# Patient Record
Sex: FEMALE | Race: WHITE | NOT HISPANIC OR LATINO | Employment: FULL TIME | URBAN - METROPOLITAN AREA
[De-identification: names, ages, dates, MRNs, and addresses within clinical notes are randomized per-mention and may not be internally consistent; named-entity substitution may affect disease eponyms.]

---

## 2019-03-18 ENCOUNTER — OFFICE VISIT (OUTPATIENT)
Dept: URGENT CARE | Facility: CLINIC | Age: 39
End: 2019-03-18
Payer: COMMERCIAL

## 2019-03-18 VITALS
BODY MASS INDEX: 49.51 KG/M2 | WEIGHT: 290 LBS | DIASTOLIC BLOOD PRESSURE: 80 MMHG | TEMPERATURE: 97 F | HEIGHT: 64 IN | HEART RATE: 88 BPM | OXYGEN SATURATION: 99 % | SYSTOLIC BLOOD PRESSURE: 130 MMHG

## 2019-03-18 DIAGNOSIS — N30.01 ACUTE CYSTITIS WITH HEMATURIA: Primary | ICD-10-CM

## 2019-03-18 DIAGNOSIS — R30.0 DYSURIA: ICD-10-CM

## 2019-03-18 LAB
BILIRUB UR QL STRIP: NEGATIVE
GLUCOSE SERPL-MCNC: 103 MG/DL (ref 70–110)
GLUCOSE UR QL STRIP: NEGATIVE
KETONES UR QL STRIP: NEGATIVE
LEUKOCYTE ESTERASE UR QL STRIP: POSITIVE
PH, POC UA: 5.5 (ref 5–8)
POC ANION GAP: 17 MMOL/L (ref 10–20)
POC BLOOD, URINE: POSITIVE
POC BUN: 14 MMOL/L (ref 8–26)
POC CHLORIDE: 101 MMOL/L (ref 98–109)
POC CREATININE: 1 MG/DL (ref 0.6–1.3)
POC HEMATOCRIT: 38 %PCV (ref 37–47)
POC HEMOGLOBIN: 12.9 G/DL (ref 12.5–16)
POC ICA: 1.16 MMOL/L (ref 1.12–1.32)
POC NITRATES, URINE: POSITIVE
POC POTASSIUM: 4.1 MMOL/L (ref 3.5–4.9)
POC SODIUM: 140 MMOL/L (ref 138–146)
POC TCO2: 27 MMOL/L (ref 24–29)
PROT UR QL STRIP: NEGATIVE
SP GR UR STRIP: 1.01 (ref 1–1.03)
UROBILINOGEN UR STRIP-ACNC: NORMAL (ref 0.1–1.1)

## 2019-03-18 PROCEDURE — 81003 URINALYSIS AUTO W/O SCOPE: CPT | Mod: QW,S$GLB,, | Performed by: PHYSICIAN ASSISTANT

## 2019-03-18 PROCEDURE — 99203 PR OFFICE/OUTPT VISIT, NEW, LEVL III, 30-44 MIN: ICD-10-PCS | Mod: 25,S$GLB,, | Performed by: PHYSICIAN ASSISTANT

## 2019-03-18 PROCEDURE — 3008F PR BODY MASS INDEX (BMI) DOCUMENTED: ICD-10-PCS | Mod: CPTII,S$GLB,, | Performed by: PHYSICIAN ASSISTANT

## 2019-03-18 PROCEDURE — 80047 POCT CHEMISTRY PANEL: ICD-10-PCS | Mod: QW,S$GLB,, | Performed by: PHYSICIAN ASSISTANT

## 2019-03-18 PROCEDURE — 87088 URINE BACTERIA CULTURE: CPT

## 2019-03-18 PROCEDURE — 3008F BODY MASS INDEX DOCD: CPT | Mod: CPTII,S$GLB,, | Performed by: PHYSICIAN ASSISTANT

## 2019-03-18 PROCEDURE — 80047 BASIC METABLC PNL IONIZED CA: CPT | Mod: QW,S$GLB,, | Performed by: PHYSICIAN ASSISTANT

## 2019-03-18 PROCEDURE — 87086 URINE CULTURE/COLONY COUNT: CPT

## 2019-03-18 PROCEDURE — 99203 OFFICE O/P NEW LOW 30 MIN: CPT | Mod: 25,S$GLB,, | Performed by: PHYSICIAN ASSISTANT

## 2019-03-18 PROCEDURE — 87077 CULTURE AEROBIC IDENTIFY: CPT

## 2019-03-18 PROCEDURE — 87186 SC STD MICRODIL/AGAR DIL: CPT

## 2019-03-18 PROCEDURE — 81003 POCT URINALYSIS, DIPSTICK, AUTOMATED, W/O SCOPE: ICD-10-PCS | Mod: QW,S$GLB,, | Performed by: PHYSICIAN ASSISTANT

## 2019-03-18 RX ORDER — SULFAMETHOXAZOLE AND TRIMETHOPRIM 800; 160 MG/1; MG/1
1 TABLET ORAL 2 TIMES DAILY
Qty: 14 TABLET | Refills: 0 | Status: SHIPPED | OUTPATIENT
Start: 2019-03-18 | End: 2019-03-25

## 2019-03-18 RX ORDER — PHENAZOPYRIDINE HYDROCHLORIDE 200 MG/1
200 TABLET, FILM COATED ORAL 3 TIMES DAILY PRN
Qty: 6 TABLET | Refills: 0 | Status: SHIPPED | OUTPATIENT
Start: 2019-03-18 | End: 2019-03-20

## 2019-03-18 NOTE — PROGRESS NOTES
"Subjective:       Patient ID: Fran Higgins is a 38 y.o. female.    Vitals:  height is 5' 4" (1.626 m) and weight is 131.5 kg (290 lb). Her temperature is 96.6 °F (35.9 °C). Her blood pressure is 130/80 and her pulse is 88. Her oxygen saturation is 99%.     Chief Complaint: Urinary Tract Infection    Patient reports dysuria, urinary frequency and urgency x 2 days. Patient reports no fever, back/flank pain, abdominal pain, N/V/D/C, headache, blurry vision, dizziness or weakness.       Urinary Tract Infection    This is a new problem. The current episode started yesterday. The problem occurs every urination. The problem has been gradually worsening. The quality of the pain is described as burning and stabbing. The pain is at a severity of 9/10. The pain is severe. There has been no fever. She is sexually active. There is no history of pyelonephritis. Associated symptoms include chills, frequency, hematuria and urgency. Pertinent negatives include no flank pain, nausea, vomiting, constipation or rash. She has tried nothing for the symptoms. The treatment provided no relief.       Constitution: Positive for chills. Negative for sweating, fatigue and fever.   HENT: Negative for ear pain, postnasal drip, sinus pain, sinus pressure, sore throat and trouble swallowing.    Neck: Negative for painful lymph nodes.   Cardiovascular: Negative for chest pain, leg swelling, palpitations, sob on exertion and passing out.   Eyes: Negative for eye pain, photophobia, double vision and blurred vision.   Respiratory: Negative for chest tightness, cough, sputum production, bloody sputum, COPD, shortness of breath, stridor and wheezing.    Gastrointestinal: Negative for abdominal pain, nausea, vomiting, constipation and diarrhea.   Genitourinary: Positive for dysuria, frequency, urgency and hematuria. Negative for urine decreased, flank pain, bladder incontinence, bed wetting, history of kidney stones, painful menstruation, irregular " menstruation, missed menses, heavy menstrual bleeding, ovarian cysts, genital trauma, vaginal pain, vaginal discharge, vaginal bleeding, vaginal odor, painful intercourse, genital sore, painful ejaculation and pelvic pain.   Musculoskeletal: Negative for joint pain, joint swelling, back pain, muscle cramps and muscle ache.   Skin: Negative for rash and lesion.   Neurological: Negative for dizziness, light-headedness, passing out, headaches, disorientation, altered mental status and loss of consciousness.   Hematologic/Lymphatic: Negative for swollen lymph nodes.   Psychiatric/Behavioral: Negative for altered mental status and disorientation.       Objective:      Physical Exam   Constitutional: She is oriented to person, place, and time. She appears well-developed and well-nourished.  Non-toxic appearance. She does not have a sickly appearance. She does not appear ill. No distress.   HENT:   Head: Normocephalic and atraumatic.   Right Ear: Hearing, tympanic membrane, external ear and ear canal normal.   Left Ear: Hearing, tympanic membrane, external ear and ear canal normal.   Nose: Nose normal. No nasal deformity. No epistaxis.   Mouth/Throat: Oropharynx is clear and moist and mucous membranes are normal.   Eyes: Conjunctivae and lids are normal.   Neck: Trachea normal, normal range of motion and phonation normal. Neck supple.   Cardiovascular: Normal rate, regular rhythm, normal heart sounds and normal pulses.   Pulmonary/Chest: Effort normal and breath sounds normal. No accessory muscle usage or stridor. She has no decreased breath sounds. She has no wheezes. She has no rhonchi. She has no rales.   Abdominal: Soft. Normal appearance and bowel sounds are normal. She exhibits no distension and no mass. There is no tenderness. There is no rigidity, no rebound, no guarding, no CVA tenderness, no tenderness at McBurney's point and negative Nolan's sign. No hernia.   Lymphadenopathy:     She has no cervical  adenopathy.   Neurological: She is alert and oriented to person, place, and time.   Skin: Skin is warm, dry and intact. No rash noted.   Psychiatric: She has a normal mood and affect. Her speech is normal and behavior is normal. Cognition and memory are normal.   Nursing note and vitals reviewed.        Results for orders placed or performed in visit on 03/18/19   POCT Urinalysis, Dipstick, Automated, W/O Scope   Result Value Ref Range    POC Blood, Urine Positive (A) Negative    POC Bilirubin, Urine Negative Negative    POC Urobilinogen, Urine normal 0.1 - 1.1    POC Ketones, Urine Negative Negative    POC Protein, Urine Negative Negative    POC Nitrates, Urine Positive (A) Negative    POC Glucose, Urine Negative Negative    pH, UA 5.5 5 - 8    POC Specific Gravity, Urine 1.015 1.003 - 1.029    POC Leukocytes, Urine Positive (A) Negative   POCT Chemistry Panel   Result Value Ref Range    POC Sodium 140 138 - 146 MMOL/L    POC Potassium 4.1 3.5 - 4.9 MMOL/L    POC Chloride 101 98 - 109 MMOL/L    POC BUN 14 8 - 26 MMOL/L    POC Glucose 103 70 - 110 MG/DL    POC Creatinine 1.0 0.6 - 1.3 mg/dL    POC iCA 1.16 1.12 - 1.32 MMOL/L    POC TCO2 27 24 - 29 MMOL/L    POC Hematocrit 38 37 - 47 %PCV    POC Hemoglobin 12.9 12.5 - 16 g/dL    POC Anion Gap 17 10.0 - 20 MMOL/L       Assessment:       1. Acute cystitis with hematuria    2. Dysuria        Plan:         Acute cystitis with hematuria  -     POCT Urinalysis, Dipstick, Automated, W/O Scope  -     Culture, Urine  -     sulfamethoxazole-trimethoprim 800-160mg (BACTRIM DS) 800-160 mg Tab; Take 1 tablet by mouth 2 (two) times daily. for 7 days  Dispense: 14 tablet; Refill: 0  -     phenazopyridine (PYRIDIUM) 200 MG tablet; Take 1 tablet (200 mg total) by mouth 3 (three) times daily as needed for Pain.  Dispense: 6 tablet; Refill: 0  -     POCT Chemistry Panel    Dysuria  -     POCT Urinalysis, Dipstick, Automated, W/O Scope  -     Culture, Urine      Patient Instructions  "  If you were prescribed a narcotic or controlled medication, do not drive or operate heavy equipment or machinery while taking these medications.  You must understand that you've received an Urgent Care treatment only and that you may be released before all your medical problems are known or treated. You, the patient, will arrange for follow up care as instructed.  Follow up with your PCP or specialty clinic as directed if not improved or as needed. You can call [phone] to schedule an appointment with the appropriate provider.  If your condition worsens we recommend that you receive another evaluation at the Emergency Department for any concerns or worsening of condition.  Patient aware and verbalized understanding.    Discussed UA results with patient.  We will call you in the next 3-5 days for Culture results.  Patient aware and verbalized understanding.    Bladder Infection, Female (Adult)    Urine is normally doesn't have any bacteria in it. But bacteria can get into the urinary tract from the skin around the rectum. Or they can travel in the blood from elsewhere in the body. Once they are in your urinary tract, they can cause infection in the urethra (urethritis), the bladder (cystitis), or the kidneys (pyelonephritis).  The most common place for an infection is in the bladder. This is called a bladder infection. This is one of the most common infections in women. Most bladder infections are easily treated. They are not serious unless the infection spreads to the kidney.  The phrases "bladder infection," "UTI," and "cystitis" are often used to describe the same thing. But they are not always the same. Cystitis is an inflammation of the bladder. The most common cause of cystitis is an infection.  Symptoms  The infection causes inflammation in the urethra and bladder. This causes many of the symptoms. The most common symptoms of a bladder infection are:  · Pain or burning when urinating  · Having to " urinate more often than usual  · Urgent need to urinate  · Only a small amount of urine comes out  · Blood in urine  · Abdominal discomfort. This is usually in the lower abdomen above the pubic bone.  · Cloudy urine  · Strong- or bad-smelling urine  · Unable to urinate (urinary retention)  · Unable to hold urine in (urinary incontinence)  · Fever  · Loss of appetite  · Confusion (in older adults)  Causes  Bladder infections are not contagious. You can't get one from someone else, from a toilet seat, or from sharing a bath.  The most common cause of bladder infections is bacteria from the bowels. The bacteria get onto the skin around the opening of the urethra. From there, they can get into the urine and travel up to the bladder, causing inflammation and infection. This usually happens because of:  · Wiping improperly after urinating. Always wipe from front to back.  · Bowel incontinence  · Pregnancy  · Procedures such as having a catheter inserted  · Older age  · Not emptying your bladder. This can allow bacteria a chance to grow in your urine.  · Dehydration  · Constipation  · Sex  · Use of a diaphragm for birth control   Treatment  Bladder infections are diagnosed by a urine test. They are treated with antibiotics and usually clear up quickly without complications. Treatment helps prevent a more serious kidney infection.  Medicines  Medicines can help in the treatment of a bladder infection:  · Take antibiotics until they are used up, even if you feel better. It is important to finish them to make sure the infection has cleared.  · You can use acetaminophen or ibuprofen for pain, fever, or discomfort, unless another medicine was prescribed. If you have chronic liver or kidney disease, talk with your healthcare provider before using these medicines. Also talk with your provider if you've ever had a stomach ulcer or gastrointestinal bleeding, or are taking blood-thinner medicines.  · If you are  given phenazopydridine to reduce burning with urination, it will cause your urine to become a bright orange color. This can stain clothing.  Care and prevention  These self-care steps can help prevent future infections:  · Drink plenty of fluids to prevent dehydration and flush out your bladder. Do this unless you must restrict fluids for other health reasons, or your doctor told you not to.  · Proper cleaning after going to the bathroom is important. Wipe from front to back after using the toilet to prevent the spread of bacteria.  · Urinate more often. Don't try to hold urine in for a long time.  · Wear loose-fitting clothes and cotton underwear. Avoid tight-fitting pants.  · Improve your diet and prevent constipation. Eat more fresh fruit and vegetables, and fiber, and less junk and fatty foods.  · Avoid sex until your symptoms are gone.  · Avoid caffeine, alcohol, and spicy foods. These can irritate your bladder.  · Urinate right after intercourse to flush out your bladder.  · If you use birth control pills and have frequent bladder infections, discuss it with your doctor.  Follow-up care  Call your healthcare provider if all symptoms are not gone after 3 days of treatment. This is especially important if you have repeat infections.  If a culture was done, you will be told if your treatment needs to be changed. If directed, you can call to find out the results.  If X-rays were done, you will be told if the results will affect your treatment.  Call 911  Call 911 if any of the following occur:  · Trouble breathing  · Hard to wake up or confusion  · Fainting or loss of consciousness  · Rapid heart rate  When to seek medical advice  Call your healthcare provider right away if any of these occur:  · Fever of 100.4ºF (38.0ºC) or higher, or as directed by your healthcare provider  · Symptoms are not better by the third day of treatment  · Back or belly (abdominal) pain that gets worse  · Repeated vomiting, or unable to  keep medicine down  · Weakness or dizziness  · Vaginal discharge  · Pain, redness, or swelling in the outer vaginal area (labia)  Date Last Reviewed: 10/1/2016  © 8275-4014 [employer], [employer]. [address] [place] [address], [place] [address]. All rights reserved. This information is not intended as a substitute for professional medical care. Always follow your healthcare professional's instructions.

## 2019-03-18 NOTE — PATIENT INSTRUCTIONS
"If you were prescribed a narcotic or controlled medication, do not drive or operate heavy equipment or machinery while taking these medications.  You must understand that you've received an Urgent Care treatment only and that you may be released before all your medical problems are known or treated. You, the patient, will arrange for follow up care as instructed.  Follow up with your PCP or specialty clinic as directed if not improved or as needed. You can call [phone] to schedule an appointment with the appropriate provider.  If your condition worsens we recommend that you receive another evaluation at the Emergency Department for any concerns or worsening of condition.  Patient aware and verbalized understanding.    Discussed UA results with patient.  We will call you in the next 3-5 days for Culture results.  Patient aware and verbalized understanding.    Bladder Infection, Female (Adult)    Urine is normally doesn't have any bacteria in it. But bacteria can get into the urinary tract from the skin around the rectum. Or they can travel in the blood from elsewhere in the body. Once they are in your urinary tract, they can cause infection in the urethra (urethritis), the bladder (cystitis), or the kidneys (pyelonephritis).  The most common place for an infection is in the bladder. This is called a bladder infection. This is one of the most common infections in women. Most bladder infections are easily treated. They are not serious unless the infection spreads to the kidney.  The phrases "bladder infection," "UTI," and "cystitis" are often used to describe the same thing. But they are not always the same. Cystitis is an inflammation of the bladder. The most common cause of cystitis is an infection.  Symptoms  The infection causes inflammation in the urethra and bladder. This causes many of the symptoms. The most common symptoms of a bladder infection are:  · Pain or burning when urinating  · Having to urinate " more often than usual  · Urgent need to urinate  · Only a small amount of urine comes out  · Blood in urine  · Abdominal discomfort. This is usually in the lower abdomen above the pubic bone.  · Cloudy urine  · Strong- or bad-smelling urine  · Unable to urinate (urinary retention)  · Unable to hold urine in (urinary incontinence)  · Fever  · Loss of appetite  · Confusion (in older adults)  Causes  Bladder infections are not contagious. You can't get one from someone else, from a toilet seat, or from sharing a bath.  The most common cause of bladder infections is bacteria from the bowels. The bacteria get onto the skin around the opening of the urethra. From there, they can get into the urine and travel up to the bladder, causing inflammation and infection. This usually happens because of:  · Wiping improperly after urinating. Always wipe from front to back.  · Bowel incontinence  · Pregnancy  · Procedures such as having a catheter inserted  · Older age  · Not emptying your bladder. This can allow bacteria a chance to grow in your urine.  · Dehydration  · Constipation  · Sex  · Use of a diaphragm for birth control   Treatment  Bladder infections are diagnosed by a urine test. They are treated with antibiotics and usually clear up quickly without complications. Treatment helps prevent a more serious kidney infection.  Medicines  Medicines can help in the treatment of a bladder infection:  · Take antibiotics until they are used up, even if you feel better. It is important to finish them to make sure the infection has cleared.  · You can use acetaminophen or ibuprofen for pain, fever, or discomfort, unless another medicine was prescribed. If you have chronic liver or kidney disease, talk with your healthcare provider before using these medicines. Also talk with your provider if you've ever had a stomach ulcer or gastrointestinal bleeding, or are taking blood-thinner medicines.  · If you are given phenazopydridine to  reduce burning with urination, it will cause your urine to become a bright orange color. This can stain clothing.  Care and prevention  These self-care steps can help prevent future infections:  · Drink plenty of fluids to prevent dehydration and flush out your bladder. Do this unless you must restrict fluids for other health reasons, or your doctor told you not to.  · Proper cleaning after going to the bathroom is important. Wipe from front to back after using the toilet to prevent the spread of bacteria.  · Urinate more often. Don't try to hold urine in for a long time.  · Wear loose-fitting clothes and cotton underwear. Avoid tight-fitting pants.  · Improve your diet and prevent constipation. Eat more fresh fruit and vegetables, and fiber, and less junk and fatty foods.  · Avoid sex until your symptoms are gone.  · Avoid caffeine, alcohol, and spicy foods. These can irritate your bladder.  · Urinate right after intercourse to flush out your bladder.  · If you use birth control pills and have frequent bladder infections, discuss it with your doctor.  Follow-up care  Call your healthcare provider if all symptoms are not gone after 3 days of treatment. This is especially important if you have repeat infections.  If a culture was done, you will be told if your treatment needs to be changed. If directed, you can call to find out the results.  If X-rays were done, you will be told if the results will affect your treatment.  Call 911  Call 911 if any of the following occur:  · Trouble breathing  · Hard to wake up or confusion  · Fainting or loss of consciousness  · Rapid heart rate  When to seek medical advice  Call your healthcare provider right away if any of these occur:  · Fever of 100.4ºF (38.0ºC) or higher, or as directed by your healthcare provider  · Symptoms are not better by the third day of treatment  · Back or belly (abdominal) pain that gets worse  · Repeated vomiting, or unable to keep medicine  down  · Weakness or dizziness  · Vaginal discharge  · Pain, redness, or swelling in the outer vaginal area (labia)  Date Last Reviewed: 10/1/2016  © 4400-8004 [employer]. [address] [place] [address], [place] [address]. All rights reserved. This information is not intended as a substitute for professional medical care. Always follow your healthcare professional's instructions.

## 2019-03-21 LAB — BACTERIA UR CULT: NORMAL

## 2019-03-22 ENCOUNTER — TELEPHONE (OUTPATIENT)
Dept: URGENT CARE | Facility: CLINIC | Age: 39
End: 2019-03-22

## 2019-03-22 NOTE — TELEPHONE ENCOUNTER
Left vm for pt to call back for results.  ----- Message from Jose Estrella MD sent at 3/21/2019  2:21 PM CDT -----  Call patient and let know on correct antibiotic based on culture

## 2019-04-16 ENCOUNTER — HOSPITAL ENCOUNTER (OUTPATIENT)
Dept: RADIOLOGY | Facility: HOSPITAL | Age: 39
Discharge: HOME | End: 2019-04-16
Attending: PHYSICIAN ASSISTANT
Payer: COMMERCIAL

## 2019-04-16 ENCOUNTER — OFFICE VISIT (OUTPATIENT)
Dept: ORTHOPEDICS | Facility: CLINIC | Age: 39
End: 2019-04-16
Payer: COMMERCIAL

## 2019-04-16 VITALS — WEIGHT: 293 LBS | BODY MASS INDEX: 50.02 KG/M2 | HEIGHT: 64 IN

## 2019-04-16 DIAGNOSIS — M77.52 TENDINITIS OF LEFT FOOT: ICD-10-CM

## 2019-04-16 DIAGNOSIS — M79.672 LEFT FOOT PAIN: ICD-10-CM

## 2019-04-16 DIAGNOSIS — M72.2 PLANTAR FASCIITIS, LEFT: Primary | ICD-10-CM

## 2019-04-16 DIAGNOSIS — M79.672 LEFT FOOT PAIN: Primary | ICD-10-CM

## 2019-04-16 PROCEDURE — 3008F PR BODY MASS INDEX (BMI) DOCUMENTED: ICD-10-PCS | Mod: CPTII,S$GLB,, | Performed by: PHYSICIAN ASSISTANT

## 2019-04-16 PROCEDURE — 99999 PR PBB SHADOW E&M-EST. PATIENT-LVL III: CPT | Mod: PBBFAC,,, | Performed by: PHYSICIAN ASSISTANT

## 2019-04-16 PROCEDURE — 73630 X-RAY EXAM OF FOOT: CPT | Mod: TC,LT

## 2019-04-16 PROCEDURE — 3008F BODY MASS INDEX DOCD: CPT | Mod: CPTII,S$GLB,, | Performed by: PHYSICIAN ASSISTANT

## 2019-04-16 PROCEDURE — 99203 PR OFFICE/OUTPT VISIT, NEW, LEVL III, 30-44 MIN: ICD-10-PCS | Mod: S$GLB,,, | Performed by: PHYSICIAN ASSISTANT

## 2019-04-16 PROCEDURE — 73630 XR FOOT COMPLETE 3 VIEW LEFT: ICD-10-PCS | Mod: 26,LT,, | Performed by: RADIOLOGY

## 2019-04-16 PROCEDURE — 73630 X-RAY EXAM OF FOOT: CPT | Mod: 26,LT,, | Performed by: RADIOLOGY

## 2019-04-16 PROCEDURE — 99203 OFFICE O/P NEW LOW 30 MIN: CPT | Mod: S$GLB,,, | Performed by: PHYSICIAN ASSISTANT

## 2019-04-16 PROCEDURE — 99999 PR PBB SHADOW E&M-EST. PATIENT-LVL III: ICD-10-PCS | Mod: PBBFAC,,, | Performed by: PHYSICIAN ASSISTANT

## 2019-04-16 RX ORDER — MELOXICAM 15 MG/1
15 TABLET ORAL DAILY
Qty: 30 TABLET | Refills: 0 | Status: SHIPPED | OUTPATIENT
Start: 2019-04-16 | End: 2019-05-13 | Stop reason: SDUPTHER

## 2019-04-18 NOTE — PROGRESS NOTES
Subjective:      Patient ID: Fran Higgins is a 38 y.o. female.    Chief Complaint: Pain of the Left Foot    HPI  38 year old female presents for evaluation of her left foot. She reports intermittent plantar fasciitis x 1 year and has received about 5 injections for this by a podiatrist in the past. She reports lateral foot pain x couple of months. She was walking and felt a snap at the lateral foot and had immediate pain. Pain is now intermittent. It sometimes radiates up the leg. It is worse with prolonged standing. She has pain with walking. Duexis prn sometimes helps. She reports intermittent swelling. No giving way. She wears inserts and does stretches for her plantar fasciitis.   Review of Systems   Constitution: Negative for chills, fever and night sweats.   Cardiovascular: Negative for chest pain.   Respiratory: Negative for cough and shortness of breath.    Hematologic/Lymphatic: Does not bruise/bleed easily.   Skin: Negative for color change.   Gastrointestinal: Negative for heartburn.   Genitourinary: Negative for dysuria.   Neurological: Negative for numbness and paresthesias.   Psychiatric/Behavioral: Negative for altered mental status.   Allergic/Immunologic: Negative for persistent infections.         Objective:            General    Vitals reviewed.  Constitutional: She is oriented to person, place, and time. She appears well-developed and well-nourished.   Cardiovascular: Normal rate.    Neurological: She is alert and oriented to person, place, and time.         Left Ankle/Foot Exam     Inspection  Erythema: absent    Range of Motion   The patient has normal left ankle ROM.     Tests   Anterior drawer: negative  Varus tilt: negative    Other   Sensation: normal    Comments:  TTP cuboid and medial calcaneal tuberosity.       Vascular Exam       Left Pulses  Dorsalis Pedis:      2+              X-ray: ordered and reviewed by myself. No acute abnormality or significant arthritis.  Calcaneal  enthesopathy.        Assessment:       Encounter Diagnoses   Name Primary?    Plantar fasciitis, left Yes    Tendinitis of left foot           Plan:       Discussed treatment options with patient. She was placed in a boot. She will wear this with insert. She will take mobic x 2 weeks then prn. RTC in 4 weeks for re-evaluation.

## 2019-04-24 ENCOUNTER — LAB VISIT (OUTPATIENT)
Dept: LAB | Facility: HOSPITAL | Age: 39
End: 2019-04-24
Attending: OBSTETRICS & GYNECOLOGY
Payer: COMMERCIAL

## 2019-04-24 ENCOUNTER — OFFICE VISIT (OUTPATIENT)
Dept: OBSTETRICS AND GYNECOLOGY | Facility: CLINIC | Age: 39
End: 2019-04-24
Payer: COMMERCIAL

## 2019-04-24 VITALS
DIASTOLIC BLOOD PRESSURE: 70 MMHG | BODY MASS INDEX: 50.02 KG/M2 | WEIGHT: 293 LBS | HEIGHT: 64 IN | SYSTOLIC BLOOD PRESSURE: 114 MMHG

## 2019-04-24 DIAGNOSIS — Z30.9 ENCOUNTER FOR CONTRACEPTIVE MANAGEMENT, UNSPECIFIED TYPE: ICD-10-CM

## 2019-04-24 DIAGNOSIS — Z12.4 PAP SMEAR FOR CERVICAL CANCER SCREENING: ICD-10-CM

## 2019-04-24 DIAGNOSIS — Z11.3 VENEREAL DISEASE SCREENING: ICD-10-CM

## 2019-04-24 DIAGNOSIS — Z01.419 ENCOUNTER FOR GYNECOLOGICAL EXAMINATION WITHOUT ABNORMAL FINDING: Primary | ICD-10-CM

## 2019-04-24 LAB
B-HCG UR QL: NEGATIVE
CTP QC/QA: YES

## 2019-04-24 PROCEDURE — 86592 SYPHILIS TEST NON-TREP QUAL: CPT

## 2019-04-24 PROCEDURE — 88141 CYTOPATH C/V INTERPRET: CPT | Mod: ,,, | Performed by: PATHOLOGY

## 2019-04-24 PROCEDURE — 81025 URINE PREGNANCY TEST: CPT | Mod: S$GLB,,, | Performed by: OBSTETRICS & GYNECOLOGY

## 2019-04-24 PROCEDURE — 87624 HPV HI-RISK TYP POOLED RSLT: CPT

## 2019-04-24 PROCEDURE — 88175 CYTOPATH C/V AUTO FLUID REDO: CPT | Performed by: PATHOLOGY

## 2019-04-24 PROCEDURE — 87491 CHLMYD TRACH DNA AMP PROBE: CPT

## 2019-04-24 PROCEDURE — 99999 PR PBB SHADOW E&M-EST. PATIENT-LVL III: ICD-10-PCS | Mod: PBBFAC,,, | Performed by: OBSTETRICS & GYNECOLOGY

## 2019-04-24 PROCEDURE — 86703 HIV-1/HIV-2 1 RESULT ANTBDY: CPT

## 2019-04-24 PROCEDURE — 80074 ACUTE HEPATITIS PANEL: CPT

## 2019-04-24 PROCEDURE — 99385 PREV VISIT NEW AGE 18-39: CPT | Mod: S$GLB,,, | Performed by: OBSTETRICS & GYNECOLOGY

## 2019-04-24 PROCEDURE — 99385 PR PREVENTIVE VISIT,NEW,18-39: ICD-10-PCS | Mod: S$GLB,,, | Performed by: OBSTETRICS & GYNECOLOGY

## 2019-04-24 PROCEDURE — 36415 COLL VENOUS BLD VENIPUNCTURE: CPT

## 2019-04-24 PROCEDURE — 88141 LIQUID-BASED PAP SMEAR, SCREENING: ICD-10-PCS | Mod: ,,, | Performed by: PATHOLOGY

## 2019-04-24 PROCEDURE — 81025 POCT URINE PREGNANCY: ICD-10-PCS | Mod: S$GLB,,, | Performed by: OBSTETRICS & GYNECOLOGY

## 2019-04-24 PROCEDURE — 99999 PR PBB SHADOW E&M-EST. PATIENT-LVL III: CPT | Mod: PBBFAC,,, | Performed by: OBSTETRICS & GYNECOLOGY

## 2019-04-24 NOTE — PROGRESS NOTES
Subjective:       Patient ID: Fran Higgins is a 38 y.o. female.    Chief Complaint:  Gynecologic Exam and Contraception      History of Present Illness  HPI    Fran Higgins is a 38 y.o. female  NEW TO ME  here for her annual GYN exam.  She is also interested in contraception.She also requests STD testing.   She describes her periods as regular, normal, heavy flow, lasting 6-7 days.   denies break through bleeding.   denies vaginal itching or irritation.  Denies vaginal discharge.  She is not currently sexually active.   She uses condoms for contraception.   History of abnormal pap: YES   Last Pap: approximate date  and was normal  Last MMG: None  Last Colonoscopy:  NONE  denies domestic violence. She does feel safe at home.     Past Medical History:   Diagnosis Date    Abnormal Pap smear of cervix      Past Surgical History:   Procedure Laterality Date    TONSILLECTOMY       Social History     Socioeconomic History    Marital status: Single     Spouse name: Not on file    Number of children: Not on file    Years of education: Not on file    Highest education level: Not on file   Occupational History    Not on file   Social Needs    Financial resource strain: Not on file    Food insecurity:     Worry: Not on file     Inability: Not on file    Transportation needs:     Medical: Not on file     Non-medical: Not on file   Tobacco Use    Smoking status: Never Smoker    Smokeless tobacco: Never Used   Substance and Sexual Activity    Alcohol use: No    Drug use: No    Sexual activity: Not Currently     Partners: Male     Birth control/protection: None, Condom   Lifestyle    Physical activity:     Days per week: Not on file     Minutes per session: Not on file    Stress: Not on file   Relationships    Social connections:     Talks on phone: Not on file     Gets together: Not on file     Attends L[employer]-day service: Not on file     Active member of club or organization: Not on  "file     Attends meetings of clubs or organizations: Not on file     Relationship status: Not on file   Other Topics Concern    Not on file   Social History Narrative    Not on file     Family History   Problem Relation Age of Onset    Diabetes Paternal Grandfather     Hypertension Father     Hypertension Mother     Stroke Mother 60        ASD, PFO    Colon cancer Neg Hx     Ovarian cancer Neg Hx     Breast cancer Neg Hx      OB History        1    Para   1    Term   1       0    AB   0    Living   1       SAB   0    TAB   0    Ectopic   0    Multiple   0    Live Births   1                 /70   Ht 5' 4" (1.626 m)   Wt (!) 158.1 kg (348 lb 8.8 oz)   LMP 2019 (Approximate)   BMI 59.83 kg/m²         GYN & OB History  Patient's last menstrual period was 2019 (approximate).   Date of Last Pap: No result found    OB History    Para Term  AB Living   1 1 1 0 0 1   SAB TAB Ectopic Multiple Live Births   0 0 0 0 1      # Outcome Date GA Lbr Hector/2nd Weight Sex Delivery Anes PTL Lv   1 Term 13 38w6d 13:00 / 00:37 3.53 kg (7 lb 12.5 oz) F Vag-Spont EPI N TYRELL       Review of Systems  Review of Systems   Constitutional: Negative for activity change, appetite change, fatigue and unexpected weight change.   HENT: Negative.    Eyes: Negative for visual disturbance.   Respiratory: Negative for shortness of breath and wheezing.    Cardiovascular: Negative for chest pain, palpitations and leg swelling.   Gastrointestinal: Negative for abdominal pain, bloating and blood in stool.   Endocrine: Negative for diabetes and hair loss.   Genitourinary: Negative for decreased libido, dyspareunia, menorrhagia and menstrual problem.   Musculoskeletal: Negative for back pain and joint swelling.   Integumentary:  Negative for acne, hair changes and nipple discharge.   Neurological: Negative for headaches.   Hematological: Does not bruise/bleed easily.   Psychiatric/Behavioral: " Negative for depression and sleep disturbance. The patient is not nervous/anxious.    Breast: Negative for mastodynia and nipple discharge          Objective:      Physical Exam:   Constitutional: She is oriented to person, place, and time. She appears well-developed and well-nourished.    HENT:   Head: Normocephalic and atraumatic.    Eyes: Pupils are equal, round, and reactive to light. EOM are normal.    Neck: Normal range of motion. Neck supple.    Cardiovascular: Normal rate and regular rhythm.     Pulmonary/Chest: Effort normal and breath sounds normal.   BREASTS:  no mass, no tenderness, no deformity and no retraction. Right breast exhibits no inverted nipple, no mass, no nipple discharge, no skin change, no tenderness, no bleeding and no swelling. Left breast exhibits no inverted nipple, no mass, no nipple discharge, no skin change, no tenderness, no bleeding and no swelling. Breasts are symmetrical.              Abdominal: Soft. Bowel sounds are normal.     Genitourinary: Pelvic exam was performed with patient supine.   Genitourinary Comments: PELVIC: Normal external genitalia without lesions.  Normal hair distribution.  Adequate perineal body, normal urethral meatus.  Vagina moist and well rugated without lesions or discharge.  Cervix pink, without lesions, discharge or tenderness.  No significant cystocele or rectocele.  Bimanual exam shows uterus to be NOT PALPABLE SECONDARY TO HABITUS, nontender.  Adnexa without masses or tenderness.               Musculoskeletal: Normal range of motion and moves all extremeties.       Neurological: She is alert and oriented to person, place, and time.    Skin: Skin is warm and dry.    Psychiatric: She has a normal mood and affect.              Assessment:        1. Encounter for gynecological examination without abnormal finding    2. Pap smear for cervical cancer screening    3. Venereal disease screening    4. Encounter for contraceptive management, unspecified type                 Plan:        1. Encounter for gynecological examination without abnormal finding  COUNSELING:  The patient was counseled today on regular weight bearing exercise. Patient was counseled today on the new ACS guidelines for cervical cytology screening as well as the current recommendations for breast cancer screening. Counseling session lasted approximately 10 minutes, and all her questions were answered. She was advised to see her primary care physician for all other health maintenance.   FOLLOW-UP with me for next routine visit.       2. Pap smear for cervical cancer screening    - Liquid-based pap smear, screening  - HPV High Risk Genotypes, PCR    3. Venereal disease screening    - C. trachomatis/N. gonorrhoeae by AMP DNA  - Hepatitis panel, acute; Future  - RPR; Future  - HIV 1/2 Ag/Ab (4th Gen); Future    4. Encounter for contraceptive management, unspecified type  Patient was counseled today on contraceptive options--Pills, Depo-Provera, IUD, Nexplanon, & Nuva Ring. We discussed the advantages and disadvantages of each. We discussed the continued use of condoms to prevent STDs.   -The use of hormonal contraception as well as other contraception options has been fully discussed with the patient. We discussed all options including OCPs, transdermal patches, vaginal ring, Depo Provera injections, Nexplanon, and IUDs. Warnings about anticipated minor side effects such as breakthrough spotting, nausea, breast tenderness, weight changes, acne, headaches, etc were given.  She has been told of the more serious potential side effects such as MI, stroke, and deep vein thrombosis, all of which are very unlikely.  She has been asked to report any signs of such serious problems immediately. The need for additional protection, such as a condom, to prevent exposure to sexually transmitted diseases has also been discussed- the patient has been clearly reminded that no hormonal contraceptive method can protect her  against diseases such as HIV and others.  All of her questions were answered. Total duration face to face visit time 25 minutes of which more than 50% was spent in counseling..     Counseling included discussion of treatment options and risks and benefits.      - POCT urine pregnancy  - US Pelvis Comp with Transvag NON-OB (xpd; Future  - levonorgestrel (MIRENA) 20 mcg/24 hr (5 years) IUD; 1 Intra Uterine Device by Intrauterine route once. for 1 dose  Dispense: 1 Intra Uterine Device; Refill: 0  - Device Authorization Order       Follow up in about 1 year (around 4/24/2020).

## 2019-04-25 ENCOUNTER — HOSPITAL ENCOUNTER (OUTPATIENT)
Dept: RADIOLOGY | Facility: OTHER | Age: 39
Discharge: HOME | End: 2019-04-25
Attending: OBSTETRICS & GYNECOLOGY
Payer: COMMERCIAL

## 2019-04-25 DIAGNOSIS — Z30.9 ENCOUNTER FOR CONTRACEPTIVE MANAGEMENT, UNSPECIFIED TYPE: ICD-10-CM

## 2019-04-25 LAB
C TRACH DNA SPEC QL NAA+PROBE: NOT DETECTED
HAV IGM SERPL QL IA: NEGATIVE
HBV CORE IGM SERPL QL IA: NEGATIVE
HBV SURFACE AG SERPL QL IA: NEGATIVE
HCV AB SERPL QL IA: NEGATIVE
HIV 1+2 AB+HIV1 P24 AG SERPL QL IA: NEGATIVE
N GONORRHOEA DNA SPEC QL NAA+PROBE: NOT DETECTED
RPR SER QL: NORMAL

## 2019-04-25 PROCEDURE — 76856 US EXAM PELVIC COMPLETE: CPT | Mod: 26,,, | Performed by: RADIOLOGY

## 2019-04-25 PROCEDURE — 76830 US PELVIS COMP WITH TRANSVAG NON-OB (XPD): ICD-10-PCS | Mod: 26,,, | Performed by: RADIOLOGY

## 2019-04-25 PROCEDURE — 76830 TRANSVAGINAL US NON-OB: CPT | Mod: TC

## 2019-04-25 PROCEDURE — 76830 TRANSVAGINAL US NON-OB: CPT | Mod: 26,,, | Performed by: RADIOLOGY

## 2019-04-25 PROCEDURE — 76856 US PELVIS COMP WITH TRANSVAG NON-OB (XPD): ICD-10-PCS | Mod: 26,,, | Performed by: RADIOLOGY

## 2019-04-29 ENCOUNTER — PATIENT MESSAGE (OUTPATIENT)
Dept: OBSTETRICS AND GYNECOLOGY | Facility: CLINIC | Age: 39
End: 2019-04-29

## 2019-04-29 ENCOUNTER — TELEPHONE (OUTPATIENT)
Dept: OBSTETRICS AND GYNECOLOGY | Facility: CLINIC | Age: 39
End: 2019-04-29

## 2019-04-29 DIAGNOSIS — N83.202 CYST OF LEFT OVARY: ICD-10-CM

## 2019-04-29 DIAGNOSIS — N76.0 VULVOVAGINITIS: Primary | ICD-10-CM

## 2019-04-29 RX ORDER — FLUCONAZOLE 150 MG/1
150 TABLET ORAL ONCE
Qty: 2 TABLET | Refills: 0 | Status: SHIPPED | OUTPATIENT
Start: 2019-04-29 | End: 2019-05-01

## 2019-04-29 NOTE — TELEPHONE ENCOUNTER
Patient stated that she has been having white, thick, smelly discharge with a lot of itching and would like to know if a medication can be called in

## 2019-04-29 NOTE — TELEPHONE ENCOUNTER
Discussed results of u/s with patient, large 12 cm mass, possible Dermoid.  Discussed need for surgery. Will check  as well.

## 2019-04-30 ENCOUNTER — LAB VISIT (OUTPATIENT)
Dept: LAB | Facility: HOSPITAL | Age: 39
End: 2019-04-30
Attending: OBSTETRICS & GYNECOLOGY
Payer: COMMERCIAL

## 2019-04-30 DIAGNOSIS — N83.202 CYST OF LEFT OVARY: ICD-10-CM

## 2019-04-30 LAB
CANCER AG125 SERPL-ACNC: 11 U/ML (ref 0–30)
HPV HR 12 DNA CVX QL NAA+PROBE: NEGATIVE
HPV16 AG SPEC QL: NEGATIVE
HPV18 DNA SPEC QL NAA+PROBE: NEGATIVE

## 2019-04-30 PROCEDURE — 36415 COLL VENOUS BLD VENIPUNCTURE: CPT

## 2019-04-30 PROCEDURE — 86304 IMMUNOASSAY TUMOR CA 125: CPT

## 2019-05-01 ENCOUNTER — TELEPHONE (OUTPATIENT)
Dept: OBSTETRICS AND GYNECOLOGY | Facility: CLINIC | Age: 39
End: 2019-05-01

## 2019-05-01 ENCOUNTER — PATIENT MESSAGE (OUTPATIENT)
Dept: OBSTETRICS AND GYNECOLOGY | Facility: CLINIC | Age: 39
End: 2019-05-01

## 2019-05-09 ENCOUNTER — OFFICE VISIT (OUTPATIENT)
Dept: OBSTETRICS AND GYNECOLOGY | Facility: CLINIC | Age: 39
End: 2019-05-09
Payer: COMMERCIAL

## 2019-05-09 VITALS
BODY MASS INDEX: 50.02 KG/M2 | WEIGHT: 293 LBS | SYSTOLIC BLOOD PRESSURE: 124 MMHG | HEIGHT: 64 IN | DIASTOLIC BLOOD PRESSURE: 82 MMHG

## 2019-05-09 DIAGNOSIS — N94.89 ADNEXAL MASS: ICD-10-CM

## 2019-05-09 DIAGNOSIS — N83.202 LEFT OVARIAN CYST: Primary | ICD-10-CM

## 2019-05-09 PROCEDURE — 99214 PR OFFICE/OUTPT VISIT, EST, LEVL IV, 30-39 MIN: ICD-10-PCS | Mod: S$GLB,,, | Performed by: OBSTETRICS & GYNECOLOGY

## 2019-05-09 PROCEDURE — 3008F BODY MASS INDEX DOCD: CPT | Mod: CPTII,S$GLB,, | Performed by: OBSTETRICS & GYNECOLOGY

## 2019-05-09 PROCEDURE — 3008F PR BODY MASS INDEX (BMI) DOCUMENTED: ICD-10-PCS | Mod: CPTII,S$GLB,, | Performed by: OBSTETRICS & GYNECOLOGY

## 2019-05-09 PROCEDURE — 99214 OFFICE O/P EST MOD 30 MIN: CPT | Mod: S$GLB,,, | Performed by: OBSTETRICS & GYNECOLOGY

## 2019-05-09 PROCEDURE — 99999 PR PBB SHADOW E&M-EST. PATIENT-LVL III: ICD-10-PCS | Mod: PBBFAC,,, | Performed by: OBSTETRICS & GYNECOLOGY

## 2019-05-09 PROCEDURE — 99999 PR PBB SHADOW E&M-EST. PATIENT-LVL III: CPT | Mod: PBBFAC,,, | Performed by: OBSTETRICS & GYNECOLOGY

## 2019-05-09 NOTE — PROGRESS NOTES
PT SEEN BY MR FOR ANNUAL EXAM. NO PROBLEMS AT THAT TIME BUT ADMITS TO PELVIC DISCOMFORT ON L NOW.  U/S WITH COMPLEX CYST ON L.    04/26/19 U/S  Uterus:  Size: 7.8 x 4.6 x 5.9 cm  Masses: 2 posterior fibroids are identified measuring 2.0 cm and 1.1 cm, respectively.  Endometrium: Normal in this pre menopausal patient, measuring 11 mm.  Right ovary is not identified on today's exam.  Left ovary:  Size: 12.6 x 9.2 x 12.7 cm  Appearance: There is a complex cystic lesion measuring 11.8 x 8.6 x 11.0 cm.  Internal mural nodule versus dependent debris.  Vascular Flow: Normal.  Free Fluid:  None.      Impression     Complex cystic lesion of the left ovary measuring up to 11.8 cm.  Findings may represent dermoid or less likely endometrioma or hemorrhagic cyst.  Given size, short-term interval follow-up versus MRI may be indicated for further evaluation.  Small uterine fibroids identified.     ROS:  GENERAL: No fever, chills, fatigability or weight loss.  VULVAR: No pain, no lesions and no itching.  VAGINAL: No relaxation, no itching, no discharge, no abnormal bleeding and no lesions.  ABDOMEN: No abdominal pain. Denies nausea. Denies vomiting. No diarrhea. No constipation  BREAST: Denies pain. No lumps. No discharge.  URINARY: No incontinence, no nocturia, no frequency and no dysuria.  CARDIOVASCULAR: No chest pain. No shortness of breath. No leg cramps.  NEUROLOGICAL: No headaches. No vision changes.  The remainder of the review of systems was negative.    PE:  General Appearance: obese And Well developed. Well nourished. In no acute distress.  Vulva: Lesions: No.  Urethral Meatus: Normal size. Normal location. No lesions. No prolapse.  Urethra: No masses. No tenderness. No prolapse. No scarring.  Bladder: No masses. No tenderness.  Vagina: Mucosa NI:yes discharge no, atrophy no, cystocele no or rectocele no.  Cervix: Lesion: no  Stenotic: no Cervical motion tenderness: no  Uterus: Uterus size: 7 weeks. Support good. Uterus  size: Normal  Adnexa: Masses: Yes 10 CM LEFT MOBILE Tenderness: No CDS Nodularity: Yes  Abdomen: obese No masses. No tenderness.  CHEST: CTA B  HEART: RRR  EXT: NO EDEMA      PROCEDURES:    PLAN:     DIAGNOSIS:  1. Left ovarian cyst    2. Adnexal mass        MEDICATIONS & ORDERS:  Orders Placed This Encounter    MRI Pelvis W WO Contrast     20 MIN D/W PT ON PELVIC MASSES AND TX OPTIONS.    FOLLOW-UP: With me AFTER MRI    Answers for HPI/ROS submitted by the patient on 2019   Gynecologic exam  genital itching: No  genital lesions: No  genital odor: No  genital rash: No  missed menses: No  pelvic pain: Yes  vaginal bleeding: No  vaginal discharge: No  Chronicity: new  Frequency: daily  Progression since onset: gradually worsening  Pain severity: moderate  Pregnant now?: No  abdominal pain: Yes  anorexia: No  back pain: Yes  chills: No  constipation: No  diarrhea: No  discolored urine: No  dysuria: No  fever: No  flank pain: Yes  frequency: No  headaches: No  hematuria: No  nausea: No  painful intercourse: Yes  rash: No  urgency: Yes  vomiting: No  STD: No  abdominal surgery: No   section: No  Ectopic pregnancy: No  Endometriosis: No  herpes simplex: No  gynecological surgery: No  menorrhagia: Yes  metrorrhagia: Yes  miscarriage: No  ovarian cysts: Yes  perineal abscess: No  PID: No  terminated pregnancy: No  vaginosis: No

## 2019-05-13 ENCOUNTER — OFFICE VISIT (OUTPATIENT)
Dept: OBSTETRICS AND GYNECOLOGY | Facility: CLINIC | Age: 39
End: 2019-05-13
Attending: OBSTETRICS & GYNECOLOGY
Payer: COMMERCIAL

## 2019-05-13 VITALS
BODY MASS INDEX: 50.02 KG/M2 | HEIGHT: 64 IN | WEIGHT: 293 LBS | SYSTOLIC BLOOD PRESSURE: 120 MMHG | DIASTOLIC BLOOD PRESSURE: 74 MMHG

## 2019-05-13 DIAGNOSIS — R87.615 UNSATISFACTORY CERVICAL PAPANICOLAOU SMEAR: Primary | ICD-10-CM

## 2019-05-13 PROCEDURE — 99499 UNLISTED E&M SERVICE: CPT | Mod: S$GLB,,, | Performed by: OBSTETRICS & GYNECOLOGY

## 2019-05-13 PROCEDURE — 88175 CYTOPATH C/V AUTO FLUID REDO: CPT

## 2019-05-13 PROCEDURE — 99999 PR PBB SHADOW E&M-EST. PATIENT-LVL III: CPT | Mod: PBBFAC,,, | Performed by: OBSTETRICS & GYNECOLOGY

## 2019-05-13 PROCEDURE — 99999 PR PBB SHADOW E&M-EST. PATIENT-LVL III: ICD-10-PCS | Mod: PBBFAC,,, | Performed by: OBSTETRICS & GYNECOLOGY

## 2019-05-13 PROCEDURE — 99499 NO LOS: ICD-10-PCS | Mod: S$GLB,,, | Performed by: OBSTETRICS & GYNECOLOGY

## 2019-05-13 RX ORDER — MELOXICAM 15 MG/1
15 TABLET ORAL DAILY
Qty: 30 TABLET | Refills: 0 | Status: SHIPPED | OUTPATIENT
Start: 2019-05-13 | End: 2019-06-15

## 2019-05-13 NOTE — PROGRESS NOTES
"  Subjective:       Patient ID: Fran Higgins is a 38 y.o. female.    Chief Complaint:  Follow-up (repap)      History of Present Illness  HPI  The patient presents today for a repeat Pap smear. Previous pap recently was insufficient, but had Negative HPV.    GYN & OB History  Patient's last menstrual period was 2019.   Date of Last Pap: 2019    OB History    Para Term  AB Living   1 1 1 0 0 1   SAB TAB Ectopic Multiple Live Births   0 0 0 0 1      # Outcome Date GA Lbr Hector/2nd Weight Sex Delivery Anes PTL Lv   1 Term 13 38w6d 13:00 / 00:37 3.53 kg (7 lb 12.5 oz) F Vag-Spont EPI N TYRELL       Past Medical History:   Diagnosis Date    Abnormal Pap smear of cervix        Past Surgical History:   Procedure Laterality Date    TONSILLECTOMY         Review of Systems  Review of Systems   Constitutional: Negative for activity change, appetite change, fatigue and unexpected weight change.   HENT: Negative.    Eyes: Negative for visual disturbance.   Respiratory: Negative for shortness of breath and wheezing.    Cardiovascular: Negative for chest pain, palpitations and leg swelling.   Gastrointestinal: Negative for abdominal pain, bloating and blood in stool.   Endocrine: Negative for diabetes and hair loss.   Genitourinary: Negative for decreased libido, dyspareunia, menorrhagia and menstrual problem.   Musculoskeletal: Negative for back pain and joint swelling.   Integumentary:  Negative for acne, hair changes and nipple discharge.   Neurological: Negative for headaches.   Hematological: Does not bruise/bleed easily.   Psychiatric/Behavioral: Negative for depression and sleep disturbance. The patient is not nervous/anxious.    Breast: Negative for mastodynia and nipple discharge          Objective:      /74   Ht 5' 4" (1.626 m)   Wt (!) 157.2 kg (346 lb 9 oz)   LMP 2019   BMI 59.49 kg/m²   Physical Exam:               Genitourinary: Pelvic exam was performed with " patient supine.   Genitourinary Comments: PELVIC: Normal external genitalia without lesions.  Normal hair distribution.  Adequate perineal body, normal urethral meatus.  Vagina moist and well rugated without lesions or discharge.  Cervix pink, PAROUS APPEARING, FRIABLE, without lesions, discharge or tenderness.  No significant cystocele or rectocele.  Bimanual exam shows uterus to be NOT READILY PALPABLE.  Adnexa NOT PALPABLE                          Assessment:        1. Unsatisfactory cervical Papanicolaou smear                Plan:       1. Unsatisfactory cervical Papanicolaou smear    - Liquid-based pap smear, screening       Follow up if symptoms worsen or fail to improve.

## 2019-05-16 ENCOUNTER — OFFICE VISIT (OUTPATIENT)
Dept: INTERNAL MEDICINE | Facility: CLINIC | Age: 39
End: 2019-05-16
Payer: COMMERCIAL

## 2019-05-16 VITALS
RESPIRATION RATE: 18 BRPM | HEIGHT: 64 IN | WEIGHT: 293 LBS | HEART RATE: 94 BPM | OXYGEN SATURATION: 98 % | TEMPERATURE: 99 F | BODY MASS INDEX: 50.02 KG/M2 | DIASTOLIC BLOOD PRESSURE: 73 MMHG | SYSTOLIC BLOOD PRESSURE: 122 MMHG

## 2019-05-16 DIAGNOSIS — F32.A DEPRESSION, UNSPECIFIED DEPRESSION TYPE: ICD-10-CM

## 2019-05-16 DIAGNOSIS — Z00.00 ANNUAL PHYSICAL EXAM: Primary | ICD-10-CM

## 2019-05-16 PROCEDURE — 99385 PREV VISIT NEW AGE 18-39: CPT | Mod: S$GLB,,, | Performed by: HOSPITALIST

## 2019-05-16 PROCEDURE — 99999 PR PBB SHADOW E&M-EST. PATIENT-LVL IV: CPT | Mod: PBBFAC,,, | Performed by: HOSPITALIST

## 2019-05-16 PROCEDURE — 99999 PR PBB SHADOW E&M-EST. PATIENT-LVL IV: ICD-10-PCS | Mod: PBBFAC,,, | Performed by: HOSPITALIST

## 2019-05-16 PROCEDURE — 99385 PR PREVENTIVE VISIT,NEW,18-39: ICD-10-PCS | Mod: S$GLB,,, | Performed by: HOSPITALIST

## 2019-05-16 RX ORDER — ESCITALOPRAM OXALATE 10 MG/1
10 TABLET ORAL DAILY
Qty: 30 TABLET | Refills: 3 | Status: SHIPPED | OUTPATIENT
Start: 2019-05-16 | End: 2019-07-16 | Stop reason: DRUGHIGH

## 2019-05-16 NOTE — PROGRESS NOTES
Subjective:     @Patient ID: Fran Higgins is a 38 y.o. female.    Chief Complaint: Establish Care and Annual Exam    HPI    38 y.o. female presents annual exam. Pt is new to me. 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 Reports she has busy work schedule.  Has hx of postpartum hemorrhage. Reports she has lost 100 lb in the past  However, lately reports she has been fatigued. Has gained weight again and feels like that has contributed to her depressed mood  Reports feeling depressed,low energy, appetite intermittent and reports insomnia. Denies SI. Has been on prozac before. Suffers with migraines usually associated with her menstrual cycle.     Lipid disorders/ASCVD risk (ages >/= 45 or >/= 20 if increased risk ): ordered    DM (>45y yearly or if obese, HTN): A1c ordered  Hepatitis C (one time if born between 0819-5316):   Eye exam: due. Has appt coming   Breast Cancer (40-50y discretion of pt, 50-74y every 1-2 years): Mammogram n/a   Cervical Cancer (Pap Smear ages 21-65 every 3 years or Pap + HPV q5 years after 30 years of age):  Done 5/13/19     Vaccines:   Influenza (yearly): done  Tetanus (every 10 yrs - 1st tdap): done 11/2018    Exercise: walking  Diet: healthy portion        Review of Systems   Constitutional: Negative for activity change, chills and fever.   HENT: Negative for hearing loss, rhinorrhea and trouble swallowing.    Eyes: Negative for discharge and visual disturbance.   Respiratory: Negative for chest tightness and wheezing.    Cardiovascular: Negative for chest pain and palpitations.   Gastrointestinal: Negative for blood in stool, constipation, diarrhea and vomiting.   Genitourinary: Negative for difficulty urinating, dysuria, hematuria and menstrual problem.   Musculoskeletal: Negative for arthralgias, joint swelling and neck pain.   Skin: Negative for color change and wound.   Neurological: Positive for headaches. Negative for weakness.   Psychiatric/Behavioral: Positive for dysphoric  "mood. Negative for confusion.     Past medical history, surgical history, and family medical history reviewed and updated as appropriate.    Medications and allergies reviewed.     Objective:     Vitals:    05/16/19 1559   BP: 122/73   BP Location: Right arm   Patient Position: Sitting   BP Method: Medium (Automatic)   Pulse: 94   Resp: 18   Temp: 98.7 °F (37.1 °C)   TempSrc: Oral   SpO2: 98%   Weight: (!) 157.7 kg (347 lb 10.7 oz)   Height: 5' 4" (1.626 m)     Body mass index is 59.68 kg/m².  Physical Exam   Constitutional: She is oriented to person, place, and time. She appears well-developed and well-nourished. No distress.   HENT:   Head: Normocephalic and atraumatic.   Mouth/Throat: Oropharynx is clear and moist. No oropharyngeal exudate.   Eyes: Pupils are equal, round, and reactive to light. Conjunctivae are normal. Right eye exhibits no discharge. Left eye exhibits no discharge.   Neck: Normal range of motion. Neck supple.   Cardiovascular: Normal rate, regular rhythm and intact distal pulses. Exam reveals no friction rub.   No murmur heard.  Pulmonary/Chest: Effort normal and breath sounds normal.   Abdominal: Soft. Bowel sounds are normal. She exhibits no distension. There is no tenderness. There is no guarding.   Musculoskeletal: Normal range of motion. She exhibits no edema.   Neurological: She is alert and oriented to person, place, and time.   Skin: Skin is warm and dry.   Psychiatric: She has a normal mood and affect. Her behavior is normal.   Vitals reviewed.      Lab Results   Component Value Date    WBC 6.49 07/16/2013    HGB 10.9 (L) 07/16/2013    HCT 32.4 (L) 07/16/2013     07/16/2013    INR 1.0 07/15/2013    HGBA1C 5.1 12/05/2012       Assessment:     1. Annual physical exam    2. Depression, unspecified depression type    3. BMI 50.0-59.9, adult      Plan:   Fran was seen today for establish care and annual exam.    Diagnoses and all orders for this visit:    Annual physical " exam  - Encouraged healthy diet and exercise. Pt current on immunizations and pap smear  -     Comprehensive metabolic panel; Future  -     CBC auto differential; Future  -     TSH; Future  -     Vitamin D; Future  -     Lipid panel; Future  -     Urinalysis; Future  -     HEMOGLOBIN A1C; Future    Depression, unspecified depression type  - Will start lexapro. Counseled that could take 8 weeks before max effect. Pt to notify MD if any issues with medication  -     escitalopram oxalate (LEXAPRO) 10 MG tablet; Take 1 tablet (10 mg total) by mouth once daily.    BMI 50.0-59.9, adult        - Pt encouraged on healthy diet and exercise for weight loss. Pt plans to start exercise regimen soon.         No follow-ups on file.    Erica Pineda MD  Internal Medicine    5/16/2019

## 2019-05-17 PROBLEM — F32.A DEPRESSION: Status: ACTIVE | Noted: 2019-05-17

## 2019-05-20 ENCOUNTER — LAB VISIT (OUTPATIENT)
Dept: LAB | Facility: HOSPITAL | Age: 39
End: 2019-05-20
Attending: HOSPITALIST
Payer: COMMERCIAL

## 2019-05-20 DIAGNOSIS — Z00.00 ANNUAL PHYSICAL EXAM: ICD-10-CM

## 2019-05-20 LAB
25(OH)D3+25(OH)D2 SERPL-MCNC: 19 NG/ML (ref 30–96)
ALBUMIN SERPL BCP-MCNC: 3.7 G/DL (ref 3.5–5.2)
ALP SERPL-CCNC: 61 U/L (ref 55–135)
ALT SERPL W/O P-5'-P-CCNC: 18 U/L (ref 10–44)
ANION GAP SERPL CALC-SCNC: 9 MMOL/L (ref 8–16)
AST SERPL-CCNC: 14 U/L (ref 10–40)
BASOPHILS # BLD AUTO: 0.02 K/UL (ref 0–0.2)
BASOPHILS NFR BLD: 0.3 % (ref 0–1.9)
BILIRUB SERPL-MCNC: 0.3 MG/DL (ref 0.1–1)
BUN SERPL-MCNC: 12 MG/DL (ref 6–20)
CALCIUM SERPL-MCNC: 9.2 MG/DL (ref 8.7–10.5)
CHLORIDE SERPL-SCNC: 106 MMOL/L (ref 95–110)
CHOLEST SERPL-MCNC: 179 MG/DL (ref 120–199)
CHOLEST/HDLC SERPL: 4.2 {RATIO} (ref 2–5)
CO2 SERPL-SCNC: 26 MMOL/L (ref 23–29)
CREAT SERPL-MCNC: 0.8 MG/DL (ref 0.5–1.4)
DIFFERENTIAL METHOD: NORMAL
EOSINOPHIL # BLD AUTO: 0.1 K/UL (ref 0–0.5)
EOSINOPHIL NFR BLD: 1.3 % (ref 0–8)
ERYTHROCYTE [DISTWIDTH] IN BLOOD BY AUTOMATED COUNT: 13.2 % (ref 11.5–14.5)
EST. GFR  (AFRICAN AMERICAN): >60 ML/MIN/1.73 M^2
EST. GFR  (NON AFRICAN AMERICAN): >60 ML/MIN/1.73 M^2
ESTIMATED AVG GLUCOSE: 111 MG/DL (ref 68–131)
GLUCOSE SERPL-MCNC: 99 MG/DL (ref 70–110)
HBA1C MFR BLD HPLC: 5.5 % (ref 4–5.6)
HCT VFR BLD AUTO: 37.2 % (ref 37–48.5)
HDLC SERPL-MCNC: 43 MG/DL (ref 40–75)
HDLC SERPL: 24 % (ref 20–50)
HGB BLD-MCNC: 12 G/DL (ref 12–16)
LDLC SERPL CALC-MCNC: 106.6 MG/DL (ref 63–159)
LYMPHOCYTES # BLD AUTO: 1.9 K/UL (ref 1–4.8)
LYMPHOCYTES NFR BLD: 27 % (ref 18–48)
MCH RBC QN AUTO: 27 PG (ref 27–31)
MCHC RBC AUTO-ENTMCNC: 32.3 G/DL (ref 32–36)
MCV RBC AUTO: 84 FL (ref 82–98)
MONOCYTES # BLD AUTO: 0.5 K/UL (ref 0.3–1)
MONOCYTES NFR BLD: 7.7 % (ref 4–15)
NEUTROPHILS # BLD AUTO: 4.3 K/UL (ref 1.8–7.7)
NEUTROPHILS NFR BLD: 63.4 % (ref 38–73)
NONHDLC SERPL-MCNC: 136 MG/DL
PLATELET # BLD AUTO: 195 K/UL (ref 150–350)
PMV BLD AUTO: 11.5 FL (ref 9.2–12.9)
POTASSIUM SERPL-SCNC: 3.8 MMOL/L (ref 3.5–5.1)
PROT SERPL-MCNC: 6.5 G/DL (ref 6–8.4)
RBC # BLD AUTO: 4.44 M/UL (ref 4–5.4)
SODIUM SERPL-SCNC: 141 MMOL/L (ref 136–145)
TRIGL SERPL-MCNC: 147 MG/DL (ref 30–150)
TSH SERPL DL<=0.005 MIU/L-ACNC: 1.35 UIU/ML (ref 0.4–4)
WBC # BLD AUTO: 6.84 K/UL (ref 3.9–12.7)

## 2019-05-20 PROCEDURE — 80053 COMPREHEN METABOLIC PANEL: CPT

## 2019-05-20 PROCEDURE — 82306 VITAMIN D 25 HYDROXY: CPT

## 2019-05-20 PROCEDURE — 83036 HEMOGLOBIN GLYCOSYLATED A1C: CPT

## 2019-05-20 PROCEDURE — 36415 COLL VENOUS BLD VENIPUNCTURE: CPT

## 2019-05-20 PROCEDURE — 84443 ASSAY THYROID STIM HORMONE: CPT

## 2019-05-20 PROCEDURE — 85025 COMPLETE CBC W/AUTO DIFF WBC: CPT

## 2019-05-20 PROCEDURE — 80061 LIPID PANEL: CPT

## 2019-05-21 ENCOUNTER — PATIENT MESSAGE (OUTPATIENT)
Dept: INTERNAL MEDICINE | Facility: CLINIC | Age: 39
End: 2019-05-21

## 2019-05-22 ENCOUNTER — HOSPITAL ENCOUNTER (OUTPATIENT)
Dept: RADIOLOGY | Facility: HOSPITAL | Age: 39
Discharge: HOME | End: 2019-05-22
Attending: OBSTETRICS & GYNECOLOGY
Payer: COMMERCIAL

## 2019-05-22 DIAGNOSIS — N94.89 ADNEXAL MASS: ICD-10-CM

## 2019-05-22 PROCEDURE — 72197 MRI PELVIS W/O & W/DYE: CPT | Mod: TC

## 2019-05-22 PROCEDURE — 72197 MRI PELVIS W/O & W/DYE: CPT | Mod: 26,,, | Performed by: RADIOLOGY

## 2019-05-22 PROCEDURE — A9585 GADOBUTROL INJECTION: HCPCS | Performed by: OBSTETRICS & GYNECOLOGY

## 2019-05-22 PROCEDURE — 72197 MRI FEMALE PELVIS W W/O CONTRAST: ICD-10-PCS | Mod: 26,,, | Performed by: RADIOLOGY

## 2019-05-22 PROCEDURE — 25500020 PHARM REV CODE 255: Performed by: OBSTETRICS & GYNECOLOGY

## 2019-05-22 RX ORDER — GADOBUTROL 604.72 MG/ML
10 INJECTION INTRAVENOUS
Status: COMPLETED | OUTPATIENT
Start: 2019-05-22 | End: 2019-05-22

## 2019-05-22 RX ADMIN — GADOBUTROL 10 ML: 604.72 INJECTION INTRAVENOUS at 06:05

## 2019-05-24 ENCOUNTER — PATIENT MESSAGE (OUTPATIENT)
Dept: OBSTETRICS AND GYNECOLOGY | Facility: CLINIC | Age: 39
End: 2019-05-24

## 2019-05-30 ENCOUNTER — OFFICE VISIT (OUTPATIENT)
Dept: OBSTETRICS AND GYNECOLOGY | Facility: CLINIC | Age: 39
End: 2019-05-30
Payer: COMMERCIAL

## 2019-05-30 ENCOUNTER — TELEPHONE (OUTPATIENT)
Dept: OBSTETRICS AND GYNECOLOGY | Facility: CLINIC | Age: 39
End: 2019-05-30

## 2019-05-30 VITALS
HEIGHT: 64 IN | WEIGHT: 293 LBS | BODY MASS INDEX: 50.02 KG/M2 | SYSTOLIC BLOOD PRESSURE: 130 MMHG | DIASTOLIC BLOOD PRESSURE: 80 MMHG

## 2019-05-30 DIAGNOSIS — R19.00 PELVIC MASS IN FEMALE: Primary | ICD-10-CM

## 2019-05-30 DIAGNOSIS — N83.202 LEFT OVARIAN CYST: Primary | ICD-10-CM

## 2019-05-30 PROCEDURE — 99212 OFFICE O/P EST SF 10 MIN: CPT | Mod: S$GLB,,, | Performed by: OBSTETRICS & GYNECOLOGY

## 2019-05-30 PROCEDURE — 99212 PR OFFICE/OUTPT VISIT, EST, LEVL II, 10-19 MIN: ICD-10-PCS | Mod: S$GLB,,, | Performed by: OBSTETRICS & GYNECOLOGY

## 2019-05-30 PROCEDURE — 3008F PR BODY MASS INDEX (BMI) DOCUMENTED: ICD-10-PCS | Mod: CPTII,S$GLB,, | Performed by: OBSTETRICS & GYNECOLOGY

## 2019-05-30 PROCEDURE — 3008F BODY MASS INDEX DOCD: CPT | Mod: CPTII,S$GLB,, | Performed by: OBSTETRICS & GYNECOLOGY

## 2019-05-30 PROCEDURE — 99999 PR PBB SHADOW E&M-EST. PATIENT-LVL III: CPT | Mod: PBBFAC,,, | Performed by: OBSTETRICS & GYNECOLOGY

## 2019-05-30 PROCEDURE — 99999 PR PBB SHADOW E&M-EST. PATIENT-LVL III: ICD-10-PCS | Mod: PBBFAC,,, | Performed by: OBSTETRICS & GYNECOLOGY

## 2019-05-30 NOTE — TELEPHONE ENCOUNTER
Spoke with pt. Pt states she can do surgery on 6/25/19. Pt informed me that she does not want the Right tube removed, just wants to do the LSO. Advised pt I would let Dr Fox know. Pre op appts scheduled and Dr Fox sent the case request for surgery to sign. Pt verbalized understanding.

## 2019-05-30 NOTE — PROGRESS NOTES
PT HERE FOR F/U OF CYST. NO PAIN.  WANTS TO SCHEDULE SURGERY.    PT SEEN BY MR FOR ANNUAL EXAM. NO PROBLEMS AT THAT TIME BUT ADMITS TO PELVIC DISCOMFORT ON L NOW.  U/S WITH COMPLEX CYST ON L.    05/22/19 MRI  There is a 9.6 x 12 x 13 cm well-circumscribed cystic mass arising from the left adnexa at that demonstrates irregular enhancing solid/cystic mural nodules, the largest of which measures 4 x 6 x 4 cm.  Right adnexa is normal.  There is an enhancing intramural fibroid within the right lateral aspect of the uterine fundus.  Junctional zone appears normal.  Cervix is grossly unremarkable.  There is a trace volume of pelvic free fluid.  Bladder is within normal limits.  Limited evaluation of the visualized bowel demonstrates no significant abnormalities.  No obvious enhancing peritoneal implants within the lower abdomen and pelvis.  No definite lower abdominal or pelvic lymphadenopathy.  Subcutaneous soft tissues are within normal limits.  No marrow signal abnormality to suggest an infiltrative process.      Impression     1. Large left adnexal complex cystic mass with enhancing solid/cystic mural nodules.  Surgical consultation is recommended.  This report was flagged in Epic as abnormal.        04/26/19 U/S       Uterus:  Size: 7.8 x 4.6 x 5.9 cm  Masses: 2 posterior fibroids are identified measuring 2.0 cm and 1.1 cm, respectively.  Endometrium: Normal in this pre menopausal patient, measuring 11 mm.  Right ovary is not identified on today's exam.  Left ovary:  Size: 12.6 x 9.2 x 12.7 cm  Appearance: There is a complex cystic lesion measuring 11.8 x 8.6 x 11.0 cm.  Internal mural nodule versus dependent debris.  Vascular Flow: Normal.  Free Fluid:  None.       Impression       Complex cystic lesion of the left ovary measuring up to 11.8 cm.  Findings may represent dermoid or less likely endometrioma or hemorrhagic cyst.  Given size, short-term interval follow-up versus MRI may be indicated for further  evaluation.  Small uterine fibroids identified.      ROS:  GENERAL: No fever, chills, fatigability or weight loss.  VULVAR: No pain, no lesions and no itching.  VAGINAL: No relaxation, no itching, no discharge, no abnormal bleeding and no lesions.  ABDOMEN: No abdominal pain. Denies nausea. Denies vomiting. No diarrhea. No constipation  BREAST: Denies pain. No lumps. No discharge.  URINARY: No incontinence, no nocturia, no frequency and no dysuria.  CARDIOVASCULAR: No chest pain. No shortness of breath. No leg cramps.  NEUROLOGICAL: No headaches. No vision changes.  The remainder of the review of systems was negative.    PE:  General Appearance: obese And Well developed. Well nourished. In no acute distress.  Vulva: Lesions: No.  Urethral Meatus: Normal size. Normal location. No lesions. No prolapse.  Urethra: No masses. No tenderness. No prolapse. No scarring.  Bladder: No masses. No tenderness.  Vagina: Mucosa NI:yes discharge no, atrophy no, cystocele no or rectocele no.  Cervix: Lesion: no  Stenotic: no Cervical motion tenderness: no  Uterus: Uterus size: 7 weeks. Support good. Uterus size: Normal  Adnexa: Masses: Yes 10 CM LEFT MOBILE Tenderness: No CDS Nodularity: Yes  Abdomen: obese No masses. No tenderness.  CHEST: CTA B  HEART: RRR  EXT: NO EDEMA        PROCEDURES:    PLAN:      DIAGNOSIS:  1. Left ovarian cyst    2. Adnexal mass      20 MIN D/W PT ON FINDINGS. FEEL THIS IS NOT MALIGNANT BUT NO WAY TO KNOW WITHOUT PATH.  WANTS LSO WITH RS.

## 2019-06-14 ENCOUNTER — PATIENT MESSAGE (OUTPATIENT)
Dept: OBSTETRICS AND GYNECOLOGY | Facility: CLINIC | Age: 39
End: 2019-06-14

## 2019-06-19 ENCOUNTER — OFFICE VISIT (OUTPATIENT)
Dept: OBSTETRICS AND GYNECOLOGY | Facility: CLINIC | Age: 39
End: 2019-06-19
Payer: COMMERCIAL

## 2019-06-19 VITALS
WEIGHT: 293 LBS | HEIGHT: 64 IN | DIASTOLIC BLOOD PRESSURE: 70 MMHG | BODY MASS INDEX: 50.02 KG/M2 | SYSTOLIC BLOOD PRESSURE: 120 MMHG

## 2019-06-19 DIAGNOSIS — N83.202 LEFT OVARIAN CYST: Primary | ICD-10-CM

## 2019-06-19 PROCEDURE — 99499 UNLISTED E&M SERVICE: CPT | Mod: S$GLB,,, | Performed by: OBSTETRICS & GYNECOLOGY

## 2019-06-19 PROCEDURE — 99499 NO LOS: ICD-10-PCS | Mod: S$GLB,,, | Performed by: OBSTETRICS & GYNECOLOGY

## 2019-06-19 PROCEDURE — 99999 PR PBB SHADOW E&M-EST. PATIENT-LVL III: CPT | Mod: PBBFAC,,, | Performed by: OBSTETRICS & GYNECOLOGY

## 2019-06-19 PROCEDURE — 99999 PR PBB SHADOW E&M-EST. PATIENT-LVL III: ICD-10-PCS | Mod: PBBFAC,,, | Performed by: OBSTETRICS & GYNECOLOGY

## 2019-06-19 RX ORDER — MELOXICAM 15 MG/1
15 TABLET ORAL DAILY
COMMUNITY
End: 2020-02-10

## 2019-06-19 RX ORDER — SODIUM CHLORIDE, SODIUM LACTATE, POTASSIUM CHLORIDE, CALCIUM CHLORIDE 600; 310; 30; 20 MG/100ML; MG/100ML; MG/100ML; MG/100ML
INJECTION, SOLUTION INTRAVENOUS CONTINUOUS
Status: CANCELLED | OUTPATIENT
Start: 2019-06-19

## 2019-06-19 NOTE — PROGRESS NOTES
PT HERE FOR F/U OF CYST. NO PAIN.  WANTS TO SCHEDULE SURGERY.     PT SEEN BY MR FOR ANNUAL EXAM. NO PROBLEMS AT THAT TIME BUT ADMITS TO PELVIC DISCOMFORT ON L NOW.  U/S WITH COMPLEX CYST ON L.     05/22/19 MRI       There is a 9.6 x 12 x 13 cm well-circumscribed cystic mass arising from the left adnexa at that demonstrates irregular enhancing solid/cystic mural nodules, the largest of which measures 4 x 6 x 4 cm.  Right adnexa is normal.  There is an enhancing intramural fibroid within the right lateral aspect of the uterine fundus.  Junctional zone appears normal.  Cervix is grossly unremarkable.  There is a trace volume of pelvic free fluid.  Bladder is within normal limits.  Limited evaluation of the visualized bowel demonstrates no significant abnormalities.  No obvious enhancing peritoneal implants within the lower abdomen and pelvis.  No definite lower abdominal or pelvic lymphadenopathy.  Subcutaneous soft tissues are within normal limits.  No marrow signal abnormality to suggest an infiltrative process.       Impression       1. Large left adnexal complex cystic mass with enhancing solid/cystic mural nodules.  Surgical consultation is recommended.  This report was flagged in Epic as abnormal.         04/26/19 U/S          Uterus:  Size: 7.8 x 4.6 x 5.9 cm  Masses: 2 posterior fibroids are identified measuring 2.0 cm and 1.1 cm, respectively.  Endometrium: Normal in this pre menopausal patient, measuring 11 mm.  Right ovary is not identified on today's exam.  Left ovary:  Size: 12.6 x 9.2 x 12.7 cm  Appearance: There is a complex cystic lesion measuring 11.8 x 8.6 x 11.0 cm.  Internal mural nodule versus dependent debris.  Vascular Flow: Normal.  Free Fluid:  None.       Impression       Complex cystic lesion of the left ovary measuring up to 11.8 cm.  Findings may represent dermoid or less likely endometrioma or hemorrhagic cyst.  Given size, short-term interval follow-up versus MRI may be indicated for  further evaluation.  Small uterine fibroids identified.      ROS:  GENERAL: No fever, chills, fatigability or weight loss.  VULVAR: No pain, no lesions and no itching.  VAGINAL: No relaxation, no itching, no discharge, no abnormal bleeding and no lesions.  ABDOMEN: No abdominal pain. Denies nausea. Denies vomiting. No diarrhea. No constipation  BREAST: Denies pain. No lumps. No discharge.  URINARY: No incontinence, no nocturia, no frequency and no dysuria.  CARDIOVASCULAR: No chest pain. No shortness of breath. No leg cramps.  NEUROLOGICAL: No headaches. No vision changes.  The remainder of the review of systems was negative.    PE:  General Appearance: obese And Well developed. Well nourished. In no acute distress.  Vulva: Lesions: No.  Urethral Meatus: Normal size. Normal location. No lesions. No prolapse.  Urethra: No masses. No tenderness. No prolapse. No scarring.  Bladder: No masses. No tenderness.  Vagina: Mucosa NI:yes discharge no, atrophy no, cystocele no or rectocele no.  Cervix: Lesion: no  Stenotic: no Cervical motion tenderness: no  Uterus: Uterus size: 7 weeks. Support good. Uterus size: Normal  Adnexa: Masses: Yes 10 CM LEFT MOBILE Tenderness: No CDS Nodularity: Yes  Abdomen: obese No masses. No tenderness.  CHEST: CTA B  HEART: RRR  EXT: NO EDEMA        PROCEDURES:    PLAN:      DIAGNOSIS:  1. Left ovarian cyst    2. Adnexal mass       20 MIN D/W PT ON RISKS OF LSO.

## 2019-06-21 ENCOUNTER — TELEPHONE (OUTPATIENT)
Dept: OBSTETRICS AND GYNECOLOGY | Facility: CLINIC | Age: 39
End: 2019-06-21

## 2019-06-21 ENCOUNTER — ANESTHESIA EVENT (OUTPATIENT)
Dept: SURGERY | Facility: OTHER | Age: 39
End: 2019-06-21
Payer: COMMERCIAL

## 2019-06-21 ENCOUNTER — HOSPITAL ENCOUNTER (OUTPATIENT)
Dept: PREADMISSION TESTING | Facility: OTHER | Age: 39
Discharge: HOME | End: 2019-06-21
Attending: OBSTETRICS & GYNECOLOGY
Payer: COMMERCIAL

## 2019-06-21 VITALS
WEIGHT: 293 LBS | BODY MASS INDEX: 50.02 KG/M2 | SYSTOLIC BLOOD PRESSURE: 145 MMHG | HEART RATE: 95 BPM | DIASTOLIC BLOOD PRESSURE: 99 MMHG | HEIGHT: 64 IN | OXYGEN SATURATION: 96 % | TEMPERATURE: 98 F

## 2019-06-21 RX ORDER — ACETAMINOPHEN 500 MG
1000 TABLET ORAL
Status: CANCELLED | OUTPATIENT
Start: 2019-06-21 | End: 2019-06-21

## 2019-06-21 RX ORDER — PREGABALIN 75 MG/1
75 CAPSULE ORAL
Status: CANCELLED | OUTPATIENT
Start: 2019-06-21 | End: 2019-06-21

## 2019-06-21 RX ORDER — FAMOTIDINE 20 MG/1
20 TABLET, FILM COATED ORAL
Status: CANCELLED | OUTPATIENT
Start: 2019-06-21 | End: 2019-06-21

## 2019-06-21 RX ORDER — SODIUM CHLORIDE, SODIUM LACTATE, POTASSIUM CHLORIDE, CALCIUM CHLORIDE 600; 310; 30; 20 MG/100ML; MG/100ML; MG/100ML; MG/100ML
INJECTION, SOLUTION INTRAVENOUS CONTINUOUS
Status: CANCELLED | OUTPATIENT
Start: 2019-06-21

## 2019-06-21 NOTE — TELEPHONE ENCOUNTER
----- Message from Erica Byrne sent at 6/21/2019  4:17 PM CDT -----  Contact: Patient   Patient returned missed call and would like a call back. The patient can be reached at [phone].

## 2019-06-21 NOTE — DISCHARGE INSTRUCTIONS
PRE-ADMIT TESTING -  [phone]    [address]  [place] [facility]          Your surgery has been scheduled at [facility]. We are pleased to have the opportunity to serve you. For Further Information please call [phone].    On the day of surgery please report to the Information Desk on the 1st floor.    · CONTACT YOUR PHYSICIAN'S OFFICE THE DAY PRIOR TO YOUR SURGERY TO OBTAIN YOUR ARRIVAL TIME.     · The evening before surgery do not eat anything after 9 p.m. ( this includes hard candy, chewing gum and mints).  You may only have GATORADE, POWERADE AND WATER  from 9 p.m. until you leave your home.   DO NOT DRINK ANY LIQUIDS ON THE WAY TO THE HOSPITAL.      SPECIAL MEDICATION INSTRUCTIONS: TAKE medications checked off by the Anesthesiologist on your Medication List.    Angiogram Patients: Take medications as instructed by your physician, including aspirin.     Surgery Patients:    If you take ASPIRIN - Your PHYSICIAN/SURGEON will need to inform you IF/OR when you need to stop taking aspirin prior to your surgery.     Do Not take any medications containing IBUPROFEN.  Do Not Wear any make-up or dark nail polish   (especially eye make-up) to surgery. If you come to surgery with makeup on you will be required to remove the makeup or nail polish.    Do not shave your surgical area at least 5 days prior to your surgery. The surgical prep will be performed at the hospital according to Infection Control regulations.    Leave all valuables at home.   Do Not wear any jewelry or watches, including any metal in body piercings. Jewelry must be removed prior to coming to the hospital.  There is a possibility that rings that are unable to be removed may be cut off if they are on the surgical extremity.    Contact Lens must be removed before surgery. Either do not wear the contact lens or bring a case and solution for storage.  Please bring a container for eyeglasses or dentures as required.  Bring  any paperwork your physician has provided, such as consent forms,  history and physicals, doctor's orders, etc.   Bring comfortable clothes that are loose fitting to wear upon discharge. Take into consideration the type of surgery being performed.  Maintain your diet as advised per your physician the day prior to surgery.      Adequate rest the night before surgery is advised.   Park in the Parking lot behind the hospital or in the [facility] across the street from the parking lot. Parking is complimentary.  If you will be discharged the same day as your procedure, please arrange for a responsible adult to drive you home or to accompany you if traveling by taxi.   YOU WILL NOT BE PERMITTED TO DRIVE OR TO LEAVE THE HOSPITAL ALONE AFTER SURGERY.   It is strongly recommended that you arrange for someone to remain with you for the first 24 hrs following your surgery.    The Surgeon will speak to your family/visitor after your surgery regarding the outcome of your surgery and post op care.  The Surgeon may speak to you after your surgery, but there is a possibility you may not remember the details.  Please check with your family members regarding the conversation with the Surgeon.      Thank you for your cooperation.  The Staff of [facility].                Bathing Instructions with Hibiclens     Shower the evening before and morning of your procedure with Hibiclens:   Wash your face with water and your regular face wash/soap   Apply Hibiclens directly on your skin or on a wet washcloth and wash gently. When showering: Move away from the shower stream when applying Hibiclens to avoid rinsing off too soon.   Rinse thoroughly with warm water   Do not dilute Hibiclens         Dry off as usual, do not use any deodorant, powder, body lotions, perfume, after shave or cologne.

## 2019-06-21 NOTE — ANESTHESIA PREPROCEDURE EVALUATION
06/21/2019  Fran Higgins is a 38 y.o., female.    Anesthesia Evaluation    I have reviewed the Patient Summary Reports.    I have reviewed the Nursing Notes.   I have reviewed the Medications.     Review of Systems  Anesthesia Hx:  No problems with previous Anesthesia  Denies Family Hx of Anesthesia complications.   Denies Personal Hx of Anesthesia complications.   Social:  Non-Smoker    Hematology/Oncology:  Hematology Normal   Oncology Normal     Cardiovascular:  Cardiovascular Normal     Pulmonary:  Pulmonary Normal    Renal/:  Renal/ Normal     Hepatic/GI:  Hepatic/GI Normal    Musculoskeletal:   Cervical scoliosis with arm and hand pain and numbness R>L Spine Disorders: cervical    Neurological:  Neurology Normal    Endocrine:  Endocrine Normal        Physical Exam  General:  Morbid Obesity    Airway/Jaw/Neck:  Airway Findings: Mouth Opening: Normal Tongue: Normal  Mallampati: I  TM Distance: Normal, at least 6 cm         Dental:  Dental Findings: In tact             Anesthesia Plan  Type of Anesthesia, risks & benefits discussed:  Anesthesia Type:  general  Patient's Preference:   Intra-op Monitoring Plan: standard ASA monitors  Intra-op Monitoring Plan Comments:   Post Op Pain Control Plan: multimodal analgesia and per primary service following discharge from PACU  Post Op Pain Control Plan Comments:   Induction:   IV  Beta Blocker:         Informed Consent: Patient understands risks and agrees with Anesthesia plan.  Questions answered. Anesthesia consent signed with patient.  ASA Score: 3     Day of Surgery Review of History & Physical:    H&P update referred to the surgeon.         Ready For Surgery From Anesthesia Perspective.

## 2019-06-25 ENCOUNTER — ANESTHESIA (OUTPATIENT)
Dept: SURGERY | Facility: OTHER | Age: 39
End: 2019-06-25
Payer: COMMERCIAL

## 2019-06-25 ENCOUNTER — HOSPITAL ENCOUNTER (OUTPATIENT)
Facility: OTHER | Age: 39
Discharge: HOME | End: 2019-06-25
Attending: OBSTETRICS & GYNECOLOGY | Admitting: OBSTETRICS & GYNECOLOGY
Payer: COMMERCIAL

## 2019-06-25 VITALS
HEIGHT: 64 IN | HEART RATE: 80 BPM | TEMPERATURE: 98 F | OXYGEN SATURATION: 96 % | SYSTOLIC BLOOD PRESSURE: 101 MMHG | RESPIRATION RATE: 16 BRPM | WEIGHT: 293 LBS | DIASTOLIC BLOOD PRESSURE: 65 MMHG | BODY MASS INDEX: 50.02 KG/M2

## 2019-06-25 DIAGNOSIS — N83.202 LEFT OVARIAN CYST: ICD-10-CM

## 2019-06-25 DIAGNOSIS — Z98.890 STATUS POST ROBOT-ASSISTED SURGICAL PROCEDURE: Primary | ICD-10-CM

## 2019-06-25 LAB
B-HCG UR QL: NEGATIVE
CTP QC/QA: YES

## 2019-06-25 PROCEDURE — 37000009 HC ANESTHESIA EA ADD 15 MINS: Performed by: OBSTETRICS & GYNECOLOGY

## 2019-06-25 PROCEDURE — 36000711: Performed by: OBSTETRICS & GYNECOLOGY

## 2019-06-25 PROCEDURE — 25000003 PHARM REV CODE 250: Performed by: SPECIALIST

## 2019-06-25 PROCEDURE — 63600175 PHARM REV CODE 636 W HCPCS: Performed by: OBSTETRICS & GYNECOLOGY

## 2019-06-25 PROCEDURE — 58661 LAPAROSCOPY REMOVE ADNEXA: CPT | Mod: LT,,, | Performed by: OBSTETRICS & GYNECOLOGY

## 2019-06-25 PROCEDURE — 25000003 PHARM REV CODE 250: Performed by: NURSE ANESTHETIST, CERTIFIED REGISTERED

## 2019-06-25 PROCEDURE — 88307 TISSUE EXAM BY PATHOLOGIST: CPT | Mod: 26,,, | Performed by: PATHOLOGY

## 2019-06-25 PROCEDURE — 58661 PR LAP,RMV  ADNEXAL STRUCTURE: ICD-10-PCS | Mod: LT,,, | Performed by: OBSTETRICS & GYNECOLOGY

## 2019-06-25 PROCEDURE — 58661 LAPAROSCOPY REMOVE ADNEXA: CPT | Mod: AS,LT,, | Performed by: NURSE PRACTITIONER

## 2019-06-25 PROCEDURE — 71000015 HC POSTOP RECOV 1ST HR: Performed by: OBSTETRICS & GYNECOLOGY

## 2019-06-25 PROCEDURE — 63600175 PHARM REV CODE 636 W HCPCS: Performed by: NURSE ANESTHETIST, CERTIFIED REGISTERED

## 2019-06-25 PROCEDURE — 37000008 HC ANESTHESIA 1ST 15 MINUTES: Performed by: OBSTETRICS & GYNECOLOGY

## 2019-06-25 PROCEDURE — 27201423 OPTIME MED/SURG SUP & DEVICES STERILE SUPPLY: Performed by: OBSTETRICS & GYNECOLOGY

## 2019-06-25 PROCEDURE — 25000003 PHARM REV CODE 250: Performed by: ANESTHESIOLOGY

## 2019-06-25 PROCEDURE — 88307 TISSUE SPECIMEN TO PATHOLOGY - SURGERY: ICD-10-PCS | Mod: 26,,, | Performed by: PATHOLOGY

## 2019-06-25 PROCEDURE — 63600175 PHARM REV CODE 636 W HCPCS: Performed by: SPECIALIST

## 2019-06-25 PROCEDURE — 25000003 PHARM REV CODE 250: Performed by: OBSTETRICS & GYNECOLOGY

## 2019-06-25 PROCEDURE — 36000710: Performed by: OBSTETRICS & GYNECOLOGY

## 2019-06-25 PROCEDURE — 58661 PR LAP,RMV  ADNEXAL STRUCTURE: ICD-10-PCS | Mod: AS,LT,, | Performed by: NURSE PRACTITIONER

## 2019-06-25 PROCEDURE — 88307 TISSUE EXAM BY PATHOLOGIST: CPT | Performed by: PATHOLOGY

## 2019-06-25 PROCEDURE — 71000033 HC RECOVERY, INTIAL HOUR: Performed by: OBSTETRICS & GYNECOLOGY

## 2019-06-25 PROCEDURE — 81025 URINE PREGNANCY TEST: CPT | Performed by: ANESTHESIOLOGY

## 2019-06-25 RX ORDER — MIDAZOLAM HYDROCHLORIDE 1 MG/ML
INJECTION INTRAMUSCULAR; INTRAVENOUS
Status: DISCONTINUED | OUTPATIENT
Start: 2019-06-25 | End: 2019-06-25

## 2019-06-25 RX ORDER — ROCURONIUM BROMIDE 10 MG/ML
INJECTION, SOLUTION INTRAVENOUS
Status: DISCONTINUED | OUTPATIENT
Start: 2019-06-25 | End: 2019-06-25

## 2019-06-25 RX ORDER — OXYCODONE HYDROCHLORIDE 5 MG/1
5 TABLET ORAL
Status: DISCONTINUED | OUTPATIENT
Start: 2019-06-25 | End: 2019-06-25 | Stop reason: HOSPADM

## 2019-06-25 RX ORDER — ONDANSETRON 2 MG/ML
INJECTION INTRAMUSCULAR; INTRAVENOUS
Status: DISCONTINUED | OUTPATIENT
Start: 2019-06-25 | End: 2019-06-25

## 2019-06-25 RX ORDER — HYDROCODONE BITARTRATE AND ACETAMINOPHEN 5; 325 MG/1; MG/1
1 TABLET ORAL EVERY 4 HOURS PRN
Status: DISCONTINUED | OUTPATIENT
Start: 2019-06-25 | End: 2019-06-25 | Stop reason: HOSPADM

## 2019-06-25 RX ORDER — PROPOFOL 10 MG/ML
VIAL (ML) INTRAVENOUS
Status: DISCONTINUED | OUTPATIENT
Start: 2019-06-25 | End: 2019-06-25

## 2019-06-25 RX ORDER — DIPHENHYDRAMINE HCL 25 MG
25 CAPSULE ORAL EVERY 4 HOURS PRN
Status: DISCONTINUED | OUTPATIENT
Start: 2019-06-25 | End: 2019-06-25 | Stop reason: HOSPADM

## 2019-06-25 RX ORDER — FENTANYL CITRATE 50 UG/ML
INJECTION, SOLUTION INTRAMUSCULAR; INTRAVENOUS
Status: DISCONTINUED | OUTPATIENT
Start: 2019-06-25 | End: 2019-06-25

## 2019-06-25 RX ORDER — FAMOTIDINE 20 MG/1
20 TABLET, FILM COATED ORAL
Status: COMPLETED | OUTPATIENT
Start: 2019-06-25 | End: 2019-06-25

## 2019-06-25 RX ORDER — HYDROCODONE BITARTRATE AND ACETAMINOPHEN 5; 325 MG/1; MG/1
1 TABLET ORAL EVERY 4 HOURS PRN
Qty: 10 TABLET | Refills: 0 | Status: SHIPPED | OUTPATIENT
Start: 2019-06-25 | End: 2019-06-27 | Stop reason: SDUPTHER

## 2019-06-25 RX ORDER — DIPHENHYDRAMINE HYDROCHLORIDE 50 MG/ML
25 INJECTION INTRAMUSCULAR; INTRAVENOUS EVERY 4 HOURS PRN
Status: DISCONTINUED | OUTPATIENT
Start: 2019-06-25 | End: 2019-06-25 | Stop reason: HOSPADM

## 2019-06-25 RX ORDER — ONDANSETRON 2 MG/ML
4 INJECTION INTRAMUSCULAR; INTRAVENOUS DAILY PRN
Status: DISCONTINUED | OUTPATIENT
Start: 2019-06-25 | End: 2019-06-25 | Stop reason: HOSPADM

## 2019-06-25 RX ORDER — DIPHENHYDRAMINE HYDROCHLORIDE 50 MG/ML
25 INJECTION INTRAMUSCULAR; INTRAVENOUS EVERY 6 HOURS PRN
Status: DISCONTINUED | OUTPATIENT
Start: 2019-06-25 | End: 2019-06-25 | Stop reason: HOSPADM

## 2019-06-25 RX ORDER — HYDROMORPHONE HYDROCHLORIDE 2 MG/ML
0.4 INJECTION, SOLUTION INTRAMUSCULAR; INTRAVENOUS; SUBCUTANEOUS EVERY 5 MIN PRN
Status: DISCONTINUED | OUTPATIENT
Start: 2019-06-25 | End: 2019-06-25 | Stop reason: HOSPADM

## 2019-06-25 RX ORDER — SODIUM CHLORIDE, SODIUM LACTATE, POTASSIUM CHLORIDE, CALCIUM CHLORIDE 600; 310; 30; 20 MG/100ML; MG/100ML; MG/100ML; MG/100ML
INJECTION, SOLUTION INTRAVENOUS CONTINUOUS
Status: DISCONTINUED | OUTPATIENT
Start: 2019-06-25 | End: 2019-06-25 | Stop reason: HOSPADM

## 2019-06-25 RX ORDER — ONDANSETRON 8 MG/1
8 TABLET, ORALLY DISINTEGRATING ORAL EVERY 8 HOURS PRN
Status: DISCONTINUED | OUTPATIENT
Start: 2019-06-25 | End: 2019-06-25 | Stop reason: HOSPADM

## 2019-06-25 RX ORDER — MEPERIDINE HYDROCHLORIDE 50 MG/ML
12.5 INJECTION INTRAMUSCULAR; INTRAVENOUS; SUBCUTANEOUS ONCE AS NEEDED
Status: DISCONTINUED | OUTPATIENT
Start: 2019-06-25 | End: 2019-06-25 | Stop reason: HOSPADM

## 2019-06-25 RX ORDER — CEFAZOLIN SODIUM 1 G/3ML
3 INJECTION, POWDER, FOR SOLUTION INTRAMUSCULAR; INTRAVENOUS
Status: COMPLETED | OUTPATIENT
Start: 2019-06-25 | End: 2019-06-25

## 2019-06-25 RX ORDER — ACETAMINOPHEN 500 MG
1000 TABLET ORAL
Status: COMPLETED | OUTPATIENT
Start: 2019-06-25 | End: 2019-06-25

## 2019-06-25 RX ORDER — SODIUM CHLORIDE 0.9 % (FLUSH) 0.9 %
3 SYRINGE (ML) INJECTION
Status: DISCONTINUED | OUTPATIENT
Start: 2019-06-25 | End: 2019-06-25 | Stop reason: HOSPADM

## 2019-06-25 RX ORDER — DEXAMETHASONE SODIUM PHOSPHATE 4 MG/ML
INJECTION, SOLUTION INTRA-ARTICULAR; INTRALESIONAL; INTRAMUSCULAR; INTRAVENOUS; SOFT TISSUE
Status: DISCONTINUED | OUTPATIENT
Start: 2019-06-25 | End: 2019-06-25

## 2019-06-25 RX ORDER — LIDOCAINE HCL/PF 100 MG/5ML
SYRINGE (ML) INTRAVENOUS
Status: DISCONTINUED | OUTPATIENT
Start: 2019-06-25 | End: 2019-06-25

## 2019-06-25 RX ORDER — PREGABALIN 75 MG/1
75 CAPSULE ORAL
Status: COMPLETED | OUTPATIENT
Start: 2019-06-25 | End: 2019-06-25

## 2019-06-25 RX ADMIN — ROCURONIUM BROMIDE 10 MG: 10 INJECTION INTRAVENOUS at 11:06

## 2019-06-25 RX ADMIN — HYDROMORPHONE HYDROCHLORIDE 0.4 MG: 2 INJECTION, SOLUTION INTRAMUSCULAR; INTRAVENOUS; SUBCUTANEOUS at 01:06

## 2019-06-25 RX ADMIN — OXYCODONE HYDROCHLORIDE 5 MG: 5 TABLET ORAL at 12:06

## 2019-06-25 RX ADMIN — ACETAMINOPHEN 1000 MG: 500 TABLET, FILM COATED ORAL at 08:06

## 2019-06-25 RX ADMIN — ROCURONIUM BROMIDE 50 MG: 10 INJECTION INTRAVENOUS at 10:06

## 2019-06-25 RX ADMIN — CEFAZOLIN 3 G: 330 INJECTION, POWDER, FOR SOLUTION INTRAMUSCULAR; INTRAVENOUS at 10:06

## 2019-06-25 RX ADMIN — FENTANYL CITRATE 100 MCG: 50 INJECTION, SOLUTION INTRAMUSCULAR; INTRAVENOUS at 10:06

## 2019-06-25 RX ADMIN — FENTANYL CITRATE 50 MCG: 50 INJECTION, SOLUTION INTRAMUSCULAR; INTRAVENOUS at 11:06

## 2019-06-25 RX ADMIN — PROPOFOL 200 MG: 10 INJECTION, EMULSION INTRAVENOUS at 10:06

## 2019-06-25 RX ADMIN — HYDROMORPHONE HYDROCHLORIDE 0.4 MG: 2 INJECTION, SOLUTION INTRAMUSCULAR; INTRAVENOUS; SUBCUTANEOUS at 12:06

## 2019-06-25 RX ADMIN — MIDAZOLAM HYDROCHLORIDE 2 MG: 1 INJECTION, SOLUTION INTRAMUSCULAR; INTRAVENOUS at 10:06

## 2019-06-25 RX ADMIN — ROCURONIUM BROMIDE 10 MG: 10 INJECTION INTRAVENOUS at 10:06

## 2019-06-25 RX ADMIN — SODIUM CHLORIDE, SODIUM LACTATE, POTASSIUM CHLORIDE, AND CALCIUM CHLORIDE: 600; 310; 30; 20 INJECTION, SOLUTION INTRAVENOUS at 11:06

## 2019-06-25 RX ADMIN — ONDANSETRON 4 MG: 2 INJECTION INTRAMUSCULAR; INTRAVENOUS at 12:06

## 2019-06-25 RX ADMIN — DEXAMETHASONE SODIUM PHOSPHATE 8 MG: 4 INJECTION, SOLUTION INTRAMUSCULAR; INTRAVENOUS at 10:06

## 2019-06-25 RX ADMIN — IBUPROFEN 800 MG: 800 INJECTION INTRAVENOUS at 01:06

## 2019-06-25 RX ADMIN — PREGABALIN 75 MG: 75 CAPSULE ORAL at 08:06

## 2019-06-25 RX ADMIN — LIDOCAINE HYDROCHLORIDE 50 MG: 20 INJECTION, SOLUTION INTRAVENOUS at 10:06

## 2019-06-25 RX ADMIN — CARBOXYMETHYLCELLULOSE SODIUM 2 DROP: 2.5 SOLUTION/ DROPS OPHTHALMIC at 10:06

## 2019-06-25 RX ADMIN — SODIUM CHLORIDE, SODIUM LACTATE, POTASSIUM CHLORIDE, AND CALCIUM CHLORIDE: 600; 310; 30; 20 INJECTION, SOLUTION INTRAVENOUS at 09:06

## 2019-06-25 RX ADMIN — FAMOTIDINE 20 MG: 20 TABLET, FILM COATED ORAL at 08:06

## 2019-06-25 NOTE — OP NOTE
[facility]  Surgery Department  Operative Note    SUMMARY     Date of Procedure: 6/25/2019     Procedure: Procedure(s) (LRB):  ROBOTIC SALPINGO-OOPHORECTOMY (Left)     Surgeon(s) and Role:     * Marky Fxo Jr., MD - Primary    Assisting Surgeon:      *Samantha Eid NP -- No qualified surgeon available to perform her tasks    Pre-Operative Diagnosis:   Pelvic mass in female [R19.00]    Post-Operative Diagnosis: Post-Op Diagnosis Codes:     * Pelvic mass in female [R19.00]    Anesthesia: General    Description of the Findings of the Procedure:   The vagina, vulva and cervix were normal. Intra-abdominally the upper abdomen was normal as well as the pelvis except there was however a large 12 cm adnexal mass consistent with an ovarian cyst on the left ovary with the fimbriae of the left tube densely adherent to that ovary.  The right ovary and tube were normal as well as the uterus in the anterior / posterior cul-de-sac.    OPERATIVE PROCEDURE IN DETAIL  Patient was taken to the operating room [facility].  Patient was placed in the [facility].  Patient's abdomen and perineum and vagina were then prepped and draped in the usual sterile manner.  A Side spec was used and cervix visualized.  Anterior was grasped with a single-tooth tenaculum.  Uterus sounded to 8 cm.  A 8 cm MOMO was then placed and retained within the uterus.  A Killian catheter was also retained within the bladder.  All other instruments were removed from the vagina and attention was brought to the abdomen.    Tenting up on the abdomen with towel clips and the Veres needle was inserted in the infraumbilical fold.  Saline drop test was confirmatory and abdomen was insufflated with carbon dioxide.  A supraumbilical midline 8 mm incision with the scalpel and trocar was introduced in the intra-abdominal cavity.  Laparoscopic confirmation of location was performed.  Of the above operative findings were noted.  8 mm trocars were  placed laterally the left and right under direct laparoscopic visualization and a 10 mm air seal trocar was placed in the left upper quadrant.    Robot was docked the procedure was begun.  The left utero-ovarian ligament and fallopian tube were transected using bipolar cautery and monopolar scissors.  The ureter was noted be well below the operative field the left infundibulopelvic ligament was then coagulated with bipolar cautery and transected with the monopolar scissors. The peritoneum was dissected and the specimen was freed from the uterus.  The ureter noted to be peristalsing nicely.  The 12 mm Endo-Catch bag was then introduced through the accessory port and the ovary was placed in the bag and inadvertently ruptured and fluid was suctioned.  The the bag was then removed from the abdominal cavity with the ovary and tube inside.  Hemostasis was noted in the operative field.  The accessory port fascia was closed with 0 Vicryl suture using the Juni Posadas suture closure method.  Trocars are removed as well as instruments and the skin incisions closed with 4 O Monocryl in a subcuticular manner.  The MOMO was removed from the vagina. Patient was taken to the recovery room in stable condition    Complications: No    Estimated Blood Loss (EBL): 10 mL           Implants: * No implants in log *    Specimens:   Specimen (12h ago, onward)    Start     Ordered    06/25/19 1106  Specimen to Pathology - Surgery  Once     Comments:  1. Left tube and ovary     Start Status     06/25/19 1106 Collected (06/25/19 1150) Order ID: [id number]       06/25/19 1150                  Condition: Stable    Disposition: PACU - hemodynamically stable.    Attestation: I was present and scrubbed for the entire procedure.       Marky Fox MD

## 2019-06-25 NOTE — TRANSFER OF CARE
"Anesthesia Transfer of Care Note    Patient: Fran Higgins    Procedure(s) Performed: Procedure(s) (LRB):  ROBOTIC SALPINGO-OOPHORECTOMY (Left)    Patient location: PACU    Anesthesia Type: general    Transport from OR: Transported from OR on 2-3 L/min O2 by NC with adequate spontaneous ventilation. Continuous SpO2 monitoring in transport    Post pain: adequate analgesia    Post assessment: no apparent anesthetic complications and tolerated procedure well    Post vital signs: stable    Level of consciousness: awake, alert and oriented    Nausea/Vomiting: no nausea/vomiting    Complications: none    Transfer of care protocol was followed      Last vitals:   Visit Vitals  /76 (BP Location: Right arm, Patient Position: Lying)   Pulse 84   Temp 37.3 °C (99.1 °F) (Oral)   Resp 16   Ht 5' 4" (1.626 m)   Wt (!) 152.8 kg (336 lb 13.8 oz)   LMP 06/01/2019   SpO2 98%   Breastfeeding? No   BMI 57.82 kg/m²     "

## 2019-06-25 NOTE — OPERATIVE NOTE ADDENDUM
Certification of Assistant at Surgery       Surgery Date: 6/25/2019     Participating Surgeons:  Surgeon(s) and Role:     * Marky Fox Jr., MD - Primary    Procedures:  Procedure(s) (LRB):  ROBOTIC SALPINGO-OOPHORECTOMY (Left)    Assistant Surgeon's Certification of Necessity:  I understand that section 1842 (b) (6) (d) of the Social Security Act generally prohibits Medicare Part B reasonable charge payment for the services of assistants at surgery in teaching hospitals when qualified residents are available to furnish such services. I certify that the services for which payment is claimed were medically necessary, and that no qualified resident was available to perform the services. I further understand that these services are subject to post-payment review by the Medicare carrier.      SUE Eli    06/25/2019  12:46 PM

## 2019-06-25 NOTE — PLAN OF CARE
Fran Johanna Higgins has met all discharge criteria from Phase II. Vital Signs are stable, ambulating  without difficulty. Discharge instructions given, patient verbalized understanding. Discharged from facility via wheelchair in stable condition.

## 2019-06-25 NOTE — ANESTHESIA POSTPROCEDURE EVALUATION
Anesthesia Post Evaluation    Patient: Fran Higgins    Procedure(s) Performed: Procedure(s) (LRB):  ROBOTIC SALPINGO-OOPHORECTOMY (Left)    Final Anesthesia Type: general  Patient location during evaluation: PACU  Patient participation: Yes- Able to Participate  Level of consciousness: oriented and awake  Post-procedure vital signs: reviewed and stable  Pain management: adequate  Airway patency: patent  PONV status at discharge: No PONV  Anesthetic complications: no      Cardiovascular status: hemodynamically stable  Respiratory status: unassisted, spontaneous ventilation and room air  Hydration status: euvolemic  Follow-up not needed.          Vitals Value Taken Time   /75 6/25/2019  1:50 PM   Temp 36.9 °C (98.4 °F) 6/25/2019  1:50 PM   Pulse 73 6/25/2019  1:50 PM   Resp 16 6/25/2019  1:50 PM   SpO2 98 % 6/25/2019  1:50 PM         Event Time     Out of Recovery 13:46:33          Pain/Bernadine Score: Pain Rating Prior to Med Admin: 6 (6/25/2019  1:06 PM)  Pain Rating Post Med Admin: 3 (6/25/2019  1:45 PM)  Bernadine Score: 10 (6/25/2019  1:50 PM)

## 2019-06-25 NOTE — DISCHARGE INSTRUCTIONS
Abdominal Laparoscopy Discharge Instructions      Activity  · Limit your activity for 4-6 weeks.  · Dont lift anything heavier than 5-10 pounds.  · Avoid strenuous activities, such as mowing the lawn, vacuuming, or playing sports.  · Limit your activity to short, slow walks. Gradually increase your pace and distance as you feel able.  · Listen to your body. If an activity causes pain, stop.  · Rest when you are tired.  · Dont have sexual intercourse or use tampons or douches until your doctor says its safe to do so.    Home Care  · Always keep your incision clean and dry.@[id number](Error - No data available.)@  · Shower as instructed in 24 hours. You may wash your incision gently with mild soap and warm water and pat dry.  Avoid tub baths, hot tubs and swimming pools until seen by your physician for a post-op follow up.  · If you have steri strips they should fall off in 7-10 days.    · If you have band aids covering your incisions change daily or when soiled.  The band aids may be removed post op day 1 if they are clean and dry.  · Take your medication exactly as instructed by your doctor.  · Return to your diet as you feel able. Eat a healthy, well-balanced diet.  · Avoid constipation.  ¨ Use laxatives, stool softeners, or enemas as directed by your doctor.  ¨ Eat more high-fiber foods.  ¨ Drink 6-8 glasses of water every day, unless directed otherwise.  Follow-Up  Make a follow-up appointment as directed by our staff.       When to Call Your Doctor  Call your doctor right away if you have any of the following:  · Fever above 101.5°F  (38.6°C) or chills  · Bright red vaginal bleeding or a foul-smelling discharge  · Vaginal bleeding that soaks more than one sanitary pad per hour  · Trouble urinating or burning sensation when you urinate  · Severe abdominal pain or bloating  · Redness, swelling, or drainage at your incision site  · Shortness of breath         FOR EMERGENCIES:  If any unusual problems or  difficulties occur, contact Dr. Fox or the resident at [phone] (page ) or at the Clinic office, [phone].      -------------------------------------------------------------------------------------------------------------    Discharge Instructions: After Your Surgery  Youve just had surgery. During surgery, you were given medicine called anesthesia to keep you relaxed and free of pain. After surgery, you may have some pain or nausea. This is common. Here are some tips for feeling better and getting well after surgery.     Stay on schedule with your medicine.   Going home  Your healthcare provider will show you how to take care of yourself when you go home. He or she will also answer your questions. Have an adult family member or friend drive you home. For the first 24 hours after your surgery:  · Do not drive or use heavy equipment.  · Do not make important decisions or sign legal papers.  · Do not drink alcohol.  · Have someone stay with you, if needed. He or she can watch for problems and help keep you safe.  Be sure to go to all follow-up visits with your healthcare provider. And rest after your surgery for as long as your healthcare provider tells you to.  Coping with pain  If you have pain after surgery, pain medicine will help you feel better. Take it as told, before pain becomes severe. Also, ask your healthcare provider or pharmacist about other ways to control pain. This might be with heat, ice, or relaxation. And follow any other instructions your surgeon or nurse gives you.  Tips for taking pain medicine  To get the best relief possible, remember these points:  · Pain medicines can upset your stomach. Taking them with a little food may help.  · Most pain relievers taken by mouth need at least 20 to 30 minutes to start to work.  · Taking medicine on a schedule can help you remember to take it. Try to time your medicine so that you can take it before starting an activity. This might be  before you get dressed, go for a walk, or sit down for dinner.  · Constipation is a common side effect of pain medicines. Call your healthcare provider before taking any medicines such as laxatives or stool softeners to help ease constipation. Also ask if you should skip any foods. Drinking lots of fluids and eating foods such as fruits and vegetables that are high in fiber can also help. Remember, do not take laxatives unless your surgeon has prescribed them.  · Drinking alcohol and taking pain medicine can cause dizziness and slow your breathing. It can even be deadly. Do not drink alcohol while taking pain medicine.  · Pain medicine can make you react more slowly to things. Do not drive or run machinery while taking pain medicine.  Your healthcare provider may tell you to take acetaminophen to help ease your pain. Ask him or her how much you are supposed to take each day. Acetaminophen or other pain relievers may interact with your prescription medicines or other over-the-counter (OTC) medicines. Some prescription medicines have acetaminophen and other ingredients. Using both prescription and OTC acetaminophen for pain can cause you to overdose. Read the labels on your OTC medicines with care. This will help you to clearly know the list of ingredients, how much to take, and any warnings. It may also help you not take too much acetaminophen. If you have questions or do not understand the information, ask your pharmacist or healthcare provider to explain it to you before you take the OTC medicine.  Managing nausea  Some people have an upset stomach after surgery. This is often because of anesthesia, pain, or pain medicine, or the stress of surgery. These tips will help you handle nausea and eat healthy foods as you get better. If you were on a special food plan before surgery, ask your healthcare provider if you should follow it while you get better. These tips may help:  · Do not push yourself to eat. Your body  will tell you when to eat and how much.  · Start off with clear liquids and soup. They are easier to digest.  · Next try semi-solid foods, such as mashed potatoes, applesauce, and gelatin, as you feel ready.  · Slowly move to solid foods. Dont eat fatty, rich, or spicy foods at first.  · Do not force yourself to have 3 large meals a day. Instead eat smaller amounts more often.  · Take pain medicines with a small amount of solid food, such as crackers or toast, to avoid nausea.     Call your surgeon if  · You still have pain an hour after taking medicine. The medicine may not be strong enough.  · You feel too sleepy, dizzy, or groggy. The medicine may be too strong.  · You have side effects like nausea, vomiting, or skin changes, such as rash, itching, or hives.       If you have obstructive sleep apnea  You were given anesthesia medicine during surgery to keep you comfortable and free of pain. After surgery, you may have more apnea spells because of this medicine and other medicines you were given. The spells may last longer than usual.   At home:  · Keep using the continuous positive airway pressure (CPAP) device when you sleep. Unless your healthcare provider tells you not to, use it when you sleep, day or night. CPAP is a common device used to treat obstructive sleep apnea.  · Talk with your provider before taking any pain medicine, muscle relaxants, or sedatives. Your provider will tell you about the possible dangers of taking these medicines.  Date Last Reviewed: 12/1/2016 © 2000-2017 [employer] [address] [place] [address], [place] [address]. All rights reserved. This information is not intended as a substitute for professional medical care. Always follow your healthcare professional's instructions.      FOLLOW ANY OTHER INSTRUCTIONS GIVEN TO YOU BY DR. HAMMER!!!

## 2019-06-25 NOTE — INTERVAL H&P NOTE
The patient has been examined and the H&P has been reviewed:    I concur with the findings and no changes have occurred since H&P was written.    Anesthesia/Surgery risks, benefits and alternative options discussed and understood by patient/family.    Proceed to OR.    Active Hospital Problems    Diagnosis  POA    Left ovarian cyst [N83.202]  Yes      Resolved Hospital Problems   No resolved problems to display.

## 2019-06-26 ENCOUNTER — PATIENT MESSAGE (OUTPATIENT)
Dept: OBSTETRICS AND GYNECOLOGY | Facility: CLINIC | Age: 39
End: 2019-06-26

## 2019-06-27 RX ORDER — HYDROCODONE BITARTRATE AND ACETAMINOPHEN 5; 325 MG/1; MG/1
1 TABLET ORAL EVERY 4 HOURS PRN
Qty: 15 TABLET | Refills: 0 | Status: SHIPPED | OUTPATIENT
Start: 2019-06-27 | End: 2019-07-11

## 2019-07-03 ENCOUNTER — PATIENT MESSAGE (OUTPATIENT)
Dept: OBSTETRICS AND GYNECOLOGY | Facility: CLINIC | Age: 39
End: 2019-07-03

## 2019-07-06 ENCOUNTER — OFFICE VISIT (OUTPATIENT)
Dept: URGENT CARE | Facility: CLINIC | Age: 39
End: 2019-07-06
Payer: COMMERCIAL

## 2019-07-06 VITALS
TEMPERATURE: 99 F | SYSTOLIC BLOOD PRESSURE: 137 MMHG | HEIGHT: 64 IN | WEIGHT: 293 LBS | HEART RATE: 91 BPM | BODY MASS INDEX: 50.02 KG/M2 | OXYGEN SATURATION: 96 % | RESPIRATION RATE: 19 BRPM | DIASTOLIC BLOOD PRESSURE: 80 MMHG

## 2019-07-06 DIAGNOSIS — J32.9 SINUSITIS, UNSPECIFIED CHRONICITY, UNSPECIFIED LOCATION: Primary | ICD-10-CM

## 2019-07-06 DIAGNOSIS — R05.9 COUGH: ICD-10-CM

## 2019-07-06 PROCEDURE — 99214 PR OFFICE/OUTPT VISIT, EST, LEVL IV, 30-39 MIN: ICD-10-PCS | Mod: 25,S$GLB,, | Performed by: FAMILY MEDICINE

## 2019-07-06 PROCEDURE — 3008F BODY MASS INDEX DOCD: CPT | Mod: CPTII,S$GLB,, | Performed by: FAMILY MEDICINE

## 2019-07-06 PROCEDURE — 99214 OFFICE O/P EST MOD 30 MIN: CPT | Mod: 25,S$GLB,, | Performed by: FAMILY MEDICINE

## 2019-07-06 PROCEDURE — 96372 PR INJECTION,THERAP/PROPH/DIAG2ST, IM OR SUBCUT: ICD-10-PCS | Mod: S$GLB,,, | Performed by: FAMILY MEDICINE

## 2019-07-06 PROCEDURE — 3008F PR BODY MASS INDEX (BMI) DOCUMENTED: ICD-10-PCS | Mod: CPTII,S$GLB,, | Performed by: FAMILY MEDICINE

## 2019-07-06 PROCEDURE — 96372 THER/PROPH/DIAG INJ SC/IM: CPT | Mod: S$GLB,,, | Performed by: FAMILY MEDICINE

## 2019-07-06 RX ORDER — BETAMETHASONE SODIUM PHOSPHATE AND BETAMETHASONE ACETATE 3; 3 MG/ML; MG/ML
9 INJECTION, SUSPENSION INTRA-ARTICULAR; INTRALESIONAL; INTRAMUSCULAR; SOFT TISSUE
Status: COMPLETED | OUTPATIENT
Start: 2019-07-06 | End: 2019-07-06

## 2019-07-06 RX ADMIN — BETAMETHASONE SODIUM PHOSPHATE AND BETAMETHASONE ACETATE 9 MG: 3; 3 INJECTION, SUSPENSION INTRA-ARTICULAR; INTRALESIONAL; INTRAMUSCULAR; SOFT TISSUE at 04:07

## 2019-07-06 NOTE — PROGRESS NOTES
"Subjective:       Patient ID: Fran Higgins is a 38 y.o. female.    Vitals:  height is 5' 4" (1.626 m) and weight is 149.7 kg (330 lb) (abnormal). Her oral temperature is 98.9 °F (37.2 °C). Her blood pressure is 137/80 and her pulse is 91. Her respiration is 19 and oxygen saturation is 96%.     Chief Complaint: Sinusitis    Sinusitis   This is a new problem. The current episode started in the past 7 days (2 days ). The problem has been gradually worsening since onset. There has been no fever. Her pain is at a severity of 6/10. The pain is moderate. Associated symptoms include congestion, coughing, ear pain, headaches, sinus pressure and sneezing. Pertinent negatives include no chills, diaphoresis, hoarse voice, neck pain, shortness of breath, sore throat or swollen glands. Treatments tried: sudafed, clartin, zyrtec, flonase. The treatment provided no relief.       Constitution: Negative for chills, sweating, fatigue and fever.   HENT: Positive for ear pain, congestion, postnasal drip, sinus pain and sinus pressure. Negative for sore throat and voice change.    Neck: Negative for neck pain and painful lymph nodes.   Eyes: Negative for eye redness.   Respiratory: Positive for cough. Negative for chest tightness, sputum production, bloody sputum, COPD, shortness of breath, stridor, wheezing and asthma.    Gastrointestinal: Negative for nausea and vomiting.   Musculoskeletal: Negative for muscle ache.   Skin: Negative for rash and erythema.   Allergic/Immunologic: Positive for sneezing. Negative for seasonal allergies and asthma.   Neurological: Positive for headaches.   Hematologic/Lymphatic: Negative for swollen lymph nodes.       Objective:      Physical Exam   Constitutional: She is oriented to person, place, and time. She appears well-developed and well-nourished.   HENT:   Head: Normocephalic and atraumatic.   Right Ear: External ear normal.   Left Ear: External ear normal.   Mild bilateral maxillary " sinus tenderness.     Eyes: Pupils are equal, round, and reactive to light. EOM are normal.   Neck: Normal range of motion. Neck supple.   Cardiovascular: Normal rate, regular rhythm and normal heart sounds. Exam reveals no gallop and no friction rub.   No murmur heard.  Pulmonary/Chest: Breath sounds normal. No respiratory distress. She has no wheezes. She has no rales. She exhibits no tenderness.   Abdominal: Soft. Bowel sounds are normal. She exhibits no distension. There is no tenderness.   Musculoskeletal: Normal range of motion. She exhibits no edema, tenderness or deformity.   Lymphadenopathy:     She has no cervical adenopathy.   Neurological: She is alert and oriented to person, place, and time. No cranial nerve deficit. She exhibits normal muscle tone. Coordination normal.   Skin: Skin is warm. Capillary refill takes less than 2 seconds. No rash noted. No erythema. No pallor.   Psychiatric: She has a normal mood and affect. Her behavior is normal. Judgment and thought content normal.   Vitals reviewed.      Assessment:       1. Sinusitis, unspecified chronicity, unspecified location    2. Cough        Plan:         Sinusitis, unspecified chronicity, unspecified location  -     betamethasone acetate-betamethasone sodium phosphate injection 9 mg    Cough          Patient Instructions     Understanding Sinus Problems    You dont often think about your sinuses until theres a problem. One day you realize you cant smell dinner cooking. Or you find you often have headaches or problems breathing through your nose.  Symptoms of sinus problems  Sinus problems can cause uncomfortable symptoms. Your nose may run constantly. You might have trouble sleeping at night. You may even lose your sense of smell. Other symptoms can include:  · Nasal congestion  · Fullness in ears  · Green, yellow, or bloody drainage from the nose  · Trouble tasting food  · Frequent headaches  · Facial pain  · Cough  When sinuses are  blocked  If something blocks the passages in the nose or sinuses, mucus cant drain. Mucus-filled sinuses often become infected.  · Colds cause the lining of the nose and sinuses to swell and make extra mucus. A buildup of mucus can lead to a more serious infection.  · Allergies irritate turbinates and other tissues. This causes swelling, which can cause a blockage. Over time, this irritation can also lead to sacs of swollen tissue (polyps).  · Polyps may form in both the sinuses and nose. Polyps can grow large enough to clog nasal passages and block drainage.  · A crooked (deviated) septum may block nasal passages. This is often the result of an injury.  Date Last Reviewed: 11/1/2016 © 2000-2017 [employer]. [address] [place] [address], [place] [address]. All rights reserved. This information is not intended as a substitute for professional medical care. Always follow your healthcare professional's instructions.      Follow up with your doctor in a few days as needed.  Return to the urgent care or go to the ER if symptoms get worse.    Salvatore Courtney MD

## 2019-07-08 ENCOUNTER — PATIENT MESSAGE (OUTPATIENT)
Dept: OBSTETRICS AND GYNECOLOGY | Facility: CLINIC | Age: 39
End: 2019-07-08

## 2019-07-08 DIAGNOSIS — C56.9 MUCINOUS TUMOR, OF LOW MALIGNANT POTENTIAL: Primary | ICD-10-CM

## 2019-07-09 ENCOUNTER — PATIENT MESSAGE (OUTPATIENT)
Dept: OBSTETRICS AND GYNECOLOGY | Facility: CLINIC | Age: 39
End: 2019-07-09

## 2019-07-09 ENCOUNTER — DOCUMENTATION ONLY (OUTPATIENT)
Dept: GYNECOLOGIC ONCOLOGY | Facility: CLINIC | Age: 39
End: 2019-07-09

## 2019-07-09 NOTE — PROGRESS NOTES
Referring physician: Dr. 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  Mass was ruptured intraoperatively as it was being placed into an Endo-Catch bag.  Preoperative  was 11.    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  Size of tumor: 11.8 CM.  Capsule involvement cannot be determined secondary to fragmentation.  Microinvasion is present: 5 foci.  Intraepithelial carcinoma is present.  Benign fallopian tube and fimbriated end with no significant histopathologic alterations.    Patient has been referred for further management.    Pap: 5/13/2019: normal.

## 2019-07-10 ENCOUNTER — TELEPHONE (OUTPATIENT)
Dept: GYNECOLOGIC ONCOLOGY | Facility: CLINIC | Age: 39
End: 2019-07-10

## 2019-07-11 ENCOUNTER — LAB VISIT (OUTPATIENT)
Dept: LAB | Facility: HOSPITAL | Age: 39
End: 2019-07-11
Attending: OBSTETRICS & GYNECOLOGY
Payer: COMMERCIAL

## 2019-07-11 ENCOUNTER — TELEPHONE (OUTPATIENT)
Dept: GYNECOLOGIC ONCOLOGY | Facility: CLINIC | Age: 39
End: 2019-07-11

## 2019-07-11 ENCOUNTER — INITIAL CONSULT (OUTPATIENT)
Dept: GYNECOLOGIC ONCOLOGY | Facility: CLINIC | Age: 39
End: 2019-07-11
Payer: COMMERCIAL

## 2019-07-11 VITALS
DIASTOLIC BLOOD PRESSURE: 74 MMHG | SYSTOLIC BLOOD PRESSURE: 127 MMHG | WEIGHT: 293 LBS | HEART RATE: 97 BPM | BODY MASS INDEX: 56.3 KG/M2

## 2019-07-11 DIAGNOSIS — C56.9 MUCINOUS TUMOR, OF LOW MALIGNANT POTENTIAL: ICD-10-CM

## 2019-07-11 LAB
ANION GAP SERPL CALC-SCNC: 9 MMOL/L (ref 8–16)
BUN SERPL-MCNC: 14 MG/DL (ref 6–20)
CALCIUM SERPL-MCNC: 10 MG/DL (ref 8.7–10.5)
CEA SERPL-MCNC: 1.7 NG/ML (ref 0–5)
CHLORIDE SERPL-SCNC: 103 MMOL/L (ref 95–110)
CO2 SERPL-SCNC: 27 MMOL/L (ref 23–29)
CREAT SERPL-MCNC: 0.8 MG/DL (ref 0.5–1.4)
EST. GFR  (AFRICAN AMERICAN): >60 ML/MIN/1.73 M^2
EST. GFR  (NON AFRICAN AMERICAN): >60 ML/MIN/1.73 M^2
GLUCOSE SERPL-MCNC: 96 MG/DL (ref 70–110)
POTASSIUM SERPL-SCNC: 4.1 MMOL/L (ref 3.5–5.1)
SODIUM SERPL-SCNC: 139 MMOL/L (ref 136–145)

## 2019-07-11 PROCEDURE — 99999 PR PBB SHADOW E&M-EST. PATIENT-LVL III: CPT | Mod: PBBFAC,,, | Performed by: OBSTETRICS & GYNECOLOGY

## 2019-07-11 PROCEDURE — 3008F BODY MASS INDEX DOCD: CPT | Mod: CPTII,S$GLB,, | Performed by: OBSTETRICS & GYNECOLOGY

## 2019-07-11 PROCEDURE — 3008F PR BODY MASS INDEX (BMI) DOCUMENTED: ICD-10-PCS | Mod: CPTII,S$GLB,, | Performed by: OBSTETRICS & GYNECOLOGY

## 2019-07-11 PROCEDURE — 82378 CARCINOEMBRYONIC ANTIGEN: CPT

## 2019-07-11 PROCEDURE — 99205 OFFICE O/P NEW HI 60 MIN: CPT | Mod: S$GLB,,, | Performed by: OBSTETRICS & GYNECOLOGY

## 2019-07-11 PROCEDURE — 36415 COLL VENOUS BLD VENIPUNCTURE: CPT

## 2019-07-11 PROCEDURE — 80048 BASIC METABOLIC PNL TOTAL CA: CPT

## 2019-07-11 PROCEDURE — 99205 PR OFFICE/OUTPT VISIT, NEW, LEVL V, 60-74 MIN: ICD-10-PCS | Mod: S$GLB,,, | Performed by: OBSTETRICS & GYNECOLOGY

## 2019-07-11 PROCEDURE — 99999 PR PBB SHADOW E&M-EST. PATIENT-LVL III: ICD-10-PCS | Mod: PBBFAC,,, | Performed by: OBSTETRICS & GYNECOLOGY

## 2019-07-11 RX ORDER — AMOXICILLIN AND CLAVULANATE POTASSIUM 875; 125 MG/1; MG/1
1 TABLET, FILM COATED ORAL 2 TIMES DAILY
Refills: 0 | COMMUNITY
Start: 2019-07-06 | End: 2019-07-26

## 2019-07-11 NOTE — LETTER
July 14, 2019      Marky Fox Jr., MD  [address]  [address]  [facility] [address]           [facility] GYN [facility]  [address]  [place] [address]  Phone: [phone]          Patient: Fran Higgins   MR Number: [id number]   YOB: 1980   Date of Visit: 7/11/2019       Dear Dr. Marky Fox Jr.:    Thank you for referring Fran Higgins to me for evaluation. Attached you will find relevant portions of my assessment and plan of care.    If you have questions, please do not hesitate to call me. I look forward to following Fran Higgins along with you.    Sincerely,    Jeff Kaufman MD    Enclosure  CC:  No Recipients    If you would like to receive this communication electronically, please contact [email or web address] or [phone] to request more information on [employer] access.    For providers and/or their staff who would like to refer a patient to [facility], please contact us through our one-stop-shop provider referral line, [facility], at [phone].    If you feel you have received this communication in error or would no longer like to receive these types of communications, please e-mail [email or web address]

## 2019-07-11 NOTE — DISCHARGE SUMMARY
[facility]  Cardiac Electrophysiology  Discharge Summary      Patient Name: Fran Higgins  MRN: [id number]  Admission Date: 6/25/2019  Hospital Length of Stay: 0 days  Discharge Date and Time:  07/11/2019 2:28 PM  Attending Physician: Marky Fox MD  Discharging Provider: Marky Fox Jr, MD  Primary Care Physician: Erica Pineda MD    HPI:   No notes on file    Procedure(s) (LRB):  ROBOTIC SALPINGO-OOPHORECTOMY (Left)     Indwelling Lines/Drains at time of discharge:  Lines/Drains/Airways          None          Hospital Course:  No notes on file    Consults:     Significant Diagnostic Studies: none    Pending Diagnostic Studies:     None          Final Active Diagnoses:    Diagnosis Date Noted POA    Left ovarian cyst [N83.202] 06/25/2019 Yes    S/P RA Laparoscopic LSO [Z98.890] 06/25/2019 Not Applicable      Problems Resolved During this Admission:     No new Assessment & Plan notes have been filed under this hospital service since the last note was generated.  Service: Gynecology      Discharged Condition: good    Disposition: Home or Self Care    Follow Up:    Patient Instructions:      Diet general     Other restrictions (specify):   Order Comments: Pelvic rest for 1 week. Nothing in the vagina: No sex, tampons, or douching.  No lifting greater than 10lbs, heavy house work, or strenuous exercise for 1 week.     Call MD for:  temperature >100.4     Call MD for:  persistent nausea and vomiting     Call MD for:  severe uncontrolled pain     Call MD for:  difficulty breathing, headache or visual disturbances     Call MD for:  redness, tenderness, or signs of infection (pain, swelling, redness, odor or green/yellow discharge around incision site)     Call MD for:  hives     Call MD for:  persistent dizziness or light-headedness     Call MD for:  extreme fatigue     Call MD for:   Order Comments: VAGINAL BLEEDING GREATER THAN 1 PAD PER HOUR FOR 2 HOURS     Wound care routine  (specify)   Order Comments: Wash incision with soap/water daily, dry completely. Remove steri-strips 1 week from surgery     Medications:  Reconciled Home Medications:      Medication List      CONTINUE taking these medications    escitalopram oxalate 10 MG tablet  Commonly known as:  LEXAPRO  Take 1 tablet (10 mg total) by mouth once daily.     meloxicam 15 MG tablet  Commonly known as:  MOBIC  Take 15 mg by mouth once daily.            Marky Fox Jr, MD  Cardiac Electrophysiology  [facility]

## 2019-07-11 NOTE — PROGRESS NOTES
Subjective:       Patient ID: Fran Higgins is a 38 y.o. female.    Chief Complaint:   HPI     Referring physician: Dr. 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  Mass was ruptured intraoperatively as it was being placed into an Endo-Catch bag.  Preoperative  was 11.     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  Size of tumor: 11.8 CM.  Capsule involvement cannot be determined secondary to fragmentation.  Microinvasion is present: 5 foci.  Intraepithelial carcinoma is present.  Benign fallopian tube and fimbriated end with no significant histopathologic alterations.     Patient has been referred for further management.     Pap: 5/13/2019: normal.     Colonoscopy: never    LMP: 6/28/2019      Review of Systems   Constitutional: Negative for chills, fatigue and fever.   Respiratory: Negative for cough, shortness of breath and wheezing.    Cardiovascular: Negative for chest pain, palpitations and leg swelling.   Gastrointestinal: Negative for abdominal pain, constipation, diarrhea, nausea and vomiting.   Genitourinary: Negative for difficulty urinating, dysuria, frequency, genital sores, hematuria, urgency, vaginal bleeding, vaginal discharge and vaginal pain.   Neurological: Negative for weakness.   Hematological: Negative for adenopathy. Does not bruise/bleed easily.   Psychiatric/Behavioral: The patient is not nervous/anxious.        Objective:   /74   Pulse 97   Wt (!) 148.8 kg (328 lb)   LMP 06/28/2019   BMI 56.30 kg/m²      Physical Exam      Deferred due to recent surgery  Assessment:       1. Mucinous tumor, of low malignant potential        Plan:   Mucinous tumor, of low malignant  potential  Discussion re: LMP tumor. Discussed observation vs surgical RALH/RSO.   Discussed early menopause with RSO and need for ERT.   Discussed risks of early menopause.   She states family is complete and could consider RALH/RSO.   Will get CT scan and rtc afterwards for exam and to discuss final plan.      -     CEA; Future; Expected date: 07/11/2019  -     Basic metabolic panel; Future; Expected date: 07/11/2019  -     CT Abdomen Pelvis With Contrast; Future; Expected date: 07/11/2019

## 2019-07-12 ENCOUNTER — PATIENT MESSAGE (OUTPATIENT)
Dept: GYNECOLOGIC ONCOLOGY | Facility: CLINIC | Age: 39
End: 2019-07-12

## 2019-07-12 DIAGNOSIS — N83.209 CYST OF OVARY, UNSPECIFIED LATERALITY: ICD-10-CM

## 2019-07-12 DIAGNOSIS — C56.9 MUCINOUS TUMOR, OF LOW MALIGNANT POTENTIAL: Primary | ICD-10-CM

## 2019-07-12 RX ORDER — ACETAMINOPHEN AND CODEINE PHOSPHATE 120; 12 MG/5ML; MG/5ML
1 SOLUTION ORAL DAILY
Qty: 30 TABLET | Refills: 11 | Status: SHIPPED | OUTPATIENT
Start: 2019-07-12 | End: 2019-10-15 | Stop reason: SDUPTHER

## 2019-07-16 ENCOUNTER — PATIENT MESSAGE (OUTPATIENT)
Dept: INTERNAL MEDICINE | Facility: CLINIC | Age: 39
End: 2019-07-16

## 2019-07-16 RX ORDER — ESCITALOPRAM OXALATE 20 MG/1
20 TABLET ORAL DAILY
Qty: 30 TABLET | Refills: 11 | Status: SHIPPED | OUTPATIENT
Start: 2019-07-16 | End: 2020-07-09

## 2019-07-22 ENCOUNTER — OFFICE VISIT (OUTPATIENT)
Dept: OBSTETRICS AND GYNECOLOGY | Facility: CLINIC | Age: 39
End: 2019-07-22
Payer: COMMERCIAL

## 2019-07-22 VITALS
HEIGHT: 64 IN | BODY MASS INDEX: 50.02 KG/M2 | WEIGHT: 293 LBS | DIASTOLIC BLOOD PRESSURE: 66 MMHG | SYSTOLIC BLOOD PRESSURE: 128 MMHG

## 2019-07-22 DIAGNOSIS — C56.9 MUCINOUS TUMOR, OF LOW MALIGNANT POTENTIAL: ICD-10-CM

## 2019-07-22 DIAGNOSIS — Z98.890 STATUS POST ROBOT-ASSISTED SURGICAL PROCEDURE: Primary | ICD-10-CM

## 2019-07-22 PROCEDURE — 99024 PR POST-OP FOLLOW-UP VISIT: ICD-10-PCS | Mod: S$GLB,,, | Performed by: OBSTETRICS & GYNECOLOGY

## 2019-07-22 PROCEDURE — 99024 POSTOP FOLLOW-UP VISIT: CPT | Mod: S$GLB,,, | Performed by: OBSTETRICS & GYNECOLOGY

## 2019-07-22 PROCEDURE — 99999 PR PBB SHADOW E&M-EST. PATIENT-LVL III: ICD-10-PCS | Mod: PBBFAC,,, | Performed by: OBSTETRICS & GYNECOLOGY

## 2019-07-22 PROCEDURE — 99999 PR PBB SHADOW E&M-EST. PATIENT-LVL III: CPT | Mod: PBBFAC,,, | Performed by: OBSTETRICS & GYNECOLOGY

## 2019-07-22 NOTE — PROGRESS NOTES
PT HERE S/P LSO. MORE PAINFUL AT [facility].  HAS SEEN DR MALIK.    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  Size of tumor: 11.8 CM.  Capsule involvement cannot be determined secondary to fragmentation.  Microinvasion is present: 5 foci.  Intraepithelial carcinoma is present.  Benign fallopian tube and fimbriated end with no significant histopathologic alterations.      PE:  General Appearance: obese And Well developed. Well nourished. In no acute distress.  Abdomen: obese No masses. No tenderness. HEALED INCISIONS NO HERNIA    PROCEDURES:    PLAN:     DIAGNOSIS:  1. S/P RA Laparoscopic LSO    2. Mucinous tumor, of low malignant potential        MEDICATIONS & ORDERS:       FOLLOW-UP: With DR MALIK

## 2019-07-25 ENCOUNTER — TELEPHONE (OUTPATIENT)
Dept: GYNECOLOGIC ONCOLOGY | Facility: CLINIC | Age: 39
End: 2019-07-25

## 2019-07-25 ENCOUNTER — HOSPITAL ENCOUNTER (OUTPATIENT)
Dept: RADIOLOGY | Facility: HOSPITAL | Age: 39
Discharge: HOME | End: 2019-07-25
Attending: OBSTETRICS & GYNECOLOGY
Payer: COMMERCIAL

## 2019-07-25 DIAGNOSIS — C56.9 MUCINOUS TUMOR, OF LOW MALIGNANT POTENTIAL: ICD-10-CM

## 2019-07-25 PROCEDURE — 74177 CT ABDOMEN PELVIS WITH CONTRAST: ICD-10-PCS | Mod: 26,,, | Performed by: RADIOLOGY

## 2019-07-25 PROCEDURE — 74177 CT ABD & PELVIS W/CONTRAST: CPT | Mod: TC

## 2019-07-25 PROCEDURE — 25500020 PHARM REV CODE 255: Performed by: OBSTETRICS & GYNECOLOGY

## 2019-07-25 PROCEDURE — 74177 CT ABD & PELVIS W/CONTRAST: CPT | Mod: 26,,, | Performed by: RADIOLOGY

## 2019-07-25 RX ADMIN — IOHEXOL 100 ML: 350 INJECTION, SOLUTION INTRAVENOUS at 04:07

## 2019-07-26 ENCOUNTER — OFFICE VISIT (OUTPATIENT)
Dept: GYNECOLOGIC ONCOLOGY | Facility: CLINIC | Age: 39
End: 2019-07-26
Payer: COMMERCIAL

## 2019-07-26 VITALS
HEART RATE: 85 BPM | HEIGHT: 64 IN | BODY MASS INDEX: 50.02 KG/M2 | WEIGHT: 293 LBS | SYSTOLIC BLOOD PRESSURE: 128 MMHG | DIASTOLIC BLOOD PRESSURE: 87 MMHG

## 2019-07-26 DIAGNOSIS — Q24.8 PERICARDIAL CYST: ICD-10-CM

## 2019-07-26 DIAGNOSIS — C56.9 MUCINOUS TUMOR, OF LOW MALIGNANT POTENTIAL: Primary | ICD-10-CM

## 2019-07-26 PROCEDURE — 3008F BODY MASS INDEX DOCD: CPT | Mod: CPTII,S$GLB,, | Performed by: OBSTETRICS & GYNECOLOGY

## 2019-07-26 PROCEDURE — 99213 PR OFFICE/OUTPT VISIT, EST, LEVL III, 20-29 MIN: ICD-10-PCS | Mod: S$GLB,,, | Performed by: OBSTETRICS & GYNECOLOGY

## 2019-07-26 PROCEDURE — 99999 PR PBB SHADOW E&M-EST. PATIENT-LVL III: CPT | Mod: PBBFAC,,, | Performed by: OBSTETRICS & GYNECOLOGY

## 2019-07-26 PROCEDURE — 3008F PR BODY MASS INDEX (BMI) DOCUMENTED: ICD-10-PCS | Mod: CPTII,S$GLB,, | Performed by: OBSTETRICS & GYNECOLOGY

## 2019-07-26 PROCEDURE — 99999 PR PBB SHADOW E&M-EST. PATIENT-LVL III: ICD-10-PCS | Mod: PBBFAC,,, | Performed by: OBSTETRICS & GYNECOLOGY

## 2019-07-26 PROCEDURE — 99213 OFFICE O/P EST LOW 20 MIN: CPT | Mod: S$GLB,,, | Performed by: OBSTETRICS & GYNECOLOGY

## 2019-07-26 NOTE — PROGRESS NOTES
"Subjective:       Patient ID: Fran Higgins is a 38 y.o. female.    Chief Complaint: Mucinous tumor, of low malignant potential; Follow-up; and Results (CT scan and blood work )    HPI     Patient comes in today to discuss CT scan and also to schedule surgery for robotic hysterectomy with right salpingo-oophorectomy.    CT scan of the abdomen and pelvis shows no evidence for metastatic disease. Right pericardial cyst.       Her oncologic history is:    June 25, 2019:  Patient underwent robotic assisted laparoscopic left salpingo-oophorectomy.  Mass was ruptured intraoperatively as it was being placed into an Endo-Catch bag.  Preoperative  was 11.     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  Size of tumor: 11.8 CM.  Capsule involvement cannot be determined secondary to fragmentation.  Microinvasion is present: 5 foci.  Intraepithelial carcinoma is present.  Benign fallopian tube and fimbriated end with no significant histopathologic alterations.     Patient has been referred for further management.     Pap: 5/13/2019: normal.      Colonoscopy: never     LMP: 6/28/2019        Review of Systems    Not done  Objective:   /87   Pulse 85   Ht 5' 4" (1.626 m)   Wt (!) 147.4 kg (324 lb 15.3 oz)   LMP 06/28/2019   BMI 55.78 kg/m²      Physical Exam    Not done  Assessment:       1. Mucinous tumor, of low malignant potential    2. Pericardial cyst        Plan:   Mucinous tumor, of low malignant potential  We discussed the role for robotic hysterectomy with right salpingo-oophorectomy.    At this time she would like to wait because she does not have any sick leave left.  She will have worked at [facility] for 1 year in January 2020 and would like to plan for surgery after that.    I told her that I thought that this was reasonable.    Return " to clinic in 6 months with ultrasound and CEA.    Cardiology consult for pericardial cysts.      -     CEA; Future; Expected date: 01/26/2020  -     US Pelvis Comp with Transvag NON-OB (xpd; Future; Expected date: 01/26/2020    Pericardial cyst  -     Ambulatory consult to Cardiology

## 2019-07-30 ENCOUNTER — OFFICE VISIT (OUTPATIENT)
Dept: CARDIOLOGY | Facility: CLINIC | Age: 39
End: 2019-07-30
Payer: COMMERCIAL

## 2019-07-30 VITALS
HEART RATE: 86 BPM | HEIGHT: 64 IN | SYSTOLIC BLOOD PRESSURE: 130 MMHG | WEIGHT: 293 LBS | BODY MASS INDEX: 50.02 KG/M2 | DIASTOLIC BLOOD PRESSURE: 69 MMHG

## 2019-07-30 DIAGNOSIS — Q24.8 PERICARDIAL CYST: Primary | ICD-10-CM

## 2019-07-30 PROCEDURE — 99204 OFFICE O/P NEW MOD 45 MIN: CPT | Mod: S$GLB,,, | Performed by: STUDENT IN AN ORGANIZED HEALTH CARE EDUCATION/TRAINING PROGRAM

## 2019-07-30 PROCEDURE — 99999 PR PBB SHADOW E&M-EST. PATIENT-LVL III: CPT | Mod: PBBFAC,,, | Performed by: STUDENT IN AN ORGANIZED HEALTH CARE EDUCATION/TRAINING PROGRAM

## 2019-07-30 PROCEDURE — 3008F PR BODY MASS INDEX (BMI) DOCUMENTED: ICD-10-PCS | Mod: CPTII,S$GLB,, | Performed by: STUDENT IN AN ORGANIZED HEALTH CARE EDUCATION/TRAINING PROGRAM

## 2019-07-30 PROCEDURE — 99204 PR OFFICE/OUTPT VISIT, NEW, LEVL IV, 45-59 MIN: ICD-10-PCS | Mod: S$GLB,,, | Performed by: STUDENT IN AN ORGANIZED HEALTH CARE EDUCATION/TRAINING PROGRAM

## 2019-07-30 PROCEDURE — 99999 PR PBB SHADOW E&M-EST. PATIENT-LVL III: ICD-10-PCS | Mod: PBBFAC,,, | Performed by: STUDENT IN AN ORGANIZED HEALTH CARE EDUCATION/TRAINING PROGRAM

## 2019-07-30 PROCEDURE — 3008F BODY MASS INDEX DOCD: CPT | Mod: CPTII,S$GLB,, | Performed by: STUDENT IN AN ORGANIZED HEALTH CARE EDUCATION/TRAINING PROGRAM

## 2019-07-30 NOTE — PROGRESS NOTES
Cardiology Clinic Note  Reason for Visit: Pericardial cyst    HPI:   Pt is a 38 year old lady who was referred by Dr. Kaufman for a pericardial cyst.     Pt has a mucinous tumor of low malignant potential s/p robotic assisted laproscopic left salpingo-oophorectomy on June 25th and is now to have a right salpingo-oophorectomy and hysterectomy after January. In work up she had a CT scan with a pericardial cyst. She is asymptomatic without any chest pain, or shortness of breath.     Review of Systems   Constitution: Negative for decreased appetite.   HENT: Negative for congestion and hearing loss.    Eyes: Negative for double vision.   Cardiovascular: Negative for chest pain and palpitations.   Respiratory: Negative for cough and shortness of breath.    Hematologic/Lymphatic: Does not bruise/bleed easily.   Gastrointestinal: Negative for abdominal pain, nausea and vomiting.   Neurological: Negative for dizziness, light-headedness and weakness.       PMH:     Past Medical History:   Diagnosis Date    Abnormal Pap smear of cervix 2013    Cervical scoliosis 1994    Severe    Depression     Mucinous tumor, 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    Ovarian cyst     Pericardial cyst 7/26/2019     Past Surgical History:   Procedure Laterality Date    ROBOTIC SALPINGO-OOPHORECTOMY Left 6/25/2019    [facility]    TONSILLECTOMY       Allergies:   Review of patient's allergies indicates:  No Known Allergies  Medications:     Current Outpatient Medications on File Prior to Visit   Medication Sig Dispense Refill    escitalopram oxalate (LEXAPRO) 20 MG tablet Take 1 tablet (20 mg total) by mouth once daily. 30 tablet 11    meloxicam (MOBIC) 15 MG tablet Take 15 mg by mouth once daily.      norethindrone (MICRONOR, 28,) 0.35 mg tablet Take 1 tablet (0.35 mg total) by mouth once daily. 30 tablet 11     No current facility-administered medications on file prior to visit.      Social  "History:     Social History     Tobacco Use    Smoking status: Never Smoker    Smokeless tobacco: Never Used   Substance Use Topics    Alcohol use: Yes     Frequency: Monthly or less     Drinks per session: 1 or 2     Binge frequency: Never     Comment: socially      Family History:     Family History   Problem Relation Age of Onset    Diabetes Paternal Grandfather     Cancer Maternal Grandfather         prostate cancer    Hypertension Father     Hypertension Mother     Stroke Mother 60        ASD, PFO    Cancer Other         uterine cancer     Uterine cancer Other     Colon cancer Neg Hx     Ovarian cancer Neg Hx     Breast cancer Neg Hx        Physical Exam   Constitutional: She is oriented to person, place, and time. She appears well-developed and well-nourished.   HENT:   Head: Normocephalic and atraumatic.   Neck: Normal range of motion. Neck supple.   Cardiovascular: Normal rate and regular rhythm.   Pulmonary/Chest: Effort normal and breath sounds normal.   Abdominal: Soft. Bowel sounds are normal.   Neurological: She is alert and oriented to person, place, and time.   Skin: Skin is warm and dry.     /69 (BP Location: Left arm, Patient Position: Sitting, BP Method: X-Large (Automatic))   Pulse 86   Ht 5' 4" (1.626 m)   Wt (!) 150.5 kg (331 lb 12.7 oz)   BMI 56.95 kg/m²          Labs:     Lab Results   Component Value Date     07/11/2019    K 4.1 07/11/2019     07/11/2019    CO2 27 07/11/2019    BUN 14 07/11/2019    CREATININE 0.8 07/11/2019    ANIONGAP 9 07/11/2019     Lab Results   Component Value Date    HGBA1C 5.5 05/20/2019     No results found for: BNP, BNPTRIAGEBLO Lab Results   Component Value Date    WBC 6.84 05/20/2019    HGB 12.0 05/20/2019    HCT 37.2 05/20/2019     05/20/2019    GRAN 4.3 05/20/2019    GRAN 63.4 05/20/2019     Lab Results   Component Value Date    CHOL 179 05/20/2019    HDL 43 05/20/2019    LDLCALC 106.6 05/20/2019    TRIG 147 05/20/2019 "          No results found for: EF              Assessment and Plan  Fran Higgins is a 38 y.o. lady who is here for a pericardial cyst.     1. Pericardial cyst   Pt with incidental finding of pericardial cyst in the work up for her mucinous tumor. This cyst is asymptomatic at this time as she has not had any chest pain or shortness of breath. Symptoms typically occur when the cyst compresses other structures ie. Right heart, coronary arteries so will need to be cognizant of symptoms associated with these issues. If there are no symptoms nothing should be done however should she become symptomatic she would require surgical evaluation by CT surgery. Will get an echo today.           Signed:        Heidy Pruitt MD  Cardiology Fellow  Pager [phone]

## 2019-07-30 NOTE — PROGRESS NOTES
I have reviewed the notes, assessments, and/or procedures performed by Dr Pruitt, I concur with her/his documentation of Fran Higgins.

## 2019-08-02 ENCOUNTER — HOSPITAL ENCOUNTER (OUTPATIENT)
Dept: CARDIOLOGY | Facility: CLINIC | Age: 39
Discharge: HOME | End: 2019-08-02
Attending: STUDENT IN AN ORGANIZED HEALTH CARE EDUCATION/TRAINING PROGRAM
Payer: COMMERCIAL

## 2019-08-02 VITALS
DIASTOLIC BLOOD PRESSURE: 88 MMHG | BODY MASS INDEX: 50.02 KG/M2 | HEIGHT: 64 IN | SYSTOLIC BLOOD PRESSURE: 128 MMHG | WEIGHT: 293 LBS | HEART RATE: 85 BPM

## 2019-08-02 DIAGNOSIS — Q24.8 PERICARDIAL CYST: ICD-10-CM

## 2019-08-02 LAB
ASCENDING AORTA: 2.82 CM
AV INDEX (PROSTH): 0.89
AV MEAN GRADIENT: 4 MMHG
AV PEAK GRADIENT: 8 MMHG
AV VALVE AREA: 3.22 CM2
AV VELOCITY RATIO: 0.84
BSA FOR ECHO PROCEDURE: 2.6 M2
CV ECHO LV RWT: 0.32 CM
DOP CALC AO PEAK VEL: 1.4 M/S
DOP CALC AO VTI: 25.61 CM
DOP CALC LVOT AREA: 3.6 CM2
DOP CALC LVOT DIAMETER: 2.15 CM
DOP CALC LVOT PEAK VEL: 1.18 M/S
DOP CALC LVOT STROKE VOLUME: 82.37 CM3
DOP CALCLVOT PEAK VEL VTI: 22.7 CM
E WAVE DECELERATION TIME: 206.08 MSEC
E/A RATIO: 1.49
E/E' RATIO: 6.54 M/S
ECHO LV POSTERIOR WALL: 0.89 CM (ref 0.6–1.1)
FRACTIONAL SHORTENING: 30 % (ref 28–44)
INTERVENTRICULAR SEPTUM: 0.87 CM (ref 0.6–1.1)
IVRT: 0.07 MSEC
LA MAJOR: 5.3 CM
LA MINOR: 5.3 CM
LA WIDTH: 4.05 CM
LEFT ATRIUM SIZE: 4 CM
LEFT ATRIUM VOLUME INDEX: 30.1 ML/M2
LEFT ATRIUM VOLUME: 72.98 CM3
LEFT INTERNAL DIMENSION IN SYSTOLE: 3.92 CM (ref 2.1–4)
LEFT VENTRICLE DIASTOLIC VOLUME INDEX: 64.43 ML/M2
LEFT VENTRICLE DIASTOLIC VOLUME: 156.11 ML
LEFT VENTRICLE MASS INDEX: 78 G/M2
LEFT VENTRICLE SYSTOLIC VOLUME INDEX: 27.6 ML/M2
LEFT VENTRICLE SYSTOLIC VOLUME: 66.76 ML
LEFT VENTRICULAR INTERNAL DIMENSION IN DIASTOLE: 5.64 CM (ref 3.5–6)
LEFT VENTRICULAR MASS: 188.48 G
LV LATERAL E/E' RATIO: 5.67 M/S
LV SEPTAL E/E' RATIO: 7.73 M/S
MV PEAK A VEL: 0.57 M/S
MV PEAK E VEL: 0.85 M/S
PISA TR MAX VEL: 2.3 M/S
PULM VEIN S/D RATIO: 1.97
PV PEAK D VEL: 0.34 M/S
PV PEAK S VEL: 0.67 M/S
RA MAJOR: 5.16 CM
RA PRESSURE: 3 MMHG
RA WIDTH: 3.98 CM
RIGHT VENTRICULAR END-DIASTOLIC DIMENSION: 4.05 CM
RV TISSUE DOPPLER FREE WALL SYSTOLIC VELOCITY 1 (APICAL 4 CHAMBER VIEW): 14.46 CM/S
SINUS: 2.96 CM
STJ: 2.33 CM
TDI LATERAL: 0.15 M/S
TDI SEPTAL: 0.11 M/S
TDI: 0.13 M/S
TR MAX PG: 21 MMHG
TRICUSPID ANNULAR PLANE SYSTOLIC EXCURSION: 2.33 CM
TV REST PULMONARY ARTERY PRESSURE: 24 MMHG

## 2019-08-02 PROCEDURE — 93306 TTE W/DOPPLER COMPLETE: CPT | Mod: S$GLB,,, | Performed by: INTERNAL MEDICINE

## 2019-08-02 PROCEDURE — 93306 TRANSTHORACIC ECHO (TTE) COMPLETE (CUPID ONLY): ICD-10-PCS | Mod: S$GLB,,, | Performed by: INTERNAL MEDICINE

## 2019-08-05 ENCOUNTER — PATIENT MESSAGE (OUTPATIENT)
Dept: CARDIOLOGY | Facility: CLINIC | Age: 39
End: 2019-08-05

## 2019-08-06 ENCOUNTER — PATIENT MESSAGE (OUTPATIENT)
Dept: CARDIOLOGY | Facility: CLINIC | Age: 39
End: 2019-08-06

## 2019-08-06 ENCOUNTER — PATIENT MESSAGE (OUTPATIENT)
Dept: GYNECOLOGIC ONCOLOGY | Facility: CLINIC | Age: 39
End: 2019-08-06

## 2019-08-07 ENCOUNTER — PATIENT MESSAGE (OUTPATIENT)
Dept: INTERNAL MEDICINE | Facility: CLINIC | Age: 39
End: 2019-08-07

## 2019-08-08 ENCOUNTER — TELEPHONE (OUTPATIENT)
Dept: CARDIOLOGY | Facility: HOSPITAL | Age: 39
End: 2019-08-08

## 2019-09-12 ENCOUNTER — PATIENT MESSAGE (OUTPATIENT)
Dept: GYNECOLOGIC ONCOLOGY | Facility: CLINIC | Age: 39
End: 2019-09-12

## 2019-09-13 ENCOUNTER — TELEPHONE (OUTPATIENT)
Dept: GYNECOLOGIC ONCOLOGY | Facility: CLINIC | Age: 39
End: 2019-09-13

## 2019-09-13 ENCOUNTER — CLINICAL SUPPORT (OUTPATIENT)
Dept: INTERNAL MEDICINE | Facility: CLINIC | Age: 39
End: 2019-09-13
Payer: COMMERCIAL

## 2019-09-13 DIAGNOSIS — N93.9 ABNORMAL UTERINE BLEEDING (AUB): ICD-10-CM

## 2019-09-13 PROCEDURE — 90471 IMMUNIZATION ADMIN: CPT | Mod: S$GLB,,, | Performed by: HOSPITALIST

## 2019-09-13 PROCEDURE — 90686 IIV4 VACC NO PRSV 0.5 ML IM: CPT | Mod: S$GLB,,, | Performed by: HOSPITALIST

## 2019-09-13 PROCEDURE — 90686 FLU VACCINE (QUAD) GREATER THAN OR EQUAL TO 3YO PRESERVATIVE FREE IM: ICD-10-PCS | Mod: S$GLB,,, | Performed by: HOSPITALIST

## 2019-09-13 PROCEDURE — 90471 FLU VACCINE (QUAD) GREATER THAN OR EQUAL TO 3YO PRESERVATIVE FREE IM: ICD-10-PCS | Mod: S$GLB,,, | Performed by: HOSPITALIST

## 2019-09-13 RX ORDER — ESTRADIOL 0.5 MG/1
0.5 TABLET ORAL DAILY
Qty: 14 TABLET | Refills: 0 | Status: SHIPPED | OUTPATIENT
Start: 2019-09-13 | End: 2020-02-10

## 2019-09-13 NOTE — PROGRESS NOTES
After obtaining consent, and per orders of Dr. Pineda, injection of fluarix Lot [id number] Exp 6/30/20 given in the LD by URBAN MORA. Patient tolerated well and band aid applied. Patient instructed to remain in clinic for 15 minutes afterwards, and to report any adverse reaction to me immediately.

## 2019-09-25 ENCOUNTER — TELEPHONE (OUTPATIENT)
Dept: ADMINISTRATIVE | Facility: OTHER | Age: 39
End: 2019-09-25

## 2019-09-26 ENCOUNTER — LAB VISIT (OUTPATIENT)
Dept: LAB | Facility: HOSPITAL | Age: 39
End: 2019-09-26
Attending: OBSTETRICS & GYNECOLOGY
Payer: COMMERCIAL

## 2019-09-26 ENCOUNTER — OFFICE VISIT (OUTPATIENT)
Dept: GYNECOLOGIC ONCOLOGY | Facility: CLINIC | Age: 39
End: 2019-09-26
Payer: COMMERCIAL

## 2019-09-26 VITALS
BODY MASS INDEX: 50.02 KG/M2 | HEIGHT: 64 IN | WEIGHT: 293 LBS | SYSTOLIC BLOOD PRESSURE: 124 MMHG | HEART RATE: 87 BPM | DIASTOLIC BLOOD PRESSURE: 83 MMHG

## 2019-09-26 DIAGNOSIS — C56.9 MUCINOUS TUMOR, OF LOW MALIGNANT POTENTIAL: Primary | ICD-10-CM

## 2019-09-26 DIAGNOSIS — B37.31 VAGINAL YEAST INFECTION: ICD-10-CM

## 2019-09-26 DIAGNOSIS — C56.9 MUCINOUS TUMOR, OF LOW MALIGNANT POTENTIAL: ICD-10-CM

## 2019-09-26 LAB
ANION GAP SERPL CALC-SCNC: 10 MMOL/L (ref 8–16)
BUN SERPL-MCNC: 12 MG/DL (ref 6–20)
CALCIUM SERPL-MCNC: 9.4 MG/DL (ref 8.7–10.5)
CHLORIDE SERPL-SCNC: 106 MMOL/L (ref 95–110)
CO2 SERPL-SCNC: 23 MMOL/L (ref 23–29)
CREAT SERPL-MCNC: 0.8 MG/DL (ref 0.5–1.4)
EST. GFR  (AFRICAN AMERICAN): >60 ML/MIN/1.73 M^2
EST. GFR  (NON AFRICAN AMERICAN): >60 ML/MIN/1.73 M^2
GLUCOSE SERPL-MCNC: 91 MG/DL (ref 70–110)
POTASSIUM SERPL-SCNC: 4.1 MMOL/L (ref 3.5–5.1)
SODIUM SERPL-SCNC: 139 MMOL/L (ref 136–145)

## 2019-09-26 PROCEDURE — 3008F BODY MASS INDEX DOCD: CPT | Mod: CPTII,S$GLB,, | Performed by: OBSTETRICS & GYNECOLOGY

## 2019-09-26 PROCEDURE — 80048 BASIC METABOLIC PNL TOTAL CA: CPT

## 2019-09-26 PROCEDURE — 99999 PR PBB SHADOW E&M-EST. PATIENT-LVL III: CPT | Mod: PBBFAC,,, | Performed by: OBSTETRICS & GYNECOLOGY

## 2019-09-26 PROCEDURE — 99999 PR PBB SHADOW E&M-EST. PATIENT-LVL III: ICD-10-PCS | Mod: PBBFAC,,, | Performed by: OBSTETRICS & GYNECOLOGY

## 2019-09-26 PROCEDURE — 3008F PR BODY MASS INDEX (BMI) DOCUMENTED: ICD-10-PCS | Mod: CPTII,S$GLB,, | Performed by: OBSTETRICS & GYNECOLOGY

## 2019-09-26 PROCEDURE — 36415 COLL VENOUS BLD VENIPUNCTURE: CPT

## 2019-09-26 PROCEDURE — 99214 PR OFFICE/OUTPT VISIT, EST, LEVL IV, 30-39 MIN: ICD-10-PCS | Mod: S$GLB,,, | Performed by: OBSTETRICS & GYNECOLOGY

## 2019-09-26 PROCEDURE — 99214 OFFICE O/P EST MOD 30 MIN: CPT | Mod: S$GLB,,, | Performed by: OBSTETRICS & GYNECOLOGY

## 2019-09-26 RX ORDER — FLUCONAZOLE 150 MG/1
150 TABLET ORAL ONCE
Qty: 1 TABLET | Refills: 0 | Status: SHIPPED | OUTPATIENT
Start: 2019-09-26 | End: 2019-09-27

## 2019-09-26 NOTE — PROGRESS NOTES
"Subjective:       Patient ID: Fran Higgins is a 39 y.o. female.    Chief Complaint: Follow-up    HPI     Patient comes in today with complaint of abnormal uterine bleeding and pelvic pain.  AUB started 6 weeks ago on progestin only OCP. Started on estrace 0.5 mg daily for 3 weeks. Bleeding has stopped.   Having vaginal itching and discharge.     Complains of LLQ pain.     History of mucinous tumor of low malignant potential of the left ovary status post LSO.  Postop CT scan of the abdomen and pelvis showed no evidence for metastatic disease.      Pap: 5/13/2019: normal.      Colonoscopy: never      Her oncologic history is:     June 25, 2019:  Patient underwent robotic assisted laparoscopic left salpingo-oophorectomy.  Mass was ruptured intraoperatively as it was being placed into an Endo-Catch bag.  Preoperative  was 11.     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  Size of tumor: 11.8 CM.  Capsule involvement cannot be determined secondary to fragmentation.  Microinvasion is present: 5 foci.  Intraepithelial carcinoma is present.  Benign fallopian tube and fimbriated end with no significant histopathologic alterations.    July 2019: Postop CT scan of the abdomen and pelvis showed no evidence for metastatic disease.                       Review of Systems   Genitourinary: Positive for vaginal discharge.       Objective:   /83   Pulse 87   Ht 5' 4" (1.626 m)   Wt (!) 150 kg (330 lb 11 oz)   LMP 09/07/2019 (Exact Date)   BMI 56.76 kg/m²      Physical Exam   Constitutional: She is oriented to person, place, and time. She appears well-developed and well-nourished.   HENT:   Head: Normocephalic and atraumatic.   Genitourinary:   Genitourinary Comments: Vagina: DC consistent with yeast. No blood.    Neurological: She is alert and " oriented to person, place, and time.   Skin: Skin is warm and dry.   Psychiatric: She has a normal mood and affect. Her behavior is normal. Judgment and thought content normal.       Assessment:       1. Mucinous tumor, of low malignant potential    2. Vaginal yeast infection        Plan:   Mucinous tumor, of low malignant potential  Return to clinic in January 2020 to schedule robotic assisted laparoscopic hysterectomy and right salpingo-oophorectomy.    Stop oral estrogen.  Continue progestin OCP.    Patient is to call me if she has another episode of bleeding.  If so, she will need an endometrial biopsy and pelvic ultrasound.    CT scan ordered for the left lower quadrant pain    -     Basic metabolic panel; Future; Expected date: 09/26/2019  -     CT Chest Abdomen Pelvis With Contrast; Future; Expected date: 09/26/2019    Vaginal yeast infection  -     fluconazole (DIFLUCAN) 150 MG Tab; Take 1 tablet (150 mg total) by mouth once for 1 dose.  Dispense: 1 tablet; Refill: 0

## 2019-10-01 ENCOUNTER — HOSPITAL ENCOUNTER (OUTPATIENT)
Dept: RADIOLOGY | Facility: HOSPITAL | Age: 39
Discharge: HOME | End: 2019-10-01
Attending: OBSTETRICS & GYNECOLOGY
Payer: COMMERCIAL

## 2019-10-01 DIAGNOSIS — C56.9 MUCINOUS TUMOR, OF LOW MALIGNANT POTENTIAL: ICD-10-CM

## 2019-10-01 PROCEDURE — 74177 CT ABD & PELVIS W/CONTRAST: CPT | Mod: TC

## 2019-10-01 PROCEDURE — 71260 CT THORAX DX C+: CPT | Mod: 26,,, | Performed by: RADIOLOGY

## 2019-10-01 PROCEDURE — 74177 CT ABD & PELVIS W/CONTRAST: CPT | Mod: 26,,, | Performed by: RADIOLOGY

## 2019-10-01 PROCEDURE — 71260 CT CHEST ABDOMEN PELVIS WITH CONTRAST (XPD): ICD-10-PCS | Mod: 26,,, | Performed by: RADIOLOGY

## 2019-10-01 PROCEDURE — 74177 CT CHEST ABDOMEN PELVIS WITH CONTRAST (XPD): ICD-10-PCS | Mod: 26,,, | Performed by: RADIOLOGY

## 2019-10-01 PROCEDURE — 25500020 PHARM REV CODE 255: Performed by: OBSTETRICS & GYNECOLOGY

## 2019-10-01 RX ADMIN — IOHEXOL 15 ML: 350 INJECTION, SOLUTION INTRAVENOUS at 07:10

## 2019-10-01 RX ADMIN — IOHEXOL 100 ML: 350 INJECTION, SOLUTION INTRAVENOUS at 07:10

## 2019-10-15 ENCOUNTER — TELEPHONE (OUTPATIENT)
Dept: GYNECOLOGIC ONCOLOGY | Facility: CLINIC | Age: 39
End: 2019-10-15

## 2019-10-15 DIAGNOSIS — C56.9 MUCINOUS TUMOR, OF LOW MALIGNANT POTENTIAL: ICD-10-CM

## 2019-10-15 RX ORDER — ACETAMINOPHEN AND CODEINE PHOSPHATE 120; 12 MG/5ML; MG/5ML
1 SOLUTION ORAL DAILY
Qty: 30 TABLET | Refills: 11 | Status: SHIPPED | OUTPATIENT
Start: 2019-10-15 | End: 2020-01-13

## 2019-10-15 NOTE — TELEPHONE ENCOUNTER
----- Message from Vicki Rodney MA sent at 10/15/2019  3:44 PM CDT -----  Please advise           Fran Higgins would like a refill of the following medications:         norethindrone (MICRONOR, 28,) 0.35 mg tablet [Jeff Kaufman MD]     Preferred pharmacy: [facility]ACY #[id number] - [id number]   Delivery method: Pickup

## 2020-01-03 ENCOUNTER — PATIENT MESSAGE (OUTPATIENT)
Dept: INTERNAL MEDICINE | Facility: CLINIC | Age: 40
End: 2020-01-03

## 2020-01-27 ENCOUNTER — HOSPITAL ENCOUNTER (OUTPATIENT)
Dept: RADIOLOGY | Facility: HOSPITAL | Age: 40
Discharge: HOME | End: 2020-01-27
Attending: OBSTETRICS & GYNECOLOGY
Payer: COMMERCIAL

## 2020-01-27 DIAGNOSIS — C56.9 MUCINOUS TUMOR, OF LOW MALIGNANT POTENTIAL: ICD-10-CM

## 2020-01-27 PROCEDURE — 76830 TRANSVAGINAL US NON-OB: CPT | Mod: 26,,, | Performed by: INTERNAL MEDICINE

## 2020-01-27 PROCEDURE — 76830 US PELVIS COMP WITH TRANSVAG NON-OB (XPD): ICD-10-PCS | Mod: 26,,, | Performed by: INTERNAL MEDICINE

## 2020-01-27 PROCEDURE — 76856 US EXAM PELVIC COMPLETE: CPT | Mod: TC

## 2020-01-27 PROCEDURE — 76856 US EXAM PELVIC COMPLETE: CPT | Mod: 26,,, | Performed by: INTERNAL MEDICINE

## 2020-01-27 PROCEDURE — 76856 US PELVIS COMP WITH TRANSVAG NON-OB (XPD): ICD-10-PCS | Mod: 26,,, | Performed by: INTERNAL MEDICINE

## 2020-01-27 NOTE — PROGRESS NOTES
Subjective:       Patient ID: Fran Higgins is a 39 y.o. female.    Chief Complaint: mucinous tumor, of low malignant potential    HPI     Patient comes in today to sign consents for robotic assisted laparoscopic hysterectomy and RSO.  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 Marky Fox.       Postop CT scan of the abdomen and pelvis showed no evidence for metastatic disease.    Jan 2020: CEA is 2.       LMP: 1/17/2020    Pap: 5/13/2019: normal.      Colonoscopy: never        Her oncologic history is:     June 25, 2019:  Patient underwent robotic assisted laparoscopic left salpingo-oophorectomy.  Mass was ruptured intraoperatively as it was being placed into an Endo-Catch bag.  Preoperative  was 11.     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  Size of tumor: 11.8 CM.  Capsule involvement cannot be determined secondary to fragmentation.  Microinvasion is present: 5 foci.  Intraepithelial carcinoma is present.  Benign fallopian tube and fimbriated end with no significant histopathologic alterations.     July 2019: Postop CT scan of the abdomen and pelvis showed no evidence for metastatic disease.           Past Medical History:   Diagnosis Date    Abnormal Pap smear of cervix 2013    Abnormal uterine bleeding (AUB) 9/13/2019    Amblyopia     Cervical scoliosis 1994    Severe    Depression     DUB (dysfunctional uterine bleeding) 1/30/2020    Migraine with aura, not intractable 1/30/2020    Mucinous tumor, 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    Ovarian cyst     Pericardial cyst 7/26/2019    Vaginal yeast infection 9/26/2019     Past Surgical History:   Procedure Laterality Date    ROBOT-ASSISTED LAPAROSCOPIC SALPINGO-OOPHORECTOMY Left 6/25/2019    Procedure:  "ROBOTIC SALPINGO-OOPHORECTOMY;  Surgeon: Marky Fox Jr., MD;  Location: [facility];  Service: OB/GYN;  Laterality: Left;    TONSILLECTOMY       Family History   Problem Relation Age of Onset    Diabetes Paternal Grandfather     Macular degeneration Maternal Grandmother     Cancer Maternal Grandfather         prostate cancer    Hypertension Father     Hypertension Mother     Stroke Mother 60        ASD, PFO    Cancer Other         uterine cancer     Uterine cancer Other     Colon cancer Neg Hx     Ovarian cancer Neg Hx     Breast cancer Neg Hx     Glaucoma Neg Hx    .soch  Review of patient's allergies indicates:  No Known Allergies    Current Outpatient Medications:     escitalopram oxalate (LEXAPRO) 20 MG tablet, Take 1 tablet (20 mg total) by mouth once daily., Disp: 30 tablet, Rfl: 11    cyclobenzaprine (FLEXERIL) 5 MG tablet, Take 1 tablet (5 mg total) by mouth 2 (two) times daily as needed for Muscle spasms., Disp: 60 tablet, Rfl: 3    galcanezumab-gnlm (EMGALITY PEN) 120 mg/mL PnIj, Inject 120 mg into the skin every 30 days., Disp: 1 mL, Rfl: 11    norethindrone-ethinyl estradiol (MICROGESTIN 1/20) 1-20 mg-mcg per tablet, Take 1 tablet by mouth once daily., Disp: 21 tablet, Rfl: 11          Review of Systems   Constitutional: Negative for chills, fatigue and fever.   Respiratory: Negative for cough, shortness of breath and wheezing.    Cardiovascular: Negative for chest pain, palpitations and leg swelling.   Gastrointestinal: Negative for abdominal pain, constipation, diarrhea, nausea and vomiting.   Genitourinary: Negative for difficulty urinating, dysuria, frequency, genital sores, hematuria, urgency, vaginal bleeding, vaginal discharge and vaginal pain.   Neurological: Negative for weakness.   Hematological: Negative for adenopathy. Does not bruise/bleed easily.   Psychiatric/Behavioral: The patient is not nervous/anxious.        Objective:   /88   Pulse 105   Ht 5' 4" (1.626 m)  "  Wt (!) 150.9 kg (332 lb 10.8 oz)   BMI 57.10 kg/m²      Physical Exam   Constitutional: She is oriented to person, place, and time. She appears well-developed and well-nourished.   Cardiovascular: Normal rate and regular rhythm.   Pulmonary/Chest: Effort normal and breath sounds normal.   Neurological: She is alert and oriented to person, place, and time.   Psychiatric: She has a normal mood and affect. Her behavior is normal. Judgment and thought content normal.       Assessment:       1. Mucinous tumor, of low malignant potential    2. S/P RA Laparoscopic LSO    3. DUB (dysfunctional uterine bleeding)    4. Migraine with aura and without status migrainosus, not intractable        Plan:   Mucinous tumor, of low malignant potential  -     Basic metabolic panel; Future; Expected date: 01/30/2020  -     CBC auto differential; Future; Expected date: 01/30/2020  -     CT Abdomen Pelvis With Contrast; Future; Expected date: 01/30/2020    S/P RA Laparoscopic LSO    DUB (dysfunctional uterine bleeding)  -     norethindrone-ethinyl estradiol (MICROGESTIN 1/20) 1-20 mg-mcg per tablet; Take 1 tablet by mouth once daily.  Dispense: 21 tablet; Refill: 11    Migraine with aura and without status migrainosus, not intractable      Consent forms were reviewed with patient. Questions were answered. Patient voiced understanding. Consents were signed.  Preop orders placed.

## 2020-01-29 ENCOUNTER — TELEPHONE (OUTPATIENT)
Dept: ADMINISTRATIVE | Facility: OTHER | Age: 40
End: 2020-01-29

## 2020-01-30 ENCOUNTER — TELEPHONE (OUTPATIENT)
Dept: GYNECOLOGIC ONCOLOGY | Facility: CLINIC | Age: 40
End: 2020-01-30
Payer: COMMERCIAL

## 2020-01-30 ENCOUNTER — HOSPITAL ENCOUNTER (OUTPATIENT)
Dept: CARDIOLOGY | Facility: CLINIC | Age: 40
Discharge: HOME | End: 2020-01-30
Payer: COMMERCIAL

## 2020-01-30 ENCOUNTER — OFFICE VISIT (OUTPATIENT)
Dept: GYNECOLOGIC ONCOLOGY | Facility: CLINIC | Age: 40
End: 2020-01-30
Payer: COMMERCIAL

## 2020-01-30 ENCOUNTER — LAB VISIT (OUTPATIENT)
Dept: LAB | Facility: HOSPITAL | Age: 40
End: 2020-01-30
Attending: OBSTETRICS & GYNECOLOGY
Payer: COMMERCIAL

## 2020-01-30 VITALS
HEIGHT: 64 IN | SYSTOLIC BLOOD PRESSURE: 133 MMHG | BODY MASS INDEX: 50.02 KG/M2 | WEIGHT: 293 LBS | HEART RATE: 105 BPM | DIASTOLIC BLOOD PRESSURE: 88 MMHG

## 2020-01-30 DIAGNOSIS — C56.9 MUCINOUS TUMOR, OF LOW MALIGNANT POTENTIAL: ICD-10-CM

## 2020-01-30 DIAGNOSIS — N93.8 DUB (DYSFUNCTIONAL UTERINE BLEEDING): ICD-10-CM

## 2020-01-30 DIAGNOSIS — C56.9 MUCINOUS TUMOR, OF LOW MALIGNANT POTENTIAL: Primary | ICD-10-CM

## 2020-01-30 DIAGNOSIS — G43.109 MIGRAINE WITH AURA AND WITHOUT STATUS MIGRAINOSUS, NOT INTRACTABLE: ICD-10-CM

## 2020-01-30 DIAGNOSIS — Z98.890 STATUS POST ROBOT-ASSISTED SURGICAL PROCEDURE: ICD-10-CM

## 2020-01-30 LAB
ANION GAP SERPL CALC-SCNC: 6 MMOL/L (ref 8–16)
BASOPHILS # BLD AUTO: 0.05 K/UL (ref 0–0.2)
BASOPHILS NFR BLD: 0.7 % (ref 0–1.9)
BUN SERPL-MCNC: 10 MG/DL (ref 6–20)
CALCIUM SERPL-MCNC: 9.7 MG/DL (ref 8.7–10.5)
CHLORIDE SERPL-SCNC: 105 MMOL/L (ref 95–110)
CO2 SERPL-SCNC: 27 MMOL/L (ref 23–29)
CREAT SERPL-MCNC: 0.8 MG/DL (ref 0.5–1.4)
DIFFERENTIAL METHOD: ABNORMAL
EOSINOPHIL # BLD AUTO: 0.1 K/UL (ref 0–0.5)
EOSINOPHIL NFR BLD: 1 % (ref 0–8)
ERYTHROCYTE [DISTWIDTH] IN BLOOD BY AUTOMATED COUNT: 12.9 % (ref 11.5–14.5)
EST. GFR  (AFRICAN AMERICAN): >60 ML/MIN/1.73 M^2
EST. GFR  (NON AFRICAN AMERICAN): >60 ML/MIN/1.73 M^2
GLUCOSE SERPL-MCNC: 97 MG/DL (ref 70–110)
HCT VFR BLD AUTO: 40.8 % (ref 37–48.5)
HGB BLD-MCNC: 12.6 G/DL (ref 12–16)
IMM GRANULOCYTES # BLD AUTO: 0.02 K/UL (ref 0–0.04)
IMM GRANULOCYTES NFR BLD AUTO: 0.3 % (ref 0–0.5)
LYMPHOCYTES # BLD AUTO: 1.8 K/UL (ref 1–4.8)
LYMPHOCYTES NFR BLD: 23.6 % (ref 18–48)
MCH RBC QN AUTO: 26.6 PG (ref 27–31)
MCHC RBC AUTO-ENTMCNC: 30.9 G/DL (ref 32–36)
MCV RBC AUTO: 86 FL (ref 82–98)
MONOCYTES # BLD AUTO: 0.7 K/UL (ref 0.3–1)
MONOCYTES NFR BLD: 9.6 % (ref 4–15)
NEUTROPHILS # BLD AUTO: 4.9 K/UL (ref 1.8–7.7)
NEUTROPHILS NFR BLD: 64.8 % (ref 38–73)
NRBC BLD-RTO: 0 /100 WBC
PLATELET # BLD AUTO: 210 K/UL (ref 150–350)
PMV BLD AUTO: 11.2 FL (ref 9.2–12.9)
POTASSIUM SERPL-SCNC: 4.8 MMOL/L (ref 3.5–5.1)
RBC # BLD AUTO: 4.74 M/UL (ref 4–5.4)
SODIUM SERPL-SCNC: 138 MMOL/L (ref 136–145)
WBC # BLD AUTO: 7.62 K/UL (ref 3.9–12.7)

## 2020-01-30 PROCEDURE — 99214 OFFICE O/P EST MOD 30 MIN: CPT | Mod: S$GLB,,, | Performed by: OBSTETRICS & GYNECOLOGY

## 2020-01-30 PROCEDURE — 36415 COLL VENOUS BLD VENIPUNCTURE: CPT

## 2020-01-30 PROCEDURE — 99214 PR OFFICE/OUTPT VISIT, EST, LEVL IV, 30-39 MIN: ICD-10-PCS | Mod: S$GLB,,, | Performed by: OBSTETRICS & GYNECOLOGY

## 2020-01-30 PROCEDURE — 93000 ELECTROCARDIOGRAM COMPLETE: CPT | Mod: ,,, | Performed by: INTERNAL MEDICINE

## 2020-01-30 PROCEDURE — 3008F PR BODY MASS INDEX (BMI) DOCUMENTED: ICD-10-PCS | Mod: CPTII,S$GLB,, | Performed by: OBSTETRICS & GYNECOLOGY

## 2020-01-30 PROCEDURE — 80048 BASIC METABOLIC PNL TOTAL CA: CPT

## 2020-01-30 PROCEDURE — 99999 PR PBB SHADOW E&M-EST. PATIENT-LVL III: ICD-10-PCS | Mod: PBBFAC,,, | Performed by: OBSTETRICS & GYNECOLOGY

## 2020-01-30 PROCEDURE — 99999 PR PBB SHADOW E&M-EST. PATIENT-LVL III: CPT | Mod: PBBFAC,,, | Performed by: OBSTETRICS & GYNECOLOGY

## 2020-01-30 PROCEDURE — 93000 EKG 12-LEAD: ICD-10-PCS | Mod: ,,, | Performed by: INTERNAL MEDICINE

## 2020-01-30 PROCEDURE — 85025 COMPLETE CBC W/AUTO DIFF WBC: CPT

## 2020-01-30 PROCEDURE — 3008F BODY MASS INDEX DOCD: CPT | Mod: CPTII,S$GLB,, | Performed by: OBSTETRICS & GYNECOLOGY

## 2020-01-30 RX ORDER — NORETHINDRONE ACETATE AND ETHINYL ESTRADIOL .02; 1 MG/1; MG/1
1 TABLET ORAL DAILY
Qty: 21 TABLET | Refills: 11 | Status: ON HOLD | OUTPATIENT
Start: 2020-01-30 | End: 2020-03-10 | Stop reason: HOSPADM

## 2020-01-31 ENCOUNTER — TELEPHONE (OUTPATIENT)
Dept: INTERNAL MEDICINE | Facility: CLINIC | Age: 40
End: 2020-01-31

## 2020-02-02 RX ORDER — MUPIROCIN 20 MG/G
OINTMENT TOPICAL
Status: CANCELLED | OUTPATIENT
Start: 2020-03-09

## 2020-02-02 RX ORDER — LIDOCAINE HYDROCHLORIDE 10 MG/ML
1 INJECTION, SOLUTION EPIDURAL; INFILTRATION; INTRACAUDAL; PERINEURAL ONCE
Status: CANCELLED | OUTPATIENT
Start: 2020-02-02 | End: 2020-02-02

## 2020-02-02 RX ORDER — SODIUM CHLORIDE 0.9 % (FLUSH) 0.9 %
10 SYRINGE (ML) INJECTION
Status: CANCELLED | OUTPATIENT
Start: 2020-02-02

## 2020-02-03 ENCOUNTER — HOSPITAL ENCOUNTER (OUTPATIENT)
Dept: RADIOLOGY | Facility: HOSPITAL | Age: 40
Discharge: HOME | End: 2020-02-03
Attending: OBSTETRICS & GYNECOLOGY
Payer: COMMERCIAL

## 2020-02-03 DIAGNOSIS — C56.9 MUCINOUS TUMOR, OF LOW MALIGNANT POTENTIAL: ICD-10-CM

## 2020-02-03 PROCEDURE — 74177 CT ABD & PELVIS W/CONTRAST: CPT | Mod: 26,,, | Performed by: RADIOLOGY

## 2020-02-03 PROCEDURE — 25500020 PHARM REV CODE 255: Performed by: OBSTETRICS & GYNECOLOGY

## 2020-02-03 PROCEDURE — 74177 CT ABD & PELVIS W/CONTRAST: CPT | Mod: TC

## 2020-02-03 PROCEDURE — 74177 CT ABDOMEN PELVIS WITH CONTRAST: ICD-10-PCS | Mod: 26,,, | Performed by: RADIOLOGY

## 2020-02-03 RX ADMIN — IOHEXOL 100 ML: 350 INJECTION, SOLUTION INTRAVENOUS at 07:02

## 2020-02-04 ENCOUNTER — PATIENT MESSAGE (OUTPATIENT)
Dept: INTERNAL MEDICINE | Facility: CLINIC | Age: 40
End: 2020-02-04

## 2020-02-04 ENCOUNTER — PATIENT MESSAGE (OUTPATIENT)
Dept: SURGERY | Facility: HOSPITAL | Age: 40
End: 2020-02-04

## 2020-02-10 ENCOUNTER — OFFICE VISIT (OUTPATIENT)
Dept: INTERNAL MEDICINE | Facility: CLINIC | Age: 40
End: 2020-02-10
Payer: COMMERCIAL

## 2020-02-10 VITALS
WEIGHT: 293 LBS | DIASTOLIC BLOOD PRESSURE: 90 MMHG | HEART RATE: 72 BPM | SYSTOLIC BLOOD PRESSURE: 120 MMHG | HEIGHT: 64 IN | TEMPERATURE: 100 F | BODY MASS INDEX: 50.02 KG/M2

## 2020-02-10 DIAGNOSIS — M54.9 MUSCULOSKELETAL BACK PAIN: ICD-10-CM

## 2020-02-10 DIAGNOSIS — G43.119 INTRACTABLE MIGRAINE WITH AURA WITHOUT STATUS MIGRAINOSUS: Primary | ICD-10-CM

## 2020-02-10 DIAGNOSIS — R03.0 ELEVATED BP WITHOUT DIAGNOSIS OF HYPERTENSION: ICD-10-CM

## 2020-02-10 PROCEDURE — 3008F BODY MASS INDEX DOCD: CPT | Mod: CPTII,S$GLB,, | Performed by: HOSPITALIST

## 2020-02-10 PROCEDURE — 99999 PR PBB SHADOW E&M-EST. PATIENT-LVL III: CPT | Mod: PBBFAC,,, | Performed by: HOSPITALIST

## 2020-02-10 PROCEDURE — 99999 PR PBB SHADOW E&M-EST. PATIENT-LVL III: ICD-10-PCS | Mod: PBBFAC,,, | Performed by: HOSPITALIST

## 2020-02-10 PROCEDURE — 99214 PR OFFICE/OUTPT VISIT, EST, LEVL IV, 30-39 MIN: ICD-10-PCS | Mod: S$GLB,,, | Performed by: HOSPITALIST

## 2020-02-10 PROCEDURE — 3008F PR BODY MASS INDEX (BMI) DOCUMENTED: ICD-10-PCS | Mod: CPTII,S$GLB,, | Performed by: HOSPITALIST

## 2020-02-10 PROCEDURE — 99214 OFFICE O/P EST MOD 30 MIN: CPT | Mod: S$GLB,,, | Performed by: HOSPITALIST

## 2020-02-10 RX ORDER — CYCLOBENZAPRINE HCL 5 MG
5 TABLET ORAL 2 TIMES DAILY PRN
Qty: 60 TABLET | Refills: 3 | Status: SHIPPED | OUTPATIENT
Start: 2020-02-10 | End: 2020-03-13

## 2020-02-10 RX ORDER — NORETHINDRONE 0.35 MG/1
TABLET ORAL
COMMUNITY
Start: 2020-02-06 | End: 2020-02-28 | Stop reason: CLARIF

## 2020-02-10 NOTE — PROGRESS NOTES
Subjective:     @Patient ID: Fran Higgins is a 39 y.o. female.    Chief Complaint: Headache    HPI    38 yo F presents for evaluation of migraine headaches. She has been diagnosed with migraines as a teenager. Reports in the past year has worsened. Now occurring 3x a week. They occur anytime. Gets flashes of light before her headaches starts. She sometimes get blurry vision, mostly on R side. Occ on the left. +n/v. Last 10-12 hours. Takes Excedrin migraine 1-2 tablets and tries to sleep if she can but she also works as a nurse at [employer] and is not always able to sleep if it happens at work. Sometimes the excedrin helps. She is unable to take NSAIDs due to GI upset. Reports HAs are worse around her menstruation. Has tried tylenol but not much relief. Has not tried any prescription strength migraine medications. She reports she does drink plenty of water/fluids to stay hydrated daily. She also reports she takes 1 cup of coffee a day but that's it.  She has hysterectomy and oopherectomy scheduled 1 month from now. Reports bp may be a little up do last night dinner had high amount of salt. Additionally pt would like muscle relaxant. Has tried flexeril in the past given to her by her mother and states it has helped. She also has hx of scoliosis.     Review of Systems   Constitutional: Negative for activity change and unexpected weight change.   HENT: Negative for hearing loss, rhinorrhea and trouble swallowing.    Eyes: Negative for discharge and visual disturbance.   Respiratory: Negative for chest tightness and wheezing.    Cardiovascular: Negative for chest pain and palpitations.   Gastrointestinal: Negative for blood in stool, constipation, diarrhea and vomiting.   Endocrine: Negative for polydipsia and polyuria.   Genitourinary: Negative for difficulty urinating, dysuria, hematuria and menstrual problem.   Musculoskeletal: Negative for arthralgias, joint swelling and neck pain.   Neurological: Positive  "for headaches. Negative for weakness.   Psychiatric/Behavioral: Negative for confusion and dysphoric mood.     Past medical history, surgical history, and family medical history reviewed and updated as appropriate.    Medications and allergies reviewed.     Objective:     Vitals:    02/10/20 0815 02/10/20 0853   BP: (!) 130/90 (!) 120/90   BP Location: Right arm    Patient Position: Sitting    BP Method: Large (Manual)    Pulse: 72    Temp: 99.5 °F (37.5 °C)    TempSrc: Oral    Weight: (!) 150.6 kg (332 lb 0.2 oz)    Height: 5' 4" (1.626 m)      Body mass index is 56.99 kg/m².     Physical Exam   Constitutional: She is oriented to person, place, and time. She appears well-developed and well-nourished. No distress.   HENT:   Head: Normocephalic and atraumatic.   Eyes: Conjunctivae are normal. Right eye exhibits no discharge. Left eye exhibits no discharge.   Neck: Normal range of motion. Neck supple.   Cardiovascular: Normal rate, regular rhythm and intact distal pulses. Exam reveals no friction rub.   No murmur heard.  Pulmonary/Chest: Effort normal and breath sounds normal.   Musculoskeletal: Normal range of motion. She exhibits no edema.   Neurological: She is alert and oriented to person, place, and time.   Skin: Skin is warm and dry.   Psychiatric: She has a normal mood and affect. Her behavior is normal.   Vitals reviewed.      Lab Results   Component Value Date    WBC 7.62 01/30/2020    HGB 12.6 01/30/2020    HCT 40.8 01/30/2020     01/30/2020    CHOL 179 05/20/2019    TRIG 147 05/20/2019    HDL 43 05/20/2019    ALT 18 05/20/2019    AST 14 05/20/2019     01/30/2020    K 4.8 01/30/2020     01/30/2020    CREATININE 0.8 01/30/2020    BUN 10 01/30/2020    CO2 27 01/30/2020    TSH 1.353 05/20/2019    INR 1.0 07/15/2013    HGBA1C 5.5 05/20/2019       Assessment:     1. Intractable migraine with aura without status migrainosus    2. Elevated BP without diagnosis of hypertension    3. " Musculoskeletal back pain      Plan:   Fran was seen today for headache.    Diagnoses and all orders for this visit:    Intractable migraine with aura without status migrainosus  - Pt unable to be on imitrex/triptans due to interaction with her lexapro. Unable to tolerate NSAIDS due to GI upset. Additionally having 3 migraines per week which is hindering her from her daily activities. Feel that she would be a good candidate for Emgality. Discussed with patient how medication works, possible side effects to monitor for (ie rash, anaphylaxis). Will send to [facility] pharmacy to obtain prior authorization. UPT negative in clinic   -     POCT Urine Pregnancy    Elevated BP with dx of htn         - BP usually controlled. Pt reports having food last night with high salt content. States she usually does not indulge. She will have her BP checked few times over the next week and will send me a message.     Musculoskeletal back pain           - Flexeril prn for pain. Pt aware of possible sedation    Follow up if symptoms worsen or fail to improve.    Erica Pineda MD  Internal Medicine    2/10/2020

## 2020-02-12 ENCOUNTER — PATIENT MESSAGE (OUTPATIENT)
Dept: SURGERY | Facility: HOSPITAL | Age: 40
End: 2020-02-12

## 2020-02-13 ENCOUNTER — OFFICE VISIT (OUTPATIENT)
Dept: OPTOMETRY | Facility: CLINIC | Age: 40
End: 2020-02-13
Payer: COMMERCIAL

## 2020-02-13 ENCOUNTER — OFFICE VISIT (OUTPATIENT)
Dept: OPTOMETRY | Facility: CLINIC | Age: 40
End: 2020-02-13

## 2020-02-13 DIAGNOSIS — H52.13 MYOPIC ASTIGMATISM OF BOTH EYES: ICD-10-CM

## 2020-02-13 DIAGNOSIS — H52.203 MYOPIC ASTIGMATISM OF BOTH EYES: ICD-10-CM

## 2020-02-13 DIAGNOSIS — Z46.0 FITTING AND ADJUSTMENT OF SPECTACLES AND CONTACT LENSES: Primary | ICD-10-CM

## 2020-02-13 DIAGNOSIS — H16.403 CORNEAL NEOVASCULARIZATION OF BOTH EYES: Primary | ICD-10-CM

## 2020-02-13 PROCEDURE — 92015 DETERMINE REFRACTIVE STATE: CPT | Mod: S$GLB,,, | Performed by: OPTOMETRIST

## 2020-02-13 PROCEDURE — 99999 PR PBB SHADOW E&M-EST. PATIENT-LVL II: CPT | Mod: PBBFAC,,, | Performed by: OPTOMETRIST

## 2020-02-13 PROCEDURE — 99999 PR PBB SHADOW E&M-EST. PATIENT-LVL II: ICD-10-PCS | Mod: PBBFAC,,, | Performed by: OPTOMETRIST

## 2020-02-13 PROCEDURE — 92310 CONTACT LENS FITTING OU: CPT | Mod: CSM,S$GLB,, | Performed by: OPTOMETRIST

## 2020-02-13 PROCEDURE — 92310 PR CONTACT LENS FITTING (NO CHANGE): ICD-10-PCS | Mod: CSM,S$GLB,, | Performed by: OPTOMETRIST

## 2020-02-13 PROCEDURE — 92015 PR REFRACTION: ICD-10-PCS | Mod: S$GLB,,, | Performed by: OPTOMETRIST

## 2020-02-13 PROCEDURE — 92004 COMPRE OPH EXAM NEW PT 1/>: CPT | Mod: S$GLB,,, | Performed by: OPTOMETRIST

## 2020-02-13 PROCEDURE — 92004 PR EYE EXAM, NEW PATIENT,COMPREHESV: ICD-10-PCS | Mod: S$GLB,,, | Performed by: OPTOMETRIST

## 2020-02-13 NOTE — PROGRESS NOTES
LILLIANA MARTÍNEZ 5 years ago somehwere else. Reports okay vision at distance and near.   Pt wishes to be for with new contact lens-her brand has been discontinued.     Monthly replacement, does not sleep in her lens. Wears CL every day for 16   hours/day. Denies occasional itching with allergies. Denies flashes -   occasional floater that are longstanding. No gtts.    Last edited by Vannessa Holley, OD on 2/13/2020 11:23 AM. (History)            Assessment /Plan     For exam results, see Encounter Report.    Corneal neovascularization of both eyes    Myopic astigmatism of both eyes      1. Educated pt of today's findings. Extensive discussion with patient on importance of decreasing CL wear. Patient denies sleeping in CL but wears CL 7 days/week for 16 hours/day. Educated patient to decrease CL throughout the week and less hours during the day. Continue to monitor in 1 yr.  2. Rx Final Spec Rx and CL Rx. Educated patient on changes. Contact lens trials dispensed to pt. Daily wear only advised, with education to risks of extended wear.  Discussed lens care, compliance and solutions.  RTC in 1 yr for annual eye exam or prn.

## 2020-02-28 ENCOUNTER — TELEPHONE (OUTPATIENT)
Dept: PREADMISSION TESTING | Facility: HOSPITAL | Age: 40
End: 2020-02-28

## 2020-02-28 ENCOUNTER — TELEPHONE (OUTPATIENT)
Dept: INTERNAL MEDICINE | Facility: CLINIC | Age: 40
End: 2020-02-28

## 2020-02-28 NOTE — ANESTHESIA PAT ROS NOTE
02/28/2020  Fran Higgins is a 39 y.o., female.      Pre-op Assessment         Review of Systems  Anesthesia Hx:  Hx of Anesthetic complications  History of prior surgery of interest to airway management or planning: Previous anesthesia: General 6/25/2019 ROBOTIC SALPINGO-OOPHORECTOMY (Left Abdomen) with general anesthesia.  Procedure performed at an [facility]. Denies Family Hx of Anesthesia complications.  Personal Hx of Anesthesia complications, Post-Operative Nausea/Vomiting, in the past, but not with recent anesthetics / prophylaxis   Social:  Non-Smoker, Social Alcohol Use    Hematology/Oncology:  Hematology Normal   Oncology Normal     EENT/Dental:EENT/Dental Normal   Cardiovascular:   Denies Pacemaker.  Denies MI.   Denies CABG/stent.   Denies Angina.     ECG has been reviewed. H/o pericardial cyst   8/20/19 TTE EF of 55-60% Functional Capacity good / => 4 METS    Pulmonary:   Denies Asthma.  Denies Shortness of breath.  Denies Recent URI.    Renal/:  Renal/ Normal     Hepatic/GI:  Hepatic/GI Normal    Musculoskeletal:  Cervical Spine Disorder H/o cervical scoliosis   No limited ROM   Neurological:   Denies TIA. Denies CVA. Denies Seizures.  Dx of Headaches, Migraine Headache   Endocrine:  Metabolic Disorders, Morbid Obesity / BMI > 40  Dermatological:  Skin Normal    Psych:   depression             Anesthesia Assessment: Preoperative EQUATION    Planned Procedure: Procedure(s) (LRB):  XI ROBOTIC HYSTERECTOMY (N/A)  SALPINGO-OOPHORECTOMY  USO (N/A)  Requested Anesthesia Type:General  Surgeon: Jeff Kaufman MD  Service: Oncology  Known or anticipated Date of Surgery:3/9/2020    Surgeon notes: reviewed    Electronic QUestionnaire Assessment completed via nurse interview with patient.        Triage considerations:     The patient has no apparent active cardiac condition (No unstable  coronary Syndrome such as severe unstable angina or recent [<1 month] myocardial infarction, decompensated CHF, severe valvular   disease or significant arrhythmia)    Previous anesthesia records:GETA, No problems and Hx PONV   6/25/2019 ROBOTIC SALPINGO-OOPHORECTOMY (Left Abdomen)  Airway/Jaw/Neck:  Airway Findings: Mouth Opening: Normal Tongue: Normal  Mallampati: I  TM Distance: Normal, at least 6 cm     06/25/19 Placement Time: 1019 Method of Intubation: Guillen Inserted by: CRNA Airway Device: Endotracheal Tube Mask Ventilation: Easy Intubated: Postinduction Blade: Emily #3 Airway Device Size: 7.5 Style: Cuffed Cuff Inflation: Minimal occlusive pressure Inflation Amount (mL): 4 Placement Verified By: Auscultation;Capnometry Grade: Grade I Complicating Factors: Obesity;Short neck Findings Post-Intubation: Positive EtCO2;Bilateral breath sounds;Atraumatic/Condition of teeth unchanged Depth of Insertion (cm): 22 Secured at: Lips Complications: None Breath Sounds: Equal Bilateral Insertion attempts (enter comment if more than 2 attempts): 1            Last PCP note: within 1 month , within [facility] Dr Azuoru 2/10/2020  Subspecialty notes: Cardiology: General, Gyn/ONC    Other important co-morbidities: Morbid Obesity and pericardial cyst, cervical scoliosis, migraines, mucinous tumor      Tests already available:  Available tests,  within 1 month , within [facility] . Results have been reviewed.   1/30/2020 CBC, BMP, EKG  8/20/2019 TTE             Instructions given. (See in Nurse's note)    Optimization:  Anesthesia Preop Clinic Assessment  Indicated, however pt has a documented airway on 6/25/2019.    Medical Opinion Indicated.            Plan:    Testing:  T&S      Consultation:Patient's PCP for a statement of optimization  sent in basket to Dr Camilo (Cards) for recommendations.     Patient  has previously scheduled Medical Appointment:  none    Navigation: Tests Scheduled.              Consults scheduled.              Results will be tracked by Preop Clinic.                Case discussed with Dr Peterson, awaiting optimization from PCP and Cards.    2/28/2020  Per Dr. Camilo:  Thanks for the update. I do not have any further recommendations prior to proceeding with her surgery.  3/4/2020 PCP clearance per Dr Pineda; see note in chart

## 2020-02-28 NOTE — TELEPHONE ENCOUNTER
----- Message from Lester Camilo III, MD sent at 2/28/2020  9:24 AM CST -----  Thanks for the update. I do not have any further recommendations prior to proceeding with her surgery.    Thanks,  David Ton  ----- Message -----  From: Mai Markham RN  Sent: 2/28/2020   9:06 AM CST  To: Lester Camilo III, MD, #    This patient is scheduled for Right Robotic Salpingo-Oophorectomy with Dr. Kaufman on 3/9/2020.  (General anesthesia, 180 minutes).  Anesthesia review is in progress.  She was cleared by your office prior to the Robotic LSO in 6/2019.    Anesthesia just wanted you to be aware of upcoming surgery should you have any further recommendations.    Thank you,  Mai Markham RN  Anesthesia [facility]ative Care Center, [facility] [place]

## 2020-02-28 NOTE — PRE-PROCEDURE INSTRUCTIONS
Preop instructions given and reviewed; instructed to hold vitamins, herbal supplements and NSAIDS one week prior to surgery;  Patient verbalized understanding.  Patient instructed to call POC with any questions or changes.

## 2020-02-28 NOTE — TELEPHONE ENCOUNTER
----- Message from Mai Markham RN sent at 2/28/2020  9:03 AM CST -----  This patient is scheduled for Right Robotic Salpingo-oophorectomy with Dr. Kaufman on 3/9/2020.  (General anesthesia, 180 minutes).  Anesthesia review is in progress.    You recently saw pt in clinic 2/10/2020.  Can you please provide a statement of optimization?    Thank you,  Mai Markham RN  Anesthesia [facility]ative [facility], [facility] [place]

## 2020-03-02 ENCOUNTER — TELEPHONE (OUTPATIENT)
Dept: INTERNAL MEDICINE | Facility: CLINIC | Age: 40
End: 2020-03-02

## 2020-03-02 ENCOUNTER — PATIENT MESSAGE (OUTPATIENT)
Dept: INTERNAL MEDICINE | Facility: CLINIC | Age: 40
End: 2020-03-02

## 2020-03-02 NOTE — TELEPHONE ENCOUNTER
----- Message from Erica Pineda MD sent at 3/2/2020  1:26 PM CST -----  Hi,   I did not do a preop exam when I saw the patient recently. It was only for migraine HA management. If she needs to have a preop evaluation then she needs to be seen in clinic for this. Let us know, and we can schedule her to have her preop done before her surgery     Dr Pineda  ----- Message -----  From: Mai Markham RN  Sent: 2/28/2020   9:03 AM CST  To: Erica Pineda MD, [facility] Staff    This patient is scheduled for Right Robotic Salpingo-oophorectomy with Dr. Kaufman on 3/9/2020.  (General anesthesia, 180 minutes).  Anesthesia review is in progress.    You recently saw pt in clinic 2/10/2020.  Can you please provide a statement of optimization?    Thank you,  Mai Markham RN  Anesthesia PreOperative Care Center, [facility] [place]

## 2020-03-03 ENCOUNTER — PATIENT MESSAGE (OUTPATIENT)
Dept: CARDIOLOGY | Facility: CLINIC | Age: 40
End: 2020-03-03

## 2020-03-04 ENCOUNTER — TELEPHONE (OUTPATIENT)
Dept: INTERNAL MEDICINE | Facility: CLINIC | Age: 40
End: 2020-03-04

## 2020-03-04 ENCOUNTER — TELEPHONE (OUTPATIENT)
Dept: PREADMISSION TESTING | Facility: HOSPITAL | Age: 40
End: 2020-03-04

## 2020-03-04 ENCOUNTER — ANESTHESIA EVENT (OUTPATIENT)
Dept: SURGERY | Facility: HOSPITAL | Age: 40
End: 2020-03-04
Payer: COMMERCIAL

## 2020-03-04 NOTE — TELEPHONE ENCOUNTER
The patient called and i stated that a pre-op was no longer needed. Dr. Pineda cleared the patient.

## 2020-03-04 NOTE — TELEPHONE ENCOUNTER
----- Message from Erica Pineda MD sent at 3/4/2020  8:49 AM CST -----  From medical standpoint ok to proceed with surgery as planned. Pt reported unable to come in due to financial constraints. On my last examination she did not have any active cardiac or pulmonary issues. I have also reviewed her EKG in January which was normal.     Dr Pineda        ----- Message -----  From: Mai Markham RN  Sent: 3/4/2020   8:14 AM CST  To: Erica Pineda MD, [facility] Staff    Just following up on this pt.  Anesthesia reached out to Dr. Camilo with [facility] for recommendations.  His note was placed in the chart.      I did not see a clearance appt for the pt with your office for her Clearance/Optimization and just wanted to see where that stands.    Thanks!  Mai  ----- Message -----  From: Erica Pineda MD  Sent: 3/2/2020   1:26 PM CST  To: Mai Markham RN, [facility] Staff    Hi,   I did not do a preop exam when I saw the patient recently. It was only for migraine HA management. If she needs to have a preop evaluation then she needs to be seen in clinic for this. Let us know, and we can schedule her to have her preop done before her surgery     Dr Pineda  ----- Message -----  From: Mai Markham RN  Sent: 2/28/2020   9:03 AM CST  To: Erica Pineda MD, [facility] Staff    This patient is scheduled for Right Robotic Salpingo-oophorectomy with Dr. Kaufman on 3/9/2020.  (General anesthesia, 180 minutes).  Anesthesia review is in progress.    You recently saw pt in clinic 2/10/2020.  Can you please provide a statement of optimization?    Thank you,  Mai Markham RN  Anesthesia PreOperative Care Center, [facility] [place]

## 2020-03-04 NOTE — TELEPHONE ENCOUNTER
----- Message from Mai Markham RN sent at 3/4/2020  8:14 AM CST -----  Just following up on this pt.  Anesthesia reached out to Dr. Camilo with [facility] for recommendations.  His note was placed in the chart.      I did not see a clearance appt for the pt with your office for her Clearance/Optimization and just wanted to see where that stands.    Thanks!  Mai  ----- Message -----  From: Erica Pineda MD  Sent: 3/2/2020   1:26 PM CST  To: Mai Markham RN, [facility] Erica Staff    Hi,   I did not do a preop exam when I saw the patient recently. It was only for migraine HA management. If she needs to have a preop evaluation then she needs to be seen in clinic for this. Let us know, and we can schedule her to have her preop done before her surgery     Dr Pineda  ----- Message -----  From: Mai Markham RN  Sent: 2/28/2020   9:03 AM CST  To: Erica Pineda MD, Brent Erica Staff    This patient is scheduled for Right Robotic Salpingo-oophorectomy with Dr. Kaufman on 3/9/2020.  (General anesthesia, 180 minutes).  Anesthesia review is in progress.    You recently saw pt in clinic 2/10/2020.  Can you please provide a statement of optimization?    Thank you,  Mai Markham RN  [facility]esthesia [facility], [facility] [place]

## 2020-03-06 ENCOUNTER — TELEPHONE (OUTPATIENT)
Dept: GYNECOLOGIC ONCOLOGY | Facility: CLINIC | Age: 40
End: 2020-03-06

## 2020-03-06 NOTE — ANESTHESIA PREPROCEDURE EVALUATION
03/06/2020  Fran Higgins is a 39 y.o., female.  Patient Active Problem List   Diagnosis    BMI 50.0-59.9, adult    Depression    Left ovarian cyst    S/P RA Laparoscopic LSO    Mucinous tumor, of low malignant potential    Pericardial cyst    Scoliosis of cervical spine    Abnormal uterine bleeding (AUB)    Vaginal yeast infection    DUB (dysfunctional uterine bleeding)    Migraine with aura, not intractable         Anesthesia Evaluation         Review of Systems      Physical Exam  General:  Well nourished    Airway/Jaw/Neck:  Airway Findings: Mouth Opening: Normal Tongue: Normal  General Airway Assessment: Adult  Mallampati: II  Improves to II with phonation.  TM Distance: Normal, at least 6 cm      Dental:  Dental Findings:   Chest/Lungs:  Chest/Lungs Findings: Clear to auscultation     Heart/Vascular:  Heart Findings: Rate: Normal  Rhythm: Regular Rhythm  Sounds: Normal        Mental Status:  Mental Status Findings:  Cooperative, Alert and Oriented         Anesthesia Plan  Type of Anesthesia, risks & benefits discussed:  Anesthesia Type:  general  Patient's Preference: General  Intra-op Monitoring Plan: standard ASA monitors  Intra-op Monitoring Plan Comments: Standard ASA monitors.   Post Op Pain Control Plan: per primary service following discharge from PACU  Post Op Pain Control Plan Comments: Per primary service.     Induction:   IV  Beta Blocker:  Patient is not currently on a Beta-Blocker (No further documentation required).       Informed Consent: Patient understands risks and agrees with Anesthesia plan.  Questions answered. Anesthesia consent signed with patient.  ASA Score: 3     Day of Surgery Review of History & Physical:    H&P update referred to the surgeon.     Anesthesia Plan Notes: Chart reviewed, patient interviewed and examined.  The plan for general anesthesia was  explained.  Questions were answered and the consent was signed.  Gus THOMAS         Ready For Surgery From Anesthesia Perspective.        flu-like symptoms

## 2020-03-09 ENCOUNTER — ANESTHESIA (OUTPATIENT)
Dept: SURGERY | Facility: HOSPITAL | Age: 40
End: 2020-03-09
Payer: COMMERCIAL

## 2020-03-09 ENCOUNTER — HOSPITAL ENCOUNTER (OUTPATIENT)
Facility: HOSPITAL | Age: 40
LOS: 1 days | Discharge: HOME | End: 2020-03-10
Attending: OBSTETRICS & GYNECOLOGY | Admitting: OBSTETRICS & GYNECOLOGY
Payer: COMMERCIAL

## 2020-03-09 DIAGNOSIS — Z01.818 PREOP TESTING: Primary | ICD-10-CM

## 2020-03-09 DIAGNOSIS — C56.9 MUCINOUS TUMOR, OF LOW MALIGNANT POTENTIAL: ICD-10-CM

## 2020-03-09 PROBLEM — D39.10 BORDERLINE EPITHELIAL NEOPLASM OF OVARY: Status: ACTIVE | Noted: 2020-03-09

## 2020-03-09 PROBLEM — Z90.710 HISTORY OF ROBOT-ASSISTED LAPAROSCOPIC HYSTERECTOMY: Status: ACTIVE | Noted: 2020-03-09

## 2020-03-09 LAB
ABO + RH BLD: NORMAL
B-HCG UR QL: NEGATIVE
BLD GP AB SCN CELLS X3 SERPL QL: NORMAL
CTP QC/QA: YES

## 2020-03-09 PROCEDURE — 63600175 PHARM REV CODE 636 W HCPCS: Performed by: ANESTHESIOLOGY

## 2020-03-09 PROCEDURE — 81025 URINE PREGNANCY TEST: CPT | Performed by: ANESTHESIOLOGY

## 2020-03-09 PROCEDURE — 27201423 OPTIME MED/SURG SUP & DEVICES STERILE SUPPLY: Performed by: OBSTETRICS & GYNECOLOGY

## 2020-03-09 PROCEDURE — 71000016 HC POSTOP RECOV ADDL HR: Performed by: OBSTETRICS & GYNECOLOGY

## 2020-03-09 PROCEDURE — 86850 RBC ANTIBODY SCREEN: CPT

## 2020-03-09 PROCEDURE — 88307 TISSUE EXAM BY PATHOLOGIST: CPT | Performed by: PATHOLOGY

## 2020-03-09 PROCEDURE — 88302 PR  SURG PATH,LEVEL II: ICD-10-PCS | Mod: 26,,, | Performed by: PATHOLOGY

## 2020-03-09 PROCEDURE — 25000003 PHARM REV CODE 250: Performed by: NURSE ANESTHETIST, CERTIFIED REGISTERED

## 2020-03-09 PROCEDURE — 25000003 PHARM REV CODE 250: Performed by: STUDENT IN AN ORGANIZED HEALTH CARE EDUCATION/TRAINING PROGRAM

## 2020-03-09 PROCEDURE — 88307 TISSUE EXAM BY PATHOLOGIST: CPT | Mod: 26,,, | Performed by: PATHOLOGY

## 2020-03-09 PROCEDURE — 36000712 HC OR TIME LEV V 1ST 15 MIN: Performed by: OBSTETRICS & GYNECOLOGY

## 2020-03-09 PROCEDURE — 58571 PR LAPAROSCOPY W TOT HYSTERECTUTERUS <=250 GRAM  W TUBE/OVARY: ICD-10-PCS | Mod: AS,,, | Performed by: NURSE PRACTITIONER

## 2020-03-09 PROCEDURE — 88112 PR  CYTOPATH, CELL ENHANCE TECH: ICD-10-PCS | Mod: 26,,, | Performed by: PATHOLOGY

## 2020-03-09 PROCEDURE — 36000713 HC OR TIME LEV V EA ADD 15 MIN: Performed by: OBSTETRICS & GYNECOLOGY

## 2020-03-09 PROCEDURE — 88307 PR  SURG PATH,LEVEL V: ICD-10-PCS | Mod: 26,,, | Performed by: PATHOLOGY

## 2020-03-09 PROCEDURE — 37000008 HC ANESTHESIA 1ST 15 MINUTES: Performed by: OBSTETRICS & GYNECOLOGY

## 2020-03-09 PROCEDURE — 88302 TISSUE EXAM BY PATHOLOGIST: CPT | Mod: 26,,, | Performed by: PATHOLOGY

## 2020-03-09 PROCEDURE — 58571 TLH W/T/O 250 G OR LESS: CPT | Mod: AS,,, | Performed by: NURSE PRACTITIONER

## 2020-03-09 PROCEDURE — 88305 TISSUE EXAM BY PATHOLOGIST: CPT | Performed by: PATHOLOGY

## 2020-03-09 PROCEDURE — 63600175 PHARM REV CODE 636 W HCPCS: Performed by: STUDENT IN AN ORGANIZED HEALTH CARE EDUCATION/TRAINING PROGRAM

## 2020-03-09 PROCEDURE — 94761 N-INVAS EAR/PLS OXIMETRY MLT: CPT

## 2020-03-09 PROCEDURE — 71000015 HC POSTOP RECOV 1ST HR: Performed by: OBSTETRICS & GYNECOLOGY

## 2020-03-09 PROCEDURE — 88112 CYTOPATH CELL ENHANCE TECH: CPT | Mod: 26,,, | Performed by: PATHOLOGY

## 2020-03-09 PROCEDURE — 63600175 PHARM REV CODE 636 W HCPCS: Performed by: NURSE ANESTHETIST, CERTIFIED REGISTERED

## 2020-03-09 PROCEDURE — 58571 TLH W/T/O 250 G OR LESS: CPT | Mod: ,,, | Performed by: OBSTETRICS & GYNECOLOGY

## 2020-03-09 PROCEDURE — D9220A PRA ANESTHESIA: ICD-10-PCS | Mod: ANES,,, | Performed by: ANESTHESIOLOGY

## 2020-03-09 PROCEDURE — D9220A PRA ANESTHESIA: Mod: ANES,,, | Performed by: ANESTHESIOLOGY

## 2020-03-09 PROCEDURE — 71000033 HC RECOVERY, INTIAL HOUR: Performed by: OBSTETRICS & GYNECOLOGY

## 2020-03-09 PROCEDURE — D9220A PRA ANESTHESIA: Mod: CRNA,,, | Performed by: NURSE ANESTHETIST, CERTIFIED REGISTERED

## 2020-03-09 PROCEDURE — 58571 PR LAPAROSCOPY W TOT HYSTERECTUTERUS <=250 GRAM  W TUBE/OVARY: ICD-10-PCS | Mod: ,,, | Performed by: OBSTETRICS & GYNECOLOGY

## 2020-03-09 PROCEDURE — D9220A PRA ANESTHESIA: ICD-10-PCS | Mod: CRNA,,, | Performed by: NURSE ANESTHETIST, CERTIFIED REGISTERED

## 2020-03-09 PROCEDURE — 88112 CYTOPATH CELL ENHANCE TECH: CPT | Performed by: PATHOLOGY

## 2020-03-09 PROCEDURE — 25000003 PHARM REV CODE 250: Performed by: OBSTETRICS & GYNECOLOGY

## 2020-03-09 PROCEDURE — 86920 COMPATIBILITY TEST SPIN: CPT

## 2020-03-09 PROCEDURE — 37000009 HC ANESTHESIA EA ADD 15 MINS: Performed by: OBSTETRICS & GYNECOLOGY

## 2020-03-09 RX ORDER — FENTANYL CITRATE 50 UG/ML
25 INJECTION, SOLUTION INTRAMUSCULAR; INTRAVENOUS EVERY 5 MIN PRN
Status: DISCONTINUED | OUTPATIENT
Start: 2020-03-09 | End: 2020-03-09 | Stop reason: HOSPADM

## 2020-03-09 RX ORDER — MUPIROCIN 20 MG/G
OINTMENT TOPICAL
Status: DISCONTINUED | OUTPATIENT
Start: 2020-03-09 | End: 2020-03-09

## 2020-03-09 RX ORDER — HYDROCODONE BITARTRATE AND ACETAMINOPHEN 5; 325 MG/1; MG/1
1 TABLET ORAL EVERY 4 HOURS PRN
Status: DISCONTINUED | OUTPATIENT
Start: 2020-03-09 | End: 2020-03-10 | Stop reason: HOSPADM

## 2020-03-09 RX ORDER — CEFAZOLIN SODIUM 1 G/3ML
INJECTION, POWDER, FOR SOLUTION INTRAMUSCULAR; INTRAVENOUS
Status: DISCONTINUED | OUTPATIENT
Start: 2020-03-09 | End: 2020-03-09

## 2020-03-09 RX ORDER — CYCLOBENZAPRINE HCL 5 MG
5 TABLET ORAL 2 TIMES DAILY PRN
Status: DISCONTINUED | OUTPATIENT
Start: 2020-03-09 | End: 2020-03-10 | Stop reason: HOSPADM

## 2020-03-09 RX ORDER — LIDOCAINE HCL/PF 100 MG/5ML
SYRINGE (ML) INTRAVENOUS
Status: DISCONTINUED | OUTPATIENT
Start: 2020-03-09 | End: 2020-03-09

## 2020-03-09 RX ORDER — ACETAMINOPHEN 10 MG/ML
INJECTION, SOLUTION INTRAVENOUS
Status: DISCONTINUED | OUTPATIENT
Start: 2020-03-09 | End: 2020-03-09

## 2020-03-09 RX ORDER — HYDROCODONE BITARTRATE AND ACETAMINOPHEN 10; 325 MG/1; MG/1
1 TABLET ORAL EVERY 4 HOURS PRN
Status: DISCONTINUED | OUTPATIENT
Start: 2020-03-09 | End: 2020-03-10 | Stop reason: HOSPADM

## 2020-03-09 RX ORDER — LIDOCAINE HYDROCHLORIDE 10 MG/ML
1 INJECTION, SOLUTION EPIDURAL; INFILTRATION; INTRACAUDAL; PERINEURAL ONCE
Status: DISCONTINUED | OUTPATIENT
Start: 2020-03-09 | End: 2020-03-09

## 2020-03-09 RX ORDER — SODIUM CHLORIDE 0.9 % (FLUSH) 0.9 %
10 SYRINGE (ML) INJECTION
Status: DISCONTINUED | OUTPATIENT
Start: 2020-03-09 | End: 2020-03-09

## 2020-03-09 RX ORDER — ONDANSETRON 2 MG/ML
INJECTION INTRAMUSCULAR; INTRAVENOUS
Status: DISCONTINUED | OUTPATIENT
Start: 2020-03-09 | End: 2020-03-09

## 2020-03-09 RX ORDER — ONDANSETRON 2 MG/ML
4 INJECTION INTRAMUSCULAR; INTRAVENOUS EVERY 12 HOURS PRN
Status: DISCONTINUED | OUTPATIENT
Start: 2020-03-09 | End: 2020-03-10 | Stop reason: HOSPADM

## 2020-03-09 RX ORDER — HYDROMORPHONE HYDROCHLORIDE 1 MG/ML
0.2 INJECTION, SOLUTION INTRAMUSCULAR; INTRAVENOUS; SUBCUTANEOUS EVERY 5 MIN PRN
Status: DISCONTINUED | OUTPATIENT
Start: 2020-03-09 | End: 2020-03-09 | Stop reason: HOSPADM

## 2020-03-09 RX ORDER — SODIUM CHLORIDE 9 MG/ML
INJECTION, SOLUTION INTRAVENOUS CONTINUOUS PRN
Status: DISCONTINUED | OUTPATIENT
Start: 2020-03-09 | End: 2020-03-09

## 2020-03-09 RX ORDER — SODIUM CHLORIDE 9 MG/ML
INJECTION, SOLUTION INTRAVENOUS CONTINUOUS
Status: DISCONTINUED | OUTPATIENT
Start: 2020-03-09 | End: 2020-03-10

## 2020-03-09 RX ORDER — MIDAZOLAM HYDROCHLORIDE 1 MG/ML
INJECTION INTRAMUSCULAR; INTRAVENOUS
Status: DISCONTINUED | OUTPATIENT
Start: 2020-03-09 | End: 2020-03-09

## 2020-03-09 RX ORDER — HYDROMORPHONE HYDROCHLORIDE 1 MG/ML
0.5 INJECTION, SOLUTION INTRAMUSCULAR; INTRAVENOUS; SUBCUTANEOUS
Status: DISCONTINUED | OUTPATIENT
Start: 2020-03-09 | End: 2020-03-10 | Stop reason: HOSPADM

## 2020-03-09 RX ORDER — KETAMINE HCL IN 0.9 % NACL 50 MG/5 ML
SYRINGE (ML) INTRAVENOUS
Status: DISCONTINUED | OUTPATIENT
Start: 2020-03-09 | End: 2020-03-09

## 2020-03-09 RX ORDER — DIPHENHYDRAMINE HYDROCHLORIDE 50 MG/ML
25 INJECTION INTRAMUSCULAR; INTRAVENOUS EVERY 6 HOURS PRN
Status: DISCONTINUED | OUTPATIENT
Start: 2020-03-09 | End: 2020-03-09 | Stop reason: HOSPADM

## 2020-03-09 RX ORDER — KETOROLAC TROMETHAMINE 30 MG/ML
INJECTION, SOLUTION INTRAMUSCULAR; INTRAVENOUS
Status: DISCONTINUED | OUTPATIENT
Start: 2020-03-09 | End: 2020-03-09

## 2020-03-09 RX ORDER — ESCITALOPRAM OXALATE 20 MG/1
20 TABLET ORAL DAILY
Status: DISCONTINUED | OUTPATIENT
Start: 2020-03-10 | End: 2020-03-10 | Stop reason: HOSPADM

## 2020-03-09 RX ORDER — PROPOFOL 10 MG/ML
VIAL (ML) INTRAVENOUS
Status: DISCONTINUED | OUTPATIENT
Start: 2020-03-09 | End: 2020-03-09

## 2020-03-09 RX ORDER — DEXAMETHASONE SODIUM PHOSPHATE 4 MG/ML
INJECTION, SOLUTION INTRA-ARTICULAR; INTRALESIONAL; INTRAMUSCULAR; INTRAVENOUS; SOFT TISSUE
Status: DISCONTINUED | OUTPATIENT
Start: 2020-03-09 | End: 2020-03-09

## 2020-03-09 RX ORDER — GLYCOPYRROLATE 0.2 MG/ML
INJECTION INTRAMUSCULAR; INTRAVENOUS
Status: DISCONTINUED | OUTPATIENT
Start: 2020-03-09 | End: 2020-03-09

## 2020-03-09 RX ORDER — MUPIROCIN 20 MG/G
1 OINTMENT TOPICAL 2 TIMES DAILY
Status: DISCONTINUED | OUTPATIENT
Start: 2020-03-09 | End: 2020-03-10 | Stop reason: HOSPADM

## 2020-03-09 RX ORDER — ONDANSETRON 2 MG/ML
4 INJECTION INTRAMUSCULAR; INTRAVENOUS ONCE AS NEEDED
Status: DISCONTINUED | OUTPATIENT
Start: 2020-03-09 | End: 2020-03-09 | Stop reason: HOSPADM

## 2020-03-09 RX ORDER — DOCUSATE SODIUM 100 MG/1
100 CAPSULE, LIQUID FILLED ORAL 2 TIMES DAILY
Status: DISCONTINUED | OUTPATIENT
Start: 2020-03-09 | End: 2020-03-10 | Stop reason: HOSPADM

## 2020-03-09 RX ORDER — ROCURONIUM BROMIDE 10 MG/ML
INJECTION, SOLUTION INTRAVENOUS
Status: DISCONTINUED | OUTPATIENT
Start: 2020-03-09 | End: 2020-03-09

## 2020-03-09 RX ORDER — FENTANYL CITRATE 50 UG/ML
INJECTION, SOLUTION INTRAMUSCULAR; INTRAVENOUS
Status: DISCONTINUED | OUTPATIENT
Start: 2020-03-09 | End: 2020-03-09

## 2020-03-09 RX ADMIN — SODIUM CHLORIDE: 0.9 INJECTION, SOLUTION INTRAVENOUS at 07:03

## 2020-03-09 RX ADMIN — SODIUM CHLORIDE: 0.9 INJECTION, SOLUTION INTRAVENOUS at 09:03

## 2020-03-09 RX ADMIN — Medication 800 MG: at 11:03

## 2020-03-09 RX ADMIN — CEFAZOLIN 3 G: 330 INJECTION, POWDER, FOR SOLUTION INTRAMUSCULAR; INTRAVENOUS at 07:03

## 2020-03-09 RX ADMIN — Medication 800 MG: at 06:03

## 2020-03-09 RX ADMIN — ROCURONIUM BROMIDE 10 MG: 10 INJECTION, SOLUTION INTRAVENOUS at 09:03

## 2020-03-09 RX ADMIN — SODIUM CHLORIDE, SODIUM GLUCONATE, SODIUM ACETATE, POTASSIUM CHLORIDE, MAGNESIUM CHLORIDE, SODIUM PHOSPHATE, DIBASIC, AND POTASSIUM PHOSPHATE: .53; .5; .37; .037; .03; .012; .00082 INJECTION, SOLUTION INTRAVENOUS at 08:03

## 2020-03-09 RX ADMIN — ROCURONIUM BROMIDE 10 MG: 10 INJECTION, SOLUTION INTRAVENOUS at 08:03

## 2020-03-09 RX ADMIN — HYDROCODONE BITARTRATE AND ACETAMINOPHEN 1 TABLET: 5; 325 TABLET ORAL at 10:03

## 2020-03-09 RX ADMIN — ROCURONIUM BROMIDE 20 MG: 10 INJECTION, SOLUTION INTRAVENOUS at 08:03

## 2020-03-09 RX ADMIN — Medication 800 MG: at 02:03

## 2020-03-09 RX ADMIN — HYDROCODONE BITARTRATE AND ACETAMINOPHEN 1 TABLET: 5; 325 TABLET ORAL at 02:03

## 2020-03-09 RX ADMIN — HYDROMORPHONE HYDROCHLORIDE 0.2 MG: 1 INJECTION, SOLUTION INTRAMUSCULAR; INTRAVENOUS; SUBCUTANEOUS at 10:03

## 2020-03-09 RX ADMIN — MUPIROCIN: 20 OINTMENT TOPICAL at 06:03

## 2020-03-09 RX ADMIN — Medication 25 MG: at 07:03

## 2020-03-09 RX ADMIN — HYDROCODONE BITARTRATE AND ACETAMINOPHEN 1 TABLET: 10; 325 TABLET ORAL at 08:03

## 2020-03-09 RX ADMIN — ROCURONIUM BROMIDE 50 MG: 10 INJECTION, SOLUTION INTRAVENOUS at 07:03

## 2020-03-09 RX ADMIN — ONDANSETRON 4 MG: 2 INJECTION INTRAMUSCULAR; INTRAVENOUS at 09:03

## 2020-03-09 RX ADMIN — MUPIROCIN 1 G: 20 OINTMENT TOPICAL at 08:03

## 2020-03-09 RX ADMIN — FENTANYL CITRATE 100 MCG: 50 INJECTION, SOLUTION INTRAMUSCULAR; INTRAVENOUS at 07:03

## 2020-03-09 RX ADMIN — KETOROLAC TROMETHAMINE 30 MG: 30 INJECTION, SOLUTION INTRAMUSCULAR at 09:03

## 2020-03-09 RX ADMIN — GLYCOPYRROLATE 0.1 MG: 0.2 INJECTION INTRAMUSCULAR; INTRAVENOUS at 07:03

## 2020-03-09 RX ADMIN — DOCUSATE SODIUM 100 MG: 100 CAPSULE, LIQUID FILLED ORAL at 10:03

## 2020-03-09 RX ADMIN — HYDROMORPHONE HYDROCHLORIDE 0.5 MG: 1 INJECTION, SOLUTION INTRAMUSCULAR; INTRAVENOUS; SUBCUTANEOUS at 06:03

## 2020-03-09 RX ADMIN — ROCURONIUM BROMIDE 20 MG: 10 INJECTION, SOLUTION INTRAVENOUS at 07:03

## 2020-03-09 RX ADMIN — MIDAZOLAM HYDROCHLORIDE 2 MG: 1 INJECTION, SOLUTION INTRAMUSCULAR; INTRAVENOUS at 07:03

## 2020-03-09 RX ADMIN — DEXAMETHASONE SODIUM PHOSPHATE 8 MG: 4 INJECTION, SOLUTION INTRA-ARTICULAR; INTRALESIONAL; INTRAMUSCULAR; INTRAVENOUS; SOFT TISSUE at 07:03

## 2020-03-09 RX ADMIN — DOCUSATE SODIUM 100 MG: 100 CAPSULE, LIQUID FILLED ORAL at 08:03

## 2020-03-09 RX ADMIN — ACETAMINOPHEN 1000 MG: 10 INJECTION, SOLUTION INTRAVENOUS at 09:03

## 2020-03-09 RX ADMIN — PROPOFOL 50 MG: 10 INJECTION, EMULSION INTRAVENOUS at 07:03

## 2020-03-09 RX ADMIN — HYDROCODONE BITARTRATE AND ACETAMINOPHEN 1 TABLET: 10; 325 TABLET ORAL at 11:03

## 2020-03-09 RX ADMIN — SUGAMMADEX 400 MG: 100 INJECTION, SOLUTION INTRAVENOUS at 09:03

## 2020-03-09 RX ADMIN — PROPOFOL 150 MG: 10 INJECTION, EMULSION INTRAVENOUS at 07:03

## 2020-03-09 RX ADMIN — LIDOCAINE HYDROCHLORIDE 100 MG: 20 INJECTION, SOLUTION INTRAVENOUS at 07:03

## 2020-03-09 RX ADMIN — Medication 15 MG: at 08:03

## 2020-03-09 NOTE — NURSING TRANSFER
Nursing Transfer Note      3/9/2020     Transfer To: 801 From PACU    Transfer via bed    Transfer with IV pump    Transported by PCT    Medicines sent: none    Chart send with patient: Yes    Notified: mother    Patient reassessed at: 3/9/2020 @ 1045    Upon arrival to floor:

## 2020-03-09 NOTE — PROGRESS NOTES
Afternoon Assessment:    Patient resting comfortably in bed. States that her pain is well-controlled. Has not tried PO. Denies nausea/vomiting.    Temp:  [97.7 °F (36.5 °C)-98.4 °F (36.9 °C)] 98.1 °F (36.7 °C)  Pulse:  [] 81  Resp:  [16-22] 16  SpO2:  [93 %-100 %] 97 %  BP: (120-131)/(73-84) 126/78    PE:  Gen: A&O, no acute distress  : Killian in place draining clear, yellow urine    A/P:  - Routine post-op care  - Scheduled IV ibuprofen and norco 5/10 prn   - Continuous IVF until tolerating a regular diet  - Will obtain AM CBC    Chana Brandon MD  OBGYN, PGY-2

## 2020-03-09 NOTE — OPERATIVE NOTE ADDENDUM
Certification of Assistant at Surgery       Surgery Date: 3/9/2020     Participating Surgeons:  Surgeon(s) and Role:     * Jeff Kaufman MD - Primary     * Bre Cope MD - Resident - Assisting       Procedures:  Procedure(s) (LRB):  XI ROBOTIC HYSTERECTOMY (N/A)  SALPINGO-OOPHORECTOMY  USO (Right)    Assistant Surgeon's Certification of Necessity:  I understand that section 1842 (b) (6) (d) of the Social Security Act generally prohibits Medicare Part B reasonable charge payment for the services of assistants at surgery in [facility] hospitals when qualified residents are available to furnish such services. I certify that the services for which payment is claimed were medically necessary, and that no qualified resident was available to perform the services. I further understand that these services are subject to post-payment review by the Medicare carrier.      Joleen Fontanez NP    03/09/2020  9:39 AM

## 2020-03-09 NOTE — H&P
The patient has been examined and the H&P has been reviewed:    I concur with the findings and no changes have occurred since H&P was written.    Anesthesia/Surgery risks, benefits and alternative options discussed and understood by patient/family.      Chana Brandon MD  OBGYN, PGY-2         Subjective:       Patient ID: Fran Higgins is a 39 y.o. female.     Chief Complaint: mucinous tumor, of low malignant potential     HPI      Patient comes in today to sign consents for robotic assisted laparoscopic hysterectomy and RSO.  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 Marky Fox.         Postop CT scan of the abdomen and pelvis showed no evidence for metastatic disease.     Jan 2020: CEA is 2.       LMP: 1/17/2020     Pap: 5/13/2019: normal.      Colonoscopy: never        Her oncologic history is:     June 25, 2019:  Patient underwent robotic assisted laparoscopic left salpingo-oophorectomy.  Mass was ruptured intraoperatively as it was being placed into an Endo-Catch bag.  Preoperative  was 11.     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  Size of tumor: 11.8 CM.  Capsule involvement cannot be determined secondary to fragmentation.  Microinvasion is present: 5 foci.  Intraepithelial carcinoma is present.  Benign fallopian tube and fimbriated end with no significant histopathologic alterations.     July 2019: Postop CT scan of the abdomen and pelvis showed no evidence for metastatic disease.                Past Medical History:   Diagnosis Date    Abnormal Pap smear of cervix 2013    Abnormal uterine bleeding (AUB) 9/13/2019    Amblyopia      Cervical scoliosis 1994     Severe    Depression      DUB (dysfunctional uterine bleeding) 1/30/2020    Migraine with aura, not  intractable 1/30/2020    Mucinous tumor, 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    Ovarian cyst      Pericardial cyst 7/26/2019    Vaginal yeast infection 9/26/2019            Past Surgical History:   Procedure Laterality Date    ROBOT-ASSISTED LAPAROSCOPIC SALPINGO-OOPHORECTOMY Left 6/25/2019     Procedure: ROBOTIC SALPINGO-OOPHORECTOMY;  Surgeon: Marky Fox Jr., MD;  Location: [facility];  Service: OB/GYN;  Laterality: Left;    TONSILLECTOMY                Family History   Problem Relation Age of Onset    Diabetes Paternal Grandfather      Macular degeneration Maternal Grandmother      Cancer Maternal Grandfather           prostate cancer    Hypertension Father      Hypertension Mother      Stroke Mother 60         ASD, PFO    Cancer Other           uterine cancer     Uterine cancer Other      Colon cancer Neg Hx      Ovarian cancer Neg Hx      Breast cancer Neg Hx      Glaucoma Neg Hx     .soch  Review of patient's allergies indicates:  No Known Allergies     Current Outpatient Medications:     escitalopram oxalate (LEXAPRO) 20 MG tablet, Take 1 tablet (20 mg total) by mouth once daily., Disp: 30 tablet, Rfl: 11    cyclobenzaprine (FLEXERIL) 5 MG tablet, Take 1 tablet (5 mg total) by mouth 2 (two) times daily as needed for Muscle spasms., Disp: 60 tablet, Rfl: 3    galcanezumab-gnlm (EMGALITY PEN) 120 mg/mL PnIj, Inject 120 mg into the skin every 30 days., Disp: 1 mL, Rfl: 11    norethindrone-ethinyl estradiol (MICROGESTIN 1/20) 1-20 mg-mcg per tablet, Take 1 tablet by mouth once daily., Disp: 21 tablet, Rfl: 11           Review of Systems   Constitutional: Negative for chills, fatigue and fever.   Respiratory: Negative for cough, shortness of breath and wheezing.    Cardiovascular: Negative for chest pain, palpitations and leg swelling.   Gastrointestinal: Negative for abdominal pain, constipation, diarrhea, nausea and vomiting.   Genitourinary: Negative for difficulty urinating, dysuria,  "frequency, genital sores, hematuria, urgency, vaginal bleeding, vaginal discharge and vaginal pain.   Neurological: Negative for weakness.   Hematological: Negative for adenopathy. Does not bruise/bleed easily.   Psychiatric/Behavioral: The patient is not nervous/anxious.        Objective:   /88   Pulse 105   Ht 5' 4" (1.626 m)   Wt (!) 150.9 kg (332 lb 10.8 oz)   BMI 57.10 kg/m²   Physical Exam   Constitutional: She is oriented to person, place, and time. She appears well-developed and well-nourished.   Cardiovascular: Normal rate and regular rhythm.   Pulmonary/Chest: Effort normal and breath sounds normal.   Neurological: She is alert and oriented to person, place, and time.   Psychiatric: She has a normal mood and affect. Her behavior is normal. Judgment and thought content normal.       Assessment:       1. Mucinous tumor, of low malignant potential    2. S/P RA Laparoscopic LSO    3. DUB (dysfunctional uterine bleeding)    4. Migraine with aura and without status migrainosus, not intractable        Plan:   Mucinous tumor, of low malignant potential  -     Basic metabolic panel; Future; Expected date: 01/30/2020  -     CBC auto differential; Future; Expected date: 01/30/2020  -     CT Abdomen Pelvis With Contrast; Future; Expected date: 01/30/2020     S/P RA Laparoscopic LSO     DUB (dysfunctional uterine bleeding)  -     norethindrone-ethinyl estradiol (MICROGESTIN 1/20) 1-20 mg-mcg per tablet; Take 1 tablet by mouth once daily.  Dispense: 21 tablet; Refill: 11     Migraine with aura and without status migrainosus, not intractable        Consent forms were reviewed with patient. Questions were answered. Patient voiced understanding. Consents were signed.  Preop orders placed.           "

## 2020-03-09 NOTE — BRIEF OP NOTE
[facility]  Brief Operative Note    SUMMARY     Surgery Date: 3/9/2020     Surgeon(s) and Role:     * Jeff Kaufman MD - Primary     * Bre Cope MD - Resident - Assisting     * Chana Brandon MD - Resident - Assisting        Pre-op Diagnosis:  Mucinous tumor, of low malignant potential [D48.9]    Post-op Diagnosis:  Post-Op Diagnosis Codes:     * Mucinous tumor, of low malignant potential [D48.9]    Procedure(s) (LRB):  XI ROBOTIC HYSTERECTOMY (N/A)  SALPINGO-OOPHORECTOMY  USO (Right)    Anesthesia: General    Description of Procedure: Robotic removal of uterus, cervix and right tube and ovary.     Description of the findings of the procedure: normal appearing uterus and right tube and ovary.     Estimated Blood Loss: 300 mL   Total Fluids: 1700 ml  Urine Output:500 m l         Specimens:   Specimen (12h ago, onward)    None

## 2020-03-09 NOTE — PROGRESS NOTES
Pre op complete. Patient noted to be a difficult stick; anesthesia MD made aware.  Waiting on IV nurse to assess.

## 2020-03-09 NOTE — OP NOTE
[facility]  Surgery Department  Operative Note    SUMMARY     Patient: Fran Higgins    Medical Record: [id number]    Date of Procedure: 3/9/2020     Surgeon: Surgeon(s) and Role:     * Jeff Kaufman MD - Primary     * Bre Cope MD - Resident - Assisting     * Chana Brandon MD - Resident - Assisting        Pre-Operative Diagnosis: Mucinous tumor, of low malignant potential [D48.9]    Post-Operative Diagnosis: Post-Op Diagnosis Codes:     * Mucinous tumor, of low malignant potential [D48.9]    Procedure: Procedure(s) (LRB):  XI ROBOTIC HYSTERECTOMY (N/A)  SALPINGO-OOPHORECTOMY  USO (Right)    EBL: 300 ml    Total Fluid: 1700 ml    Urine Output: 500 ml    Drains: none    Operative History: prior RSO showing a mucinous LMP tumor.     Operative Findings: normal appearing right tube and ovary.  Normal appearing uterus.    Procedure in Detail: The patient was brought to the Operating Room after induction of general anesthesia, the arms were secured to the patient's side. A chest strap was placed.      The legs were placed in Tito stirrups.  The vulva and vagina were then prepped with Betadine scrub and solution.  The abdomen was prepped with ChloraPrep and the patient was sterilely draped.     After timeout identifying patient and procedure, attention was then directed to the peritoneum.  The cervix was exposed with two right angle retractors.  Cervix was grasped with single-tooth tenaculum.  Uterus and cervix sounded 9 cm. The cervix was easily dilated to a 21 Hanks dilator.     We then placed a 0 Vicryl stitch at 6 o'clock and 12 o'clock.  A large VCare uterine manipulator was inserted into the endometrial cavity.  Cervical cup was advanced over cervical sutures.  Vaginal occluder was advanced and the entire system was locked in place.     We changed gloves and attention was now directed to the abdomen.     Pneumoperitoneum was obtained with first pass of the Veress  needle.  Opening  pressure was 2 and the needle was further advanced with a resultant pressure of -2.  The abdomen was insufflated to 15 mmHg.  An incision was made approximately 22 cm above the pubic symphysis.  The Xi trocar was introduced followed by the camera.  We had entered into the peritoneal cavity without injury.     We then placed 2 robotic arm trocar(s) on the left.  One approximately 10 cm lateral and slightly below the camera port.  The other just above the iliac crest.  A 3rd robotic arm was then placed on the right side 15 cm lateral and slightly below the camera port.  An 8 mm AirSeal trocar was placed in the left upper quadrant.  These four trocars were placed under direct visualization.     The patient was then placed in steep Trendelenburg and the robot was docked.     I began on the left hand side.  The left round ligament was transected with monopolar scissors.  The anterior leaf of the broad ligament was opened.  The infundibulopelvic ligament was identified.  The left ureter was identified.   the left tube and ovary had already been removed.  The attachment of the infundibulopelvic ligament the uterus was now cauterized and cut.  The also heard the portion of the related and to the was also resected.       The anterior cul-de-sac peritoneum was opened.  The bladder was dissected downward off of the cervix.  The uterine vessels were cauterized and cut.        Attention was now directed to the right hand side. The right round ligament was transected with monopolar scissors.  The anterior leaf of the broad ligament was opened.  The infundibulopelvic ligament was identified.  The right ureter was identified.  We then began the salpingo-oophorectomy by identifying infundibulopelvic ligament which was cauterized by 3 and cut.     Attention was now directed back anteriorly.  The bladder had been dissected downward off of the cervix.  A colpotomy was made at the cervical cup and followed  circumferentially around the cervix.  Uterus, cervix, and bilateral tubes and ovaries were removed through the vagina.    At this point, the vagina was then closed with interrupted 0 Vicryl sutures.  A Sharmaine stitch was placed on each side of each angle of the vagina and the intervening vagina was closed with interrupted 0 Vicryl suture.     The pelvis was irrigated.  There was no evidence of any bleeding.  The abdomen was desufflated and reinsufflated with no evidence for bleeding.     The robotic instruments were removed.  The robot was undocked.  The skin incisions were then closed with a running 4-0 Monocryl suture in a subcuticular closure.     The patient was then awakened and taken to Recovery Room in stable condition.    Lap, needle, sponge, and instrument count was correct.

## 2020-03-09 NOTE — TRANSFER OF CARE
Anesthesia Transfer of Care Note    Patient: Fran Higgins    Procedure(s) Performed: Procedure(s) (LRB):  XI ROBOTIC HYSTERECTOMY (N/A)  SALPINGO-OOPHORECTOMY  USO (Right)    Patient location: PACU    Anesthesia Type: general    Transport from OR: Transported from OR on 6-10 L/min O2 by face mask with adequate spontaneous ventilation    Post pain: adequate analgesia    Post assessment: no apparent anesthetic complications and tolerated procedure well    Post vital signs: stable    Level of consciousness: responds to stimulation    Nausea/Vomiting: no nausea/vomiting    Complications: none    Transfer of care protocol was followed      Last vitals:   Visit Vitals  /78 (BP Location: Right arm)   Pulse (!) 111   Temp 36.5 °C (97.7 °F) (Temporal)   Resp (!) 21   Wt (!) 150.6 kg (332 lb)   LMP 03/09/2020 (Exact Date)   SpO2 100%   Breastfeeding? No   BMI 56.99 kg/m²

## 2020-03-09 NOTE — PLAN OF CARE
Patient arrive from PACU at 1140; she remained awake and alert ; vital signs remain stable ; All schedules/PRN medications administered as ordered.  Normal saline infusing at 75 cc/hour; tolerating a regular diet without any difficulty. Assisted with activity; Killian catheter remains in place per order;  side rails up x2; call bell in place; bed in lowest, locked position; SCDs placed;skid proof socks on;care plan explained to patient; no additional complaints at this time. Will continue routine plan of care.

## 2020-03-10 ENCOUNTER — PATIENT MESSAGE (OUTPATIENT)
Dept: GYNECOLOGIC ONCOLOGY | Facility: CLINIC | Age: 40
End: 2020-03-10

## 2020-03-10 ENCOUNTER — PATIENT MESSAGE (OUTPATIENT)
Dept: INTERNAL MEDICINE | Facility: CLINIC | Age: 40
End: 2020-03-10

## 2020-03-10 VITALS
WEIGHT: 293 LBS | DIASTOLIC BLOOD PRESSURE: 68 MMHG | TEMPERATURE: 98 F | SYSTOLIC BLOOD PRESSURE: 120 MMHG | HEART RATE: 91 BPM | BODY MASS INDEX: 56.99 KG/M2 | RESPIRATION RATE: 16 BRPM | OXYGEN SATURATION: 96 %

## 2020-03-10 LAB
BASOPHILS # BLD AUTO: 0.01 K/UL (ref 0–0.2)
BASOPHILS NFR BLD: 0.1 % (ref 0–1.9)
DIFFERENTIAL METHOD: ABNORMAL
EOSINOPHIL # BLD AUTO: 0 K/UL (ref 0–0.5)
EOSINOPHIL NFR BLD: 0.1 % (ref 0–8)
ERYTHROCYTE [DISTWIDTH] IN BLOOD BY AUTOMATED COUNT: 13.1 % (ref 11.5–14.5)
FINAL PATHOLOGIC DIAGNOSIS: NORMAL
HCT VFR BLD AUTO: 35.9 % (ref 37–48.5)
HGB BLD-MCNC: 11 G/DL (ref 12–16)
IMM GRANULOCYTES # BLD AUTO: 0.02 K/UL (ref 0–0.04)
IMM GRANULOCYTES NFR BLD AUTO: 0.2 % (ref 0–0.5)
LYMPHOCYTES # BLD AUTO: 1.3 K/UL (ref 1–4.8)
LYMPHOCYTES NFR BLD: 14.8 % (ref 18–48)
MCH RBC QN AUTO: 26.8 PG (ref 27–31)
MCHC RBC AUTO-ENTMCNC: 30.6 G/DL (ref 32–36)
MCV RBC AUTO: 87 FL (ref 82–98)
MONOCYTES # BLD AUTO: 0.6 K/UL (ref 0.3–1)
MONOCYTES NFR BLD: 6.8 % (ref 4–15)
NEUTROPHILS # BLD AUTO: 6.8 K/UL (ref 1.8–7.7)
NEUTROPHILS NFR BLD: 78 % (ref 38–73)
NRBC BLD-RTO: 0 /100 WBC
PLATELET # BLD AUTO: 197 K/UL (ref 150–350)
PMV BLD AUTO: 12 FL (ref 9.2–12.9)
RBC # BLD AUTO: 4.11 M/UL (ref 4–5.4)
WBC # BLD AUTO: 8.74 K/UL (ref 3.9–12.7)

## 2020-03-10 PROCEDURE — 25000003 PHARM REV CODE 250: Performed by: STUDENT IN AN ORGANIZED HEALTH CARE EDUCATION/TRAINING PROGRAM

## 2020-03-10 PROCEDURE — 36415 COLL VENOUS BLD VENIPUNCTURE: CPT

## 2020-03-10 PROCEDURE — 25000003 PHARM REV CODE 250: Performed by: OBSTETRICS & GYNECOLOGY

## 2020-03-10 PROCEDURE — 94761 N-INVAS EAR/PLS OXIMETRY MLT: CPT

## 2020-03-10 PROCEDURE — 85025 COMPLETE CBC W/AUTO DIFF WBC: CPT

## 2020-03-10 RX ORDER — IBUPROFEN 600 MG/1
600 TABLET ORAL EVERY 6 HOURS PRN
Qty: 30 TABLET | Refills: 1 | Status: SHIPPED | OUTPATIENT
Start: 2020-03-10 | End: 2020-08-10

## 2020-03-10 RX ORDER — HYDROCODONE BITARTRATE AND ACETAMINOPHEN 5; 325 MG/1; MG/1
1 TABLET ORAL EVERY 6 HOURS PRN
Qty: 25 TABLET | Refills: 0 | Status: SHIPPED | OUTPATIENT
Start: 2020-03-10 | End: 2020-04-09

## 2020-03-10 RX ORDER — IBUPROFEN 600 MG/1
600 TABLET ORAL EVERY 6 HOURS
Status: DISCONTINUED | OUTPATIENT
Start: 2020-03-10 | End: 2020-03-10 | Stop reason: HOSPADM

## 2020-03-10 RX ORDER — IBUPROFEN 600 MG/1
600 TABLET ORAL EVERY 6 HOURS
Status: DISCONTINUED | OUTPATIENT
Start: 2020-03-10 | End: 2020-03-10

## 2020-03-10 RX ORDER — HYDROCODONE BITARTRATE AND ACETAMINOPHEN 5; 325 MG/1; MG/1
1 TABLET ORAL EVERY 6 HOURS PRN
Qty: 25 TABLET | Refills: 0 | Status: SHIPPED | OUTPATIENT
Start: 2020-03-10 | End: 2020-03-10

## 2020-03-10 RX ADMIN — HYDROCODONE BITARTRATE AND ACETAMINOPHEN 1 TABLET: 10; 325 TABLET ORAL at 03:03

## 2020-03-10 RX ADMIN — HYDROCODONE BITARTRATE AND ACETAMINOPHEN 1 TABLET: 10; 325 TABLET ORAL at 08:03

## 2020-03-10 RX ADMIN — ESCITALOPRAM OXALATE 20 MG: 20 TABLET ORAL at 08:03

## 2020-03-10 RX ADMIN — DOCUSATE SODIUM 100 MG: 100 CAPSULE, LIQUID FILLED ORAL at 08:03

## 2020-03-10 RX ADMIN — MUPIROCIN 1 G: 20 OINTMENT TOPICAL at 08:03

## 2020-03-10 RX ADMIN — IBUPROFEN 600 MG: 600 TABLET, FILM COATED ORAL at 08:03

## 2020-03-10 NOTE — SUBJECTIVE & OBJECTIVE
Interval History:   Patient resting comfortably in bed. She states that she had moderate pain yesterday evening and has been taking norco 10mg q4h throughout the night. She states that her pain is coming mostly from the vagina and Killian irritation. She reports a small amount of vaginal spotting. She ambulated twice around her room yesterday. She has not yet voided; Killian catheter is still in place. She denies nausea, vomiting, flatus, and BM.    Scheduled Meds:   docusate sodium  100 mg Oral BID    escitalopram oxalate  20 mg Oral Daily    ibuprofen  800 mg Intravenous Q6H    mupirocin  1 g Nasal BID     Continuous Infusions:   sodium chloride 0.9% 75 mL/hr at 03/09/20 0941     PRN Meds:cyclobenzaprine, HYDROcodone-acetaminophen, HYDROcodone-acetaminophen, HYDROmorphone, ondansetron, promethazine (PHENERGAN) IVPB    Review of patient's allergies indicates:  No Known Allergies    Objective:     Vital Signs (Most Recent):  Temp: 98.6 °F (37 °C) (03/10/20 0353)  Pulse: 85 (03/10/20 0353)  Resp: 16 (03/10/20 0353)  BP: 126/75 (03/10/20 0353)  SpO2: 97 % (03/10/20 0353) Vital Signs (24h Range):  Temp:  [97.7 °F (36.5 °C)-99.2 °F (37.3 °C)] 98.6 °F (37 °C)  Pulse:  [] 85  Resp:  [12-22] 16  SpO2:  [93 %-100 %] 97 %  BP: (117-131)/(66-84) 126/75     Weight: (!) 150.6 kg (332 lb)  Body mass index is 56.99 kg/m².    Intake/Output - Last 3 Shifts       03/08 0700 - 03/09 0659 03/09 0700 - 03/10 0659    I.V. (mL/kg)  3230.3 (21.4)    IV Piggyback  250    Total Intake(mL/kg)  3480.3 (23.1)    Urine (mL/kg/hr)  3700 (1)    Blood  300    Total Output  4000    Net  -519.8                   Physical Exam:   Constitutional: She is oriented to person, place, and time. She appears well-developed and well-nourished. No distress.    HENT:   Head: Normocephalic and atraumatic.      Cardiovascular: Normal rate.     Pulmonary/Chest: Effort normal. No respiratory distress.        Abdominal: Soft. Bowel sounds are normal. She  exhibits abdominal incision (Steri strips in place over 5 port sites). There is no tenderness. There is no rebound and no guarding.     Genitourinary:   Genitourinary Comments: Killian in place draining clear, yellow urine           Musculoskeletal: Normal range of motion. She exhibits no edema or tenderness.   TEDs/SCDs on       Neurological: She is alert and oriented to person, place, and time.    Skin: Skin is warm and dry.    Psychiatric: She has a normal mood and affect. Her behavior is normal. Judgment and thought content normal.       Lines/Drains/Airways     Drain                 Urethral Catheter 03/09/20 0732 Non-latex 16 Fr. less than 1 day          Peripheral Intravenous Line                 Peripheral IV - Single Lumen 03/09/20 0709 18 G Left Antecubital less than 1 day         Peripheral IV - Single Lumen 03/09/20 0715 20 G Right Hand less than 1 day                Laboratory:  CBC:   Recent Labs   Lab 03/10/20  0350   WBC 8.74   HGB 11.0*   HCT 35.9*

## 2020-03-10 NOTE — DISCHARGE SUMMARY
[facility]  Gynecologic Oncology  Discharge Summary    Patient Name: Fran Higgins  MRN: [id number]  Admission Date: 3/9/2020  Hospital Length of Stay: 1 days  Discharge Date and Time:  03/10/2020 11:39 AM  Attending Physician: No att. providers found   Discharging Provider: Chana Brandon MD  Primary Care Provider: Erica Pineda MD    HPI:   Fran Higgins is a 39 y.o. [id number] who is now s/p RALH/RSO for the treatment of mucinous tumor with low manlignant potential. Her PMHx includes morbid obesity, depression, and a pericardial cyst.    Hospital Course:  03/10/2020: POD#1. Pain controlled with norco 10mg q4h. Tolerating PO. Passing flatus and voiding. Will discharge home today.    Procedure(s) (LRB):  XI ROBOTIC HYSTERECTOMY (N/A)  SALPINGO-OOPHORECTOMY  USO (Right)         Pending Diagnostic Studies:     Procedure Component Value Units Date/Time    Cytology, Fluid/Wash/Brush [[id number]] Collected:  20 0758    Order Status:  Sent Lab Status:  In process Updated:  20 1308    Specimen to Pathology, Surgery Gynecology and Obstetrics [[id number]] Collected:  20 0928    Order Status:  Sent Lab Status:  In process Updated:  20 1207        Final Active Diagnoses:    Diagnosis Date Noted POA    PRINCIPAL PROBLEM:  s/p robotic hysterectomy/RSO 3/9/20 [Z90.710] 2020 Not Applicable    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 No.    Discharged Condition: good    Disposition: Home or Self Care    Follow Up:  Follow-up Information     Jeff Kaufman MD. Go on 2020.    Specialty:  Gynecologic Oncology  Why:  Postoperative appointment  Contact  information:  [address]  [facility] [address]  [phone]                 Patient Instructions:   No discharge procedures on file.  Medications:  Reconciled Home Medications:      Medication List      START taking these medications    HYDROcodone-acetaminophen 5-325 mg per tablet  Commonly known as:  NORCO  Take 1 tablet by mouth every 6 (six) hours as needed.     ibuprofen 600 MG tablet  Commonly known as:  ADVIL,MOTRIN  Take 1 tablet (600 mg total) by mouth every 6 (six) hours as needed for Pain.        CONTINUE taking these medications    cyclobenzaprine 5 MG tablet  Commonly known as:  FLEXERIL  Take 1 tablet (5 mg total) by mouth 2 (two) times daily as needed for Muscle spasms.     escitalopram oxalate 20 MG tablet  Commonly known as:  LEXAPRO  Take 1 tablet (20 mg total) by mouth once daily.        STOP taking these medications    galcanezumab-gnlm 120 mg/mL Pnij  Commonly known as:  EMGALITY PEN     JUNEL 1/20 (21) 1-20 mg-mcg per tablet  Generic drug:  norethindrone-ethinyl estradiol            Chana Brandon MD  Gynecologic Oncology  [facility]

## 2020-03-10 NOTE — HOSPITAL COURSE
03/10/2020: POD#1. Pain controlled with norco 10mg q4h. Tolerating PO. Passing flatus and voiding. Will discharge home today.

## 2020-03-10 NOTE — PLAN OF CARE
Discharge instructions and prescriptions given and explained to pt.  Pt. verbalized understanding with no further questions.  Peripheral IVs D/C'd with catheter tip intact.  VS WDL.  Patient is awaiting to leave with pt's mother.    ROAD TEST  O=SpO2 97% on RA  A=Ambulating around room and hallway  D=Peripheral IVs D/C'd  T=Tolerating regular diet  E=Voids  S=Performs self care independently  T=Teaching on wounds care complete

## 2020-03-10 NOTE — PROGRESS NOTES
[facility]  Gynecologic Oncology  Progress Note      Patient Name: Fran Higgins  MRN: [id number]  Admission Date: 3/9/2020  Hospital Length of Stay: 1 days  Attending Provider: Jeff Kaufman MD  Primary Care Provider: Erica Pineda MD  Principal Problem: History of robot-assisted laparoscopic hysterectomy    Follow-up For: Procedure(s) (LRB):  XI ROBOTIC HYSTERECTOMY (N/A)  SALPINGO-OOPHORECTOMY  USO (Right)  Post-Operative Day: 1 Day Post-Op  Subjective:      History of Present Illness:  Fran Higgins is a 39 y.o. [id number] who is now s/p RALH/RSO for the treatment of mucinous tumor with low manlignant potential. Her PMHx includes morbid obesity, depression, and a pericardial cyst.    Hospital Course:  03/10/2020: POD#1. Pain controlled with norco 10mg q4h. Tolerating PO. Denies flatus/BM.    Interval History:   Patient resting comfortably in bed. She states that she had moderate pain yesterday evening and has been taking norco 10mg q4h throughout the night. She states that her pain is coming mostly from the vagina and Killian irritation. She reports a small amount of vaginal spotting. She ambulated twice around her room yesterday. She has not yet voided; Killian catheter is still in place. She denies nausea, vomiting, flatus, and BM.    Scheduled Meds:   docusate sodium  100 mg Oral BID    escitalopram oxalate  20 mg Oral Daily    ibuprofen  800 mg Intravenous Q6H    mupirocin  1 g Nasal BID     Continuous Infusions:   sodium chloride 0.9% 75 mL/hr at 20 0941     PRN Meds:cyclobenzaprine, HYDROcodone-acetaminophen, HYDROcodone-acetaminophen, HYDROmorphone, ondansetron, promethazine (PHENERGAN) IVPB    Review of patient's allergies indicates:  No Known Allergies    Objective:     Vital Signs (Most Recent):  Temp: 98.6 °F (37 °C) (03/10/20 0353)  Pulse: 85 (03/10/20 0353)  Resp: 16 (03/10/20 0353)  BP: 126/75 (03/10/20 0353)  SpO2: 97 % (03/10/20 0353) Vital Signs (24h  Range):  Temp:  [97.7 °F (36.5 °C)-99.2 °F (37.3 °C)] 98.6 °F (37 °C)  Pulse:  [] 85  Resp:  [12-22] 16  SpO2:  [93 %-100 %] 97 %  BP: (117-131)/(66-84) 126/75     Weight: (!) 150.6 kg (332 lb)  Body mass index is 56.99 kg/m².    Intake/Output - Last 3 Shifts       03/08 0700 - 03/09 0659 03/09 0700 - 03/10 0659    I.V. (mL/kg)  3230.3 (21.4)    IV Piggyback  250    Total Intake(mL/kg)  3480.3 (23.1)    Urine (mL/kg/hr)  3700 (1)    Blood  300    Total Output  4000    Net  -519.8                   Physical Exam:   Constitutional: She is oriented to person, place, and time. She appears well-developed and well-nourished. No distress.    HENT:   Head: Normocephalic and atraumatic.      Cardiovascular: Normal rate.     Pulmonary/Chest: Effort normal. No respiratory distress.        Abdominal: Soft. Bowel sounds are normal. She exhibits abdominal incision (Steri strips in place over 5 port sites). There is no tenderness. There is no rebound and no guarding.     Genitourinary:   Genitourinary Comments: Killian in place draining clear, yellow urine           Musculoskeletal: Normal range of motion. She exhibits no edema or tenderness.   TEDs/SCDs on       Neurological: She is alert and oriented to person, place, and time.    Skin: Skin is warm and dry.    Psychiatric: She has a normal mood and affect. Her behavior is normal. Judgment and thought content normal.       Lines/Drains/Airways     Drain                 Urethral Catheter 03/09/20 0732 Non-latex 16 Fr. less than 1 day          Peripheral Intravenous Line                 Peripheral IV - Single Lumen 03/09/20 0709 18 G Left Antecubital less than 1 day         Peripheral IV - Single Lumen 03/09/20 0715 20 G Right Hand less than 1 day                Laboratory:  CBC:   Recent Labs   Lab 03/10/20  0350   WBC 8.74   HGB 11.0*   HCT 35.9*        Assessment/Plan:     * s/p robotic hysterectomy/RSO 3/9/20  POD#1:   - Routine post-op advances  - Continue  scheduled IV ibuprofen. Norco 5mg/10mg prn and IV dilaudid prn BTP  - D/C gates this AM and perform spontaneous voiding trial. UOP adequate, >75cc/hr  - Encourage ambulation   - Encourage IS  - H/H stable: 12.6/40.8 > 11.0/35.9  - Tolerating regular diet. Will discontinue IVF  - Antiemetics prn nausea/vomiting  - Awaiting return of bowel function    Depression  - Continue home lexapro    BMI 50.0-59.9, adult  - Encourage ambulation and use of TEDs/SCDs      VTE Risk Mitigation (From admission, onward)    None          Was gates catheter removed? No: plan to discontinue this AM.    Chana Brandon MD  Gynecologic Oncology  [facility]

## 2020-03-10 NOTE — PLAN OF CARE
Problem: Adult Inpatient Plan of Care  Goal: Plan of Care Review  Outcome: Ongoing, Progressing     Pt is POD 1 from [facility] with 5 lap sites. On IVF. Killian catheter intact. Tolerating regular diet. Up with standby assist. Afebrile.

## 2020-03-10 NOTE — ANESTHESIA POSTPROCEDURE EVALUATION
Anesthesia Post Evaluation    Patient: Fran Higgins    Procedure(s) Performed: Procedure(s) (LRB):  XI ROBOTIC HYSTERECTOMY (N/A)  SALPINGO-OOPHORECTOMY  USO (Right)    Final Anesthesia Type: general    Patient location during evaluation: floor  Patient participation: Yes- Able to Participate  Level of consciousness: awake and alert  Post-procedure vital signs: reviewed and stable  Pain management: adequate  Airway patency: patent    PONV status at discharge: No PONV  Anesthetic complications: no      Cardiovascular status: hemodynamically stable  Respiratory status: unassisted  Hydration status: euvolemic  Follow-up not needed.          Vitals Value Taken Time   /68 3/10/2020  7:22 AM   Temp 36.8 °C (98.3 °F) 3/10/2020  7:22 AM   Pulse 90 3/10/2020  7:22 AM   Resp 17 3/10/2020  7:22 AM   SpO2 96 % 3/10/2020  7:22 AM         Event Time     Out of Recovery 10:15:00          Pain/Bernadine Score: Pain Rating Prior to Med Admin: 6 (3/10/2020  8:32 AM)  Pain Rating Post Med Admin: 3 (3/9/2020  9:37 PM)  Bernadine Score: 10 (3/9/2020 10:15 AM)

## 2020-03-10 NOTE — PLAN OF CARE
Future Appointments   Date Time Provider Department Center   4/9/2020 10:30 AM Jeff Kaufman MD [facility] GYN ONC [place], RN, BSN, CM  Utilization Management  [facility]

## 2020-03-10 NOTE — HPI
Fran Johanna Higgins is a 39 y.o. [id number] who is now s/p RALH/RSO for the treatment of mucinous tumor with low manlignant potential. Her PMHx includes morbid obesity, depression, and a pericardial cyst.

## 2020-03-10 NOTE — ASSESSMENT & PLAN NOTE
POD#1:   - Routine post-op advances  - Continue scheduled IV ibuprofen. Norco 5mg/10mg prn and IV dilaudid prn BTP  - D/C gates this AM and perform spontaneous voiding trial. UOP adequate, >75cc/hr  - Encourage ambulation   - Encourage IS  - H/H stable: 12.6/40.8 > 11.0/35.9  - Tolerating regular diet. Will discontinue IVF  - Antiemetics prn nausea/vomiting  - Awaiting return of bowel function

## 2020-03-11 LAB
FINAL PATHOLOGIC DIAGNOSIS: NORMAL
GROSS: NORMAL

## 2020-03-12 ENCOUNTER — PATIENT MESSAGE (OUTPATIENT)
Dept: GYNECOLOGIC ONCOLOGY | Facility: CLINIC | Age: 40
End: 2020-03-12

## 2020-03-12 ENCOUNTER — TELEPHONE (OUTPATIENT)
Dept: GYNECOLOGIC ONCOLOGY | Facility: OTHER | Age: 40
End: 2020-03-12

## 2020-03-12 RX ORDER — TERCONAZOLE 4 MG/G
1 CREAM VAGINAL NIGHTLY
Qty: 45 G | Refills: 0 | Status: SHIPPED | OUTPATIENT
Start: 2020-03-12 | End: 2020-03-12

## 2020-03-12 RX ORDER — TERCONAZOLE 4 MG/G
1 CREAM VAGINAL NIGHTLY
Qty: 45 G | Refills: 0 | Status: SHIPPED | OUTPATIENT
Start: 2020-03-12 | End: 2020-03-19

## 2020-03-12 NOTE — TELEPHONE ENCOUNTER
Returned call and had a long talk re: how she is doing post op. Worsening depression, no SI/HI. Declines psych referral at this time. Encouraged small frequent meals, hydration, up and around in the house, showering, getting out of the house for events with continued light restrictions as she is able. Encouraged follow up with our dept and that she can call to talk anytime.    ----- Message from Heriberto Amanda MA sent at 3/12/2020  3:55 PM CDT -----  Contact: CARLA MCLEOD [[id number]]      ----- Message -----  From: Michael Acevedo, Patient Care Assistant  Sent: 3/12/2020   3:44 PM CDT  To: Mandeep MERRITT Staff    Type:  Patient Returning Call    Who Called: CARLA MCLEOD [[id number]]    Who Left Message for Patient: Joleen Boggs Huseyin  Does the patient know what this is regarding?:Yes    Best Call Back Number:[phone]    Additional Information:   None

## 2020-03-12 NOTE — TELEPHONE ENCOUNTER
Left message to call clinic. Rx sent for Terazol to be used externally only while she heals from surgery.

## 2020-03-13 ENCOUNTER — PATIENT MESSAGE (OUTPATIENT)
Dept: GYNECOLOGIC ONCOLOGY | Facility: CLINIC | Age: 40
End: 2020-03-13

## 2020-03-13 DIAGNOSIS — E89.40 SURGICAL MENOPAUSE: ICD-10-CM

## 2020-03-13 LAB
BLD PROD TYP BPU: NORMAL
BLOOD UNIT EXPIRATION DATE: NORMAL
BLOOD UNIT TYPE CODE: 6200
BLOOD UNIT TYPE: NORMAL
CODING SYSTEM: NORMAL
DISPENSE STATUS: NORMAL
TRANS ERYTHROCYTES VOL PATIENT: NORMAL ML

## 2020-03-18 DIAGNOSIS — B37.31 YEAST VAGINITIS: ICD-10-CM

## 2020-03-18 RX ORDER — FLUCONAZOLE 150 MG/1
150 TABLET ORAL ONCE
Qty: 1 TABLET | Refills: 0 | Status: SHIPPED | OUTPATIENT
Start: 2020-03-18 | End: 2020-03-18

## 2020-03-27 ENCOUNTER — PATIENT MESSAGE (OUTPATIENT)
Dept: GYNECOLOGIC ONCOLOGY | Facility: CLINIC | Age: 40
End: 2020-03-27

## 2020-04-06 DIAGNOSIS — E89.40 SURGICAL MENOPAUSE: ICD-10-CM

## 2020-04-07 ENCOUNTER — TELEPHONE (OUTPATIENT)
Dept: INTERNAL MEDICINE | Facility: CLINIC | Age: 40
End: 2020-04-07

## 2020-04-07 DIAGNOSIS — E89.40 SURGICAL MENOPAUSE: ICD-10-CM

## 2020-04-07 NOTE — PROGRESS NOTES
"Subjective:       Patient ID: Fran Higgins is a 39 y.o. female.    Chief Complaint: Post-op Evaluation and Ovarian Cancer    HPI   Patient comes in today for her postoperative visit after robotic assisted laparoscopic hysterectomy and RSO on 03/09/2020..  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 Marky Fox.          Postoperatively she has been struggling with depression and fatigue. Today she complains of insomnia, dizziness especially when she lies down to sleep at night. She continues with depression. She also notes bilateral galactorrhea.  She was started on estrogen replacement therapy. This has helped some with the depression.   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     She had vaginal spotting last week. None since.     She walking a mile a day outside.       Review of Systems   Constitutional: Positive for fatigue. Negative for chills and fever.   Gastrointestinal: Negative for abdominal pain.   Endocrine:        Bilateral galactorrhea.      Genitourinary: Negative for vaginal bleeding.   Neurological: Positive for dizziness. Negative for weakness.   Psychiatric/Behavioral: Positive for sleep disturbance (insomnia).        Depression.          Objective:   /89   Pulse 85   Ht 5' 4" (1.626 m)   Wt (!) 146.4 kg (322 lb 12.1 oz)   LMP 03/09/2020 (Exact Date)   BMI 55.40 kg/m²      Physical Exam   Constitutional: She is oriented to person, place, and time. She appears well-developed and well-nourished.   HENT:   Head: Normocephalic and atraumatic.   Eyes: No scleral icterus.   Abdominal: Soft. She exhibits no distension and no mass. There is no tenderness. There is no rebound and no guarding.   Healing trocar sites    Genitourinary:   Genitourinary Comments: Bimanual exam:  Vulva: no lesions. Normal appearance  Urethra: Normal size and location. " No lesions  Bladder: No masses or tenderness.  Vagina: normal mucosa. No lesion. Cuff intact.   Cervix: absent.   Uterus: absent.  Adnexa: no masses.  Rectovaginal: No posterior cul de sac thickening or nodularity.  Rectal: no masses. Nontender. Normal tone.      Musculoskeletal: She exhibits no edema or tenderness.   Neurological: She is alert and oriented to person, place, and time.   Skin: Skin is warm and dry.   Psychiatric: She has a normal mood and affect. Her behavior is normal. Judgment and thought content normal.       Assessment:       1. Mucinous tumor, of low malignant potential    2. s/p robotic hysterectomy/RSO 3/9/20    3. Menopause    4. Dizziness    5. Galactorrhea in female- bilateral breast    6. Other insomnia        Plan:   Mucinous tumor, of low malignant potential  -     CEA; Future; Expected date: 07/09/2020    s/p robotic hysterectomy/RSO 3/9/20    Menopause  -     progesterone (PROMETRIUM) 100 MG capsule; Place 1 capsule (100 mg total) vaginally once daily.  Dispense: 21 capsule; Refill: 6    Dizziness  -     meclizine (ANTIVERT) 12.5 mg tablet; Take 1 tablet (12.5 mg total) by mouth 3 (three) times daily as needed for Dizziness.  Dispense: 90 tablet; Refill: 2    Galactorrhea in female- bilateral breast  -     PROLACTIN; Future; Expected date: 04/09/2020    Other insomnia  -     ALPRAZolam (XANAX) 0.5 MG tablet; Take 1 tablet (0.5 mg total) by mouth nightly as needed.  Dispense: 30 tablet; Refill: 3      Will add micronized progesterone to her ERT.   Check prolactin   alprazolam for insomnia  Meclizine for dizziness.     RTC in 3 months for follow up.   Instructed to call if she is not feeling better.

## 2020-04-07 NOTE — TELEPHONE ENCOUNTER
MD spoke w/ pt and pt confirmed that emgality had been approved. She just started Rx last week. Will keep MD posted on progress

## 2020-04-09 ENCOUNTER — DOCUMENTATION ONLY (OUTPATIENT)
Dept: GYNECOLOGIC ONCOLOGY | Facility: CLINIC | Age: 40
End: 2020-04-09

## 2020-04-09 ENCOUNTER — PATIENT MESSAGE (OUTPATIENT)
Dept: GYNECOLOGIC ONCOLOGY | Facility: CLINIC | Age: 40
End: 2020-04-09

## 2020-04-09 ENCOUNTER — LAB VISIT (OUTPATIENT)
Dept: LAB | Facility: HOSPITAL | Age: 40
End: 2020-04-09
Attending: OBSTETRICS & GYNECOLOGY
Payer: COMMERCIAL

## 2020-04-09 ENCOUNTER — OFFICE VISIT (OUTPATIENT)
Dept: GYNECOLOGIC ONCOLOGY | Facility: CLINIC | Age: 40
End: 2020-04-09
Payer: COMMERCIAL

## 2020-04-09 VITALS
HEIGHT: 64 IN | SYSTOLIC BLOOD PRESSURE: 131 MMHG | DIASTOLIC BLOOD PRESSURE: 89 MMHG | WEIGHT: 293 LBS | BODY MASS INDEX: 50.02 KG/M2 | HEART RATE: 85 BPM

## 2020-04-09 DIAGNOSIS — C56.9 MUCINOUS TUMOR, OF LOW MALIGNANT POTENTIAL: Primary | ICD-10-CM

## 2020-04-09 DIAGNOSIS — N64.3 GALACTORRHEA IN FEMALE: ICD-10-CM

## 2020-04-09 DIAGNOSIS — R42 DIZZINESS: ICD-10-CM

## 2020-04-09 DIAGNOSIS — Z78.0 MENOPAUSE: ICD-10-CM

## 2020-04-09 DIAGNOSIS — Z90.710 HISTORY OF ROBOT-ASSISTED LAPAROSCOPIC HYSTERECTOMY: ICD-10-CM

## 2020-04-09 DIAGNOSIS — G47.09 OTHER INSOMNIA: ICD-10-CM

## 2020-04-09 LAB — PROLACTIN SERPL IA-MCNC: 19.8 NG/ML (ref 5.2–26.5)

## 2020-04-09 PROCEDURE — 84146 ASSAY OF PROLACTIN: CPT

## 2020-04-09 PROCEDURE — 36415 COLL VENOUS BLD VENIPUNCTURE: CPT

## 2020-04-09 PROCEDURE — 99999 PR PBB SHADOW E&M-EST. PATIENT-LVL III: CPT | Mod: PBBFAC,,, | Performed by: OBSTETRICS & GYNECOLOGY

## 2020-04-09 PROCEDURE — 99024 POSTOP FOLLOW-UP VISIT: CPT | Mod: S$GLB,,, | Performed by: OBSTETRICS & GYNECOLOGY

## 2020-04-09 PROCEDURE — 99024 PR POST-OP FOLLOW-UP VISIT: ICD-10-PCS | Mod: S$GLB,,, | Performed by: OBSTETRICS & GYNECOLOGY

## 2020-04-09 PROCEDURE — 99999 PR PBB SHADOW E&M-EST. PATIENT-LVL III: ICD-10-PCS | Mod: PBBFAC,,, | Performed by: OBSTETRICS & GYNECOLOGY

## 2020-04-09 RX ORDER — ALPRAZOLAM 0.5 MG/1
0.5 TABLET ORAL NIGHTLY PRN
Qty: 30 TABLET | Refills: 3 | Status: SHIPPED | OUTPATIENT
Start: 2020-04-09 | End: 2020-08-06 | Stop reason: SDUPTHER

## 2020-04-09 RX ORDER — MECLIZINE HCL 12.5 MG 12.5 MG/1
12.5 TABLET ORAL 3 TIMES DAILY PRN
Qty: 90 TABLET | Refills: 2 | Status: SHIPPED | OUTPATIENT
Start: 2020-04-09 | End: 2021-08-02

## 2020-04-09 RX ORDER — PROGESTERONE 100 MG/1
100 CAPSULE ORAL DAILY
Qty: 21 CAPSULE | Refills: 6 | Status: SHIPPED | OUTPATIENT
Start: 2020-04-09 | End: 2020-12-10

## 2020-04-09 NOTE — PROGRESS NOTES
Employee Health form received from pt, emailed to disability dept requesting completed copy be emailed to pt at work email. [email or web address] per pt request.

## 2020-04-16 ENCOUNTER — TELEPHONE (OUTPATIENT)
Dept: PHARMACY | Facility: CLINIC | Age: 40
End: 2020-04-16

## 2020-04-16 NOTE — TELEPHONE ENCOUNTER
DOCUMENTATION ONLY:     Re authorization for Emgality approved from 04/09/20 to 09/08/20.       Case ID# [id number]    Co-pay: $25    Patient Assistance IS NOT required.      Forward to clinical pharmacist for consult & shipment. CRM

## 2020-04-16 NOTE — TELEPHONE ENCOUNTER
Initial Emgality consult declined on 2020 as patient started therapy last month and is comfortable with medication . Emgality 120mg will be shipped on 2020 to arrive at patient's home on 2020 via [employer]. $0.00 copay. Patient intends to inject next Emgality dose on 2020. Address confirmed. Confirmed 2 patient identifiers - name and . Therapy Appropriate.

## 2020-04-21 DIAGNOSIS — Z01.84 ANTIBODY RESPONSE EXAMINATION: ICD-10-CM

## 2020-04-24 ENCOUNTER — PATIENT MESSAGE (OUTPATIENT)
Dept: GYNECOLOGIC ONCOLOGY | Facility: CLINIC | Age: 40
End: 2020-04-24

## 2020-05-01 ENCOUNTER — LAB VISIT (OUTPATIENT)
Dept: LAB | Facility: HOSPITAL | Age: 40
End: 2020-05-01
Attending: INTERNAL MEDICINE
Payer: COMMERCIAL

## 2020-05-01 DIAGNOSIS — Z01.84 ANTIBODY RESPONSE EXAMINATION: ICD-10-CM

## 2020-05-01 LAB — SARS-COV-2 IGG SERPLBLD QL IA.RAPID: NEGATIVE

## 2020-05-01 PROCEDURE — 36415 COLL VENOUS BLD VENIPUNCTURE: CPT | Mod: PO

## 2020-05-01 PROCEDURE — 86769 SARS-COV-2 COVID-19 ANTIBODY: CPT

## 2020-05-04 ENCOUNTER — PATIENT MESSAGE (OUTPATIENT)
Dept: INTERNAL MEDICINE | Facility: CLINIC | Age: 40
End: 2020-05-04

## 2020-05-07 ENCOUNTER — OFFICE VISIT (OUTPATIENT)
Dept: DERMATOLOGY | Facility: CLINIC | Age: 40
End: 2020-05-07
Payer: COMMERCIAL

## 2020-05-07 DIAGNOSIS — D23.61 DERMATOFIBROMA OF RIGHT UPPER EXTREMITY: ICD-10-CM

## 2020-05-07 DIAGNOSIS — L73.8 BACTERIAL FOLLICULITIS: Primary | ICD-10-CM

## 2020-05-07 DIAGNOSIS — D22.5 MELANOCYTIC NEVI OF TRUNK: ICD-10-CM

## 2020-05-07 PROCEDURE — 99203 PR OFFICE/OUTPT VISIT, NEW, LEVL III, 30-44 MIN: ICD-10-PCS | Mod: S$GLB,,, | Performed by: DERMATOLOGY

## 2020-05-07 PROCEDURE — 99999 PR PBB SHADOW E&M-EST. PATIENT-LVL II: ICD-10-PCS | Mod: PBBFAC,,, | Performed by: DERMATOLOGY

## 2020-05-07 PROCEDURE — 99999 PR PBB SHADOW E&M-EST. PATIENT-LVL II: CPT | Mod: PBBFAC,,, | Performed by: DERMATOLOGY

## 2020-05-07 PROCEDURE — 99203 OFFICE O/P NEW LOW 30 MIN: CPT | Mod: S$GLB,,, | Performed by: DERMATOLOGY

## 2020-05-07 RX ORDER — CLINDAMYCIN PHOSPHATE 10 UG/ML
LOTION TOPICAL 2 TIMES DAILY
Qty: 60 ML | Refills: 3 | Status: SHIPPED | OUTPATIENT
Start: 2020-05-07 | End: 2021-03-22

## 2020-05-07 NOTE — PROGRESS NOTES
"  Subjective:       Patient ID:  Fran Higgins is a 39 y.o. female who presents for   Chief Complaint   Patient presents with    Mass     both thighs, acne    Acne     face, years,      Mass  - Initial  Affected locations: left upper leg, right upper leg, left buttock, right buttock and abdomen  Duration: 3 years  Signs / symptoms: redness and inflamed ("acne bumps")  Severity: mild  Timing: constant and recurrent  Aggravated by: nothing  Treatments tried: has used Hibiclens.  Improvement on treatment: moderate    Mole  - Initial  Affected locations: diffuse  Duration: 37 years  Signs / symptoms: asymptomatic  Severity: mild  Timing: constant      Review of Systems   Constitutional: Negative for fever.   Skin: Negative for itching and rash.   Endocrine:        "hormonal changes" due to surgical menopause        Objective:    Physical Exam   Constitutional: She appears well-developed and well-nourished. No distress.   HENT:   Mouth/Throat: Lips normal.    Eyes: Lids are normal.  No conjunctival no injection.   Neurological: She is alert and oriented to person, place, and time. She is not disoriented.   Psychiatric: She has a normal mood and affect.   Skin:   Areas Examined (abnormalities noted in diagram):   Head / Face Inspection Performed  Neck Inspection Performed  Chest / Axilla Inspection Performed  Abdomen Inspection Performed  Genitals / Buttocks / Groin Inspection Performed  Back Inspection Performed  RUE Inspected  LUE Inspection Performed  RLE Inspected  LLE Inspection Performed              Diagram Legend     Erythematous scaling macule/papule c/w actinic keratosis       Vascular papule c/w angioma      Pigmented verrucoid papule/plaque c/w seborrheic keratosis      Yellow umbilicated papule c/w sebaceous hyperplasia      Irregularly shaped tan macule c/w lentigo     1-2 mm smooth white papules consistent with Milia      Movable subcutaneous cyst with punctum c/w epidermal inclusion cyst      " Subcutaneous movable cyst c/w pilar cyst      Firm pink to brown papule c/w dermatofibroma      Pedunculated fleshy papule(s) c/w skin tag(s)      Evenly pigmented macule c/w junctional nevus     Mildly variegated pigmented, slightly irregular-bordered macule c/w mildly atypical nevus      Flesh colored to evenly pigmented papule c/w intradermal nevus       Pink pearly papule/plaque c/w basal cell carcinoma      Erythematous hyperkeratotic cursted plaque c/w SCC      Surgical scar with no sign of skin cancer recurrence      Open and closed comedones      Inflammatory papules and pustules      Verrucoid papule consistent consistent with wart     Erythematous eczematous patches and plaques     Dystrophic onycholytic nail with subungual debris c/w onychomycosis     Umbilicated papule    Erythematous-base heme-crusted tan verrucoid plaque consistent with inflamed seborrheic keratosis     Erythematous Silvery Scaling Plaque c/w Psoriasis     See annotation      Assessment / Plan:        Bacterial folliculitis  -     clindamycin (CLEOCIN T) 1 % lotion; Apply topically 2 (two) times daily. To affected areas of legs/buttocks  Dispense: 60 mL; Refill: 3  Recommended a benzoyl peroxide (2-5%) wash, such as PanOxyl 4% Creamy Wash or Neutrogena Clear Pore Cleanser/Mask. Reminded patient that benzoyl peroxide can bleach towels, sheets, and clothing if not rinsed well from the skin.    Recommended washing body with Hibiclens solution for 5-10 minutes qod x 2 weeks. Do not allow contact with eyes or ears.    Melanocytic nevi of trunk  Multiple benign-appearing nevi present on exam today. Reassurance provided. Counseled patient to periodically examine moles and return to clinic if any changes in size, shape, or color are noted or if it becomes symptomatic (bleeding, itching, pain, etc).    Dermatofibroma of right upper extremity  Reassurance given to patient. No treatment is necessary.  This is a benign growth that sometimes occurs  as a result of trauma or insect bites.      Follow up if symptoms worsen or fail to improve.

## 2020-05-14 ENCOUNTER — TELEPHONE (OUTPATIENT)
Dept: PHARMACY | Facility: CLINIC | Age: 40
End: 2020-05-14

## 2020-05-18 ENCOUNTER — TELEPHONE (OUTPATIENT)
Dept: PHARMACY | Facility: CLINIC | Age: 40
End: 2020-05-18

## 2020-06-12 ENCOUNTER — TELEPHONE (OUTPATIENT)
Dept: PHARMACY | Facility: CLINIC | Age: 40
End: 2020-06-12

## 2020-06-16 ENCOUNTER — TELEPHONE (OUTPATIENT)
Dept: PHARMACY | Facility: CLINIC | Age: 40
End: 2020-06-16

## 2020-07-08 NOTE — PROGRESS NOTES
Subjective:       Patient ID: Fran Hgigins is a 39 y.o. female.    Chief Complaint: Ovarian Cancer (3mth f/u)    HPI   Patient comes in today for follow-up mucinous tumor of low malignant potential of the right ovary.  Status post robotic assisted laparoscopic hysterectomy and BSO as detailed below.    After completion hysterectomy and RSO she problems with depression and hormone replacement.    Continued VMS. Also continued galactorhea.     Continued depression.   Working 3-12 hour shifts plus on call.       Jul 2020: CEA: 1.5.       Her oncologic history is:    Mar 2020: Robotic assisted laparoscopic hysterectomy and RSO on 03/09/2020..  She has a history of mucinous tumor of low malignant potential of the left ovary status post LSO by Dr. Marky Fox in Jun 2019.        Review of Systems   Constitutional: Negative for chills, fatigue and fever.        VMS   Respiratory: Negative for cough, shortness of breath and wheezing.    Cardiovascular: Negative for chest pain, palpitations and leg swelling.   Gastrointestinal: Negative for abdominal pain, constipation, diarrhea, nausea and vomiting.   Genitourinary: Negative for difficulty urinating, dysuria, frequency, genital sores, hematuria, urgency, vaginal bleeding, vaginal discharge and vaginal pain.   Neurological: Negative for weakness.   Hematological: Negative for adenopathy. Does not bruise/bleed easily.   Psychiatric/Behavioral: The patient is not nervous/anxious.         Depression.        Objective:   /76   Pulse 85   Wt (!) 151 kg (332 lb 14.3 oz)   LMP 03/09/2020 (Exact Date)   BMI 57.14 kg/m²      Physical Exam  Constitutional:       Appearance: She is well-developed.   HENT:      Head: Normocephalic and atraumatic.   Eyes:      General: No scleral icterus.  Neck:      Musculoskeletal: Neck supple.      Thyroid: No thyroid mass or thyromegaly.      Trachea: No tracheal deviation.   Cardiovascular:      Rate and Rhythm: Normal rate and  regular rhythm.   Pulmonary:      Effort: Pulmonary effort is normal.      Breath sounds: Normal breath sounds. No wheezing.   Abdominal:      General: There is no distension.      Palpations: Abdomen is soft. There is no mass.      Tenderness: There is no abdominal tenderness. There is no guarding or rebound.   Genitourinary:     Comments: Bimanual exam:  Vulva: no lesions. Normal appearance  Urethra: Normal size and location. No lesions  Bladder: No masses or tenderness.  Vagina: normal mucosa. No lesion  Cervix: absent.   Uterus: absent.  Adnexa: no masses.  Rectovaginal: Not performed    Musculoskeletal:         General: No tenderness.   Lymphadenopathy:      Cervical: No cervical adenopathy.      Upper Body:      Right upper body: No supraclavicular adenopathy.      Left upper body: No supraclavicular adenopathy.   Skin:     General: Skin is warm and dry.   Neurological:      Mental Status: She is alert and oriented to person, place, and time.   Psychiatric:         Behavior: Behavior normal.         Thought Content: Thought content normal.         Judgment: Judgment normal.         Assessment:       1. Mucinous tumor, of low malignant potential    2. Menopause    3. Reactive depression    4. Galactorrhea of both breasts        Plan:   Mucinous tumor, of low malignant potential   LUZMARIA    RTC in 3 months.   -     CEA; Future; Expected date: 10/09/2020    Menopause    Reactive depression  -     venlafaxine (EFFEXOR-XR) 37.5 MG 24 hr capsule; Take 1 capsule (37.5 mg total) by mouth once daily.  Dispense: 30 capsule; Refill: 11  -     Ambulatory referral/consult to Hematology/Oncology/Psychology; Future; Expected date: 07/16/2020    Galactorrhea of both breasts  Prior normal prolactin.   -     Mammo Digital Screening Bilat; Future; Expected date: 07/09/2020      Will stop Lexapro and change to venlafaxine to see if this helps with VMS. If VMS are improved then will consider stopping Prometrium to lessen risk for  breast cancer.   Referral to Oncology psychology.

## 2020-07-09 ENCOUNTER — LAB VISIT (OUTPATIENT)
Dept: LAB | Facility: HOSPITAL | Age: 40
End: 2020-07-09
Payer: COMMERCIAL

## 2020-07-09 ENCOUNTER — OFFICE VISIT (OUTPATIENT)
Dept: GYNECOLOGIC ONCOLOGY | Facility: CLINIC | Age: 40
End: 2020-07-09
Payer: COMMERCIAL

## 2020-07-09 ENCOUNTER — TELEPHONE (OUTPATIENT)
Dept: INFUSION THERAPY | Facility: HOSPITAL | Age: 40
End: 2020-07-09

## 2020-07-09 VITALS
WEIGHT: 293 LBS | DIASTOLIC BLOOD PRESSURE: 76 MMHG | SYSTOLIC BLOOD PRESSURE: 136 MMHG | HEART RATE: 85 BPM | BODY MASS INDEX: 57.14 KG/M2

## 2020-07-09 DIAGNOSIS — Z78.0 MENOPAUSE: ICD-10-CM

## 2020-07-09 DIAGNOSIS — F32.9 REACTIVE DEPRESSION: ICD-10-CM

## 2020-07-09 DIAGNOSIS — N64.3 GALACTORRHEA OF BOTH BREASTS: ICD-10-CM

## 2020-07-09 DIAGNOSIS — C56.9 MUCINOUS TUMOR, OF LOW MALIGNANT POTENTIAL: ICD-10-CM

## 2020-07-09 DIAGNOSIS — C56.9 MUCINOUS TUMOR, OF LOW MALIGNANT POTENTIAL: Primary | ICD-10-CM

## 2020-07-09 LAB — CEA SERPL-MCNC: 1.5 NG/ML (ref 0–5)

## 2020-07-09 PROCEDURE — 82378 CARCINOEMBRYONIC ANTIGEN: CPT

## 2020-07-09 PROCEDURE — 99214 PR OFFICE/OUTPT VISIT, EST, LEVL IV, 30-39 MIN: ICD-10-PCS | Mod: S$GLB,,, | Performed by: OBSTETRICS & GYNECOLOGY

## 2020-07-09 PROCEDURE — 3008F PR BODY MASS INDEX (BMI) DOCUMENTED: ICD-10-PCS | Mod: CPTII,S$GLB,, | Performed by: OBSTETRICS & GYNECOLOGY

## 2020-07-09 PROCEDURE — 99214 OFFICE O/P EST MOD 30 MIN: CPT | Mod: S$GLB,,, | Performed by: OBSTETRICS & GYNECOLOGY

## 2020-07-09 PROCEDURE — 99999 PR PBB SHADOW E&M-EST. PATIENT-LVL III: CPT | Mod: PBBFAC,,, | Performed by: OBSTETRICS & GYNECOLOGY

## 2020-07-09 PROCEDURE — 99999 PR PBB SHADOW E&M-EST. PATIENT-LVL III: ICD-10-PCS | Mod: PBBFAC,,, | Performed by: OBSTETRICS & GYNECOLOGY

## 2020-07-09 PROCEDURE — 36415 COLL VENOUS BLD VENIPUNCTURE: CPT

## 2020-07-09 PROCEDURE — 3008F BODY MASS INDEX DOCD: CPT | Mod: CPTII,S$GLB,, | Performed by: OBSTETRICS & GYNECOLOGY

## 2020-07-09 RX ORDER — VENLAFAXINE HYDROCHLORIDE 37.5 MG/1
37.5 CAPSULE, EXTENDED RELEASE ORAL DAILY
Qty: 30 CAPSULE | Refills: 11 | Status: SHIPPED | OUTPATIENT
Start: 2020-07-09 | End: 2020-08-06

## 2020-07-11 ENCOUNTER — HOSPITAL ENCOUNTER (OUTPATIENT)
Dept: RADIOLOGY | Facility: HOSPITAL | Age: 40
Discharge: HOME | End: 2020-07-11
Attending: OBSTETRICS & GYNECOLOGY
Payer: COMMERCIAL

## 2020-07-11 DIAGNOSIS — N64.3 GALACTORRHEA OF BOTH BREASTS: ICD-10-CM

## 2020-07-13 ENCOUNTER — TELEPHONE (OUTPATIENT)
Dept: GYNECOLOGIC ONCOLOGY | Facility: CLINIC | Age: 40
End: 2020-07-13

## 2020-07-13 DIAGNOSIS — N64.3 GALACTORRHEA OF BOTH BREASTS: Primary | ICD-10-CM

## 2020-07-13 NOTE — TELEPHONE ENCOUNTER
----- Message from Jeff Kaufman MD sent at 7/13/2020  4:42 PM CDT -----  Regarding: RE: wrong Mammo ordered  Please let her know that placed an order for diag [facility]. Will need to be scheduled. Thank you.  ----- Message -----  From: Heriberto Amanda MA  Sent: 7/13/2020  12:12 PM CDT  To: Jeff Kaufman MD  Subject: FW: wrong Mammo ordered                            ----- Message -----  From: Joni Jonhchuckie  Sent: 7/13/2020   9:28 AM CDT  To: Mandeep MERRITT Staff  Subject: wrong Mammo ordered                                  The Pt states that she will need another order entered in for a Diagnostic Mammo since she has nipple discharge.  She was told this on Saturday when she went to her appt.    Please contact the Pt to discuss.    Phone # [phone]

## 2020-07-16 ENCOUNTER — HOSPITAL ENCOUNTER (OUTPATIENT)
Dept: RADIOLOGY | Facility: HOSPITAL | Age: 40
Discharge: HOME | End: 2020-07-16
Attending: OBSTETRICS & GYNECOLOGY
Payer: COMMERCIAL

## 2020-07-16 ENCOUNTER — TELEPHONE (OUTPATIENT)
Dept: PHARMACY | Facility: CLINIC | Age: 40
End: 2020-07-16

## 2020-07-16 ENCOUNTER — OFFICE VISIT (OUTPATIENT)
Dept: PSYCHIATRY | Facility: CLINIC | Age: 40
End: 2020-07-16
Payer: COMMERCIAL

## 2020-07-16 DIAGNOSIS — C56.9 MUCINOUS TUMOR, OF LOW MALIGNANT POTENTIAL: ICD-10-CM

## 2020-07-16 DIAGNOSIS — F33.1 MAJOR DEPRESSIVE DISORDER, RECURRENT EPISODE, MODERATE WITH ANXIOUS DISTRESS: Primary | ICD-10-CM

## 2020-07-16 DIAGNOSIS — N64.3 GALACTORRHEA OF BOTH BREASTS: ICD-10-CM

## 2020-07-16 DIAGNOSIS — F32.9 REACTIVE DEPRESSION: ICD-10-CM

## 2020-07-16 PROCEDURE — 90791 PR PSYCHIATRIC DIAGNOSTIC EVALUATION: ICD-10-PCS | Mod: S$GLB,,, | Performed by: PSYCHOLOGIST

## 2020-07-16 PROCEDURE — G0279 TOMOSYNTHESIS, MAMMO: HCPCS | Mod: 26,,, | Performed by: RADIOLOGY

## 2020-07-16 PROCEDURE — G0279 MAMMO DIGITAL DIAGNOSTIC BILAT WITH TOMOSYNTHESIS_CAD: ICD-10-PCS | Mod: 26,,, | Performed by: RADIOLOGY

## 2020-07-16 PROCEDURE — 99999 PR PBB SHADOW E&M-EST. PATIENT-LVL II: ICD-10-PCS | Mod: PBBFAC,,, | Performed by: PSYCHOLOGIST

## 2020-07-16 PROCEDURE — 77066 DX MAMMO INCL CAD BI: CPT | Mod: 26,,, | Performed by: RADIOLOGY

## 2020-07-16 PROCEDURE — 90791 PSYCH DIAGNOSTIC EVALUATION: CPT | Mod: S$GLB,,, | Performed by: PSYCHOLOGIST

## 2020-07-16 PROCEDURE — 99999 PR PBB SHADOW E&M-EST. PATIENT-LVL II: CPT | Mod: PBBFAC,,, | Performed by: PSYCHOLOGIST

## 2020-07-16 PROCEDURE — 77066 DX MAMMO INCL CAD BI: CPT | Mod: TC,PO

## 2020-07-16 PROCEDURE — 77066 MAMMO DIGITAL DIAGNOSTIC BILAT WITH TOMOSYNTHESIS_CAD: ICD-10-PCS | Mod: 26,,, | Performed by: RADIOLOGY

## 2020-07-16 NOTE — PROGRESS NOTES
INFORMED CONSENT/ LIMITS of CONFIDENTIALITY: Prior to beginning the interview, the patient's identification was confirmed via name and date of birth. Fran Higgins  was informed of the possible risks and benefits of psychological interventions (e.g., counseling, psychotherapy, testing) and provided information regarding the handling of protected health records and the limits of confidentiality, including the importance of reporting any suicidal or homicidal ideation to ensure safety of all parties. This provider explained the purpose of today's appointment and the patient was provided with time to ask questions regarding this information.  Acceptance and understanding of these conditions was expressed, and Fran Higgins freely consented to this evaluation.     PSYCHO-ONCOLOGY INTAKE    Diagnostic Interview - CPT 00204    Date: 7/16/2020  Site: [facility]     Evaluation Length (direct face-to-face time):  1 hour     Referral Source: Jeff Kaufman MD   Oncologist:   PCP: Erica Pineda MD    Clinical status of patient: Outpatient    Fran Higgins, a 39 y.o. female, seen for initial evaluation visit.  Met with patient.    Chief complaint/reason for encounter: adjustment to illness, anxiety and Psychological Evaluation and treatment recommendations    Medical/Surgical History:    Patient Active Problem List   Diagnosis    BMI 50.0-59.9, adult    Depression    Left ovarian cyst    S/P RA Laparoscopic LSO    Mucinous tumor, of low malignant potential    Pericardial cyst    Scoliosis of cervical spine    Abnormal uterine bleeding (AUB)    Vaginal yeast infection    DUB (dysfunctional uterine bleeding)    Migraine with aura, not intractable    s/p robotic hysterectomy/RSO 3/9/20    Borderline epithelial neoplasm of ovary    Surgical menopause    Yeast vaginitis    Menopause    Dizziness    Galactorrhea in female- bilateral breast    Other insomnia    Galactorrhea  of both breasts       Health Behaviors:       ETOH Use: Yes (rare)       Tobacco Use: No   Illicit Drug Use:  No     Prescription Misuse:No   Caffeine: Coffee, 1 standard cup in the morning.    Exercise:The patient engages in little, if any physical activity.   Firearms:  No   Advanced directives:No     Family History:   Psychiatric illness: Yes  Mother, Father, Sister with Depression   Alcohol/Drug Abuse: Yes  Father- Drug Use, went to rehab   Suicide: No      Past Psychiatric History:   Inpatient treatment: No     Outpatient treatment: Yes  Childhood Therapy for depression; Grief for father's death in 2004   Prior substance abuse treatment: No     Suicide Attempts: No     Psychotropic Medications:  Current: Effexor and Xanax       Past: Lexapro  and Prozac    Current medications as per below, allergies reviewed in chart.    Current Outpatient Medications   Medication    ALPRAZolam (XANAX) 0.5 MG tablet    clindamycin (CLEOCIN T) 1 % lotion    estrogens, conjugated, (PREMARIN) 1.25 MG tablet    galcanezumab-gnlm (EMGALITY PEN) 120 mg/mL PnIj    ibuprofen (ADVIL,MOTRIN) 600 MG tablet    meclizine (ANTIVERT) 12.5 mg tablet    progesterone (PROMETRIUM) 100 MG capsule    venlafaxine (EFFEXOR-XR) 37.5 MG 24 hr capsule     No current facility-administered medications for this visit.        CAM Therapies: None     Screening: Depression: Positive   Bell: Denies Psychosis: Denies    Generalized anxiety: Positive Panic Disorder: Denies    Social/specific phobia: Denies OCD: Denies   Sexual Dysfunction:  No Desire/Interest (New) Trauma: Denies      Social situation/Stressors: Fran Higgins lives with daughter and mother in [place], [place].  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    Fran Higgins has never been  and has 1 7-year old daugther.    The patient reports fair social support. She has friends but very limited support from family.  Fran Higgins is  not Spiritism.  Fran Higgins has no hobbies.    Additional stressors:  Lives with Mother- Tense Situation  Difficulty with Child's fathers  Grief-- Dad  in a motorcycle accident    Strengths:Housing stability, Able to vocalize needs and Setting and pursuing goals, hopes, dreams, aspirations  Liabilities: No interests, Lack of social supports and Financial strain    Current Evaluation:     Mental Status Exam: Fran Higgins arrived promptly for the assessment session. The patient was fully cooperative throughout the interview and was an adequate historian   Appearance: age appropriate, casually  dressed, adequately  groomed  Behavior/Cooperation: friendly and cooperative  Speech: normal in rate, volume, and tone and appropriate quality, quantity and organization of sentences  Mood: anxious, dysthymic  Affect: blunted  Thought Process: goal-directed, logical  Thought Content: normal, no suicidality, no homicidality, delusions, or paranoia;did not appear to be responding to internal stimuli during the interview.   Orientation: grossly intact  Memory: Grossly intact  Attention Span/Concentration: Attends to interview without distraction; reports no difficulty  Fund of Knowledge: average  Estimate of Intelligence: average from verbal skills and history  Cognition: grossly intact  Insight: patient has awareness of illness; good insight into own behavior and behavior of others  Judgment: the patient's behavior is adequate to circumstances    Distress Score    Distress Score: (P) 10        Practical Problems Physical Problems   : (P) Yes Appearance: (P) Yes   Housing: (P) Yes Bathing / Dressing: (P) No   Insurance / Financial: (P) Yes Breathing: (P) No    Transportation: (P) No  Changes in Urination: (P) No    Work / School: (P) Yes  Constipation: (P) Yes   Treatment Decisions: (P) Yes  Diarrhea: (P) No     Eating: (P) No    Family Problems Fatigue: (P) No    Dealing with Children: (P)  Yes Feeling Swollen: (P) No    Dealing with Partner: (P) No Fevers: (P) No    Ability to Have Children: (P) Yes  Getting Around: (P) No       Indigestion: (P) Yes     Memory / Concentration: (P) Yes   Emotional Problems Mouth Sores: (P) No    Depression: (P) Yes  Nausea: (P) No    Fears: (P) Yes  Nose Dry / Congested: (P) No    Nervousness: (P) Yes  Pain: (P) Yes    Sadness: (P) Yes Sexual: (P) Yes    Worry: (P) Yes Skin Dry / Itchy: (P) Yes    Loss of Interest in Usual Activities: (P) Yes Sleep: (P) Yes     Substance Abuse: (P) No    Spiritual/Religions Concerns Tingling in Hands / Feet: (P) No   Spritual / Mosque Concerns: (P) No         Other Problems              PHQ ANSWERS    Q1. Little interest or pleasure in doing things: (P) More than half the days (07/09/20 1720)  Q2. Feeling down, depressed, or hopeless: (P) Nearly every day (07/09/20 1720)  Q3. Trouble falling or staying asleep, or sleeping too much: (P) Nearly every day (07/09/20 1720)  Q4. Feeling tired or having little energy: (P) Nearly every day (07/09/20 1720)  Q5. Poor appetite or overeating: (P) Nearly every day (07/09/20 1720)  Q6. Feeling bad about yourself - or that you are a failure or have let yourself or your family down: (P) More than half the days (07/09/20 1720)  Q7. Trouble concentrating on things, such as reading the newspaper or watching television: (P) Nearly every day (07/09/20 1720)  Q8. Moving or speaking so slowly that other people could have noticed. Or the opposite - being so fidgety or restless that you have been moving around a lot more than usual: (P) More than half the days (07/09/20 1720)  Q9.  no SI    PHQ8 Score : (P) 21 (07/09/20 1720)  PHQ-9 Total Score: (P) 21 (07/09/20 1720)        JOSE MARIA-7     GAD7 7/9/2020   1. Feeling nervous, anxious, or on edge? 3   2. Not being able to stop or control worrying? 3   3. Worrying too much about different things? 3   4. Trouble relaxing? 3   5. Being so restless that it is hard to  sit still? 3   6. Becoming easily annoyed or irritable? 3   7. Feeling afraid as if something awful might happen? 3   JOSE MARIA-7 Score 21          History of present illness:    Parental history of psychiatric issues with hospitalization. Patient with Ovarian Cancer. Post surgery, No chemoRT. On hormone therapy.  Patient reports having stress related to her daughter care given that her child;'s father is not involved. Patient currently in a tense relationship with her live-in mother. 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 She also takes call.       Fran Higgins has adjusted to illness with difficulty primarily through avoidance. She has engaged in appropriate information gathering.  The patient has inadequate family/friend support.  Illness-related psychosocial stressors include difficulty meeting family responsibilities and limitations on sexual interactions.  The patient has a good partnership with her [facility] [place] team. The patient reports the following barriers to cancer care:none.   Symptoms:   · Mood: depressed mood, diminished interest, insomnia, psychomotor agitation, fatigue, worthlessness/guilt, poor concentration, tearfulness and social isolation;  prior depression:episodic, age 16  · Anxiety: Feeling nervous, anxious, or on edge, Uncontrollable worry (about everything), Excessive worry (interfering with sleep, functioning), Difficulty relaxing, Restlessness, Irritability and Fear of unknown; lifelong anxiety  · Substance abuse: denied  · Cognitive functioning: denied  · Health behaviors: noncontributory  · Sleep: Trouble with sleep onset and maintenence. Wakes up several times in the night, with variable reonset.  (+) use of benzodiazepines Xanax Nighttime anxiety present. Nightmares present-dreams about dad.  · Pain: Ms. Higgins reports nerve pain in vaginal, occasional back pain. Symptoms interfere with daily activity, sleeping and work.       Assessment -  Diagnosis - Goals:       ICD-10-CM ICD-9-CM   1. Major depressive disorder, recurrent episode, moderate with anxious distress  F33.1 296.32   2. Reactive depression  F32.9 300.4   3. Mucinous tumor, of low malignant potential  D48.9 238.9   R/O JOSE MARIA    Plan:individual psychotherapy and consult psychiatrist for medication evaluation    Summary and Recommendations  Fran Higgins is a 39 y.o. female referred by Jeff Kaufman MD for psychological evaluation and treatment.  Ms. Higgins appears to be coping poorly with her diagnosis and proposed treatment course.  She is interested in CBT to address depression/anxiety/insomnia and will follow up with me for that purpose. Mood protective strategies during cancer treatment were discussed. .    GOALS:   Referral to psychiatry for medication evaluation  Sleep Hygiene: Buffer Zone, Reset Sleep  Meditation  Exercise    Next Session:  Sleep Hygiene    Brittani Mahan, PhD  Clinical Psychologist  [place] License #[id number]

## 2020-07-16 NOTE — Clinical Note
Patient with ovarian cancer s/p surgery. No chemoRT indicated on horomone therapy. Started effexor this week at 37.5. also taking xanax. Would like an appointment.

## 2020-07-20 DIAGNOSIS — N64.3 GALACTORRHEA IN FEMALE: ICD-10-CM

## 2020-07-20 DIAGNOSIS — N64.3 GALACTORRHEA OF BOTH BREASTS: Primary | ICD-10-CM

## 2020-07-27 ENCOUNTER — TELEPHONE (OUTPATIENT)
Dept: SURGERY | Facility: CLINIC | Age: 40
End: 2020-07-27

## 2020-07-27 NOTE — TELEPHONE ENCOUNTER
Contacted the patient regarding scheduling appointment with .  The patient is scheduled to be seen on Thursday 8/6/2020, 9:30 am with  at [facility].  Patient voiced understanding of appointment date, time, and location.  Reminder letter mailed to the patient.

## 2020-07-30 ENCOUNTER — OFFICE VISIT (OUTPATIENT)
Dept: PSYCHIATRY | Facility: CLINIC | Age: 40
End: 2020-07-30
Payer: COMMERCIAL

## 2020-07-30 DIAGNOSIS — C56.9 MUCINOUS TUMOR, OF LOW MALIGNANT POTENTIAL: ICD-10-CM

## 2020-07-30 DIAGNOSIS — F33.1 MAJOR DEPRESSIVE DISORDER, RECURRENT EPISODE, MODERATE WITH ANXIOUS DISTRESS: Primary | ICD-10-CM

## 2020-07-30 PROCEDURE — 90834 PR PSYCHOTHERAPY W/PATIENT, 45 MIN: ICD-10-PCS | Mod: S$GLB,,, | Performed by: PSYCHOLOGIST

## 2020-07-30 PROCEDURE — 99999 PR PBB SHADOW E&M-EST. PATIENT-LVL I: ICD-10-PCS | Mod: PBBFAC,,, | Performed by: PSYCHOLOGIST

## 2020-07-30 PROCEDURE — 90834 PSYTX W PT 45 MINUTES: CPT | Mod: S$GLB,,, | Performed by: PSYCHOLOGIST

## 2020-07-30 PROCEDURE — 99999 PR PBB SHADOW E&M-EST. PATIENT-LVL I: CPT | Mod: PBBFAC,,, | Performed by: PSYCHOLOGIST

## 2020-07-30 NOTE — PROGRESS NOTES
INFORMED CONSENT: Fran Higgins   is known to this provider and identity was confirmed via NAME and .  The patient has been informed of the risks and benefits associated with engaging in psychotherapy, the handling of protected health information, the rights of privacy and the limits of confidentiality. The patient has also been informed of the importance of reporting any suicidal or homicidal ideation to this or any provider to ensure safety of all parties, and the Fran Higgins expressed understanding. The patient was agreeable to these terms and freely participates in individual psychotherapy.    PSYCHO-ONCOLOGY NOTE/ Individual Psychotherapy     Date: 2020   Site:  [facility]        Therapeutic Intervention: Met with patient.  Outpatient - Behavior modifying psychotherapy 45 min - CPT code 84261      Patient was last seen by me on 2020    Problem list  Patient Active Problem List   Diagnosis    BMI 50.0-59.9, adult    Depression    Left ovarian cyst    S/P RA Laparoscopic LSO    Mucinous tumor, of low malignant potential    Pericardial cyst    Scoliosis of cervical spine    Abnormal uterine bleeding (AUB)    Vaginal yeast infection    DUB (dysfunctional uterine bleeding)    Migraine with aura, not intractable    s/p robotic hysterectomy/RSO 3/9/20    Borderline epithelial neoplasm of ovary    Surgical menopause    Yeast vaginitis    Menopause    Dizziness    Galactorrhea in female- bilateral breast    Other insomnia    Major depressive disorder, recurrent episode, moderate with anxious distress       Chief complaint/reason for encounter: depression and anxiety   Met with patient to evaluate psychosocial adaptation to diagnosis/treatment/survivorship of Ovarian Cancer    Current Medications  Current Outpatient Medications   Medication    ALPRAZolam (XANAX) 0.5 MG tablet    clindamycin (CLEOCIN T) 1 % lotion    estrogens, conjugated, (PREMARIN) 1.25 MG  tablet    galcanezumab-gnlm (EMGALITY PEN) 120 mg/mL PnIj    ibuprofen (ADVIL,MOTRIN) 600 MG tablet    meclizine (ANTIVERT) 12.5 mg tablet    progesterone (PROMETRIUM) 100 MG capsule    venlafaxine (EFFEXOR-XR) 37.5 MG 24 hr capsule     No current facility-administered medications for this visit.        Objective:  Fran Higgins arrived promptly for the session.   Ms. Higgins was independently ambulatory at the time of session. The patient was fully cooperative throughout the session.  Appearance: age appropriate, appropriately  dressed, adequately  groomed  Behavior/Cooperation: friendly and cooperative  Speech: normal in rate, volume, and tone and appropriate quality, quantity and organization of sentences  Mood: anxious, dysthymic  Affect: mood incongruent  Thought Process: goal-directed, logical  Thought Content: normal,  No delusions or paranoia; did not appear to be responding to internal stimuli during the session  Orientation: grossly intact  Memory: Grossly intact  Attention Span/Concentration: Attends to session without distraction; reports no difficulty  Fund of Knowledge: average  Estimate of Intelligence: average from verbal skills and history  Cognition: grossly intact  Insight: patient has awareness of illness; good insight into own behavior and behavior of others  Judgment: the patient's behavior is adequate to circumstances    Interval history and content of current session: Patient reported increased distress related to her best friend father with COVID and a decline in functional status (on a vent). Patient reported that feeling of grief and somewhat intensified due to reminder about her own fathers death. Discussed importance of focusing on well-being and being aware of own needs in order to provide support for friend. Patient struggles with incorporating coping strategies due to her hectic work schedule.   Discussed attching well-being strategies to usual acitivies including 5  senses awareness when drinking coffeee, 5 mins deep breathing in parking lot after work and sleep reset.. Reports to be coping with great difficulty. Evaluated cognitive response, paying particular attention to negative intrusive thoughts of a persistent and detrimental nature. Thoughts of this type are in evidence with severe distress. Provided cognitive behavioral therapy to address negative cognitions. Identified and evaluated psychosocial and environmental stressors secondary to diagnosis and treatment.  Examined proactive behaviors that may be implemented to minimize or ameliorate psychosocial stressors secondary to diagnosis and treatment.     Risk parameters:   Patient reports no suicidal ideation  Patient reports no homicidal ideation  Patient reports no self-injurious behavior  Patient reports no violent behavior   Safety needs:  None at this time      Verbal deficits: None     Patient's response to intervention:The patient's response to intervention is accepting.     Progress toward goals and other mental status changes:  The patient's progress toward goals is fair .      Progress to date:No Progress - Continue Objectives      Goals from last visit: Attempted, not met     Patient reported outcomes:    Distress Score    Distress Score: 10        Practical Problems Physical Problems   : Yes Appearance: Yes   Housing: No Bathing / Dressing: No   Insurance / Financial: Yes Breathing: No    Transportation: No  Changes in Urination: No    Work / School: Yes  Constipation: Yes   Treatment Decisions: Yes  Diarrhea: No     Eating: No    Family Problems Fatigue: Yes    Dealing with Children: Yes Feeling Swollen: No    Dealing with Partner: No Fevers: No    Ability to Have Children: Yes  Getting Around: No       Indigestion: Yes     Memory / Concentration: Yes   Emotional Problems Mouth Sores: No    Depression: Yes  Nausea: Yes    Fears: Yes  Nose Dry / Congested: No    Nervousness: Yes  Pain: Yes     Sadness: Yes Sexual: Yes    Worry: Yes Skin Dry / Itchy: No    Loss of Interest in Usual Activities: Yes Sleep: Yes     Substance Abuse: No    Spiritual/Religions Concerns Tingling in Hands / Feet: No   Spritual / Episcopalian Concerns: No         Other Problems              PHQ ANSWERS    Q1. Little interest or pleasure in doing things: (P) More than half the days (07/24/20 2047)  Q2. Feeling down, depressed, or hopeless: (P) Nearly every day (07/24/20 2047)  Q3. Trouble falling or staying asleep, or sleeping too much: (P) Nearly every day (07/24/20 2047)  Q4. Feeling tired or having little energy: (P) Nearly every day (07/24/20 2047)  Q5. Poor appetite or overeating: (P) Nearly every day (07/24/20 2047)  Q6. Feeling bad about yourself - or that you are a failure or have let yourself or your family down: (P) More than half the days (07/24/20 2047)  Q7. Trouble concentrating on things, such as reading the newspaper or watching television: (P) Nearly every day (07/24/20 2047)  Q8. Moving or speaking so slowly that other people could have noticed. Or the opposite - being so fidgety or restless that you have been moving around a lot more than usual: (P) Several days (07/24/20 2047)  Q9.      PHQ8 Score : (P) 20 (07/24/20 2047)  PHQ-9 Total Score: (P) 20 (07/24/20 2047)     JOSE MARIA-7 Answers    GAD7 7/24/2020   1. Feeling nervous, anxious, or on edge? 3   2. Not being able to stop or control worrying? 3   3. Worrying too much about different things? 3   4. Trouble relaxing? 3   5. Being so restless that it is hard to sit still? 3   6. Becoming easily annoyed or irritable? 3   7. Feeling afraid as if something awful might happen? 3   JOSE MARIA-7 Score 21     JOSE MARIA-7 Score: (P) 21  Interpretation: (P) Severe Anxiety     Client Strengths: verbal, intelligent, successful, good social support, good insight, commitment to wellness, strong kerri, strong cultural traditions     Diagnosis:     ICD-10-CM ICD-9-CM   1. Major depressive  disorder, recurrent episode, moderate with anxious distress  F33.1 296.32   2. Mucinous tumor, of low malignant potential  D48.9 238.9       Treatment Plan:individual psychotherapy and consult psychiatrist for medication evaluation  · Target symptoms: depression, anxiety , grief, work stress  · Why chosen therapy is appropriate versus another modality: relevant to diagnosis, evidence based practice  · Outcome monitoring methods: self-report, observation, checklist/rating scale  · Therapeutic intervention type: behavior modifying psychotherapy  · Prognosis: Fair      Behavioral goals:    Exercise:   Stress management:   Social engagement:   Nutrition:   Smoking Cessation:   Therapy: Appointment with Dr. Balderas next week    Return to clinic: 2 weeks 8/13/2020 at 1pm    Next Session:   Self-Care well-being strategies.   Introduce to meaning Making for Grief     Length of Service (minutes direct face-to-face contact): 45    Brittani Mahan, PhD  [place] License #[id number]

## 2020-08-04 ENCOUNTER — TELEPHONE (OUTPATIENT)
Dept: OTOLARYNGOLOGY | Facility: CLINIC | Age: 40
End: 2020-08-04

## 2020-08-04 NOTE — TELEPHONE ENCOUNTER
----- Message from Mihaela Colin sent at 8/4/2020 12:08 PM CDT -----  Contact: Patient  Type:  Patient Returning Call    Who Called Patient  Who Left Message for Patient:Office  Does the patient know what this is regarding?:appt  Would the patient rather a call back or a response via [employer]? call  Best Call Back Number: [phone]  Additional Information:

## 2020-08-04 NOTE — TELEPHONE ENCOUNTER
Spoke with the pt. I explained that an audiogram is needed prior to her apt. I was able to schedule her audio for 8/10 at 10AM and her visit with Dr.de Geller for 10:40AM. Pt thanked me and verbalized understanding.

## 2020-08-06 ENCOUNTER — OFFICE VISIT (OUTPATIENT)
Dept: PSYCHIATRY | Facility: CLINIC | Age: 40
End: 2020-08-06
Payer: COMMERCIAL

## 2020-08-06 ENCOUNTER — OFFICE VISIT (OUTPATIENT)
Dept: SURGERY | Facility: CLINIC | Age: 40
End: 2020-08-06
Attending: OBSTETRICS & GYNECOLOGY
Payer: COMMERCIAL

## 2020-08-06 ENCOUNTER — LAB VISIT (OUTPATIENT)
Dept: LAB | Facility: OTHER | Age: 40
End: 2020-08-06
Attending: SURGERY
Payer: COMMERCIAL

## 2020-08-06 VITALS — HEIGHT: 64 IN | WEIGHT: 293 LBS | BODY MASS INDEX: 50.02 KG/M2

## 2020-08-06 VITALS
BODY MASS INDEX: 58.84 KG/M2 | WEIGHT: 293 LBS | SYSTOLIC BLOOD PRESSURE: 136 MMHG | DIASTOLIC BLOOD PRESSURE: 79 MMHG | HEART RATE: 91 BPM

## 2020-08-06 DIAGNOSIS — G47.09 OTHER INSOMNIA: ICD-10-CM

## 2020-08-06 DIAGNOSIS — N64.3 GALACTORRHEA IN FEMALE: ICD-10-CM

## 2020-08-06 DIAGNOSIS — F32.1 CURRENT MODERATE EPISODE OF MAJOR DEPRESSIVE DISORDER, UNSPECIFIED WHETHER RECURRENT: Primary | ICD-10-CM

## 2020-08-06 LAB
T3FREE SERPL-MCNC: 4.1 PG/ML (ref 2.3–4.2)
T4 FREE SERPL-MCNC: 0.91 NG/DL (ref 0.71–1.51)
T4 SERPL-MCNC: 10.4 UG/DL (ref 4.5–11.5)
TSH SERPL DL<=0.005 MIU/L-ACNC: 0.87 UIU/ML (ref 0.4–4)

## 2020-08-06 PROCEDURE — 99203 PR OFFICE/OUTPT VISIT, NEW, LEVL III, 30-44 MIN: ICD-10-PCS | Mod: S$GLB,,, | Performed by: SURGERY

## 2020-08-06 PROCEDURE — 84436 ASSAY OF TOTAL THYROXINE: CPT

## 2020-08-06 PROCEDURE — 99999 PR PBB SHADOW E&M-EST. PATIENT-LVL II: ICD-10-PCS | Mod: PBBFAC,,, | Performed by: SPECIALIST

## 2020-08-06 PROCEDURE — 84439 ASSAY OF FREE THYROXINE: CPT

## 2020-08-06 PROCEDURE — 99203 OFFICE O/P NEW LOW 30 MIN: CPT | Mod: S$GLB,,, | Performed by: SURGERY

## 2020-08-06 PROCEDURE — 90792 PSYCH DIAG EVAL W/MED SRVCS: CPT | Mod: S$GLB,,, | Performed by: SPECIALIST

## 2020-08-06 PROCEDURE — 84481 FREE ASSAY (FT-3): CPT

## 2020-08-06 PROCEDURE — 99999 PR PBB SHADOW E&M-EST. PATIENT-LVL II: CPT | Mod: PBBFAC,,, | Performed by: SPECIALIST

## 2020-08-06 PROCEDURE — 3008F PR BODY MASS INDEX (BMI) DOCUMENTED: ICD-10-PCS | Mod: CPTII,S$GLB,, | Performed by: SURGERY

## 2020-08-06 PROCEDURE — 90792 PR PSYCHIATRIC DIAGNOSTIC EVALUATION W/MEDICAL SERVICES: ICD-10-PCS | Mod: S$GLB,,, | Performed by: SPECIALIST

## 2020-08-06 PROCEDURE — 36415 COLL VENOUS BLD VENIPUNCTURE: CPT

## 2020-08-06 PROCEDURE — 3008F BODY MASS INDEX DOCD: CPT | Mod: CPTII,S$GLB,, | Performed by: SURGERY

## 2020-08-06 PROCEDURE — 84443 ASSAY THYROID STIM HORMONE: CPT

## 2020-08-06 RX ORDER — CYCLOBENZAPRINE HCL 10 MG
10 TABLET ORAL 3 TIMES DAILY PRN
COMMUNITY
End: 2020-08-10

## 2020-08-06 RX ORDER — ALPRAZOLAM 0.5 MG/1
0.5 TABLET ORAL NIGHTLY PRN
Qty: 30 TABLET | Refills: 3 | Status: SHIPPED | OUTPATIENT
Start: 2020-08-06 | End: 2021-02-05 | Stop reason: SDUPTHER

## 2020-08-06 RX ORDER — TRAZODONE HYDROCHLORIDE 50 MG/1
TABLET ORAL
Qty: 90 TABLET | Refills: 2 | Status: SHIPPED | OUTPATIENT
Start: 2020-08-06 | End: 2021-03-11

## 2020-08-06 RX ORDER — VENLAFAXINE HYDROCHLORIDE 75 MG/1
75 CAPSULE, EXTENDED RELEASE ORAL DAILY
Qty: 30 CAPSULE | Refills: 11 | Status: SHIPPED | OUTPATIENT
Start: 2020-08-06 | End: 2020-10-08 | Stop reason: DRUGHIGH

## 2020-08-06 NOTE — LETTER
August 31, 2020      Jeff Kaufman MD  [address]  [facility] [address]           [facility] [address]  [address], [address]  [facility] [address]  Phone: [phone]  Fax: [phone]          Patient: Fran Higgins   MR Number: [id number]   YOB: 1980   Date of Visit: 8/6/2020       Dear Dr. Jeff Kaufman:    Thank you for referring Fran Higgins to me for evaluation. Attached you will find relevant portions of my assessment and plan of care.    If you have questions, please do not hesitate to call me. I look forward to following Fran Higgins along with you.    Sincerely,    Ирина James MD    Enclosure  CC:  No Recipients    If you would like to receive this communication electronically, please contact [email or web address] or [phone] to request more information on [employer] access.    For providers and/or their staff who would like to refer a patient to [facility], please contact us through our one-stop-shop provider referral line, [facility], at [phone].    If you feel you have received this communication in error or would no longer like to receive these types of communications, please e-mail [email or web address]

## 2020-08-06 NOTE — PROGRESS NOTES
"Outpatient Psychiatry Initial Visit (MD/NP)    8/6/2020    Fran Higgins, a 39 y.o. female, presenting for initial evaluation visit. Met with patient.    Reason for Encounter: Referral from Dr Mahan. Patient complains of depression, and wondering if her medications are appropriate. .    History of Present Illness: Depression  Patient complains of depression. She complains of anhedonia, depressed mood, difficulty concentrating, fatigue, impaired memory and insomnia. Onset was approximately 5 months ago. Symptoms have been unchanged since that time. Current symptoms include: anhedonia, depressed mood, difficulty concentrating, fatigue, impaired memory and insomnia. Patient denies suicidal thoughts with specific plan. Family history significant for no psychiatric illness. Possible organic causes contributing are: endocrine/metabolic, prior depressive episodes, BSO, removal ovaries, menopausal symptosm. Risk factors: negative life event bilateral oopherectomies, hysterectomy, loss ovaries and previous episode of depression. Previous treatment includes medication. She complains of the following side effects from the treatment: none.    The patinet's depressive history predates her medical history.   As as teenager she suffered depression and was treated with prozac 40 mg. This continued for many years.   In 5/2019 following her first left oopherectomy she was started on lexapro 20 mg by her PMD for derpession.  She had a second oopherectomy (right) 3/2020, also with low potential malignancy, and a hysterectomry.  She reports her anxiety was "off the charts" and she became very depressed, but not suicidal.  To address menopausal symptoms, she was changed to effexor 37.5 mg.  Lexapro was discontinued.  She has been of effexor 3 weeks.  Shefeels like the dose is "not enough"- she still has sinking feelings, incrased irritabliyt, loss o fmotivation, hot flases, insomnia, sweating, poor memory and diminished " "concnentration.   She has been prescribed alprazoalm 0.5 mg for insomnia, which does not keep her asleep overnght.   No SA  No psychiat hospitalizations  NO tobacco, ETOH, drugs    PMH  Obesity  Oophorectomies x2, sp hysterectomy  Depression, recurrent  Migraine  Pericardial cyst  Surgical menopause  Scoliosis cervical spine  Insomnia    PSOC  Works as RN circulator nurse  Has 7 year old dtr  Single mom, father not in picture  Has "of and on" relatiohsip with mother, mom has difficulty with derpession and memoyr  Both parents had psych hospitazliations at some point  Father- ETOH drugs,     ROS  Mood- down, irriitably  intersest- decreased  Guilt- no  Energy- decreased  Concentration - poor  Appetite- no decrease  Suicidal thought- no  Sweats= yes  Hot flashes- yes  Insomnia= yes        Review Of Systems:     Pertinent items are noted in HPI.    Current Evaluation:     Nutritional Screening: Considering the patient's height and weight, medications, medical history and preferences, should a referral be made to the dietitian? no    Constitutional  Vitals:  Most recent vital signs, dated less than 90 days prior to this appointment, were reviewed.    Vitals:    08/06/20 1310   BP: 136/79   Pulse: 91   Weight: (!) 155.5 kg (342 lb 13 oz)        General:  unremarkable, age appropriate     Musculoskeletal  Muscle Strength/Tone:  no spasicity, no rigidity   Gait & Station:  non-ataxic     Psychiatric  Speech:  no latency; no press, spontaneous   Mood & Affect:  anxious  congruent and appropriate, decreased range   Thought Process:  normal and logical   Associations:  intact   Thought Content:  normal, no suicidality, no homicidality, delusions, or paranoia   Insight:  has awareness of illness   Judgement: behavior is adequate to circumstances   Orientation:  grossly intact, person, place, situation, time/date   Memory: intact for content of interview, grossly intact   Language: grossly intact, not tested   Attention Span & " Concentration:  able to focus   Fund of Knowledge:  intact and appropriate to age and level of education       Relevant Elements of Neurological Exam: awake  alert, ox3, coopertive, good eye contact, mild anxity, no psychomotor retardation    Functioning in Relationships:  Spouse/partner: NA  Peers: good  Employers: good    Laboratory Data  Lab Visit on 08/06/2020   Component Date Value Ref Range Status    TSH 08/06/2020 0.872  0.400 - 4.000 uIU/mL Final    Free T4 08/06/2020 0.91  0.71 - 1.51 ng/dL Final   Lab Visit on 07/09/2020   Component Date Value Ref Range Status    CEA 07/09/2020 1.5  0.0 - 5.0 ng/mL Final         Medications  Outpatient Encounter Medications as of 8/6/2020   Medication Sig Dispense Refill    ALPRAZolam (XANAX) 0.5 MG tablet Take 1 tablet (0.5 mg total) by mouth nightly as needed. 30 tablet 3    clindamycin (CLEOCIN T) 1 % lotion Apply topically 2 (two) times daily to affected areas of legs/buttocks. 60 mL 3    cyclobenzaprine (FLEXERIL) 10 MG tablet Take 10 mg by mouth 3 (three) times daily as needed for Muscle spasms.      estrogens, conjugated, (PREMARIN) 1.25 MG tablet Take 1 tablet (1.25 mg total) by mouth once daily. 30 tablet 11    galcanezumab-gnlm (EMGALITY PEN) 120 mg/mL PnIj Inject 120 mg into the skin every 30 days. 1 mL 11    ibuprofen (ADVIL,MOTRIN) 600 MG tablet Take 1 tablet (600 mg total) by mouth every 6 (six) hours as needed for Pain. (Patient not taking: Reported on 4/9/2020) 30 tablet 1    meclizine (ANTIVERT) 12.5 mg tablet Take 1 tablet (12.5 mg total) by mouth 3 (three) times daily as needed for Dizziness. 90 tablet 2    progesterone (PROMETRIUM) 100 MG capsule Place 1 capsule (100 mg total) vaginally once daily. 21 capsule 6    venlafaxine (EFFEXOR-XR) 37.5 MG 24 hr capsule Take 1 capsule (37.5 mg total) by mouth once daily. 30 capsule 11     No facility-administered encounter medications on file as of 8/6/2020.            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 Previously on prozac, lexapro, now effexor 37.5 mg with perissitent symptoms.   Suggest-   Increase to 75 mg, reassess. She may need 150 mg. Add trazodone  mg HS, augmentation of effexor and address insomnia.   She may need to continue alorazpolam for now- only using at night. Advised to use only 4 ngiths per week, either with or without trazodone.   Try 0.25 mg to see what is lowset efective dose. She may need 0.5. avoid tolerance.   FU 4-6 weeks.   She is safe for Op treatment, and knows to come to the ER for new problems, suicidal thoughts or intolerable side effects.     No diagnosis found.    Strengths and Liabilities: Strength: Patient accepts guidance/feedback, Strength: Patient is expressive/articulate., Strength: Patient is intelligent., Strength: Patient is motivated for change., Strength: Patient has positive support network., Strength: Patient has reasonable judgment., Strength: Patient is stable.    Treatment Goals:  Specify outcomes written in observable, behavioral terms:   Depression: increasing energy    Treatment Plan/Recommendations:   · Medication Management: Continue current medications.   · Add trazodone  · Refill laxalx 0.5 HS 4 nights per week. Try 0.25 mg.       Return to Clinic: 1 month    Counseling time: 10  Total time: 45  Consulting clinician was informed of the encounter and consult note.

## 2020-08-07 ENCOUNTER — LAB VISIT (OUTPATIENT)
Dept: URGENT CARE | Facility: CLINIC | Age: 40
End: 2020-08-07
Payer: COMMERCIAL

## 2020-08-07 DIAGNOSIS — R11.0 NAUSEA: ICD-10-CM

## 2020-08-07 DIAGNOSIS — R42 DIZZINESS: ICD-10-CM

## 2020-08-07 DIAGNOSIS — J02.9 SORE THROAT: ICD-10-CM

## 2020-08-07 DIAGNOSIS — J02.9 SORE THROAT: Primary | ICD-10-CM

## 2020-08-07 DIAGNOSIS — R51.9 NONINTRACTABLE HEADACHE, UNSPECIFIED CHRONICITY PATTERN, UNSPECIFIED HEADACHE TYPE: ICD-10-CM

## 2020-08-07 LAB
CTP QC/QA: YES
SARS-COV-2 RDRP RESP QL NAA+PROBE: NEGATIVE

## 2020-08-07 PROCEDURE — U0002 COVID-19 LAB TEST NON-CDC: HCPCS | Mod: S$GLB,,, | Performed by: INTERNAL MEDICINE

## 2020-08-07 PROCEDURE — U0002: ICD-10-PCS | Mod: S$GLB,,, | Performed by: INTERNAL MEDICINE

## 2020-08-08 ENCOUNTER — TELEPHONE (OUTPATIENT)
Dept: PRIMARY CARE CLINIC | Facility: CLINIC | Age: 40
End: 2020-08-08

## 2020-08-10 ENCOUNTER — OFFICE VISIT (OUTPATIENT)
Dept: OTOLARYNGOLOGY | Facility: CLINIC | Age: 40
End: 2020-08-10
Payer: COMMERCIAL

## 2020-08-10 ENCOUNTER — CLINICAL SUPPORT (OUTPATIENT)
Dept: OTOLARYNGOLOGY | Facility: CLINIC | Age: 40
End: 2020-08-10
Payer: COMMERCIAL

## 2020-08-10 VITALS
SYSTOLIC BLOOD PRESSURE: 141 MMHG | BODY MASS INDEX: 50.02 KG/M2 | HEIGHT: 64 IN | WEIGHT: 293 LBS | DIASTOLIC BLOOD PRESSURE: 91 MMHG | TEMPERATURE: 99 F | HEART RATE: 91 BPM

## 2020-08-10 DIAGNOSIS — H93.13 TINNITUS OF BOTH EARS: ICD-10-CM

## 2020-08-10 DIAGNOSIS — R42 DIZZINESS: ICD-10-CM

## 2020-08-10 DIAGNOSIS — H93.13 TINNITUS AURIUM, BILATERAL: Primary | ICD-10-CM

## 2020-08-10 DIAGNOSIS — R42 VERTIGO: Primary | ICD-10-CM

## 2020-08-10 DIAGNOSIS — H90.3 HEARING LOSS, SENSORINEURAL, HIGH FREQUENCY, BILATERAL: ICD-10-CM

## 2020-08-10 PROCEDURE — 92567 PR TYMPA2METRY: ICD-10-PCS | Mod: S$GLB,,, | Performed by: AUDIOLOGIST-HEARING AID FITTER

## 2020-08-10 PROCEDURE — 99999 PR PBB SHADOW E&M-EST. PATIENT-LVL IV: ICD-10-PCS | Mod: PBBFAC,,, | Performed by: OTOLARYNGOLOGY

## 2020-08-10 PROCEDURE — 99999 PR PBB SHADOW E&M-EST. PATIENT-LVL II: CPT | Mod: PBBFAC,,, | Performed by: AUDIOLOGIST-HEARING AID FITTER

## 2020-08-10 PROCEDURE — 99999 PR PBB SHADOW E&M-EST. PATIENT-LVL IV: CPT | Mod: PBBFAC,,, | Performed by: OTOLARYNGOLOGY

## 2020-08-10 PROCEDURE — 3008F BODY MASS INDEX DOCD: CPT | Mod: CPTII,S$GLB,, | Performed by: OTOLARYNGOLOGY

## 2020-08-10 PROCEDURE — 99204 OFFICE O/P NEW MOD 45 MIN: CPT | Mod: S$GLB,,, | Performed by: OTOLARYNGOLOGY

## 2020-08-10 PROCEDURE — 3008F PR BODY MASS INDEX (BMI) DOCUMENTED: ICD-10-PCS | Mod: CPTII,S$GLB,, | Performed by: OTOLARYNGOLOGY

## 2020-08-10 PROCEDURE — 92557 COMPREHENSIVE HEARING TEST: CPT | Mod: S$GLB,,, | Performed by: AUDIOLOGIST-HEARING AID FITTER

## 2020-08-10 PROCEDURE — 92567 TYMPANOMETRY: CPT | Mod: S$GLB,,, | Performed by: AUDIOLOGIST-HEARING AID FITTER

## 2020-08-10 PROCEDURE — 99204 PR OFFICE/OUTPT VISIT, NEW, LEVL IV, 45-59 MIN: ICD-10-PCS | Mod: S$GLB,,, | Performed by: OTOLARYNGOLOGY

## 2020-08-10 PROCEDURE — 92557 PR COMPREHENSIVE HEARING TEST: ICD-10-PCS | Mod: S$GLB,,, | Performed by: AUDIOLOGIST-HEARING AID FITTER

## 2020-08-10 PROCEDURE — 99999 PR PBB SHADOW E&M-EST. PATIENT-LVL II: ICD-10-PCS | Mod: PBBFAC,,, | Performed by: AUDIOLOGIST-HEARING AID FITTER

## 2020-08-10 RX ORDER — CYCLOBENZAPRINE HCL 5 MG
TABLET ORAL
COMMUNITY
Start: 2020-02-10 | End: 2020-12-10 | Stop reason: SDUPTHER

## 2020-08-10 NOTE — PATIENT INSTRUCTIONS
Dizziness and Vertigo    Dizziness is a feeling that may be hard to describe, but often includes a feeling that you are spinning or tilting, or that you are about to fall or pass out. Dizziness can also cause you to feel lightheaded or giddy, or have difficulty walking straight.    Many people who feel dizzy have vertigo, a specific type of dizziness. Vertigo is a sense of spinning dizziness, swaying, or tilting. You may feel that you are moving or that the room is moving around you. Vertigo can be caused by a number of different problems involving the inner ear or brain. Some of these problems are not serious while others can be life threatening.    Causes of Vertigo    Benign paroxysmal positional vertigo (BPPV) - BPPV, sometimes called benign positional vertigo, positional vertigo, postural vertigo, or simply vertigo, is a type of vertigo that develops due to collections of calcium in the inner ear. These collections are called canaliths. Moving the canaliths (called canalith repositioning) is a common treatment for BPPV.    Vertigo is typically brief in people with BPPV, lasting seconds to minutes. Vertigo can be triggered by moving the head in certain ways. Many forms of vertigo are worsened by head movements. In BPPV it is CAUSED by movement.    Meniere disease - Meniere disease is a condition that causes repeated spells of vertigo, hearing loss, and ringing in the ears. Spells can last several minutes or hours. It is probably caused by a buildup of fluid in the inner ear.     Vestibular neuritis - Vestibular neuritis, or labyrinthitis, is probably caused by a virus that causes swelling around the balance nerve. People with vestibular neuritis develop sudden, severe vertigo, nausea, vomiting, and difficulty walking or standing up; these problems can last several days. Some people also develop difficulty hearing in one ear (labyrinthitis).    Head injury - Head injuries can affect the vestibular system in a  variety of ways, and lead to vertigo.     Medications - Other medications can affect the function of the inner ear or brain and lead to vertigo. Rarely, some medications can actually damage the inner ear.    Migraines - In a condition called vestibular migraine or migrainous vertigo, vertigo can be caused by a migraine. This type of vertigo usually happens along with a headache, but some patients have migraines without headache.     Other brain problems - Stroke or TIA (transient ischemic attack), bleeding in the brain, or multiple sclerosis can also cause vertigo. There are usually other symptoms, besides vertigo, that happen with these brain problems.    Non-vestibular causes of dizziness and imbalance    Around one in four people experience dizziness. Although it becomes more common with age, it can occur for a variety of reasons. People with a vestibular disorder may also experience dizziness due to other causes. Below is a list of some of the possible non-vestibular causes of dizziness. If you are experiencing symptoms of dizziness it is important to see a qualified health professional to find out the cause of your symptoms and for advice, treatment and referral to a specialist if necessary.    Side effects of medications  Some medications list dizziness as a side effect. If you take more than four different types of medication, this may also cause dizziness and increase your risk of falling.    Stress, tiredness and concentration  If you feel particularly stressed, anxious, angry or fearful, your heart rate increases and your breathing gets faster as blood is pumped to your muscles. A side effect of this is that you may feel sick or dizzy, since breathing too fast (hyperventilation) causes you to take in too much oxygen, which can make you dizzy. If you are tired or doing things you have to think about, this can affect balance.    Aging  Around one in five people over 60 are affected by dizziness and  imbalance. As people get older, the parts of the body which help maintain balance, e.g. the muscles, eyes and vestibular system in the inner ear, begin to function less well. Seeing in bad light may become difficult and there may be a permanent feeling of unsteadiness. Dizziness, however, does not tend to result from age-related changes alone and it is more likely to occur in people with a disorder or as a side-effect to medication.    Restricted blood flow to the brain  Dizziness and unsteadiness can also be the result of conditions that cause a temporary decrease in the blood flow to your brain. These conditions include:    Low blood pressure after standing up  Postural or orthostatic hypotension is a condition in which your blood pressure briefly drops when you get up quickly from sitting or lying down, causing nausea, dizziness, unsteadiness or blackouts. This is common and can be caused by dehydration, standing still for too long, becoming too hot and not eating enough or regularly enough (hypoglycaemia).    Low blood pressure after eating  Postprandial hypotension is a condition in which after eating (particularly after a large, high carbohydrate meal), your body does not compensate for the increased blood flow to your digestive system, causing dizziness, unsteadiness, or blackouts.    Osteoarthritis  A condition that causes damage and swelling of the joints. If the neck joints are affected, the blood flow to the brain can become restricted, causing temporary dizziness when you turn your head.    Arteriosclerosis  A build up of fatty deposits and cholesterol makes arteries become hard and narrow, reducing blood flow to the brain, which can cause dizziness. Arteriosclerosis can be caused by high blood pressure, diabetes, and an unhealthy lifestyle, and can lead to heart disease, stroke and blood clots.    Neurological diseases  Dizziness and unsteadiness can also be the result of damage to the areas of the  brain that coordinate balance, e.g. in people who have MS, stroke, Parkinsons disease or brain tumours.    Lack of exercise  Regular exercise is important for maintaining flexibility and strength, which can help balance. The stronger and more flexible the muscles and joints are, the better your body will be able to deal with different surfaces and keep you balanced    Can Medication Help Me Feel Better?    The use of medication in treating vestibular disorders depends on whether the vestibular system dysfunction is in an initial or acute phase (lasting up to 5 days) or chronic phase (ongoing).    During the acute phase, and when other illnesses have been ruled out, medications that may be prescribed include vestibular suppressants to reduce motion sickness or anti-emetics to reduce nausea. Vestibular suppressants include three general drug classes: anticholinergics, antihistamines, and benzodiazepines. Examples of vestibular suppressants are meclizine and dimenhydinate (antihistamine-anticholinergics) and lorazepam and diazepam (benzodiazepines).    Other medications that may be prescribed are steroids (e.g., prednisone), antiviral drugs (e.g., acyclovir), or antibiotics (e.g., amoxicillin) if a middle ear infection is present. If nausea has been severe enough to cause excessive dehydration, intravenous fluids may be given.    During the chronic phase, symptoms must be actively experienced without interference in order for the brain to adjust, a process called vestibular compensation. Any medication that makes the brain sleepy, including all vestibular suppressants, can slow down or stop the process of compensation. Therefore, they are often not appropriate for long-term use. Physicians generally find that most patients who fail to compensate are either strictly avoiding certain movements, using vestibular suppressants daily, or both.    [place]'s Head Exercises    C[place]thorne exercises are used to reinforce the  associations between your balance mechanism and your eyes. The following exercises may help decrease the sensation of unsteadiness for certain types of dizziness. Virtually all patients using these exercises will note improved balance, but it may take a few weeks. Just as Physical Therapy may increase strength, balance can also be strengthened by doing these exercises repeatedly. It is important to start slowly because quick head movements can make anyone lightheaded at first. Slowly increase the speed and duration of exercises as tolerated.    These exercises are to be carried out for 15 minutes twice a day, increasing to 30 minutes.    Eye Exercises:  Look up, then down - at first slowly, then quickly - 20 times  Look from one side to the other - at first slowly, then quickly - 20 times  Focus on finger at arm's length moving one foot closer and back again - 20 times    Head Exercises:  Bend head forward then backward with eyes open - slowly, later quickly - 20 times  Turn head from one side to other side - slowly, then quickly - 20 times  As dizziness decreases these exercises should be done with eyes closed.    Sitting:  While sitting shrug shoulders - 20 times  Turn shoulders to right, then to left - 20 times  Bend forward and  objects from ground and sit up - 20 times    Standing:  Change from sitting to standing and back again - 20 times with eyes open  Repeat with eyes closed  Throw a small rubber ball from hand to hand above eye level.  Throw ball from hand to hand under one knee    Moving About:  Walk across room with eyes open, then closed - 10 times  Walk up and down a slope with eyes open, then closed - 10 times  Walk up and down steps with eyes open, then closed - 10 times  Any game involving stooping or turning is good

## 2020-08-10 NOTE — PROGRESS NOTES
Otolaryngology Clinic Note    Fran Higgins  Encounter Date: 8/10/2020   YOB: 1980  Referring Physician: Gabriela Self  No address on file   PCP: Erica Pineda MD    Chief Complaint:   Chief Complaint   Patient presents with    Dizziness     not constant, started in March, feels off balance    Ear Fullness     pressure and fullness in left ear       HPI: Fran Higgins is a 39 y.o. female who presents for evaluation of dizziness. The symptoms started several months ago and have stabilized. Describes dizziness as sometimes feeling light headed and off balance and sometime more room spinning. The attacks occur several times a day and last a few minutes. Positions that worsen symptoms: any motion.  Associated ear symptoms: aural pressure and tinnitus although not necessarily associated with dizziness. Pressure is worse on the left. Associated symptoms include: headaches. Does have history of migraines. Gets injections. Used to have every other day but much better now. Does report associated photophobia and phonophobia at times during dizzy spells. Recent infections: none. Head trauma: denied. Drug ingestion prior to onset: none. Noise exposure: no occupational exposure.Previous workup: none. Previous treatment includes: meclizine.  Response to this treatment has been fair.      Review of Systems   Constitutional: Negative for chills, fever and weight loss.   HENT: Positive for tinnitus. Negative for ear discharge, ear pain, hearing loss and sore throat.    Eyes: Negative for blurred vision and double vision.   Respiratory: Negative for cough and shortness of breath.    Cardiovascular: Negative for chest pain and palpitations.   Gastrointestinal: Negative for nausea and vomiting.   Musculoskeletal: Negative for joint pain and neck pain.   Skin: Negative for rash.   Neurological: Positive for dizziness and headaches. Negative for focal weakness.   Psychiatric/Behavioral: Negative  for depression. The patient is not nervous/anxious.         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    Ovarian cyst     Pericardial cyst 7/26/2019    Psychiatric problem     Sleep difficulties     Surgical menopause 3/13/2020    Therapy     Vaginal yeast infection 9/26/2019       Past Surgical History:   Procedure Laterality Date    HYSTERECTOMY  03/2020    ROBOT-ASSISTED LAPAROSCOPIC ABDOMINAL HYSTERECTOMY USING DA ROMAIN XI N/A 3/9/2020    Procedure: XI ROBOTIC HYSTERECTOMY;  Surgeon: Jeff Kaufman MD;  Location: [address];  Service: Oncology;  Laterality: N/A;    ROBOT-ASSISTED LAPAROSCOPIC SALPINGO-OOPHORECTOMY Left 6/25/2019    Procedure: ROBOTIC SALPINGO-OOPHORECTOMY;  Surgeon: Marky Fox Jr., MD;  Location: [facility];  Service: OB/GYN;  Laterality: Left;    SALPINGOOPHORECTOMY Right 3/9/2020    Procedure: SALPINGO-OOPHORECTOMY  USO;  Surgeon: Jeff Kaufman MD;  Location: [address];  Service: Oncology;  Laterality: Right;    TONSILLECTOMY         Social History     Socioeconomic History    Marital status: Single     Spouse name: Not on file    Number of children: 1    Years of education: Not on file    Highest education level: Not on file   Occupational History    Occupation: Nurse circulator   Social Needs    Financial resource strain: Somewhat hard    Food insecurity     Worry: Never true     Inability: Never true    Transportation needs     Medical: No      Non-medical: No   Tobacco Use    Smoking status: Never Smoker    Smokeless tobacco: Never Used   Substance and Sexual Activity    Alcohol use: Yes     Frequency: Monthly or less     Drinks per session: 1 or 2     Binge frequency: Never     Comment: socially     Drug use: No    Sexual activity: Not Currently     Partners: Male     Birth control/protection: None, Condom   Lifestyle    Physical activity     Days per week: 2 days     Minutes per session: 30 min    Stress: Very much   Relationships    Social connections     Talks on phone: Once a week     Gets together: Once a week     Attends Denominational service: Not on file     Active member of club or organization: No     Attends meetings of clubs or organizations: Never     Relationship status: Never    Other Topics Concern    Are you pregnant or think you may be? No    Breast-feeding No    Patient feels they ought to cut down on drinking/drug use Not Asked    Patient annoyed by others criticizing their drinking/drug use Not Asked    Patient has felt bad or guilty about drinking/drug use Not Asked    Patient has had a drink/used drugs as an eye opener in the AM Not Asked   Social History Narrative    Not on file       Family History   Problem Relation Age of Onset    Diabetes Paternal Grandfather     Macular degeneration Maternal Grandmother     Cancer Maternal Grandfather         prostate cancer    Hypertension Father     Depression Father     Drug abuse Father     Hypertension Mother     Stroke Mother 60        ASD, PFO    Depression Mother     Cancer Other         uterine cancer     Uterine cancer Other     Depression Sister     Colon cancer Neg Hx     Ovarian cancer Neg Hx     Breast cancer Neg Hx     Glaucoma Neg Hx     Melanoma Neg Hx        Outpatient Encounter Medications as of 8/10/2020   Medication Sig Dispense Refill    ALPRAZolam (XANAX) 0.5 MG tablet Take 1 tablet (0.5 mg total) by mouth nightly as needed. 30  tablet 3    clindamycin (CLEOCIN T) 1 % lotion Apply topically 2 (two) times daily to affected areas of legs/buttocks. 60 mL 3    cyclobenzaprine (FLEXERIL) 5 MG tablet       estrogens, conjugated, (PREMARIN) 1.25 MG tablet Take 1 tablet (1.25 mg total) by mouth once daily. 30 tablet 11    galcanezumab-gnlm (EMGALITY PEN) 120 mg/mL PnIj Inject 120 mg into the skin every 30 days. 1 mL 11    meclizine (ANTIVERT) 12.5 mg tablet Take 1 tablet (12.5 mg total) by mouth 3 (three) times daily as needed for Dizziness. 90 tablet 2    progesterone (PROMETRIUM) 100 MG capsule Place 1 capsule (100 mg total) vaginally once daily. 21 capsule 6    traZODone (DESYREL) 50 MG tablet Take 1/2 pill as needed at bedtime for sleep. If 1/2 not enough, you may take 1 pill. If 1 pill not enough, you may take 2 pills. If 2 pills not enough, you may take 3 pills as needed at bedtime for sleep. 90 tablet 2    venlafaxine (EFFEXOR-XR) 75 MG 24 hr capsule Take 1 capsule (75 mg total) by mouth once daily. 30 capsule 11    [DISCONTINUED] cyclobenzaprine (FLEXERIL) 10 MG tablet Take 10 mg by mouth 3 (three) times daily as needed for Muscle spasms.      [DISCONTINUED] ibuprofen (ADVIL,MOTRIN) 600 MG tablet Take 1 tablet (600 mg total) by mouth every 6 (six) hours as needed for Pain. (Patient not taking: Reported on 4/9/2020) 30 tablet 1     No facility-administered encounter medications on file as of 8/10/2020.        Physical Exam:    Vitals:    08/10/20 1029   BP: (!) 141/91   Pulse: 91   Temp: 98.6 °F (37 °C)       Constitutional  General Appearance: well nourished, well-developed, alert, oriented, in no acute distress  Communication: ability, understanding, voice quality normal  Head and Face  Inspection: normocephalic, atraumatic, no scars, lesions or masses    Palpation: no stepoffs, sinus tenderness or masses  Parotid glands: no masses, stones, swelling or tenderness  Eyes  Ocular Motility / Alignment: normal alignment, motility, no  proptosis, enophthalmus or nystagmus  Conjunctiva: not injected  Eyelids: no entropion or ectropion, no edema  Ears  Hearing: speech reception thresholds grossly normal  External Ears: no auricle lesions, non-tender, mobile to palpation  Vestibular exam: no nystagmus, negative Romberg, negative Fakuda step test, negative head thrust, negative Primo-Hallpike bilaterally    Otoscopy:  Right Ear: no tympanic membrane lesions, perforations, or effusion, normal EAC  Left Ear: no tympanic membrane lesions, perforations, or effusion, normal EAC  Nose  External Nose: no lesions, tenderness, trauma or deformity  Intranasal Exam: no edema, erythema, discharge, mass or obstruction  Oral Cavity / Oropharynx  Lips: upper and lower lips pink and moist  Teeth: dentition good  Gingiva: healthy  Oral Mucosa: moist, no mucosal lesions  Floor of Mouth: normal, no lesions  Tongue: moist, normal mobility, no lesions  Palate: soft and hard palates without lesions or ulcers  Oropharynx: tonsils and walls without erythema, exudate, lesions, fullness or obstruction  Neck  Inspection and Palpation: no erythema, induration, emphysema, tenderness or masses  Larynx and Trachea: normal position and mobility   Thyroid: no tenderness, enlargement or nodules  Submandibular Glands: no masses or tenderness  Lymphatic:  Anterior, Posterior, Submandibular, Submental, Supraclavicular: no lymphadenopathy present  Chest / Respiratory  Chest: no stridor or retractions, normal effort and expansion  Cardiovascular:  Pulses: 2+ radial pulses, regular rhythm and rate  Auscultation: deferred  Neurological  Cranial Nerves: grossly intact  General: no focal deficits  Psychiatric  Orientation: oriented to time, place and person  Mood and Affect: no depression, anxiety or agitation  Extremities  Inspection: moves all extremities well    Procedures:  No notes on file    Pertinent Data:  ? LABS:   ? AUDIO:    ? PATH:  ? CULTURE:    I personally reviewed the following  pertinent data at today's visit: Audiogram. Interpretation by me - mild HF SNHL, normal type A tymps      Imaging:   ? XRAY:  ? Ultrasound:  ? CT Scan:  ? MRI Scan:  ? PET/CT Scan:    I personally reviewed the following images:    Miscellaneous:     [place] Sleepiness Scale-     Reflux Symptom Index (RSI) -       Summary of Outside Records:      Assessment and Plan:  Fran Higgins is a 39 y.o. female with     Vertigo  -     Basic vestibular evaluation; Future    Tinnitus of both ears    Hearing loss, sensorineural, high frequency, bilateral       I had a long discussion with the patient regarding their symptoms.  Dizziness, vertigo and disequilibrium are common symptoms reported by adults during visits to their doctors. They are all symptoms that can result from a peripheral vestibular disorder (a dysfunction of the balance organs of the inner ear) or central vestibular disorder (a dysfunction of one or more parts of the central nervous system that help process balance and spatial information).  There are also non-vestibular causes of dizziness, and dizziness can be linked to a wide array of problems such as blood-flow irregularities from cardiovascular problems and blood pressure fluctuations.  I would recommend a VNG for further diagnostic testing, and will contact the patient with further recommendations when that is complete. May be a migraine variant. Worried due to history of Meniere's but audiogram and symptoms not really consistent. No fluctuating hearing loss and fullness/tinnitus not correlated to dizzy spells. Also room spinning episodes only seem to last a few minutes. Will call with results of VNG to determine further workup and management. Stop meclizine as previously prescribed - advised to only take as needed for severe symptoms. Discussed vestibular suppressants and inhibition of compensation in long term use. Trial of Cawthorne exercises.          E. Grier de Lauréal, MD [facility]  Otolaryngology

## 2020-08-13 ENCOUNTER — OFFICE VISIT (OUTPATIENT)
Dept: PSYCHIATRY | Facility: CLINIC | Age: 40
End: 2020-08-13
Payer: COMMERCIAL

## 2020-08-13 DIAGNOSIS — C56.9 MUCINOUS TUMOR, OF LOW MALIGNANT POTENTIAL: ICD-10-CM

## 2020-08-13 DIAGNOSIS — F33.1 MAJOR DEPRESSIVE DISORDER, RECURRENT EPISODE, MODERATE WITH ANXIOUS DISTRESS: Primary | ICD-10-CM

## 2020-08-13 DIAGNOSIS — G47.09 OTHER INSOMNIA: ICD-10-CM

## 2020-08-13 PROCEDURE — 99999 PR PBB SHADOW E&M-EST. PATIENT-LVL II: CPT | Mod: PBBFAC,,, | Performed by: PSYCHOLOGIST

## 2020-08-13 PROCEDURE — 99999 PR PBB SHADOW E&M-EST. PATIENT-LVL II: ICD-10-PCS | Mod: PBBFAC,,, | Performed by: PSYCHOLOGIST

## 2020-08-13 PROCEDURE — 90837 PR PSYCHOTHERAPY W/PATIENT, 60 MIN: ICD-10-PCS | Mod: S$GLB,,, | Performed by: PSYCHOLOGIST

## 2020-08-13 PROCEDURE — 90837 PSYTX W PT 60 MINUTES: CPT | Mod: S$GLB,,, | Performed by: PSYCHOLOGIST

## 2020-08-13 NOTE — PROGRESS NOTES
INFORMED CONSENT: Fran Higgins   is known to this provider and identity was confirmed via NAME and .  The patient has been informed of the risks and benefits associated with engaging in psychotherapy, the handling of protected health information, the rights of privacy and the limits of confidentiality. The patient has also been informed of the importance of reporting any suicidal or homicidal ideation to this or any provider to ensure safety of all parties, and the Fran Higgins expressed understanding. The patient was agreeable to these terms and freely participates in individual psychotherapy.    PSYCHO-ONCOLOGY NOTE/ Individual Psychotherapy     Date: 2020   Site:  [facility]        Therapeutic Intervention: Met with patient.  Outpatient - Behavior modifying psychotherapy 60 min - CPT code 49721      Patient was last seen by me on 2020    Problem list  Patient Active Problem List   Diagnosis    BMI 50.0-59.9, adult    Depression    Left ovarian cyst    S/P RA Laparoscopic LSO    Mucinous tumor, of low malignant potential    Pericardial cyst    Scoliosis of cervical spine    Abnormal uterine bleeding (AUB)    Vaginal yeast infection    DUB (dysfunctional uterine bleeding)    Migraine with aura, not intractable    s/p robotic hysterectomy/RSO 3/9/20    Borderline epithelial neoplasm of ovary    Surgical menopause    Yeast vaginitis    Menopause    Dizziness    Galactorrhea in female- bilateral breast    Other insomnia    Major depressive disorder, recurrent episode, moderate with anxious distress       Chief complaint/reason for encounter: depression, anxiety and sleep   Met with patient to evaluate psychosocial adaptation to diagnosis/treatment/survivorship of Ovarian    Current Medications  Current Outpatient Medications   Medication    ALPRAZolam (XANAX) 0.5 MG tablet    clindamycin (CLEOCIN T) 1 % lotion    cyclobenzaprine (FLEXERIL) 5 MG tablet     estrogens, conjugated, (PREMARIN) 1.25 MG tablet    galcanezumab-gnlm (EMGALITY PEN) 120 mg/mL PnIj    meclizine (ANTIVERT) 12.5 mg tablet    progesterone (PROMETRIUM) 100 MG capsule    traZODone (DESYREL) 50 MG tablet    venlafaxine (EFFEXOR-XR) 75 MG 24 hr capsule     No current facility-administered medications for this visit.        Objective:  Fran Higgins arrived promptly for the session.   Ms. Higgins was independently ambulatory at the time of session. The patient was fully cooperative throughout the session.  Appearance: age appropriate, appropriately  dressed, adequately  groomed  Behavior/Cooperation: friendly and cooperative  Speech: normal in rate, volume, and tone and appropriate quality, quantity and organization of sentences  Mood: dysthymic  Affect: blunted  Thought Process: goal-directed, logical  Thought Content: normal,  No delusions or paranoia; did not appear to be responding to internal stimuli during the session  Orientation: grossly intact  Memory: Grossly intact  Attention Span/Concentration: Attends to session without distraction; reports no difficulty  Fund of Knowledge: average  Estimate of Intelligence: average from verbal skills and history  Cognition: grossly intact  Insight: patient has awareness of illness; good insight into own behavior and behavior of others  Judgment: the patient's behavior is adequate to circumstances    Interval history and content of current session: Patient met with Dr. Balderas and received an dose adjustment/addition for medication management of mood and sleep.  Patient has joined a gym and begin eating healtier with her daughter. Discussed feelings of guilt and work stress. Reports to be coping with moderate difficulty. Evaluated cognitive response, paying particular attention to negative intrusive thoughts of a persistent and detrimental nature. Thoughts of this type are in evidence with severe distress. Provided cognitive behavioral  therapy to address negative cognitions. Identified and evaluated psychosocial and environmental stressors secondary to diagnosis and treatment.  Examined proactive behaviors that may be implemented to minimize or ameliorate psychosocial stressors secondary to diagnosis and treatment.     Risk parameters:   Patient reports no suicidal ideation  Patient reports no homicidal ideation  Patient reports no self-injurious behavior  Patient reports no violent behavior   Safety needs:  None at this time      Verbal deficits: None     Patient's response to intervention:The patient's response to intervention is accepting.     Progress toward goals and other mental status changes:  The patient's progress toward goals is good.      Progress to date:Progress - Ongoing, but Slow      Goals from last visit: Attempted, partially met     Patient reported outcomes:    Distress Score    Distress Score: 9        Practical Problems Physical Problems   : No Appearance: No   Housing: No Bathing / Dressing: No   Insurance / Financial: No Breathing: No    Transportation: No  Changes in Urination: No    Work / School: Yes  Constipation: Yes   Treatment Decisions: No  Diarrhea: No     Eating: No    Family Problems Fatigue: Yes    Dealing with Children: No Feeling Swollen: No    Dealing with Partner: No Fevers: No    Ability to Have Children: No  Getting Around: No       Indigestion: Yes     Memory / Concentration: Yes   Emotional Problems Mouth Sores: No    Depression: Yes  Nausea: Yes    Fears: Yes  Nose Dry / Congested: No    Nervousness: Yes  Pain: Yes    Sadness: Yes Sexual: No    Worry: Yes Skin Dry / Itchy: No    Loss of Interest in Usual Activities: Yes Sleep: Yes     Substance Abuse: No    Spiritual/Religions Concerns Tingling in Hands / Feet: No   Spritual / Yarsani Concerns: No         Other Problems              PHQ ANSWERS    Q1. Little interest or pleasure in doing things: (P) Nearly every day (08/13/20 1117)  Q2.  Feeling down, depressed, or hopeless: (P) Nearly every day (08/13/20 1117)  Q3. Trouble falling or staying asleep, or sleeping too much: (P) Nearly every day (08/13/20 1117)  Q4. Feeling tired or having little energy: (P) Nearly every day (08/13/20 1117)  Q5. Poor appetite or overeating: (P) More than half the days (08/13/20 1117)  Q6. Feeling bad about yourself - or that you are a failure or have let yourself or your family down: (P) Nearly every day (08/13/20 1117)  Q7. Trouble concentrating on things, such as reading the newspaper or watching television: (P) More than half the days (08/13/20 1117)  Q8. Moving or speaking so slowly that other people could have noticed. Or the opposite - being so fidgety or restless that you have been moving around a lot more than usual: (P) More than half the days (08/13/20 1117)  Q9.      PHQ8 Score : (P) 21 (08/13/20 1117)  PHQ-9 Total Score: (P) 21 (08/13/20 1117)     JOSE MARIA-7 Answers    GAD7 8/13/2020   1. Feeling nervous, anxious, or on edge? 3   2. Not being able to stop or control worrying? 2   3. Worrying too much about different things? 2   4. Trouble relaxing? 3   5. Being so restless that it is hard to sit still? 3   6. Becoming easily annoyed or irritable? 2   7. Feeling afraid as if something awful might happen? 3   JOSE MARIA-7 Score 18     JOSE MARIA-7 Score: (P) 18  Interpretation: (P) Severe Anxiety     Client Strengths: verbal, intelligent, successful, good social support, good insight, commitment to wellness, strong kerri, strong cultural traditions     Diagnosis:     ICD-10-CM ICD-9-CM   1. Major depressive disorder, recurrent episode, moderate with anxious distress  F33.1 296.32   2. Mucinous tumor, of low malignant potential  D48.9 238.9   3. Other insomnia  G47.09 780.52     Treatment Plan:individual psychotherapy and medication management by physician  · Target symptoms: depression, anxiety , sleep  · Why chosen therapy is appropriate versus another modality: relevant to  diagnosis, evidence based practice  · Outcome monitoring methods: self-report, observation, checklist/rating scale  · Therapeutic intervention type: behavior modifying psychotherapy  · Prognosis: Good      Behavioral goals:    Exercise:   Stress management: Increase time with daughter. Begin transitioning to new apartment and developing a routine immediately upon moving in.    Social engagement:   Nutrition:   Smoking Cessation:   Therapy:    Return to clinic: 2 weeks      Length of Service (minutes direct face-to-face contact): 60    Brittani Mahan, PhD  [place] License #[id number]

## 2020-08-15 ENCOUNTER — TELEPHONE (OUTPATIENT)
Dept: PHARMACY | Facility: CLINIC | Age: 40
End: 2020-08-15

## 2020-08-27 ENCOUNTER — CLINICAL SUPPORT (OUTPATIENT)
Dept: HEMATOLOGY/ONCOLOGY | Facility: CLINIC | Age: 40
End: 2020-08-27
Payer: COMMERCIAL

## 2020-08-27 ENCOUNTER — OFFICE VISIT (OUTPATIENT)
Dept: PSYCHIATRY | Facility: CLINIC | Age: 40
End: 2020-08-27
Payer: COMMERCIAL

## 2020-08-27 VITALS — HEIGHT: 64 IN | WEIGHT: 293 LBS | BODY MASS INDEX: 50.02 KG/M2

## 2020-08-27 DIAGNOSIS — C56.9 MUCINOUS TUMOR, OF LOW MALIGNANT POTENTIAL: ICD-10-CM

## 2020-08-27 DIAGNOSIS — G47.09 OTHER INSOMNIA: ICD-10-CM

## 2020-08-27 DIAGNOSIS — Z71.3 NUTRITIONAL COUNSELING: Primary | ICD-10-CM

## 2020-08-27 DIAGNOSIS — F33.1 MAJOR DEPRESSIVE DISORDER, RECURRENT EPISODE, MODERATE WITH ANXIOUS DISTRESS: Primary | ICD-10-CM

## 2020-08-27 PROCEDURE — 90837 PR PSYCHOTHERAPY W/PATIENT, 60 MIN: ICD-10-PCS | Mod: S$GLB,,, | Performed by: PSYCHOLOGIST

## 2020-08-27 PROCEDURE — 90837 PSYTX W PT 60 MINUTES: CPT | Mod: S$GLB,,, | Performed by: PSYCHOLOGIST

## 2020-08-27 PROCEDURE — 99999 PR PBB SHADOW E&M-EST. PATIENT-LVL I: CPT | Mod: PBBFAC,,, | Performed by: PSYCHOLOGIST

## 2020-08-27 PROCEDURE — 99999 PR PBB SHADOW E&M-EST. PATIENT-LVL I: ICD-10-PCS | Mod: PBBFAC,,, | Performed by: PSYCHOLOGIST

## 2020-08-27 PROCEDURE — 99999 PR PBB SHADOW E&M-EST. PATIENT-LVL II: CPT | Mod: PBBFAC,,, | Performed by: DIETITIAN, REGISTERED

## 2020-08-27 PROCEDURE — 99999 PR PBB SHADOW E&M-EST. PATIENT-LVL II: ICD-10-PCS | Mod: PBBFAC,,, | Performed by: DIETITIAN, REGISTERED

## 2020-08-27 PROCEDURE — 97802 PR MED NUTR THER, 1ST, INDIV, EA 15 MIN: ICD-10-PCS | Mod: S$GLB,,, | Performed by: DIETITIAN, REGISTERED

## 2020-08-27 PROCEDURE — 97802 MEDICAL NUTRITION INDIV IN: CPT | Mod: S$GLB,,, | Performed by: DIETITIAN, REGISTERED

## 2020-08-27 NOTE — PROGRESS NOTES
INFORMED CONSENT: Fran Higgins   is known to this provider and identity was confirmed via NAME and .  The patient has been informed of the risks and benefits associated with engaging in psychotherapy, the handling of protected health information, the rights of privacy and the limits of confidentiality. The patient has also been informed of the importance of reporting any suicidal or homicidal ideation to this or any provider to ensure safety of all parties, and the Fran Higgins expressed understanding. The patient was agreeable to these terms and freely participates in individual psychotherapy.    PSYCHO-ONCOLOGY NOTE/ Individual Psychotherapy     Date: 2020   Site:  [facility]        Therapeutic Intervention: Met with patient.  Outpatient - Behavior modifying psychotherapy 60 min - CPT code 39818      Patient was last seen by me on 2020    Problem list  Patient Active Problem List   Diagnosis    BMI 50.0-59.9, adult    Depression    Left ovarian cyst    S/P RA Laparoscopic LSO    Mucinous tumor, of low malignant potential    Pericardial cyst    Scoliosis of cervical spine    Abnormal uterine bleeding (AUB)    Vaginal yeast infection    DUB (dysfunctional uterine bleeding)    Migraine with aura, not intractable    s/p robotic hysterectomy/RSO 3/9/20    Borderline epithelial neoplasm of ovary    Surgical menopause    Yeast vaginitis    Menopause    Dizziness    Galactorrhea in female- bilateral breast    Other insomnia    Major depressive disorder, recurrent episode, moderate with anxious distress       Chief complaint/reason for encounter: depression and anxiety   Met with patient to evaluate psychosocial adaptation to diagnosis/treatment/survivorship of Ovarian Neoplasm    Current Medications  Current Outpatient Medications   Medication    ALPRAZolam (XANAX) 0.5 MG tablet    clindamycin (CLEOCIN T) 1 % lotion    cyclobenzaprine (FLEXERIL) 5 MG tablet     estrogens, conjugated, (PREMARIN) 1.25 MG tablet    galcanezumab-gnlm (EMGALITY PEN) 120 mg/mL PnIj    meclizine (ANTIVERT) 12.5 mg tablet    progesterone (PROMETRIUM) 100 MG capsule    traZODone (DESYREL) 50 MG tablet    venlafaxine (EFFEXOR-XR) 75 MG 24 hr capsule     No current facility-administered medications for this visit.        Objective:  Fran Higgins arrived promptly for the session.  Ms. Higgins was independently ambulatory at the time of session. The patient was fully cooperative throughout the session.  Appearance: age appropriate, appropriately  dressed, adequately  groomed  Behavior/Cooperation: friendly and cooperative  Speech: normal in rate, volume, and tone and appropriate quality, quantity and organization of sentences  Mood: dysthymic  Affect: dysphoric  Thought Process: goal-directed, logical  Thought Content: normal,  No delusions or paranoia; did not appear to be responding to internal stimuli during the session  Orientation: grossly intact  Memory: Grossly intact  Attention Span/Concentration: Attends to session without distraction; reports no difficulty  Fund of Knowledge: average  Estimate of Intelligence: average from verbal skills and history  Cognition: grossly intact  Insight: patient has awareness of illness; good insight into own behavior and behavior of others  Judgment: the patient's behavior is adequate to circumstances    Interval history and content of current session: Joined the gym 8/10 has been 8 times, reported feeling better afterwards. Met with the RD today and got tips. Discussed anxious thoughts, which are most prominent in the morning. Currently enrolled in BSN program. Discussed current psychosocial stressors and eating habits. Reports to be coping with moderate difficulty. Evaluated cognitive response, paying particular attention to negative intrusive thoughts of a persistent and detrimental nature. Thoughts of this type are in evidence with severe  distress. Provided cognitive behavioral therapy to address negative cognitions. Identified and evaluated psychosocial and environmental stressors secondary to diagnosis and treatment.  Examined proactive behaviors that may be implemented to minimize or ameliorate psychosocial stressors secondary to diagnosis and treatment.     Risk parameters:   Patient reports no suicidal ideation  Patient reports no homicidal ideation  Patient reports no self-injurious behavior  Patient reports no violent behavior   Safety needs:  None at this time      Verbal deficits: None     Patient's response to intervention:The patient's response to intervention is accepting.     Progress toward goals and other mental status changes:  The patient's progress toward goals is good.      Progress to date:Progress as Expected      Goals from last visit: Met     Patient reported outcomes:    Distress Score    Distress Score: 8        Practical Problems Physical Problems   : No Appearance: Yes   Housing: No Bathing / Dressing: No   Insurance / Financial: Yes Breathing: No    Transportation: No  Changes in Urination: No    Work / School: Yes  Constipation: No   Treatment Decisions: No  Diarrhea: No     Eating: No    Family Problems Fatigue: Yes    Dealing with Children: Yes Feeling Swollen: No    Dealing with Partner: No Fevers: No    Ability to Have Children: No  Getting Around: No       Indigestion: Yes     Memory / Concentration: Yes   Emotional Problems Mouth Sores: No    Depression: Yes  Nausea: No    Fears: Yes  Nose Dry / Congested: No    Nervousness: Yes  Pain: Yes    Sadness: Yes Sexual: No    Worry: Yes Skin Dry / Itchy: No    Loss of Interest in Usual Activities: Yes Sleep: Yes     Substance Abuse: No    Spiritual/Religions Concerns Tingling in Hands / Feet: No   Spritual / Scientologist Concerns: No         Other Problems              PHQ ANSWERS    Q1. Little interest or pleasure in doing things: (P) Nearly every day (08/24/20  0936)  Q2. Feeling down, depressed, or hopeless: (P) Nearly every day (08/24/20 0936)  Q3. Trouble falling or staying asleep, or sleeping too much: (P) Nearly every day (08/24/20 0936)  Q4. Feeling tired or having little energy: (P) Nearly every day (08/24/20 0936)  Q5. Poor appetite or overeating: (P) More than half the days (08/24/20 0936)  Q6. Feeling bad about yourself - or that you are a failure or have let yourself or your family down: (P) More than half the days (08/24/20 0936)  Q7. Trouble concentrating on things, such as reading the newspaper or watching television: (P) More than half the days (08/24/20 0936)  Q8. Moving or speaking so slowly that other people could have noticed. Or the opposite - being so fidgety or restless that you have been moving around a lot more than usual: (P) More than half the days (08/24/20 0936)  Q9.  No SI.    PHQ8 Score : (P) 20 (08/24/20 0936)  PHQ-9 Total Score: (P) 20 (08/24/20 0936)     JOSE MARIA-7 Answers    GAD7 8/24/2020   1. Feeling nervous, anxious, or on edge? 2   2. Not being able to stop or control worrying? 2   3. Worrying too much about different things? 2   4. Trouble relaxing? 3   5. Being so restless that it is hard to sit still? 3   6. Becoming easily annoyed or irritable? 1   7. Feeling afraid as if something awful might happen? 1   JOSE MARIA-7 Score 14     JOSE MARIA-7 Score: (P) 14  Interpretation: (P) Moderate Anxiety     Client Strengths: verbal, intelligent, successful, good social support, good insight, commitment to wellness, strong kerri, strong cultural traditions     Diagnosis:     ICD-10-CM ICD-9-CM   1. Major depressive disorder, recurrent episode, moderate with anxious distress  F33.1 296.32   2. Mucinous tumor, of low malignant potential  D48.9 238.9   3. Other insomnia  G47.09 780.52       Treatment Plan:individual psychotherapy and consult psychiatrist for medication evaluation  · Target symptoms: depression, anxiety   · Why chosen therapy is appropriate versus  another modality: relevant to diagnosis, patient responds to this modality, evidence based practice  · Outcome monitoring methods: self-report, observation, checklist/rating scale  · Therapeutic intervention type: behavior modifying psychotherapy  · Prognosis: Good      Behavioral goals:    Exercise:   Stress management:   Social engagement:   Nutrition:   Smoking Cessation:   Therapy: Eat breakfast 4x in the week over the next 2 weeks.     Return to clinic: 2 weeks    Next Session:   SMART Goals  CBT Model  Unhelpful thinking Style Categories     Length of Service (minutes direct face-to-face contact): 60    Brittani Mahan, PhD  [place] License #[id number]

## 2020-08-27 NOTE — PROGRESS NOTES
"Oncology Nutrition Assessment for Medical Nutrition Therapy  Initial Visit    Fran Simmonsespack   1980    Referring Provider:  Brittani Mahan, PhD      Reason for Visit: Pt in for education and nutrition counseling     PMHx:   Past Medical History:   Diagnosis Date    Abnormal Pap smear of cervix 2013    Abnormal uterine bleeding (AUB) 9/13/2019    Amblyopia     Anxiety     Cervical scoliosis 1994    Severe    Depression     DUB (dysfunctional uterine bleeding) 1/30/2020    Fatigue     Galactorrhea in female- bilateral breast 4/9/2020    Galactorrhea of both breasts 7/9/2020    Hx of psychiatric care     Migraine with aura, not intractable 1/30/2020    Mucinous tumor, 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    Ovarian cyst     Pericardial cyst 7/26/2019    Psychiatric problem     Sleep difficulties     Surgical menopause 3/13/2020    Therapy     Vaginal yeast infection 9/26/2019       Nutrition Assessment    This is a 40 y.o.female with a medical diagnosis of mucinous tumor of low malignant potential of the right ovary.  Status post robotic assisted laparoscopic hysterectomy and BSO. Referred to nutrition for assistance with meal planning and weight loss. 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 She previously lost 100lb on her own before she had her daughter about 7 years ago. Weighed 174lb at this time. Was working out a lot and eating better. Reports she has had a lot of medical problems since then and didn't take time to take care of herself.      Work day- lunch only- chicken or tacos (leftovers from dinner  Does not eat or drink the rest of the day      Home days  coffee with cream and equal   Sometimes skips lunch   Drinks water only   Snacking on cheese/crackers, sweets     Back at the gym as of august 10th- brings daughter treadmill or swimming 30 min to 1 hour 2-3x per week    Weight:(!) 152.8 kg (336 lb 13.8 oz)  Height:5' 4" (1.626 " m)  BMI:Body mass index is 57.82 kg/m².   IBW: Ideal body weight: 54.7 kg (120 lb 9.5 oz)  Adjusted ideal body weight: 93.9 kg (207 lb 1.6 oz)    Usual BW: varies, this is around her heaviest weight   Weight Change: recent weight gain    Nutrition focused physical findings: well nourished, obese     Allergies: Patient has no known allergies.    Current Medications:    Current Outpatient Medications:     ALPRAZolam (XANAX) 0.5 MG tablet, Take 1 tablet (0.5 mg total) by mouth nightly as needed., Disp: 30 tablet, Rfl: 3    clindamycin (CLEOCIN T) 1 % lotion, Apply topically 2 (two) times daily to affected areas of legs/buttocks., Disp: 60 mL, Rfl: 3    cyclobenzaprine (FLEXERIL) 5 MG tablet, , Disp: , Rfl:     estrogens, conjugated, (PREMARIN) 1.25 MG tablet, Take 1 tablet (1.25 mg total) by mouth once daily., Disp: 30 tablet, Rfl: 11    galcanezumab-gnlm (EMGALITY PEN) 120 mg/mL PnIj, Inject 120 mg into the skin every 30 days., Disp: 1 mL, Rfl: 11    meclizine (ANTIVERT) 12.5 mg tablet, Take 1 tablet (12.5 mg total) by mouth 3 (three) times daily as needed for Dizziness., Disp: 90 tablet, Rfl: 2    progesterone (PROMETRIUM) 100 MG capsule, Place 1 capsule (100 mg total) vaginally once daily., Disp: 21 capsule, Rfl: 6    traZODone (DESYREL) 50 MG tablet, Take 1/2 pill as needed at bedtime for sleep. If 1/2 not enough, you may take 1 pill. If 1 pill not enough, you may take 2 pills. If 2 pills not enough, you may take 3 pills as needed at bedtime for sleep., Disp: 90 tablet, Rfl: 2    venlafaxine (EFFEXOR-XR) 75 MG 24 hr capsule, Take 1 capsule (75 mg total) by mouth once daily., Disp: 30 capsule, Rfl: 11    Food/medication interactions noted: avoid grapefruit     Vitamins/Supplements: none reported     Labs: Reviewed -no concerning nutrition related labs     Nutrition Diagnosis    Problem: excessive protein/calorie intake   Etiology (related to): dietary non-compliance  Signs/Symptoms (as evidenced by): BMI =  58    Nutrition Intervention    Nutrition Prescription   2150 Kcals (14kcal/kg)  122 g protein (0.8g/kg)   8086-2895 mL fluid (14-19mL/kg)    Recommendations:  Three regular meals daily   Breakfast- protein shake, oatmeal, or toast with peanut butter   Lunch- salads or pre-made healthy meals   Dinner- continue to cook regular meals  Add serving of vegetables to lunch and dinner   Increase water intake     Materials Provided/Reviewed wrote down all instructions/recommendations     Nutrition Monitoring and Evaluation    Monitor: weight and adherence to nutrition recommendations     Goals: weight loss 2lb per week    Motivation to change/anticipated barriers: no barriers, highly motivated to improve weight/health     Follow up In 4 weeks     Communication to referring provider/care team: note available in chart     Counseling time: 45 Minutes    Vannessa Mixon, MPH, RD, , LDN, FAND   [phone]

## 2020-08-31 NOTE — PROGRESS NOTES
History and Physical  [facility]  Department of Surgery    REFERRING PROVIDER: Jeff Kaufman Md  [address] [place] [address]    CHIEF COMPLAINT: bilateral nipple discharge galactorrhea    Subjective:      Fran Higgins is a 40 y.o. postmenopausal female referred for evaluation of discharge of the bilateral breast.  Discharge is noted to be milky, expression.  Discharge started 1 year ago. Diagnostics studies including  Mammogram and/or ultrasound were performed on 2020.  Patient was given BIRADS 1.    Patient does routinely do self breast exams.  Patient has noted a change on breast exam.  Patient denies to previous breast biopsy. Patient denies a personal history of breast cancer.    Does have history of borderline ovarian tumor treated by Dr. Kaufman.    GYN History:  Age of menarche was 11. Age of menopause was 40.  Patient reports hormonal therapy. Patient is . Age of first live birth was 32. Patient did breast feed x 2 years.    FAMILY History:  MGF prostate      Past Medical History:   Diagnosis Date    Abnormal Pap smear of cervix     Abnormal uterine bleeding (AUB) 2019    Amblyopia     Anxiety     Cervical scoliosis     Severe    Depression     DUB (dysfunctional uterine bleeding) 2020    Fatigue     Galactorrhea in female- bilateral breast 2020    Galactorrhea of both breasts 2020    Hx of psychiatric care     Migraine with aura, not intractable 2020    Mucinous tumor, of low malignant potential 2019    Ovarian cyst     Pericardial cyst 2019    Psychiatric problem     Sleep difficulties     Surgical menopause 3/13/2020    Therapy     Vaginal yeast infection 2019     Past Surgical History:   Procedure Laterality Date    HYSTERECTOMY  2020    ROBOT-ASSISTED LAPAROSCOPIC ABDOMINAL HYSTERECTOMY USING DA ROMAIN XI N/A 3/9/2020    Procedure: XI ROBOTIC HYSTERECTOMY;  Surgeon: Jeff Kaufman MD;   Location: [address];  Service: Oncology;  Laterality: N/A;    ROBOT-ASSISTED LAPAROSCOPIC SALPINGO-OOPHORECTOMY Left 6/25/2019    Procedure: ROBOTIC SALPINGO-OOPHORECTOMY;  Surgeon: Marky Fox Jr., MD;  Location: [facility];  Service: OB/GYN;  Laterality: Left;    SALPINGOOPHORECTOMY Right 3/9/2020    Procedure: SALPINGO-OOPHORECTOMY  USO;  Surgeon: Jeff Kaufman MD;  Location: [address];  Service: Oncology;  Laterality: Right;    TONSILLECTOMY       Current Outpatient Medications on File Prior to Visit   Medication Sig Dispense Refill    clindamycin (CLEOCIN T) 1 % lotion Apply topically 2 (two) times daily to affected areas of legs/buttocks. 60 mL 3    estrogens, conjugated, (PREMARIN) 1.25 MG tablet Take 1 tablet (1.25 mg total) by mouth once daily. 30 tablet 11    galcanezumab-gnlm (EMGALITY PEN) 120 mg/mL PnIj Inject 120 mg into the skin every 30 days. 1 mL 11    meclizine (ANTIVERT) 12.5 mg tablet Take 1 tablet (12.5 mg total) by mouth 3 (three) times daily as needed for Dizziness. 90 tablet 2    progesterone (PROMETRIUM) 100 MG capsule Place 1 capsule (100 mg total) vaginally once daily. 21 capsule 6     No current facility-administered medications on file prior to visit.      Social History     Socioeconomic History    Marital status: Single     Spouse name: Not on file    Number of children: 1    Years of education: Not on file    Highest education level: Not on file   Occupational History    Occupation: Nurse circulator   Social Needs    Financial resource strain: Somewhat hard    Food insecurity     Worry: Never true     Inability: Never true    Transportation needs     Medical: No     Non-medical: No   Tobacco Use    Smoking status: Never Smoker    Smokeless tobacco: Never Used   Substance and Sexual Activity    Alcohol use: Yes     Frequency: Monthly or less     Drinks per session: 1 or 2     Binge frequency: Never     Comment: socially     Drug use: No    Sexual  "activity: Not Currently     Partners: Male     Birth control/protection: None, Condom   Lifestyle    Physical activity     Days per week: 2 days     Minutes per session: 30 min    Stress: Very much   Relationships    Social connections     Talks on phone: Once a week     Gets together: Once a week     Attends Christianity service: Not on file     Active member of club or organization: No     Attends meetings of clubs or organizations: Never     Relationship status: Never    Other Topics Concern    Are you pregnant or think you may be? No    Breast-feeding No    Patient feels they ought to cut down on drinking/drug use Not Asked    Patient annoyed by others criticizing their drinking/drug use Not Asked    Patient has felt bad or guilty about drinking/drug use Not Asked    Patient has had a drink/used drugs as an eye opener in the AM Not Asked   Social History Narrative    Not on file     Family History   Problem Relation Age of Onset    Diabetes Paternal Grandfather     Macular degeneration Maternal Grandmother     Cancer Maternal Grandfather         prostate cancer    Hypertension Father     Depression Father     Drug abuse Father     Hypertension Mother     Stroke Mother 60        ASD, PFO    Depression Mother     Cancer Other         uterine cancer     Uterine cancer Other     Depression Sister     Colon cancer Neg Hx     Ovarian cancer Neg Hx     Breast cancer Neg Hx     Glaucoma Neg Hx     Melanoma Neg Hx        Review of Systems  Pertinent items are noted in HPI.       Objective:        Ht 5' 4" (1.626 m)   Wt (!) 151 kg (332 lb 14.3 oz)   LMP 03/09/2020 (Exact Date)   BMI 57.14 kg/m²     General Appearance:    Alert, cooperative, no distress, appears stated age   Head:    Normocephalic, without obvious abnormality, atraumatic   Eyes:    PERRL, lids normal   Neck:   Supple, symmetrical, no adenopathy   Lungs:     respirations unlabored; no obvious deformity   Chest Wall:    No " tenderness or deformity   Heart::   Regular rate and rhythm   Abdomen:     Soft, non-tender, nondistended   Extremities:   Extremities normal, atraumatic   Skin:   Skin color, texture, turgor normal, no rashes or lesions   Lymph nodes:   No Cervical or supraclavicular adenopathy   Neuro/Psych:   Alert and oriented, good judgement   BREAST exam:  Left: no masses, skin changes. Milky nipple discharge multiple ducts. no inverted nipple.  No axillary LAD  Right: no masses, skin changes. Milky nipple discharge mulitple ducts. no inverted nipple.  No axillary LAD      Radiology review: Images personally reviewed by me in the clinic.        Assessment:      Fran Higgins is a 40 y.o. postmenopausal female with bilateral nipple discharge.     Plan:   We discussed the options for management of nipple discharge.     This is consistent with physiologic discharge.  Prolactin level is normal.  Discussed possible psych med with normoprolactinemia, however she states this discharge started prior to her current psych meds.  She is struggling with acute menopause.  Will get thyroid function tests, since CBC, prolactin already performed.     Reassurance given.  F/u as needed.

## 2020-09-10 ENCOUNTER — OFFICE VISIT (OUTPATIENT)
Dept: PSYCHIATRY | Facility: CLINIC | Age: 40
End: 2020-09-10
Payer: COMMERCIAL

## 2020-09-10 DIAGNOSIS — F33.1 MAJOR DEPRESSIVE DISORDER, RECURRENT EPISODE, MODERATE WITH ANXIOUS DISTRESS: Primary | ICD-10-CM

## 2020-09-10 DIAGNOSIS — D39.10 BORDERLINE EPITHELIAL NEOPLASM OF OVARY: ICD-10-CM

## 2020-09-10 DIAGNOSIS — G47.09 OTHER INSOMNIA: ICD-10-CM

## 2020-09-10 DIAGNOSIS — C56.9 MUCINOUS TUMOR, OF LOW MALIGNANT POTENTIAL: ICD-10-CM

## 2020-09-10 PROCEDURE — 90837 PSYTX W PT 60 MINUTES: CPT | Mod: S$GLB,,, | Performed by: PSYCHOLOGIST

## 2020-09-10 PROCEDURE — 99999 PR PBB SHADOW E&M-EST. PATIENT-LVL II: CPT | Mod: PBBFAC,,, | Performed by: PSYCHOLOGIST

## 2020-09-10 PROCEDURE — 99999 PR PBB SHADOW E&M-EST. PATIENT-LVL II: ICD-10-PCS | Mod: PBBFAC,,, | Performed by: PSYCHOLOGIST

## 2020-09-10 PROCEDURE — 90837 PR PSYCHOTHERAPY W/PATIENT, 60 MIN: ICD-10-PCS | Mod: S$GLB,,, | Performed by: PSYCHOLOGIST

## 2020-09-10 NOTE — PROGRESS NOTES
INFORMED CONSENT: Fran Higgins   is known to this provider and identity was confirmed via NAME and .  The patient has been informed of the risks and benefits associated with engaging in psychotherapy, the handling of protected health information, the rights of privacy and the limits of confidentiality. The patient has also been informed of the importance of reporting any suicidal or homicidal ideation to this or any provider to ensure safety of all parties, and the Fran Higgins expressed understanding. The patient was agreeable to these terms and freely participates in individual psychotherapy.    PSYCHO-ONCOLOGY NOTE/ Individual Psychotherapy     Date: 9/10/2020   Site:  [facility]        Therapeutic Intervention: Met with patient.  Outpatient - Behavior modifying psychotherapy 60 min - CPT code 00743      Patient was last seen by me on 2020    Problem list  Patient Active Problem List   Diagnosis    BMI 50.0-59.9, adult    Depression    Left ovarian cyst    S/P RA Laparoscopic LSO    Mucinous tumor, of low malignant potential    Pericardial cyst    Scoliosis of cervical spine    Abnormal uterine bleeding (AUB)    Vaginal yeast infection    DUB (dysfunctional uterine bleeding)    Migraine with aura, not intractable    s/p robotic hysterectomy/RSO 3/9/20    Borderline epithelial neoplasm of ovary    Surgical menopause    Yeast vaginitis    Menopause    Dizziness    Galactorrhea in female- bilateral breast    Other insomnia    Major depressive disorder, recurrent episode, moderate with anxious distress       Chief complaint/reason for encounter: depression and anxiety   Met with patient to evaluate psychosocial adaptation to diagnosis/treatment/survivorship of Ovarian cancer    Current Medications  Current Outpatient Medications   Medication    ALPRAZolam (XANAX) 0.5 MG tablet    clindamycin (CLEOCIN T) 1 % lotion    cyclobenzaprine (FLEXERIL) 5 MG tablet     estrogens, conjugated, (PREMARIN) 1.25 MG tablet    galcanezumab-gnlm (EMGALITY PEN) 120 mg/mL PnIj    meclizine (ANTIVERT) 12.5 mg tablet    progesterone (PROMETRIUM) 100 MG capsule    traZODone (DESYREL) 50 MG tablet    venlafaxine (EFFEXOR-XR) 75 MG 24 hr capsule     No current facility-administered medications for this visit.        Objective:  Fran Higgins arrived promptly for the session.   Ms. Higgins was independently ambulatory at the time of session. The patient was fully cooperative throughout the session.  Appearance: age appropriate, appropriately  dressed, adequately  groomed  Behavior/Cooperation: friendly and cooperative  Speech: normal in rate, volume, and tone and appropriate quality, quantity and organization of sentences  Mood: dysthymic  Affect: dysphoric  Thought Process: goal-directed, logical  Thought Content: normal,  No delusions or paranoia; did not appear to be responding to internal stimuli during the session  Orientation: grossly intact  Memory: Grossly intact  Attention Span/Concentration: Attends to session without distraction; reports no difficulty  Fund of Knowledge: average  Estimate of Intelligence: average from verbal skills and history  Cognition: grossly intact  Insight: patient has awareness of illness; good insight into own behavior and behavior of others  Judgment: the patient's behavior is adequate to circumstances    Interval history and content of current session: Patient moved into her new apartment. Sleep has still been variable, and she crashed last night.  Anxiety decreased, depression still prominent. Patient has eaten breakfast daily for 2 weeks.   Discussed CBT model and introcuded to thought records and unhelpful thinking styles.. Reports to be coping with great difficulty. Evaluated cognitive response, paying particular attention to negative intrusive thoughts of a persistent and detrimental nature. Thoughts of this type are in evidence with  severe distress. Provided cognitive behavioral therapy to address negative cognitions. Identified and evaluated psychosocial and environmental stressors secondary to diagnosis and treatment.  Examined proactive behaviors that may be implemented to minimize or ameliorate psychosocial stressors secondary to diagnosis and treatment.     Risk parameters:   Patient reports no suicidal ideation  Patient reports no homicidal ideation  Patient reports no self-injurious behavior  Patient reports no violent behavior   Safety needs:  None at this time      Verbal deficits: None     Patient's response to intervention:The patient's response to intervention is accepting.     Progress toward goals and other mental status changes:  The patient's progress toward goals is good.      Progress to date:Progress as Expected      Goals from last visit: Met     Patient reported outcomes:    Distress Score    Distress Score: 9        Practical Problems Physical Problems   : No Appearance: Yes   Housing: No Bathing / Dressing: No   Insurance / Financial: Yes Breathing: No    Transportation: No  Changes in Urination: No    Work / School: Yes  Constipation: No   Treatment Decisions: No  Diarrhea: No     Eating: No    Family Problems Fatigue: Yes    Dealing with Children: No Feeling Swollen: No    Dealing with Partner: No Fevers: No    Ability to Have Children: No  Getting Around: No       Indigestion: Yes     Memory / Concentration: Yes   Emotional Problems Mouth Sores: No    Depression: Yes  Nausea: No    Fears: Yes  Nose Dry / Congested: No    Nervousness: Yes  Pain: No    Sadness: Yes Sexual: No    Worry: Yes Skin Dry / Itchy: No    Loss of Interest in Usual Activities: No Sleep: Yes     Substance Abuse: No    Spiritual/Religions Concerns Tingling in Hands / Feet: No   Spritual / Synagogue Concerns: No         Other Problems            PHQ ANSWERS    Q1. Little interest or pleasure in doing things: (P) More than half the days  (09/09/20 2134)  Q2. Feeling down, depressed, or hopeless: (P) More than half the days (09/09/20 2134)  Q3. Trouble falling or staying asleep, or sleeping too much: (P) Nearly every day (09/09/20 2134)  Q4. Feeling tired or having little energy: (P) Nearly every day (09/09/20 2134)  Q5. Poor appetite or overeating: (P) Nearly every day (09/09/20 2134)  Q6. Feeling bad about yourself - or that you are a failure or have let yourself or your family down: (P) More than half the days (09/09/20 2134)  Q7. Trouble concentrating on things, such as reading the newspaper or watching television: (P) Several days (09/09/20 2134)  Q8. Moving or speaking so slowly that other people could have noticed. Or the opposite - being so fidgety or restless that you have been moving around a lot more than usual: (P) More than half the days (09/09/20 2134)  Q9.  No SI    PHQ8 Score : (P) 18 (09/09/20 2134)  PHQ-9 Total Score: (P) 18 (09/09/20 2134)     JOSE MARIA-7 Answers    GAD7 9/9/2020   1. Feeling nervous, anxious, or on edge? 1   2. Not being able to stop or control worrying? 1   3. Worrying too much about different things? 1   4. Trouble relaxing? 1   5. Being so restless that it is hard to sit still? 1   6. Becoming easily annoyed or irritable? 2   7. Feeling afraid as if something awful might happen? 1   JOSE MARIA-7 Score 8     JOSE MARIA-7 Score: (P) 8  Interpretation: (P) Mild Anxiety     Client Strengths: verbal, intelligent, successful, good social support, good insight, commitment to wellness, strong kerri, strong cultural traditions     Diagnosis:     ICD-10-CM ICD-9-CM   1. Major depressive disorder, recurrent episode, moderate with anxious distress  F33.1 296.32   2. 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 Mucinous tumor, of low malignant potential  D48.9 238.9   4. Other insomnia  G47.09 780.52     Treatment Plan:individual psychotherapy and consult psychiatrist for medication evaluation  · Target symptoms: depression,  anxiety   · Why chosen therapy is appropriate versus another modality: relevant to diagnosis, patient responds to this modality, evidence based practice  · Outcome monitoring methods: self-report, observation, checklist/rating scale  · Therapeutic intervention type: behavior modifying psychotherapy  · Prognosis: Good      Behavioral goals:    Exercise:   Stress management: Establish daily routine in new living situation; Set daughter's bedtime at 8:30 for 3xs a week over the next 2 weeks.   Social engagement:   Nutrition:   Smoking Cessation:   Therapy: Daily breakfast     Return to clinic: 2 weeks    Next Session:   Complete thought records and Identify Unhelpful thinking styles.     Length of Service (minutes direct face-to-face contact): 60    Brittani Mahan, PhD  [place] License #[id number]

## 2020-09-14 ENCOUNTER — CLINICAL SUPPORT (OUTPATIENT)
Dept: AUDIOLOGY | Facility: CLINIC | Age: 40
End: 2020-09-14
Payer: COMMERCIAL

## 2020-09-14 DIAGNOSIS — H81.391 PERIPHERAL VERTIGO, RIGHT: Primary | ICD-10-CM

## 2020-09-14 PROCEDURE — 92537 PR CALORIC VSTBLR TEST W/REC BITHERMAL: ICD-10-PCS | Mod: S$GLB,,, | Performed by: AUDIOLOGIST

## 2020-09-14 PROCEDURE — 92537 CALORIC VSTBLR TEST W/REC: CPT | Mod: S$GLB,,, | Performed by: AUDIOLOGIST

## 2020-09-14 PROCEDURE — 92540 PR VESTIBULAR EVAL NYSTAG FOVL&PERPH STIM OSCIL TRACKING: ICD-10-PCS | Mod: S$GLB,,, | Performed by: AUDIOLOGIST

## 2020-09-14 PROCEDURE — 92540 BASIC VESTIBULAR EVALUATION: CPT | Mod: S$GLB,,, | Performed by: AUDIOLOGIST

## 2020-09-14 NOTE — Clinical Note
Hi Dr. Peres,  Please see the results of today's VNG evaluation. Please let me know if I can provide any additional information.    Kindly,  Shannon Ling, CCC-A

## 2020-09-14 NOTE — PROGRESS NOTES
VNG/Posturography Evaluation    Referring physician:  Dr. Peres    40 y.o. female complains of lightheadedness, imbalance, and nausea.  Symptoms are not provoked by any specific movement and reportedly last for approximately 30 seconds.  Fran stated that the epiosdes have been recurring over the past 6 months, since having a hysterectomy.  She denied taking any medication that would affect today's test results.    Gaze nystagmus was absent.    Oculomotor function was normal.    Spontaneous nystagmus was absent.    The head-hanging left Hallpike was negative.    The head-hanging right Hallpike was negative.    Static positional nystagmus was absent.    Unilateral weakness: 22% (right)  Directional preponderance 39% (left-beating)    RW: 6 d/s  LW: 17 d/s  RC: 8 d/s   d/s    Fixation suppression was normal.    Impression: VNG results suggest a right peripheral vestibular abnormality.    Recommend: 1. An at-home trial with C[place] exercises to help reduce subjective symptoms.  A printed copy of these exercises was provided to Ms. Kellerack at today's appointment.  2. Follow-up with Dr. Peres to review the results of today's evaluation.

## 2020-09-16 ENCOUNTER — PATIENT MESSAGE (OUTPATIENT)
Dept: OTOLARYNGOLOGY | Facility: CLINIC | Age: 40
End: 2020-09-16

## 2020-09-16 ENCOUNTER — TELEPHONE (OUTPATIENT)
Dept: OTOLARYNGOLOGY | Facility: CLINIC | Age: 40
End: 2020-09-16

## 2020-09-16 DIAGNOSIS — H81.399 OTHER PERIPHERAL VERTIGO, UNSPECIFIED EAR: ICD-10-CM

## 2020-09-16 DIAGNOSIS — H81.91 PERIPHERAL VESTIBULOPATHY, RIGHT: Primary | ICD-10-CM

## 2020-09-16 NOTE — PROGRESS NOTES
Spoke to patient regarding results. Discussed options of vestibular therapy, home exercises, MRI. Patient would like to proceed to with MRI and therapy. Orders place.

## 2020-09-16 NOTE — TELEPHONE ENCOUNTER
Left voicemail for patient to return my call to schedule labs. Also sending portal message.    ----- Message from Joselin Peres MD sent at 9/16/2020 11:09 AM CDT -----   Please assist with scheduling MRI for patient

## 2020-09-17 ENCOUNTER — TELEPHONE (OUTPATIENT)
Dept: PHARMACY | Facility: CLINIC | Age: 40
End: 2020-09-17

## 2020-09-24 ENCOUNTER — HOSPITAL ENCOUNTER (OUTPATIENT)
Dept: RADIOLOGY | Facility: HOSPITAL | Age: 40
Discharge: HOME | End: 2020-09-24
Attending: OTOLARYNGOLOGY
Payer: COMMERCIAL

## 2020-09-24 DIAGNOSIS — H81.91 PERIPHERAL VESTIBULOPATHY, RIGHT: ICD-10-CM

## 2020-09-24 PROCEDURE — 70553 MRI BRAIN STEM W/O & W/DYE: CPT | Mod: 26,,, | Performed by: RADIOLOGY

## 2020-09-24 PROCEDURE — A9585 GADOBUTROL INJECTION: HCPCS | Performed by: OTOLARYNGOLOGY

## 2020-09-24 PROCEDURE — 25500020 PHARM REV CODE 255: Performed by: OTOLARYNGOLOGY

## 2020-09-24 PROCEDURE — 70553 MRI IAC/TEMPORAL BONES W W/O CONTRAST: ICD-10-PCS | Mod: 26,,, | Performed by: RADIOLOGY

## 2020-09-24 PROCEDURE — 70553 MRI BRAIN STEM W/O & W/DYE: CPT | Mod: TC

## 2020-09-24 RX ORDER — GADOBUTROL 604.72 MG/ML
10 INJECTION INTRAVENOUS
Status: COMPLETED | OUTPATIENT
Start: 2020-09-24 | End: 2020-09-24

## 2020-09-24 RX ADMIN — GADOBUTROL 10 ML: 604.72 INJECTION INTRAVENOUS at 04:09

## 2020-09-25 ENCOUNTER — TELEPHONE (OUTPATIENT)
Dept: PHARMACY | Facility: CLINIC | Age: 40
End: 2020-09-25

## 2020-09-28 ENCOUNTER — PATIENT MESSAGE (OUTPATIENT)
Dept: OTOLARYNGOLOGY | Facility: CLINIC | Age: 40
End: 2020-09-28

## 2020-09-28 ENCOUNTER — TELEPHONE (OUTPATIENT)
Dept: OTOLARYNGOLOGY | Facility: CLINIC | Age: 40
End: 2020-09-28

## 2020-09-28 DIAGNOSIS — H81.91 PERIPHERAL VESTIBULOPATHY, RIGHT: Primary | ICD-10-CM

## 2020-09-28 NOTE — TELEPHONE ENCOUNTER
Called patient to get CT scheduled. Pt did not answer and a VM was left asking to return my call or send a message on portal with day/time/location that works best for her.     ----- Message from Joselin Peres MD sent at 9/28/2020  8:24 AM CDT -----   Please assist with scheduling CT for patient

## 2020-10-05 ENCOUNTER — HOSPITAL ENCOUNTER (OUTPATIENT)
Dept: RADIOLOGY | Facility: HOSPITAL | Age: 40
Discharge: HOME | End: 2020-10-05
Attending: OTOLARYNGOLOGY
Payer: COMMERCIAL

## 2020-10-05 DIAGNOSIS — H81.91 PERIPHERAL VESTIBULOPATHY, RIGHT: ICD-10-CM

## 2020-10-05 PROCEDURE — 70480 CT ORBIT/EAR/FOSSA W/O DYE: CPT | Mod: TC

## 2020-10-05 PROCEDURE — 70480 CT ORBIT/EAR/FOSSA W/O DYE: CPT | Mod: 26,,, | Performed by: RADIOLOGY

## 2020-10-05 PROCEDURE — 70480 CT TEMPORAL BONE WITHOUT CONTRAST: ICD-10-PCS | Mod: 26,,, | Performed by: RADIOLOGY

## 2020-10-06 ENCOUNTER — PATIENT MESSAGE (OUTPATIENT)
Dept: OTOLARYNGOLOGY | Facility: CLINIC | Age: 40
End: 2020-10-06

## 2020-10-08 ENCOUNTER — OFFICE VISIT (OUTPATIENT)
Dept: PSYCHIATRY | Facility: CLINIC | Age: 40
End: 2020-10-08
Payer: COMMERCIAL

## 2020-10-08 ENCOUNTER — PATIENT MESSAGE (OUTPATIENT)
Dept: PSYCHIATRY | Facility: CLINIC | Age: 40
End: 2020-10-08

## 2020-10-08 VITALS
BODY MASS INDEX: 58.73 KG/M2 | SYSTOLIC BLOOD PRESSURE: 168 MMHG | WEIGHT: 293 LBS | DIASTOLIC BLOOD PRESSURE: 92 MMHG | HEART RATE: 112 BPM

## 2020-10-08 DIAGNOSIS — F33.41 RECURRENT MAJOR DEPRESSIVE DISORDER IN PARTIAL REMISSION: Primary | ICD-10-CM

## 2020-10-08 PROCEDURE — 99214 PR OFFICE/OUTPT VISIT, EST, LEVL IV, 30-39 MIN: ICD-10-PCS | Mod: S$GLB,,, | Performed by: SPECIALIST

## 2020-10-08 PROCEDURE — 99999 PR PBB SHADOW E&M-EST. PATIENT-LVL II: CPT | Mod: PBBFAC,,, | Performed by: SPECIALIST

## 2020-10-08 PROCEDURE — 99999 PR PBB SHADOW E&M-EST. PATIENT-LVL II: ICD-10-PCS | Mod: PBBFAC,,, | Performed by: SPECIALIST

## 2020-10-08 PROCEDURE — 99214 OFFICE O/P EST MOD 30 MIN: CPT | Mod: S$GLB,,, | Performed by: SPECIALIST

## 2020-10-08 PROCEDURE — 3008F PR BODY MASS INDEX (BMI) DOCUMENTED: ICD-10-PCS | Mod: CPTII,S$GLB,, | Performed by: SPECIALIST

## 2020-10-08 PROCEDURE — 3008F BODY MASS INDEX DOCD: CPT | Mod: CPTII,S$GLB,, | Performed by: SPECIALIST

## 2020-10-08 RX ORDER — VENLAFAXINE HYDROCHLORIDE 150 MG/1
CAPSULE, EXTENDED RELEASE ORAL
Qty: 30 CAPSULE | Refills: 2 | Status: SHIPPED | OUTPATIENT
Start: 2020-10-08 | End: 2021-01-12 | Stop reason: SDUPTHER

## 2020-10-08 NOTE — PROGRESS NOTES
Outpatient Psychiatry Follow-Up Visit (MD/NP)    10/8/2020    Clinical Status of Patient:  Outpatient (Ambulatory)    Chief Complaint:  Fran Higgins is a 40 y.o. female who presents today for follow-up of depression.  Met with patient.      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 Previously on prozac, lexapro, now effexor 37.5 mg with perissitent symptoms. Pt was diagnosed with reucrrent MDD, moderate symptoms. Her effexor wasd increased from 37.5 to effexor xr 75 mg.   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 She is going to do balance therapy with PT for this.   At night she feels dizzy while she is lying down, and this intereres with her sleep.  Eh nix tr trazodone for sleep, but it gave her a HA. She uses 0. 5mg xanax as needed for insomnia. That works better than the trazodone. She onlyuses it 2 nights per wee. She has tried melatonin but it gave her ngihtmares.    Bob has not had a sleep study- this might be a consideration for future.   She will ask her ENT about which anxiolytics help with treating dizziness. The bdz could be increased to 3 nighte per week, and/or neurontin could be added to cross cover for insomnia, dizziness to prevent bdz tolerance. She will send me a mychart note to FU.   Suggest-   Increase effexor xr to 150 mg- to address residual depressive sx and to get pt fully into remission  Fu with the nighttime regimen adjustments: ? Adding neurontin? She will ask ENT.   FU 4 weeks. At that time pt can decide if primary care is easier to prescribe her meds.     She is safe for Op treatment, and knows to come to the ER for new problems, suicidal thoughts or intolerable side effects.      Psychotherapy:  · Target symptoms: depression  · Why chosen therapy is appropriate versus another modality: relevant to diagnosis  · Outcome monitoring methods: self-report  · Therapeutic intervention type: supportive psychotherapy  · Topics discussed/themes: work stress  · The patient's response to the intervention is accepting. The patient's progress toward treatment goals is excellent.   · Duration of intervention: 10 minutes.    Review of Systems   · PSYCHIATRIC: Pertinant items are noted in the narrative.    Past Medical, Family and Social History: The patient's past medical, family and social history have been reviewed and updated as appropriate within the electronic medical record - see encounter notes.    Compliance: yes    Side effects: None    Risk Parameters:  Patient reports no suicidal ideation  Patient reports no homicidal ideation  Patient reports no self-injurious behavior  Patient reports no violent behavior    Exam (detailed: at least 9 elements; comprehensive: all 15 elements)   Constitutional  Vitals:  Most recent vital signs, dated less than 90 days prior to this appointment, were reviewed.   Vitals:    10/08/20 0906   BP: (!) 168/92   Pulse: (!) 112   Weight: (!) 155.2 kg (342 lb 2.5 oz)        General:  unremarkable, age appropriate, casually dressed, neatly groomed, obese     Musculoskeletal  Muscle Strength/Tone:  not examined   Gait & Station:  non-ataxic     Psychiatric  Speech:  no latency; no press, spontaneous   Mood & Affect:  euthymic  congruent and appropriate   Thought Process:  normal and logical   Associations:  intact   Thought Content:  normal, no suicidality, no homicidality, delusions, or paranoia   Insight:  has awareness of illness   Judgement: behavior is adequate to circumstances   Orientation:  grossly intact, person, place, situation, time/date   Memory: intact for content of interview, not tested   Language: grossly intact, not tested   Attention Span &  Concentration:  able to focus   Fund of Knowledge:  intact and appropriate to age and level of education     Assessment and Diagnosis   Status/Progress: Based on the examination today, the patient's problem(s) is/are improved.  New problems have not been presented today.   Lack of compliance are not complicating management of the primary condition.  There are no active rule-out diagnoses for this patient at this time.     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   New dizziness- ear bone related issue- anxiolytics help, continue xanax 0.5 mg 3 nights per week; consider neurontin HS for sleep and dizziness. Pt to FU with pT.   She is safe for OP management  She is aware to go to ER fur urgent sisues, and to U with me by mychart for non urgent issues.   Noted today pt's bp is elevated- this is a risk for her 150 mg effexor- she will see her PCP in a few weeks and should d/w her PCP. Roshni shaver monitor her BP at home starting a few days after she takes 150 mg. Pt is not on antihypertensive med at this time.      Recurrent MDD, in parital remission, in treatment  EFFEXOR  mg  Xanax 0.5 mg HS prn 3 nights /week  Obesity  S/p bilat ooperectomy, [facility]AN[facility]    Intervention/Counseling/Treatment Plan   · Medication Management: Continue current medications.      Return to Clinic: 1 month

## 2020-10-15 ENCOUNTER — LAB VISIT (OUTPATIENT)
Dept: LAB | Facility: HOSPITAL | Age: 40
End: 2020-10-15
Attending: OBSTETRICS & GYNECOLOGY
Payer: COMMERCIAL

## 2020-10-15 ENCOUNTER — OFFICE VISIT (OUTPATIENT)
Dept: GYNECOLOGIC ONCOLOGY | Facility: CLINIC | Age: 40
End: 2020-10-15
Payer: COMMERCIAL

## 2020-10-15 VITALS
WEIGHT: 293 LBS | BODY MASS INDEX: 58.28 KG/M2 | HEART RATE: 83 BPM | DIASTOLIC BLOOD PRESSURE: 81 MMHG | SYSTOLIC BLOOD PRESSURE: 136 MMHG

## 2020-10-15 DIAGNOSIS — C56.9 MUCINOUS TUMOR, OF LOW MALIGNANT POTENTIAL: ICD-10-CM

## 2020-10-15 DIAGNOSIS — C56.9 MUCINOUS TUMOR, OF LOW MALIGNANT POTENTIAL: Primary | ICD-10-CM

## 2020-10-15 LAB — CEA SERPL-MCNC: 1.9 NG/ML (ref 0–5)

## 2020-10-15 PROCEDURE — 99999 PR PBB SHADOW E&M-EST. PATIENT-LVL III: ICD-10-PCS | Mod: PBBFAC,,, | Performed by: OBSTETRICS & GYNECOLOGY

## 2020-10-15 PROCEDURE — 99214 OFFICE O/P EST MOD 30 MIN: CPT | Mod: S$GLB,,, | Performed by: OBSTETRICS & GYNECOLOGY

## 2020-10-15 PROCEDURE — 99214 PR OFFICE/OUTPT VISIT, EST, LEVL IV, 30-39 MIN: ICD-10-PCS | Mod: S$GLB,,, | Performed by: OBSTETRICS & GYNECOLOGY

## 2020-10-15 PROCEDURE — 36415 COLL VENOUS BLD VENIPUNCTURE: CPT

## 2020-10-15 PROCEDURE — 99999 PR PBB SHADOW E&M-EST. PATIENT-LVL III: CPT | Mod: PBBFAC,,, | Performed by: OBSTETRICS & GYNECOLOGY

## 2020-10-15 PROCEDURE — 3008F BODY MASS INDEX DOCD: CPT | Mod: CPTII,S$GLB,, | Performed by: OBSTETRICS & GYNECOLOGY

## 2020-10-15 PROCEDURE — 82378 CARCINOEMBRYONIC ANTIGEN: CPT

## 2020-10-15 PROCEDURE — 3008F PR BODY MASS INDEX (BMI) DOCUMENTED: ICD-10-PCS | Mod: CPTII,S$GLB,, | Performed by: OBSTETRICS & GYNECOLOGY

## 2020-10-15 NOTE — PROGRESS NOTES
Subjective:       Patient ID: Fran Higgins is a 40 y.o. female.    Chief Complaint: Ovarian Cancer (3mth f/u)    HPI     Patient comes in today for follow-up mucinous tumor of low malignant potential of the right ovary.  Status post robotic assisted laparoscopic hysterectomy and BSO as detailed below.    After completion hysterectomy and RSO she problems with depression and hormone replacement. Seen by Katia Mahan and Sherrie for depression. On  venlafaxine. Depression is better.   VMS are better but she stopped progesterone to lessen her risk for breast cancer.     Oct 2020: CEA: 1.9.     Her oncologic history is:  Mar 2020: Robotic assisted laparoscopic hysterectomy and RSO on 03/09/2020..  She has a history of mucinous tumor of low malignant potential of the left ovary status post LSO by Dr. Marky Fox in Jun 2019.       Review of Systems   Constitutional: Negative for chills, fatigue and fever.   Respiratory: Negative for cough, shortness of breath and wheezing.    Cardiovascular: Negative for chest pain, palpitations and leg swelling.   Gastrointestinal: Negative for abdominal pain, constipation, diarrhea, nausea and vomiting.   Genitourinary: Negative for difficulty urinating, dysuria, frequency, genital sores, hematuria, urgency, vaginal bleeding, vaginal discharge and vaginal pain.   Musculoskeletal: Positive for back pain (chronic) and neck pain.   Neurological: Negative for weakness.   Hematological: Negative for adenopathy. Does not bruise/bleed easily.   Psychiatric/Behavioral: The patient is not nervous/anxious.        Objective:   /81   Pulse 83   Wt (!) 154 kg (339 lb 8.1 oz)   LMP 03/09/2020 (Exact Date)   BMI 58.28 kg/m²      Physical Exam  Constitutional:       Appearance: She is well-developed.   HENT:      Head: Normocephalic and atraumatic.   Eyes:      General: No scleral icterus.  Neck:      Musculoskeletal: Neck supple.      Thyroid: No thyroid mass or thyromegaly.       Trachea: No tracheal deviation.   Cardiovascular:      Rate and Rhythm: Normal rate and regular rhythm.   Pulmonary:      Effort: Pulmonary effort is normal.      Breath sounds: Normal breath sounds. No wheezing.   Abdominal:      General: There is no distension.      Palpations: Abdomen is soft. There is no mass.      Tenderness: There is no abdominal tenderness. There is no guarding or rebound.   Genitourinary:     Comments: Bimanual exam:  Vulva: no lesions. Normal appearance  Urethra: Normal size and location. No lesions  Bladder: No masses or tenderness.  Vagina: normal mucosa. No lesion  Cervix: absent.   Uterus: absent.  Adnexa: no masses.  Rectovaginal: Not performed    Musculoskeletal:         General: No tenderness.   Lymphadenopathy:      Cervical: No cervical adenopathy.      Upper Body:      Right upper body: No supraclavicular adenopathy.      Left upper body: No supraclavicular adenopathy.   Skin:     General: Skin is warm and dry.   Neurological:      Mental Status: She is alert and oriented to person, place, and time.   Psychiatric:         Behavior: Behavior normal.         Thought Content: Thought content normal.         Judgment: Judgment normal.         Assessment:       1. Mucinous tumor, of low malignant potential        Plan:   Mucinous tumor, of low malignant potential   LUZMARIA    RTC in 3 months.   -     CEA; Future; Expected date: 01/15/2021

## 2020-10-27 ENCOUNTER — SPECIALTY PHARMACY (OUTPATIENT)
Dept: PHARMACY | Facility: CLINIC | Age: 40
End: 2020-10-27

## 2020-10-27 ENCOUNTER — PATIENT MESSAGE (OUTPATIENT)
Dept: PSYCHIATRY | Facility: CLINIC | Age: 40
End: 2020-10-27

## 2020-11-12 ENCOUNTER — OFFICE VISIT (OUTPATIENT)
Dept: PSYCHIATRY | Facility: CLINIC | Age: 40
End: 2020-11-12
Payer: COMMERCIAL

## 2020-11-12 DIAGNOSIS — F33.41 MAJOR DEPRESSIVE DISORDER, RECURRENT EPISODE, IN PARTIAL REMISSION WITH ANXIOUS DISTRESS: Primary | ICD-10-CM

## 2020-11-12 PROCEDURE — 90834 PR PSYCHOTHERAPY W/PATIENT, 45 MIN: ICD-10-PCS | Mod: S$GLB,,, | Performed by: PSYCHOLOGIST

## 2020-11-12 PROCEDURE — 99999 PR PBB SHADOW E&M-EST. PATIENT-LVL I: ICD-10-PCS | Mod: PBBFAC,,, | Performed by: PSYCHOLOGIST

## 2020-11-12 PROCEDURE — 90834 PSYTX W PT 45 MINUTES: CPT | Mod: S$GLB,,, | Performed by: PSYCHOLOGIST

## 2020-11-12 PROCEDURE — 99999 PR PBB SHADOW E&M-EST. PATIENT-LVL I: CPT | Mod: PBBFAC,,, | Performed by: PSYCHOLOGIST

## 2020-11-12 NOTE — PROGRESS NOTES
INFORMED CONSENT: Fran Higgins   is known to this provider and identity was confirmed via NAME and .  The patient has been informed of the risks and benefits associated with engaging in psychotherapy, the handling of protected health information, the rights of privacy and the limits of confidentiality. The patient has also been informed of the importance of reporting any suicidal or homicidal ideation to this or any provider to ensure safety of all parties, and the Fran Higgins expressed understanding. The patient was agreeable to these terms and freely participates in individual psychotherapy.    PSYCHO-ONCOLOGY NOTE/ Individual Psychotherapy     Date: 2020   Site:  [facility]        Therapeutic Intervention: Met with patient.  Outpatient - Behavior modifying psychotherapy 45 min - CPT code 06206      Patient was last seen by me on 9/10/2020    Problem list  Patient Active Problem List   Diagnosis    BMI 50.0-59.9, adult    Depression    Left ovarian cyst    S/P RA Laparoscopic LSO    Mucinous tumor, of low malignant potential    Pericardial cyst    Scoliosis of cervical spine    Abnormal uterine bleeding (AUB)    Vaginal yeast infection    DUB (dysfunctional uterine bleeding)    Migraine with aura, not intractable    s/p robotic hysterectomy/RSO 3/9/20    Borderline epithelial neoplasm of ovary    Surgical menopause    Yeast vaginitis    Menopause    Dizziness    Galactorrhea in female- bilateral breast    Other insomnia    Major depressive disorder, recurrent episode, moderate with anxious distress       Chief complaint/reason for encounter: depression and anxiety   Met with patient to evaluate psychosocial adaptation to diagnosis/treatment/survivorship of  Mucinous    Current Medications  Current Outpatient Medications   Medication    ALPRAZolam (XANAX) 0.5 MG tablet    clindamycin (CLEOCIN T) 1 % lotion    cyclobenzaprine (FLEXERIL) 5 MG tablet     "estrogens, conjugated, (PREMARIN) 1.25 MG tablet    galcanezumab-gnlm (EMGALITY PEN) 120 mg/mL PnIj    meclizine (ANTIVERT) 12.5 mg tablet    progesterone (PROMETRIUM) 100 MG capsule    traZODone (DESYREL) 50 MG tablet    venlafaxine (EFFEXOR-XR) 150 MG Cp24     No current facility-administered medications for this visit.        Objective:  Fran Higgins arrived late for the session. Ms. Higgins was independently ambulatory at the time of session. The patient was fully cooperative throughout the session.  Appearance: age appropriate, appropriately  dressed, adequately  groomed  Behavior/Cooperation: friendly and cooperative  Speech: normal in rate, volume, and tone and appropriate quality, quantity and organization of sentences  Mood: euthymic  Affect: mood congruent and appropriate  Thought Process: goal-directed, logical  Thought Content: normal,  No delusions or paranoia; did not appear to be responding to internal stimuli during the session  Orientation: grossly intact  Memory: Grossly intact  Attention Span/Concentration: Attends to session without distraction; reports no difficulty  Fund of Knowledge: average  Estimate of Intelligence: average from verbal skills and history  Cognition: grossly intact  Insight: patient has awareness of illness; good insight into own behavior and behavior of others  Judgment: the patient's behavior is adequate to circumstances    Interval history and content of current session: Patient reported significant improvement in mood since moving into her own apartment. Patient has established a better routine for herself and her daughter and has felt a "lift in mood." Patient now actively engaging in behavioral activation, weight management, and incorporating stress management into her daily routine.  Discussed current adaptation to disease and treatment status. Reports to be coping in an adequate manner. Evaluated cognitive response, paying particular attention to " negative intrusive thoughts of a persistent and detrimental nature. Thoughts of this type are in evidence with moderate distress. Provided cognitive behavioral therapy to address negative cognitions. Identified and evaluated psychosocial and environmental stressors secondary to diagnosis and treatment.  Examined proactive behaviors that may be implemented to minimize or ameliorate psychosocial stressors secondary to diagnosis and treatment.     Risk parameters:   Patient reports no suicidal ideation  Patient reports no homicidal ideation  Patient reports no self-injurious behavior  Patient reports no violent behavior   Safety needs:  None at this time      Verbal deficits: None     Patient's response to intervention:The patient's response to intervention is accepting.     Progress toward goals and other mental status changes:  The patient's progress toward goals is good.      Progress to date:Progress as Expected      Goals from last visit: Met     Patient reported outcomes:    Distress Score    Distress Score: 6        Practical Problems Physical Problems   : No Appearance: Yes   Housing: No Bathing / Dressing: No   Insurance / Financial: Yes Breathing: No    Transportation: No  Changes in Urination: No    Work / School: Yes  Constipation: No   Treatment Decisions: No  Diarrhea: No     Eating: No    Family Problems Fatigue: No    Dealing with Children: Yes Feeling Swollen: No    Dealing with Partner: No Fevers: No    Ability to Have Children: No  Getting Around: No       Indigestion: No     Memory / Concentration: No   Emotional Problems Mouth Sores: No    Depression: Yes  Nausea: No    Fears: No  Nose Dry / Congested: No    Nervousness: Yes  Pain: Yes    Sadness: Yes Sexual: No    Worry: Yes Skin Dry / Itchy: No    Loss of Interest in Usual Activities: No Sleep: Yes     Substance Abuse: No    Spiritual/Religions Concerns Tingling in Hands / Feet: No   Spritual / Mosque Concerns: No         Other  Problems              PHQ ANSWERS    Q1. Little interest or pleasure in doing things: (P) Not at all (11/10/20 0956)  Q2. Feeling down, depressed, or hopeless: (P) Several days (11/10/20 0956)  Q3. Trouble falling or staying asleep, or sleeping too much: (P) More than half the days (11/10/20 0956)  Q4. Feeling tired or having little energy: (P) More than half the days (11/10/20 0956)  Q5. Poor appetite or overeating: (P) More than half the days (11/10/20 0956)  Q6. Feeling bad about yourself - or that you are a failure or have let yourself or your family down: (P) Not at all (11/10/20 0956)  Q7. Trouble concentrating on things, such as reading the newspaper or watching television: (P) Not at all (11/10/20 0956)  Q8. Moving or speaking so slowly that other people could have noticed. Or the opposite - being so fidgety or restless that you have been moving around a lot more than usual: (P) Not at all (11/10/20 0956)  Q9.  No SI    PHQ8 Score : (P) 7 (11/10/20 0956)  PHQ-9 Total Score: (P) 7 (11/10/20 0956)     JOSE MARIA-7 Answers    GAD7 11/10/2020   1. Feeling nervous, anxious, or on edge? 1   2. Not being able to stop or control worrying? 0   3. Worrying too much about different things? 1   4. Trouble relaxing? 1   5. Being so restless that it is hard to sit still? 0   6. Becoming easily annoyed or irritable? 1   7. Feeling afraid as if something awful might happen? 1   JOSE MARIA-7 Score 5     JOSE MARIA-7 Score: (P) 5  Interpretation: (P) Mild Anxiety     Client Strengths: verbal, intelligent, successful, good social support, good insight, commitment to wellness, strong kerri, strong cultural traditions     Diagnosis:     ICD-10-CM ICD-9-CM   1. Major depressive disorder, recurrent episode, in partial remission with anxious distress  F33.41 296.35    F41.8      Treatment Plan:individual psychotherapy and consult psychiatrist for medication evaluation  · Target symptoms: depression, anxiety   · Why chosen therapy is appropriate versus  another modality: relevant to diagnosis, patient responds to this modality, evidence based practice  · Outcome monitoring methods: self-report, observation, checklist/rating scale  · Therapeutic intervention type: behavior modifying psychotherapy, supportive psychotherapy  · Prognosis: Good      Behavioral goals:    Exercise:   Stress management: Continue to utilize skills learned in session   Social engagement:   Nutrition:   Smoking Cessation:   Therapy: Patient would like to reduce the frequency of her appointment to utilize skills on her own, with which I agree    Return to clinic: 1 month    Next Session:   Reassessment of symptoms and needs     Length of Service (minutes direct face-to-face contact): 45    Brittani Mahan, PhD  [place] License #[id number]

## 2020-11-17 ENCOUNTER — TELEPHONE (OUTPATIENT)
Dept: PSYCHIATRY | Facility: CLINIC | Age: 40
End: 2020-11-17

## 2020-11-24 ENCOUNTER — PATIENT MESSAGE (OUTPATIENT)
Dept: PHARMACY | Facility: CLINIC | Age: 40
End: 2020-11-24

## 2020-11-24 ENCOUNTER — SPECIALTY PHARMACY (OUTPATIENT)
Dept: PHARMACY | Facility: CLINIC | Age: 40
End: 2020-11-24

## 2020-11-24 NOTE — TELEPHONE ENCOUNTER
Specialty Pharmacy - Refill Coordination    Specialty Medication Orders Linked to Encounter      Most Recent Value   Medication #1  galcanezumab-gnlm (EMGALITY PEN) 120 mg/mL PnIj (Order#[id number], Rx#[id number])          Refill Questions - Documented Responses      Most Recent Value   Relationship to patient of person spoken to?  Self   HIPAA/medical authority confirmed?  Yes   Any changes in contact preferences or allowed representatives?  No   Has the patient had any insurance changes?  No   Has the patient had any changes to specialty medication, dose, or instructions?  No   Has the patient started taking any new medications, herbals, or supplements?  No   Has the patient been diagnosed with any new medical conditions?  No   Does the patient have any new allergies to medications or foods?  No   Does the patient have any concerns about side effects?  No   Can the patient store medication/sharps container properly (at the correct temperature, away from children/pets, etc.)?  Yes   Can the patient call emergency services (911) in the event of an emergency?  Yes   Does the patient have any concerns or questions about taking or administering this medication as prescribed?  No   How many doses did the patient miss in the past 4 weeks or since the last fill?  0   How many doses does the patient have on hand?  0   How many days does the patient report on hand quantity will last?  0   Does the number of doses/days supply remaining match pharmacy expected amounts?  Yes   Does the patient feel that this medication is effective?  Yes   During the past 4 weeks, has patient missed any activities due to condition or medication?  No   During the past 4 weeks, did patient have any of the following urgent care visits?  None   How will the patient receive the medication?  Mail   When does the patient need to receive the medication?  12/02/20   Shipping Address  Home   Address in [facility] confirmed and updated if  neccessary?  Yes   Expected Copay ($)  0   Is the patient able to afford the medication copay?  Yes   Payment Method  zero copay   Days supply of Refill  28   Would patient like to speak to a pharmacist?  No   Do you want to trigger an intervention?  No   Do you want to trigger an additional referral task?  No   Refill activity completed?  Yes   Refill activity plan  Refill scheduled   Shipment/Pickup Date:  11/30/20          Current Outpatient Medications   Medication Sig    ALPRAZolam (XANAX) 0.5 MG tablet Take 1 tablet (0.5 mg total) by mouth nightly as needed.    clindamycin (CLEOCIN T) 1 % lotion Apply topically 2 (two) times daily to affected areas of legs/buttocks.    cyclobenzaprine (FLEXERIL) 5 MG tablet     estrogens, conjugated, (PREMARIN) 1.25 MG tablet Take 1 tablet (1.25 mg total) by mouth once daily.    galcanezumab-gnlm (EMGALITY PEN) 120 mg/mL PnIj Inject 120 mg into the skin every 30 days.    meclizine (ANTIVERT) 12.5 mg tablet Take 1 tablet (12.5 mg total) by mouth 3 (three) times daily as needed for Dizziness. (Patient not taking: Reported on 10/15/2020)    progesterone (PROMETRIUM) 100 MG capsule Place 1 capsule (100 mg total) vaginally once daily. (Patient not taking: Reported on 10/15/2020)    traZODone (DESYREL) 50 MG tablet Take 1/2 pill as needed at bedtime for sleep. If 1/2 not enough, you may take 1 pill. If 1 pill not enough, you may take 2 pills. If 2 pills not enough, you may take 3 pills as needed at bedtime for sleep.    venlafaxine (EFFEXOR-XR) 150 MG Cp24 Take 1 capsule (150 mg total) by mouth daily.   Please check blood pressure before and after your dose daily at least 4 times on different days, after starting this medication.   Last reviewed on 11/24/2020  4:47 PM by Mili Melendez    Review of patient's allergies indicates:  No Known Allergies Last reviewed on  11/24/2020 4:47 PM by Mili Melendez      Tasks added this encounter   12/22/2020 - Refill Call  (Auto Added)   Tasks due within next 3 months   No tasks due.     Mili Melendez  [facility]  [address] [facility] [address]  Phone: [phone]  Fax: [phone]

## 2020-12-05 ENCOUNTER — TELEPHONE (OUTPATIENT)
Dept: PRIMARY CARE CLINIC | Facility: OTHER | Age: 40
End: 2020-12-05

## 2020-12-05 ENCOUNTER — CLINICAL SUPPORT (OUTPATIENT)
Dept: URGENT CARE | Facility: CLINIC | Age: 40
End: 2020-12-05
Payer: COMMERCIAL

## 2020-12-05 DIAGNOSIS — G44.52 NEW DAILY PERSISTENT HEADACHE: Primary | ICD-10-CM

## 2020-12-05 DIAGNOSIS — G44.52 NEW DAILY PERSISTENT HEADACHE: ICD-10-CM

## 2020-12-05 LAB
CTP QC/QA: YES
SARS-COV-2 RDRP RESP QL NAA+PROBE: NEGATIVE

## 2020-12-05 PROCEDURE — U0002 COVID-19 LAB TEST NON-CDC: HCPCS | Mod: QW,S$GLB,, | Performed by: INTERNAL MEDICINE

## 2020-12-05 PROCEDURE — U0002: ICD-10-PCS | Mod: QW,S$GLB,, | Performed by: INTERNAL MEDICINE

## 2020-12-10 ENCOUNTER — OFFICE VISIT (OUTPATIENT)
Dept: INTERNAL MEDICINE | Facility: CLINIC | Age: 40
End: 2020-12-10
Payer: COMMERCIAL

## 2020-12-10 VITALS
HEIGHT: 64 IN | HEART RATE: 80 BPM | SYSTOLIC BLOOD PRESSURE: 118 MMHG | WEIGHT: 293 LBS | BODY MASS INDEX: 50.02 KG/M2 | DIASTOLIC BLOOD PRESSURE: 78 MMHG | TEMPERATURE: 98 F

## 2020-12-10 DIAGNOSIS — F32.A DEPRESSION, UNSPECIFIED DEPRESSION TYPE: ICD-10-CM

## 2020-12-10 DIAGNOSIS — G43.119 INTRACTABLE MIGRAINE WITH AURA WITHOUT STATUS MIGRAINOSUS: ICD-10-CM

## 2020-12-10 DIAGNOSIS — Z00.00 ANNUAL PHYSICAL EXAM: Primary | ICD-10-CM

## 2020-12-10 PROCEDURE — 1126F PR PAIN SEVERITY QUANTIFIED, NO PAIN PRESENT: ICD-10-PCS | Mod: S$GLB,,, | Performed by: HOSPITALIST

## 2020-12-10 PROCEDURE — 99395 PREV VISIT EST AGE 18-39: CPT | Mod: S$GLB,,, | Performed by: HOSPITALIST

## 2020-12-10 PROCEDURE — 3008F PR BODY MASS INDEX (BMI) DOCUMENTED: ICD-10-PCS | Mod: CPTII,S$GLB,, | Performed by: HOSPITALIST

## 2020-12-10 PROCEDURE — 99395 PR PREVENTIVE VISIT,EST,18-39: ICD-10-PCS | Mod: S$GLB,,, | Performed by: HOSPITALIST

## 2020-12-10 PROCEDURE — 99999 PR PBB SHADOW E&M-EST. PATIENT-LVL IV: CPT | Mod: PBBFAC,,, | Performed by: HOSPITALIST

## 2020-12-10 PROCEDURE — 99999 PR PBB SHADOW E&M-EST. PATIENT-LVL IV: ICD-10-PCS | Mod: PBBFAC,,, | Performed by: HOSPITALIST

## 2020-12-10 PROCEDURE — 3008F BODY MASS INDEX DOCD: CPT | Mod: CPTII,S$GLB,, | Performed by: HOSPITALIST

## 2020-12-10 PROCEDURE — 1126F AMNT PAIN NOTED NONE PRSNT: CPT | Mod: S$GLB,,, | Performed by: HOSPITALIST

## 2020-12-10 RX ORDER — CYCLOBENZAPRINE HCL 5 MG
5 TABLET ORAL 2 TIMES DAILY PRN
Qty: 60 TABLET | Refills: 3 | Status: SHIPPED | OUTPATIENT
Start: 2020-12-10 | End: 2021-10-07 | Stop reason: SDUPTHER

## 2020-12-17 ENCOUNTER — PATIENT MESSAGE (OUTPATIENT)
Dept: INTERNAL MEDICINE | Facility: CLINIC | Age: 40
End: 2020-12-17

## 2020-12-17 ENCOUNTER — IMMUNIZATION (OUTPATIENT)
Dept: INTERNAL MEDICINE | Facility: CLINIC | Age: 40
End: 2020-12-17
Payer: COMMERCIAL

## 2020-12-17 ENCOUNTER — LAB VISIT (OUTPATIENT)
Dept: LAB | Facility: HOSPITAL | Age: 40
End: 2020-12-17
Attending: HOSPITALIST
Payer: COMMERCIAL

## 2020-12-17 DIAGNOSIS — Z00.00 ANNUAL PHYSICAL EXAM: ICD-10-CM

## 2020-12-17 DIAGNOSIS — Z23 NEED FOR VACCINATION: ICD-10-CM

## 2020-12-17 LAB
25(OH)D3+25(OH)D2 SERPL-MCNC: 28 NG/ML (ref 30–96)
ALBUMIN SERPL BCP-MCNC: 3.6 G/DL (ref 3.5–5.2)
ALP SERPL-CCNC: 95 U/L (ref 55–135)
ALT SERPL W/O P-5'-P-CCNC: 14 U/L (ref 10–44)
ANION GAP SERPL CALC-SCNC: 8 MMOL/L (ref 8–16)
AST SERPL-CCNC: 13 U/L (ref 10–40)
BASOPHILS # BLD AUTO: 0.04 K/UL (ref 0–0.2)
BASOPHILS NFR BLD: 0.7 % (ref 0–1.9)
BILIRUB SERPL-MCNC: 0.5 MG/DL (ref 0.1–1)
BUN SERPL-MCNC: 13 MG/DL (ref 6–20)
CALCIUM SERPL-MCNC: 9.1 MG/DL (ref 8.7–10.5)
CHLORIDE SERPL-SCNC: 103 MMOL/L (ref 95–110)
CHOLEST SERPL-MCNC: 218 MG/DL (ref 120–199)
CHOLEST/HDLC SERPL: 3.3 {RATIO} (ref 2–5)
CO2 SERPL-SCNC: 28 MMOL/L (ref 23–29)
CREAT SERPL-MCNC: 0.8 MG/DL (ref 0.5–1.4)
DIFFERENTIAL METHOD: ABNORMAL
EOSINOPHIL # BLD AUTO: 0.1 K/UL (ref 0–0.5)
EOSINOPHIL NFR BLD: 1 % (ref 0–8)
ERYTHROCYTE [DISTWIDTH] IN BLOOD BY AUTOMATED COUNT: 13.2 % (ref 11.5–14.5)
EST. GFR  (AFRICAN AMERICAN): >60 ML/MIN/1.73 M^2
EST. GFR  (NON AFRICAN AMERICAN): >60 ML/MIN/1.73 M^2
ESTIMATED AVG GLUCOSE: 117 MG/DL (ref 68–131)
GLUCOSE SERPL-MCNC: 109 MG/DL (ref 70–110)
HBA1C MFR BLD HPLC: 5.7 % (ref 4–5.6)
HCT VFR BLD AUTO: 38.7 % (ref 37–48.5)
HDLC SERPL-MCNC: 66 MG/DL (ref 40–75)
HDLC SERPL: 30.3 % (ref 20–50)
HGB BLD-MCNC: 12.3 G/DL (ref 12–16)
IMM GRANULOCYTES # BLD AUTO: 0.01 K/UL (ref 0–0.04)
IMM GRANULOCYTES NFR BLD AUTO: 0.2 % (ref 0–0.5)
LDLC SERPL CALC-MCNC: 119.8 MG/DL (ref 63–159)
LYMPHOCYTES # BLD AUTO: 1.4 K/UL (ref 1–4.8)
LYMPHOCYTES NFR BLD: 23.2 % (ref 18–48)
MCH RBC QN AUTO: 27 PG (ref 27–31)
MCHC RBC AUTO-ENTMCNC: 31.8 G/DL (ref 32–36)
MCV RBC AUTO: 85 FL (ref 82–98)
MONOCYTES # BLD AUTO: 0.4 K/UL (ref 0.3–1)
MONOCYTES NFR BLD: 7.4 % (ref 4–15)
NEUTROPHILS # BLD AUTO: 4 K/UL (ref 1.8–7.7)
NEUTROPHILS NFR BLD: 67.5 % (ref 38–73)
NONHDLC SERPL-MCNC: 152 MG/DL
NRBC BLD-RTO: 0 /100 WBC
PLATELET # BLD AUTO: 194 K/UL (ref 150–350)
PMV BLD AUTO: 11.5 FL (ref 9.2–12.9)
POTASSIUM SERPL-SCNC: 4.3 MMOL/L (ref 3.5–5.1)
PROT SERPL-MCNC: 7 G/DL (ref 6–8.4)
RBC # BLD AUTO: 4.55 M/UL (ref 4–5.4)
SODIUM SERPL-SCNC: 139 MMOL/L (ref 136–145)
TRIGL SERPL-MCNC: 161 MG/DL (ref 30–150)
TSH SERPL DL<=0.005 MIU/L-ACNC: 1.04 UIU/ML (ref 0.4–4)
WBC # BLD AUTO: 5.94 K/UL (ref 3.9–12.7)

## 2020-12-17 PROCEDURE — 0001A COVID-19, MRNA, LNP-S, PF, 30 MCG/0.3 ML DOSE VACCINE: ICD-10-PCS | Mod: CV19,,, | Performed by: INTERNAL MEDICINE

## 2020-12-17 PROCEDURE — 0001A COVID-19, MRNA, LNP-S, PF, 30 MCG/0.3 ML DOSE VACCINE: CPT | Mod: CV19,,, | Performed by: INTERNAL MEDICINE

## 2020-12-17 PROCEDURE — 80053 COMPREHEN METABOLIC PANEL: CPT

## 2020-12-17 PROCEDURE — 80061 LIPID PANEL: CPT

## 2020-12-17 PROCEDURE — 91300 COVID-19, MRNA, LNP-S, PF, 30 MCG/0.3 ML DOSE VACCINE: CPT | Mod: ,,, | Performed by: INTERNAL MEDICINE

## 2020-12-17 PROCEDURE — 85025 COMPLETE CBC W/AUTO DIFF WBC: CPT

## 2020-12-17 PROCEDURE — 91300 COVID-19, MRNA, LNP-S, PF, 30 MCG/0.3 ML DOSE VACCINE: ICD-10-PCS | Mod: ,,, | Performed by: INTERNAL MEDICINE

## 2020-12-17 PROCEDURE — 83036 HEMOGLOBIN GLYCOSYLATED A1C: CPT

## 2020-12-17 PROCEDURE — 82306 VITAMIN D 25 HYDROXY: CPT

## 2020-12-17 PROCEDURE — 36415 COLL VENOUS BLD VENIPUNCTURE: CPT | Mod: PO

## 2020-12-17 PROCEDURE — 84443 ASSAY THYROID STIM HORMONE: CPT

## 2020-12-21 ENCOUNTER — OFFICE VISIT (OUTPATIENT)
Dept: BARIATRICS | Facility: CLINIC | Age: 40
End: 2020-12-21
Payer: COMMERCIAL

## 2020-12-21 VITALS
HEART RATE: 82 BPM | RESPIRATION RATE: 21 BRPM | BODY MASS INDEX: 51.91 KG/M2 | HEIGHT: 63 IN | OXYGEN SATURATION: 98 % | SYSTOLIC BLOOD PRESSURE: 134 MMHG | DIASTOLIC BLOOD PRESSURE: 86 MMHG | WEIGHT: 293 LBS

## 2020-12-21 DIAGNOSIS — E66.01 CLASS 3 SEVERE OBESITY DUE TO EXCESS CALORIES WITH SERIOUS COMORBIDITY AND BODY MASS INDEX (BMI) OF 50.0 TO 59.9 IN ADULT: Primary | ICD-10-CM

## 2020-12-21 PROBLEM — E66.813 CLASS 3 SEVERE OBESITY DUE TO EXCESS CALORIES WITH SERIOUS COMORBIDITY AND BODY MASS INDEX (BMI) OF 50.0 TO 59.9 IN ADULT: Status: ACTIVE | Noted: 2019-05-17

## 2020-12-21 PROCEDURE — 1126F AMNT PAIN NOTED NONE PRSNT: CPT | Mod: S$GLB,,, | Performed by: STUDENT IN AN ORGANIZED HEALTH CARE EDUCATION/TRAINING PROGRAM

## 2020-12-21 PROCEDURE — 3008F PR BODY MASS INDEX (BMI) DOCUMENTED: ICD-10-PCS | Mod: CPTII,S$GLB,, | Performed by: STUDENT IN AN ORGANIZED HEALTH CARE EDUCATION/TRAINING PROGRAM

## 2020-12-21 PROCEDURE — 99204 OFFICE O/P NEW MOD 45 MIN: CPT | Mod: S$GLB,,, | Performed by: STUDENT IN AN ORGANIZED HEALTH CARE EDUCATION/TRAINING PROGRAM

## 2020-12-21 PROCEDURE — 3008F BODY MASS INDEX DOCD: CPT | Mod: CPTII,S$GLB,, | Performed by: STUDENT IN AN ORGANIZED HEALTH CARE EDUCATION/TRAINING PROGRAM

## 2020-12-21 PROCEDURE — 99204 PR OFFICE/OUTPT VISIT, NEW, LEVL IV, 45-59 MIN: ICD-10-PCS | Mod: S$GLB,,, | Performed by: STUDENT IN AN ORGANIZED HEALTH CARE EDUCATION/TRAINING PROGRAM

## 2020-12-21 PROCEDURE — 99999 PR PBB SHADOW E&M-EST. PATIENT-LVL IV: CPT | Mod: PBBFAC,,, | Performed by: STUDENT IN AN ORGANIZED HEALTH CARE EDUCATION/TRAINING PROGRAM

## 2020-12-21 PROCEDURE — 1126F PR PAIN SEVERITY QUANTIFIED, NO PAIN PRESENT: ICD-10-PCS | Mod: S$GLB,,, | Performed by: STUDENT IN AN ORGANIZED HEALTH CARE EDUCATION/TRAINING PROGRAM

## 2020-12-21 PROCEDURE — 99999 PR PBB SHADOW E&M-EST. PATIENT-LVL IV: ICD-10-PCS | Mod: PBBFAC,,, | Performed by: STUDENT IN AN ORGANIZED HEALTH CARE EDUCATION/TRAINING PROGRAM

## 2020-12-21 RX ORDER — SEMAGLUTIDE 1.34 MG/ML
INJECTION, SOLUTION SUBCUTANEOUS
Qty: 1.5 ML | Refills: 0 | Status: SHIPPED | OUTPATIENT
Start: 2020-12-21 | End: 2021-02-01

## 2020-12-21 NOTE — PROGRESS NOTES
Subjective:       Patient ID: Fran Higgins is a 40 y.o. female.    Chief Complaint: No chief complaint on file.    Patient presents for treatment of obesity.     Co-morbidities:   Anxiety  Depression  Migraines    Weight History  Lowest adult weight: 174 lbs  Highest adult weight: 346.1 lbs     History of Weight Loss Efforts  No prior use of weight loss medications    Current Physical Activity  No current exercise routine  Just joined [facility]    Current Eating Habits  Breakfast - usually skips  Lunch - hamburgers, tacos  Dinner - hamburgers, tacos, pasta  Snacks  Beverages - water, iced tea (unsweetened)    Alcohol: rarely    Tobacco: no    Sleep: 4 hours/night      Review of Systems   Constitutional: Negative for chills and fever.   HENT: Negative for sore throat and trouble swallowing.    Eyes: Negative for visual disturbance.        Last eye exam in past year, negative for glaucoma   Respiratory: Negative for cough and shortness of breath.    Cardiovascular: Negative for chest pain, palpitations and leg swelling.   Gastrointestinal: Negative for abdominal pain, constipation, diarrhea, nausea, vomiting and reflux.   Endocrine: Negative for cold intolerance and heat intolerance.        Negative for thyroid cancer   Genitourinary: Negative for difficulty urinating.        Positive for kidney stones (about 15 years ago); h/o hysterectomy   Musculoskeletal: Negative for arthralgias and back pain.   Integumentary:  Negative for rash.   Allergic/Immunologic: Negative for immunocompromised state.   Neurological: Negative for dizziness, seizures, light-headedness and headaches.   Hematological: Does not bruise/bleed easily.   Psychiatric/Behavioral: Positive for dysphoric mood. The patient is not nervous/anxious.          Objective:     Results for FRAN HIGGINS (MRN [id number]) as of 12/21/2020 10:16   Ref. Range 12/17/2020 09:37   WBC Latest Ref Range: 3.90 - 12.70 K/uL 5.94   RBC Latest  Ref Range: 4.00 - 5.40 M/uL 4.55   Hemoglobin Latest Ref Range: 12.0 - 16.0 g/dL 12.3   Hematocrit Latest Ref Range: 37.0 - 48.5 % 38.7   MCV Latest Ref Range: 82 - 98 fL 85   MCH Latest Ref Range: 27.0 - 31.0 pg 27.0   MCHC Latest Ref Range: 32.0 - 36.0 g/dL 31.8 (L)   RDW Latest Ref Range: 11.5 - 14.5 % 13.2   Platelets Latest Ref Range: 150 - 350 K/uL 194   MPV Latest Ref Range: 9.2 - 12.9 fL 11.5   Gran % Latest Ref Range: 38.0 - 73.0 % 67.5   Lymph % Latest Ref Range: 18.0 - 48.0 % 23.2   Mono % Latest Ref Range: 4.0 - 15.0 % 7.4   Eosinophil % Latest Ref Range: 0.0 - 8.0 % 1.0   Basophil % Latest Ref Range: 0.0 - 1.9 % 0.7   Immature Granulocytes Latest Ref Range: 0.0 - 0.5 % 0.2   Gran # (ANC) Latest Ref Range: 1.8 - 7.7 K/uL 4.0   Lymph # Latest Ref Range: 1.0 - 4.8 K/uL 1.4   Mono # Latest Ref Range: 0.3 - 1.0 K/uL 0.4   Eos # Latest Ref Range: 0.0 - 0.5 K/uL 0.1   Baso # Latest Ref Range: 0.00 - 0.20 K/uL 0.04   Immature Grans (Abs) Latest Ref Range: 0.00 - 0.04 K/uL 0.01   nRBC Latest Ref Range: 0 /100 WBC 0   Differential Method Unknown Automated   Sodium Latest Ref Range: 136 - 145 mmol/L 139   Potassium Latest Ref Range: 3.5 - 5.1 mmol/L 4.3   Chloride Latest Ref Range: 95 - 110 mmol/L 103   CO2 Latest Ref Range: 23 - 29 mmol/L 28   Anion Gap Latest Ref Range: 8 - 16 mmol/L 8   BUN Latest Ref Range: 6 - 20 mg/dL 13   Creatinine Latest Ref Range: 0.5 - 1.4 mg/dL 0.8   eGFR if non African American Latest Ref Range: >60 mL/min/1.73 m^2 >60.0   eGFR if African American Latest Ref Range: >60 mL/min/1.73 m^2 >60.0   Glucose Latest Ref Range: 70 - 110 mg/dL 109   Calcium Latest Ref Range: 8.7 - 10.5 mg/dL 9.1   Alkaline Phosphatase Latest Ref Range: 55 - 135 U/L 95   PROTEIN TOTAL Latest Ref Range: 6.0 - 8.4 g/dL 7.0   Albumin Latest Ref Range: 3.5 - 5.2 g/dL 3.6   BILIRUBIN TOTAL Latest Ref Range: 0.1 - 1.0 mg/dL 0.5   AST Latest Ref Range: 10 - 40 U/L 13   ALT Latest Ref Range: 10 - 44 U/L 14    Triglycerides Latest Ref Range: 30 - 150 mg/dL 161 (H)   Cholesterol Latest Ref Range: 120 - 199 mg/dL 218 (H)   HDL Latest Ref Range: 40 - 75 mg/dL 66   HDL/Cholesterol Ratio Latest Ref Range: 20.0 - 50.0 % 30.3   LDL Cholesterol External Latest Ref Range: 63.0 - 159.0 mg/dL 119.8   Non-HDL Cholesterol Latest Units: mg/dL 152   Total Cholesterol/HDL Ratio Latest Ref Range: 2.0 - 5.0  3.3   Vit D, 25-Hydroxy Latest Ref Range: 30 - 96 ng/mL 28 (L)   Hemoglobin A1C External Latest Ref Range: 4.0 - 5.6 % 5.7 (H)   Estimated Avg Glucose Latest Ref Range: 68 - 131 mg/dL 117   TSH Latest Ref Range: 0.400 - 4.000 uIU/mL 1.040       EKG (1/2020)  Normal sinus rhythm, 76 bpm   Normal ECG     Weight 346.1 lbs  BMI 62.3  BFP 56.3%  SMM 85.1 lbs  BMR 1850 kcal    Vitals:    12/21/20 1016   BP: 134/86   Pulse: 82   Resp: (!) 21       Physical Exam  Vitals signs reviewed.   Constitutional:       General: She is not in acute distress.     Appearance: Normal appearance. She is obese. She is not ill-appearing, toxic-appearing or diaphoretic.   HENT:      Head: Normocephalic and atraumatic.   Eyes:      Extraocular Movements: Extraocular movements intact.   Neck:      Musculoskeletal: Normal range of motion and neck supple.   Cardiovascular:      Rate and Rhythm: Normal rate and regular rhythm.   Pulmonary:      Effort: Pulmonary effort is normal. No respiratory distress.      Breath sounds: Normal breath sounds.   Abdominal:      Palpations: Abdomen is soft.      Tenderness: There is no abdominal tenderness.   Musculoskeletal: Normal range of motion.      Right lower leg: No edema.      Left lower leg: No edema.   Skin:     General: Skin is warm and dry.   Neurological:      General: No focal deficit present.      Mental Status: She is alert and oriented to person, place, and time.      Gait: Gait normal.   Psychiatric:         Mood and Affect: Mood normal.         Behavior: Behavior normal.         Thought Content: Thought  content normal.         Judgment: Judgment normal.         Assessment:       1. Class 3 severe obesity due to excess calories with serious comorbidity and body mass index (BMI) of 50.0 to 59.9 in adult        Plan:   - Start Ozempic 0.25 mg weekly injections for 4 weeks, then increase to 0.5 mg weekly injections    - Log all food and beverage intake with a daily calorie goal of 3950-1304 calories per day    - Moderate intensity aerobic exercise for 30 minutes 3-4x/week    - Return to clinic in 4 weeks

## 2020-12-21 NOTE — LETTER
December 21, 2020      Erica Pineda MD  [address]  [place] [address]           [facility] 2nd Fl  [address]  [facility] [address]  Phone: [phone]  Fax: [phone]          Patient: Fran Higgins   MR Number: [id number]   YOB: 1980   Date of Visit: 12/21/2020       Dear Dr. Eriac Pineda:    Thank you for referring Fran Higgins to me for evaluation. Attached you will find relevant portions of my assessment and plan of care.    If you have questions, please do not hesitate to call me. I look forward to following Fran Higgins along with you.    Sincerely,    Leonela Jimenez MD    Enclosure  CC:  No Recipients    If you would like to receive this communication electronically, please contact [email or web address] or [phone] to request more information on [employer] Link access.    For providers and/or their staff who would like to refer a patient to [facility], please contact us through our one-stop-shop provider referral line, [facility], at [phone].    If you feel you have received this communication in error or would no longer like to receive these types of communications, please e-mail [email or web address]

## 2020-12-21 NOTE — PATIENT INSTRUCTIONS
"Start Ozempic once a week. Start with 0.25mg once a week x 4 weeks, then 0.5 mg weekly.   Decrease portions as soon as you start Ozempic. Some nausea in the first 2 weeks is not unusual.   If you get pain across the upper abdomen and around to your back, please call the office.         PLEASE BE AT LEAST 15 MINUTES EARLY FOR YOUR NEXT APPOINTMENT.  Late arrivals WILL BE TURNED AWAY AND ASKED TO RESCHEDULE.  YOU MUST COME EARLY TO ALLOW TIME FOR CHECK-IN AS WELL AS GET YOUR VITAL SIGNS AND GO OVER YOUR MEDICATIONS.  Tardiness is not fair to the patients who present after you and are on time for their appointments.  It causes a delay in the appointments for patients and staff.  YOU MAY ALSO BE DISCHARGED FROM CLINIC with multiple late arrivals or "No Show" appointments.        Weekly Weight:  Weigh yourself at least once a week to help keep track of your progress between visits. Tracking your weight will provide you with important feedback about the changes you are making. To measure your weight as accurately as possible, weigh yourself at the same time of day, wearing the same clothes, and using the same scale.         Food and Beverage Log:  Keep a log of all food and beverages that you consume.  Keeping track of calories will help you lose weight, because monitoring will help you become more aware of what you are eating and recognize your eating patterns.  Be mindful of your hunger level before eating and your satiety level after eating.  Just keeping records will help you make healthy changes in your diet and eat fewer calories.    Tips for keeping a calorie count:     ? Each day, aim for the daily calorie goal of 5670-9366 calories/day.     ? Record your food intake immediately after eating. If possible, look up calories before or immediately after eating.     ? Download an roseann like Nimbuzz Pal, Lose It!, or Auto Secure (Code: 50986) To keep track of your calories.    ? Measuring foods (using measuring cups or " spoons) will help you be more accurate with counting calories.     ? Keep a running calorie count throughout the day.     ? Count daily calories toward a weekly calorie total. If you eat too many calories one day, cut back slightly over the next few days to meet your weekly goal.         Meal Planning & Grocery Shopping    Meal planning builds the foundation for healthy eating. When you have structured ideas for healthy meals and foods available at home to prepare those meals, weight control becomes easier.  If only healthy foods are available at home, then you will be much more likely to eat healthy foods. And you will be less likely to go to a restaurant or  a fast food meal, which tend to be unhealthy and higher in calories than meals prepared at home.      Take 5-10 minutes each week to plan meals for the next 7 days.  Make a grocery list based on the meal plan.    Grocery Shopping Tips:  ? Shop on a full stomach.  ? Schedule your shopping for times when you are most motivated and able to be disciplined about your purchases. For example, after a stressful day at work it may be difficult to make the healthiest choices. Shopping at other times, such as early in the morning or after dinner, may be easier.  ? Focus your shopping on the outside aisles of the store, which tend to contain more fresh foods and lower calorie foods. The inside aisles tend to have more processed foods.  ? Stick to your list. Avoid buying unhealthy items just because they are on sale.   ? Compare nutrition labels to check the number of calories and percentage of fat.      What to buy:    Vegetables  - Fresh vegetables  - Frozen vegetables with no sauce or added salt  - Canned vegetables with no sauce or added salt    Protein  - Lean meats, such as chicken and turkey  - Limit red meats, such as beef to no more than 1x/week  - Limit processed meats, such as cold cuts, alvarez, sausage, and hot dogs. Look for brands that have no nitrites  and are minimally processed. Consider turkey sausage or turkey alvarez.  - Fish and Shellfish  - Eggs  - Dried beans  - Canned beans (reduced sodium)    Fat  - Use healthy oils, such as olive oil or canola oil, for cooking, salad dressings, etc.  - Unflavored nuts and seeds  - Nut butters (no added sugar)    Dairy  - Yogurt (no sugar added)  - Cheese  - Low-fat milk  - Unsweetened nondairy milks (almond milk, soy milk, etc)    Fruit  - Fresh Fruit  - Frozen fruit with no added sugar  - Canned fruit with no added sugar  - Dried fruit with no added sugar  - 100% fruit juice    Whole Grains  - Single ingredient grains, such as oats, quinoa, brown rice  - Whole-wheat pasta  - Sprouted whole-grain bread    What to avoid:  - Avoid fried foods  - Avoid foods with added sugar  - Avoid sugar-sweetened beverages  - Avoid ultra-processed foods      Lifestyle Activity    Lifestyle activity consists of all the activities that burn calories during the course of a normal day. Using the stairs, washing the dishes, or even getting up to turn off the television are all examples of lifestyle activity. All activities, no matter how small, burn calories. Increasing lifestyle activity can help with weight control, so building physical activity into your everyday routine is important.     A pedometer is a tool that can help you track how much lifestyle activity you are getting. It can help you stay active. A pedometer counts each step you take and displays your total steps on the screen. Many smartphones, including iPhones and Androids, have applications that automatically count your steps. If you decide to use this method, make sure you are holding or wearing your smartphone so it can count all of your steps.     During the next 2 weeks, aim for 5,000 or more steps per day. Each week aim to increase your daily step goal by 250 so that you will reach an average of 10,000 steps per day (equal to walking 4 to 5 miles).     Start making more  active choices in your routine in order to increase the amount of walking you do each day.     Strategies to Increase Lifestyle Activity:    ? Take several 5-10-minute walks during the day.   ? Set a reminder to take a 5 minute break from your desk every hour.   ? Choose the farthest entrance to a building that you are entering.   ? Host walking meetings at work.   ? Walk down the munguia to contact co-workers face-to-face, instead of emailing or calling.  ? Walk to a restroom, water cooler, or copy machine on a different floor at work.   ? Walk during your lunch break.   ? Park farther away in parking lots.   ? Get off the bus or train earlier and walk farther to your destination.   ? Take the stairs rather than the elevator or the escalator.   ? Start a walking club with co-workers or neighbors.   ? Walk - don't drive - for trips less than one mile.   ? Take an after-dinner walk with family.   ? Go for a walk while talking on your wireless phone.   ? Walk the dog more often.   ? If you prefer to stay indoors because of the weather, try walking in a shopping mall or doing laps around a large store.   ? Purchase a treadmill to use at home.   ? Schedule time for walking every week and stick to it like any other appointment.   ? Or think of your own strategy: _______________________________       Physical Activity    Physical activity improves your health and helps with weight control, especially weight loss maintenance.      When starting to incorporate an exercise routine into your day, it is helpful to set specific goals.  For example, I will walk at moderate intensity from 12:30-12:45pm on Monday, Wednesday, and Friday.  Schedule your exercise time into your calendar.  Think of any potential barriers that may come up and prevent you from exercising.  Develop solutions or back-up plans for when these barriers may arise.    Walking is an ideal activity to start with if you do not currently have an exercise routine.  You can make the activity more fun by doing it with a friend or listening to music or a book on tape while you do it. If you don't enjoy walking, think of other activities you like, such as bike riding or swimming.  Other alternatives include group fitness classes or exercise at home using DVDs, Apps, etc.    If you are new to exercising, then start with 10 minutes 3-4 days per week.  After two weeks, increase to 15 minutes 3-4 days per week.  If you already exercise more than this, continue at your higher level, or increase by 10-15 minutes if able.         Aerobic Activity  Aerobic Activity gets you breathing harder and your heart beating faster.  Eventually, you should work up to 150 - 300 minutes of moderate-intensity aerobic activity every week or 75 - 150 minutes of vigorous-intensity aerobic activity every week.  An easy way to gauge the intensity of your activity is with the Talk Test.  During moderate activity, you should be able to talk, but not sing without having to pause for a breath.  During vigorous activity, you shouldn't be able to say more than a few words without pausing for a breath.  You should not push yourself until you are completely out of breath, tired, or sore.    Examples of Aerobic Activity:  - Walking  - Running  - Swimming  - Biking  - Playing sports like tennis or basketball  - Dancing  - Jumping Rope      Strength Training  It is also recommended that you perform muscle-strengthening activities at least 2 days per week.  Be sure to include activities that work all major muscle groups (legs, arms, abdomen, back, buttocks, chest, and shoulders)    Examples of Strength Training  - Lifting weights  - Working with resistance band  - Using your body weight for resistance (squats, push-ups, sit-ups)  - Pilates  - Yoga      https://health.gov/moveyourway/activity-planner/      Remember to keep a record of your activity to track your progress.

## 2020-12-22 ENCOUNTER — PATIENT MESSAGE (OUTPATIENT)
Dept: INTERNAL MEDICINE | Facility: CLINIC | Age: 40
End: 2020-12-22

## 2020-12-22 DIAGNOSIS — R73.03 PREDIABETES: ICD-10-CM

## 2020-12-23 ENCOUNTER — PATIENT MESSAGE (OUTPATIENT)
Dept: INTERNAL MEDICINE | Facility: CLINIC | Age: 40
End: 2020-12-23

## 2020-12-24 ENCOUNTER — PATIENT MESSAGE (OUTPATIENT)
Dept: INTERNAL MEDICINE | Facility: CLINIC | Age: 40
End: 2020-12-24

## 2020-12-30 ENCOUNTER — PATIENT MESSAGE (OUTPATIENT)
Dept: INTERNAL MEDICINE | Facility: CLINIC | Age: 40
End: 2020-12-30

## 2021-01-05 ENCOUNTER — CLINICAL SUPPORT (OUTPATIENT)
Dept: URGENT CARE | Facility: CLINIC | Age: 41
End: 2021-01-05

## 2021-01-05 VITALS — TEMPERATURE: 98 F | HEART RATE: 94 BPM | OXYGEN SATURATION: 96 %

## 2021-01-05 DIAGNOSIS — Z20.822 EXPOSURE TO COVID-19 VIRUS: ICD-10-CM

## 2021-01-05 DIAGNOSIS — R51.9 INTRACTABLE HEADACHE, UNSPECIFIED CHRONICITY PATTERN, UNSPECIFIED HEADACHE TYPE: Primary | ICD-10-CM

## 2021-01-05 DIAGNOSIS — R51.9 INTRACTABLE HEADACHE, UNSPECIFIED CHRONICITY PATTERN, UNSPECIFIED HEADACHE TYPE: ICD-10-CM

## 2021-01-05 LAB
CTP QC/QA: YES
SARS-COV-2 RDRP RESP QL NAA+PROBE: NEGATIVE

## 2021-01-05 PROCEDURE — U0002 COVID-19 LAB TEST NON-CDC: HCPCS | Mod: QW,S$GLB,, | Performed by: INTERNAL MEDICINE

## 2021-01-05 PROCEDURE — U0002: ICD-10-PCS | Mod: QW,S$GLB,, | Performed by: INTERNAL MEDICINE

## 2021-01-06 ENCOUNTER — TELEPHONE (OUTPATIENT)
Dept: PRIMARY CARE CLINIC | Facility: OTHER | Age: 41
End: 2021-01-06

## 2021-01-07 ENCOUNTER — IMMUNIZATION (OUTPATIENT)
Dept: INTERNAL MEDICINE | Facility: CLINIC | Age: 41
End: 2021-01-07
Payer: COMMERCIAL

## 2021-01-07 DIAGNOSIS — Z23 NEED FOR VACCINATION: ICD-10-CM

## 2021-01-07 PROCEDURE — 0002A COVID-19, MRNA, LNP-S, PF, 30 MCG/0.3 ML DOSE VACCINE: CPT | Mod: PBBFAC | Performed by: INTERNAL MEDICINE

## 2021-01-07 PROCEDURE — 91300 COVID-19, MRNA, LNP-S, PF, 30 MCG/0.3 ML DOSE VACCINE: CPT | Mod: PBBFAC | Performed by: INTERNAL MEDICINE

## 2021-01-08 ENCOUNTER — PATIENT MESSAGE (OUTPATIENT)
Dept: OTOLARYNGOLOGY | Facility: CLINIC | Age: 41
End: 2021-01-08

## 2021-01-12 ENCOUNTER — CLINICAL SUPPORT (OUTPATIENT)
Dept: URGENT CARE | Facility: CLINIC | Age: 41
End: 2021-01-12

## 2021-01-12 ENCOUNTER — TELEPHONE (OUTPATIENT)
Dept: PRIMARY CARE CLINIC | Facility: CLINIC | Age: 41
End: 2021-01-12

## 2021-01-12 DIAGNOSIS — R50.9 FEVER, UNSPECIFIED FEVER CAUSE: ICD-10-CM

## 2021-01-12 DIAGNOSIS — F33.41 RECURRENT MAJOR DEPRESSIVE DISORDER IN PARTIAL REMISSION: ICD-10-CM

## 2021-01-12 DIAGNOSIS — R50.9 FEVER, UNSPECIFIED FEVER CAUSE: Primary | ICD-10-CM

## 2021-01-12 LAB
CTP QC/QA: YES
SARS-COV-2 RDRP RESP QL NAA+PROBE: NEGATIVE

## 2021-01-12 PROCEDURE — U0002 COVID-19 LAB TEST NON-CDC: HCPCS | Mod: QW,S$GLB,, | Performed by: INTERNAL MEDICINE

## 2021-01-12 PROCEDURE — U0002: ICD-10-PCS | Mod: QW,S$GLB,, | Performed by: INTERNAL MEDICINE

## 2021-01-12 RX ORDER — VENLAFAXINE HYDROCHLORIDE 150 MG/1
CAPSULE, EXTENDED RELEASE ORAL
Qty: 30 CAPSULE | Refills: 0 | Status: SHIPPED | OUTPATIENT
Start: 2021-01-12 | End: 2021-02-05

## 2021-01-20 DIAGNOSIS — E11.9 TYPE 2 DIABETES MELLITUS WITHOUT COMPLICATION: ICD-10-CM

## 2021-01-26 ENCOUNTER — PATIENT OUTREACH (OUTPATIENT)
Dept: ADMINISTRATIVE | Facility: OTHER | Age: 41
End: 2021-01-26

## 2021-01-28 ENCOUNTER — OFFICE VISIT (OUTPATIENT)
Dept: GYNECOLOGIC ONCOLOGY | Facility: CLINIC | Age: 41
End: 2021-01-28
Payer: COMMERCIAL

## 2021-01-28 ENCOUNTER — LAB VISIT (OUTPATIENT)
Dept: LAB | Facility: HOSPITAL | Age: 41
End: 2021-01-28
Attending: OBSTETRICS & GYNECOLOGY
Payer: COMMERCIAL

## 2021-01-28 VITALS
BODY MASS INDEX: 61.62 KG/M2 | DIASTOLIC BLOOD PRESSURE: 80 MMHG | SYSTOLIC BLOOD PRESSURE: 132 MMHG | WEIGHT: 293 LBS | HEART RATE: 90 BPM

## 2021-01-28 DIAGNOSIS — C56.9 MUCINOUS TUMOR, OF LOW MALIGNANT POTENTIAL: ICD-10-CM

## 2021-01-28 DIAGNOSIS — C56.9 MUCINOUS TUMOR, OF LOW MALIGNANT POTENTIAL: Primary | ICD-10-CM

## 2021-01-28 DIAGNOSIS — R10.2 FEMALE PELVIC PAIN: ICD-10-CM

## 2021-01-28 LAB — CEA SERPL-MCNC: 1.7 NG/ML (ref 0–5)

## 2021-01-28 PROCEDURE — 3008F BODY MASS INDEX DOCD: CPT | Mod: CPTII,S$GLB,, | Performed by: OBSTETRICS & GYNECOLOGY

## 2021-01-28 PROCEDURE — 1126F AMNT PAIN NOTED NONE PRSNT: CPT | Mod: S$GLB,,, | Performed by: OBSTETRICS & GYNECOLOGY

## 2021-01-28 PROCEDURE — 99214 PR OFFICE/OUTPT VISIT, EST, LEVL IV, 30-39 MIN: ICD-10-PCS | Mod: S$GLB,,, | Performed by: OBSTETRICS & GYNECOLOGY

## 2021-01-28 PROCEDURE — 36415 COLL VENOUS BLD VENIPUNCTURE: CPT

## 2021-01-28 PROCEDURE — 3008F PR BODY MASS INDEX (BMI) DOCUMENTED: ICD-10-PCS | Mod: CPTII,S$GLB,, | Performed by: OBSTETRICS & GYNECOLOGY

## 2021-01-28 PROCEDURE — 1126F PR PAIN SEVERITY QUANTIFIED, NO PAIN PRESENT: ICD-10-PCS | Mod: S$GLB,,, | Performed by: OBSTETRICS & GYNECOLOGY

## 2021-01-28 PROCEDURE — 82378 CARCINOEMBRYONIC ANTIGEN: CPT

## 2021-01-28 PROCEDURE — 99214 OFFICE O/P EST MOD 30 MIN: CPT | Mod: S$GLB,,, | Performed by: OBSTETRICS & GYNECOLOGY

## 2021-01-28 PROCEDURE — 99999 PR PBB SHADOW E&M-EST. PATIENT-LVL III: CPT | Mod: PBBFAC,,, | Performed by: OBSTETRICS & GYNECOLOGY

## 2021-01-28 PROCEDURE — 99999 PR PBB SHADOW E&M-EST. PATIENT-LVL III: ICD-10-PCS | Mod: PBBFAC,,, | Performed by: OBSTETRICS & GYNECOLOGY

## 2021-02-01 ENCOUNTER — HOSPITAL ENCOUNTER (OUTPATIENT)
Dept: RADIOLOGY | Facility: HOSPITAL | Age: 41
Discharge: HOME | End: 2021-02-01
Attending: OBSTETRICS & GYNECOLOGY
Payer: COMMERCIAL

## 2021-02-01 DIAGNOSIS — C56.9 MUCINOUS TUMOR, OF LOW MALIGNANT POTENTIAL: ICD-10-CM

## 2021-02-01 DIAGNOSIS — Q24.8 PERICARDIAL CYST: Primary | ICD-10-CM

## 2021-02-01 DIAGNOSIS — R10.2 FEMALE PELVIC PAIN: ICD-10-CM

## 2021-02-01 PROCEDURE — 74177 CT ABD & PELVIS W/CONTRAST: CPT | Mod: 26,,, | Performed by: RADIOLOGY

## 2021-02-01 PROCEDURE — 74177 CT ABD & PELVIS W/CONTRAST: CPT | Mod: TC

## 2021-02-01 PROCEDURE — 25500020 PHARM REV CODE 255: Performed by: OBSTETRICS & GYNECOLOGY

## 2021-02-01 PROCEDURE — 74177 CT ABDOMEN PELVIS WITH CONTRAST: ICD-10-PCS | Mod: 26,,, | Performed by: RADIOLOGY

## 2021-02-01 RX ADMIN — IOHEXOL 100 ML: 350 INJECTION, SOLUTION INTRAVENOUS at 03:02

## 2021-02-02 ENCOUNTER — TELEPHONE (OUTPATIENT)
Dept: GYNECOLOGIC ONCOLOGY | Facility: CLINIC | Age: 41
End: 2021-02-02

## 2021-02-05 ENCOUNTER — TELEPHONE (OUTPATIENT)
Dept: PSYCHIATRY | Facility: CLINIC | Age: 41
End: 2021-02-05

## 2021-02-05 ENCOUNTER — PATIENT MESSAGE (OUTPATIENT)
Dept: PSYCHIATRY | Facility: CLINIC | Age: 41
End: 2021-02-05

## 2021-02-05 ENCOUNTER — PATIENT MESSAGE (OUTPATIENT)
Dept: INTERNAL MEDICINE | Facility: CLINIC | Age: 41
End: 2021-02-05

## 2021-02-05 DIAGNOSIS — F33.41 RECURRENT MAJOR DEPRESSIVE DISORDER IN PARTIAL REMISSION: ICD-10-CM

## 2021-02-05 DIAGNOSIS — F32.1 CURRENT MODERATE EPISODE OF MAJOR DEPRESSIVE DISORDER, UNSPECIFIED WHETHER RECURRENT: ICD-10-CM

## 2021-02-05 DIAGNOSIS — G47.09 OTHER INSOMNIA: ICD-10-CM

## 2021-02-05 RX ORDER — VENLAFAXINE HYDROCHLORIDE 150 MG/1
CAPSULE, EXTENDED RELEASE ORAL
Qty: 30 CAPSULE | Refills: 0 | OUTPATIENT
Start: 2021-02-05

## 2021-02-05 RX ORDER — VENLAFAXINE HYDROCHLORIDE 150 MG/1
CAPSULE, EXTENDED RELEASE ORAL
Qty: 30 CAPSULE | Refills: 0 | Status: SHIPPED | OUTPATIENT
Start: 2021-02-05 | End: 2021-03-11

## 2021-02-05 RX ORDER — ALPRAZOLAM 0.5 MG/1
0.5 TABLET ORAL NIGHTLY PRN
Qty: 30 TABLET | Refills: 0 | Status: SHIPPED | OUTPATIENT
Start: 2021-02-05 | End: 2021-03-11 | Stop reason: SDUPTHER

## 2021-02-08 ENCOUNTER — OFFICE VISIT (OUTPATIENT)
Dept: BARIATRICS | Facility: CLINIC | Age: 41
End: 2021-02-08
Payer: COMMERCIAL

## 2021-02-08 VITALS
DIASTOLIC BLOOD PRESSURE: 90 MMHG | HEART RATE: 79 BPM | HEIGHT: 64 IN | BODY MASS INDEX: 50.02 KG/M2 | OXYGEN SATURATION: 96 % | WEIGHT: 293 LBS | SYSTOLIC BLOOD PRESSURE: 130 MMHG

## 2021-02-08 DIAGNOSIS — E66.01 CLASS 3 SEVERE OBESITY DUE TO EXCESS CALORIES WITH SERIOUS COMORBIDITY AND BODY MASS INDEX (BMI) OF 50.0 TO 59.9 IN ADULT: Primary | ICD-10-CM

## 2021-02-08 DIAGNOSIS — R73.03 PREDIABETES: ICD-10-CM

## 2021-02-08 PROCEDURE — 1125F AMNT PAIN NOTED PAIN PRSNT: CPT | Mod: S$GLB,,, | Performed by: STUDENT IN AN ORGANIZED HEALTH CARE EDUCATION/TRAINING PROGRAM

## 2021-02-08 PROCEDURE — 99999 PR PBB SHADOW E&M-EST. PATIENT-LVL III: ICD-10-PCS | Mod: PBBFAC,,, | Performed by: STUDENT IN AN ORGANIZED HEALTH CARE EDUCATION/TRAINING PROGRAM

## 2021-02-08 PROCEDURE — 3008F PR BODY MASS INDEX (BMI) DOCUMENTED: ICD-10-PCS | Mod: CPTII,S$GLB,, | Performed by: STUDENT IN AN ORGANIZED HEALTH CARE EDUCATION/TRAINING PROGRAM

## 2021-02-08 PROCEDURE — 99999 PR PBB SHADOW E&M-EST. PATIENT-LVL III: CPT | Mod: PBBFAC,,, | Performed by: STUDENT IN AN ORGANIZED HEALTH CARE EDUCATION/TRAINING PROGRAM

## 2021-02-08 PROCEDURE — 99213 PR OFFICE/OUTPT VISIT, EST, LEVL III, 20-29 MIN: ICD-10-PCS | Mod: S$GLB,,, | Performed by: STUDENT IN AN ORGANIZED HEALTH CARE EDUCATION/TRAINING PROGRAM

## 2021-02-08 PROCEDURE — 99213 OFFICE O/P EST LOW 20 MIN: CPT | Mod: S$GLB,,, | Performed by: STUDENT IN AN ORGANIZED HEALTH CARE EDUCATION/TRAINING PROGRAM

## 2021-02-08 PROCEDURE — 3008F BODY MASS INDEX DOCD: CPT | Mod: CPTII,S$GLB,, | Performed by: STUDENT IN AN ORGANIZED HEALTH CARE EDUCATION/TRAINING PROGRAM

## 2021-02-08 PROCEDURE — 1125F PR PAIN SEVERITY QUANTIFIED, PAIN PRESENT: ICD-10-PCS | Mod: S$GLB,,, | Performed by: STUDENT IN AN ORGANIZED HEALTH CARE EDUCATION/TRAINING PROGRAM

## 2021-02-08 RX ORDER — SEMAGLUTIDE 1.34 MG/ML
0.5 INJECTION, SOLUTION SUBCUTANEOUS
Qty: 1.5 ML | Refills: 0 | Status: SHIPPED | OUTPATIENT
Start: 2021-02-08 | End: 2021-03-11 | Stop reason: SDUPTHER

## 2021-02-18 ENCOUNTER — PATIENT MESSAGE (OUTPATIENT)
Dept: INTERNAL MEDICINE | Facility: CLINIC | Age: 41
End: 2021-02-18

## 2021-02-22 ENCOUNTER — TELEPHONE (OUTPATIENT)
Dept: BARIATRICS | Facility: CLINIC | Age: 41
End: 2021-02-22

## 2021-02-22 ENCOUNTER — OFFICE VISIT (OUTPATIENT)
Dept: CARDIOLOGY | Facility: CLINIC | Age: 41
End: 2021-02-22
Payer: COMMERCIAL

## 2021-02-22 VITALS
BODY MASS INDEX: 50.02 KG/M2 | HEIGHT: 64 IN | WEIGHT: 293 LBS | HEART RATE: 81 BPM | SYSTOLIC BLOOD PRESSURE: 140 MMHG | DIASTOLIC BLOOD PRESSURE: 100 MMHG

## 2021-02-22 DIAGNOSIS — E66.01 CLASS 3 SEVERE OBESITY DUE TO EXCESS CALORIES WITH SERIOUS COMORBIDITY AND BODY MASS INDEX (BMI) OF 50.0 TO 59.9 IN ADULT: ICD-10-CM

## 2021-02-22 DIAGNOSIS — Q24.8 PERICARDIAL CYST: Primary | ICD-10-CM

## 2021-02-22 PROCEDURE — 3008F BODY MASS INDEX DOCD: CPT | Mod: CPTII,S$GLB,, | Performed by: INTERNAL MEDICINE

## 2021-02-22 PROCEDURE — 1126F AMNT PAIN NOTED NONE PRSNT: CPT | Mod: S$GLB,,, | Performed by: INTERNAL MEDICINE

## 2021-02-22 PROCEDURE — 99999 PR PBB SHADOW E&M-EST. PATIENT-LVL V: ICD-10-PCS | Mod: PBBFAC,,, | Performed by: INTERNAL MEDICINE

## 2021-02-22 PROCEDURE — 99214 PR OFFICE/OUTPT VISIT, EST, LEVL IV, 30-39 MIN: ICD-10-PCS | Mod: S$GLB,,, | Performed by: INTERNAL MEDICINE

## 2021-02-22 PROCEDURE — 99999 PR PBB SHADOW E&M-EST. PATIENT-LVL V: CPT | Mod: PBBFAC,,, | Performed by: INTERNAL MEDICINE

## 2021-02-22 PROCEDURE — 3008F PR BODY MASS INDEX (BMI) DOCUMENTED: ICD-10-PCS | Mod: CPTII,S$GLB,, | Performed by: INTERNAL MEDICINE

## 2021-02-22 PROCEDURE — 1126F PR PAIN SEVERITY QUANTIFIED, NO PAIN PRESENT: ICD-10-PCS | Mod: S$GLB,,, | Performed by: INTERNAL MEDICINE

## 2021-02-22 PROCEDURE — 99214 OFFICE O/P EST MOD 30 MIN: CPT | Mod: S$GLB,,, | Performed by: INTERNAL MEDICINE

## 2021-02-24 ENCOUNTER — DOCUMENTATION ONLY (OUTPATIENT)
Dept: BARIATRICS | Facility: CLINIC | Age: 41
End: 2021-02-24

## 2021-02-27 ENCOUNTER — OFFICE VISIT (OUTPATIENT)
Dept: INTERNAL MEDICINE | Facility: CLINIC | Age: 41
End: 2021-02-27
Payer: COMMERCIAL

## 2021-02-27 VITALS
SYSTOLIC BLOOD PRESSURE: 126 MMHG | HEART RATE: 53 BPM | OXYGEN SATURATION: 96 % | DIASTOLIC BLOOD PRESSURE: 78 MMHG | BODY MASS INDEX: 50.02 KG/M2 | HEIGHT: 64 IN | WEIGHT: 293 LBS

## 2021-02-27 DIAGNOSIS — B95.8 STAPH INFECTION: Primary | ICD-10-CM

## 2021-02-27 PROCEDURE — 3008F PR BODY MASS INDEX (BMI) DOCUMENTED: ICD-10-PCS | Mod: CPTII,S$GLB,, | Performed by: INTERNAL MEDICINE

## 2021-02-27 PROCEDURE — 1126F AMNT PAIN NOTED NONE PRSNT: CPT | Mod: S$GLB,,, | Performed by: INTERNAL MEDICINE

## 2021-02-27 PROCEDURE — 3008F BODY MASS INDEX DOCD: CPT | Mod: CPTII,S$GLB,, | Performed by: INTERNAL MEDICINE

## 2021-02-27 PROCEDURE — 99213 PR OFFICE/OUTPT VISIT, EST, LEVL III, 20-29 MIN: ICD-10-PCS | Mod: S$GLB,,, | Performed by: INTERNAL MEDICINE

## 2021-02-27 PROCEDURE — 99999 PR PBB SHADOW E&M-EST. PATIENT-LVL III: ICD-10-PCS | Mod: PBBFAC,,, | Performed by: INTERNAL MEDICINE

## 2021-02-27 PROCEDURE — 1126F PR PAIN SEVERITY QUANTIFIED, NO PAIN PRESENT: ICD-10-PCS | Mod: S$GLB,,, | Performed by: INTERNAL MEDICINE

## 2021-02-27 PROCEDURE — 99999 PR PBB SHADOW E&M-EST. PATIENT-LVL III: CPT | Mod: PBBFAC,,, | Performed by: INTERNAL MEDICINE

## 2021-02-27 PROCEDURE — 99213 OFFICE O/P EST LOW 20 MIN: CPT | Mod: S$GLB,,, | Performed by: INTERNAL MEDICINE

## 2021-02-27 RX ORDER — NYSTATIN AND TRIAMCINOLONE ACETONIDE 100000; 1 [USP'U]/G; MG/G
CREAM TOPICAL 2 TIMES DAILY
Qty: 30 G | Refills: 1 | Status: SHIPPED | OUTPATIENT
Start: 2021-02-27 | End: 2021-09-07 | Stop reason: SDUPTHER

## 2021-02-27 RX ORDER — SULFAMETHOXAZOLE AND TRIMETHOPRIM 800; 160 MG/1; MG/1
1 TABLET ORAL 2 TIMES DAILY
Qty: 20 TABLET | Refills: 0 | Status: SHIPPED | OUTPATIENT
Start: 2021-02-27 | End: 2021-03-11

## 2021-03-11 ENCOUNTER — OFFICE VISIT (OUTPATIENT)
Dept: BARIATRICS | Facility: CLINIC | Age: 41
End: 2021-03-11
Payer: COMMERCIAL

## 2021-03-11 ENCOUNTER — OFFICE VISIT (OUTPATIENT)
Dept: PSYCHIATRY | Facility: CLINIC | Age: 41
End: 2021-03-11
Payer: COMMERCIAL

## 2021-03-11 VITALS
HEART RATE: 88 BPM | HEIGHT: 64 IN | DIASTOLIC BLOOD PRESSURE: 82 MMHG | SYSTOLIC BLOOD PRESSURE: 118 MMHG | BODY MASS INDEX: 50.02 KG/M2 | WEIGHT: 293 LBS | OXYGEN SATURATION: 98 %

## 2021-03-11 DIAGNOSIS — E66.01 CLASS 3 SEVERE OBESITY DUE TO EXCESS CALORIES WITH SERIOUS COMORBIDITY AND BODY MASS INDEX (BMI) OF 50.0 TO 59.9 IN ADULT: Primary | ICD-10-CM

## 2021-03-11 DIAGNOSIS — F33.41 RECURRENT MAJOR DEPRESSIVE DISORDER IN PARTIAL REMISSION: Primary | ICD-10-CM

## 2021-03-11 DIAGNOSIS — G47.09 OTHER INSOMNIA: ICD-10-CM

## 2021-03-11 DIAGNOSIS — R73.03 PREDIABETES: ICD-10-CM

## 2021-03-11 PROCEDURE — 99214 PR OFFICE/OUTPT VISIT, EST, LEVL IV, 30-39 MIN: ICD-10-PCS | Mod: S$GLB,,, | Performed by: PSYCHIATRY & NEUROLOGY

## 2021-03-11 PROCEDURE — 99213 PR OFFICE/OUTPT VISIT, EST, LEVL III, 20-29 MIN: ICD-10-PCS | Mod: S$GLB,,, | Performed by: STUDENT IN AN ORGANIZED HEALTH CARE EDUCATION/TRAINING PROGRAM

## 2021-03-11 PROCEDURE — 99999 PR PBB SHADOW E&M-EST. PATIENT-LVL I: CPT | Mod: PBBFAC,,, | Performed by: PSYCHIATRY & NEUROLOGY

## 2021-03-11 PROCEDURE — 1126F AMNT PAIN NOTED NONE PRSNT: CPT | Mod: S$GLB,,, | Performed by: STUDENT IN AN ORGANIZED HEALTH CARE EDUCATION/TRAINING PROGRAM

## 2021-03-11 PROCEDURE — 99999 PR PBB SHADOW E&M-EST. PATIENT-LVL I: ICD-10-PCS | Mod: PBBFAC,,, | Performed by: PSYCHIATRY & NEUROLOGY

## 2021-03-11 PROCEDURE — 99214 OFFICE O/P EST MOD 30 MIN: CPT | Mod: S$GLB,,, | Performed by: PSYCHIATRY & NEUROLOGY

## 2021-03-11 PROCEDURE — 99999 PR PBB SHADOW E&M-EST. PATIENT-LVL III: CPT | Mod: PBBFAC,,, | Performed by: STUDENT IN AN ORGANIZED HEALTH CARE EDUCATION/TRAINING PROGRAM

## 2021-03-11 PROCEDURE — 3008F PR BODY MASS INDEX (BMI) DOCUMENTED: ICD-10-PCS | Mod: CPTII,S$GLB,, | Performed by: STUDENT IN AN ORGANIZED HEALTH CARE EDUCATION/TRAINING PROGRAM

## 2021-03-11 PROCEDURE — 1126F PR PAIN SEVERITY QUANTIFIED, NO PAIN PRESENT: ICD-10-PCS | Mod: S$GLB,,, | Performed by: STUDENT IN AN ORGANIZED HEALTH CARE EDUCATION/TRAINING PROGRAM

## 2021-03-11 PROCEDURE — 99999 PR PBB SHADOW E&M-EST. PATIENT-LVL III: ICD-10-PCS | Mod: PBBFAC,,, | Performed by: STUDENT IN AN ORGANIZED HEALTH CARE EDUCATION/TRAINING PROGRAM

## 2021-03-11 PROCEDURE — 3008F BODY MASS INDEX DOCD: CPT | Mod: CPTII,S$GLB,, | Performed by: STUDENT IN AN ORGANIZED HEALTH CARE EDUCATION/TRAINING PROGRAM

## 2021-03-11 PROCEDURE — 99213 OFFICE O/P EST LOW 20 MIN: CPT | Mod: S$GLB,,, | Performed by: STUDENT IN AN ORGANIZED HEALTH CARE EDUCATION/TRAINING PROGRAM

## 2021-03-11 RX ORDER — SEMAGLUTIDE 1.34 MG/ML
0.5 INJECTION, SOLUTION SUBCUTANEOUS
Qty: 1.5 ML | Refills: 0 | Status: SHIPPED | OUTPATIENT
Start: 2021-03-11 | End: 2021-04-06 | Stop reason: SDUPTHER

## 2021-03-11 RX ORDER — ALPRAZOLAM 0.5 MG/1
0.5 TABLET ORAL NIGHTLY PRN
Qty: 30 TABLET | Refills: 1 | Status: SHIPPED | OUTPATIENT
Start: 2021-03-11 | End: 2021-05-24 | Stop reason: SDUPTHER

## 2021-03-11 RX ORDER — GABAPENTIN 100 MG/1
100 CAPSULE ORAL 3 TIMES DAILY
Qty: 90 CAPSULE | Refills: 1 | Status: SHIPPED | OUTPATIENT
Start: 2021-03-11 | End: 2021-05-24 | Stop reason: SINTOL

## 2021-03-11 RX ORDER — VENLAFAXINE HYDROCHLORIDE 75 MG/1
CAPSULE, EXTENDED RELEASE ORAL
Qty: 90 CAPSULE | Refills: 1 | Status: SHIPPED | OUTPATIENT
Start: 2021-03-11 | End: 2021-05-16 | Stop reason: SDUPTHER

## 2021-03-15 ENCOUNTER — OFFICE VISIT (OUTPATIENT)
Dept: PSYCHIATRY | Facility: CLINIC | Age: 41
End: 2021-03-15
Payer: COMMERCIAL

## 2021-03-15 DIAGNOSIS — E66.01 CLASS 3 SEVERE OBESITY DUE TO EXCESS CALORIES WITH SERIOUS COMORBIDITY AND BODY MASS INDEX (BMI) OF 50.0 TO 59.9 IN ADULT: ICD-10-CM

## 2021-03-15 DIAGNOSIS — D39.10 BORDERLINE EPITHELIAL NEOPLASM OF OVARY: ICD-10-CM

## 2021-03-15 DIAGNOSIS — F33.41 MAJOR DEPRESSIVE DISORDER, RECURRENT EPISODE, IN PARTIAL REMISSION WITH ANXIOUS DISTRESS: Primary | ICD-10-CM

## 2021-03-15 DIAGNOSIS — R73.03 PREDIABETES: ICD-10-CM

## 2021-03-15 PROCEDURE — 90832 PSYTX W PT 30 MINUTES: CPT | Mod: S$GLB,,, | Performed by: PSYCHOLOGIST

## 2021-03-15 PROCEDURE — 99999 PR PBB SHADOW E&M-EST. PATIENT-LVL I: ICD-10-PCS | Mod: PBBFAC,,, | Performed by: PSYCHOLOGIST

## 2021-03-15 PROCEDURE — 99999 PR PBB SHADOW E&M-EST. PATIENT-LVL I: CPT | Mod: PBBFAC,,, | Performed by: PSYCHOLOGIST

## 2021-03-15 PROCEDURE — 90832 PR PSYCHOTHERAPY W/PATIENT, 30 MIN: ICD-10-PCS | Mod: S$GLB,,, | Performed by: PSYCHOLOGIST

## 2021-03-21 ENCOUNTER — PATIENT MESSAGE (OUTPATIENT)
Dept: DERMATOLOGY | Facility: CLINIC | Age: 41
End: 2021-03-21

## 2021-03-22 ENCOUNTER — OFFICE VISIT (OUTPATIENT)
Dept: DERMATOLOGY | Facility: CLINIC | Age: 41
End: 2021-03-22
Payer: COMMERCIAL

## 2021-03-22 VITALS — TEMPERATURE: 98 F

## 2021-03-22 DIAGNOSIS — L73.8 BACTERIAL FOLLICULITIS: ICD-10-CM

## 2021-03-22 DIAGNOSIS — L30.9 DERMATITIS: Primary | ICD-10-CM

## 2021-03-22 PROCEDURE — 87077 CULTURE AEROBIC IDENTIFY: CPT | Performed by: DERMATOLOGY

## 2021-03-22 PROCEDURE — 1126F PR PAIN SEVERITY QUANTIFIED, NO PAIN PRESENT: ICD-10-PCS | Mod: S$GLB,,, | Performed by: DERMATOLOGY

## 2021-03-22 PROCEDURE — 87070 CULTURE OTHR SPECIMN AEROBIC: CPT | Performed by: DERMATOLOGY

## 2021-03-22 PROCEDURE — 1126F AMNT PAIN NOTED NONE PRSNT: CPT | Mod: S$GLB,,, | Performed by: DERMATOLOGY

## 2021-03-22 PROCEDURE — 99213 OFFICE O/P EST LOW 20 MIN: CPT | Mod: S$GLB,,, | Performed by: DERMATOLOGY

## 2021-03-22 PROCEDURE — 87186 SC STD MICRODIL/AGAR DIL: CPT | Mod: 59 | Performed by: DERMATOLOGY

## 2021-03-22 PROCEDURE — 99213 PR OFFICE/OUTPT VISIT, EST, LEVL III, 20-29 MIN: ICD-10-PCS | Mod: S$GLB,,, | Performed by: DERMATOLOGY

## 2021-03-22 PROCEDURE — 87075 CULTR BACTERIA EXCEPT BLOOD: CPT | Performed by: DERMATOLOGY

## 2021-03-22 RX ORDER — PIMECROLIMUS 10 MG/G
CREAM TOPICAL
Qty: 30 G | Refills: 3 | Status: SHIPPED | OUTPATIENT
Start: 2021-03-22

## 2021-03-22 RX ORDER — CLINDAMYCIN PHOSPHATE 11.9 MG/ML
SOLUTION TOPICAL
Qty: 30 ML | Refills: 3 | Status: SHIPPED | OUTPATIENT
Start: 2021-03-22 | End: 2021-09-05 | Stop reason: SDUPTHER

## 2021-03-24 ENCOUNTER — PATIENT MESSAGE (OUTPATIENT)
Dept: DERMATOLOGY | Facility: CLINIC | Age: 41
End: 2021-03-24

## 2021-03-24 LAB
BACTERIA SPEC AEROBE CULT: ABNORMAL
BACTERIA SPEC AEROBE CULT: ABNORMAL

## 2021-03-25 ENCOUNTER — PATIENT MESSAGE (OUTPATIENT)
Dept: DERMATOLOGY | Facility: CLINIC | Age: 41
End: 2021-03-25

## 2021-03-25 ENCOUNTER — OFFICE VISIT (OUTPATIENT)
Dept: BARIATRICS | Facility: CLINIC | Age: 41
End: 2021-03-25
Payer: COMMERCIAL

## 2021-03-25 VITALS
WEIGHT: 293 LBS | HEART RATE: 87 BPM | OXYGEN SATURATION: 95 % | BODY MASS INDEX: 50.02 KG/M2 | DIASTOLIC BLOOD PRESSURE: 97 MMHG | HEIGHT: 64 IN | SYSTOLIC BLOOD PRESSURE: 147 MMHG

## 2021-03-25 DIAGNOSIS — A49.8 KLEBSIELLA PNEUMONIAE INFECTION: ICD-10-CM

## 2021-03-25 DIAGNOSIS — A49.02 MRSA INFECTION: Primary | ICD-10-CM

## 2021-03-25 DIAGNOSIS — F32.A DEPRESSION, UNSPECIFIED DEPRESSION TYPE: ICD-10-CM

## 2021-03-25 PROCEDURE — 3008F BODY MASS INDEX DOCD: CPT | Mod: CPTII,S$GLB,, | Performed by: PHYSICIAN ASSISTANT

## 2021-03-25 PROCEDURE — 1126F PR PAIN SEVERITY QUANTIFIED, NO PAIN PRESENT: ICD-10-PCS | Mod: S$GLB,,, | Performed by: PHYSICIAN ASSISTANT

## 2021-03-25 PROCEDURE — 1126F AMNT PAIN NOTED NONE PRSNT: CPT | Mod: S$GLB,,, | Performed by: PHYSICIAN ASSISTANT

## 2021-03-25 PROCEDURE — 99999 PR PBB SHADOW E&M-EST. PATIENT-LVL V: CPT | Mod: PBBFAC,,, | Performed by: PHYSICIAN ASSISTANT

## 2021-03-25 PROCEDURE — 3008F PR BODY MASS INDEX (BMI) DOCUMENTED: ICD-10-PCS | Mod: CPTII,S$GLB,, | Performed by: PHYSICIAN ASSISTANT

## 2021-03-25 PROCEDURE — 99215 PR OFFICE/OUTPT VISIT, EST, LEVL V, 40-54 MIN: ICD-10-PCS | Mod: S$GLB,,, | Performed by: PHYSICIAN ASSISTANT

## 2021-03-25 PROCEDURE — 99999 PR PBB SHADOW E&M-EST. PATIENT-LVL V: ICD-10-PCS | Mod: PBBFAC,,, | Performed by: PHYSICIAN ASSISTANT

## 2021-03-25 PROCEDURE — 99215 OFFICE O/P EST HI 40 MIN: CPT | Mod: S$GLB,,, | Performed by: PHYSICIAN ASSISTANT

## 2021-03-25 RX ORDER — DOXYCYCLINE 100 MG/1
TABLET ORAL
Qty: 60 TABLET | Refills: 0 | Status: SHIPPED | OUTPATIENT
Start: 2021-03-25 | End: 2021-05-24

## 2021-03-29 ENCOUNTER — CLINICAL SUPPORT (OUTPATIENT)
Dept: BARIATRICS | Facility: CLINIC | Age: 41
End: 2021-03-29
Payer: COMMERCIAL

## 2021-03-29 ENCOUNTER — HOSPITAL ENCOUNTER (OUTPATIENT)
Dept: CARDIOLOGY | Facility: CLINIC | Age: 41
Discharge: HOME | End: 2021-03-29
Payer: COMMERCIAL

## 2021-03-29 ENCOUNTER — HOSPITAL ENCOUNTER (OUTPATIENT)
Dept: RADIOLOGY | Facility: HOSPITAL | Age: 41
Discharge: HOME | End: 2021-03-29
Attending: PHYSICIAN ASSISTANT
Payer: COMMERCIAL

## 2021-03-29 VITALS — BODY MASS INDEX: 50.02 KG/M2 | HEIGHT: 64 IN | WEIGHT: 293 LBS

## 2021-03-29 DIAGNOSIS — E66.01 MORBID OBESITY WITH BODY MASS INDEX (BMI) OF 50.0 TO 59.9 IN ADULT: ICD-10-CM

## 2021-03-29 DIAGNOSIS — Z71.3 DIETARY COUNSELING AND SURVEILLANCE: ICD-10-CM

## 2021-03-29 LAB — BACTERIA SPEC ANAEROBE CULT: NORMAL

## 2021-03-29 PROCEDURE — 99499 NO LOS: ICD-10-PCS | Mod: 95,,, | Performed by: DIETITIAN, REGISTERED

## 2021-03-29 PROCEDURE — 71046 X-RAY EXAM CHEST 2 VIEWS: CPT | Mod: TC

## 2021-03-29 PROCEDURE — 97802 PR MED NUTR THER, 1ST, INDIV, EA 15 MIN: ICD-10-PCS | Mod: 95,,, | Performed by: DIETITIAN, REGISTERED

## 2021-03-29 PROCEDURE — 71046 X-RAY EXAM CHEST 2 VIEWS: CPT | Mod: 26,,, | Performed by: RADIOLOGY

## 2021-03-29 PROCEDURE — 71046 XR CHEST PA AND LATERAL: ICD-10-PCS | Mod: 26,,, | Performed by: RADIOLOGY

## 2021-03-29 PROCEDURE — 97802 MEDICAL NUTRITION INDIV IN: CPT | Mod: 95,,, | Performed by: DIETITIAN, REGISTERED

## 2021-03-29 PROCEDURE — 93000 EKG 12-LEAD: ICD-10-PCS | Mod: S$GLB,,, | Performed by: INTERNAL MEDICINE

## 2021-03-29 PROCEDURE — 99499 UNLISTED E&M SERVICE: CPT | Mod: 95,,, | Performed by: DIETITIAN, REGISTERED

## 2021-03-29 PROCEDURE — 93000 ELECTROCARDIOGRAM COMPLETE: CPT | Mod: S$GLB,,, | Performed by: INTERNAL MEDICINE

## 2021-03-30 ENCOUNTER — PATIENT MESSAGE (OUTPATIENT)
Dept: INTERNAL MEDICINE | Facility: CLINIC | Age: 41
End: 2021-03-30

## 2021-03-30 RX ORDER — GALCANEZUMAB 120 MG/ML
120 INJECTION, SOLUTION SUBCUTANEOUS
Qty: 1 ML | Refills: 11 | Status: SHIPPED | OUTPATIENT
Start: 2021-03-30 | End: 2022-03-31 | Stop reason: SDUPTHER

## 2021-04-05 ENCOUNTER — TELEPHONE (OUTPATIENT)
Dept: PHARMACY | Facility: CLINIC | Age: 41
End: 2021-04-05

## 2021-04-06 RX ORDER — SEMAGLUTIDE 1.34 MG/ML
0.5 INJECTION, SOLUTION SUBCUTANEOUS
Qty: 1.5 ML | Refills: 0 | Status: CANCELLED | OUTPATIENT
Start: 2021-04-06 | End: 2021-04-28

## 2021-04-09 ENCOUNTER — PATIENT MESSAGE (OUTPATIENT)
Dept: PSYCHIATRY | Facility: CLINIC | Age: 41
End: 2021-04-09

## 2021-04-09 ENCOUNTER — PATIENT MESSAGE (OUTPATIENT)
Dept: DERMATOLOGY | Facility: CLINIC | Age: 41
End: 2021-04-09

## 2021-04-09 ENCOUNTER — OFFICE VISIT (OUTPATIENT)
Dept: URGENT CARE | Facility: CLINIC | Age: 41
End: 2021-04-09
Payer: COMMERCIAL

## 2021-04-09 VITALS
HEART RATE: 94 BPM | BODY MASS INDEX: 50.02 KG/M2 | WEIGHT: 293 LBS | OXYGEN SATURATION: 95 % | DIASTOLIC BLOOD PRESSURE: 106 MMHG | TEMPERATURE: 97 F | HEIGHT: 64 IN | SYSTOLIC BLOOD PRESSURE: 151 MMHG

## 2021-04-09 DIAGNOSIS — B34.9 VIRAL SYNDROME: Primary | ICD-10-CM

## 2021-04-09 LAB
CTP QC/QA: YES
CTP QC/QA: YES
POC MOLECULAR INFLUENZA A AGN: NEGATIVE
POC MOLECULAR INFLUENZA B AGN: NEGATIVE
SARS-COV-2 RDRP RESP QL NAA+PROBE: NEGATIVE

## 2021-04-09 PROCEDURE — U0002: ICD-10-PCS | Mod: QW,S$GLB,, | Performed by: PHYSICIAN ASSISTANT

## 2021-04-09 PROCEDURE — 87502 INFLUENZA DNA AMP PROBE: CPT | Mod: QW,S$GLB,, | Performed by: PHYSICIAN ASSISTANT

## 2021-04-09 PROCEDURE — 87502 POCT INFLUENZA A/B MOLECULAR: ICD-10-PCS | Mod: QW,S$GLB,, | Performed by: PHYSICIAN ASSISTANT

## 2021-04-09 PROCEDURE — U0002 COVID-19 LAB TEST NON-CDC: HCPCS | Mod: QW,S$GLB,, | Performed by: PHYSICIAN ASSISTANT

## 2021-04-09 PROCEDURE — 3008F BODY MASS INDEX DOCD: CPT | Mod: CPTII,S$GLB,, | Performed by: PHYSICIAN ASSISTANT

## 2021-04-09 PROCEDURE — 3008F PR BODY MASS INDEX (BMI) DOCUMENTED: ICD-10-PCS | Mod: CPTII,S$GLB,, | Performed by: PHYSICIAN ASSISTANT

## 2021-04-09 PROCEDURE — 99213 PR OFFICE/OUTPT VISIT, EST, LEVL III, 20-29 MIN: ICD-10-PCS | Mod: S$GLB,,, | Performed by: PHYSICIAN ASSISTANT

## 2021-04-09 PROCEDURE — 99213 OFFICE O/P EST LOW 20 MIN: CPT | Mod: S$GLB,,, | Performed by: PHYSICIAN ASSISTANT

## 2021-04-09 RX ORDER — SEMAGLUTIDE 1.34 MG/ML
0.5 INJECTION, SOLUTION SUBCUTANEOUS
Qty: 1.5 ML | Refills: 0 | Status: CANCELLED | OUTPATIENT
Start: 2021-04-06 | End: 2021-04-28

## 2021-04-12 ENCOUNTER — CLINICAL SUPPORT (OUTPATIENT)
Dept: BARIATRICS | Facility: CLINIC | Age: 41
End: 2021-04-12
Payer: COMMERCIAL

## 2021-04-12 ENCOUNTER — OFFICE VISIT (OUTPATIENT)
Dept: DERMATOLOGY | Facility: CLINIC | Age: 41
End: 2021-04-12
Payer: COMMERCIAL

## 2021-04-12 ENCOUNTER — OFFICE VISIT (OUTPATIENT)
Dept: PSYCHIATRY | Facility: CLINIC | Age: 41
End: 2021-04-12
Payer: COMMERCIAL

## 2021-04-12 VITALS — WEIGHT: 293 LBS | BODY MASS INDEX: 56.64 KG/M2

## 2021-04-12 DIAGNOSIS — Z22.322 MRSA (METHICILLIN RESISTANT STAPHYLOCOCCUS AUREUS) CARRIER: ICD-10-CM

## 2021-04-12 DIAGNOSIS — E66.01 MORBID OBESITY WITH BODY MASS INDEX (BMI) OF 50.0 TO 59.9 IN ADULT: ICD-10-CM

## 2021-04-12 DIAGNOSIS — B37.31 VULVOVAGINAL CANDIDIASIS: ICD-10-CM

## 2021-04-12 DIAGNOSIS — E66.01 MORBID OBESITY DUE TO EXCESS CALORIES: ICD-10-CM

## 2021-04-12 DIAGNOSIS — Z71.3 DIETARY COUNSELING AND SURVEILLANCE: ICD-10-CM

## 2021-04-12 DIAGNOSIS — A49.02 MRSA INFECTION: Primary | ICD-10-CM

## 2021-04-12 DIAGNOSIS — Z01.818 PREOP EXAMINATION: Primary | ICD-10-CM

## 2021-04-12 DIAGNOSIS — F33.41 MAJOR DEPRESSIVE DISORDER, RECURRENT EPISODE, IN PARTIAL REMISSION WITH ANXIOUS DISTRESS: ICD-10-CM

## 2021-04-12 PROCEDURE — 99499 UNLISTED E&M SERVICE: CPT | Mod: 95,,, | Performed by: DIETITIAN, REGISTERED

## 2021-04-12 PROCEDURE — 99213 PR OFFICE/OUTPT VISIT, EST, LEVL III, 20-29 MIN: ICD-10-PCS | Mod: S$GLB,,, | Performed by: DERMATOLOGY

## 2021-04-12 PROCEDURE — 99213 OFFICE O/P EST LOW 20 MIN: CPT | Mod: S$GLB,,, | Performed by: DERMATOLOGY

## 2021-04-12 PROCEDURE — 97803 MED NUTRITION INDIV SUBSEQ: CPT | Mod: 95,,, | Performed by: DIETITIAN, REGISTERED

## 2021-04-12 PROCEDURE — 1126F PR PAIN SEVERITY QUANTIFIED, NO PAIN PRESENT: ICD-10-PCS | Mod: S$GLB,,, | Performed by: DERMATOLOGY

## 2021-04-12 PROCEDURE — 90791 PR PSYCHIATRIC DIAGNOSTIC EVALUATION: ICD-10-PCS | Mod: 95,,, | Performed by: PSYCHOLOGIST

## 2021-04-12 PROCEDURE — 1126F AMNT PAIN NOTED NONE PRSNT: CPT | Mod: S$GLB,,, | Performed by: DERMATOLOGY

## 2021-04-12 PROCEDURE — 99499 NO LOS: ICD-10-PCS | Mod: 95,,, | Performed by: DIETITIAN, REGISTERED

## 2021-04-12 PROCEDURE — 97803 PR MED NUTR THER, SUBSQ, INDIV, EA 15 MIN: ICD-10-PCS | Mod: 95,,, | Performed by: DIETITIAN, REGISTERED

## 2021-04-12 PROCEDURE — 90791 PSYCH DIAGNOSTIC EVALUATION: CPT | Mod: 95,,, | Performed by: PSYCHOLOGIST

## 2021-04-12 RX ORDER — FLUCONAZOLE 200 MG/1
200 TABLET ORAL
Qty: 4 TABLET | Refills: 0 | Status: SHIPPED | OUTPATIENT
Start: 2021-04-12 | End: 2021-04-29

## 2021-04-12 RX ORDER — SEMAGLUTIDE 1.34 MG/ML
0.5 INJECTION, SOLUTION SUBCUTANEOUS
Qty: 1.5 ML | Refills: 0 | Status: SHIPPED | OUTPATIENT
Start: 2021-04-12 | End: 2021-05-14 | Stop reason: SDUPTHER

## 2021-04-15 ENCOUNTER — OFFICE VISIT (OUTPATIENT)
Dept: BARIATRICS | Facility: CLINIC | Age: 41
End: 2021-04-15
Payer: COMMERCIAL

## 2021-04-15 VITALS
HEIGHT: 64 IN | WEIGHT: 293 LBS | DIASTOLIC BLOOD PRESSURE: 78 MMHG | HEART RATE: 89 BPM | BODY MASS INDEX: 50.02 KG/M2 | OXYGEN SATURATION: 99 % | SYSTOLIC BLOOD PRESSURE: 140 MMHG

## 2021-04-15 DIAGNOSIS — E66.01 CLASS 3 SEVERE OBESITY DUE TO EXCESS CALORIES WITH SERIOUS COMORBIDITY AND BODY MASS INDEX (BMI) OF 50.0 TO 59.9 IN ADULT: Primary | ICD-10-CM

## 2021-04-15 DIAGNOSIS — R73.03 PREDIABETES: ICD-10-CM

## 2021-04-15 PROCEDURE — 99999 PR PBB SHADOW E&M-EST. PATIENT-LVL III: ICD-10-PCS | Mod: PBBFAC,,, | Performed by: STUDENT IN AN ORGANIZED HEALTH CARE EDUCATION/TRAINING PROGRAM

## 2021-04-15 PROCEDURE — 99213 OFFICE O/P EST LOW 20 MIN: CPT | Mod: S$GLB,,, | Performed by: STUDENT IN AN ORGANIZED HEALTH CARE EDUCATION/TRAINING PROGRAM

## 2021-04-15 PROCEDURE — 3008F BODY MASS INDEX DOCD: CPT | Mod: CPTII,S$GLB,, | Performed by: STUDENT IN AN ORGANIZED HEALTH CARE EDUCATION/TRAINING PROGRAM

## 2021-04-15 PROCEDURE — 1126F PR PAIN SEVERITY QUANTIFIED, NO PAIN PRESENT: ICD-10-PCS | Mod: S$GLB,,, | Performed by: STUDENT IN AN ORGANIZED HEALTH CARE EDUCATION/TRAINING PROGRAM

## 2021-04-15 PROCEDURE — 99999 PR PBB SHADOW E&M-EST. PATIENT-LVL III: CPT | Mod: PBBFAC,,, | Performed by: STUDENT IN AN ORGANIZED HEALTH CARE EDUCATION/TRAINING PROGRAM

## 2021-04-15 PROCEDURE — 3008F PR BODY MASS INDEX (BMI) DOCUMENTED: ICD-10-PCS | Mod: CPTII,S$GLB,, | Performed by: STUDENT IN AN ORGANIZED HEALTH CARE EDUCATION/TRAINING PROGRAM

## 2021-04-15 PROCEDURE — 1126F AMNT PAIN NOTED NONE PRSNT: CPT | Mod: S$GLB,,, | Performed by: STUDENT IN AN ORGANIZED HEALTH CARE EDUCATION/TRAINING PROGRAM

## 2021-04-15 PROCEDURE — 99213 PR OFFICE/OUTPT VISIT, EST, LEVL III, 20-29 MIN: ICD-10-PCS | Mod: S$GLB,,, | Performed by: STUDENT IN AN ORGANIZED HEALTH CARE EDUCATION/TRAINING PROGRAM

## 2021-04-22 ENCOUNTER — PATIENT MESSAGE (OUTPATIENT)
Dept: OTOLARYNGOLOGY | Facility: CLINIC | Age: 41
End: 2021-04-22

## 2021-04-23 ENCOUNTER — PATIENT MESSAGE (OUTPATIENT)
Dept: BARIATRICS | Facility: CLINIC | Age: 41
End: 2021-04-23

## 2021-04-27 ENCOUNTER — PATIENT MESSAGE (OUTPATIENT)
Dept: DERMATOLOGY | Facility: CLINIC | Age: 41
End: 2021-04-27

## 2021-04-29 ENCOUNTER — LAB VISIT (OUTPATIENT)
Dept: LAB | Facility: HOSPITAL | Age: 41
End: 2021-04-29
Payer: COMMERCIAL

## 2021-04-29 ENCOUNTER — TELEPHONE (OUTPATIENT)
Dept: GYNECOLOGIC ONCOLOGY | Facility: CLINIC | Age: 41
End: 2021-04-29

## 2021-04-29 ENCOUNTER — OFFICE VISIT (OUTPATIENT)
Dept: GYNECOLOGIC ONCOLOGY | Facility: CLINIC | Age: 41
End: 2021-04-29
Payer: COMMERCIAL

## 2021-04-29 VITALS
DIASTOLIC BLOOD PRESSURE: 69 MMHG | SYSTOLIC BLOOD PRESSURE: 142 MMHG | WEIGHT: 293 LBS | BODY MASS INDEX: 57.9 KG/M2 | HEART RATE: 88 BPM

## 2021-04-29 DIAGNOSIS — E89.40 SURGICAL MENOPAUSE: ICD-10-CM

## 2021-04-29 DIAGNOSIS — C56.9 MUCINOUS TUMOR, OF LOW MALIGNANT POTENTIAL: Primary | ICD-10-CM

## 2021-04-29 DIAGNOSIS — R10.2 FEMALE PELVIC PAIN: ICD-10-CM

## 2021-04-29 DIAGNOSIS — C56.9 MUCINOUS TUMOR, OF LOW MALIGNANT POTENTIAL: ICD-10-CM

## 2021-04-29 LAB — CEA SERPL-MCNC: 1.8 NG/ML (ref 0–5)

## 2021-04-29 PROCEDURE — 3008F PR BODY MASS INDEX (BMI) DOCUMENTED: ICD-10-PCS | Mod: CPTII,S$GLB,, | Performed by: OBSTETRICS & GYNECOLOGY

## 2021-04-29 PROCEDURE — 3008F BODY MASS INDEX DOCD: CPT | Mod: CPTII,S$GLB,, | Performed by: OBSTETRICS & GYNECOLOGY

## 2021-04-29 PROCEDURE — 99999 PR PBB SHADOW E&M-EST. PATIENT-LVL III: CPT | Mod: PBBFAC,,, | Performed by: OBSTETRICS & GYNECOLOGY

## 2021-04-29 PROCEDURE — 82378 CARCINOEMBRYONIC ANTIGEN: CPT | Performed by: OBSTETRICS & GYNECOLOGY

## 2021-04-29 PROCEDURE — 1126F AMNT PAIN NOTED NONE PRSNT: CPT | Mod: S$GLB,,, | Performed by: OBSTETRICS & GYNECOLOGY

## 2021-04-29 PROCEDURE — 99213 OFFICE O/P EST LOW 20 MIN: CPT | Mod: S$GLB,,, | Performed by: OBSTETRICS & GYNECOLOGY

## 2021-04-29 PROCEDURE — 1126F PR PAIN SEVERITY QUANTIFIED, NO PAIN PRESENT: ICD-10-PCS | Mod: S$GLB,,, | Performed by: OBSTETRICS & GYNECOLOGY

## 2021-04-29 PROCEDURE — 99999 PR PBB SHADOW E&M-EST. PATIENT-LVL III: ICD-10-PCS | Mod: PBBFAC,,, | Performed by: OBSTETRICS & GYNECOLOGY

## 2021-04-29 PROCEDURE — 99213 PR OFFICE/OUTPT VISIT, EST, LEVL III, 20-29 MIN: ICD-10-PCS | Mod: S$GLB,,, | Performed by: OBSTETRICS & GYNECOLOGY

## 2021-04-29 PROCEDURE — 36415 COLL VENOUS BLD VENIPUNCTURE: CPT | Performed by: OBSTETRICS & GYNECOLOGY

## 2021-05-05 ENCOUNTER — PATIENT MESSAGE (OUTPATIENT)
Dept: BARIATRICS | Facility: CLINIC | Age: 41
End: 2021-05-05

## 2021-05-07 ENCOUNTER — TELEPHONE (OUTPATIENT)
Dept: BARIATRICS | Facility: CLINIC | Age: 41
End: 2021-05-07

## 2021-05-07 DIAGNOSIS — E66.01 CLASS 3 SEVERE OBESITY DUE TO EXCESS CALORIES WITH SERIOUS COMORBIDITY AND BODY MASS INDEX (BMI) OF 50.0 TO 59.9 IN ADULT: Primary | ICD-10-CM

## 2021-05-07 DIAGNOSIS — Z01.818 PREOP EXAMINATION: ICD-10-CM

## 2021-05-07 DIAGNOSIS — E66.01 MORBID OBESITY WITH BODY MASS INDEX (BMI) OF 50.0 TO 59.9 IN ADULT: ICD-10-CM

## 2021-05-14 RX ORDER — SEMAGLUTIDE 1.34 MG/ML
0.5 INJECTION, SOLUTION SUBCUTANEOUS
Qty: 1 PEN | Refills: 2 | Status: SHIPPED | OUTPATIENT
Start: 2021-05-14 | End: 2021-07-31

## 2021-05-16 DIAGNOSIS — F33.41 RECURRENT MAJOR DEPRESSIVE DISORDER IN PARTIAL REMISSION: ICD-10-CM

## 2021-05-17 ENCOUNTER — PATIENT MESSAGE (OUTPATIENT)
Dept: BARIATRICS | Facility: CLINIC | Age: 41
End: 2021-05-17

## 2021-05-17 ENCOUNTER — HOSPITAL ENCOUNTER (OUTPATIENT)
Dept: CARDIOLOGY | Facility: HOSPITAL | Age: 41
Discharge: HOME | End: 2021-05-17
Attending: PHYSICIAN ASSISTANT
Payer: COMMERCIAL

## 2021-05-17 ENCOUNTER — TELEPHONE (OUTPATIENT)
Dept: BARIATRICS | Facility: CLINIC | Age: 41
End: 2021-05-17

## 2021-05-17 ENCOUNTER — PATIENT MESSAGE (OUTPATIENT)
Dept: PSYCHIATRY | Facility: CLINIC | Age: 41
End: 2021-05-17

## 2021-05-17 VITALS
DIASTOLIC BLOOD PRESSURE: 90 MMHG | WEIGHT: 293 LBS | HEART RATE: 88 BPM | SYSTOLIC BLOOD PRESSURE: 146 MMHG | HEIGHT: 64 IN | RESPIRATION RATE: 18 BRPM | BODY MASS INDEX: 50.02 KG/M2

## 2021-05-17 DIAGNOSIS — E66.01 CLASS 3 SEVERE OBESITY DUE TO EXCESS CALORIES WITH SERIOUS COMORBIDITY AND BODY MASS INDEX (BMI) OF 50.0 TO 59.9 IN ADULT: ICD-10-CM

## 2021-05-17 DIAGNOSIS — E66.01 MORBID OBESITY WITH BODY MASS INDEX (BMI) OF 50.0 TO 59.9 IN ADULT: ICD-10-CM

## 2021-05-17 DIAGNOSIS — Z01.818 PREOP EXAMINATION: ICD-10-CM

## 2021-05-17 LAB
ASCENDING AORTA: 2.56 CM
BSA FOR ECHO PROCEDURE: 2.62 M2
CV ECHO LV RWT: 0.33 CM
CV STRESS BASE HR: 88 BPM
DIASTOLIC BLOOD PRESSURE: 90 MMHG
DOP CALC LVOT AREA: 3.4 CM2
DOP CALC LVOT DIAMETER: 2.08 CM
DOP CALC LVOT PEAK VEL: 1.32 M/S
DOP CALC LVOT STROKE VOLUME: 81.71 CM3
DOP CALCLVOT PEAK VEL VTI: 24.06 CM
E WAVE DECELERATION TIME: 171.94 MSEC
E/A RATIO: 1.23
E/E' RATIO: 7.14 M/S
ECHO LV POSTERIOR WALL: 0.88 CM (ref 0.6–1.1)
EJECTION FRACTION: 65 %
FRACTIONAL SHORTENING: 37 % (ref 28–44)
INTERVENTRICULAR SEPTUM: 0.9 CM (ref 0.6–1.1)
IVRT: 82.78 MSEC
LA MAJOR: 4.86 CM
LA MINOR: 5 CM
LA WIDTH: 2.78 CM
LEFT ATRIUM SIZE: 3.28 CM
LEFT ATRIUM VOLUME INDEX: 15.7 ML/M2
LEFT ATRIUM VOLUME: 38.2 CM3
LEFT INTERNAL DIMENSION IN SYSTOLE: 3.43 CM (ref 2.1–4)
LEFT VENTRICLE DIASTOLIC VOLUME INDEX: 58.22 ML/M2
LEFT VENTRICLE DIASTOLIC VOLUME: 142.06 ML
LEFT VENTRICLE MASS INDEX: 73 G/M2
LEFT VENTRICLE SYSTOLIC VOLUME INDEX: 19.9 ML/M2
LEFT VENTRICLE SYSTOLIC VOLUME: 48.51 ML
LEFT VENTRICULAR INTERNAL DIMENSION IN DIASTOLE: 5.41 CM (ref 3.5–6)
LEFT VENTRICULAR MASS: 178.11 G
LV LATERAL E/E' RATIO: 5.77 M/S
LV SEPTAL E/E' RATIO: 9.38 M/S
MV A" WAVE DURATION": 9.13 MSEC
MV PEAK A VEL: 0.61 M/S
MV PEAK E VEL: 0.75 M/S
MV STENOSIS PRESSURE HALF TIME: 49.86 MS
MV VALVE AREA P 1/2 METHOD: 4.41 CM2
OHS CV CPX 1 MINUTE RECOVERY HEART RATE: 148 BPM
OHS CV CPX 85 PERCENT MAX PREDICTED HEART RATE MALE: 147
OHS CV CPX MAX PREDICTED HEART RATE: 173
OHS CV CPX PATIENT IS FEMALE: 1
OHS CV CPX PATIENT IS MALE: 0
OHS CV CPX PEAK DIASTOLIC BLOOD PRESSURE: 74 MMHG
OHS CV CPX PEAK HEAR RATE: 153 BPM
OHS CV CPX PEAK RATE PRESSURE PRODUCT: NORMAL
OHS CV CPX PEAK SYSTOLIC BLOOD PRESSURE: 129 MMHG
OHS CV CPX PERCENT MAX PREDICTED HEART RATE ACHIEVED: 89
OHS CV CPX RATE PRESSURE PRODUCT PRESENTING: NORMAL
PISA TR MAX VEL: 2.55 M/S
PULM VEIN S/D RATIO: 1.45
PV PEAK D VEL: 0.31 M/S
PV PEAK S VEL: 0.45 M/S
RA MAJOR: 4.64 CM
RA PRESSURE: 3 MMHG
RA WIDTH: 2.68 CM
RIGHT VENTRICULAR END-DIASTOLIC DIMENSION: 3.6 CM
RV TISSUE DOPPLER FREE WALL SYSTOLIC VELOCITY 1 (APICAL 4 CHAMBER VIEW): 13.01 CM/S
SINUS: 2.74 CM
STJ: 2.53 CM
SYSTOLIC BLOOD PRESSURE: 146 MMHG
TDI LATERAL: 0.13 M/S
TDI SEPTAL: 0.08 M/S
TDI: 0.11 M/S
TR MAX PG: 26 MMHG
TRICUSPID ANNULAR PLANE SYSTOLIC EXCURSION: 2.36 CM
TV REST PULMONARY ARTERY PRESSURE: 29 MMHG

## 2021-05-17 PROCEDURE — 93351 STRESS TTE COMPLETE: CPT

## 2021-05-17 PROCEDURE — 93351 STRESS ECHO (CUPID ONLY): ICD-10-PCS | Mod: 26,,, | Performed by: INTERNAL MEDICINE

## 2021-05-17 PROCEDURE — 93351 STRESS TTE COMPLETE: CPT | Mod: 26,,, | Performed by: INTERNAL MEDICINE

## 2021-05-17 PROCEDURE — 63600175 PHARM REV CODE 636 W HCPCS: Performed by: PHYSICIAN ASSISTANT

## 2021-05-17 PROCEDURE — 25500020 PHARM REV CODE 255: Performed by: PHYSICIAN ASSISTANT

## 2021-05-17 PROCEDURE — 93352 STRESS ECHO (CUPID ONLY): ICD-10-PCS | Mod: ,,, | Performed by: INTERNAL MEDICINE

## 2021-05-17 PROCEDURE — 93352 ADMIN ECG CONTRAST AGENT: CPT | Mod: ,,, | Performed by: INTERNAL MEDICINE

## 2021-05-17 RX ORDER — DOBUTAMINE HYDROCHLORIDE 400 MG/100ML
30 INJECTION INTRAVENOUS
Status: COMPLETED | OUTPATIENT
Start: 2021-05-17 | End: 2021-05-17

## 2021-05-17 RX ORDER — VENLAFAXINE HYDROCHLORIDE 75 MG/1
CAPSULE, EXTENDED RELEASE ORAL
Qty: 90 CAPSULE | Refills: 0 | Status: SHIPPED | OUTPATIENT
Start: 2021-05-17 | End: 2021-05-24 | Stop reason: ALTCHOICE

## 2021-05-17 RX ADMIN — HUMAN ALBUMIN MICROSPHERES AND PERFLUTREN 0.66 MG: 10; .22 INJECTION, SOLUTION INTRAVENOUS at 09:05

## 2021-05-17 RX ADMIN — DOBUTAMINE HYDROCHLORIDE 30 MCG/KG/MIN: 400 INJECTION INTRAVENOUS at 09:05

## 2021-05-18 ENCOUNTER — PATIENT MESSAGE (OUTPATIENT)
Dept: CARDIOLOGY | Facility: CLINIC | Age: 41
End: 2021-05-18

## 2021-05-18 ENCOUNTER — TELEPHONE (OUTPATIENT)
Dept: BARIATRICS | Facility: CLINIC | Age: 41
End: 2021-05-18

## 2021-05-21 ENCOUNTER — TELEPHONE (OUTPATIENT)
Dept: BARIATRICS | Facility: CLINIC | Age: 41
End: 2021-05-21

## 2021-05-21 DIAGNOSIS — R73.03 PREDIABETES: ICD-10-CM

## 2021-05-21 DIAGNOSIS — E66.01 SEVERE OBESITY: Primary | ICD-10-CM

## 2021-05-21 DIAGNOSIS — Z71.3 DIETARY COUNSELING AND SURVEILLANCE: ICD-10-CM

## 2021-05-21 DIAGNOSIS — Z98.890 POST-OPERATIVE STATE: ICD-10-CM

## 2021-05-24 ENCOUNTER — OFFICE VISIT (OUTPATIENT)
Dept: PSYCHIATRY | Facility: CLINIC | Age: 41
End: 2021-05-24
Payer: COMMERCIAL

## 2021-05-24 DIAGNOSIS — F33.41 MAJOR DEPRESSIVE DISORDER, RECURRENT EPISODE, IN PARTIAL REMISSION WITH ANXIOUS DISTRESS: Primary | ICD-10-CM

## 2021-05-24 DIAGNOSIS — G47.09 OTHER INSOMNIA: ICD-10-CM

## 2021-05-24 DIAGNOSIS — E66.01 MORBID OBESITY DUE TO EXCESS CALORIES: ICD-10-CM

## 2021-05-24 PROCEDURE — 99214 OFFICE O/P EST MOD 30 MIN: CPT | Mod: 95,,, | Performed by: PSYCHIATRY & NEUROLOGY

## 2021-05-24 PROCEDURE — 99214 PR OFFICE/OUTPT VISIT, EST, LEVL IV, 30-39 MIN: ICD-10-PCS | Mod: 95,,, | Performed by: PSYCHIATRY & NEUROLOGY

## 2021-05-24 RX ORDER — ALPRAZOLAM 0.5 MG/1
0.5 TABLET ORAL NIGHTLY PRN
Qty: 30 TABLET | Refills: 1 | Status: SHIPPED | OUTPATIENT
Start: 2021-05-24 | End: 2021-08-02

## 2021-05-24 RX ORDER — VENLAFAXINE 75 MG/1
75 TABLET ORAL 3 TIMES DAILY
Qty: 90 TABLET | Refills: 2 | Status: SHIPPED | OUTPATIENT
Start: 2021-05-24 | End: 2021-08-02 | Stop reason: ALTCHOICE

## 2021-05-25 ENCOUNTER — ANESTHESIA EVENT (OUTPATIENT)
Dept: SURGERY | Facility: HOSPITAL | Age: 41
DRG: 621 | End: 2021-05-25
Payer: COMMERCIAL

## 2021-05-26 ENCOUNTER — PATIENT MESSAGE (OUTPATIENT)
Dept: INTERNAL MEDICINE | Facility: CLINIC | Age: 41
End: 2021-05-26

## 2021-06-08 ENCOUNTER — CLINICAL SUPPORT (OUTPATIENT)
Dept: BARIATRICS | Facility: CLINIC | Age: 41
End: 2021-06-08
Payer: COMMERCIAL

## 2021-06-08 ENCOUNTER — TELEPHONE (OUTPATIENT)
Dept: BARIATRICS | Facility: CLINIC | Age: 41
End: 2021-06-08

## 2021-06-08 PROCEDURE — 99499 NO LOS: ICD-10-PCS | Mod: 95,,, | Performed by: DIETITIAN, REGISTERED

## 2021-06-08 PROCEDURE — 99499 UNLISTED E&M SERVICE: CPT | Mod: 95,,, | Performed by: DIETITIAN, REGISTERED

## 2021-06-09 ENCOUNTER — OFFICE VISIT (OUTPATIENT)
Dept: BARIATRICS | Facility: CLINIC | Age: 41
End: 2021-06-09
Payer: COMMERCIAL

## 2021-06-09 ENCOUNTER — LAB VISIT (OUTPATIENT)
Dept: LAB | Facility: HOSPITAL | Age: 41
End: 2021-06-09
Attending: SURGERY
Payer: COMMERCIAL

## 2021-06-09 VITALS
SYSTOLIC BLOOD PRESSURE: 122 MMHG | WEIGHT: 293 LBS | HEART RATE: 96 BPM | BODY MASS INDEX: 58.73 KG/M2 | DIASTOLIC BLOOD PRESSURE: 82 MMHG | OXYGEN SATURATION: 93 %

## 2021-06-09 DIAGNOSIS — Z71.3 DIETARY COUNSELING AND SURVEILLANCE: ICD-10-CM

## 2021-06-09 DIAGNOSIS — R73.03 PREDIABETES: ICD-10-CM

## 2021-06-09 DIAGNOSIS — Q24.8 PERICARDIAL CYST: ICD-10-CM

## 2021-06-09 DIAGNOSIS — E66.01 SEVERE OBESITY: ICD-10-CM

## 2021-06-09 DIAGNOSIS — E66.01 CLASS 3 SEVERE OBESITY DUE TO EXCESS CALORIES WITH SERIOUS COMORBIDITY AND BODY MASS INDEX (BMI) OF 50.0 TO 59.9 IN ADULT: Primary | ICD-10-CM

## 2021-06-09 LAB
ALBUMIN SERPL BCP-MCNC: 3.8 G/DL (ref 3.5–5.2)
ALP SERPL-CCNC: 102 U/L (ref 55–135)
ALT SERPL W/O P-5'-P-CCNC: 23 U/L (ref 10–44)
ANION GAP SERPL CALC-SCNC: 14 MMOL/L (ref 8–16)
AST SERPL-CCNC: 21 U/L (ref 10–40)
BASOPHILS # BLD AUTO: 0.02 K/UL (ref 0–0.2)
BASOPHILS NFR BLD: 0.3 % (ref 0–1.9)
BILIRUB SERPL-MCNC: 0.4 MG/DL (ref 0.1–1)
BUN SERPL-MCNC: 17 MG/DL (ref 6–20)
CALCIUM SERPL-MCNC: 10.2 MG/DL (ref 8.7–10.5)
CHLORIDE SERPL-SCNC: 103 MMOL/L (ref 95–110)
CO2 SERPL-SCNC: 22 MMOL/L (ref 23–29)
CREAT SERPL-MCNC: 0.8 MG/DL (ref 0.5–1.4)
DIFFERENTIAL METHOD: ABNORMAL
EOSINOPHIL # BLD AUTO: 0.1 K/UL (ref 0–0.5)
EOSINOPHIL NFR BLD: 0.8 % (ref 0–8)
ERYTHROCYTE [DISTWIDTH] IN BLOOD BY AUTOMATED COUNT: 13.4 % (ref 11.5–14.5)
EST. GFR  (AFRICAN AMERICAN): >60 ML/MIN/1.73 M^2
EST. GFR  (NON AFRICAN AMERICAN): >60 ML/MIN/1.73 M^2
GLUCOSE SERPL-MCNC: 96 MG/DL (ref 70–110)
HCT VFR BLD AUTO: 39.2 % (ref 37–48.5)
HGB BLD-MCNC: 12.8 G/DL (ref 12–16)
IMM GRANULOCYTES # BLD AUTO: 0.01 K/UL (ref 0–0.04)
IMM GRANULOCYTES NFR BLD AUTO: 0.1 % (ref 0–0.5)
LYMPHOCYTES # BLD AUTO: 2.3 K/UL (ref 1–4.8)
LYMPHOCYTES NFR BLD: 30.5 % (ref 18–48)
MCH RBC QN AUTO: 26.8 PG (ref 27–31)
MCHC RBC AUTO-ENTMCNC: 32.7 G/DL (ref 32–36)
MCV RBC AUTO: 82 FL (ref 82–98)
MONOCYTES # BLD AUTO: 0.4 K/UL (ref 0.3–1)
MONOCYTES NFR BLD: 5.2 % (ref 4–15)
NEUTROPHILS # BLD AUTO: 4.7 K/UL (ref 1.8–7.7)
NEUTROPHILS NFR BLD: 63.1 % (ref 38–73)
NRBC BLD-RTO: 0 /100 WBC
PLATELET # BLD AUTO: 159 K/UL (ref 150–450)
PMV BLD AUTO: 12.5 FL (ref 9.2–12.9)
POTASSIUM SERPL-SCNC: 3.9 MMOL/L (ref 3.5–5.1)
PROT SERPL-MCNC: 7.5 G/DL (ref 6–8.4)
RBC # BLD AUTO: 4.77 M/UL (ref 4–5.4)
SODIUM SERPL-SCNC: 139 MMOL/L (ref 136–145)
WBC # BLD AUTO: 7.37 K/UL (ref 3.9–12.7)

## 2021-06-09 PROCEDURE — 80053 COMPREHEN METABOLIC PANEL: CPT | Performed by: SURGERY

## 2021-06-09 PROCEDURE — 99999 PR PBB SHADOW E&M-EST. PATIENT-LVL III: ICD-10-PCS | Mod: PBBFAC,,, | Performed by: SURGERY

## 2021-06-09 PROCEDURE — 3008F PR BODY MASS INDEX (BMI) DOCUMENTED: ICD-10-PCS | Mod: CPTII,S$GLB,, | Performed by: SURGERY

## 2021-06-09 PROCEDURE — 99213 OFFICE O/P EST LOW 20 MIN: CPT | Mod: S$GLB,,, | Performed by: SURGERY

## 2021-06-09 PROCEDURE — 85025 COMPLETE CBC W/AUTO DIFF WBC: CPT | Performed by: SURGERY

## 2021-06-09 PROCEDURE — 1126F PR PAIN SEVERITY QUANTIFIED, NO PAIN PRESENT: ICD-10-PCS | Mod: S$GLB,,, | Performed by: SURGERY

## 2021-06-09 PROCEDURE — 99213 PR OFFICE/OUTPT VISIT, EST, LEVL III, 20-29 MIN: ICD-10-PCS | Mod: S$GLB,,, | Performed by: SURGERY

## 2021-06-09 PROCEDURE — 99999 PR PBB SHADOW E&M-EST. PATIENT-LVL III: CPT | Mod: PBBFAC,,, | Performed by: SURGERY

## 2021-06-09 PROCEDURE — 1126F AMNT PAIN NOTED NONE PRSNT: CPT | Mod: S$GLB,,, | Performed by: SURGERY

## 2021-06-09 PROCEDURE — 36415 COLL VENOUS BLD VENIPUNCTURE: CPT | Performed by: SURGERY

## 2021-06-09 PROCEDURE — 3008F BODY MASS INDEX DOCD: CPT | Mod: CPTII,S$GLB,, | Performed by: SURGERY

## 2021-06-09 RX ORDER — SODIUM CHLORIDE 9 MG/ML
INJECTION, SOLUTION INTRAVENOUS CONTINUOUS
Status: CANCELLED | OUTPATIENT
Start: 2021-06-09

## 2021-06-09 RX ORDER — OMEPRAZOLE 40 MG/1
40 CAPSULE, DELAYED RELEASE ORAL EVERY MORNING
Qty: 30 CAPSULE | Refills: 2 | Status: SHIPPED | OUTPATIENT
Start: 2021-06-09 | End: 2021-10-21 | Stop reason: SDUPTHER

## 2021-06-09 RX ORDER — ONDANSETRON 2 MG/ML
8 INJECTION INTRAMUSCULAR; INTRAVENOUS EVERY 6 HOURS PRN
Status: CANCELLED | OUTPATIENT
Start: 2021-06-09

## 2021-06-09 RX ORDER — PROCHLORPERAZINE EDISYLATE 5 MG/ML
5 INJECTION INTRAMUSCULAR; INTRAVENOUS EVERY 6 HOURS PRN
Status: CANCELLED | OUTPATIENT
Start: 2021-06-09

## 2021-06-09 RX ORDER — SODIUM CHLORIDE, SODIUM LACTATE, POTASSIUM CHLORIDE, CALCIUM CHLORIDE 600; 310; 30; 20 MG/100ML; MG/100ML; MG/100ML; MG/100ML
INJECTION, SOLUTION INTRAVENOUS CONTINUOUS
Status: CANCELLED | OUTPATIENT
Start: 2021-06-09

## 2021-06-09 RX ORDER — ENOXAPARIN SODIUM 100 MG/ML
40 INJECTION SUBCUTANEOUS EVERY 12 HOURS
Status: CANCELLED | OUTPATIENT
Start: 2021-06-09

## 2021-06-09 RX ORDER — HYDROCODONE BITARTRATE AND ACETAMINOPHEN 7.5; 325 MG/15ML; MG/15ML
15 SOLUTION ORAL 4 TIMES DAILY PRN
Qty: 118 ML | Refills: 0 | Status: SHIPPED | OUTPATIENT
Start: 2021-06-09 | End: 2021-08-02

## 2021-06-09 RX ORDER — MUPIROCIN 20 MG/G
OINTMENT TOPICAL
Status: CANCELLED | OUTPATIENT
Start: 2021-06-09

## 2021-06-09 RX ORDER — DEXTROMETHORPHAN/PSEUDOEPHED 2.5-7.5/.8
40 DROPS ORAL 4 TIMES DAILY PRN
Status: CANCELLED | OUTPATIENT
Start: 2021-06-09

## 2021-06-09 RX ORDER — HYDROCODONE BITARTRATE AND ACETAMINOPHEN 7.5; 325 MG/15ML; MG/15ML
15 SOLUTION ORAL EVERY 4 HOURS PRN
Status: CANCELLED | OUTPATIENT
Start: 2021-06-10

## 2021-06-09 RX ORDER — FAMOTIDINE 10 MG/ML
20 INJECTION INTRAVENOUS ONCE
Status: CANCELLED | OUTPATIENT
Start: 2021-06-09 | End: 2021-06-09

## 2021-06-09 RX ORDER — HYDROMORPHONE HYDROCHLORIDE 1 MG/ML
0.5 INJECTION, SOLUTION INTRAMUSCULAR; INTRAVENOUS; SUBCUTANEOUS
Status: CANCELLED | OUTPATIENT
Start: 2021-06-09

## 2021-06-09 RX ORDER — URSODIOL 500 MG/1
TABLET, FILM COATED ORAL
Qty: 90 TABLET | Refills: 1 | Status: SHIPPED | OUTPATIENT
Start: 2021-06-09 | End: 2022-04-14 | Stop reason: ALTCHOICE

## 2021-06-09 RX ORDER — POLYETHYLENE GLYCOL 3350 17 G/17G
17 POWDER, FOR SOLUTION ORAL DAILY
Qty: 510 G | Refills: 3 | Status: SHIPPED | OUTPATIENT
Start: 2021-06-09 | End: 2022-04-28 | Stop reason: ALTCHOICE

## 2021-06-09 RX ORDER — SODIUM CITRATE AND CITRIC ACID MONOHYDRATE 334; 500 MG/5ML; MG/5ML
30 SOLUTION ORAL ONCE
Status: CANCELLED | OUTPATIENT
Start: 2021-06-09 | End: 2021-06-09

## 2021-06-09 RX ORDER — PANTOPRAZOLE SODIUM 40 MG/10ML
40 INJECTION, POWDER, LYOPHILIZED, FOR SOLUTION INTRAVENOUS 2 TIMES DAILY
Status: CANCELLED | OUTPATIENT
Start: 2021-06-09

## 2021-06-09 RX ORDER — GABAPENTIN 250 MG/5ML
300 SOLUTION ORAL 3 TIMES DAILY
Status: CANCELLED | OUTPATIENT
Start: 2021-06-09

## 2021-06-09 RX ORDER — LIDOCAINE HYDROCHLORIDE 10 MG/ML
1 INJECTION, SOLUTION EPIDURAL; INFILTRATION; INTRACAUDAL; PERINEURAL ONCE
Status: CANCELLED | OUTPATIENT
Start: 2021-06-09 | End: 2021-06-09

## 2021-06-09 RX ORDER — HEPARIN SODIUM 5000 [USP'U]/ML
5000 INJECTION, SOLUTION INTRAVENOUS; SUBCUTANEOUS ONCE
Status: CANCELLED | OUTPATIENT
Start: 2021-06-09 | End: 2021-06-09

## 2021-06-09 RX ORDER — ACETAMINOPHEN 650 MG/20.3ML
500 LIQUID ORAL EVERY 8 HOURS
Status: CANCELLED | OUTPATIENT
Start: 2021-06-09 | End: 2021-06-10

## 2021-06-09 RX ORDER — METOCLOPRAMIDE HYDROCHLORIDE 5 MG/ML
10 INJECTION INTRAMUSCULAR; INTRAVENOUS ONCE
Status: CANCELLED | OUTPATIENT
Start: 2021-06-09 | End: 2021-06-09

## 2021-06-09 RX ORDER — PROMETHAZINE HYDROCHLORIDE 6.25 MG/5ML
SYRUP ORAL
Qty: 280 ML | Refills: 0 | Status: SHIPPED | OUTPATIENT
Start: 2021-06-09 | End: 2021-08-02

## 2021-06-09 RX ORDER — ONDANSETRON 8 MG/1
8 TABLET, ORALLY DISINTEGRATING ORAL EVERY 6 HOURS PRN
Qty: 30 TABLET | Refills: 0 | Status: SHIPPED | OUTPATIENT
Start: 2021-06-09 | End: 2021-08-02

## 2021-06-14 ENCOUNTER — PATIENT MESSAGE (OUTPATIENT)
Dept: GYNECOLOGIC ONCOLOGY | Facility: CLINIC | Age: 41
End: 2021-06-14

## 2021-06-16 ENCOUNTER — TELEPHONE (OUTPATIENT)
Dept: GYNECOLOGIC ONCOLOGY | Facility: CLINIC | Age: 41
End: 2021-06-16

## 2021-06-16 DIAGNOSIS — C56.9 MUCINOUS TUMOR, OF LOW MALIGNANT POTENTIAL: Primary | ICD-10-CM

## 2021-06-21 ENCOUNTER — TELEPHONE (OUTPATIENT)
Dept: BARIATRICS | Facility: CLINIC | Age: 41
End: 2021-06-21

## 2021-06-22 ENCOUNTER — ANESTHESIA (OUTPATIENT)
Dept: SURGERY | Facility: HOSPITAL | Age: 41
DRG: 621 | End: 2021-06-22
Payer: COMMERCIAL

## 2021-06-22 ENCOUNTER — HOSPITAL ENCOUNTER (INPATIENT)
Facility: HOSPITAL | Age: 41
LOS: 1 days | Discharge: HOME | DRG: 621 | End: 2021-06-23
Attending: SURGERY | Admitting: SURGERY
Payer: COMMERCIAL

## 2021-06-22 DIAGNOSIS — E66.01 CLASS 3 SEVERE OBESITY DUE TO EXCESS CALORIES WITH SERIOUS COMORBIDITY AND BODY MASS INDEX (BMI) OF 50.0 TO 59.9 IN ADULT: ICD-10-CM

## 2021-06-22 PROBLEM — E66.9 OBESITY: Status: ACTIVE | Noted: 2021-06-22

## 2021-06-22 PROCEDURE — C9113 INJ PANTOPRAZOLE SODIUM, VIA: HCPCS | Performed by: SURGERY

## 2021-06-22 PROCEDURE — 27201423 OPTIME MED/SURG SUP & DEVICES STERILE SUPPLY: Performed by: SURGERY

## 2021-06-22 PROCEDURE — D9220A PRA ANESTHESIA: Mod: CRNA,,, | Performed by: NURSE ANESTHETIST, CERTIFIED REGISTERED

## 2021-06-22 PROCEDURE — 63600175 PHARM REV CODE 636 W HCPCS: Performed by: SURGERY

## 2021-06-22 PROCEDURE — 36000710: Performed by: SURGERY

## 2021-06-22 PROCEDURE — 25000003 PHARM REV CODE 250: Performed by: NURSE ANESTHETIST, CERTIFIED REGISTERED

## 2021-06-22 PROCEDURE — 94761 N-INVAS EAR/PLS OXIMETRY MLT: CPT

## 2021-06-22 PROCEDURE — 63600175 PHARM REV CODE 636 W HCPCS: Performed by: ANESTHESIOLOGY

## 2021-06-22 PROCEDURE — 99900035 HC TECH TIME PER 15 MIN (STAT)

## 2021-06-22 PROCEDURE — 71000015 HC POSTOP RECOV 1ST HR: Performed by: SURGERY

## 2021-06-22 PROCEDURE — 43775 LAP SLEEVE GASTRECTOMY: CPT | Mod: ,,, | Performed by: SURGERY

## 2021-06-22 PROCEDURE — 11000001 HC ACUTE MED/SURG PRIVATE ROOM

## 2021-06-22 PROCEDURE — 88307 TISSUE EXAM BY PATHOLOGIST: CPT | Mod: 26,,, | Performed by: PATHOLOGY

## 2021-06-22 PROCEDURE — D9220A PRA ANESTHESIA: Mod: ANES,,, | Performed by: ANESTHESIOLOGY

## 2021-06-22 PROCEDURE — 71000033 HC RECOVERY, INTIAL HOUR: Performed by: SURGERY

## 2021-06-22 PROCEDURE — 27000221 HC OXYGEN, UP TO 24 HOURS

## 2021-06-22 PROCEDURE — 94799 UNLISTED PULMONARY SVC/PX: CPT

## 2021-06-22 PROCEDURE — 43775 PR LAP, GAST RESTRICT PROC, LONGITUDINAL GASTRECTOMY: ICD-10-PCS | Mod: ,,, | Performed by: SURGERY

## 2021-06-22 PROCEDURE — 88307 PR  SURG PATH,LEVEL V: ICD-10-PCS | Mod: 26,,, | Performed by: PATHOLOGY

## 2021-06-22 PROCEDURE — 71000016 HC POSTOP RECOV ADDL HR: Performed by: SURGERY

## 2021-06-22 PROCEDURE — D9220A PRA ANESTHESIA: ICD-10-PCS | Mod: ANES,,, | Performed by: ANESTHESIOLOGY

## 2021-06-22 PROCEDURE — 25000003 PHARM REV CODE 250: Performed by: SURGERY

## 2021-06-22 PROCEDURE — 36000711: Performed by: SURGERY

## 2021-06-22 PROCEDURE — 37000009 HC ANESTHESIA EA ADD 15 MINS: Performed by: SURGERY

## 2021-06-22 PROCEDURE — D9220A PRA ANESTHESIA: ICD-10-PCS | Mod: CRNA,,, | Performed by: NURSE ANESTHETIST, CERTIFIED REGISTERED

## 2021-06-22 PROCEDURE — 63600175 PHARM REV CODE 636 W HCPCS: Performed by: NURSE ANESTHETIST, CERTIFIED REGISTERED

## 2021-06-22 PROCEDURE — 88307 TISSUE EXAM BY PATHOLOGIST: CPT | Performed by: PATHOLOGY

## 2021-06-22 PROCEDURE — 37000008 HC ANESTHESIA 1ST 15 MINUTES: Performed by: SURGERY

## 2021-06-22 PROCEDURE — 27800903 OPTIME MED/SURG SUP & DEVICES OTHER IMPLANTS: Performed by: SURGERY

## 2021-06-22 DEVICE — SEAMGUARD ESCHELON 60 MM.: Type: IMPLANTABLE DEVICE | Site: ABDOMEN | Status: FUNCTIONAL

## 2021-06-22 RX ORDER — HYDROMORPHONE HYDROCHLORIDE 2 MG/ML
INJECTION, SOLUTION INTRAMUSCULAR; INTRAVENOUS; SUBCUTANEOUS
Status: DISCONTINUED | OUTPATIENT
Start: 2021-06-22 | End: 2021-06-22

## 2021-06-22 RX ORDER — KETOROLAC TROMETHAMINE 30 MG/ML
INJECTION, SOLUTION INTRAMUSCULAR; INTRAVENOUS
Status: DISCONTINUED | OUTPATIENT
Start: 2021-06-22 | End: 2021-06-22

## 2021-06-22 RX ORDER — ONDANSETRON 2 MG/ML
INJECTION INTRAMUSCULAR; INTRAVENOUS
Status: DISCONTINUED | OUTPATIENT
Start: 2021-06-22 | End: 2021-06-22

## 2021-06-22 RX ORDER — LIDOCAINE HYDROCHLORIDE 10 MG/ML
1 INJECTION, SOLUTION EPIDURAL; INFILTRATION; INTRACAUDAL; PERINEURAL ONCE
Status: COMPLETED | OUTPATIENT
Start: 2021-06-22 | End: 2021-06-22

## 2021-06-22 RX ORDER — FAMOTIDINE 10 MG/ML
20 INJECTION INTRAVENOUS ONCE
Status: COMPLETED | OUTPATIENT
Start: 2021-06-22 | End: 2021-06-22

## 2021-06-22 RX ORDER — SODIUM CHLORIDE, SODIUM LACTATE, POTASSIUM CHLORIDE, CALCIUM CHLORIDE 600; 310; 30; 20 MG/100ML; MG/100ML; MG/100ML; MG/100ML
INJECTION, SOLUTION INTRAVENOUS CONTINUOUS
Status: DISCONTINUED | OUTPATIENT
Start: 2021-06-22 | End: 2021-06-23 | Stop reason: HOSPADM

## 2021-06-22 RX ORDER — FENTANYL CITRATE 50 UG/ML
INJECTION, SOLUTION INTRAMUSCULAR; INTRAVENOUS
Status: DISCONTINUED | OUTPATIENT
Start: 2021-06-22 | End: 2021-06-22

## 2021-06-22 RX ORDER — BUPIVACAINE HYDROCHLORIDE 2.5 MG/ML
INJECTION, SOLUTION EPIDURAL; INFILTRATION; INTRACAUDAL
Status: DISCONTINUED | OUTPATIENT
Start: 2021-06-22 | End: 2021-06-22 | Stop reason: HOSPADM

## 2021-06-22 RX ORDER — PANTOPRAZOLE SODIUM 40 MG/10ML
40 INJECTION, POWDER, LYOPHILIZED, FOR SOLUTION INTRAVENOUS 2 TIMES DAILY
Status: DISCONTINUED | OUTPATIENT
Start: 2021-06-22 | End: 2021-06-23 | Stop reason: HOSPADM

## 2021-06-22 RX ORDER — PROPOFOL 10 MG/ML
VIAL (ML) INTRAVENOUS
Status: DISCONTINUED | OUTPATIENT
Start: 2021-06-22 | End: 2021-06-22

## 2021-06-22 RX ORDER — HYDROMORPHONE HYDROCHLORIDE 1 MG/ML
0.5 INJECTION, SOLUTION INTRAMUSCULAR; INTRAVENOUS; SUBCUTANEOUS
Status: DISCONTINUED | OUTPATIENT
Start: 2021-06-22 | End: 2021-06-23 | Stop reason: HOSPADM

## 2021-06-22 RX ORDER — ROCURONIUM BROMIDE 10 MG/ML
INJECTION, SOLUTION INTRAVENOUS
Status: DISCONTINUED | OUTPATIENT
Start: 2021-06-22 | End: 2021-06-22

## 2021-06-22 RX ORDER — HYDROCODONE BITARTRATE AND ACETAMINOPHEN 7.5; 325 MG/15ML; MG/15ML
15 SOLUTION ORAL EVERY 4 HOURS PRN
Status: DISCONTINUED | OUTPATIENT
Start: 2021-06-22 | End: 2021-06-23 | Stop reason: HOSPADM

## 2021-06-22 RX ORDER — KETAMINE HCL IN 0.9 % NACL 50 MG/5 ML
SYRINGE (ML) INTRAVENOUS
Status: DISCONTINUED | OUTPATIENT
Start: 2021-06-22 | End: 2021-06-22

## 2021-06-22 RX ORDER — METOCLOPRAMIDE HYDROCHLORIDE 5 MG/ML
10 INJECTION INTRAMUSCULAR; INTRAVENOUS ONCE
Status: COMPLETED | OUTPATIENT
Start: 2021-06-22 | End: 2021-06-22

## 2021-06-22 RX ORDER — SODIUM CHLORIDE 9 MG/ML
INJECTION, SOLUTION INTRAVENOUS CONTINUOUS
Status: DISCONTINUED | OUTPATIENT
Start: 2021-06-22 | End: 2021-06-23 | Stop reason: HOSPADM

## 2021-06-22 RX ORDER — LORAZEPAM 2 MG/ML
0.25 INJECTION INTRAMUSCULAR ONCE AS NEEDED
Status: DISCONTINUED | OUTPATIENT
Start: 2021-06-22 | End: 2021-06-22 | Stop reason: HOSPADM

## 2021-06-22 RX ORDER — ONDANSETRON 2 MG/ML
8 INJECTION INTRAMUSCULAR; INTRAVENOUS EVERY 6 HOURS PRN
Status: DISCONTINUED | OUTPATIENT
Start: 2021-06-22 | End: 2021-06-23

## 2021-06-22 RX ORDER — ACETAMINOPHEN 10 MG/ML
INJECTION, SOLUTION INTRAVENOUS
Status: DISCONTINUED | OUTPATIENT
Start: 2021-06-22 | End: 2021-06-22

## 2021-06-22 RX ORDER — SODIUM CITRATE AND CITRIC ACID MONOHYDRATE 334; 500 MG/5ML; MG/5ML
30 SOLUTION ORAL ONCE
Status: COMPLETED | OUTPATIENT
Start: 2021-06-22 | End: 2021-06-22

## 2021-06-22 RX ORDER — MUPIROCIN 20 MG/G
OINTMENT TOPICAL
Status: DISCONTINUED | OUTPATIENT
Start: 2021-06-22 | End: 2021-06-22 | Stop reason: HOSPADM

## 2021-06-22 RX ORDER — MEPERIDINE HYDROCHLORIDE 50 MG/ML
12.5 INJECTION INTRAMUSCULAR; INTRAVENOUS; SUBCUTANEOUS ONCE AS NEEDED
Status: DISCONTINUED | OUTPATIENT
Start: 2021-06-22 | End: 2021-06-22 | Stop reason: HOSPADM

## 2021-06-22 RX ORDER — LIDOCAINE HYDROCHLORIDE AND EPINEPHRINE 10; 10 MG/ML; UG/ML
INJECTION, SOLUTION INFILTRATION; PERINEURAL
Status: DISCONTINUED | OUTPATIENT
Start: 2021-06-22 | End: 2021-06-22 | Stop reason: HOSPADM

## 2021-06-22 RX ORDER — HYDROMORPHONE HYDROCHLORIDE 1 MG/ML
0.2 INJECTION, SOLUTION INTRAMUSCULAR; INTRAVENOUS; SUBCUTANEOUS EVERY 5 MIN PRN
Status: DISCONTINUED | OUTPATIENT
Start: 2021-06-22 | End: 2021-06-22 | Stop reason: HOSPADM

## 2021-06-22 RX ORDER — NEOSTIGMINE METHYLSULFATE 0.5 MG/ML
INJECTION, SOLUTION INTRAVENOUS
Status: DISCONTINUED | OUTPATIENT
Start: 2021-06-22 | End: 2021-06-22

## 2021-06-22 RX ORDER — PROCHLORPERAZINE EDISYLATE 5 MG/ML
5 INJECTION INTRAMUSCULAR; INTRAVENOUS EVERY 6 HOURS PRN
Status: DISCONTINUED | OUTPATIENT
Start: 2021-06-22 | End: 2021-06-23 | Stop reason: HOSPADM

## 2021-06-22 RX ORDER — DEXAMETHASONE SODIUM PHOSPHATE 4 MG/ML
INJECTION, SOLUTION INTRA-ARTICULAR; INTRALESIONAL; INTRAMUSCULAR; INTRAVENOUS; SOFT TISSUE
Status: DISCONTINUED | OUTPATIENT
Start: 2021-06-22 | End: 2021-06-22

## 2021-06-22 RX ORDER — MIDAZOLAM HYDROCHLORIDE 1 MG/ML
INJECTION, SOLUTION INTRAMUSCULAR; INTRAVENOUS
Status: DISCONTINUED | OUTPATIENT
Start: 2021-06-22 | End: 2021-06-22

## 2021-06-22 RX ORDER — LIDOCAINE HYDROCHLORIDE 20 MG/ML
INJECTION, SOLUTION EPIDURAL; INFILTRATION; INTRACAUDAL; PERINEURAL
Status: DISCONTINUED | OUTPATIENT
Start: 2021-06-22 | End: 2021-06-22

## 2021-06-22 RX ORDER — HEPARIN SODIUM 5000 [USP'U]/ML
5000 INJECTION, SOLUTION INTRAVENOUS; SUBCUTANEOUS ONCE
Status: COMPLETED | OUTPATIENT
Start: 2021-06-22 | End: 2021-06-22

## 2021-06-22 RX ADMIN — HYDROMORPHONE HYDROCHLORIDE 0.2 MG: 1 INJECTION, SOLUTION INTRAMUSCULAR; INTRAVENOUS; SUBCUTANEOUS at 11:06

## 2021-06-22 RX ADMIN — HYDROCODONE BITARTRATE AND ACETAMINOPHEN 15 ML: 7.5; 325 SOLUTION ORAL at 06:06

## 2021-06-22 RX ADMIN — ONDANSETRON 8 MG: 2 INJECTION INTRAMUSCULAR; INTRAVENOUS at 06:06

## 2021-06-22 RX ADMIN — LIDOCAINE HYDROCHLORIDE 2 MG: 10 INJECTION, SOLUTION EPIDURAL; INFILTRATION; INTRACAUDAL at 06:06

## 2021-06-22 RX ADMIN — NEOSTIGMINE METHYLSULFATE 4 MG: 0.5 INJECTION INTRAVENOUS at 08:06

## 2021-06-22 RX ADMIN — HEPARIN SODIUM 5000 UNITS: 5000 INJECTION INTRAVENOUS; SUBCUTANEOUS at 06:06

## 2021-06-22 RX ADMIN — ONDANSETRON 4 MG: 2 INJECTION INTRAMUSCULAR; INTRAVENOUS at 08:06

## 2021-06-22 RX ADMIN — SODIUM CHLORIDE, SODIUM LACTATE, POTASSIUM CHLORIDE, AND CALCIUM CHLORIDE: .6; .31; .03; .02 INJECTION, SOLUTION INTRAVENOUS at 09:06

## 2021-06-22 RX ADMIN — Medication 30 MG: at 07:06

## 2021-06-22 RX ADMIN — ROCURONIUM BROMIDE 50 MG: 10 INJECTION, SOLUTION INTRAVENOUS at 07:06

## 2021-06-22 RX ADMIN — FENTANYL CITRATE 50 MCG: 50 INJECTION, SOLUTION INTRAMUSCULAR; INTRAVENOUS at 06:06

## 2021-06-22 RX ADMIN — FAMOTIDINE 20 MG: 10 INJECTION INTRAVENOUS at 06:06

## 2021-06-22 RX ADMIN — ACETAMINOPHEN 1000 MG: 10 INJECTION, SOLUTION INTRAVENOUS at 07:06

## 2021-06-22 RX ADMIN — PANTOPRAZOLE SODIUM 40 MG: 40 INJECTION, POWDER, FOR SOLUTION INTRAVENOUS at 09:06

## 2021-06-22 RX ADMIN — FENTANYL CITRATE 50 MCG: 50 INJECTION, SOLUTION INTRAMUSCULAR; INTRAVENOUS at 07:06

## 2021-06-22 RX ADMIN — HYDROMORPHONE HYDROCHLORIDE 0.4 MG: 2 INJECTION, SOLUTION INTRAMUSCULAR; INTRAVENOUS; SUBCUTANEOUS at 08:06

## 2021-06-22 RX ADMIN — KETOROLAC TROMETHAMINE 30 MG: 30 INJECTION, SOLUTION INTRAMUSCULAR at 08:06

## 2021-06-22 RX ADMIN — MUPIROCIN: 20 OINTMENT TOPICAL at 06:06

## 2021-06-22 RX ADMIN — PROPOFOL 150 MG: 10 INJECTION, EMULSION INTRAVENOUS at 07:06

## 2021-06-22 RX ADMIN — HYDROCODONE BITARTRATE AND ACETAMINOPHEN 15 ML: 7.5; 325 SOLUTION ORAL at 09:06

## 2021-06-22 RX ADMIN — SODIUM CHLORIDE, SODIUM LACTATE, POTASSIUM CHLORIDE, AND CALCIUM CHLORIDE: .6; .31; .03; .02 INJECTION, SOLUTION INTRAVENOUS at 06:06

## 2021-06-22 RX ADMIN — DEXAMETHASONE SODIUM PHOSPHATE 8 MG: 4 INJECTION INTRA-ARTICULAR; INTRALESIONAL; INTRAMUSCULAR; INTRAVENOUS; SOFT TISSUE at 07:06

## 2021-06-22 RX ADMIN — HYDROMORPHONE HYDROCHLORIDE 0.2 MG: 1 INJECTION, SOLUTION INTRAMUSCULAR; INTRAVENOUS; SUBCUTANEOUS at 09:06

## 2021-06-22 RX ADMIN — MIDAZOLAM 4 MG: 1 INJECTION INTRAMUSCULAR; INTRAVENOUS at 06:06

## 2021-06-22 RX ADMIN — GLYCOPYRROLATE 0.6 MCG: 0.2 INJECTION, SOLUTION INTRAMUSCULAR; INTRAVITREAL at 08:06

## 2021-06-22 RX ADMIN — METOCLOPRAMIDE 10 MG: 5 INJECTION, SOLUTION INTRAMUSCULAR; INTRAVENOUS at 06:06

## 2021-06-22 RX ADMIN — HYDROMORPHONE HYDROCHLORIDE 0.2 MG: 1 INJECTION, SOLUTION INTRAMUSCULAR; INTRAVENOUS; SUBCUTANEOUS at 10:06

## 2021-06-22 RX ADMIN — DEXTROSE 3 G: 50 INJECTION, SOLUTION INTRAVENOUS at 07:06

## 2021-06-22 RX ADMIN — ROCURONIUM BROMIDE 20 MG: 10 INJECTION, SOLUTION INTRAVENOUS at 07:06

## 2021-06-22 RX ADMIN — SODIUM CITRATE AND CITRIC ACID MONOHYDRATE 30 ML: 500; 334 SOLUTION ORAL at 06:06

## 2021-06-22 RX ADMIN — FENTANYL CITRATE 100 MCG: 50 INJECTION, SOLUTION INTRAMUSCULAR; INTRAVENOUS at 07:06

## 2021-06-22 RX ADMIN — SODIUM CHLORIDE: 0.9 INJECTION, SOLUTION INTRAVENOUS at 06:06

## 2021-06-22 RX ADMIN — LIDOCAINE HYDROCHLORIDE 60 MG: 20 INJECTION, SOLUTION EPIDURAL; INFILTRATION; INTRACAUDAL at 07:06

## 2021-06-23 VITALS
WEIGHT: 293 LBS | RESPIRATION RATE: 18 BRPM | BODY MASS INDEX: 50.02 KG/M2 | HEIGHT: 64 IN | HEART RATE: 90 BPM | SYSTOLIC BLOOD PRESSURE: 141 MMHG | DIASTOLIC BLOOD PRESSURE: 94 MMHG | TEMPERATURE: 98 F | OXYGEN SATURATION: 98 %

## 2021-06-23 LAB
ANION GAP SERPL CALC-SCNC: 16 MMOL/L (ref 8–16)
BASOPHILS # BLD AUTO: 0.03 K/UL (ref 0–0.2)
BASOPHILS NFR BLD: 0.3 % (ref 0–1.9)
BUN SERPL-MCNC: 6 MG/DL (ref 6–20)
CALCIUM SERPL-MCNC: 9.3 MG/DL (ref 8.7–10.5)
CHLORIDE SERPL-SCNC: 105 MMOL/L (ref 95–110)
CO2 SERPL-SCNC: 21 MMOL/L (ref 23–29)
CREAT SERPL-MCNC: 0.8 MG/DL (ref 0.5–1.4)
DIFFERENTIAL METHOD: ABNORMAL
EOSINOPHIL # BLD AUTO: 0 K/UL (ref 0–0.5)
EOSINOPHIL NFR BLD: 0.2 % (ref 0–8)
ERYTHROCYTE [DISTWIDTH] IN BLOOD BY AUTOMATED COUNT: 13.1 % (ref 11.5–14.5)
EST. GFR  (AFRICAN AMERICAN): >60 ML/MIN/1.73 M^2
EST. GFR  (NON AFRICAN AMERICAN): >60 ML/MIN/1.73 M^2
GLUCOSE SERPL-MCNC: 118 MG/DL (ref 70–110)
HCT VFR BLD AUTO: 36.1 % (ref 37–48.5)
HGB BLD-MCNC: 12.1 G/DL (ref 12–16)
IMM GRANULOCYTES # BLD AUTO: 0.06 K/UL (ref 0–0.04)
IMM GRANULOCYTES NFR BLD AUTO: 0.6 % (ref 0–0.5)
LYMPHOCYTES # BLD AUTO: 1.5 K/UL (ref 1–4.8)
LYMPHOCYTES NFR BLD: 15.7 % (ref 18–48)
MAGNESIUM SERPL-MCNC: 1.8 MG/DL (ref 1.6–2.6)
MCH RBC QN AUTO: 27.3 PG (ref 27–31)
MCHC RBC AUTO-ENTMCNC: 33.5 G/DL (ref 32–36)
MCV RBC AUTO: 82 FL (ref 82–98)
MONOCYTES # BLD AUTO: 0.8 K/UL (ref 0.3–1)
MONOCYTES NFR BLD: 7.8 % (ref 4–15)
NEUTROPHILS # BLD AUTO: 7.3 K/UL (ref 1.8–7.7)
NEUTROPHILS NFR BLD: 75.4 % (ref 38–73)
NRBC BLD-RTO: 0 /100 WBC
PHOSPHATE SERPL-MCNC: 3.2 MG/DL (ref 2.7–4.5)
PLATELET # BLD AUTO: 180 K/UL (ref 150–450)
PMV BLD AUTO: 11.2 FL (ref 9.2–12.9)
POTASSIUM SERPL-SCNC: 4.2 MMOL/L (ref 3.5–5.1)
RBC # BLD AUTO: 4.43 M/UL (ref 4–5.4)
SODIUM SERPL-SCNC: 142 MMOL/L (ref 136–145)
WBC # BLD AUTO: 9.64 K/UL (ref 3.9–12.7)

## 2021-06-23 PROCEDURE — 84100 ASSAY OF PHOSPHORUS: CPT | Performed by: STUDENT IN AN ORGANIZED HEALTH CARE EDUCATION/TRAINING PROGRAM

## 2021-06-23 PROCEDURE — 63600175 PHARM REV CODE 636 W HCPCS: Performed by: STUDENT IN AN ORGANIZED HEALTH CARE EDUCATION/TRAINING PROGRAM

## 2021-06-23 PROCEDURE — 83735 ASSAY OF MAGNESIUM: CPT | Performed by: STUDENT IN AN ORGANIZED HEALTH CARE EDUCATION/TRAINING PROGRAM

## 2021-06-23 PROCEDURE — 25000003 PHARM REV CODE 250: Performed by: STUDENT IN AN ORGANIZED HEALTH CARE EDUCATION/TRAINING PROGRAM

## 2021-06-23 PROCEDURE — 36415 COLL VENOUS BLD VENIPUNCTURE: CPT | Performed by: STUDENT IN AN ORGANIZED HEALTH CARE EDUCATION/TRAINING PROGRAM

## 2021-06-23 PROCEDURE — 63600175 PHARM REV CODE 636 W HCPCS: Performed by: SURGERY

## 2021-06-23 PROCEDURE — C9113 INJ PANTOPRAZOLE SODIUM, VIA: HCPCS | Performed by: SURGERY

## 2021-06-23 PROCEDURE — 36415 COLL VENOUS BLD VENIPUNCTURE: CPT | Performed by: SURGERY

## 2021-06-23 PROCEDURE — 85025 COMPLETE CBC W/AUTO DIFF WBC: CPT | Performed by: SURGERY

## 2021-06-23 PROCEDURE — 80048 BASIC METABOLIC PNL TOTAL CA: CPT | Performed by: STUDENT IN AN ORGANIZED HEALTH CARE EDUCATION/TRAINING PROGRAM

## 2021-06-23 RX ORDER — ONDANSETRON 2 MG/ML
8 INJECTION INTRAMUSCULAR; INTRAVENOUS EVERY 6 HOURS
Status: DISCONTINUED | OUTPATIENT
Start: 2021-06-23 | End: 2021-06-23 | Stop reason: HOSPADM

## 2021-06-23 RX ORDER — POLYETHYLENE GLYCOL 3350 17 G/17G
17 POWDER, FOR SOLUTION ORAL DAILY
Status: DISCONTINUED | OUTPATIENT
Start: 2021-06-23 | End: 2021-06-23 | Stop reason: HOSPADM

## 2021-06-23 RX ORDER — CYCLOBENZAPRINE HCL 5 MG
5 TABLET ORAL 2 TIMES DAILY PRN
Status: DISCONTINUED | OUTPATIENT
Start: 2021-06-23 | End: 2021-06-23

## 2021-06-23 RX ORDER — POLYETHYLENE GLYCOL 3350 17 G/17G
17 POWDER, FOR SOLUTION ORAL DAILY
Status: DISCONTINUED | OUTPATIENT
Start: 2021-06-23 | End: 2021-06-23

## 2021-06-23 RX ORDER — CYCLOBENZAPRINE HCL 5 MG
5 TABLET ORAL 2 TIMES DAILY
Status: DISCONTINUED | OUTPATIENT
Start: 2021-06-23 | End: 2021-06-23 | Stop reason: HOSPADM

## 2021-06-23 RX ORDER — ENOXAPARIN SODIUM 100 MG/ML
40 INJECTION SUBCUTANEOUS EVERY 12 HOURS
Status: DISCONTINUED | OUTPATIENT
Start: 2021-06-23 | End: 2021-06-23 | Stop reason: HOSPADM

## 2021-06-23 RX ORDER — SCOLOPAMINE TRANSDERMAL SYSTEM 1 MG/1
1 PATCH, EXTENDED RELEASE TRANSDERMAL
Status: DISCONTINUED | OUTPATIENT
Start: 2021-06-23 | End: 2021-06-23 | Stop reason: HOSPADM

## 2021-06-23 RX ORDER — VENLAFAXINE 37.5 MG/1
75 TABLET ORAL 3 TIMES DAILY
Status: DISCONTINUED | OUTPATIENT
Start: 2021-06-23 | End: 2021-06-23 | Stop reason: HOSPADM

## 2021-06-23 RX ORDER — DEXTROMETHORPHAN/PSEUDOEPHED 2.5-7.5/.8
40 DROPS ORAL 4 TIMES DAILY PRN
Status: DISCONTINUED | OUTPATIENT
Start: 2021-06-23 | End: 2021-06-23 | Stop reason: HOSPADM

## 2021-06-23 RX ADMIN — SODIUM CHLORIDE, SODIUM LACTATE, POTASSIUM CHLORIDE, AND CALCIUM CHLORIDE: .6; .31; .03; .02 INJECTION, SOLUTION INTRAVENOUS at 01:06

## 2021-06-23 RX ADMIN — Medication 40 MG: at 06:06

## 2021-06-23 RX ADMIN — ONDANSETRON 8 MG: 2 INJECTION INTRAMUSCULAR; INTRAVENOUS at 02:06

## 2021-06-23 RX ADMIN — PANTOPRAZOLE SODIUM 40 MG: 40 INJECTION, POWDER, FOR SOLUTION INTRAVENOUS at 08:06

## 2021-06-23 RX ADMIN — VENLAFAXINE 75 MG: 37.5 TABLET ORAL at 08:06

## 2021-06-23 RX ADMIN — ENOXAPARIN SODIUM 40 MG: 40 INJECTION SUBCUTANEOUS at 08:06

## 2021-06-23 RX ADMIN — SODIUM CHLORIDE, SODIUM LACTATE, POTASSIUM CHLORIDE, AND CALCIUM CHLORIDE: .6; .31; .03; .02 INJECTION, SOLUTION INTRAVENOUS at 04:06

## 2021-06-23 RX ADMIN — SCOPALAMINE 1 PATCH: 1 PATCH, EXTENDED RELEASE TRANSDERMAL at 08:06

## 2021-06-23 RX ADMIN — VENLAFAXINE 75 MG: 37.5 TABLET ORAL at 02:06

## 2021-06-23 RX ADMIN — ONDANSETRON 8 MG: 2 INJECTION INTRAMUSCULAR; INTRAVENOUS at 11:06

## 2021-06-23 RX ADMIN — PROCHLORPERAZINE EDISYLATE 5 MG: 5 INJECTION INTRAMUSCULAR; INTRAVENOUS at 03:06

## 2021-06-23 RX ADMIN — Medication 40 MG: at 03:06

## 2021-06-23 RX ADMIN — ONDANSETRON 8 MG: 2 INJECTION INTRAMUSCULAR; INTRAVENOUS at 05:06

## 2021-06-23 RX ADMIN — SODIUM CHLORIDE, SODIUM LACTATE, POTASSIUM CHLORIDE, AND CALCIUM CHLORIDE: .6; .31; .03; .02 INJECTION, SOLUTION INTRAVENOUS at 08:06

## 2021-06-24 ENCOUNTER — TELEPHONE (OUTPATIENT)
Dept: INTERNAL MEDICINE | Facility: CLINIC | Age: 41
End: 2021-06-24

## 2021-06-28 LAB
FINAL PATHOLOGIC DIAGNOSIS: NORMAL
Lab: NORMAL

## 2021-06-29 ENCOUNTER — TELEPHONE (OUTPATIENT)
Dept: BARIATRICS | Facility: CLINIC | Age: 41
End: 2021-06-29

## 2021-06-29 NOTE — TELEPHONE ENCOUNTER
----- Message from Vannessa Buenrostro sent at 2021  2:00 PM CDT -----  Regardin week post op  1 week post op

## 2021-07-01 ENCOUNTER — OFFICE VISIT (OUTPATIENT)
Dept: BARIATRICS | Facility: CLINIC | Age: 41
End: 2021-07-01
Payer: COMMERCIAL

## 2021-07-01 VITALS
DIASTOLIC BLOOD PRESSURE: 80 MMHG | WEIGHT: 293 LBS | BODY MASS INDEX: 54.26 KG/M2 | OXYGEN SATURATION: 98 % | HEART RATE: 93 BPM | SYSTOLIC BLOOD PRESSURE: 104 MMHG

## 2021-07-01 DIAGNOSIS — E66.01 CLASS 3 SEVERE OBESITY DUE TO EXCESS CALORIES WITH SERIOUS COMORBIDITY AND BODY MASS INDEX (BMI) OF 50.0 TO 59.9 IN ADULT: Primary | ICD-10-CM

## 2021-07-01 DIAGNOSIS — Z98.84 S/P LAPAROSCOPIC SLEEVE GASTRECTOMY: ICD-10-CM

## 2021-07-01 DIAGNOSIS — R73.03 PREDIABETES: ICD-10-CM

## 2021-07-01 PROCEDURE — 3008F PR BODY MASS INDEX (BMI) DOCUMENTED: ICD-10-PCS | Mod: CPTII,S$GLB,, | Performed by: STUDENT IN AN ORGANIZED HEALTH CARE EDUCATION/TRAINING PROGRAM

## 2021-07-01 PROCEDURE — 99213 OFFICE O/P EST LOW 20 MIN: CPT | Mod: S$GLB,,, | Performed by: STUDENT IN AN ORGANIZED HEALTH CARE EDUCATION/TRAINING PROGRAM

## 2021-07-01 PROCEDURE — 1111F PR DISCHARGE MEDS RECONCILED W/ CURRENT OUTPATIENT MED LIST: ICD-10-PCS | Mod: CPTII,S$GLB,, | Performed by: STUDENT IN AN ORGANIZED HEALTH CARE EDUCATION/TRAINING PROGRAM

## 2021-07-01 PROCEDURE — 99213 PR OFFICE/OUTPT VISIT, EST, LEVL III, 20-29 MIN: ICD-10-PCS | Mod: S$GLB,,, | Performed by: STUDENT IN AN ORGANIZED HEALTH CARE EDUCATION/TRAINING PROGRAM

## 2021-07-01 PROCEDURE — 1111F DSCHRG MED/CURRENT MED MERGE: CPT | Mod: CPTII,S$GLB,, | Performed by: STUDENT IN AN ORGANIZED HEALTH CARE EDUCATION/TRAINING PROGRAM

## 2021-07-01 PROCEDURE — 99999 PR PBB SHADOW E&M-EST. PATIENT-LVL III: CPT | Mod: PBBFAC,,, | Performed by: STUDENT IN AN ORGANIZED HEALTH CARE EDUCATION/TRAINING PROGRAM

## 2021-07-01 PROCEDURE — 3008F BODY MASS INDEX DOCD: CPT | Mod: CPTII,S$GLB,, | Performed by: STUDENT IN AN ORGANIZED HEALTH CARE EDUCATION/TRAINING PROGRAM

## 2021-07-01 PROCEDURE — 1126F PR PAIN SEVERITY QUANTIFIED, NO PAIN PRESENT: ICD-10-PCS | Mod: S$GLB,,, | Performed by: STUDENT IN AN ORGANIZED HEALTH CARE EDUCATION/TRAINING PROGRAM

## 2021-07-01 PROCEDURE — 1126F AMNT PAIN NOTED NONE PRSNT: CPT | Mod: S$GLB,,, | Performed by: STUDENT IN AN ORGANIZED HEALTH CARE EDUCATION/TRAINING PROGRAM

## 2021-07-01 PROCEDURE — 99999 PR PBB SHADOW E&M-EST. PATIENT-LVL III: ICD-10-PCS | Mod: PBBFAC,,, | Performed by: STUDENT IN AN ORGANIZED HEALTH CARE EDUCATION/TRAINING PROGRAM

## 2021-07-03 ENCOUNTER — PATIENT MESSAGE (OUTPATIENT)
Dept: BARIATRICS | Facility: CLINIC | Age: 41
End: 2021-07-03

## 2021-07-07 ENCOUNTER — CLINICAL SUPPORT (OUTPATIENT)
Dept: BARIATRICS | Facility: CLINIC | Age: 41
End: 2021-07-07
Payer: COMMERCIAL

## 2021-07-07 ENCOUNTER — OFFICE VISIT (OUTPATIENT)
Dept: BARIATRICS | Facility: CLINIC | Age: 41
End: 2021-07-07
Payer: COMMERCIAL

## 2021-07-07 ENCOUNTER — LAB VISIT (OUTPATIENT)
Dept: LAB | Facility: HOSPITAL | Age: 41
End: 2021-07-07
Attending: PHYSICIAN ASSISTANT
Payer: COMMERCIAL

## 2021-07-07 VITALS
WEIGHT: 293 LBS | SYSTOLIC BLOOD PRESSURE: 118 MMHG | HEART RATE: 84 BPM | BODY MASS INDEX: 54.04 KG/M2 | OXYGEN SATURATION: 98 % | DIASTOLIC BLOOD PRESSURE: 88 MMHG

## 2021-07-07 DIAGNOSIS — E66.01 MORBID OBESITY WITH BODY MASS INDEX (BMI) OF 50.0 TO 59.9 IN ADULT: ICD-10-CM

## 2021-07-07 DIAGNOSIS — Z98.84 S/P LAPAROSCOPIC SLEEVE GASTRECTOMY: ICD-10-CM

## 2021-07-07 DIAGNOSIS — Z98.890 POST-OPERATIVE STATE: Primary | ICD-10-CM

## 2021-07-07 DIAGNOSIS — Z98.84 S/P BARIATRIC SURGERY: Primary | ICD-10-CM

## 2021-07-07 DIAGNOSIS — Z71.3 DIETARY COUNSELING AND SURVEILLANCE: ICD-10-CM

## 2021-07-07 DIAGNOSIS — E66.01 CLASS 3 SEVERE OBESITY DUE TO EXCESS CALORIES WITH SERIOUS COMORBIDITY AND BODY MASS INDEX (BMI) OF 50.0 TO 59.9 IN ADULT: ICD-10-CM

## 2021-07-07 DIAGNOSIS — Z98.890 POST-OPERATIVE STATE: ICD-10-CM

## 2021-07-07 DIAGNOSIS — R63.4 WEIGHT LOSS: ICD-10-CM

## 2021-07-07 DIAGNOSIS — R73.03 PREDIABETES: ICD-10-CM

## 2021-07-07 DIAGNOSIS — E66.01 SEVERE OBESITY: ICD-10-CM

## 2021-07-07 LAB
ALBUMIN SERPL BCP-MCNC: 4 G/DL (ref 3.5–5.2)
ALP SERPL-CCNC: 96 U/L (ref 55–135)
ALT SERPL W/O P-5'-P-CCNC: 95 U/L (ref 10–44)
ANION GAP SERPL CALC-SCNC: 10 MMOL/L (ref 8–16)
AST SERPL-CCNC: 51 U/L (ref 10–40)
BASOPHILS # BLD AUTO: 0.06 K/UL (ref 0–0.2)
BASOPHILS NFR BLD: 1.2 % (ref 0–1.9)
BILIRUB SERPL-MCNC: 0.4 MG/DL (ref 0.1–1)
BUN SERPL-MCNC: 6 MG/DL (ref 6–20)
CALCIUM SERPL-MCNC: 9.7 MG/DL (ref 8.7–10.5)
CHLORIDE SERPL-SCNC: 104 MMOL/L (ref 95–110)
CO2 SERPL-SCNC: 26 MMOL/L (ref 23–29)
CREAT SERPL-MCNC: 0.8 MG/DL (ref 0.5–1.4)
DIFFERENTIAL METHOD: ABNORMAL
EOSINOPHIL # BLD AUTO: 0.1 K/UL (ref 0–0.5)
EOSINOPHIL NFR BLD: 2 % (ref 0–8)
ERYTHROCYTE [DISTWIDTH] IN BLOOD BY AUTOMATED COUNT: 13.2 % (ref 11.5–14.5)
EST. GFR  (AFRICAN AMERICAN): >60 ML/MIN/1.73 M^2
EST. GFR  (NON AFRICAN AMERICAN): >60 ML/MIN/1.73 M^2
GLUCOSE SERPL-MCNC: 106 MG/DL (ref 70–110)
HCT VFR BLD AUTO: 39.9 % (ref 37–48.5)
HGB BLD-MCNC: 13 G/DL (ref 12–16)
IMM GRANULOCYTES # BLD AUTO: 0.01 K/UL (ref 0–0.04)
IMM GRANULOCYTES NFR BLD AUTO: 0.2 % (ref 0–0.5)
LYMPHOCYTES # BLD AUTO: 1.4 K/UL (ref 1–4.8)
LYMPHOCYTES NFR BLD: 29.2 % (ref 18–48)
MCH RBC QN AUTO: 26.6 PG (ref 27–31)
MCHC RBC AUTO-ENTMCNC: 32.6 G/DL (ref 32–36)
MCV RBC AUTO: 82 FL (ref 82–98)
MONOCYTES # BLD AUTO: 0.4 K/UL (ref 0.3–1)
MONOCYTES NFR BLD: 8.8 % (ref 4–15)
NEUTROPHILS # BLD AUTO: 2.9 K/UL (ref 1.8–7.7)
NEUTROPHILS NFR BLD: 58.6 % (ref 38–73)
NRBC BLD-RTO: 0 /100 WBC
PLATELET # BLD AUTO: 245 K/UL (ref 150–450)
PMV BLD AUTO: 11.8 FL (ref 9.2–12.9)
POTASSIUM SERPL-SCNC: 3.9 MMOL/L (ref 3.5–5.1)
PROT SERPL-MCNC: 7.3 G/DL (ref 6–8.4)
RBC # BLD AUTO: 4.89 M/UL (ref 4–5.4)
SODIUM SERPL-SCNC: 140 MMOL/L (ref 136–145)
VIT B12 SERPL-MCNC: 538 PG/ML (ref 210–950)
WBC # BLD AUTO: 4.9 K/UL (ref 3.9–12.7)

## 2021-07-07 PROCEDURE — 99999 PR PBB SHADOW E&M-EST. PATIENT-LVL IV: ICD-10-PCS | Mod: PBBFAC,,, | Performed by: PHYSICIAN ASSISTANT

## 2021-07-07 PROCEDURE — 1126F AMNT PAIN NOTED NONE PRSNT: CPT | Mod: S$GLB,,, | Performed by: PHYSICIAN ASSISTANT

## 2021-07-07 PROCEDURE — 85025 COMPLETE CBC W/AUTO DIFF WBC: CPT | Performed by: PHYSICIAN ASSISTANT

## 2021-07-07 PROCEDURE — 82607 VITAMIN B-12: CPT | Performed by: PHYSICIAN ASSISTANT

## 2021-07-07 PROCEDURE — 1126F PR PAIN SEVERITY QUANTIFIED, NO PAIN PRESENT: ICD-10-PCS | Mod: S$GLB,,, | Performed by: PHYSICIAN ASSISTANT

## 2021-07-07 PROCEDURE — 99499 UNLISTED E&M SERVICE: CPT | Mod: S$GLB,,, | Performed by: DIETITIAN, REGISTERED

## 2021-07-07 PROCEDURE — 36415 COLL VENOUS BLD VENIPUNCTURE: CPT | Performed by: PHYSICIAN ASSISTANT

## 2021-07-07 PROCEDURE — 99024 POSTOP FOLLOW-UP VISIT: CPT | Mod: S$GLB,,, | Performed by: PHYSICIAN ASSISTANT

## 2021-07-07 PROCEDURE — 80053 COMPREHEN METABOLIC PANEL: CPT | Performed by: PHYSICIAN ASSISTANT

## 2021-07-07 PROCEDURE — 99999 PR PBB SHADOW E&M-EST. PATIENT-LVL IV: CPT | Mod: PBBFAC,,, | Performed by: PHYSICIAN ASSISTANT

## 2021-07-07 PROCEDURE — 99024 PR POST-OP FOLLOW-UP VISIT: ICD-10-PCS | Mod: S$GLB,,, | Performed by: PHYSICIAN ASSISTANT

## 2021-07-07 PROCEDURE — 99499 NO LOS: ICD-10-PCS | Mod: S$GLB,,, | Performed by: DIETITIAN, REGISTERED

## 2021-07-07 PROCEDURE — 3008F PR BODY MASS INDEX (BMI) DOCUMENTED: ICD-10-PCS | Mod: CPTII,S$GLB,, | Performed by: PHYSICIAN ASSISTANT

## 2021-07-07 PROCEDURE — 84425 ASSAY OF VITAMIN B-1: CPT | Performed by: PHYSICIAN ASSISTANT

## 2021-07-07 PROCEDURE — 3008F BODY MASS INDEX DOCD: CPT | Mod: CPTII,S$GLB,, | Performed by: PHYSICIAN ASSISTANT

## 2021-07-12 LAB — VIT B1 BLD-MCNC: 42 UG/L (ref 38–122)

## 2021-07-13 PROBLEM — Z98.84 S/P LAPAROSCOPIC SLEEVE GASTRECTOMY: Status: ACTIVE | Noted: 2021-07-13

## 2021-07-22 ENCOUNTER — PATIENT MESSAGE (OUTPATIENT)
Dept: BARIATRICS | Facility: CLINIC | Age: 41
End: 2021-07-22

## 2021-08-02 ENCOUNTER — OFFICE VISIT (OUTPATIENT)
Dept: PSYCHIATRY | Facility: CLINIC | Age: 41
End: 2021-08-02
Payer: COMMERCIAL

## 2021-08-02 DIAGNOSIS — G47.09 OTHER INSOMNIA: ICD-10-CM

## 2021-08-02 DIAGNOSIS — F33.41 RECURRENT MAJOR DEPRESSIVE DISORDER IN PARTIAL REMISSION: Primary | ICD-10-CM

## 2021-08-02 PROCEDURE — 1160F RVW MEDS BY RX/DR IN RCRD: CPT | Mod: CPTII,,, | Performed by: PSYCHIATRY & NEUROLOGY

## 2021-08-02 PROCEDURE — 3044F PR MOST RECENT HEMOGLOBIN A1C LEVEL <7.0%: ICD-10-PCS | Mod: CPTII,,, | Performed by: PSYCHIATRY & NEUROLOGY

## 2021-08-02 PROCEDURE — 3044F HG A1C LEVEL LT 7.0%: CPT | Mod: CPTII,,, | Performed by: PSYCHIATRY & NEUROLOGY

## 2021-08-02 PROCEDURE — 1159F PR MEDICATION LIST DOCUMENTED IN MEDICAL RECORD: ICD-10-PCS | Mod: CPTII,,, | Performed by: PSYCHIATRY & NEUROLOGY

## 2021-08-02 PROCEDURE — 99214 OFFICE O/P EST MOD 30 MIN: CPT | Mod: 95,,, | Performed by: PSYCHIATRY & NEUROLOGY

## 2021-08-02 PROCEDURE — 99214 PR OFFICE/OUTPT VISIT, EST, LEVL IV, 30-39 MIN: ICD-10-PCS | Mod: 95,,, | Performed by: PSYCHIATRY & NEUROLOGY

## 2021-08-02 PROCEDURE — 1160F PR REVIEW ALL MEDS BY PRESCRIBER/CLIN PHARMACIST DOCUMENTED: ICD-10-PCS | Mod: CPTII,,, | Performed by: PSYCHIATRY & NEUROLOGY

## 2021-08-02 PROCEDURE — 1159F MED LIST DOCD IN RCRD: CPT | Mod: CPTII,,, | Performed by: PSYCHIATRY & NEUROLOGY

## 2021-08-02 RX ORDER — THIAMINE HCL 50 MG
50 TABLET ORAL DAILY
COMMUNITY

## 2021-08-02 RX ORDER — VENLAFAXINE HYDROCHLORIDE 150 MG/1
150 CAPSULE, EXTENDED RELEASE ORAL DAILY
Qty: 30 CAPSULE | Refills: 4 | Status: SHIPPED | OUTPATIENT
Start: 2021-08-02 | End: 2021-11-15 | Stop reason: SDUPTHER

## 2021-08-02 RX ORDER — ALPRAZOLAM 0.5 MG/1
0.5 TABLET ORAL NIGHTLY PRN
Qty: 30 TABLET | Refills: 2 | Status: SHIPPED | OUTPATIENT
Start: 2021-08-02 | End: 2021-11-15 | Stop reason: SDUPTHER

## 2021-08-02 RX ORDER — VITAMIN B COMPLEX
1 CAPSULE ORAL DAILY
COMMUNITY

## 2021-08-04 RX ORDER — NEOMYCIN SULFATE, POLYMYXIN B SULFATE AND HYDROCORTISONE 10; 3.5; 1 MG/ML; MG/ML; [USP'U]/ML
SUSPENSION/ DROPS AURICULAR (OTIC)
Qty: 10 ML | Refills: 0 | Status: SHIPPED | OUTPATIENT
Start: 2021-08-04 | End: 2021-08-26 | Stop reason: ALTCHOICE

## 2021-08-13 DIAGNOSIS — R42 DIZZINESS: ICD-10-CM

## 2021-08-13 DIAGNOSIS — R06.02 SOB (SHORTNESS OF BREATH): ICD-10-CM

## 2021-08-13 DIAGNOSIS — R51.9 NONINTRACTABLE HEADACHE, UNSPECIFIED CHRONICITY PATTERN, UNSPECIFIED HEADACHE TYPE: ICD-10-CM

## 2021-08-13 DIAGNOSIS — R05.9 COUGH: Primary | ICD-10-CM

## 2021-08-16 ENCOUNTER — PATIENT MESSAGE (OUTPATIENT)
Dept: BARIATRICS | Facility: CLINIC | Age: 41
End: 2021-08-16

## 2021-08-23 ENCOUNTER — PATIENT MESSAGE (OUTPATIENT)
Dept: DERMATOLOGY | Facility: CLINIC | Age: 41
End: 2021-08-23

## 2021-08-23 ENCOUNTER — LAB VISIT (OUTPATIENT)
Dept: LAB | Facility: HOSPITAL | Age: 41
End: 2021-08-23
Attending: OBSTETRICS & GYNECOLOGY
Payer: COMMERCIAL

## 2021-08-23 DIAGNOSIS — C56.9 MUCINOUS TUMOR, OF LOW MALIGNANT POTENTIAL: ICD-10-CM

## 2021-08-26 ENCOUNTER — OFFICE VISIT (OUTPATIENT)
Dept: GYNECOLOGIC ONCOLOGY | Facility: CLINIC | Age: 41
End: 2021-08-26
Payer: COMMERCIAL

## 2021-08-26 ENCOUNTER — OFFICE VISIT (OUTPATIENT)
Dept: BARIATRICS | Facility: CLINIC | Age: 41
End: 2021-08-26
Payer: COMMERCIAL

## 2021-08-26 ENCOUNTER — CLINICAL SUPPORT (OUTPATIENT)
Dept: BARIATRICS | Facility: CLINIC | Age: 41
End: 2021-08-26
Payer: COMMERCIAL

## 2021-08-26 ENCOUNTER — PATIENT MESSAGE (OUTPATIENT)
Dept: BARIATRICS | Facility: CLINIC | Age: 41
End: 2021-08-26

## 2021-08-26 VITALS
OXYGEN SATURATION: 97 % | HEIGHT: 64 IN | DIASTOLIC BLOOD PRESSURE: 79 MMHG | WEIGHT: 293 LBS | HEART RATE: 77 BPM | BODY MASS INDEX: 50.02 KG/M2 | SYSTOLIC BLOOD PRESSURE: 125 MMHG

## 2021-08-26 VITALS
SYSTOLIC BLOOD PRESSURE: 115 MMHG | DIASTOLIC BLOOD PRESSURE: 78 MMHG | HEART RATE: 83 BPM | BODY MASS INDEX: 50.37 KG/M2 | WEIGHT: 293 LBS

## 2021-08-26 VITALS — BODY MASS INDEX: 50.41 KG/M2 | WEIGHT: 293 LBS

## 2021-08-26 DIAGNOSIS — Z71.3 DIETARY COUNSELING AND SURVEILLANCE: ICD-10-CM

## 2021-08-26 DIAGNOSIS — Z98.84 S/P LAPAROSCOPIC SLEEVE GASTRECTOMY: Primary | ICD-10-CM

## 2021-08-26 DIAGNOSIS — D39.10 BORDERLINE EPITHELIAL NEOPLASM OF OVARY: Primary | ICD-10-CM

## 2021-08-26 DIAGNOSIS — Z98.890 POST-OPERATIVE STATE: Primary | ICD-10-CM

## 2021-08-26 DIAGNOSIS — R63.4 WEIGHT LOSS: ICD-10-CM

## 2021-08-26 DIAGNOSIS — Z98.84 S/P LAPAROSCOPIC SLEEVE GASTRECTOMY: ICD-10-CM

## 2021-08-26 DIAGNOSIS — F33.41 MAJOR DEPRESSIVE DISORDER, RECURRENT EPISODE, IN PARTIAL REMISSION WITH ANXIOUS DISTRESS: ICD-10-CM

## 2021-08-26 PROCEDURE — 99024 PR POST-OP FOLLOW-UP VISIT: ICD-10-PCS | Mod: S$GLB,,, | Performed by: PHYSICIAN ASSISTANT

## 2021-08-26 PROCEDURE — 3044F PR MOST RECENT HEMOGLOBIN A1C LEVEL <7.0%: ICD-10-PCS | Mod: CPTII,S$GLB,, | Performed by: PHYSICIAN ASSISTANT

## 2021-08-26 PROCEDURE — 3074F SYST BP LT 130 MM HG: CPT | Mod: CPTII,S$GLB,, | Performed by: PHYSICIAN ASSISTANT

## 2021-08-26 PROCEDURE — 3008F BODY MASS INDEX DOCD: CPT | Mod: CPTII,S$GLB,, | Performed by: PHYSICIAN ASSISTANT

## 2021-08-26 PROCEDURE — 3008F PR BODY MASS INDEX (BMI) DOCUMENTED: ICD-10-PCS | Mod: CPTII,S$GLB,, | Performed by: PHYSICIAN ASSISTANT

## 2021-08-26 PROCEDURE — 3074F PR MOST RECENT SYSTOLIC BLOOD PRESSURE < 130 MM HG: ICD-10-PCS | Mod: CPTII,S$GLB,, | Performed by: PHYSICIAN ASSISTANT

## 2021-08-26 PROCEDURE — 1159F MED LIST DOCD IN RCRD: CPT | Mod: CPTII,S$GLB,, | Performed by: PHYSICIAN ASSISTANT

## 2021-08-26 PROCEDURE — 99499 UNLISTED E&M SERVICE: CPT | Mod: S$GLB,,, | Performed by: DIETITIAN, REGISTERED

## 2021-08-26 PROCEDURE — 3008F PR BODY MASS INDEX (BMI) DOCUMENTED: ICD-10-PCS | Mod: CPTII,S$GLB,, | Performed by: OBSTETRICS & GYNECOLOGY

## 2021-08-26 PROCEDURE — 3078F PR MOST RECENT DIASTOLIC BLOOD PRESSURE < 80 MM HG: ICD-10-PCS | Mod: CPTII,S$GLB,, | Performed by: OBSTETRICS & GYNECOLOGY

## 2021-08-26 PROCEDURE — 1126F PR PAIN SEVERITY QUANTIFIED, NO PAIN PRESENT: ICD-10-PCS | Mod: CPTII,S$GLB,, | Performed by: PHYSICIAN ASSISTANT

## 2021-08-26 PROCEDURE — 3074F PR MOST RECENT SYSTOLIC BLOOD PRESSURE < 130 MM HG: ICD-10-PCS | Mod: CPTII,S$GLB,, | Performed by: OBSTETRICS & GYNECOLOGY

## 2021-08-26 PROCEDURE — 3008F BODY MASS INDEX DOCD: CPT | Mod: CPTII,S$GLB,, | Performed by: OBSTETRICS & GYNECOLOGY

## 2021-08-26 PROCEDURE — 3078F PR MOST RECENT DIASTOLIC BLOOD PRESSURE < 80 MM HG: ICD-10-PCS | Mod: CPTII,S$GLB,, | Performed by: PHYSICIAN ASSISTANT

## 2021-08-26 PROCEDURE — 99999 PR PBB SHADOW E&M-EST. PATIENT-LVL IV: ICD-10-PCS | Mod: PBBFAC,,, | Performed by: PHYSICIAN ASSISTANT

## 2021-08-26 PROCEDURE — 3044F HG A1C LEVEL LT 7.0%: CPT | Mod: CPTII,S$GLB,, | Performed by: OBSTETRICS & GYNECOLOGY

## 2021-08-26 PROCEDURE — 99999 PR PBB SHADOW E&M-EST. PATIENT-LVL III: ICD-10-PCS | Mod: PBBFAC,,, | Performed by: OBSTETRICS & GYNECOLOGY

## 2021-08-26 PROCEDURE — 3044F PR MOST RECENT HEMOGLOBIN A1C LEVEL <7.0%: ICD-10-PCS | Mod: CPTII,S$GLB,, | Performed by: OBSTETRICS & GYNECOLOGY

## 2021-08-26 PROCEDURE — 99999 PR PBB SHADOW E&M-EST. PATIENT-LVL III: CPT | Mod: PBBFAC,,, | Performed by: OBSTETRICS & GYNECOLOGY

## 2021-08-26 PROCEDURE — 1126F PR PAIN SEVERITY QUANTIFIED, NO PAIN PRESENT: ICD-10-PCS | Mod: CPTII,S$GLB,, | Performed by: OBSTETRICS & GYNECOLOGY

## 2021-08-26 PROCEDURE — 3074F SYST BP LT 130 MM HG: CPT | Mod: CPTII,S$GLB,, | Performed by: OBSTETRICS & GYNECOLOGY

## 2021-08-26 PROCEDURE — 99395 PR PREVENTIVE VISIT,EST,18-39: ICD-10-PCS | Mod: S$GLB,,, | Performed by: OBSTETRICS & GYNECOLOGY

## 2021-08-26 PROCEDURE — 1159F PR MEDICATION LIST DOCUMENTED IN MEDICAL RECORD: ICD-10-PCS | Mod: CPTII,S$GLB,, | Performed by: PHYSICIAN ASSISTANT

## 2021-08-26 PROCEDURE — 99024 POSTOP FOLLOW-UP VISIT: CPT | Mod: S$GLB,,, | Performed by: PHYSICIAN ASSISTANT

## 2021-08-26 PROCEDURE — 1126F AMNT PAIN NOTED NONE PRSNT: CPT | Mod: CPTII,S$GLB,, | Performed by: OBSTETRICS & GYNECOLOGY

## 2021-08-26 PROCEDURE — 99999 PR PBB SHADOW E&M-EST. PATIENT-LVL IV: CPT | Mod: PBBFAC,,, | Performed by: PHYSICIAN ASSISTANT

## 2021-08-26 PROCEDURE — 99395 PREV VISIT EST AGE 18-39: CPT | Mod: S$GLB,,, | Performed by: OBSTETRICS & GYNECOLOGY

## 2021-08-26 PROCEDURE — 3078F DIAST BP <80 MM HG: CPT | Mod: CPTII,S$GLB,, | Performed by: OBSTETRICS & GYNECOLOGY

## 2021-08-26 PROCEDURE — 3078F DIAST BP <80 MM HG: CPT | Mod: CPTII,S$GLB,, | Performed by: PHYSICIAN ASSISTANT

## 2021-08-26 PROCEDURE — 99499 NO LOS: ICD-10-PCS | Mod: S$GLB,,, | Performed by: DIETITIAN, REGISTERED

## 2021-08-26 PROCEDURE — 3044F HG A1C LEVEL LT 7.0%: CPT | Mod: CPTII,S$GLB,, | Performed by: PHYSICIAN ASSISTANT

## 2021-08-26 PROCEDURE — 1126F AMNT PAIN NOTED NONE PRSNT: CPT | Mod: CPTII,S$GLB,, | Performed by: PHYSICIAN ASSISTANT

## 2021-09-05 DIAGNOSIS — L73.8 BACTERIAL FOLLICULITIS: ICD-10-CM

## 2021-09-06 ENCOUNTER — TELEPHONE (OUTPATIENT)
Dept: PHARMACY | Facility: CLINIC | Age: 41
End: 2021-09-06

## 2021-09-07 RX ORDER — CLINDAMYCIN PHOSPHATE 11.9 MG/ML
SOLUTION TOPICAL
Qty: 30 ML | Refills: 3 | Status: SHIPPED | OUTPATIENT
Start: 2021-09-07 | End: 2022-02-15 | Stop reason: SDUPTHER

## 2021-09-13 ENCOUNTER — OFFICE VISIT (OUTPATIENT)
Dept: PSYCHIATRY | Facility: CLINIC | Age: 41
End: 2021-09-13
Payer: COMMERCIAL

## 2021-09-13 DIAGNOSIS — F33.41 MAJOR DEPRESSIVE DISORDER, RECURRENT EPISODE, IN PARTIAL REMISSION WITH ANXIOUS DISTRESS: Primary | ICD-10-CM

## 2021-09-13 DIAGNOSIS — C56.9 MUCINOUS TUMOR, OF LOW MALIGNANT POTENTIAL: ICD-10-CM

## 2021-09-13 PROCEDURE — 3044F PR MOST RECENT HEMOGLOBIN A1C LEVEL <7.0%: ICD-10-PCS | Mod: CPTII,S$GLB,, | Performed by: PSYCHOLOGIST

## 2021-09-13 PROCEDURE — 1159F MED LIST DOCD IN RCRD: CPT | Mod: CPTII,S$GLB,, | Performed by: PSYCHOLOGIST

## 2021-09-13 PROCEDURE — 90834 PR PSYCHOTHERAPY W/PATIENT, 45 MIN: ICD-10-PCS | Mod: S$GLB,,, | Performed by: PSYCHOLOGIST

## 2021-09-13 PROCEDURE — 99999 PR PBB SHADOW E&M-EST. PATIENT-LVL I: ICD-10-PCS | Mod: PBBFAC,,, | Performed by: PSYCHOLOGIST

## 2021-09-13 PROCEDURE — 99999 PR PBB SHADOW E&M-EST. PATIENT-LVL I: CPT | Mod: PBBFAC,,, | Performed by: PSYCHOLOGIST

## 2021-09-13 PROCEDURE — 3044F HG A1C LEVEL LT 7.0%: CPT | Mod: CPTII,S$GLB,, | Performed by: PSYCHOLOGIST

## 2021-09-13 PROCEDURE — 1159F PR MEDICATION LIST DOCUMENTED IN MEDICAL RECORD: ICD-10-PCS | Mod: CPTII,S$GLB,, | Performed by: PSYCHOLOGIST

## 2021-09-13 PROCEDURE — 90834 PSYTX W PT 45 MINUTES: CPT | Mod: S$GLB,,, | Performed by: PSYCHOLOGIST

## 2021-09-14 ENCOUNTER — PATIENT MESSAGE (OUTPATIENT)
Dept: PSYCHIATRY | Facility: CLINIC | Age: 41
End: 2021-09-14

## 2021-09-16 ENCOUNTER — OFFICE VISIT (OUTPATIENT)
Dept: DERMATOLOGY | Facility: CLINIC | Age: 41
End: 2021-09-16
Payer: COMMERCIAL

## 2021-09-16 DIAGNOSIS — L21.9 SEBORRHEIC DERMATITIS: ICD-10-CM

## 2021-09-16 DIAGNOSIS — D23.9 DERMATOFIBROMA: ICD-10-CM

## 2021-09-16 DIAGNOSIS — L73.2 HIDRADENITIS: Primary | ICD-10-CM

## 2021-09-16 PROCEDURE — 99999 PR PBB SHADOW E&M-EST. PATIENT-LVL III: CPT | Mod: PBBFAC,,, | Performed by: DERMATOLOGY

## 2021-09-16 PROCEDURE — 3044F PR MOST RECENT HEMOGLOBIN A1C LEVEL <7.0%: ICD-10-PCS | Mod: CPTII,S$GLB,, | Performed by: DERMATOLOGY

## 2021-09-16 PROCEDURE — 3044F HG A1C LEVEL LT 7.0%: CPT | Mod: CPTII,S$GLB,, | Performed by: DERMATOLOGY

## 2021-09-16 PROCEDURE — 1159F MED LIST DOCD IN RCRD: CPT | Mod: CPTII,S$GLB,, | Performed by: DERMATOLOGY

## 2021-09-16 PROCEDURE — 1159F PR MEDICATION LIST DOCUMENTED IN MEDICAL RECORD: ICD-10-PCS | Mod: CPTII,S$GLB,, | Performed by: DERMATOLOGY

## 2021-09-16 PROCEDURE — 99999 PR PBB SHADOW E&M-EST. PATIENT-LVL III: ICD-10-PCS | Mod: PBBFAC,,, | Performed by: DERMATOLOGY

## 2021-09-16 PROCEDURE — 1160F RVW MEDS BY RX/DR IN RCRD: CPT | Mod: CPTII,S$GLB,, | Performed by: DERMATOLOGY

## 2021-09-16 PROCEDURE — 1160F PR REVIEW ALL MEDS BY PRESCRIBER/CLIN PHARMACIST DOCUMENTED: ICD-10-PCS | Mod: CPTII,S$GLB,, | Performed by: DERMATOLOGY

## 2021-09-16 PROCEDURE — 99214 OFFICE O/P EST MOD 30 MIN: CPT | Mod: S$GLB,,, | Performed by: DERMATOLOGY

## 2021-09-16 PROCEDURE — 99214 PR OFFICE/OUTPT VISIT, EST, LEVL IV, 30-39 MIN: ICD-10-PCS | Mod: S$GLB,,, | Performed by: DERMATOLOGY

## 2021-09-16 RX ORDER — RIFAMPIN 300 MG/1
300 CAPSULE ORAL EVERY 12 HOURS
Qty: 60 CAPSULE | Refills: 2 | Status: SHIPPED | OUTPATIENT
Start: 2021-09-16 | End: 2022-04-18

## 2021-09-16 RX ORDER — FLUOCINOLONE ACETONIDE 0.11 MG/ML
OIL AURICULAR (OTIC)
Qty: 20 ML | Refills: 3 | Status: SHIPPED | OUTPATIENT
Start: 2021-09-16

## 2021-09-16 RX ORDER — FINASTERIDE 5 MG/1
5 TABLET, FILM COATED ORAL DAILY
Qty: 30 TABLET | Refills: 11 | Status: SHIPPED | OUTPATIENT
Start: 2021-09-16 | End: 2022-08-24 | Stop reason: SDUPTHER

## 2021-09-16 RX ORDER — DOXYCYCLINE 100 MG/1
100 TABLET ORAL 2 TIMES DAILY
Qty: 180 TABLET | Refills: 0 | Status: SHIPPED | OUTPATIENT
Start: 2021-09-16 | End: 2021-12-16

## 2021-09-16 RX ORDER — CLINDAMYCIN PHOSPHATE 10 UG/ML
LOTION TOPICAL
Qty: 120 ML | Refills: 3 | Status: SHIPPED | OUTPATIENT
Start: 2021-09-16 | End: 2023-04-21 | Stop reason: SDUPTHER

## 2021-09-16 RX ORDER — SPIRONOLACTONE 50 MG/1
TABLET, FILM COATED ORAL
Qty: 60 TABLET | Refills: 4 | Status: SHIPPED | OUTPATIENT
Start: 2021-09-16 | End: 2022-02-21 | Stop reason: SDUPTHER

## 2021-09-16 RX ORDER — KETOCONAZOLE 20 MG/G
CREAM TOPICAL
Qty: 60 G | Refills: 3 | Status: SHIPPED | OUTPATIENT
Start: 2021-09-16

## 2021-09-30 ENCOUNTER — IMMUNIZATION (OUTPATIENT)
Dept: INTERNAL MEDICINE | Facility: CLINIC | Age: 41
End: 2021-09-30
Payer: COMMERCIAL

## 2021-09-30 DIAGNOSIS — Z23 NEED FOR VACCINATION: Primary | ICD-10-CM

## 2021-09-30 PROCEDURE — 0003A COVID-19, MRNA, LNP-S, PF, 30 MCG/0.3 ML DOSE VACCINE: CPT | Mod: CV19,PBBFAC | Performed by: INTERNAL MEDICINE

## 2021-09-30 PROCEDURE — 91300 COVID-19, MRNA, LNP-S, PF, 30 MCG/0.3 ML DOSE VACCINE: CPT | Mod: PBBFAC | Performed by: INTERNAL MEDICINE

## 2021-10-05 ENCOUNTER — PATIENT MESSAGE (OUTPATIENT)
Dept: INTERNAL MEDICINE | Facility: CLINIC | Age: 41
End: 2021-10-05

## 2021-10-07 ENCOUNTER — OFFICE VISIT (OUTPATIENT)
Dept: INTERNAL MEDICINE | Facility: CLINIC | Age: 41
End: 2021-10-07
Payer: COMMERCIAL

## 2021-10-07 VITALS
TEMPERATURE: 98 F | DIASTOLIC BLOOD PRESSURE: 86 MMHG | HEIGHT: 64 IN | RESPIRATION RATE: 16 BRPM | WEIGHT: 276.44 LBS | SYSTOLIC BLOOD PRESSURE: 110 MMHG | BODY MASS INDEX: 47.19 KG/M2 | HEART RATE: 76 BPM | OXYGEN SATURATION: 96 %

## 2021-10-07 DIAGNOSIS — R73.03 PREDIABETES: ICD-10-CM

## 2021-10-07 DIAGNOSIS — Z00.00 ANNUAL PHYSICAL EXAM: Primary | ICD-10-CM

## 2021-10-07 DIAGNOSIS — G43.109 MIGRAINE WITH AURA AND WITHOUT STATUS MIGRAINOSUS, NOT INTRACTABLE: ICD-10-CM

## 2021-10-07 DIAGNOSIS — Z98.84 S/P LAPAROSCOPIC SLEEVE GASTRECTOMY: ICD-10-CM

## 2021-10-07 DIAGNOSIS — F32.A DEPRESSION, UNSPECIFIED DEPRESSION TYPE: ICD-10-CM

## 2021-10-07 PROBLEM — E66.01 CLASS 3 SEVERE OBESITY DUE TO EXCESS CALORIES WITH SERIOUS COMORBIDITY AND BODY MASS INDEX (BMI) OF 50.0 TO 59.9 IN ADULT: Status: RESOLVED | Noted: 2019-05-17 | Resolved: 2021-10-07

## 2021-10-07 PROBLEM — E66.813 CLASS 3 SEVERE OBESITY DUE TO EXCESS CALORIES WITH SERIOUS COMORBIDITY AND BODY MASS INDEX (BMI) OF 50.0 TO 59.9 IN ADULT: Status: RESOLVED | Noted: 2019-05-17 | Resolved: 2021-10-07

## 2021-10-07 PROCEDURE — 3074F SYST BP LT 130 MM HG: CPT | Mod: CPTII,S$GLB,, | Performed by: HOSPITALIST

## 2021-10-07 PROCEDURE — 1160F RVW MEDS BY RX/DR IN RCRD: CPT | Mod: CPTII,S$GLB,, | Performed by: HOSPITALIST

## 2021-10-07 PROCEDURE — 3074F PR MOST RECENT SYSTOLIC BLOOD PRESSURE < 130 MM HG: ICD-10-PCS | Mod: CPTII,S$GLB,, | Performed by: HOSPITALIST

## 2021-10-07 PROCEDURE — 3008F BODY MASS INDEX DOCD: CPT | Mod: CPTII,S$GLB,, | Performed by: HOSPITALIST

## 2021-10-07 PROCEDURE — 3008F PR BODY MASS INDEX (BMI) DOCUMENTED: ICD-10-PCS | Mod: CPTII,S$GLB,, | Performed by: HOSPITALIST

## 2021-10-07 PROCEDURE — 1159F MED LIST DOCD IN RCRD: CPT | Mod: CPTII,S$GLB,, | Performed by: HOSPITALIST

## 2021-10-07 PROCEDURE — 3044F PR MOST RECENT HEMOGLOBIN A1C LEVEL <7.0%: ICD-10-PCS | Mod: CPTII,S$GLB,, | Performed by: HOSPITALIST

## 2021-10-07 PROCEDURE — 3079F PR MOST RECENT DIASTOLIC BLOOD PRESSURE 80-89 MM HG: ICD-10-PCS | Mod: CPTII,S$GLB,, | Performed by: HOSPITALIST

## 2021-10-07 PROCEDURE — 99395 PR PREVENTIVE VISIT,EST,18-39: ICD-10-PCS | Mod: S$GLB,,, | Performed by: HOSPITALIST

## 2021-10-07 PROCEDURE — 99999 PR PBB SHADOW E&M-EST. PATIENT-LVL V: CPT | Mod: PBBFAC,,, | Performed by: HOSPITALIST

## 2021-10-07 PROCEDURE — 1159F PR MEDICATION LIST DOCUMENTED IN MEDICAL RECORD: ICD-10-PCS | Mod: CPTII,S$GLB,, | Performed by: HOSPITALIST

## 2021-10-07 PROCEDURE — 99999 PR PBB SHADOW E&M-EST. PATIENT-LVL V: ICD-10-PCS | Mod: PBBFAC,,, | Performed by: HOSPITALIST

## 2021-10-07 PROCEDURE — 3044F HG A1C LEVEL LT 7.0%: CPT | Mod: CPTII,S$GLB,, | Performed by: HOSPITALIST

## 2021-10-07 PROCEDURE — 99395 PREV VISIT EST AGE 18-39: CPT | Mod: S$GLB,,, | Performed by: HOSPITALIST

## 2021-10-07 PROCEDURE — 1160F PR REVIEW ALL MEDS BY PRESCRIBER/CLIN PHARMACIST DOCUMENTED: ICD-10-PCS | Mod: CPTII,S$GLB,, | Performed by: HOSPITALIST

## 2021-10-07 PROCEDURE — 3079F DIAST BP 80-89 MM HG: CPT | Mod: CPTII,S$GLB,, | Performed by: HOSPITALIST

## 2021-10-07 RX ORDER — CYCLOBENZAPRINE HCL 5 MG
5 TABLET ORAL 2 TIMES DAILY PRN
Qty: 60 TABLET | Refills: 5 | Status: SHIPPED | OUTPATIENT
Start: 2021-10-07 | End: 2022-12-15 | Stop reason: SDUPTHER

## 2021-10-09 ENCOUNTER — IMMUNIZATION (OUTPATIENT)
Dept: INTERNAL MEDICINE | Facility: CLINIC | Age: 41
End: 2021-10-09
Payer: COMMERCIAL

## 2021-10-09 PROCEDURE — 90686 IIV4 VACC NO PRSV 0.5 ML IM: CPT | Mod: S$GLB,,, | Performed by: FAMILY MEDICINE

## 2021-10-09 PROCEDURE — 90471 FLU VACCINE (QUAD) GREATER THAN OR EQUAL TO 3YO PRESERVATIVE FREE IM: ICD-10-PCS | Mod: S$GLB,,, | Performed by: FAMILY MEDICINE

## 2021-10-09 PROCEDURE — 90471 IMMUNIZATION ADMIN: CPT | Mod: S$GLB,,, | Performed by: FAMILY MEDICINE

## 2021-10-09 PROCEDURE — 90686 FLU VACCINE (QUAD) GREATER THAN OR EQUAL TO 3YO PRESERVATIVE FREE IM: ICD-10-PCS | Mod: S$GLB,,, | Performed by: FAMILY MEDICINE

## 2021-10-11 ENCOUNTER — LAB VISIT (OUTPATIENT)
Dept: LAB | Facility: HOSPITAL | Age: 41
End: 2021-10-11
Attending: HOSPITALIST
Payer: COMMERCIAL

## 2021-10-11 DIAGNOSIS — Z00.00 ANNUAL PHYSICAL EXAM: ICD-10-CM

## 2021-10-11 LAB
25(OH)D3+25(OH)D2 SERPL-MCNC: 32 NG/ML (ref 30–96)
ALBUMIN SERPL BCP-MCNC: 4 G/DL (ref 3.5–5.2)
ALP SERPL-CCNC: 80 U/L (ref 55–135)
ALT SERPL W/O P-5'-P-CCNC: 17 U/L (ref 10–44)
ANION GAP SERPL CALC-SCNC: 13 MMOL/L (ref 8–16)
AST SERPL-CCNC: 12 U/L (ref 10–40)
BASOPHILS # BLD AUTO: 0.05 K/UL (ref 0–0.2)
BASOPHILS NFR BLD: 1.2 % (ref 0–1.9)
BILIRUB SERPL-MCNC: 0.2 MG/DL (ref 0.1–1)
BUN SERPL-MCNC: 9 MG/DL (ref 6–20)
CALCIUM SERPL-MCNC: 9.9 MG/DL (ref 8.7–10.5)
CHLORIDE SERPL-SCNC: 105 MMOL/L (ref 95–110)
CHOLEST SERPL-MCNC: 226 MG/DL (ref 120–199)
CHOLEST/HDLC SERPL: 4.2 {RATIO} (ref 2–5)
CO2 SERPL-SCNC: 23 MMOL/L (ref 23–29)
CREAT SERPL-MCNC: 0.7 MG/DL (ref 0.5–1.4)
DIFFERENTIAL METHOD: NORMAL
EOSINOPHIL # BLD AUTO: 0.1 K/UL (ref 0–0.5)
EOSINOPHIL NFR BLD: 1.2 % (ref 0–8)
ERYTHROCYTE [DISTWIDTH] IN BLOOD BY AUTOMATED COUNT: 13.3 % (ref 11.5–14.5)
EST. GFR  (AFRICAN AMERICAN): >60 ML/MIN/1.73 M^2
EST. GFR  (NON AFRICAN AMERICAN): >60 ML/MIN/1.73 M^2
ESTIMATED AVG GLUCOSE: 108 MG/DL (ref 68–131)
GLUCOSE SERPL-MCNC: 88 MG/DL (ref 70–110)
HBA1C MFR BLD: 5.4 % (ref 4–5.6)
HCT VFR BLD AUTO: 40.4 % (ref 37–48.5)
HDLC SERPL-MCNC: 54 MG/DL (ref 40–75)
HDLC SERPL: 23.9 % (ref 20–50)
HGB BLD-MCNC: 13.2 G/DL (ref 12–16)
IMM GRANULOCYTES # BLD AUTO: 0.01 K/UL (ref 0–0.04)
IMM GRANULOCYTES NFR BLD AUTO: 0.2 % (ref 0–0.5)
LDLC SERPL CALC-MCNC: 148 MG/DL (ref 63–159)
LYMPHOCYTES # BLD AUTO: 1.3 K/UL (ref 1–4.8)
LYMPHOCYTES NFR BLD: 33.3 % (ref 18–48)
MCH RBC QN AUTO: 27.6 PG (ref 27–31)
MCHC RBC AUTO-ENTMCNC: 32.7 G/DL (ref 32–36)
MCV RBC AUTO: 84 FL (ref 82–98)
MONOCYTES # BLD AUTO: 0.3 K/UL (ref 0.3–1)
MONOCYTES NFR BLD: 8.4 % (ref 4–15)
NEUTROPHILS # BLD AUTO: 2.2 K/UL (ref 1.8–7.7)
NEUTROPHILS NFR BLD: 55.7 % (ref 38–73)
NONHDLC SERPL-MCNC: 172 MG/DL
NRBC BLD-RTO: 0 /100 WBC
PLATELET # BLD AUTO: 199 K/UL (ref 150–450)
PMV BLD AUTO: 12.1 FL (ref 9.2–12.9)
POTASSIUM SERPL-SCNC: 3.8 MMOL/L (ref 3.5–5.1)
PROT SERPL-MCNC: 6.9 G/DL (ref 6–8.4)
RBC # BLD AUTO: 4.79 M/UL (ref 4–5.4)
SODIUM SERPL-SCNC: 141 MMOL/L (ref 136–145)
TRIGL SERPL-MCNC: 120 MG/DL (ref 30–150)
TSH SERPL DL<=0.005 MIU/L-ACNC: 1.11 UIU/ML (ref 0.4–4)
WBC # BLD AUTO: 4.03 K/UL (ref 3.9–12.7)

## 2021-10-11 PROCEDURE — 36415 COLL VENOUS BLD VENIPUNCTURE: CPT | Mod: PO | Performed by: HOSPITALIST

## 2021-10-11 PROCEDURE — 83036 HEMOGLOBIN GLYCOSYLATED A1C: CPT | Performed by: HOSPITALIST

## 2021-10-11 PROCEDURE — 82306 VITAMIN D 25 HYDROXY: CPT | Performed by: HOSPITALIST

## 2021-10-11 PROCEDURE — 84443 ASSAY THYROID STIM HORMONE: CPT | Performed by: HOSPITALIST

## 2021-10-11 PROCEDURE — 85025 COMPLETE CBC W/AUTO DIFF WBC: CPT | Performed by: HOSPITALIST

## 2021-10-11 PROCEDURE — 80061 LIPID PANEL: CPT | Performed by: HOSPITALIST

## 2021-10-11 PROCEDURE — 80053 COMPREHEN METABOLIC PANEL: CPT | Performed by: HOSPITALIST

## 2021-10-15 ENCOUNTER — TELEPHONE (OUTPATIENT)
Dept: NEUROLOGY | Facility: CLINIC | Age: 41
End: 2021-10-15

## 2021-10-21 ENCOUNTER — PATIENT MESSAGE (OUTPATIENT)
Dept: BARIATRICS | Facility: CLINIC | Age: 41
End: 2021-10-21

## 2021-10-21 DIAGNOSIS — E66.01 CLASS 3 SEVERE OBESITY DUE TO EXCESS CALORIES WITH SERIOUS COMORBIDITY AND BODY MASS INDEX (BMI) OF 50.0 TO 59.9 IN ADULT: ICD-10-CM

## 2021-10-22 DIAGNOSIS — E66.01 CLASS 3 SEVERE OBESITY DUE TO EXCESS CALORIES WITH SERIOUS COMORBIDITY AND BODY MASS INDEX (BMI) OF 50.0 TO 59.9 IN ADULT: ICD-10-CM

## 2021-10-22 RX ORDER — OMEPRAZOLE 40 MG/1
40 CAPSULE, DELAYED RELEASE ORAL EVERY MORNING
Qty: 30 CAPSULE | Refills: 1 | Status: SHIPPED | OUTPATIENT
Start: 2021-10-22 | End: 2021-12-03 | Stop reason: SDUPTHER

## 2021-10-22 RX ORDER — OMEPRAZOLE 40 MG/1
40 CAPSULE, DELAYED RELEASE ORAL EVERY MORNING
Qty: 30 CAPSULE | Refills: 2 | OUTPATIENT
Start: 2021-10-22

## 2021-11-11 ENCOUNTER — OFFICE VISIT (OUTPATIENT)
Dept: OPTOMETRY | Facility: CLINIC | Age: 41
End: 2021-11-11
Payer: COMMERCIAL

## 2021-11-11 DIAGNOSIS — H04.123 DRY EYE SYNDROME, BILATERAL: ICD-10-CM

## 2021-11-11 DIAGNOSIS — H52.203 MYOPIC ASTIGMATISM OF BOTH EYES: Primary | ICD-10-CM

## 2021-11-11 DIAGNOSIS — H52.13 MYOPIC ASTIGMATISM OF BOTH EYES: Primary | ICD-10-CM

## 2021-11-11 DIAGNOSIS — Z04.9 DISEASE RULED OUT AFTER EXAMINATION: ICD-10-CM

## 2021-11-11 DIAGNOSIS — Z46.0 FITTING AND ADJUSTMENT OF SPECTACLES AND CONTACT LENSES: Primary | ICD-10-CM

## 2021-11-11 DIAGNOSIS — Z46.0 FITTING AND ADJUSTMENT OF SPECTACLES AND CONTACT LENSES: ICD-10-CM

## 2021-11-11 PROCEDURE — 92310 PR CONTACT LENS FITTING (NO CHANGE): ICD-10-PCS | Mod: S$GLB,,, | Performed by: OPTOMETRIST

## 2021-11-11 PROCEDURE — 99999 PR PBB SHADOW E&M-EST. PATIENT-LVL III: ICD-10-PCS | Mod: PBBFAC,,, | Performed by: OPTOMETRIST

## 2021-11-11 PROCEDURE — 92015 DETERMINE REFRACTIVE STATE: CPT | Mod: S$GLB,,, | Performed by: OPTOMETRIST

## 2021-11-11 PROCEDURE — 99999 PR PBB SHADOW E&M-EST. PATIENT-LVL III: CPT | Mod: PBBFAC,,, | Performed by: OPTOMETRIST

## 2021-11-11 PROCEDURE — 92014 COMPRE OPH EXAM EST PT 1/>: CPT | Mod: S$GLB,,, | Performed by: OPTOMETRIST

## 2021-11-11 PROCEDURE — 92015 PR REFRACTION: ICD-10-PCS | Mod: S$GLB,,, | Performed by: OPTOMETRIST

## 2021-11-11 PROCEDURE — 92014 PR EYE EXAM, EST PATIENT,COMPREHESV: ICD-10-PCS | Mod: S$GLB,,, | Performed by: OPTOMETRIST

## 2021-11-11 PROCEDURE — 92310 CONTACT LENS FITTING OU: CPT | Mod: S$GLB,,, | Performed by: OPTOMETRIST

## 2021-11-15 ENCOUNTER — OFFICE VISIT (OUTPATIENT)
Dept: PSYCHIATRY | Facility: CLINIC | Age: 41
End: 2021-11-15
Payer: COMMERCIAL

## 2021-11-15 DIAGNOSIS — Z03.89 NO DIAGNOSIS ON AXIS I: Primary | ICD-10-CM

## 2021-11-15 DIAGNOSIS — G47.09 OTHER INSOMNIA: ICD-10-CM

## 2021-11-15 PROCEDURE — 3044F PR MOST RECENT HEMOGLOBIN A1C LEVEL <7.0%: ICD-10-PCS | Mod: CPTII,95,, | Performed by: PSYCHIATRY & NEUROLOGY

## 2021-11-15 PROCEDURE — 99499 NO LOS: ICD-10-PCS | Mod: 95,,, | Performed by: PSYCHIATRY & NEUROLOGY

## 2021-11-15 PROCEDURE — 99499 UNLISTED E&M SERVICE: CPT | Mod: 95,,, | Performed by: PSYCHIATRY & NEUROLOGY

## 2021-11-15 PROCEDURE — 3044F HG A1C LEVEL LT 7.0%: CPT | Mod: CPTII,95,, | Performed by: PSYCHIATRY & NEUROLOGY

## 2021-11-15 RX ORDER — ALPRAZOLAM 0.5 MG/1
0.5 TABLET ORAL NIGHTLY PRN
Qty: 30 TABLET | Refills: 2 | Status: SHIPPED | OUTPATIENT
Start: 2021-11-15 | End: 2022-03-09 | Stop reason: SDUPTHER

## 2021-11-15 RX ORDER — VENLAFAXINE HYDROCHLORIDE 150 MG/1
150 CAPSULE, EXTENDED RELEASE ORAL DAILY
Qty: 30 CAPSULE | Refills: 3 | Status: SHIPPED | OUTPATIENT
Start: 2021-11-15 | End: 2022-03-14 | Stop reason: SDUPTHER

## 2021-11-17 ENCOUNTER — PATIENT MESSAGE (OUTPATIENT)
Dept: NEUROLOGY | Facility: CLINIC | Age: 41
End: 2021-11-17
Payer: COMMERCIAL

## 2021-11-23 ENCOUNTER — TELEPHONE (OUTPATIENT)
Dept: NEUROLOGY | Facility: CLINIC | Age: 41
End: 2021-11-23
Payer: COMMERCIAL

## 2021-11-23 ENCOUNTER — PATIENT MESSAGE (OUTPATIENT)
Dept: DERMATOLOGY | Facility: CLINIC | Age: 41
End: 2021-11-23
Payer: COMMERCIAL

## 2021-11-23 ENCOUNTER — PATIENT MESSAGE (OUTPATIENT)
Dept: NEUROLOGY | Facility: CLINIC | Age: 41
End: 2021-11-23
Payer: COMMERCIAL

## 2021-11-24 ENCOUNTER — PATIENT MESSAGE (OUTPATIENT)
Dept: INTERNAL MEDICINE | Facility: CLINIC | Age: 41
End: 2021-11-24
Payer: COMMERCIAL

## 2021-11-24 RX ORDER — FLUCONAZOLE 150 MG/1
TABLET ORAL
Qty: 2 TABLET | Refills: 0 | Status: SHIPPED | OUTPATIENT
Start: 2021-11-24 | End: 2022-04-18

## 2021-12-03 DIAGNOSIS — E66.01 CLASS 3 SEVERE OBESITY DUE TO EXCESS CALORIES WITH SERIOUS COMORBIDITY AND BODY MASS INDEX (BMI) OF 50.0 TO 59.9 IN ADULT: ICD-10-CM

## 2021-12-03 RX ORDER — OMEPRAZOLE 40 MG/1
40 CAPSULE, DELAYED RELEASE ORAL EVERY MORNING
Qty: 30 CAPSULE | Refills: 1 | Status: SHIPPED | OUTPATIENT
Start: 2021-12-03 | End: 2022-02-10 | Stop reason: SDUPTHER

## 2021-12-06 ENCOUNTER — OFFICE VISIT (OUTPATIENT)
Dept: NEUROLOGY | Facility: CLINIC | Age: 41
End: 2021-12-06
Payer: COMMERCIAL

## 2021-12-06 DIAGNOSIS — Z98.84 S/P LAPAROSCOPIC SLEEVE GASTRECTOMY: ICD-10-CM

## 2021-12-06 DIAGNOSIS — M41.9 SCOLIOSIS OF CERVICAL SPINE, UNSPECIFIED SCOLIOSIS TYPE: ICD-10-CM

## 2021-12-06 DIAGNOSIS — R11.0 NAUSEA: ICD-10-CM

## 2021-12-06 DIAGNOSIS — G43.109 MIGRAINE WITH AURA AND WITHOUT STATUS MIGRAINOSUS, NOT INTRACTABLE: Primary | ICD-10-CM

## 2021-12-06 DIAGNOSIS — F32.A DEPRESSION, UNSPECIFIED DEPRESSION TYPE: ICD-10-CM

## 2021-12-06 DIAGNOSIS — R29.898 DECREASED ROM OF NECK: ICD-10-CM

## 2021-12-06 PROCEDURE — 99204 PR OFFICE/OUTPT VISIT, NEW, LEVL IV, 45-59 MIN: ICD-10-PCS | Mod: 95,,, | Performed by: PHYSICIAN ASSISTANT

## 2021-12-06 PROCEDURE — 99204 OFFICE O/P NEW MOD 45 MIN: CPT | Mod: 95,,, | Performed by: PHYSICIAN ASSISTANT

## 2021-12-06 RX ORDER — UBROGEPANT 50 MG/1
TABLET ORAL
Qty: 10 TABLET | Refills: 2 | Status: SHIPPED | OUTPATIENT
Start: 2021-12-06 | End: 2022-02-15 | Stop reason: SDUPTHER

## 2021-12-06 RX ORDER — ONDANSETRON 4 MG/1
4 TABLET, ORALLY DISINTEGRATING ORAL EVERY 6 HOURS PRN
Qty: 20 TABLET | Refills: 2 | Status: SHIPPED | OUTPATIENT
Start: 2021-12-06 | End: 2023-04-18 | Stop reason: SDUPTHER

## 2021-12-07 ENCOUNTER — TELEPHONE (OUTPATIENT)
Dept: PHARMACY | Facility: CLINIC | Age: 41
End: 2021-12-07
Payer: COMMERCIAL

## 2021-12-27 ENCOUNTER — LAB VISIT (OUTPATIENT)
Dept: LAB | Facility: HOSPITAL | Age: 41
End: 2021-12-27
Attending: OBSTETRICS & GYNECOLOGY
Payer: COMMERCIAL

## 2021-12-27 DIAGNOSIS — D39.10 BORDERLINE EPITHELIAL NEOPLASM OF OVARY: ICD-10-CM

## 2021-12-27 LAB — CEA SERPL-MCNC: <1.7 NG/ML (ref 0–5)

## 2021-12-27 PROCEDURE — 36415 COLL VENOUS BLD VENIPUNCTURE: CPT | Performed by: OBSTETRICS & GYNECOLOGY

## 2021-12-27 PROCEDURE — 82378 CARCINOEMBRYONIC ANTIGEN: CPT | Performed by: OBSTETRICS & GYNECOLOGY

## 2021-12-30 ENCOUNTER — OFFICE VISIT (OUTPATIENT)
Dept: GYNECOLOGIC ONCOLOGY | Facility: CLINIC | Age: 41
End: 2021-12-30
Payer: COMMERCIAL

## 2021-12-30 VITALS
HEART RATE: 65 BPM | HEIGHT: 64 IN | SYSTOLIC BLOOD PRESSURE: 114 MMHG | BODY MASS INDEX: 45.81 KG/M2 | WEIGHT: 268.31 LBS | DIASTOLIC BLOOD PRESSURE: 65 MMHG

## 2021-12-30 DIAGNOSIS — E89.40 SURGICAL MENOPAUSE: ICD-10-CM

## 2021-12-30 DIAGNOSIS — D39.10 BORDERLINE EPITHELIAL NEOPLASM OF OVARY: Primary | ICD-10-CM

## 2021-12-30 PROCEDURE — 3044F PR MOST RECENT HEMOGLOBIN A1C LEVEL <7.0%: ICD-10-PCS | Mod: CPTII,S$GLB,, | Performed by: OBSTETRICS & GYNECOLOGY

## 2021-12-30 PROCEDURE — 99999 PR PBB SHADOW E&M-EST. PATIENT-LVL IV: ICD-10-PCS | Mod: PBBFAC,,, | Performed by: OBSTETRICS & GYNECOLOGY

## 2021-12-30 PROCEDURE — 3074F PR MOST RECENT SYSTOLIC BLOOD PRESSURE < 130 MM HG: ICD-10-PCS | Mod: CPTII,S$GLB,, | Performed by: OBSTETRICS & GYNECOLOGY

## 2021-12-30 PROCEDURE — 1159F PR MEDICATION LIST DOCUMENTED IN MEDICAL RECORD: ICD-10-PCS | Mod: CPTII,S$GLB,, | Performed by: OBSTETRICS & GYNECOLOGY

## 2021-12-30 PROCEDURE — 3078F DIAST BP <80 MM HG: CPT | Mod: CPTII,S$GLB,, | Performed by: OBSTETRICS & GYNECOLOGY

## 2021-12-30 PROCEDURE — 1159F MED LIST DOCD IN RCRD: CPT | Mod: CPTII,S$GLB,, | Performed by: OBSTETRICS & GYNECOLOGY

## 2021-12-30 PROCEDURE — 99214 OFFICE O/P EST MOD 30 MIN: CPT | Mod: S$GLB,,, | Performed by: OBSTETRICS & GYNECOLOGY

## 2021-12-30 PROCEDURE — 99999 PR PBB SHADOW E&M-EST. PATIENT-LVL IV: CPT | Mod: PBBFAC,,, | Performed by: OBSTETRICS & GYNECOLOGY

## 2021-12-30 PROCEDURE — 3074F SYST BP LT 130 MM HG: CPT | Mod: CPTII,S$GLB,, | Performed by: OBSTETRICS & GYNECOLOGY

## 2021-12-30 PROCEDURE — 3044F HG A1C LEVEL LT 7.0%: CPT | Mod: CPTII,S$GLB,, | Performed by: OBSTETRICS & GYNECOLOGY

## 2021-12-30 PROCEDURE — 3008F BODY MASS INDEX DOCD: CPT | Mod: CPTII,S$GLB,, | Performed by: OBSTETRICS & GYNECOLOGY

## 2021-12-30 PROCEDURE — 3008F PR BODY MASS INDEX (BMI) DOCUMENTED: ICD-10-PCS | Mod: CPTII,S$GLB,, | Performed by: OBSTETRICS & GYNECOLOGY

## 2021-12-30 PROCEDURE — 3078F PR MOST RECENT DIASTOLIC BLOOD PRESSURE < 80 MM HG: ICD-10-PCS | Mod: CPTII,S$GLB,, | Performed by: OBSTETRICS & GYNECOLOGY

## 2021-12-30 PROCEDURE — 99214 PR OFFICE/OUTPT VISIT, EST, LEVL IV, 30-39 MIN: ICD-10-PCS | Mod: S$GLB,,, | Performed by: OBSTETRICS & GYNECOLOGY

## 2022-01-05 LAB
CTP QC/QA: YES
SARS-COV-2 AG RESP QL IA.RAPID: NEGATIVE

## 2022-01-12 ENCOUNTER — DOCUMENTATION ONLY (OUTPATIENT)
Dept: ADMINISTRATIVE | Facility: HOSPITAL | Age: 42
End: 2022-01-12
Payer: COMMERCIAL

## 2022-01-21 LAB
CTP QC/QA: YES
SARS-COV-2 AG RESP QL IA.RAPID: NEGATIVE

## 2022-01-26 ENCOUNTER — DOCUMENTATION ONLY (OUTPATIENT)
Dept: ADMINISTRATIVE | Facility: HOSPITAL | Age: 42
End: 2022-01-26
Payer: COMMERCIAL

## 2022-02-01 ENCOUNTER — CLINICAL SUPPORT (OUTPATIENT)
Dept: OTHER | Facility: CLINIC | Age: 42
End: 2022-02-01
Payer: COMMERCIAL

## 2022-02-01 DIAGNOSIS — Z00.8 ENCOUNTER FOR OTHER GENERAL EXAMINATION: ICD-10-CM

## 2022-02-02 VITALS — BODY MASS INDEX: 44.65 KG/M2 | HEIGHT: 65 IN

## 2022-02-02 LAB
GLUCOSE SERPL-MCNC: 104 MG/DL (ref 60–140)
HDLC SERPL-MCNC: 62 MG/DL
POC CHOLESTEROL, LDL (DOCK): 119 MG/DL
POC CHOLESTEROL, TOTAL: 231 MG/DL
TRIGL SERPL-MCNC: 251 MG/DL

## 2022-02-10 DIAGNOSIS — E66.01 CLASS 3 SEVERE OBESITY DUE TO EXCESS CALORIES WITH SERIOUS COMORBIDITY AND BODY MASS INDEX (BMI) OF 50.0 TO 59.9 IN ADULT: ICD-10-CM

## 2022-02-10 DIAGNOSIS — L73.2 HIDRADENITIS: ICD-10-CM

## 2022-02-10 RX ORDER — SPIRONOLACTONE 50 MG/1
TABLET, FILM COATED ORAL
Qty: 60 TABLET | Refills: 4 | Status: CANCELLED | OUTPATIENT
Start: 2022-02-10

## 2022-02-10 RX ORDER — OMEPRAZOLE 40 MG/1
40 CAPSULE, DELAYED RELEASE ORAL EVERY MORNING
Qty: 30 CAPSULE | Refills: 1 | Status: SHIPPED | OUTPATIENT
Start: 2022-02-10 | End: 2022-04-06 | Stop reason: SDUPTHER

## 2022-02-11 DIAGNOSIS — L73.2 HIDRADENITIS: ICD-10-CM

## 2022-02-11 RX ORDER — SPIRONOLACTONE 50 MG/1
TABLET, FILM COATED ORAL
Qty: 60 TABLET | Refills: 4 | Status: CANCELLED | OUTPATIENT
Start: 2022-02-10

## 2022-02-15 DIAGNOSIS — G43.109 MIGRAINE WITH AURA AND WITHOUT STATUS MIGRAINOSUS, NOT INTRACTABLE: ICD-10-CM

## 2022-02-15 DIAGNOSIS — L73.8 BACTERIAL FOLLICULITIS: ICD-10-CM

## 2022-02-16 RX ORDER — UBROGEPANT 50 MG/1
TABLET ORAL
Qty: 10 TABLET | Refills: 2 | Status: SHIPPED | OUTPATIENT
Start: 2022-02-16 | End: 2022-09-19 | Stop reason: SDUPTHER

## 2022-02-17 RX ORDER — CLINDAMYCIN PHOSPHATE 11.9 MG/ML
SOLUTION TOPICAL
Qty: 30 ML | Refills: 3 | Status: SHIPPED | OUTPATIENT
Start: 2022-02-17 | End: 2022-07-01 | Stop reason: SDUPTHER

## 2022-02-21 ENCOUNTER — PATIENT MESSAGE (OUTPATIENT)
Dept: DERMATOLOGY | Facility: CLINIC | Age: 42
End: 2022-02-21
Payer: COMMERCIAL

## 2022-02-21 DIAGNOSIS — L73.2 HIDRADENITIS: ICD-10-CM

## 2022-03-02 RX ORDER — SPIRONOLACTONE 50 MG/1
TABLET, FILM COATED ORAL
Qty: 60 TABLET | Refills: 4 | Status: SHIPPED | OUTPATIENT
Start: 2022-03-02 | End: 2022-03-14 | Stop reason: SDUPTHER

## 2022-03-02 RX ORDER — SPIRONOLACTONE 50 MG/1
TABLET, FILM COATED ORAL
Qty: 60 TABLET | Refills: 4 | OUTPATIENT
Start: 2022-03-02

## 2022-03-07 ENCOUNTER — PATIENT MESSAGE (OUTPATIENT)
Dept: BARIATRICS | Facility: CLINIC | Age: 42
End: 2022-03-07
Payer: COMMERCIAL

## 2022-03-09 DIAGNOSIS — G47.09 OTHER INSOMNIA: ICD-10-CM

## 2022-03-09 RX ORDER — ALPRAZOLAM 0.5 MG/1
0.5 TABLET ORAL NIGHTLY PRN
Qty: 30 TABLET | Refills: 1 | Status: SHIPPED | OUTPATIENT
Start: 2022-03-09 | End: 2022-05-26 | Stop reason: SDUPTHER

## 2022-03-14 ENCOUNTER — OFFICE VISIT (OUTPATIENT)
Dept: DERMATOLOGY | Facility: CLINIC | Age: 42
End: 2022-03-14
Payer: COMMERCIAL

## 2022-03-14 DIAGNOSIS — L21.9 SEBORRHEIC DERMATITIS: ICD-10-CM

## 2022-03-14 DIAGNOSIS — L73.2 HIDRADENITIS: Primary | ICD-10-CM

## 2022-03-14 PROCEDURE — 1159F PR MEDICATION LIST DOCUMENTED IN MEDICAL RECORD: ICD-10-PCS | Mod: CPTII,S$GLB,, | Performed by: DERMATOLOGY

## 2022-03-14 PROCEDURE — 1159F MED LIST DOCD IN RCRD: CPT | Mod: CPTII,S$GLB,, | Performed by: DERMATOLOGY

## 2022-03-14 PROCEDURE — 99999 PR PBB SHADOW E&M-EST. PATIENT-LVL III: CPT | Mod: PBBFAC,,, | Performed by: DERMATOLOGY

## 2022-03-14 PROCEDURE — 99214 PR OFFICE/OUTPT VISIT, EST, LEVL IV, 30-39 MIN: ICD-10-PCS | Mod: S$GLB,,, | Performed by: DERMATOLOGY

## 2022-03-14 PROCEDURE — 99214 OFFICE O/P EST MOD 30 MIN: CPT | Mod: S$GLB,,, | Performed by: DERMATOLOGY

## 2022-03-14 PROCEDURE — 99999 PR PBB SHADOW E&M-EST. PATIENT-LVL III: ICD-10-PCS | Mod: PBBFAC,,, | Performed by: DERMATOLOGY

## 2022-03-14 RX ORDER — DOXYCYCLINE 100 MG/1
1 TABLET ORAL 2 TIMES DAILY
Qty: 20 TABLET | Refills: 6 | Status: SHIPPED | OUTPATIENT
Start: 2022-03-14 | End: 2022-03-25

## 2022-03-14 RX ORDER — B3/AZEL AC/ZINC/B6/COPPER/FA 600-5-500
TABLET ORAL
Qty: 60 TABLET | Refills: 5 | Status: SHIPPED | OUTPATIENT
Start: 2022-03-14 | End: 2022-04-01 | Stop reason: SDUPTHER

## 2022-03-14 RX ORDER — SPIRONOLACTONE 50 MG/1
TABLET, FILM COATED ORAL
Qty: 60 TABLET | Refills: 4 | Status: SHIPPED | OUTPATIENT
Start: 2022-03-14 | End: 2022-08-24 | Stop reason: SDUPTHER

## 2022-03-14 NOTE — PATIENT INSTRUCTIONS
Doxycycline may cause GI discomfort, esophageal irritation/ulceration, and increased sun sensitivity. Patient was counseled to take medicine with meals and at least 1 hour before lying down.       TREATMENT REGIMEN:   ENVIRONMENTAL:  Dilute bleach baths or dilute bleach spray (Levicyn) or Vetrycin ([employer])  Recipe for dilute bleach baths 2 times per week (or more often as needed) and discussed protocol -- add 1/2 cup of regular strength (6%) bleach to a full tub of lukewarm water and soak for 10 - 15 minutes. (use 1/4 cup for a half-full tub of water).  Hibiclens wash and /or (can alternate) benzoyl peroxide wash to affected areas daily  Weight loss: Low glycemic index diet/no sugar/no dairy OR paleo diet OR Keto diet (look up sugar busters or zone diet)  50 - 75% of HS pts are obese  wt loss can decrease risk for disease progression  obesity leads to increased friction which exacerbates disease  Wear loose fitting clothing - friction exacerbates disease  Stop shaving where you have breakouts - friction exacerbates disease; consider laser hair removal  Keep skin cool - overheating and sweating can flare HS; consider Robinul  Warm/hot compress for home use on a painful deep lump  Hydrogen Peroxide and medihoney can decrease odor    SUPPLEMENTS (anti-inflammatory): Discussed starting the following  Turmeric - start with 500mg every day and increase to 1 g every day (may cause GI upset)- 100x more anti-inflammatory if mixed with black pepper or with fatty food  NicAzel Forte - dosed qday. (FDA approved supplement for tx of acne and rosacea). It is combination of Zinc, Cu++, folic acid and niacinamide. (Can order through [facility] - approx. $40)      TOPICALS/IL:  Clindamycin solution (roll on or can put in spray bottle) - Use on affected areas 2x/day    Abx:   Doxy prn as needed    Hormonal:  Continue finasteride and spironlactone 100 mg

## 2022-03-14 NOTE — PROGRESS NOTES
Subjective:       Patient ID:  Fran Higgins is a 41 y.o. female who presents for   Chief Complaint   Patient presents with    Hidradenitis Suppurativa     Pt here today for a f/u on Hidradenitis  -     clindamycin (CLEOCIN T) 1 % lotion; AAA bid  Dispense: 120 mL; Refill: 3  -     finasteride (PROSCAR) 5 mg tablet; Take 1 tablet (5 mg total) by mouth once daily.  Dispense: 30 tablet; Refill: 11  -     spironolactone (ALDACTONE) 50 MG tablet; Start with 1 po qday, increase to 2 po qday as tolerated  Dispense: 60 tablet; Refill: 4  -     doxycycline monohydrate 100 mg Tab; Take 1 tablet (100 mg total) by mouth 2 (two) times a day.  Dispense: 180 tablet; Refill: 0  -     rifAMpin (RIFADIN) 300 MG capsule;    Improved with all treatments, no active lesions, mild scaring, doxycycline/rifampin did not aggrevate her too much, every now and then has break through lesion     Hidradenitis Suppurativa        Review of Systems   Musculoskeletal: Negative for joint swelling.   Skin: Negative for itching and rash.   Hematologic/Lymphatic: Does not bruise/bleed easily.        Objective:    Physical Exam   Constitutional: She appears well-developed and well-nourished. No distress.   Neurological: She is alert and oriented to person, place, and time. She is not disoriented.   Psychiatric: She has a normal mood and affect.   Skin:   Areas Examined (abnormalities noted in diagram):   Scalp / Hair Palpated and Inspected  Head / Face Inspection Performed  Neck Inspection Performed  Chest / Axilla Inspection Performed  Abdomen Inspection Performed  Genitals / Buttocks / Groin Inspection Performed  Back Inspection Performed  RUE Inspected  LUE Inspection Performed  RLE Inspected  LLE Inspection Performed  Nails and Digits Inspection Performed                   Diagram Legend     Erythematous scaling macule/papule c/w actinic keratosis       Vascular papule c/w angioma      Pigmented verrucoid papule/plaque c/w seborrheic  keratosis      Yellow umbilicated papule c/w sebaceous hyperplasia      Irregularly shaped tan macule c/w lentigo     1-2 mm smooth white papules consistent with Milia      Movable subcutaneous cyst with punctum c/w epidermal inclusion cyst      Subcutaneous movable cyst c/w pilar cyst      Firm pink to brown papule c/w dermatofibroma      Pedunculated fleshy papule(s) c/w skin tag(s)      Evenly pigmented macule c/w junctional nevus     Mildly variegated pigmented, slightly irregular-bordered macule c/w mildly atypical nevus      Flesh colored to evenly pigmented papule c/w intradermal nevus       Pink pearly papule/plaque c/w basal cell carcinoma      Erythematous hyperkeratotic cursted plaque c/w SCC      Surgical scar with no sign of skin cancer recurrence      Open and closed comedones      Inflammatory papules and pustules      Verrucoid papule consistent consistent with wart     Erythematous eczematous patches and plaques     Dystrophic onycholytic nail with subungual debris c/w onychomycosis     Umbilicated papule    Erythematous-base heme-crusted tan verrucoid plaque consistent with inflamed seborrheic keratosis     Erythematous Silvery Scaling Plaque c/w Psoriasis     See annotation      Assessment / Plan:        Hidradenitis  -     doxycycline monohydrate 100 mg Tab; Take 1 tablet (100 mg total) by mouth 2 (two) times daily. for 10 days  Dispense: 20 tablet; Refill: 6  -     NICAZEL 600-5-10-5-1.5 mg Tab; 1 po bid  Dispense: 60 tablet; Refill: 5  -     spironolactone (ALDACTONE) 50 MG tablet; Start with taking 1 tablet by mouth every day, may increase to 2 tablets by mouth every day as tolerated.  Dispense: 60 tablet; Refill: 4  TREATMENT REGIMEN:   ENVIRONMENTAL:   Dilute bleach baths or dilute bleach spray (Levicyn) or Vetrycin ([employer])  o Recipe for dilute bleach baths 2 times per week (or more often as needed) and discussed protocol -- add 1/2 cup of regular strength (6%) bleach to a full tub of  lukewarm water and soak for 10 - 15 minutes. (use 1/4 cup for a half-full tub of water).   Hibiclens wash and /or (can alternate) benzoyl peroxide wash to affected areas daily   Weight loss: Low glycemic index diet/no sugar/no dairy OR paleo diet OR Keto diet (look up sugar busters or zone diet)  o 50 - 75% of HS pts are obese  o wt loss can decrease risk for disease progression  o obesity leads to increased friction which exacerbates disease   Wear loose fitting clothing - friction exacerbates disease   Stop shaving where you have breakouts - friction exacerbates disease; consider laser hair removal   Keep skin cool - overheating and sweating can flare HS; consider Robinul   Warm/hot compress for home use on a painful deep lump   Hydrogen Peroxide and medihoney can decrease odor    SUPPLEMENTS (anti-inflammatory): Discussed starting the following   Turmeric - start with 500mg every day and increase to 1 g every day (may cause GI upset)- 100x more anti-inflammatory if mixed with black pepper or with fatty food   NicAzel Forte - dosed qday. (FDA approved supplement for tx of acne and rosacea). It is combination of Zinc, Cu++, folic acid and niacinamide. (Can order through [facility] - approx. $40)      TOPICALS/IL:  Clindamycin solution (roll on or can put in spray bottle) - Use on affected areas 2x/day    Abx:   Doxy prn as needed  Hormonal:  Continue finasteride and spironlactone 100 mg  Discussed benefits and risks of doxycyline therapy including but not limited to GI discomfort, esophageal irritation/ulceration, and increased sun sensitivity. Patient was counseled to take medicine with meals and at least 1 hour before lying down.       Seborrheic dermatitis- stable  - continue fluocinolone oil (derm otic) as needed to ears           Follow up in about 6 months (around 9/14/2022) for HS .

## 2022-03-15 RX ORDER — VENLAFAXINE HYDROCHLORIDE 150 MG/1
150 CAPSULE, EXTENDED RELEASE ORAL DAILY
Qty: 30 CAPSULE | Refills: 1 | Status: SHIPPED | OUTPATIENT
Start: 2022-03-15 | End: 2022-05-26 | Stop reason: SDUPTHER

## 2022-03-31 DIAGNOSIS — L73.2 HIDRADENITIS: ICD-10-CM

## 2022-03-31 RX ORDER — B3/AZEL AC/ZINC/B6/COPPER/FA 600-5-500
TABLET ORAL
Qty: 60 TABLET | Refills: 5 | Status: CANCELLED | OUTPATIENT
Start: 2022-03-31

## 2022-03-31 RX ORDER — GALCANEZUMAB 120 MG/ML
120 INJECTION, SOLUTION SUBCUTANEOUS
Qty: 1 ML | Refills: 11 | Status: SHIPPED | OUTPATIENT
Start: 2022-03-31 | End: 2022-09-19

## 2022-04-01 DIAGNOSIS — L73.2 HIDRADENITIS: ICD-10-CM

## 2022-04-03 RX ORDER — B3/AZEL AC/ZINC/B6/COPPER/FA 600-5-500
TABLET ORAL
Qty: 60 TABLET | Refills: 5 | Status: SHIPPED | OUTPATIENT
Start: 2022-04-03

## 2022-04-05 ENCOUNTER — TELEPHONE (OUTPATIENT)
Dept: BARIATRICS | Facility: CLINIC | Age: 42
End: 2022-04-05
Payer: COMMERCIAL

## 2022-04-06 DIAGNOSIS — E66.01 CLASS 3 SEVERE OBESITY DUE TO EXCESS CALORIES WITH SERIOUS COMORBIDITY AND BODY MASS INDEX (BMI) OF 50.0 TO 59.9 IN ADULT: ICD-10-CM

## 2022-04-06 RX ORDER — OMEPRAZOLE 40 MG/1
40 CAPSULE, DELAYED RELEASE ORAL EVERY MORNING
Qty: 30 CAPSULE | Refills: 1 | Status: SHIPPED | OUTPATIENT
Start: 2022-04-06 | End: 2022-06-22 | Stop reason: SDUPTHER

## 2022-04-07 ENCOUNTER — OFFICE VISIT (OUTPATIENT)
Dept: PSYCHIATRY | Facility: CLINIC | Age: 42
End: 2022-04-07
Payer: COMMERCIAL

## 2022-04-07 ENCOUNTER — PATIENT MESSAGE (OUTPATIENT)
Dept: BARIATRICS | Facility: CLINIC | Age: 42
End: 2022-04-07
Payer: COMMERCIAL

## 2022-04-07 DIAGNOSIS — F33.41 MAJOR DEPRESSIVE DISORDER, RECURRENT EPISODE, IN PARTIAL REMISSION WITH ANXIOUS DISTRESS: Primary | ICD-10-CM

## 2022-04-07 DIAGNOSIS — C56.9 MUCINOUS TUMOR, OF LOW MALIGNANT POTENTIAL: ICD-10-CM

## 2022-04-07 PROCEDURE — 99999 PR PBB SHADOW E&M-EST. PATIENT-LVL II: ICD-10-PCS | Mod: PBBFAC,,, | Performed by: PSYCHOLOGIST

## 2022-04-07 PROCEDURE — 90834 PR PSYCHOTHERAPY W/PATIENT, 45 MIN: ICD-10-PCS | Mod: S$GLB,,, | Performed by: PSYCHOLOGIST

## 2022-04-07 PROCEDURE — 1159F MED LIST DOCD IN RCRD: CPT | Mod: CPTII,S$GLB,, | Performed by: PSYCHOLOGIST

## 2022-04-07 PROCEDURE — 1159F PR MEDICATION LIST DOCUMENTED IN MEDICAL RECORD: ICD-10-PCS | Mod: CPTII,S$GLB,, | Performed by: PSYCHOLOGIST

## 2022-04-07 PROCEDURE — 99999 PR PBB SHADOW E&M-EST. PATIENT-LVL II: CPT | Mod: PBBFAC,,, | Performed by: PSYCHOLOGIST

## 2022-04-07 PROCEDURE — 90834 PSYTX W PT 45 MINUTES: CPT | Mod: S$GLB,,, | Performed by: PSYCHOLOGIST

## 2022-04-07 NOTE — PROGRESS NOTES
INFORMED CONSENT: Fran Higgins   is known to this provider and identity was confirmed via NAME and .  The patient has been informed of the risks and benefits associated with engaging in psychotherapy, the handling of protected health information, the rights of privacy and the limits of confidentiality. The patient has also been informed of the importance of reporting any suicidal or homicidal ideation to this or any provider to ensure safety of all parties, and the Fran Higgins expressed understanding. The patient was agreeable to these terms and freely participates in individual psychotherapy.    PSYCHO-ONCOLOGY NOTE/ Individual Psychotherapy     Date: 2022   Site:  [facility]        Therapeutic Intervention: Met with patient.  Outpatient - Behavior modifying psychotherapy 45 min - CPT code 80587      Patient was last seen by me on 2021    Problem list  Patient Active Problem List   Diagnosis    Depression    Left ovarian cyst    S/P RA Laparoscopic LSO    Mucinous tumor, of low malignant potential    Pericardial cyst    Scoliosis of cervical spine    Abnormal uterine bleeding (AUB)    Vaginal yeast infection    DUB (dysfunctional uterine bleeding)    Migraine with aura, not intractable    s/p robotic hysterectomy/RSO 3/9/20    Borderline epithelial neoplasm of ovary    Surgical menopause    Yeast vaginitis    Menopause    Dizziness    Galactorrhea in female- bilateral breast    Other insomnia    Major depressive disorder, recurrent episode, in partial remission with anxious distress    Prediabetes    Obesity    S/P laparoscopic sleeve gastrectomy    BMI 45.0-49.9, adult    Hidradenitis    Seborrheic dermatitis       Chief complaint/reason for encounter: interpersonal discord with father of her child  Met with patient to evaluate psychosocial adaptation to diagnosis/treatment/survivorship of Ovarian Cancer    Current Medications  Current Outpatient  Medications   Medication    ALPRAZolam (XANAX) 0.5 MG tablet    b complex vitamins capsule    CALCIUM CITRATE ORAL    clindamycin (CLEOCIN T) 1 % external solution    clindamycin (CLEOCIN T) 1 % lotion    cyclobenzaprine (FLEXERIL) 5 MG tablet    estrogens, conjugated, (PREMARIN) 1.25 MG tablet    finasteride (PROSCAR) 5 mg tablet    fluconazole (DIFLUCAN) 150 MG Tab    fluocinolone acetonide oiL (DERMOTIC OIL) 0.01 % Drop    galcanezumab-gnlm (EMGALITY PEN) 120 mg/mL PnIj    ketoconazole (NIZORAL) 2 % cream    multivitamin capsule    B3-azelaic-zinc-B6-copper-FA (NICAZEL) 600-5-10-5-1.5 mg Tab    nystatin-triamcinolone (MYCOLOG II) cream    omeprazole (PRILOSEC) 40 MG capsule    ondansetron (ZOFRAN-ODT) 4 MG TbDL    pimecrolimus (ELIDEL) 1 % cream    polyethylene glycol (GLYCOLAX) 17 gram/dose powder    rifAMpin (RIFADIN) 300 MG capsule    spironolactone (ALDACTONE) 50 MG tablet    thiamine (VITAMIN B-1) 50 MG tablet    ubrogepant (UBROGEPANT) 50 mg tablet    ursodioL (BARBARA FORTE) 500 MG tablet    venlafaxine (EFFEXOR-XR) 150 MG Cp24     No current facility-administered medications for this visit.       Objective:  Fran Higgins arrived promptly for the session.   Ms. Higgins was independently ambulatory at the time of session. The patient was fully cooperative throughout the session.  Appearance: age appropriate, appropriately  dressed, adequately  groomed  Behavior/Cooperation: friendly and cooperative  Speech: normal in rate, volume, and tone and appropriate quality, quantity and organization of sentences  Mood: anxious  Affect: mood congruent  Thought Process: goal-directed, logical  Thought Content: normal,  No delusions or paranoia; did not appear to be responding to internal stimuli during the session  Orientation: grossly intact  Memory: Grossly intact  Attention Span/Concentration: Attends to session without distraction; reports no difficulty  Fund of Knowledge:  average  Estimate of Intelligence: average from verbal skills and history  Cognition: grossly intact  Insight: patient has awareness of illness; good insight into own behavior and behavior of others  Judgment: the patient's behavior is adequate to circumstances    Interval history and content of current session: Patient returned for a booster session given increase in interpersonal discord with her daughter ffather Discussed sstrategiesboundary setting and managing appropriate support of her ddauhumbertoter   Discussed diagnosis. Reports to be coping in an adequate manner. Evaluated cognitive response, paying particular attention to negative intrusive thoughts of a persistent and detrimental nature. Thoughts of this type are in evidence with moderate distress. Provided cognitive behavioral therapy to address negative cognitions. Identified and evaluated psychosocial and environmental stressors secondary to diagnosis and treatment.  Examined proactive behaviors that may be implemented to minimize or ameliorate psychosocial stressors secondary to diagnosis and treatment.     Risk parameters:   Patient reports no suicidal ideation  Patient reports no homicidal ideation  Patient reports no self-injurious behavior  Patient reports no violent behavior   Safety needs:  None at this time      Verbal deficits: None     Patient's response to intervention:The patient's response to intervention is accepting.     Progress toward goals and other mental status changes:  The patient's progress toward goals is good.      Progress to date:Progress as Expected      Goals from last visit: Met     Patient reported outcomes:    Distress Thermometer:   Distress Score    Distress Score: (P) 7        Practical Problems Physical Problems   : (P) No Appearance: (P) No   Housing: (P) No Bathing / Dressing: (P) No   Insurance / Financial: (P) No Breathing: (P) No    Transportation: (P) No  Changes in Urination: (P) No    Work / School: (P)  No  Constipation: (P) No   Treatment Decisions: (P) No  Diarrhea: (P) No     Eating: (P) No    Family Problems Fatigue: (P) No    Dealing with Children: (P) Yes Feeling Swollen: (P) No    Dealing with Partner: (P) No Fevers: (P) No    Ability to Have Children: (P) No  Getting Around: (P) No       Indigestion: (P) No     Memory / Concentration: (P) No   Emotional Problems Mouth Sores: (P) No    Depression: (P) No  Nausea: (P) No    Fears: (P) Yes  Nose Dry / Congested: (P) No    Nervousness: (P) Yes  Pain: (P) No    Sadness: (P) No Sexual: (P) No    Worry: (P) Yes Skin Dry / Itchy: (P) No    Loss of Interest in Usual Activities: (P) No Sleep: (P) Yes     Substance Abuse: (P) No    Spiritual/Religions Concerns Tingling in Hands / Feet: (P) No   Spritual / Lutheran Concerns: (P) No         Other Problems    Other Problems:: (P) Other parent issues         PHQ ANSWERS    Q1. Little interest or pleasure in doing things: (P) Not at all (04/06/22 0919)  Q2. Feeling down, depressed, or hopeless: (P) Not at all (04/06/22 0919)  Q3. Trouble falling or staying asleep, or sleeping too much: (P) Several days (04/06/22 0919)  Q4. Feeling tired or having little energy: (P) Several days (04/06/22 0919)  Q5. Poor appetite or overeating: (P) Not at all (04/06/22 0919)  Q6. Feeling bad about yourself - or that you are a failure or have let yourself or your family down: (P) Not at all (04/06/22 0919)  Q7. Trouble concentrating on things, such as reading the newspaper or watching television: (P) Not at all (04/06/22 0919)  Q8. Moving or speaking so slowly that other people could have noticed. Or the opposite - being so fidgety or restless that you have been moving around a lot more than usual: (P) Not at all (04/06/22 0919)  Q9.      PHQ8 Score : (P) 2 (04/06/22 0919)  PHQ-9 Total Score: (P) 2 (04/06/22 0919)     JOSE MARIA-7 Answers    GAD7 4/6/2022   1. Feeling nervous, anxious, or on edge? 1   2. Not being able to stop or control worrying?  1   3. Worrying too much about different things? 0   4. Trouble relaxing? 1   5. Being so restless that it is hard to sit still? 0   6. Becoming easily annoyed or irritable? 0   7. Feeling afraid as if something awful might happen? 1   JOSE MARIA-7 Score 4   Some encounter information is confidential and restricted. Go to Review Flowsheets activity to see all data.     JOSE MARIA-7 Score: (P) 4  Interpretation: (P) Normal     Client Strengths: verbal, intelligent, successful, good social support, good insight, commitment to wellness, strong kerri, strong cultural traditions     Diagnosis:     ICD-10-CM ICD-9-CM   1. Major depressive disorder, recurrent episode, in partial remission with anxious distress  F33.41 296.35    F41.8    2. Mucinous tumor, of low malignant potential  C56.9 236.2     Treatment Plan:individual psychotherapy and medication management by physician  · Target symptoms: depression, anxiety   · Why chosen therapy is appropriate versus another modality: relevant to diagnosis, patient responds to this modality, evidence based practice  · Outcome monitoring methods: self-report, observation, checklist/rating scale  · Therapeutic intervention type: behavior modifying psychotherapy  · Prognosis: Good      Behavioral goals:    Exercise:   Stress management:   Social engagement:   Nutrition:   Smoking Cessation:   Therapy:  Learn and utilize emotion management strategies  Identify, implement and maintain healthy personal boundaries  Improve self-regulation through CBT strategies    Return to clinic: 1 month       Length of Service (minutes direct face-to-face contact): 45    Brittani Mahan, PhD  Clinical Psychologist  [place] License #[id number]  [place] License #[id number]

## 2022-04-12 ENCOUNTER — PATIENT MESSAGE (OUTPATIENT)
Dept: BARIATRICS | Facility: CLINIC | Age: 42
End: 2022-04-12
Payer: COMMERCIAL

## 2022-04-14 ENCOUNTER — OFFICE VISIT (OUTPATIENT)
Dept: BARIATRICS | Facility: CLINIC | Age: 42
End: 2022-04-14
Payer: COMMERCIAL

## 2022-04-14 VITALS
OXYGEN SATURATION: 98 % | BODY MASS INDEX: 45.09 KG/M2 | WEIGHT: 270.94 LBS | TEMPERATURE: 98 F | SYSTOLIC BLOOD PRESSURE: 130 MMHG | DIASTOLIC BLOOD PRESSURE: 78 MMHG

## 2022-04-14 DIAGNOSIS — R73.03 PREDIABETES: ICD-10-CM

## 2022-04-14 DIAGNOSIS — K21.9 GASTROESOPHAGEAL REFLUX DISEASE, UNSPECIFIED WHETHER ESOPHAGITIS PRESENT: ICD-10-CM

## 2022-04-14 DIAGNOSIS — Z98.84 S/P LAPAROSCOPIC SLEEVE GASTRECTOMY: Primary | ICD-10-CM

## 2022-04-14 PROCEDURE — 3078F DIAST BP <80 MM HG: CPT | Mod: CPTII,S$GLB,, | Performed by: NURSE PRACTITIONER

## 2022-04-14 PROCEDURE — 99024 PR POST-OP FOLLOW-UP VISIT: ICD-10-PCS | Mod: S$GLB,,, | Performed by: NURSE PRACTITIONER

## 2022-04-14 PROCEDURE — 1160F PR REVIEW ALL MEDS BY PRESCRIBER/CLIN PHARMACIST DOCUMENTED: ICD-10-PCS | Mod: CPTII,S$GLB,, | Performed by: NURSE PRACTITIONER

## 2022-04-14 PROCEDURE — 3075F PR MOST RECENT SYSTOLIC BLOOD PRESS GE 130-139MM HG: ICD-10-PCS | Mod: CPTII,S$GLB,, | Performed by: NURSE PRACTITIONER

## 2022-04-14 PROCEDURE — 99999 PR PBB SHADOW E&M-EST. PATIENT-LVL V: CPT | Mod: PBBFAC,,, | Performed by: NURSE PRACTITIONER

## 2022-04-14 PROCEDURE — 99024 POSTOP FOLLOW-UP VISIT: CPT | Mod: S$GLB,,, | Performed by: NURSE PRACTITIONER

## 2022-04-14 PROCEDURE — 3008F PR BODY MASS INDEX (BMI) DOCUMENTED: ICD-10-PCS | Mod: CPTII,S$GLB,, | Performed by: NURSE PRACTITIONER

## 2022-04-14 PROCEDURE — 99999 PR PBB SHADOW E&M-EST. PATIENT-LVL V: ICD-10-PCS | Mod: PBBFAC,,, | Performed by: NURSE PRACTITIONER

## 2022-04-14 PROCEDURE — 3078F PR MOST RECENT DIASTOLIC BLOOD PRESSURE < 80 MM HG: ICD-10-PCS | Mod: CPTII,S$GLB,, | Performed by: NURSE PRACTITIONER

## 2022-04-14 PROCEDURE — 1160F RVW MEDS BY RX/DR IN RCRD: CPT | Mod: CPTII,S$GLB,, | Performed by: NURSE PRACTITIONER

## 2022-04-14 PROCEDURE — 3008F BODY MASS INDEX DOCD: CPT | Mod: CPTII,S$GLB,, | Performed by: NURSE PRACTITIONER

## 2022-04-14 PROCEDURE — 3075F SYST BP GE 130 - 139MM HG: CPT | Mod: CPTII,S$GLB,, | Performed by: NURSE PRACTITIONER

## 2022-04-14 PROCEDURE — 1159F MED LIST DOCD IN RCRD: CPT | Mod: CPTII,S$GLB,, | Performed by: NURSE PRACTITIONER

## 2022-04-14 PROCEDURE — 1159F PR MEDICATION LIST DOCUMENTED IN MEDICAL RECORD: ICD-10-PCS | Mod: CPTII,S$GLB,, | Performed by: NURSE PRACTITIONER

## 2022-04-14 NOTE — PROGRESS NOTES
BARIATRIC POST-OPERATIVE VISIT:    HPI:  Fran Higgins is a 41 y.o. year old female presents for  6 month post op visit following s/p sleeve.  she is doing well and tolerating the diet without difficulty.  she has no complaints.    Denies: nausea, vomiting, abdominal pain, changes in bowel movement pattern, fever, chills, dysphagia, chest pain, and shortness of breath.    Review of Systems   Constitutional: Negative for fatigue and fever.   HENT: Negative for tinnitus and trouble swallowing.    Eyes: Negative for visual disturbance.   Respiratory: Negative for cough and shortness of breath.    Cardiovascular: Negative for chest pain, palpitations and leg swelling.   Gastrointestinal: Negative for abdominal pain, constipation, diarrhea, nausea and vomiting.   Genitourinary: Negative for decreased urine volume.   Musculoskeletal: Negative for myalgias.   Skin: Negative for rash.   Neurological: Negative for dizziness, facial asymmetry and headaches.   Psychiatric/Behavioral: Negative for dysphoric mood, sleep disturbance and suicidal ideas.       EXERCISE & VITAMINS:  See Bariatric Assessment  Adherent to vitamin regimen   Walking and swimming     MEDICATIONS/ALLERGIES:  Have been reviewed.    DIET: Regular Bariatric Diet.      Diet Recall: 120 grams of protein/day  100+ oz of fluids/day      Physical Exam  Constitutional:       Appearance: She is obese.   HENT:      Head: Normocephalic and atraumatic.      Nose: Nose normal.   Eyes:      Extraocular Movements: Extraocular movements intact.      Conjunctiva/sclera: Conjunctivae normal.      Pupils: Pupils are equal, round, and reactive to light.   Cardiovascular:      Rate and Rhythm: Normal rate and regular rhythm.      Pulses: Normal pulses.      Heart sounds: Normal heart sounds. No murmur heard.  Pulmonary:      Effort: Pulmonary effort is normal.      Breath sounds: Normal breath sounds.   Abdominal:      General: Bowel sounds are normal.       Palpations: Abdomen is soft.      Tenderness: There is no abdominal tenderness.   Musculoskeletal:         General: Normal range of motion.      Cervical back: Normal range of motion and neck supple.   Skin:     General: Skin is warm and dry.      Capillary Refill: Capillary refill takes less than 2 seconds.   Neurological:      General: No focal deficit present.      Mental Status: She is alert and oriented to person, place, and time.   Psychiatric:         Mood and Affect: Mood normal.         Behavior: Behavior normal.         Thought Content: Thought content normal.         Judgment: Judgment normal.         ASSESSMENT:  - Morbid obesity s/p sleeve gastrectomy  - Co-morbidities: n/a  - Good Weight loss, 67 #'s and 34% EWL  - Good Exercise routine  - Good Diet  - Good Vitamin regimen    PLAN:  - PPI every 2 days   - Miralax daily for constipation  - Emphasized the importance of regular exercise and adherence to bariatric diet to achieve maximum weight loss.  - Encouraged patient to continue regular exercise.  - Follow-up with dietician to advance diet.  - Continue daily vitamins and medications.  - RTC per post op   - Call the office for any issues.  - Check labs today.

## 2022-04-14 NOTE — PATIENT INSTRUCTIONS
Meal Ideas for Regular Bariatric Diet  *Recipes and products available at [email or web address]      Breakfast: (15-20g protein)    - Egg white omelet: 2 egg whites or ½ cup Egg Beaters. (Optional proteins: cheese, shrimp, black beans, chicken, sliced turkey) (Optional veggies: tomatoes, salsa, spinach, mushrooms, onions, green peppers, or small slice avocado)     - Egg and sausage: 1 egg or ¼ cup Egg Beaters (any variety), with 1 yasmin or 2 links of Turkey sausage or Veggie breakfast sausage (Visual Revenue or beModel)    - Crust-less breakfast quiche: To make a glass pie dish, mix 4oz part skim Ricotta, 1 cup skim milk, and 2 eggs as your base. Add protein: shredded cheese, sliced lean ham or turkey, turkey alvarez/sausage. Add veggies: tomato, onion, green onion, mushroom, green pepper, spinach, etc.    - Yogurt parfait: Mix 1 - 6oz container Dannon Light N Fit vanilla yogurt, with ¼ cup crushed unsalted nuts    - Cottage cheese and fruit: ½ cup part-skim cottage cheese or ricotta cheese topped with fresh fruit or sugar free preserves     - Jennifer Norris's Vanilla Egg custard* (add 2 Tbsp instant coffee granules to make Cappuccino Custard*)    - Hi-Protein café latte (skim milk, decaf coffee, 1 scoop protein powder). Optional to add Sugar free syrup or extract flavoring.    - Breakfast Lox: spread fat free cream cheese on slices of smoked salmon. Serve over scrambled or egg over easy (sauteed with nonstick cookspray) OR on a cucumber slice    - Eggwhich: Scramble or cook 1 large egg over easy using nonstick cookspray. Place between 2 slices of Malaysian alvarez and low fat cheese.     Lunch: (20-30g protein)    - ½ cup Black bean soup (Homemade or Progresso), with ¼ cup shredded low-fat cheese. Top with chopped tomato or fresh salsa.     - Lean deli turkey breast and low-fat sliced cheese, mustard or light roa to moisten, rolled up together, or wrapped in a [place] lettuce leaf    - Chicken salad made from dinner  leftovers, moisten with low-fat salad dressing or light roa. Also try leftover salmon, shrimp, tuna or boiled eggs. Serve ½ cup over dark green salad    - Fat-free canned refried beans, topped with ¼ cup shredded low-fat cheese. Top with chopped tomato or fresh salsa.     - Greek salad: Top mixed greens with 1-2oz grilled chicken, tomatoes, red onions, 2-3 kalamata olives, and sprinkle lightly with feta cheese. Spritz with Balsamic vinegar to taste.     - Crust-less lunch quiche: To make a glass pie dish, mix 4oz part skim Ricotta, 1 cup skim milk, and 2 eggs as your base. Add protein: shredded cheese, sliced lean ham or turkey, shrimp, chicken. Add veggies: tomato, onion, green onion, mushroom, green pepper, spinach, artichoke, broccoli, etc.    - Pizza bake: spread a  rosas adelina mushroom with tomato sauce, low-fat shredded mozzarella and turkey pepperoni or [place] alvarez. Add any veggies. Roast for 10-15 minutes, until cheese melted.     - Cucumber crab bites: Spread ¼ cup crab dip (lump crabmeat + light cream cheese and green onions) over sliced cucumber.     - Chicken with light spinach and artichoke dip*: Puree in : 6oz cooked and drained spinach, 2 cloves garlic, 1 can cannelloni beans, ½ cup chopped green onions, 1 can drained artichoke hearts (not marinated in oil), lemon juice and basil. Mix in 2oz chopped up chicken.    Supper: (20-30g protein)    - Serve grilled fish over dark green salad tossed with low-fat dressing, served with grilled asparagus bess     - Rotisserie chicken salad: served with sliced strawberries, walnuts, fat-free feta cheese crumbles and 1 tbsp [place]s Own Light Raspberry Rochelle Vinaigrette    - Shrimp cocktail: Dip cold boiled shrimp in homemade low-sugar cocktail sauce (1/2 cup Gladys One Carb ketchup, 2 tbsp horseradish, 1/4 tsp hot sauce, 1 tsp Worcestershire sauce, 1 tbsp freshly-squeezed lemon juice). Serve with dark green salad, walnuts, and crumbled blue  cheese drizzled with olive oil and Balsamic vinegar    - Tuna Melt: Spread tuna salad onto 2 thick slices of tomato. Top with low-fat cheese and broil until cheese is melted. May also be made with chicken salad of shrimp salad. [place] with different types of cheeses.    - Chicken or beef fajitas (no tortilla, rice, beans, chips). Top meat and veggies w/ fresh salsa, fat free sour cream.     - Homemade low-fat Chili using extra lean ground beef or ground turkey. Top with shredded cheese and salsa as desired. May add dollop fat-free sour cream if desired    - Chicken parmesan: Top chicken breast w/ low sugar marinara sauce, mozzarella cheese and bake until chicken reaches 165*.  Serve w/ spaghetti SQUASH or Martiniquais cut green beans    - Dinner Omelet with shrimp or chicken and onion, green peppers and chives.    - No noodle lasagna: Use sliced zucchini or eggplant in place of noodles.  Layer with part skim ricotta cheese and low sugar meat sauce (use very lean ground beef or ground turkey).    - Mexican chicken bake: Bake chunks of chicken breast or thigh with taco seasoning, Pace brand enchilada sauce, green onions and low-fat cheese. Serve with ¼ cup black beans or fat free refried beans topped with chopped tomatoes or salsa.    - Arlin frozen meatballs, simmered in Classico Marinara sauce. Different flavors of salsa or spaghetti sauce create different dishes! Sprinkle with parmesan cheese. Serve with grilled or steamed veggies, or a dark green salad.    - Simmer boneless skinless chicken thigh chunks in Classico Marinara sauce or roasted salsa until tender with chopped onion, bell pepper, garlic, mushrooms, spinach, etc.     - Hamburger or veggie burger, without the bun, dressed the way you like. Served with grilled or steamed veggies.    - Eggplant parmesan: Bake slices of eggplant at 350 degrees for 15 minutes. Layer tomato sauce, sliced eggplant and low-fat mozzarella cheese in a baking dish and cover with  foil. Bake 30-40 more minutes or until bubbly. Uncover and bake at 400 degrees for about 15 more minutes, or until top is slightly crisp.    - Fish tacos: grilled/baked white fish, wrapped in [place] lettuce leaf, topped with salsa, shredded low-fat cheese, and light coleslaw.    - Chicken saman: Sprinkle chicken w/ 1 tsp of [place] ranch dip mix. Then grill chicken and top with black beans, salsa and 1 tsp fat free sour cream.     - Cauliflower pizza crust: Use cauliflower as crust (see recipe on pinterest, no flour!). Top w/ low fat cheese, turkey pepperoni and veggies and bake again    - chicken or turkey crust pizza: use ground chicken or turkey instead of cauliflower, spread in [place] and bake at 350 for about 20-30 minutes(may want to add garlic, black pepper, oregano and other herbs to ground meat mixture).  Remove and top w/ low fat cheese, turkey pepperoni and veggies and bake again for another 10 minutes or until cheese is browned.     Snacks: (100-200 calories; >5g protein)    - 1 low-fat cheese stick with 8 cherry tomatoes or 1 serving fresh fruit  - 4 thin slices fat-free turkey breast and 1 slice low-fat cheese  - 4 thin slices fat-free honey ham with wedge of melon  - 6-8 edamame pods (equivalent to about 1/4 cup edamame without pods).   - 1/4 cup unsalted nuts with ½ cup fruit  - 6-oz container Dannon Light n Fit vanilla yogurt, topped with 1oz unsalted nuts         - apple, celery or baby carrots spread with 2 Tbsp PB2  - apple slices with 1 oz slice low-fat cheese  - Apple slices dipped in 2 Tbsp of PB2  - celery, cucumber, bell pepper or baby carrots dipped in ¼ cup hummus bean spread or light spinach and artichoke dip (*recipe in lunch section)  - celery, cucumber, baby carrots dipped in high protein greek yogurt (Mix 16 oz plain greek yogurt + 1 packet of hidden [place] ranch dip mix)  - Barney Links Beef Steak - 14g protein! (similar to beef jerky)  - 2 wedges Laughing Cow - Light Herb  & Garlic Cheese with sliced cucumber or green bell pepper  - 1/2 cup low-fat cottage cheese with ¼ cup fruit or ¼ cup salsa  - RTD Protein drinks: Atkins, Low Carb Slim Fast, EAS light, Muscle Milk Light, etc.  - Homemade Protein drinks: GNC Soy95, Isopure, Nectar, UNJURY, Whey Gourmet, etc. Mix 1 scoop powder with 8oz skim/1% milk or light soymilk.  - Protein bars: Atkins, EAS, Pure Protein, Think Thin, Detour, etc. Must have 0-4 grams sugar - Read the label.    Takeout Options: No more than twice/week  Deli - Salads (no pasta or rice), meats, cheeses. Roasted chicken. Lox (salmon)    Mexican - Platters which don't include tortillas, chips, or rice. Go easy on the beans. Example: Fajitas without the tortillas. Ask the  not to bring chips to the table if they are too tempting.    Greek - Meat or fish and vegetable, but no bread or rice. Including hummus, baba ganoush, etc, is OK. Most sit-down Greek restaurants can provide you with cucumber slices for dipping instead of wilber bread.    Fast Food (Avoid as much as possible) - Salads (no croutons and limit salad dressing to 2 tbsp), grilled chicken sandwich without the bun and ask for no roa. Vijis low fat chili or Taco Bell pintos and cheese.    BBQ - The meats are fine if you ask for sauces on the side, but most of the traditional side dishes are loaded with carbs. Brandon slaw, baked beans and BBQ sauce are typically made with sugar.    Chinese - Nothing deep-fried, no rice or noodles. Many Chinese sauces have starch and sugar in them, so you'll have to use your judgement. If you find that these sauces trigger cravings, or cause Dumping, you can ask for the sauce to be made without sugar or just use soy sauce.

## 2022-04-18 ENCOUNTER — LAB VISIT (OUTPATIENT)
Dept: LAB | Facility: HOSPITAL | Age: 42
End: 2022-04-18
Attending: NURSE PRACTITIONER
Payer: COMMERCIAL

## 2022-04-18 ENCOUNTER — PATIENT MESSAGE (OUTPATIENT)
Dept: BARIATRICS | Facility: CLINIC | Age: 42
End: 2022-04-18
Payer: COMMERCIAL

## 2022-04-18 ENCOUNTER — OFFICE VISIT (OUTPATIENT)
Dept: PSYCHIATRY | Facility: CLINIC | Age: 42
End: 2022-04-18
Payer: COMMERCIAL

## 2022-04-18 DIAGNOSIS — K21.9 GASTROESOPHAGEAL REFLUX DISEASE, UNSPECIFIED WHETHER ESOPHAGITIS PRESENT: ICD-10-CM

## 2022-04-18 DIAGNOSIS — R73.03 PREDIABETES: ICD-10-CM

## 2022-04-18 DIAGNOSIS — F33.41 RECURRENT MAJOR DEPRESSIVE DISORDER IN PARTIAL REMISSION: Primary | ICD-10-CM

## 2022-04-18 DIAGNOSIS — G47.09 OTHER INSOMNIA: ICD-10-CM

## 2022-04-18 DIAGNOSIS — Z98.84 S/P LAPAROSCOPIC SLEEVE GASTRECTOMY: ICD-10-CM

## 2022-04-18 LAB
25(OH)D3+25(OH)D2 SERPL-MCNC: 34 NG/ML (ref 30–96)
ALBUMIN SERPL BCP-MCNC: 3.6 G/DL (ref 3.5–5.2)
ALP SERPL-CCNC: 79 U/L (ref 55–135)
ALT SERPL W/O P-5'-P-CCNC: 14 U/L (ref 10–44)
ANION GAP SERPL CALC-SCNC: 9 MMOL/L (ref 8–16)
AST SERPL-CCNC: 14 U/L (ref 10–40)
BASOPHILS # BLD AUTO: 0.03 K/UL (ref 0–0.2)
BASOPHILS NFR BLD: 0.5 % (ref 0–1.9)
BILIRUB SERPL-MCNC: 0.4 MG/DL (ref 0.1–1)
BUN SERPL-MCNC: 10 MG/DL (ref 6–20)
CALCIUM SERPL-MCNC: 9.6 MG/DL (ref 8.7–10.5)
CHLORIDE SERPL-SCNC: 104 MMOL/L (ref 95–110)
CHOLEST SERPL-MCNC: 223 MG/DL (ref 120–199)
CHOLEST/HDLC SERPL: 3.4 {RATIO} (ref 2–5)
CO2 SERPL-SCNC: 27 MMOL/L (ref 23–29)
CREAT SERPL-MCNC: 0.8 MG/DL (ref 0.5–1.4)
DIFFERENTIAL METHOD: NORMAL
EOSINOPHIL # BLD AUTO: 0.1 K/UL (ref 0–0.5)
EOSINOPHIL NFR BLD: 0.8 % (ref 0–8)
ERYTHROCYTE [DISTWIDTH] IN BLOOD BY AUTOMATED COUNT: 12.2 % (ref 11.5–14.5)
EST. GFR  (AFRICAN AMERICAN): >60 ML/MIN/1.73 M^2
EST. GFR  (NON AFRICAN AMERICAN): >60 ML/MIN/1.73 M^2
GLUCOSE SERPL-MCNC: 77 MG/DL (ref 70–110)
HCT VFR BLD AUTO: 39.2 % (ref 37–48.5)
HDLC SERPL-MCNC: 66 MG/DL (ref 40–75)
HDLC SERPL: 29.6 % (ref 20–50)
HGB BLD-MCNC: 12.6 G/DL (ref 12–16)
IMM GRANULOCYTES # BLD AUTO: 0.02 K/UL (ref 0–0.04)
IMM GRANULOCYTES NFR BLD AUTO: 0.3 % (ref 0–0.5)
IRON SERPL-MCNC: 117 UG/DL (ref 30–160)
LDLC SERPL CALC-MCNC: 130.2 MG/DL (ref 63–159)
LYMPHOCYTES # BLD AUTO: 1.7 K/UL (ref 1–4.8)
LYMPHOCYTES NFR BLD: 26.9 % (ref 18–48)
MCH RBC QN AUTO: 28.2 PG (ref 27–31)
MCHC RBC AUTO-ENTMCNC: 32.1 G/DL (ref 32–36)
MCV RBC AUTO: 88 FL (ref 82–98)
MONOCYTES # BLD AUTO: 0.4 K/UL (ref 0.3–1)
MONOCYTES NFR BLD: 5.6 % (ref 4–15)
NEUTROPHILS # BLD AUTO: 4.2 K/UL (ref 1.8–7.7)
NEUTROPHILS NFR BLD: 65.9 % (ref 38–73)
NONHDLC SERPL-MCNC: 157 MG/DL
NRBC BLD-RTO: 0 /100 WBC
PLATELET # BLD AUTO: 184 K/UL (ref 150–450)
PMV BLD AUTO: 12.1 FL (ref 9.2–12.9)
POTASSIUM SERPL-SCNC: 3.9 MMOL/L (ref 3.5–5.1)
PROT SERPL-MCNC: 6.6 G/DL (ref 6–8.4)
RBC # BLD AUTO: 4.47 M/UL (ref 4–5.4)
SATURATED IRON: 27 % (ref 20–50)
SODIUM SERPL-SCNC: 140 MMOL/L (ref 136–145)
TOTAL IRON BINDING CAPACITY: 440 UG/DL (ref 250–450)
TRANSFERRIN SERPL-MCNC: 297 MG/DL (ref 200–375)
TRIGL SERPL-MCNC: 134 MG/DL (ref 30–150)
VIT B12 SERPL-MCNC: 385 PG/ML (ref 210–950)
WBC # BLD AUTO: 6.4 K/UL (ref 3.9–12.7)

## 2022-04-18 PROCEDURE — 82306 VITAMIN D 25 HYDROXY: CPT | Performed by: NURSE PRACTITIONER

## 2022-04-18 PROCEDURE — 99213 PR OFFICE/OUTPT VISIT, EST, LEVL III, 20-29 MIN: ICD-10-PCS | Mod: 95,,, | Performed by: PSYCHIATRY & NEUROLOGY

## 2022-04-18 PROCEDURE — 1160F RVW MEDS BY RX/DR IN RCRD: CPT | Mod: CPTII,95,, | Performed by: PSYCHIATRY & NEUROLOGY

## 2022-04-18 PROCEDURE — 84466 ASSAY OF TRANSFERRIN: CPT | Performed by: NURSE PRACTITIONER

## 2022-04-18 PROCEDURE — 82607 VITAMIN B-12: CPT | Performed by: NURSE PRACTITIONER

## 2022-04-18 PROCEDURE — 80053 COMPREHEN METABOLIC PANEL: CPT | Performed by: NURSE PRACTITIONER

## 2022-04-18 PROCEDURE — 1160F PR REVIEW ALL MEDS BY PRESCRIBER/CLIN PHARMACIST DOCUMENTED: ICD-10-PCS | Mod: CPTII,95,, | Performed by: PSYCHIATRY & NEUROLOGY

## 2022-04-18 PROCEDURE — 36415 COLL VENOUS BLD VENIPUNCTURE: CPT | Mod: PO | Performed by: NURSE PRACTITIONER

## 2022-04-18 PROCEDURE — 80061 LIPID PANEL: CPT | Performed by: NURSE PRACTITIONER

## 2022-04-18 PROCEDURE — 1159F PR MEDICATION LIST DOCUMENTED IN MEDICAL RECORD: ICD-10-PCS | Mod: CPTII,95,, | Performed by: PSYCHIATRY & NEUROLOGY

## 2022-04-18 PROCEDURE — 1159F MED LIST DOCD IN RCRD: CPT | Mod: CPTII,95,, | Performed by: PSYCHIATRY & NEUROLOGY

## 2022-04-18 PROCEDURE — 84425 ASSAY OF VITAMIN B-1: CPT | Performed by: NURSE PRACTITIONER

## 2022-04-18 PROCEDURE — 85025 COMPLETE CBC W/AUTO DIFF WBC: CPT | Performed by: NURSE PRACTITIONER

## 2022-04-18 PROCEDURE — 99213 OFFICE O/P EST LOW 20 MIN: CPT | Mod: 95,,, | Performed by: PSYCHIATRY & NEUROLOGY

## 2022-04-18 NOTE — PROGRESS NOTES
"The patient location is: home  The chief complaint leading to consultation is: depression    Visit type: audiovisual    Face to Face time with patient: 16 mins  21 minutes of total time spent on the encounter, which includes face to face time and non-face to face time preparing to see the patient (eg, review of tests), Obtaining and/or reviewing separately obtained history, Documenting clinical information in the electronic or other health record, Independently interpreting results (not separately reported) and communicating results to the patient/family/caregiver, or Care coordination (not separately reported).     Each patient to whom he or she provides medical services by telemedicine is:  (1) informed of the relationship between the physician and patient and the respective role of any other health care provider with respect to management of the patient; and (2) notified that he or she may decline to receive medical services by telemedicine and may withdraw from such care at any time.    Notes:     Outpatient Psychiatry Follow-Up Visit (MD/NP)    4/18/2022    Clinical Status of Patient:  Outpatient (Ambulatory)    Chief Complaint:  Fran Higgins is a 41 y.o. female who presents today for follow-up of depression.  Met with patient.      Interval History and Content of Current Session:  Interim Events/Subjective Report/Content of Current Session:  "I'm okay."  She reports her mood is pretty stable.  Takes a xanax for sleep once or twice a week.  Anxiety about not been abl eto sleep and worry about how many hrs she will get.  Appetite is okay.  She has lost 76 pounds after bariatric surgery.  Enjoys going out with friends, goes to dinner, to the park with her daughter.  Denies excessive worry about anything but sleep.        Prior trials:  Benadryl - inconsistent  prozac when younger worked  Trazodone - HA in the morning      Psychotherapy:  · Target symptoms: depression  · Why chosen therapy is appropriate " versus another modality: relevant to diagnosis  · Outcome monitoring methods: self-report  · Therapeutic intervention type: supportive psychotherapy  · Topics discussed/themes: building skills sets for symptom management, symptom recognition  · The patient's response to the intervention is accepting. The patient's progress toward treatment goals is excellent.   · Duration of intervention:  minutes.    Review of Systems   Constitutional: Negative for chills and fever.   Respiratory: Negative for cough, shortness of breath and wheezing.    Cardiovascular: Negative for chest pain and palpitations.   Gastrointestinal: Negative for abdominal pain, diarrhea and vomiting.   Genitourinary: Negative for dysuria and frequency.       Past Medical, Family and Social History: The patient's past medical, family and social history have been reviewed and updated as appropriate within the electronic medical record - see encounter notes.    Compliance: yes    Side effects: None    Risk Parameters:  Patient reports no suicidal ideation  Patient reports no homicidal ideation  Patient reports no self-injurious behavior  Patient reports no violent behavior    Exam (detailed: at least 9 elements; comprehensive: all 15 elements)   Constitutional  Vitals:  Most recent vital signs, dated less than 90 days prior to this appointment, were reviewed.   There were no vitals filed for this visit.     General:  age appropriate, neatly groomed     Musculoskeletal  Muscle Strength/Tone:  no tic   Gait & Station:  not observed     Psychiatric  Speech:  spontaneous, not pressured, clearly audible   Mood:   Affect:  euthymic  congruent and appropriate   Thought Process:  goal-directed, logical   Associations:  intact   Thought Content:  normal, no suicidality, no homicidality, delusions, or paranoia   Insight:  has awareness of illness   Judgement: behavior is adequate to circumstances   Orientation:  grossly intact   Memory: intact for content of  interview, not tested   Language: grossly intact   Attention Span & Concentration:  able to focus   Fund of Knowledge:  intact and appropriate to age and level of education     Assessment and Diagnosis   Status/Progress: Based on the examination today, the patient's problem(s) is/are well controlled.  New problems have not been presented today.   Lack of compliance are not complicating management of the primary condition.  There are no active rule-out diagnoses for this patient at this time.     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  H/o recurrent MDD.          Diagnosis:  Recurrent MDD, in parital remission  Obesity  S/p bilat ooperectomy, HANNAH    Intervention/Counseling/Treatment Plan   · Continue Effexor  mg daily  · Continue xanax 0.5 mg as needed for now.  Discussed possible trials of other sleep meds.      Return to Clinic: 4 months

## 2022-04-22 LAB — VIT B1 BLD-MCNC: 68 UG/L (ref 38–122)

## 2022-04-26 DIAGNOSIS — E89.40 SURGICAL MENOPAUSE: ICD-10-CM

## 2022-04-28 ENCOUNTER — LAB VISIT (OUTPATIENT)
Dept: LAB | Facility: OTHER | Age: 42
End: 2022-04-28
Attending: PHYSICIAN ASSISTANT
Payer: COMMERCIAL

## 2022-04-28 ENCOUNTER — OFFICE VISIT (OUTPATIENT)
Dept: GYNECOLOGIC ONCOLOGY | Facility: CLINIC | Age: 42
End: 2022-04-28
Payer: COMMERCIAL

## 2022-04-28 VITALS
HEART RATE: 73 BPM | BODY MASS INDEX: 45.05 KG/M2 | WEIGHT: 270.69 LBS | DIASTOLIC BLOOD PRESSURE: 77 MMHG | SYSTOLIC BLOOD PRESSURE: 123 MMHG

## 2022-04-28 DIAGNOSIS — D39.10 BORDERLINE EPITHELIAL NEOPLASM OF OVARY: ICD-10-CM

## 2022-04-28 DIAGNOSIS — C56.9 MUCINOUS TUMOR, OF LOW MALIGNANT POTENTIAL: ICD-10-CM

## 2022-04-28 DIAGNOSIS — D39.10 BORDERLINE EPITHELIAL NEOPLASM OF OVARY: Primary | ICD-10-CM

## 2022-04-28 PROCEDURE — 36415 COLL VENOUS BLD VENIPUNCTURE: CPT | Performed by: PHYSICIAN ASSISTANT

## 2022-04-28 PROCEDURE — 3008F PR BODY MASS INDEX (BMI) DOCUMENTED: ICD-10-PCS | Mod: CPTII,S$GLB,, | Performed by: OBSTETRICS & GYNECOLOGY

## 2022-04-28 PROCEDURE — 82378 CARCINOEMBRYONIC ANTIGEN: CPT | Performed by: PHYSICIAN ASSISTANT

## 2022-04-28 PROCEDURE — 1159F PR MEDICATION LIST DOCUMENTED IN MEDICAL RECORD: ICD-10-PCS | Mod: CPTII,S$GLB,, | Performed by: OBSTETRICS & GYNECOLOGY

## 2022-04-28 PROCEDURE — 3078F PR MOST RECENT DIASTOLIC BLOOD PRESSURE < 80 MM HG: ICD-10-PCS | Mod: CPTII,S$GLB,, | Performed by: OBSTETRICS & GYNECOLOGY

## 2022-04-28 PROCEDURE — 1159F MED LIST DOCD IN RCRD: CPT | Mod: CPTII,S$GLB,, | Performed by: OBSTETRICS & GYNECOLOGY

## 2022-04-28 PROCEDURE — 99214 OFFICE O/P EST MOD 30 MIN: CPT | Mod: S$GLB,,, | Performed by: OBSTETRICS & GYNECOLOGY

## 2022-04-28 PROCEDURE — 3074F SYST BP LT 130 MM HG: CPT | Mod: CPTII,S$GLB,, | Performed by: OBSTETRICS & GYNECOLOGY

## 2022-04-28 PROCEDURE — 3074F PR MOST RECENT SYSTOLIC BLOOD PRESSURE < 130 MM HG: ICD-10-PCS | Mod: CPTII,S$GLB,, | Performed by: OBSTETRICS & GYNECOLOGY

## 2022-04-28 PROCEDURE — 3008F BODY MASS INDEX DOCD: CPT | Mod: CPTII,S$GLB,, | Performed by: OBSTETRICS & GYNECOLOGY

## 2022-04-28 PROCEDURE — 99214 PR OFFICE/OUTPT VISIT, EST, LEVL IV, 30-39 MIN: ICD-10-PCS | Mod: S$GLB,,, | Performed by: OBSTETRICS & GYNECOLOGY

## 2022-04-28 PROCEDURE — 99999 PR PBB SHADOW E&M-EST. PATIENT-LVL IV: ICD-10-PCS | Mod: PBBFAC,,, | Performed by: OBSTETRICS & GYNECOLOGY

## 2022-04-28 PROCEDURE — 3078F DIAST BP <80 MM HG: CPT | Mod: CPTII,S$GLB,, | Performed by: OBSTETRICS & GYNECOLOGY

## 2022-04-28 PROCEDURE — 99999 PR PBB SHADOW E&M-EST. PATIENT-LVL IV: CPT | Mod: PBBFAC,,, | Performed by: OBSTETRICS & GYNECOLOGY

## 2022-04-28 NOTE — PROGRESS NOTES
Subjective:       Patient ID: Fran Higgins is a 41 y.o. female.    Chief Complaint: Follow-up    INTERVAL HISTORY  CC: h/o right ovarian mucinous borderline tumor    Fran Higgins is a 41 y.o. who presents for routine surveillance visit.  No GYN concerns since her 12/30/2021 visit with Dr. Miller, previous patient of Dr. Kaufman.  She has no vaginal bleeding or discharge.  She is having no nausea or vomiting.  She has no bowel or bladder complaints.  She has no pain issues.  She is resuming her normal activities.    CEA: <1.7 (12/2021), 1.9 (8/2021), 1.8 (4/2021), 1.7 (1/2021), 1.9(10/2020)      HPI or ONCOLOGIC HISTORY  6/25/2019: LSO by Dr. 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  Size of tumor: 11.8 CM.  Capsule involvement cannot be determined secondary to fragmentation.  Microinvasion is present: 5 foci. Intraepithelial carcinoma is present. Benign fallopian tube and fimbriated end with no significant histopathologic alterations. Pathologic staging: pT1a      3/9/2020: Robotic assisted total hysterectomy and RSO.    PATHOLOGY:  139 g uterus with secretory endometrium and focal adenomyosis, multiple leiomyomas.  Right ovary and fallopian tube benign. (After completion hysterectomy and RSO she has had problems with depression and hormone replacement. Seen by Katia Mahan and Sherrie for depression. On  venlafaxine. Depression and VMS is better.)        6/22/2021 Gastric Sleeve    Review of Systems   Constitutional: Negative for chills and fever.   HENT: Negative for mouth sores.    Respiratory: Negative for chest tightness and shortness of breath.    Cardiovascular: Negative for chest pain.   Gastrointestinal: Negative for abdominal distention, nausea and vomiting.   Genitourinary: Negative for dysuria, hematuria, pelvic pain, vaginal bleeding and vaginal discharge.   Neurological: Negative for syncope and weakness.    All other systems reviewed and are negative.        Objective:      Physical Exam  Vitals reviewed. Exam conducted with a chaperone present.   Constitutional:       Appearance: Normal appearance.   HENT:      Head: Normocephalic and atraumatic.   Eyes:      Extraocular Movements: Extraocular movements intact.      Conjunctiva/sclera: Conjunctivae normal.      Pupils: Pupils are equal, round, and reactive to light.   Pulmonary:      Effort: Pulmonary effort is normal. No respiratory distress.   Abdominal:      General: There is no distension.      Palpations: Abdomen is soft.      Tenderness: There is no abdominal tenderness.   Genitourinary:     Vagina: Normal. No signs of injury. No lesions.      Uterus: Absent.       Adnexa: Right adnexa normal and left adnexa normal.        Right: No mass or tenderness.          Left: No mass or tenderness.        Comments: Uterus and cervix surgically absent.   Musculoskeletal:         General: Normal range of motion.   Skin:     General: Skin is warm and dry.   Neurological:      General: No focal deficit present.      Mental Status: She is alert and oriented to person, place, and time. Mental status is at baseline.   Psychiatric:         Mood and Affect: Mood normal.         Behavior: Behavior normal.         Thought Content: Thought content normal.         Judgment: Judgment normal.         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   Feels well, no new symptoms.   Disease free interval 2 years.    CEA pending.     Recommend continued surveillance. RTC 6 months with CEA or sooner if needed.     I spent approximately 30 minutes reviewing the available records and evaluating the patient, out of which over 50% of the time was spent face to face with the patient  in counseling and coordinating this patient's care.

## 2022-04-28 NOTE — PROGRESS NOTES
Subjective:       Patient ID: Fran Higgins is a 41 y.o. female.    Chief Complaint: Follow-up    HPI  Review of Systems   Constitutional: Negative for chills and fever.   HENT: Negative for mouth sores.    Respiratory: Negative for chest tightness and shortness of breath.    Cardiovascular: Negative for chest pain.   Gastrointestinal: Negative for abdominal distention, nausea and vomiting.   Genitourinary: Negative for dysuria, hematuria, pelvic pain, vaginal bleeding and vaginal discharge.   Neurological: Negative for syncope and weakness.   All other systems reviewed and are negative.        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

## 2022-04-29 LAB — CEA SERPL-MCNC: <1.7 NG/ML (ref 0–5)

## 2022-05-05 ENCOUNTER — PATIENT MESSAGE (OUTPATIENT)
Dept: BARIATRICS | Facility: CLINIC | Age: 42
End: 2022-05-05
Payer: COMMERCIAL

## 2022-05-10 ENCOUNTER — PATIENT MESSAGE (OUTPATIENT)
Dept: BARIATRICS | Facility: CLINIC | Age: 42
End: 2022-05-10
Payer: COMMERCIAL

## 2022-05-26 DIAGNOSIS — G47.09 OTHER INSOMNIA: ICD-10-CM

## 2022-05-26 RX ORDER — VENLAFAXINE HYDROCHLORIDE 150 MG/1
150 CAPSULE, EXTENDED RELEASE ORAL DAILY
Qty: 30 CAPSULE | Refills: 1 | Status: SHIPPED | OUTPATIENT
Start: 2022-05-26 | End: 2022-07-23 | Stop reason: SDUPTHER

## 2022-05-26 RX ORDER — ALPRAZOLAM 0.5 MG/1
0.5 TABLET ORAL NIGHTLY PRN
Qty: 30 TABLET | Refills: 1 | Status: SHIPPED | OUTPATIENT
Start: 2022-05-26 | End: 2022-08-12 | Stop reason: SDUPTHER

## 2022-06-10 ENCOUNTER — PATIENT MESSAGE (OUTPATIENT)
Dept: BARIATRICS | Facility: CLINIC | Age: 42
End: 2022-06-10
Payer: COMMERCIAL

## 2022-06-14 ENCOUNTER — PATIENT MESSAGE (OUTPATIENT)
Dept: DERMATOLOGY | Facility: CLINIC | Age: 42
End: 2022-06-14
Payer: COMMERCIAL

## 2022-06-14 ENCOUNTER — PATIENT MESSAGE (OUTPATIENT)
Dept: BARIATRICS | Facility: CLINIC | Age: 42
End: 2022-06-14
Payer: COMMERCIAL

## 2022-06-22 DIAGNOSIS — E66.01 CLASS 3 SEVERE OBESITY DUE TO EXCESS CALORIES WITH SERIOUS COMORBIDITY AND BODY MASS INDEX (BMI) OF 50.0 TO 59.9 IN ADULT: ICD-10-CM

## 2022-06-22 RX ORDER — OMEPRAZOLE 40 MG/1
40 CAPSULE, DELAYED RELEASE ORAL EVERY MORNING
Qty: 30 CAPSULE | Refills: 1 | Status: SHIPPED | OUTPATIENT
Start: 2022-06-22 | End: 2022-08-12 | Stop reason: SDUPTHER

## 2022-06-27 RX ORDER — DOXYCYCLINE 100 MG/1
100 CAPSULE ORAL 2 TIMES DAILY
Qty: 20 CAPSULE | Refills: 0 | Status: SHIPPED | OUTPATIENT
Start: 2022-06-27 | End: 2022-07-15

## 2022-07-01 DIAGNOSIS — L73.8 BACTERIAL FOLLICULITIS: ICD-10-CM

## 2022-07-04 RX ORDER — CLINDAMYCIN PHOSPHATE 11.9 MG/ML
SOLUTION TOPICAL
Qty: 30 ML | Refills: 3 | Status: SHIPPED | OUTPATIENT
Start: 2022-07-04 | End: 2022-09-06 | Stop reason: SDUPTHER

## 2022-07-05 ENCOUNTER — PATIENT MESSAGE (OUTPATIENT)
Dept: BARIATRICS | Facility: CLINIC | Age: 42
End: 2022-07-05
Payer: COMMERCIAL

## 2022-07-16 RX ORDER — VENLAFAXINE HYDROCHLORIDE 150 MG/1
150 CAPSULE, EXTENDED RELEASE ORAL DAILY
Qty: 30 CAPSULE | Refills: 1 | Status: CANCELLED | OUTPATIENT
Start: 2022-07-16 | End: 2023-07-16

## 2022-07-18 ENCOUNTER — TELEPHONE (OUTPATIENT)
Dept: PHARMACY | Facility: CLINIC | Age: 42
End: 2022-07-18
Payer: COMMERCIAL

## 2022-07-18 RX ORDER — VENLAFAXINE HYDROCHLORIDE 150 MG/1
150 CAPSULE, EXTENDED RELEASE ORAL DAILY
Qty: 30 CAPSULE | Refills: 1 | OUTPATIENT
Start: 2022-07-18 | End: 2023-07-18

## 2022-07-25 ENCOUNTER — TELEPHONE (OUTPATIENT)
Dept: INFUSION THERAPY | Facility: HOSPITAL | Age: 42
End: 2022-07-25
Payer: COMMERCIAL

## 2022-07-25 RX ORDER — VENLAFAXINE HYDROCHLORIDE 150 MG/1
150 CAPSULE, EXTENDED RELEASE ORAL DAILY
Qty: 30 CAPSULE | Refills: 1 | Status: SHIPPED | OUTPATIENT
Start: 2022-07-25 | End: 2022-09-07 | Stop reason: SDUPTHER

## 2022-08-02 ENCOUNTER — PATIENT MESSAGE (OUTPATIENT)
Dept: BARIATRICS | Facility: CLINIC | Age: 42
End: 2022-08-02
Payer: COMMERCIAL

## 2022-08-04 ENCOUNTER — PATIENT MESSAGE (OUTPATIENT)
Dept: BARIATRICS | Facility: CLINIC | Age: 42
End: 2022-08-04
Payer: COMMERCIAL

## 2022-08-05 ENCOUNTER — PATIENT MESSAGE (OUTPATIENT)
Dept: DERMATOLOGY | Facility: CLINIC | Age: 42
End: 2022-08-05
Payer: COMMERCIAL

## 2022-08-05 ENCOUNTER — PATIENT MESSAGE (OUTPATIENT)
Dept: OPTOMETRY | Facility: CLINIC | Age: 42
End: 2022-08-05
Payer: COMMERCIAL

## 2022-08-11 ENCOUNTER — IMMUNIZATION (OUTPATIENT)
Dept: PHARMACY | Facility: CLINIC | Age: 42
End: 2022-08-11
Payer: COMMERCIAL

## 2022-08-12 DIAGNOSIS — E66.01 CLASS 3 SEVERE OBESITY DUE TO EXCESS CALORIES WITH SERIOUS COMORBIDITY AND BODY MASS INDEX (BMI) OF 50.0 TO 59.9 IN ADULT: ICD-10-CM

## 2022-08-12 DIAGNOSIS — G47.09 OTHER INSOMNIA: ICD-10-CM

## 2022-08-15 RX ORDER — ALPRAZOLAM 0.5 MG/1
0.5 TABLET ORAL NIGHTLY PRN
Qty: 30 TABLET | Refills: 1 | Status: SHIPPED | OUTPATIENT
Start: 2022-08-15 | End: 2022-11-28 | Stop reason: SDUPTHER

## 2022-08-15 RX ORDER — OMEPRAZOLE 40 MG/1
40 CAPSULE, DELAYED RELEASE ORAL EVERY MORNING
Qty: 30 CAPSULE | Refills: 1 | Status: SHIPPED | OUTPATIENT
Start: 2022-08-15 | End: 2022-11-16 | Stop reason: SDUPTHER

## 2022-08-19 ENCOUNTER — OFFICE VISIT (OUTPATIENT)
Dept: DERMATOLOGY | Facility: CLINIC | Age: 42
End: 2022-08-19
Payer: COMMERCIAL

## 2022-08-19 ENCOUNTER — LAB VISIT (OUTPATIENT)
Dept: LAB | Facility: HOSPITAL | Age: 42
End: 2022-08-19
Payer: COMMERCIAL

## 2022-08-19 DIAGNOSIS — L73.2 HIDRADENITIS: Primary | ICD-10-CM

## 2022-08-19 DIAGNOSIS — Z79.899 ENCOUNTER FOR LONG-TERM (CURRENT) USE OF OTHER MEDICATIONS: ICD-10-CM

## 2022-08-19 PROCEDURE — 87340 HEPATITIS B SURFACE AG IA: CPT | Performed by: DERMATOLOGY

## 2022-08-19 PROCEDURE — 86705 HEP B CORE ANTIBODY IGM: CPT | Performed by: DERMATOLOGY

## 2022-08-19 PROCEDURE — 99214 OFFICE O/P EST MOD 30 MIN: CPT | Mod: 95,,, | Performed by: DERMATOLOGY

## 2022-08-19 PROCEDURE — 36415 COLL VENOUS BLD VENIPUNCTURE: CPT | Mod: PO | Performed by: DERMATOLOGY

## 2022-08-19 PROCEDURE — 86706 HEP B SURFACE ANTIBODY: CPT | Performed by: DERMATOLOGY

## 2022-08-19 PROCEDURE — 87389 HIV-1 AG W/HIV-1&-2 AB AG IA: CPT | Performed by: DERMATOLOGY

## 2022-08-19 PROCEDURE — 99214 PR OFFICE/OUTPT VISIT, EST, LEVL IV, 30-39 MIN: ICD-10-PCS | Mod: 95,,, | Performed by: DERMATOLOGY

## 2022-08-19 PROCEDURE — 86803 HEPATITIS C AB TEST: CPT | Performed by: DERMATOLOGY

## 2022-08-19 PROCEDURE — 86704 HEP B CORE ANTIBODY TOTAL: CPT | Performed by: DERMATOLOGY

## 2022-08-19 RX ORDER — ADALIMUMAB 80MG/0.8ML
KIT SUBCUTANEOUS
Qty: 3 PEN | Refills: 0 | Status: SHIPPED | OUTPATIENT
Start: 2022-08-19 | End: 2022-10-11 | Stop reason: DRUGHIGH

## 2022-08-19 NOTE — PROGRESS NOTES
Subjective:       Patient ID:  Fran Higgins is a 42 y.o. female who presents for No chief complaint on file.    Pt here today for a f/u on Hidradenitis  Currently on following  -     clindamycin (CLEOCIN T) 1 % lotion; AAA bid  Dispense: 120 mL; Refill: 3  -     finasteride (PROSCAR) 5 mg tablet; Take 1 tablet (5 mg total) by mouth once daily.  Dispense: 30 tablet; Refill: 11  -     spironolactone (ALDACTONE) 50 MG tablet; Start with 1 po qday, increase to 2 po qday as tolerated  Dispense: 60 tablet; Refill: 4  -     doxycycline monohydrate 100 mg Tab; Take 1 tablet (100 mg total) by mouth 2 (two) times a day. Prn  - NICAZEL 600-5-10-5-1.5 mg Tab; 1 po bid  Dispense: 60 tablet; Refill: 5    Also on Tumeric 1g  Keto diet  Bleach baths  Still flaring multiples x a month severe, painful        Review of Systems   Constitutional: Negative for fever.   Eyes: Negative for visual change.   Respiratory: Negative for cough and shortness of breath.    Cardiovascular: Negative for chest pain, palpitations and pedal edema.   Musculoskeletal: Negative for joint swelling and arthralgias.   Skin: Negative for abscesses.   Neurological: Negative for focal weakness, seizures and numbness.        Objective:    Physical Exam   Constitutional: She appears well-developed and well-nourished. No distress.   Neurological: She is alert and oriented to person, place, and time. She is not disoriented.   Psychiatric: She has a normal mood and affect.   Skin:   Areas Examined (abnormalities noted in diagram):   Scalp / Hair Palpated and Inspected  Head / Face Inspection Performed  Neck Inspection Performed  Chest / Axilla Inspection Performed  Abdomen Inspection Performed  Genitals / Buttocks / Groin Inspection Performed  Back Inspection Performed  RUE Inspected  LUE Inspection Performed  RLE Inspected  LLE Inspection Performed  Nails and Digits Inspection Performed                   Diagram Legend     Erythematous scaling  macule/papule c/w actinic keratosis       Vascular papule c/w angioma      Pigmented verrucoid papule/plaque c/w seborrheic keratosis      Yellow umbilicated papule c/w sebaceous hyperplasia      Irregularly shaped tan macule c/w lentigo     1-2 mm smooth white papules consistent with Milia      Movable subcutaneous cyst with punctum c/w epidermal inclusion cyst      Subcutaneous movable cyst c/w pilar cyst      Firm pink to brown papule c/w dermatofibroma      Pedunculated fleshy papule(s) c/w skin tag(s)      Evenly pigmented macule c/w junctional nevus     Mildly variegated pigmented, slightly irregular-bordered macule c/w mildly atypical nevus      Flesh colored to evenly pigmented papule c/w intradermal nevus       Pink pearly papule/plaque c/w basal cell carcinoma      Erythematous hyperkeratotic cursted plaque c/w SCC      Surgical scar with no sign of skin cancer recurrence      Open and closed comedones      Inflammatory papules and pustules      Verrucoid papule consistent consistent with wart     Erythematous eczematous patches and plaques     Dystrophic onycholytic nail with subungual debris c/w onychomycosis     Umbilicated papule    Erythematous-base heme-crusted tan verrucoid plaque consistent with inflamed seborrheic keratosis     Erythematous Silvery Scaling Plaque c/w Psoriasis     See annotation      Assessment / Plan:        Hidradenitis  -     HUMIRA,CF, PEN 80 mg/0.8 mL PnKt; Inject 2 - 80mg pens SQ day 1 then inject 1 - 80mg pen SQ day 15  Dispense: 3 pen; Refill: 0    Discussed  benefits and risks of Humira including but not limited to injection site reactions, increased risk of infection, decreased tumor surveillance, optic neuritis. Patient informed to discontinue Humira and notify our office if an infection develops.  Patient counseled to avoid live vaccines.    Check baseline CBC, CMP, Hep B, Hep C, and Quantiferon gold    If labs are WNL, start Humira (original or CF) as follows: 2 -  40mg pens SQ on day 1 then 1 - 40mg pen SQ on day 8 then 1 - 40mg pen SQ every other week    Will need CBC and LFTs q 3 q 3 - 6 months. Will need Quantiferon gold annually.      Continue current tx:    -     clindamycin (CLEOCIN T) 1 % lotion; AAA bid  Dispense: 120 mL; Refill: 3  -     finasteride (PROSCAR) 5 mg tablet; Take 1 tablet (5 mg total) by mouth once daily.  Dispense: 30 tablet; Refill: 11  -     spironolactone (ALDACTONE) 50 MG tablet; Start with 1 po qday, increase to 2 po qday as tolerated  Dispense: 60 tablet; Refill: 4  -     doxycycline monohydrate 100 mg Tab; Take 1 tablet (100 mg total) by mouth 2 (two) times a day. Prn  - NICAZEL 600-5-10-5-1.5 mg Tab; 1 po bid  Dispense: 60 tablet; Refill: 5    Also on Tumeric 1g  Keto diet  Bleach baths  Still flaring multiples x a month severe, painful  And lifestyle modifications    Encounter for long-term (current) use of other medications  -     Hepatitis B Surface Ab, Qualitative; Future; Expected date: 08/19/2022  -     Hepatitis B Surface Antigen; Future; Expected date: 08/19/2022  -     Hepatitis C Antibody; Future; Expected date: 08/19/2022  -     HIV 1/2 Ag/Ab (4th Gen); Future; Expected date: 08/19/2022  -     Quantiferon Gold TB; Future; Expected date: 08/19/2022  -     Hepatitis B Core Antibody, IgM; Future; Expected date: 08/19/2022  -     Hepatitis B Core Antibody, Total; Future; Expected date: 08/19/2022    F/u 4 mo       The patient location is: Home in [place]  The chief complaint leading to consultation is: hidradentitis   Visit type: audiovisual    Face to Face time with patient: 17  25 minutes of total time spent on the encounter, which includes face to face time and non-face to face time preparing to see the patient (eg, review of tests), Obtaining and/or reviewing separately obtained history, Documenting clinical information in the electronic or other health record, Independently interpreting results (not separately reported) and  communicating results to the patient/family/caregiver, or Care coordination (not separately reported).         Each patient to whom he or she provides medical services by telemedicine is:  (1) informed of the relationship between the physician and patient and the respective role of any other health care provider with respect to management of the patient; and (2) notified that he or she may decline to receive medical services by telemedicine and may withdraw from such care at any time.           No follow-ups on file.

## 2022-08-22 LAB
HBV CORE AB SERPL QL IA: NEGATIVE
HBV CORE IGM SERPL QL IA: NEGATIVE
HBV SURFACE AB SER-ACNC: POSITIVE M[IU]/ML
HBV SURFACE AG SERPL QL IA: NEGATIVE
HCV AB SERPL QL IA: NEGATIVE
HIV 1+2 AB+HIV1 P24 AG SERPL QL IA: NEGATIVE

## 2022-08-23 ENCOUNTER — SPECIALTY PHARMACY (OUTPATIENT)
Dept: PHARMACY | Facility: CLINIC | Age: 42
End: 2022-08-23
Payer: COMMERCIAL

## 2022-08-23 ENCOUNTER — APPOINTMENT (OUTPATIENT)
Dept: LAB | Facility: HOSPITAL | Age: 42
End: 2022-08-23
Payer: COMMERCIAL

## 2022-08-23 NOTE — TELEPHONE ENCOUNTER
Yue, this is Briana Fabian with [facility].  We are working on your prescription that your doctor has sent us. We will be working with your insurance to get this approved for you. We will be calling you along the way with updates on your medication.  If you have any questions, you can reach us at [phone]. 2:23 PM   Welcome call outcome: Patient/caregiver reached.   Informed she still needs the quantiferon gold TB test- patient will get collected.   Informed [facility] of quant gold pending collection.

## 2022-08-24 DIAGNOSIS — L73.2 HIDRADENITIS: ICD-10-CM

## 2022-08-24 RX ORDER — SPIRONOLACTONE 50 MG/1
TABLET, FILM COATED ORAL
Qty: 60 TABLET | Refills: 4 | Status: SHIPPED | OUTPATIENT
Start: 2022-08-24 | End: 2023-01-23 | Stop reason: SDUPTHER

## 2022-08-24 RX ORDER — FINASTERIDE 5 MG/1
5 TABLET, FILM COATED ORAL DAILY
Qty: 30 TABLET | Refills: 11 | Status: SHIPPED | OUTPATIENT
Start: 2022-08-24 | End: 2023-08-28 | Stop reason: SDUPTHER

## 2022-08-25 ENCOUNTER — IMMUNIZATION (OUTPATIENT)
Dept: INTERNAL MEDICINE | Facility: CLINIC | Age: 42
End: 2022-08-25
Payer: COMMERCIAL

## 2022-08-25 DIAGNOSIS — Z23 NEED FOR VACCINATION: Primary | ICD-10-CM

## 2022-08-25 PROCEDURE — 0054A COVID-19, MRNA, LNP-S, PF, 30 MCG/0.3 ML DOSE VACCINE (PFIZER): CPT | Mod: CV19,PBBFAC | Performed by: INTERNAL MEDICINE

## 2022-08-30 ENCOUNTER — DOCUMENTATION ONLY (OUTPATIENT)
Dept: DERMATOLOGY | Facility: CLINIC | Age: 42
End: 2022-08-30
Payer: COMMERCIAL

## 2022-08-30 NOTE — PROGRESS NOTES
Loading dose approved 8/26/2022-9/25/2022  maintenance dose approved 9/18/2022-2/17/2023  [facility]

## 2022-08-31 NOTE — TELEPHONE ENCOUNTER
Attempted to call [facility] re: maintenance dose Rx, but the number listed is for pathology. Follow up secure chat was sent 8/30. No response.

## 2022-09-01 NOTE — TELEPHONE ENCOUNTER
Sent staff message to BRISSA Crocker, re: Dr. Turner out of office? See poly. Briana Fabian, PharmD 9/1/2022 9:06 AM

## 2022-09-01 NOTE — TELEPHONE ENCOUNTER
Pt called for update on Humira. Advised her [facility] waiting on maintenance dose Rx.  Pt stated she will also contact [facility].

## 2022-09-02 NOTE — TELEPHONE ENCOUNTER
Discussed w/ Jesica, moving forward to BI/FA/Initial. Opened ivent indicating we still need the maintenance dose RX. 11:09 AM     Benefits Investigation     Insurance name: [employer]  Rep name: Vanessa   Copay amount: $500, belkis applied for effective copay of $5   Deductible: $0  OOPmax: $3000 indiv/ 9000 family   Accumulators- $1038.91 ind/ 1138.91 family   [facility] in network? Y  Tier level (if applicable): 4   25% or $250 max copay per plan     Effective copay of $5. No further FA required. Forwarding to initial. 11:14 AM

## 2022-09-06 ENCOUNTER — PATIENT MESSAGE (OUTPATIENT)
Dept: NEUROLOGY | Facility: CLINIC | Age: 42
End: 2022-09-06
Payer: COMMERCIAL

## 2022-09-06 ENCOUNTER — SPECIALTY PHARMACY (OUTPATIENT)
Dept: PHARMACY | Facility: CLINIC | Age: 42
End: 2022-09-06
Payer: COMMERCIAL

## 2022-09-06 ENCOUNTER — TELEPHONE (OUTPATIENT)
Dept: PHARMACY | Facility: CLINIC | Age: 42
End: 2022-09-06
Payer: COMMERCIAL

## 2022-09-06 DIAGNOSIS — L73.2 HIDRADENITIS: Primary | ICD-10-CM

## 2022-09-06 DIAGNOSIS — L73.8 BACTERIAL FOLLICULITIS: ICD-10-CM

## 2022-09-06 RX ORDER — ADALIMUMAB 80MG/0.8ML
KIT SUBCUTANEOUS
Qty: 2 PEN | Refills: 4 | Status: SHIPPED | OUTPATIENT
Start: 2022-09-06 | End: 2023-01-12 | Stop reason: SDUPTHER

## 2022-09-06 RX ORDER — CLINDAMYCIN PHOSPHATE 11.9 MG/ML
SOLUTION TOPICAL
Qty: 30 ML | Refills: 3 | Status: CANCELLED | OUTPATIENT
Start: 2022-09-06

## 2022-09-06 NOTE — TELEPHONE ENCOUNTER
40 mg weekly dose received.   However, per LD approval document: Prior authorization [id number] has also been entered for HUMIRA(CF) PEN 80MG/0.8ML for up to 5 fills with a quantity limit of 2 pens per 28 days valid 09/18/2023 through 02/17/2023.   Called to clarify PA coverage of 40 mg every week, outgoing call to pharmacy help desk 2:01 PM   Rep: Vanessa   Submitted Phone PA for 40 mg/0.4 mL CF pens #4 for 28 days   24-48 hours processing time.   Will receive all further updates via fax. 2:23 PM

## 2022-09-06 NOTE — TELEPHONE ENCOUNTER
40 mg weekly dose received.   However, per LD approval document: Prior authorization [id number] has also been entered for HUMIRA(CF) PEN 80MG/0.8ML for up to 5 fills with a quantity limit of 2 pens per 28 days valid 09/18/2023 through 02/17/2023.   Will call to clarify PA coverage of 40 mg every week this afternoon.

## 2022-09-07 ENCOUNTER — PATIENT MESSAGE (OUTPATIENT)
Dept: BARIATRICS | Facility: CLINIC | Age: 42
End: 2022-09-07
Payer: COMMERCIAL

## 2022-09-07 ENCOUNTER — PATIENT MESSAGE (OUTPATIENT)
Dept: PHARMACY | Facility: CLINIC | Age: 42
End: 2022-09-07
Payer: COMMERCIAL

## 2022-09-07 ENCOUNTER — TELEPHONE (OUTPATIENT)
Dept: PHARMACY | Facility: CLINIC | Age: 42
End: 2022-09-07
Payer: COMMERCIAL

## 2022-09-07 RX ORDER — VENLAFAXINE HYDROCHLORIDE 150 MG/1
150 CAPSULE, EXTENDED RELEASE ORAL DAILY
Qty: 30 CAPSULE | Refills: 1 | Status: SHIPPED | OUTPATIENT
Start: 2022-09-07 | End: 2022-11-09 | Stop reason: SDUPTHER

## 2022-09-07 NOTE — TELEPHONE ENCOUNTER
Outgoing call to [facility] PA line to check status of PA submitted yesterday. 3:13 PM   Rep: My PEREZ is still pending. Sent a survey to solicit additional information ~ 1 pm today to another fax number.   Answered survey questions on the phone.     PA#[id number]   Currently in pending status. Is marked as urgent.   Will be sent to fax number provided (gave [facility]'s fax #).   Anticipated 24-72 hours minimum turnaround time.   3:41 PM

## 2022-09-08 ENCOUNTER — IMMUNIZATION (OUTPATIENT)
Dept: PHARMACY | Facility: CLINIC | Age: 42
End: 2022-09-08
Payer: COMMERCIAL

## 2022-09-08 ENCOUNTER — TELEPHONE (OUTPATIENT)
Dept: INTERNAL MEDICINE | Facility: CLINIC | Age: 42
End: 2022-09-08
Payer: COMMERCIAL

## 2022-09-08 DIAGNOSIS — Z12.31 ENCOUNTER FOR SCREENING MAMMOGRAM FOR BREAST CANCER: Primary | ICD-10-CM

## 2022-09-08 NOTE — TELEPHONE ENCOUNTER
Outgoing call to [facility] PA dept. To check status of PA# [id number]  12:27 PM   Rep: Michael   Submitted on 9/6, response on 9/7, anticipated response on 9/9 or 9/10 at the latest. Gave Rep [facility] fax #. We should receive final decision by fax.   12:40 PM

## 2022-09-08 NOTE — TELEPHONE ENCOUNTER
----- Message from Dar Sanchez sent at 9/8/2022  1:52 PM CDT -----  Regarding: Order-mammogram  Contact: PT @ [phone]  Pt is calling to speak to someone in Dr. Pineda's office to have a mammogram order in her chart, so that she can schedule. No referral or active orders in Epic. Please call. Thanks.

## 2022-09-09 NOTE — TELEPHONE ENCOUNTER
Outgoing call to [facility] PA dept to check status of PA # [id number] 9:07 AM   Rep: Ivan   While it is still in process, it is marked for decision  finalization and we will have a final decision faxed to us once it is complete.   9:14 AM

## 2022-09-09 NOTE — TELEPHONE ENCOUNTER
Incoming call from patient returning call, patient would like a call after 2:30 pm on 9/12/22 for initial  if possible. Routing [facility] to alert.

## 2022-09-12 DIAGNOSIS — L73.8 BACTERIAL FOLLICULITIS: ICD-10-CM

## 2022-09-13 ENCOUNTER — SPECIALTY PHARMACY (OUTPATIENT)
Dept: PHARMACY | Facility: CLINIC | Age: 42
End: 2022-09-13
Payer: COMMERCIAL

## 2022-09-13 DIAGNOSIS — L73.2 HIDRADENITIS: Primary | ICD-10-CM

## 2022-09-13 NOTE — TELEPHONE ENCOUNTER
Specialty Pharmacy - Initial Clinical Assessment    Specialty Medication Orders Linked to Encounter      Flowsheet Row Most Recent Value   Medication #1 adalimumab (HUMIRA,CF, PEN CROHNS-UC-HS) 80 mg/0.8 mL PnKt (Order#[id number], Rx#[id number])          Patient Diagnosis   L73.2 - Hidradenitis    Subjective    Fran Higgins is a 42 y.o. female, who is followed by the specialty pharmacy service for management and education.    Recent Encounters       Date Type Provider Description    09/13/2022 Specialty Pharmacy Audi Wilkerson Initial Clinical Assessment    09/06/2022 Specialty Pharmacy Briana Fabian PharmD Referral Authorization    08/23/2022 Specialty Pharmacy Briana Fabian PharmD Referral Authorization    11/24/2020 Specialty Pharmacy Mili Melendez Refill Coordination    10/27/2020 Specialty Pharmacy Mili Melendez Refill Coordination          Clinical call attempts since last clinical assessment   9/7/2022  2:12 PM - Specialty Pharmacy - Clinical Assessment by Rock Jaeger PharmD     Current Outpatient Medications   Medication Sig    ALPRAZolam (XANAX) 0.5 MG tablet Take 1 tablet (0.5 mg total) by mouth nightly as needed.    b complex vitamins capsule Take 1 capsule by mouth once daily.    B3-azelaic-zinc-B6-copper-FA (NICAZEL) 600-5-10-5-1.5 mg Tab take 1 tablet by mouth twice daily    CALCIUM CITRATE ORAL Take 1 tablet by mouth 3 (three) times daily. chewable    clindamycin (CLEOCIN T) 1 % external solution Apply to affected areas of underarm twice a day as needed for folliculitis.    clindamycin (CLEOCIN T) 1 % lotion Apply to affected area two times a day.    cyclobenzaprine (FLEXERIL) 5 MG tablet Take 1 tablet (5 mg total) by mouth 2 (two) times daily as needed for Muscle spasms.    estrogens, conjugated, (PREMARIN) 1.25 MG tablet Take 1 tablet (1.25 mg total) by mouth once daily.    finasteride (PROSCAR) 5 mg tablet Take 1 tablet (5 mg total) by mouth once daily.    fluocinolone  acetonide oiL (DERMOTIC OIL) 0.01 % Drop Use to affected ears at night, drop in canal and use to ear when flaring    galcanezumab-gnlm (EMGALITY PEN) 120 mg/mL PnIj Inject 120 mg into the skin every 30 days.    HUMIRA,CF, PEN 80 mg/0.8 mL PnKt Starting day 29, inject 80 mg (1 pen) into the skin every 2 weeks.    adalimumab (HUMIRA,CF, PEN CROHNS-UC-HS) 80 mg/0.8 mL PnKt Inject 160 mg (2 pens) on day 1, then inject 80 mg (1 pen) on day 15.    ketoconazole (NIZORAL) 2 % cream Apply to affected area two times a day (Patient taking differently: Apply to affected area two times a day)    multivitamin capsule Take by mouth once daily.    nystatin-triamcinolone (MYCOLOG II) cream Apply topically 2 (two) times daily.    omeprazole (PRILOSEC) 40 MG capsule Take 1 capsule (40 mg total) by mouth every morning.    ondansetron (ZOFRAN-ODT) 4 MG TbDL Take 1 tablet (4 mg total) by mouth every 6 (six) hours as needed (for nausea).    pimecrolimus (ELIDEL) 1 % cream Apply to affected areas of underarm twice a day as needed for irritation. (Patient taking differently: Apply to affected areas of underarm twice a day as needed for irritation.)    spironolactone (ALDACTONE) 50 MG tablet Start with taking 1 tablet by mouth every day, may increase to 2 tablets by mouth every day as tolerated.    thiamine (VITAMIN B-1) 50 MG tablet Take 50 mg by mouth once daily.    ubrogepant (UBRELVY) 50 mg tablet Take 1 tab by mouth for headaches. May repeat once 2 hours after initial dose based on response and tolerability.    venlafaxine (EFFEXOR-XR) 150 MG Cp24 Take 1 capsule (150 mg total) by mouth once daily.   Last reviewed on 9/13/2022  4:14 PM by Rock Jaeger, PharmD    Review of patient's allergies indicates:  No Known AllergiesLast reviewed on  9/13/2022 4:15 PM by Rock Jaeger    Drug Interactions    Drug interactions evaluated: yes  Clinically relevant drug interactions identified: no  Provided the patient with educational material regarding  drug interactions: not applicable           Assessment Questions - Documented Responses      Flowsheet Row Most Recent Value   Assessment    Medication Reconciliation completed for patient Yes   During the past 4 weeks, has patient missed any activities due to condition or medication? No   During the past 4 weeks, did patient have any of the following urgent care visits? None   Goals of Therapy Status Discussed (new start)   Status of the patients ability to self-administer: Is Able   All education points have been covered with patient? Yes, supplemental printed education provided   Welcome packet contents reviewed and discussed with patient? Yes   Assesment completed? Yes   Plan Therapy being initiated   Do you need to open a clinical intervention (i-vent)? No   Do you want to schedule first shipment? Yes          Refill Questions - Documented Responses      Flowsheet Row Most Recent Value   Patient Availability and HIPAA Verification    Does patient want to proceed with activity? Yes   HIPAA/medical authority confirmed? Yes   Relationship to patient of person spoken to? Self   Refill Screening Questions    When does the patient need to receive the medication? 09/17/22   Refill Delivery Questions    How will the patient receive the medication? Delivery Kaye   When does the patient need to receive the medication? 09/17/22   Shipping Address Home   Address in [facility] confirmed and updated if neccessary? Yes   Expected Copay ($) 5   Is the patient able to afford the medication copay? Yes   Payment Method CC on file   Days supply of Refill 28   Supplies needed? No supplies needed   Refill activity completed? 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    Tried/failed medications: doxycycline, clindamycin topical, bleach baths    BP Readings from Last 4 Encounters:   04/28/22 123/77   04/14/22 130/78   12/30/21 114/65   10/07/21 110/86     Ht Readings from Last 4 Encounters:   02/01/22 5' 5" (1.651 m)   12/30/21 5' 4" (1.626 m)   10/07/21 5' 4" (1.626 m)   08/26/21 5' 4" (1.626 m)     Wt Readings from Last 4 Encounters:   04/28/22 122.8 kg (270 lb 11.2 oz)   04/14/22 122.9 kg (270 lb 15.1 oz)   12/30/21 121.7 kg (268 lb 4.8 oz)   10/07/21 125.4 kg (276 lb 7.3 oz)     Recent Labs   Lab Result Units 08/19/22  1439   Hepatitis B Surface Ag  Negative   Hep B S Ab  Positive   Hep B Core Total Ab  Negative     The goals of prescribed drug therapy management include:  Supporting patient to meet the prescriber's medical treatment objectives  Improving or maintaining quality of life  Maintaining optimal therapy adherence  Minimizing and managing side effects      Goals of Therapy Status: Discussed (new start)    Assessment/Plan  Patient plans to start therapy on 09/17/22      Indication, dosage, appropriateness, effectiveness, safety and convenience of her specialty medication(s) were reviewed today.     Patient Education   Patient received education on the following:   Expectations and possible outcomes of therapy  Proper use, timely administration, and missed dose management  Duration of therapy  Side effects, including prevention, minimization, and management  Contraindications and safety precautions  New or changed medications, including prescribe and over the " counter medications and supplements  Reviews recommended vaccinations, as appropriate  Storage, safe handling, and disposal        Tasks added this encounter   No tasks added.   Tasks due within next 3 months   9/9/2022 - Clinical - Initial Assessment     Audi Wilkerson - Specialty Pharmacy  [address]  [facility] [address]  Phone: [phone]  Fax: [phone]

## 2022-09-14 RX ORDER — CLINDAMYCIN PHOSPHATE 11.9 MG/ML
SOLUTION TOPICAL
Qty: 30 ML | Refills: 3 | Status: SHIPPED | OUTPATIENT
Start: 2022-09-14 | End: 2023-01-23 | Stop reason: SDUPTHER

## 2022-09-15 ENCOUNTER — APPOINTMENT (OUTPATIENT)
Dept: RADIOLOGY | Facility: OTHER | Age: 42
End: 2022-09-15
Attending: HOSPITALIST
Payer: COMMERCIAL

## 2022-09-15 VITALS — WEIGHT: 270.75 LBS | HEIGHT: 65 IN | BODY MASS INDEX: 45.11 KG/M2

## 2022-09-15 DIAGNOSIS — Z12.31 ENCOUNTER FOR SCREENING MAMMOGRAM FOR BREAST CANCER: ICD-10-CM

## 2022-09-15 PROCEDURE — 77063 MAMMO DIGITAL SCREENING BILAT WITH TOMO: ICD-10-PCS | Mod: 26,,, | Performed by: RADIOLOGY

## 2022-09-15 PROCEDURE — 77067 MAMMO DIGITAL SCREENING BILAT WITH TOMO: ICD-10-PCS | Mod: 26,,, | Performed by: RADIOLOGY

## 2022-09-15 PROCEDURE — 77067 SCR MAMMO BI INCL CAD: CPT | Mod: TC,PN

## 2022-09-15 PROCEDURE — 77063 BREAST TOMOSYNTHESIS BI: CPT | Mod: 26,,, | Performed by: RADIOLOGY

## 2022-09-15 PROCEDURE — 77067 SCR MAMMO BI INCL CAD: CPT | Mod: 26,,, | Performed by: RADIOLOGY

## 2022-09-16 ENCOUNTER — PATIENT MESSAGE (OUTPATIENT)
Dept: NEUROLOGY | Facility: CLINIC | Age: 42
End: 2022-09-16
Payer: COMMERCIAL

## 2022-09-19 ENCOUNTER — OFFICE VISIT (OUTPATIENT)
Dept: NEUROLOGY | Facility: CLINIC | Age: 42
End: 2022-09-19
Payer: COMMERCIAL

## 2022-09-19 DIAGNOSIS — G43.109 MIGRAINE WITH AURA AND WITHOUT STATUS MIGRAINOSUS, NOT INTRACTABLE: ICD-10-CM

## 2022-09-19 PROCEDURE — 99214 PR OFFICE/OUTPT VISIT, EST, LEVL IV, 30-39 MIN: ICD-10-PCS | Mod: 95,,, | Performed by: PHYSICIAN ASSISTANT

## 2022-09-19 PROCEDURE — 1160F RVW MEDS BY RX/DR IN RCRD: CPT | Mod: CPTII,95,, | Performed by: PHYSICIAN ASSISTANT

## 2022-09-19 PROCEDURE — 1160F PR REVIEW ALL MEDS BY PRESCRIBER/CLIN PHARMACIST DOCUMENTED: ICD-10-PCS | Mod: CPTII,95,, | Performed by: PHYSICIAN ASSISTANT

## 2022-09-19 PROCEDURE — 1159F MED LIST DOCD IN RCRD: CPT | Mod: CPTII,95,, | Performed by: PHYSICIAN ASSISTANT

## 2022-09-19 PROCEDURE — 99214 OFFICE O/P EST MOD 30 MIN: CPT | Mod: 95,,, | Performed by: PHYSICIAN ASSISTANT

## 2022-09-19 PROCEDURE — 1159F PR MEDICATION LIST DOCUMENTED IN MEDICAL RECORD: ICD-10-PCS | Mod: CPTII,95,, | Performed by: PHYSICIAN ASSISTANT

## 2022-09-19 RX ORDER — GALCANEZUMAB 120 MG/ML
120 INJECTION, SOLUTION SUBCUTANEOUS
Qty: 1 ML | Refills: 11 | Status: SHIPPED | OUTPATIENT
Start: 2022-09-19 | End: 2022-10-12 | Stop reason: SDUPTHER

## 2022-09-19 RX ORDER — TOPIRAMATE 25 MG/1
25 TABLET ORAL DAILY
Qty: 30 TABLET | Refills: 5 | Status: SHIPPED | OUTPATIENT
Start: 2022-09-19 | End: 2022-11-28

## 2022-09-19 RX ORDER — UBROGEPANT 50 MG/1
TABLET ORAL
Qty: 16 TABLET | Refills: 5 | Status: SHIPPED | OUTPATIENT
Start: 2022-09-19 | End: 2023-08-28 | Stop reason: SDUPTHER

## 2022-09-19 NOTE — PROGRESS NOTES
Established Patient     The patient location is: [place]  The chief complaint leading to consultation is: headache follow up visit    Visit type: audiovisual    Face to Face time with patient: 12 min  16 minutes of total time spent on the encounter, which includes face to face time and non-face to face time preparing to see the patient (eg, review of tests), Obtaining and/or reviewing separately obtained history, Documenting clinical information in the electronic or other health record, Independently interpreting results (not separately reported) and communicating results to the patient/family/caregiver, or Care coordination (not separately reported).     Each patient to whom he or she provides medical services by telemedicine is:  (1) informed of the relationship between the physician and patient and the respective role of any other health care provider with respect to management of the patient; and (2) notified that he or she may decline to receive medical services by telemedicine and may withdraw from such care at any time.    Notes:        SUBJECTIVE:  Patient ID: Fran Higgins   Chief Complaint: Headache    History of Present Illness:  Fran Higgins is a 42 y.o. female with migraine, cervical scoliosis, obesity s/p sleeve gastrectomy, anxiety/depression, surgical menopause s/p hysterectomy/RSO for ovary cyst, insomnia who presents via virtual visit alone for follow-up of headaches.       09/19/2022 - Interval History:  Ha's have increased 1-2 months ago possibly due to worsened insomnia and stress lately. Are now occurring 8/30 days per month lasting up to 1 day at a time. Currently taking effexor and xanax for these conditions.   Ubrelvy 50mg - typically resolves HA's, infrequently needs to repeat dose  Discussed multiple options, pt agreeable w/ following. Denies current depression.   Plan: start tpx 25mg/d, continue emgality, ubrelvy 50mg prn, zofran, rtc in 3 mo or sooner if  needed    Recommendations made at last Office Visit on 12/6/21:  - Discussed symptoms appear to be consistent with migraines. Discussed this with patient along with treatment options and patient agreed with the following plan  - ppx - continue emgality  - abortive - trial ubrelvy  - nausea - continue zofran  - avoid nsaids as they are c/i 2/2 gastrectomy  - avoid triptans as recommended by psychiatry 2/2 potential serotinin syndrome w/ effexor for depression  - decreased neck ROM, tightness, and cervical scoliosis - continue conservative treatments, consider PT in future  - risks, benefits, and potential side effects of emgality, ubrelvy, zofran discussed   - alternative treatment options offered   - importance of healthy diet, regular exercise and sleep hygiene in the treatment of headaches    - Start tracking headaches via Migraine Buddy roseann on phone   - RTC 6-12 mo or sooner if needed     Treatments Tried:  emgality - helps  Effexor  Ubrelvy - helps  triptans - has been told to avoid per psychiatrist 2/2 serotonin syndrome risk  nsaids - c/i 2/2 recent gastrectomy    Current Medications:    Current Outpatient Medications:     adalimumab (HUMIRA,CF, PEN CROHNS-UC-HS) 80 mg/0.8 mL PnKt, Inject 160 mg (2 pens) on day 1, then inject 80 mg (1 pen) on day 15., Disp: 3 pen, Rfl: 0    ALPRAZolam (XANAX) 0.5 MG tablet, Take 1 tablet (0.5 mg total) by mouth nightly as needed., Disp: 30 tablet, Rfl: 1    b complex vitamins capsule, Take 1 capsule by mouth once daily., Disp: , Rfl:     B3-azelaic-zinc-B6-copper-FA (NICAZEL) 600-5-10-5-1.5 mg Tab, take 1 tablet by mouth twice daily, Disp: 60 tablet, Rfl: 5    CALCIUM CITRATE ORAL, Take 1 tablet by mouth 3 (three) times daily. chewable, Disp: , Rfl:     clindamycin (CLEOCIN T) 1 % external solution, Apply to affected areas of underarm twice a day as needed for folliculitis., Disp: 30 mL, Rfl: 3    clindamycin (CLEOCIN T) 1 % lotion, Apply to affected area two times a day.,  Disp: 120 mL, Rfl: 3    cyclobenzaprine (FLEXERIL) 5 MG tablet, Take 1 tablet (5 mg total) by mouth 2 (two) times daily as needed for Muscle spasms., Disp: 60 tablet, Rfl: 5    estrogens, conjugated, (PREMARIN) 1.25 MG tablet, Take 1 tablet (1.25 mg total) by mouth once daily., Disp: 90 tablet, Rfl: 3    finasteride (PROSCAR) 5 mg tablet, Take 1 tablet (5 mg total) by mouth once daily., Disp: 30 tablet, Rfl: 11    fluocinolone acetonide oiL (DERMOTIC OIL) 0.01 % Drop, Use to affected ears at night, drop in canal and use to ear when flaring, Disp: 20 mL, Rfl: 3    galcanezumab-gnlm (EMGALITY PEN) 120 mg/mL PnIj, Inject 120 mg into the skin every 30 days., Disp: 1 mL, Rfl: 11    HUMIRA,CF, PEN 80 mg/0.8 mL PnKt, Starting day 29, inject 80 mg (1 pen) into the skin every 2 weeks., Disp: 2 pen, Rfl: 4    ketoconazole (NIZORAL) 2 % cream, Apply to affected area two times a day (Patient taking differently: Apply to affected area two times a day), Disp: 60 g, Rfl: 3    multivitamin capsule, Take by mouth once daily., Disp: , Rfl:     nystatin-triamcinolone (MYCOLOG II) cream, Apply topically 2 (two) times daily., Disp: 30 g, Rfl: 1    omeprazole (PRILOSEC) 40 MG capsule, Take 1 capsule (40 mg total) by mouth every morning., Disp: 30 capsule, Rfl: 1    ondansetron (ZOFRAN-ODT) 4 MG TbDL, Take 1 tablet (4 mg total) by mouth every 6 (six) hours as needed (for nausea)., Disp: 20 tablet, Rfl: 2    pimecrolimus (ELIDEL) 1 % cream, Apply to affected areas of underarm twice a day as needed for irritation. (Patient taking differently: Apply to affected areas of underarm twice a day as needed for irritation.), Disp: 30 g, Rfl: 3    spironolactone (ALDACTONE) 50 MG tablet, Start with taking 1 tablet by mouth every day, may increase to 2 tablets by mouth every day as tolerated., Disp: 60 tablet, Rfl: 4    thiamine (VITAMIN B-1) 50 MG tablet, Take 50 mg by mouth once daily., Disp: , Rfl:     topiramate (TOPAMAX) 25 MG tablet, Take 1  tablet (25 mg total) by mouth once daily., Disp: 30 tablet, Rfl: 5    ubrogepant (UBRELVY) 50 mg tablet, Take 1 tab by mouth for headaches. May repeat once 2 hours after initial dose based on response and tolerability., Disp: 16 tablet, Rfl: 5    venlafaxine (EFFEXOR-XR) 150 MG Cp24, Take 1 capsule (150 mg total) by mouth once daily., Disp: 30 capsule, Rfl: 1    Review of Systems - as per HPI, otherwise a balanced 10 systems review is negative.    OBJECTIVE:  Vitals:  LMP 03/09/2020 (Exact Date)      Physical Exam:  Constitutional: she appears well-developed and well-nourished. she is well groomed. NAD   HENT:    Head: Normocephalic and atraumatic  Eyes: Conjunctivae and EOM are normal  Musculoskeletal: Normal range of motion. No joint stiffness.   Psychiatric: Mood and affect are normal    Neuro: Patient is alert and oriented to person, place, and time. Language is intact and fluent. Speech is clear and fluent. Recent and remote memory are intact.  Normal attention and concentration.  Facial movement is symmetric. Moves all 4 extremities against gravity.      Review of Data:   Notes from neuro reviewed   Labs:  Lab Visit on 08/23/2022   Component Date Value Ref Range Status    NIL 08/23/2022 0.77826  IU/mL Final    TB1 - Nil 08/23/2022 0.004  IU/mL Final    TB2 - Nil 08/23/2022 0.000  IU/mL Final    Mitogen - Nil 08/23/2022 10.000  IU/mL Final    TB Gold Plus 08/23/2022 Negative  Negative Final   Lab Visit on 08/19/2022   Component Date Value Ref Range Status    Hep B S Ab 08/19/2022 Positive   Final    Hepatitis B Surface Ag 08/19/2022 Negative  Negative Final    Hepatitis C Ab 08/19/2022 Negative  Negative Final    HIV 1/2 Ag/Ab 08/19/2022 Negative  Negative Final    Hep B C IgM 08/19/2022 Negative  Negative Final    Hep B Core Total Ab 08/19/2022 Negative   Final     Imaging:  No results found for this or any previous visit.  Note: I have independently reviewed any/all imaging/labs/tests and agree with the  report (s) as documented.  Any discrepancies will be as noted/demarcated by free text.  CHANTELLE CHING 9/19/2022    ASSESSMENT:  1. Migraine with aura and without status migrainosus, not intractable        PLAN:  - Discussed symptoms appear to be consistent with migraines. Discussed this with patient along with treatment options and patient agreed with the following plan  - ppx - continue emgality, start tpx (denies depression)  - abortive - continue ubrelvy  - nausea - continue zofran  - avoid nsaids as they are c/i 2/2 gastrectomy  - avoid triptans as recommended by psychiatry 2/2 potential serotinin syndrome w/ effexor for depression  - decreased neck ROM, tightness, and cervical scoliosis - continue conservative treatments, consider PT in future  - Continue tracking headaches   - Discussed goals of therapy are to decrease the frequency, intensity, and duration of headaches  - RTC in 3 mo or sooner if needed     Orders Placed This Encounter    topiramate (TOPAMAX) 25 MG tablet    galcanezumab-gnlm (EMGALITY PEN) 120 mg/mL PnIj    ubrogepant (UBRELVY) 50 mg tablet       Discussed potential for teratogenicity with treatment, patient understands if her family planning status should change she will contact office immediately and we will safely adjust medications as needed.     Questions and concerns were sought and answered to the patient's stated verbal satisfaction.  The patient verbalizes understanding and agreement with the above stated treatment plan.     CC: MD Marina Manrique PA-C  [facility] [place]   [phone]    Dr. Martino was available during today's encounter.

## 2022-09-23 ENCOUNTER — PATIENT MESSAGE (OUTPATIENT)
Dept: PSYCHIATRY | Facility: CLINIC | Age: 42
End: 2022-09-23
Payer: COMMERCIAL

## 2022-09-26 DIAGNOSIS — L73.2 HIDRADENITIS: Primary | ICD-10-CM

## 2022-09-26 RX ORDER — ADALIMUMAB 80MG/0.8ML
KIT SUBCUTANEOUS
Qty: 3 PEN | Refills: 0 | Status: SHIPPED | OUTPATIENT
Start: 2022-09-26 | End: 2022-10-11 | Stop reason: DRUGHIGH

## 2022-10-04 ENCOUNTER — PATIENT MESSAGE (OUTPATIENT)
Dept: NEUROLOGY | Facility: CLINIC | Age: 42
End: 2022-10-04
Payer: COMMERCIAL

## 2022-10-06 ENCOUNTER — PATIENT MESSAGE (OUTPATIENT)
Dept: BARIATRICS | Facility: CLINIC | Age: 42
End: 2022-10-06
Payer: COMMERCIAL

## 2022-10-10 ENCOUNTER — OFFICE VISIT (OUTPATIENT)
Dept: PSYCHIATRY | Facility: CLINIC | Age: 42
End: 2022-10-10
Payer: COMMERCIAL

## 2022-10-10 ENCOUNTER — PATIENT MESSAGE (OUTPATIENT)
Dept: PHARMACY | Facility: CLINIC | Age: 42
End: 2022-10-10
Payer: COMMERCIAL

## 2022-10-10 DIAGNOSIS — F33.41 RECURRENT MAJOR DEPRESSIVE DISORDER IN PARTIAL REMISSION: ICD-10-CM

## 2022-10-10 DIAGNOSIS — D39.10 BORDERLINE EPITHELIAL NEOPLASM OF OVARY: Primary | ICD-10-CM

## 2022-10-10 DIAGNOSIS — Z90.710 HISTORY OF ROBOT-ASSISTED LAPAROSCOPIC HYSTERECTOMY: ICD-10-CM

## 2022-10-10 PROCEDURE — 90834 PR PSYCHOTHERAPY W/PATIENT, 45 MIN: ICD-10-PCS | Mod: S$GLB,,, | Performed by: PSYCHOLOGIST

## 2022-10-10 PROCEDURE — 1159F PR MEDICATION LIST DOCUMENTED IN MEDICAL RECORD: ICD-10-PCS | Mod: CPTII,S$GLB,, | Performed by: PSYCHOLOGIST

## 2022-10-10 PROCEDURE — 99999 PR PBB SHADOW E&M-EST. PATIENT-LVL II: ICD-10-PCS | Mod: PBBFAC,,, | Performed by: PSYCHOLOGIST

## 2022-10-10 PROCEDURE — 99999 PR PBB SHADOW E&M-EST. PATIENT-LVL II: CPT | Mod: PBBFAC,,, | Performed by: PSYCHOLOGIST

## 2022-10-10 PROCEDURE — 90834 PSYTX W PT 45 MINUTES: CPT | Mod: S$GLB,,, | Performed by: PSYCHOLOGIST

## 2022-10-10 PROCEDURE — 1159F MED LIST DOCD IN RCRD: CPT | Mod: CPTII,S$GLB,, | Performed by: PSYCHOLOGIST

## 2022-10-10 NOTE — PROGRESS NOTES
INFORMED CONSENT: Fran Higgins   is known to this provider and identity was confirmed via NAME and .  The patient has been informed of the risks and benefits associated with engaging in psychotherapy, the handling of protected health information, the rights of privacy and the limits of confidentiality. The patient has also been informed of the importance of reporting any suicidal or homicidal ideation to this or any provider to ensure safety of all parties, and the Fran Higgins expressed understanding. The patient was agreeable to these terms and freely participates in individual psychotherapy.    PSYCHO-ONCOLOGY NOTE/ Individual Psychotherapy     Date: 10/10/2022   Site:  [facility]        Therapeutic Intervention: Met with patient.  Outpatient - Insight oriented psychotherapy 45 min - CPT code 18512      Patient was last seen by me on 2022    Problem list  Patient Active Problem List   Diagnosis    Depression    Left ovarian cyst    S/P RA Laparoscopic LSO    Mucinous tumor, of low malignant potential    Pericardial cyst    Scoliosis of cervical spine    Abnormal uterine bleeding (AUB)    Vaginal yeast infection    DUB (dysfunctional uterine bleeding)    Migraine with aura, not intractable    s/p robotic hysterectomy/RSO 3/9/20    Borderline epithelial neoplasm of ovary    Surgical menopause    Yeast vaginitis    Menopause    Dizziness    Galactorrhea in female- bilateral breast    Other insomnia    Major depressive disorder, recurrent episode, in partial remission with anxious distress    Prediabetes    Obesity    S/P laparoscopic sleeve gastrectomy    BMI 45.0-49.9, adult    Hidradenitis    Seborrheic dermatitis       Chief complaint/reason for encounter: anxiety   Met with patient to evaluate psychosocial adaptation to diagnosis/treatment/survivorship of Ovarian    Current Medications  Current Outpatient Medications   Medication    adalimumab (HUMIRA,CF, PEN CROHNS-UC-HS) 80 mg/0.8  mL PnKt    ALPRAZolam (XANAX) 0.5 MG tablet    b complex vitamins capsule    B3-azelaic-zinc-B6-copper-FA (NICAZEL) 600-5-10-5-1.5 mg Tab    CALCIUM CITRATE ORAL    clindamycin (CLEOCIN T) 1 % external solution    clindamycin (CLEOCIN T) 1 % lotion    cyclobenzaprine (FLEXERIL) 5 MG tablet    estrogens, conjugated, (PREMARIN) 1.25 MG tablet    finasteride (PROSCAR) 5 mg tablet    fluocinolone acetonide oiL (DERMOTIC OIL) 0.01 % Drop    galcanezumab-gnlm (EMGALITY PEN) 120 mg/mL PnIj    HUMIRA,CF, PEN 80 mg/0.8 mL PnKt    HUMIRA,CF, PEN 80 mg/0.8 mL PnKt    ketoconazole (NIZORAL) 2 % cream    multivitamin capsule    nystatin-triamcinolone (MYCOLOG II) cream    omeprazole (PRILOSEC) 40 MG capsule    ondansetron (ZOFRAN-ODT) 4 MG TbDL    pimecrolimus (ELIDEL) 1 % cream    spironolactone (ALDACTONE) 50 MG tablet    thiamine (VITAMIN B-1) 50 MG tablet    topiramate (TOPAMAX) 25 MG tablet    ubrogepant (UBRELVY) 50 mg tablet    venlafaxine (EFFEXOR-XR) 150 MG Cp24     No current facility-administered medications for this visit.       Objective:  Fran Higgins arrived promptly for the session.  Ms. Higgins was independently ambulatory at the time of session. The patient was fully cooperative throughout the session.  Appearance: age appropriate, appropriately  dressed, adequately  groomed  Behavior/Cooperation: friendly and cooperative  Speech: normal in rate, volume, and tone and appropriate quality, quantity and organization of sentences  Mood: euthymic  Affect: mood congruent  Thought Process: goal-directed, logical  Thought Content: normal,  No delusions or paranoia; did not appear to be responding to internal stimuli during the session  Orientation: grossly intact  Memory: Grossly intact  Attention Span/Concentration: Attends to session without distraction; reports no difficulty  Fund of Knowledge: average  Estimate of Intelligence: average from verbal skills and history  Cognition: grossly intact  Insight:  patient has awareness of illness; good insight into own behavior and behavior of others  Judgment: the patient's behavior is adequate to circumstances    Interval history and content of current session: Pat[employer] provider interim updates. Worsening Insomnia, hot flashes and night sweats.   Discussed diagnosis, treatment, prognosis, current adaptation to disease and treatment status, and family's adaptation to disease and treatment status. Reports to be coping in an adequate manner. Evaluated cognitive response, paying particular attention to negative intrusive thoughts of a persistent and detrimental nature. Thoughts of this type are in evidence with mild distress. Provided cognitive behavioral therapy to address negative cognitions. Identified and evaluated psychosocial and environmental stressors secondary to diagnosis and treatment.  Examined proactive behaviors that may be implemented to minimize or ameliorate psychosocial stressors secondary to diagnosis and treatment.     Risk parameters:   Patient reports no suicidal ideation  Patient reports no homicidal ideation  Patient reports no self-injurious behavior  Patient reports no violent behavior   Safety needs:  None at this time      Verbal deficits: None     Patient's response to intervention:The patient's response to intervention is accepting.     Progress toward goals and other mental status changes:  The patient's progress toward goals is good.      Progress to date:Progress as Expected      Goals from last visit: Met     Patient reported outcomes:    Distress Thermometer:   Distress Score    Distress Score: 3        Practical Problems Physical Problems                                                   Family Problems                                         Emotional Problems                                                         Spiritual/Religions Concerns     Spiritual / Hoahaoism Concerns: No         Other Problems                PHQ-9=     JOSE MARIA-7=    PHQ ANSWERS    Q1. Little interest or pleasure in doing things: (P) Not at all (10/04/22 1002)  Q2. Feeling down, depressed, or hopeless: (P) Not at all (10/04/22 1002)  Q3. Trouble falling or staying asleep, or sleeping too much: (P) More than half the days (10/04/22 1002)  Q4. Feeling tired or having little energy: (P) Several days (10/04/22 1002)  Q5. Poor appetite or overeating: (P) Not at all (10/04/22 1002)  Q6. Feeling bad about yourself - or that you are a failure or have let yourself or your family down: (P) Not at all (10/04/22 1002)  Q7. Trouble concentrating on things, such as reading the newspaper or watching television: (P) Not at all (10/04/22 1002)  Q8. Moving or speaking so slowly that other people could have noticed. Or the opposite - being so fidgety or restless that you have been moving around a lot more than usual: (P) Not at all (10/04/22 1002)  Q9.      PHQ8 Score : (P) 3 (10/04/22 1002)  PHQ-9 Total Score: (P) 3 (10/04/22 1002)     JOSE MARIA-7 Answers    GAD7 10/4/2022   1. Feeling nervous, anxious, or on edge? 0   2. Not being able to stop or control worrying? 0   3. Worrying too much about different things? 0   4. Trouble relaxing? 1   5. Being so restless that it is hard to sit still? 0   6. Becoming easily annoyed or irritable? 0   7. Feeling afraid as if something awful might happen? 0   JOSE MARIA-7 Score 1     JOSE MARIA-7 Score: (P) 1  Interpretation: (P) Normal     Client Strengths: verbal, intelligent, successful, good social support, good insight, commitment to wellness, strong kerri, strong cultural traditions     Diagnosis:     ICD-10-CM ICD-9-CM   1. Borderline epithelial neoplasm of ovary  D39.10 236.2   2. s/p robotic hysterectomy/RSO 3/9/20  Z90.710 V15.29   3. Recurrent major depressive disorder in partial remission  F33.41 296.35        Treatment Plan:individual psychotherapy and medication management by physician  Target symptoms: mood disorder  Why chosen therapy is appropriate  versus another modality: relevant to diagnosis, patient responds to this modality, evidence based practice  Outcome monitoring methods: self-report, observation, checklist/rating scale  Therapeutic intervention type: behavior modifying psychotherapy  Prognosis: Good      Behavioral goals:    Exercise:   Stress management:   Social engagement:   Nutrition:   Smoking Cessation:   Therapy:  Referral to integrative oncology  Monitor stressors in writing and bring to next visit    Return to clinic: as scheduled    Next Session:      Length of Service (minutes direct face-to-face contact): 45    Brittani Mahan, PhD  Clinical Psychologist  [place] License #[id number]  [place] License #[id number]

## 2022-10-11 ENCOUNTER — SPECIALTY PHARMACY (OUTPATIENT)
Dept: PHARMACY | Facility: CLINIC | Age: 42
End: 2022-10-11
Payer: COMMERCIAL

## 2022-10-11 NOTE — TELEPHONE ENCOUNTER
Specialty Pharmacy - Refill Coordination    Specialty Medication Orders Linked to Encounter      Flowsheet Row Most Recent Value   Medication #1 HUMIRA,CF, PEN 80 mg/0.8 mL PnKt (Order#[id number], Rx#[id number])            Refill Questions - Documented Responses      Flowsheet Row Most Recent Value   Patient Availability and HIPAA Verification    Does patient want to proceed with activity? Yes   HIPAA/medical authority confirmed? Yes   Relationship to patient of person spoken to? Self   Refill Screening Questions    Changes to allergies? No   Changes to medications? No   New conditions since last clinic visit? No   Unplanned office visit, urgent care, ED, or hospital admission in the last 4 weeks? No   How does patient/caregiver feel medication is working? Too soon to tell   Financial problems or insurance changes? No   How many doses of your specialty medications were missed in the last 4 weeks? 0   Would patient like to speak to a pharmacist? No   When does the patient need to receive the medication? 10/15/22   Refill Delivery Questions    How will the patient receive the medication? [facility]   When does the patient need to receive the medication? 10/15/22   Shipping Address Home   Address in [facility] confirmed and updated if neccessary? Yes   Expected Copay ($) 5   Is the patient able to afford the medication copay? Yes   Payment Method CC on file   Days supply of Refill 28   Supplies needed? --  [Injection Kit]   Refill activity completed? Yes   Refill activity plan Refill scheduled   Shipment/Pickup Date: 10/12/22            Current Outpatient Medications   Medication Sig    ALPRAZolam (XANAX) 0.5 MG tablet Take 1 tablet (0.5 mg total) by mouth nightly as needed.    b complex vitamins capsule Take 1 capsule by mouth once daily.    B3-azelaic-zinc-B6-copper-FA (NICAZEL) 600-5-10-5-1.5 mg Tab take 1 tablet by mouth twice daily    CALCIUM CITRATE ORAL Take 1 tablet by mouth 3 (three) times daily. chewable     clindamycin (CLEOCIN T) 1 % external solution Apply to affected areas of underarm twice a day as needed for folliculitis.    clindamycin (CLEOCIN T) 1 % lotion Apply to affected area two times a day.    cyclobenzaprine (FLEXERIL) 5 MG tablet Take 1 tablet (5 mg total) by mouth 2 (two) times daily as needed for Muscle spasms.    estrogens, conjugated, (PREMARIN) 1.25 MG tablet Take 1 tablet (1.25 mg total) by mouth once daily.    finasteride (PROSCAR) 5 mg tablet Take 1 tablet (5 mg total) by mouth once daily.    fluocinolone acetonide oiL (DERMOTIC OIL) 0.01 % Drop Use to affected ears at night, drop in canal and use to ear when flaring    galcanezumab-gnlm (EMGALITY PEN) 120 mg/mL PnIj Inject 120 mg into the skin every 30 days.    HUMIRA,CF, PEN 80 mg/0.8 mL PnKt Starting day 29, inject 80 mg (1 pen) into the skin every 2 weeks.    ketoconazole (NIZORAL) 2 % cream Apply to affected area two times a day (Patient taking differently: Apply to affected area two times a day)    multivitamin capsule Take by mouth once daily.    nystatin-triamcinolone (MYCOLOG II) cream Apply topically 2 (two) times daily.    omeprazole (PRILOSEC) 40 MG capsule Take 1 capsule (40 mg total) by mouth every morning.    ondansetron (ZOFRAN-ODT) 4 MG TbDL Take 1 tablet (4 mg total) by mouth every 6 (six) hours as needed (for nausea).    pimecrolimus (ELIDEL) 1 % cream Apply to affected areas of underarm twice a day as needed for irritation. (Patient taking differently: Apply to affected areas of underarm twice a day as needed for irritation.)    spironolactone (ALDACTONE) 50 MG tablet Start with taking 1 tablet by mouth every day, may increase to 2 tablets by mouth every day as tolerated.    thiamine (VITAMIN B-1) 50 MG tablet Take 50 mg by mouth once daily.    topiramate (TOPAMAX) 25 MG tablet Take 1 tablet (25 mg total) by mouth once daily.    ubrogepant (UBRELVY) 50 mg tablet Take 1 tab by mouth for headaches. May repeat once 2 hours after  initial dose based on response and tolerability.    venlafaxine (EFFEXOR-XR) 150 MG Cp24 Take 1 capsule (150 mg total) by mouth once daily.   Last reviewed on 10/10/2022  3:07 PM by Brittani Mahan, PhD    Review of patient's allergies indicates:  No Known Allergies Last reviewed on  10/10/2022 3:07 PM by Brittani Mahan      Tasks added this encounter   11/5/2022 - Refill Call (Auto Added)   Tasks due within next 3 months   No tasks due.     Antonietta Negron, PharmD  [facility]  [address] [address]  Phone: [phone]  Fax: [phone]

## 2022-10-13 ENCOUNTER — OFFICE VISIT (OUTPATIENT)
Dept: URGENT CARE | Facility: CLINIC | Age: 42
End: 2022-10-13
Payer: COMMERCIAL

## 2022-10-13 VITALS
TEMPERATURE: 99 F | RESPIRATION RATE: 16 BRPM | OXYGEN SATURATION: 96 % | HEIGHT: 65 IN | WEIGHT: 270 LBS | SYSTOLIC BLOOD PRESSURE: 117 MMHG | DIASTOLIC BLOOD PRESSURE: 77 MMHG | BODY MASS INDEX: 44.98 KG/M2 | HEART RATE: 76 BPM

## 2022-10-13 DIAGNOSIS — J02.9 SORE THROAT: Primary | ICD-10-CM

## 2022-10-13 DIAGNOSIS — R05.9 COUGH, UNSPECIFIED TYPE: ICD-10-CM

## 2022-10-13 PROCEDURE — 99214 PR OFFICE/OUTPT VISIT, EST, LEVL IV, 30-39 MIN: ICD-10-PCS | Mod: S$GLB,,, | Performed by: NURSE PRACTITIONER

## 2022-10-13 PROCEDURE — 87502 INFLUENZA DNA AMP PROBE: CPT | Mod: QW,S$GLB,, | Performed by: NURSE PRACTITIONER

## 2022-10-13 PROCEDURE — 3074F SYST BP LT 130 MM HG: CPT | Mod: CPTII,S$GLB,, | Performed by: NURSE PRACTITIONER

## 2022-10-13 PROCEDURE — U0002 COVID-19 LAB TEST NON-CDC: HCPCS | Mod: QW,S$GLB,, | Performed by: NURSE PRACTITIONER

## 2022-10-13 PROCEDURE — 99214 OFFICE O/P EST MOD 30 MIN: CPT | Mod: S$GLB,,, | Performed by: NURSE PRACTITIONER

## 2022-10-13 PROCEDURE — U0002: ICD-10-PCS | Mod: QW,S$GLB,, | Performed by: NURSE PRACTITIONER

## 2022-10-13 PROCEDURE — 1159F MED LIST DOCD IN RCRD: CPT | Mod: CPTII,S$GLB,, | Performed by: NURSE PRACTITIONER

## 2022-10-13 PROCEDURE — 87502 POCT INFLUENZA A/B MOLECULAR: ICD-10-PCS | Mod: QW,S$GLB,, | Performed by: NURSE PRACTITIONER

## 2022-10-13 PROCEDURE — 3074F PR MOST RECENT SYSTOLIC BLOOD PRESSURE < 130 MM HG: ICD-10-PCS | Mod: CPTII,S$GLB,, | Performed by: NURSE PRACTITIONER

## 2022-10-13 PROCEDURE — 3078F DIAST BP <80 MM HG: CPT | Mod: CPTII,S$GLB,, | Performed by: NURSE PRACTITIONER

## 2022-10-13 PROCEDURE — 3078F PR MOST RECENT DIASTOLIC BLOOD PRESSURE < 80 MM HG: ICD-10-PCS | Mod: CPTII,S$GLB,, | Performed by: NURSE PRACTITIONER

## 2022-10-13 PROCEDURE — 1159F PR MEDICATION LIST DOCUMENTED IN MEDICAL RECORD: ICD-10-PCS | Mod: CPTII,S$GLB,, | Performed by: NURSE PRACTITIONER

## 2022-10-13 RX ORDER — BENZONATATE 200 MG/1
200 CAPSULE ORAL 3 TIMES DAILY PRN
Qty: 30 CAPSULE | Refills: 0 | Status: SHIPPED | OUTPATIENT
Start: 2022-10-13 | End: 2022-10-24

## 2022-10-13 NOTE — PATIENT INSTRUCTIONS
"Cough   If your condition worsens or fails to improve we recommend that you receive another evaluation at the ER immediately or contact your PCP to discuss your concerns or return here. You must understand that you've received an urgent care treatment only and that you may be released before all your medical problems are known or treated. You the patient will arrange for follouwp care as instructed. .  Rest and fluids are important  Can use honey with nae to soothe your throat  Take prescription cough meds (pills) as prescribed; take prescription cough syrup at night as needed for cough.  Do not take both the prescribed cough pills and syrup at the same time.   -  Flonase (fluticasone) is a nasal spray which is available over the counter and may help with your symptoms.   -  If you have hypertension avoid using the "D" which is the decongestant.  Instead you can use Coricidin HBP for cold and cough symptoms.    -  If you just have drainage you can take plain Zyrtec, Claritin or Allegra   -  Tylenol or ibuprofen can also be used as directed for pain unless you have an allergy to them or medical condition such as stomach ulcers, kidney or liver disease or blood thinners etc for which you should not be taking these type of medications.   Please follow up with your primary care doctor or specialist in the next 48-72hrs as needed and if no improvement  If you  smoke, please stop smoking.   "

## 2022-10-13 NOTE — PROGRESS NOTES
"Subjective:       Patient ID: Fran Higgins is a 42 y.o. female.    Vitals:  height is 5' 5" (1.651 m) and weight is 122.5 kg (270 lb). Her temperature is 98.7 °F (37.1 °C). Her blood pressure is 117/77 and her pulse is 76. Her respiration is 16 and oxygen saturation is 96%.     Chief Complaint: Cough    Pt is 43 yo female present today for cough and other uri symptoms. Pt states that symptoms started a week ago. Experiencing sore throat and hoarseness. Pt is not getting better. Production of green mucus. 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 Pt has taken mucinex, delsum, tylenol for relief.     Cough  This is a new problem. The current episode started in the past 7 days. The problem has been gradually worsening. The problem occurs constantly. The cough is Productive of sputum (green mucus). Associated symptoms include a sore throat. Associated symptoms comments: hoarseness. Nothing aggravates the symptoms. Treatments tried: mucinex, delsum, tylenol. The treatment provided no relief.     HENT:  Positive for sore throat.    Respiratory:  Positive for cough.      Objective:      Physical Exam   HENT:   Head: Normocephalic and atraumatic.   Ears:   Right Ear: External ear normal.   Left Ear: External ear normal.   Mouth/Throat: Uvula is midline and mucous membranes are normal. Posterior oropharyngeal erythema present.      Comments: Erythematous pharynx, no exudate, no lesion, uvula midline.     Pulmonary/Chest: Effort normal and breath sounds normal.   Abdominal: Normal appearance.   Neurological: no focal deficit. She is alert.   Skin: Skin is warm.   Nursing note and vitals reviewed.      Results for orders placed or performed in visit on 10/13/22   POCT Influenza A/B MOLECULAR   Result Value Ref Range    POC Molecular Influenza A Ag Negative Negative, Not Reported    POC Molecular Influenza B Ag Negative Negative, Not Reported     Acceptable Yes    POCT COVID-19 Rapid " Screening   Result Value Ref Range    POC Rapid COVID Negative Negative     Acceptable Yes       Assessment:       1. Sore throat    2. Cough, unspecified type          Plan:     Influenza A & B negative  Covid negative    history and exam findings reviewed, reassurance provided  - differential reviewed, presentation is very consistent with acute URI, likely viral  - supportive care measures reviewed, have recommended increased oral fluids and judicious use of OTC cough remedies  - treatment options reviewed, have counseled that antibiotics are not indicated at this time, and the patient expressed understanding  - questions answered, warning signs reviewed, patient is comfortable with plan to monitor condition and was encouraged to call the office for any concerns or worsening of condition      Sore throat  -     POCT Influenza A/B MOLECULAR  -     POCT COVID-19 Rapid Screening    Cough, unspecified type  -     benzonatate (TESSALON) 200 MG capsule; Take 1 capsule (200 mg total) by mouth 3 (three) times daily as needed for Cough.  Dispense: 30 capsule; Refill: 0               Patient Instructions   Cough   If your condition worsens or fails to improve we recommend that you receive another evaluation at the ER immediately or contact your PCP to discuss your concerns or return here. You must understand that you've received an urgent care treatment only and that you may be released before all your medical problems are known or treated. You the patient will arrange for follouwp care as instructed. .  Rest and fluids are important  Can use honey with nae to soothe your throat  Take prescription cough meds (pills) as prescribed; take prescription cough syrup at night as needed for cough.  Do not take both the prescribed cough pills and syrup at the same time.   -  Flonase (fluticasone) is a nasal spray which is available over the counter and may help with your symptoms.   -  If you have hypertension  "avoid using the "D" which is the decongestant.  Instead you can use Coricidin HBP for cold and cough symptoms.    -  If you just have drainage you can take plain Zyrtec, Claritin or Allegra   -  Tylenol or ibuprofen can also be used as directed for pain unless you have an allergy to them or medical condition such as stomach ulcers, kidney or liver disease or blood thinners etc for which you should not be taking these type of medications.   Please follow up with your primary care doctor or specialist in the next 48-72hrs as needed and if no improvement  If you  smoke, please stop smoking.      "

## 2022-10-19 ENCOUNTER — E-VISIT (OUTPATIENT)
Dept: INTERNAL MEDICINE | Facility: CLINIC | Age: 42
End: 2022-10-19
Payer: COMMERCIAL

## 2022-10-19 DIAGNOSIS — B96.89 ACUTE BACTERIAL BRONCHITIS: Primary | ICD-10-CM

## 2022-10-19 DIAGNOSIS — J20.8 ACUTE BACTERIAL BRONCHITIS: Primary | ICD-10-CM

## 2022-10-19 PROCEDURE — 99421 PR E&M, ONLINE DIGIT, EST, < 7 DAYS, 5-10 MINS: ICD-10-PCS | Mod: S$GLB,,, | Performed by: HOSPITALIST

## 2022-10-19 PROCEDURE — 99421 OL DIG E/M SVC 5-10 MIN: CPT | Mod: S$GLB,,, | Performed by: HOSPITALIST

## 2022-10-19 RX ORDER — PROMETHAZINE HYDROCHLORIDE AND DEXTROMETHORPHAN HYDROBROMIDE 6.25; 15 MG/5ML; MG/5ML
5 SYRUP ORAL EVERY 6 HOURS PRN
Qty: 118 ML | Refills: 0 | Status: SHIPPED | OUTPATIENT
Start: 2022-10-19 | End: 2022-10-29

## 2022-10-19 RX ORDER — AZITHROMYCIN 250 MG/1
TABLET, FILM COATED ORAL
Qty: 6 TABLET | Refills: 0 | Status: SHIPPED | OUTPATIENT
Start: 2022-10-19 | End: 2022-10-24

## 2022-10-19 RX ORDER — ALBUTEROL SULFATE 90 UG/1
2 AEROSOL, METERED RESPIRATORY (INHALATION) EVERY 6 HOURS PRN
Qty: 18 G | Refills: 0 | Status: SHIPPED | OUTPATIENT
Start: 2022-10-19 | End: 2022-11-28

## 2022-10-19 NOTE — PROGRESS NOTES
Patient ID: Fran Higgins is a 42 y.o. female.    Chief Complaint: Cough    The patient initiated a request through [facility] on 10/19/2022 for evaluation and management with a chief complaint of Cough     I evaluated the questionnaire submission on 10/19/22.    Ohs Peq Evisit Upper Respitatory/Cough Questionnaire    10/19/2022  9:00 AM CDT - Filed by Patient   Do you agree to participate in an E-Visit? Yes   If you have any of the following symptoms, please present to your local ER or call 911:  I acknowledge   What is the main issue that you would like for your doctor to address today? possible bronchitis   Are you able to take your vital signs? No   What symptoms do you currently have?  Cough;  Fatigue;  Headache;  Muscle or body aches   Have you had a fever? No   When did your symptoms first appear? 10/5/2022   In the last two weeks, have you been in close contact with someone who has COVID-19? No   In the last two weeks, have you worked or volunteered in a healthcare facility or as a ? Healthcare facilities include a hospital, medical or dental clinic, long-term care facility, or nursing home Yes   Do you live in a long-term care facility, nursing home, or homeless shelter? No   List what you have done or taken to help your symptoms. mucinex,  sudafed, tylenol, delsym, went to urgent care, covid/flu negative, tessalon perles, steamy showers, rest   How severe are your symptoms? Moderate   Have you taken an at home Covid test? Yes   What were the results? Negative   Have you been fully vaccinated for COVID? (2 Pfizer, 2 Moderna or 1 Rashaun & Rashaun vaccine injections) Yes   Have you received a booster? Yes   Do you have transportation to get tested for COVID if it is indicated and ordered for you at an [facility] location? Yes   Provide any information you feel is important to your history not asked above Shortness of breath and coughing is miserable. Cannot sleep from it.  productive cough  with green sputum.  Lost my voice for 6 days.  symptoms keep getting worse   Please attach any relevant images or files           Active Problem List with Overview Notes    Diagnosis Date Noted    Hidradenitis 03/14/2022    Seborrheic dermatitis 03/14/2022    BMI 45.0-49.9, adult 10/07/2021    S/P laparoscopic sleeve gastrectomy 07/13/2021    Obesity 06/22/2021    Prediabetes 12/22/2020    Major depressive disorder, recurrent episode, in partial remission with anxious distress 07/16/2020    Menopause 04/09/2020    Dizziness 04/09/2020    Galactorrhea in female- bilateral breast 04/09/2020    Other insomnia 04/09/2020    Yeast vaginitis 03/18/2020    Surgical menopause 03/13/2020    s/p robotic hysterectomy/RSO 3/9/20 03/09/2020    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    DUB (dysfunctional uterine bleeding) 01/30/2020    Migraine with aura, not intractable 01/30/2020    Vaginal yeast infection 09/26/2019    Abnormal uterine bleeding (AUB) 09/13/2019    Pericardial cyst 07/26/2019    Mucinous tumor, 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    Left ovarian cyst 06/25/2019    S/P RA Laparoscopic LSO 06/25/2019    Depression 05/17/2019    Scoliosis of cervical spine 01/01/1994      Recent Labs Obtained:  Office Visit on 10/13/2022   Component Date Value Ref Range Status    POC Molecular Influenza A Ag 10/13/2022 Negative  Negative, Not Reported Final    POC Molecular Influenza B Ag 10/13/2022 Negative  Negative, Not Reported Final     Acceptable 10/13/2022 Yes   Final    POC Rapid COVID 10/13/2022 Negative  Negative Final     Acceptable 10/13/2022 Yes   Final       Encounter Diagnosis   Name Primary?    Acute bacterial bronchitis Yes        No orders of the defined types were placed in this encounter.           E-Visit Time Tracking:

## 2022-10-20 DIAGNOSIS — E66.01 CLASS 3 SEVERE OBESITY DUE TO EXCESS CALORIES WITH SERIOUS COMORBIDITY AND BODY MASS INDEX (BMI) OF 50.0 TO 59.9 IN ADULT: ICD-10-CM

## 2022-10-20 RX ORDER — OMEPRAZOLE 40 MG/1
40 CAPSULE, DELAYED RELEASE ORAL EVERY MORNING
Qty: 30 CAPSULE | Refills: 1 | Status: CANCELLED | OUTPATIENT
Start: 2022-10-20

## 2022-10-26 ENCOUNTER — TELEPHONE (OUTPATIENT)
Dept: GYNECOLOGIC ONCOLOGY | Facility: CLINIC | Age: 42
End: 2022-10-26
Payer: COMMERCIAL

## 2022-10-28 ENCOUNTER — TELEPHONE (OUTPATIENT)
Dept: GYNECOLOGIC ONCOLOGY | Facility: CLINIC | Age: 42
End: 2022-10-28
Payer: COMMERCIAL

## 2022-11-02 DIAGNOSIS — E66.01 CLASS 3 SEVERE OBESITY DUE TO EXCESS CALORIES WITH SERIOUS COMORBIDITY AND BODY MASS INDEX (BMI) OF 50.0 TO 59.9 IN ADULT: ICD-10-CM

## 2022-11-02 RX ORDER — OMEPRAZOLE 40 MG/1
40 CAPSULE, DELAYED RELEASE ORAL EVERY MORNING
Qty: 30 CAPSULE | Refills: 1 | Status: CANCELLED | OUTPATIENT
Start: 2022-11-02

## 2022-11-02 RX ORDER — OMEPRAZOLE 40 MG/1
40 CAPSULE, DELAYED RELEASE ORAL EVERY MORNING
Qty: 30 CAPSULE | Refills: 1 | Status: CANCELLED | OUTPATIENT
Start: 2022-10-20

## 2022-11-04 ENCOUNTER — PATIENT MESSAGE (OUTPATIENT)
Dept: BARIATRICS | Facility: CLINIC | Age: 42
End: 2022-11-04
Payer: COMMERCIAL

## 2022-11-09 RX ORDER — VENLAFAXINE HYDROCHLORIDE 150 MG/1
150 CAPSULE, EXTENDED RELEASE ORAL DAILY
Qty: 30 CAPSULE | Refills: 1 | Status: SHIPPED | OUTPATIENT
Start: 2022-11-09 | End: 2022-11-28

## 2022-11-10 ENCOUNTER — SPECIALTY PHARMACY (OUTPATIENT)
Dept: PHARMACY | Facility: CLINIC | Age: 42
End: 2022-11-10
Payer: COMMERCIAL

## 2022-11-10 NOTE — TELEPHONE ENCOUNTER
Specialty Pharmacy - Refill Coordination    Specialty Medication Orders Linked to Encounter      Flowsheet Row Most Recent Value   Medication #1 HUMIRA,CF, PEN 80 mg/0.8 mL PnKt (Order#[id number], Rx#[id number])            Refill Questions - Documented Responses      Flowsheet Row Most Recent Value   Patient Availability and HIPAA Verification    Does patient want to proceed with activity? Yes   HIPAA/medical authority confirmed? Yes   Relationship to patient of person spoken to? Self   Refill Screening Questions    Changes to allergies? No   Changes to medications? No   New conditions since last clinic visit? No   Unplanned office visit, urgent care, ED, or hospital admission in the last 4 weeks? No   How does patient/caregiver feel medication is working? Very good   Financial problems or insurance changes? No   How many doses of your specialty medications were missed in the last 4 weeks? 0   Would patient like to speak to a pharmacist? No   When does the patient need to receive the medication? 11/11/22   Refill Delivery Questions    How will the patient receive the medication? [facility]   When does the patient need to receive the medication? 11/11/22   Shipping Address Home   Address in [facility] confirmed and updated if neccessary? Yes   Expected Copay ($) 5   Is the patient able to afford the medication copay? Yes   Payment Method CC on file   Days supply of Refill 28   Supplies needed? No supplies needed   Refill activity completed? Yes   Refill activity plan Refill scheduled   Shipment/Pickup Date: 11/10/22            Current Outpatient Medications   Medication Sig    albuterol (VENTOLIN HFA) 90 mcg/actuation inhaler Inhale 2 puffs into the lungs every 6 (six) hours as needed for Wheezing. Rescue    ALPRAZolam (XANAX) 0.5 MG tablet Take 1 tablet (0.5 mg total) by mouth nightly as needed.    b complex vitamins capsule Take 1 capsule by mouth once daily.    B3-azelaic-zinc-B6-copper-FA (NICAZEL)  600-5-10-5-1.5 mg Tab take 1 tablet by mouth twice daily    CALCIUM CITRATE ORAL Take 1 tablet by mouth 3 (three) times daily. chewable    clindamycin (CLEOCIN T) 1 % external solution Apply to affected areas of underarm twice a day as needed for folliculitis.    clindamycin (CLEOCIN T) 1 % lotion Apply to affected area two times a day.    cyclobenzaprine (FLEXERIL) 5 MG tablet Take 1 tablet (5 mg total) by mouth 2 (two) times daily as needed for Muscle spasms.    estrogens, conjugated, (PREMARIN) 1.25 MG tablet Take 1 tablet (1.25 mg total) by mouth once daily.    finasteride (PROSCAR) 5 mg tablet Take 1 tablet (5 mg total) by mouth once daily.    fluocinolone acetonide oiL (DERMOTIC OIL) 0.01 % Drop Use to affected ears at night, drop in canal and use to ear when flaring    galcanezumab-gnlm (EMGALITY PEN) 120 mg/mL PnIj Inject 120 mg into the skin every 30 days.    ZACKARY LINARES, PEN 80 mg/0.8 mL PnKt Starting day 29, inject 80 mg (1 pen) into the skin every 2 weeks.    ketoconazole (NIZORAL) 2 % cream Apply to affected area two times a day (Patient taking differently: Apply to affected area two times a day)    multivitamin capsule Take by mouth once daily.    nystatin-triamcinolone (MYCOLOG II) cream Apply topically 2 (two) times daily.    omeprazole (PRILOSEC) 40 MG capsule Take 1 capsule (40 mg total) by mouth every morning.    ondansetron (ZOFRAN-ODT) 4 MG TbDL Take 1 tablet (4 mg total) by mouth every 6 (six) hours as needed (for nausea).    pimecrolimus (ELIDEL) 1 % cream Apply to affected areas of underarm twice a day as needed for irritation. (Patient taking differently: Apply to affected areas of underarm twice a day as needed for irritation.)    spironolactone (ALDACTONE) 50 MG tablet Start with taking 1 tablet by mouth every day, may increase to 2 tablets by mouth every day as tolerated.    thiamine (VITAMIN B-1) 50 MG tablet Take 50 mg by mouth once daily.    topiramate (TOPAMAX) 25 MG tablet Take 1  tablet (25 mg total) by mouth once daily.    ubrogepant (UBRELVY) 50 mg tablet Take 1 tab by mouth for headaches. May repeat once 2 hours after initial dose based on response and tolerability.    venlafaxine (EFFEXOR-XR) 150 MG Cp24 Take 1 capsule (150 mg total) by mouth once daily.   Last reviewed on 10/19/2022  4:23 PM by Erica Pineda MD    Review of patient's allergies indicates:  No Known Allergies Last reviewed on  10/19/2022 4:23 PM by Erica Pineda      Tasks added this encounter   12/2/2022 - Refill Call (Auto Added)   Tasks due within next 3 months   No tasks due.     Joleen Butt, PharmD  [facility]  [address] [place] [id number]  Phone: [phone]  Fax: [phone]

## 2022-11-11 ENCOUNTER — TELEPHONE (OUTPATIENT)
Dept: GYNECOLOGIC ONCOLOGY | Facility: CLINIC | Age: 42
End: 2022-11-11
Payer: COMMERCIAL

## 2022-11-11 NOTE — TELEPHONE ENCOUNTER
Left voicemail we need to reschedule the appointment on 30th the provider will not be in clinic that afternoon.

## 2022-11-12 ENCOUNTER — IMMUNIZATION (OUTPATIENT)
Dept: INTERNAL MEDICINE | Facility: CLINIC | Age: 42
End: 2022-11-12
Payer: COMMERCIAL

## 2022-11-12 PROCEDURE — 90686 IIV4 VACC NO PRSV 0.5 ML IM: CPT | Mod: S$GLB,,, | Performed by: INTERNAL MEDICINE

## 2022-11-12 PROCEDURE — 90686 FLU VACCINE (QUAD) GREATER THAN OR EQUAL TO 3YO PRESERVATIVE FREE IM: ICD-10-PCS | Mod: S$GLB,,, | Performed by: INTERNAL MEDICINE

## 2022-11-12 PROCEDURE — 90471 IMMUNIZATION ADMIN: CPT | Mod: S$GLB,,, | Performed by: INTERNAL MEDICINE

## 2022-11-12 PROCEDURE — 90471 FLU VACCINE (QUAD) GREATER THAN OR EQUAL TO 3YO PRESERVATIVE FREE IM: ICD-10-PCS | Mod: S$GLB,,, | Performed by: INTERNAL MEDICINE

## 2022-11-14 ENCOUNTER — TELEPHONE (OUTPATIENT)
Dept: GYNECOLOGIC ONCOLOGY | Facility: CLINIC | Age: 42
End: 2022-11-14
Payer: COMMERCIAL

## 2022-11-14 DIAGNOSIS — E66.01 CLASS 3 SEVERE OBESITY DUE TO EXCESS CALORIES WITH SERIOUS COMORBIDITY AND BODY MASS INDEX (BMI) OF 50.0 TO 59.9 IN ADULT: ICD-10-CM

## 2022-11-14 RX ORDER — OMEPRAZOLE 40 MG/1
40 CAPSULE, DELAYED RELEASE ORAL EVERY MORNING
Qty: 30 CAPSULE | Refills: 1 | Status: CANCELLED | OUTPATIENT
Start: 2022-11-02

## 2022-11-14 NOTE — TELEPHONE ENCOUNTER
Left voicemail with new appointment date and time. The provider will to be in the office that afternoon. Appointment mail.

## 2022-11-16 ENCOUNTER — PATIENT MESSAGE (OUTPATIENT)
Dept: BARIATRICS | Facility: CLINIC | Age: 42
End: 2022-11-16
Payer: COMMERCIAL

## 2022-11-16 DIAGNOSIS — E66.01 CLASS 3 SEVERE OBESITY DUE TO EXCESS CALORIES WITH SERIOUS COMORBIDITY AND BODY MASS INDEX (BMI) OF 50.0 TO 59.9 IN ADULT: ICD-10-CM

## 2022-11-16 DIAGNOSIS — K21.9 GASTROESOPHAGEAL REFLUX DISEASE, UNSPECIFIED WHETHER ESOPHAGITIS PRESENT: Primary | ICD-10-CM

## 2022-11-16 RX ORDER — OMEPRAZOLE 40 MG/1
40 CAPSULE, DELAYED RELEASE ORAL EVERY MORNING
Qty: 30 CAPSULE | Refills: 1 | Status: CANCELLED | OUTPATIENT
Start: 2022-11-16

## 2022-11-17 RX ORDER — OMEPRAZOLE 40 MG/1
40 CAPSULE, DELAYED RELEASE ORAL EVERY MORNING
Qty: 30 CAPSULE | Refills: 1 | Status: SHIPPED | OUTPATIENT
Start: 2022-11-17 | End: 2023-01-23 | Stop reason: SDUPTHER

## 2022-11-28 ENCOUNTER — OFFICE VISIT (OUTPATIENT)
Dept: PSYCHIATRY | Facility: CLINIC | Age: 42
End: 2022-11-28
Payer: COMMERCIAL

## 2022-11-28 ENCOUNTER — OFFICE VISIT (OUTPATIENT)
Dept: OPTOMETRY | Facility: CLINIC | Age: 42
End: 2022-11-28
Payer: COMMERCIAL

## 2022-11-28 DIAGNOSIS — G47.09 OTHER INSOMNIA: ICD-10-CM

## 2022-11-28 DIAGNOSIS — H04.123 DRY EYE SYNDROME, BILATERAL: Primary | ICD-10-CM

## 2022-11-28 DIAGNOSIS — Z04.9 DISEASE RULED OUT AFTER EXAMINATION: ICD-10-CM

## 2022-11-28 DIAGNOSIS — Z46.0 FITTING AND ADJUSTMENT OF SPECTACLES AND CONTACT LENSES: ICD-10-CM

## 2022-11-28 DIAGNOSIS — H52.203 MYOPIC ASTIGMATISM OF BOTH EYES: ICD-10-CM

## 2022-11-28 DIAGNOSIS — F33.41 RECURRENT MAJOR DEPRESSIVE DISORDER IN PARTIAL REMISSION: Primary | ICD-10-CM

## 2022-11-28 DIAGNOSIS — H52.13 MYOPIC ASTIGMATISM OF BOTH EYES: ICD-10-CM

## 2022-11-28 DIAGNOSIS — H53.8 BLURRY VISION, BILATERAL: ICD-10-CM

## 2022-11-28 PROCEDURE — 92310 CONTACT LENS FITTING OU: CPT | Mod: S$GLB,,, | Performed by: OPTOMETRIST

## 2022-11-28 PROCEDURE — 92014 PR EYE EXAM, EST PATIENT,COMPREHESV: ICD-10-PCS | Mod: S$GLB,,, | Performed by: OPTOMETRIST

## 2022-11-28 PROCEDURE — 92310 PR CONTACT LENS FITTING (NO CHANGE): ICD-10-PCS | Mod: S$GLB,,, | Performed by: OPTOMETRIST

## 2022-11-28 PROCEDURE — 92014 COMPRE OPH EXAM EST PT 1/>: CPT | Mod: S$GLB,,, | Performed by: OPTOMETRIST

## 2022-11-28 PROCEDURE — 1159F MED LIST DOCD IN RCRD: CPT | Mod: CPTII,95,, | Performed by: PSYCHIATRY & NEUROLOGY

## 2022-11-28 PROCEDURE — 92015 PR REFRACTION: ICD-10-PCS | Mod: S$GLB,,, | Performed by: OPTOMETRIST

## 2022-11-28 PROCEDURE — 99999 PR PBB SHADOW E&M-EST. PATIENT-LVL IV: CPT | Mod: PBBFAC,,, | Performed by: OPTOMETRIST

## 2022-11-28 PROCEDURE — 1160F PR REVIEW ALL MEDS BY PRESCRIBER/CLIN PHARMACIST DOCUMENTED: ICD-10-PCS | Mod: CPTII,95,, | Performed by: PSYCHIATRY & NEUROLOGY

## 2022-11-28 PROCEDURE — 92015 DETERMINE REFRACTIVE STATE: CPT | Mod: S$GLB,,, | Performed by: OPTOMETRIST

## 2022-11-28 PROCEDURE — 99213 PR OFFICE/OUTPT VISIT, EST, LEVL III, 20-29 MIN: ICD-10-PCS | Mod: 95,,, | Performed by: PSYCHIATRY & NEUROLOGY

## 2022-11-28 PROCEDURE — 99213 OFFICE O/P EST LOW 20 MIN: CPT | Mod: 95,,, | Performed by: PSYCHIATRY & NEUROLOGY

## 2022-11-28 PROCEDURE — 1159F PR MEDICATION LIST DOCUMENTED IN MEDICAL RECORD: ICD-10-PCS | Mod: CPTII,95,, | Performed by: PSYCHIATRY & NEUROLOGY

## 2022-11-28 PROCEDURE — 99212 OFFICE O/P EST SF 10 MIN: CPT | Performed by: PSYCHIATRY & NEUROLOGY

## 2022-11-28 PROCEDURE — 1160F RVW MEDS BY RX/DR IN RCRD: CPT | Mod: CPTII,95,, | Performed by: PSYCHIATRY & NEUROLOGY

## 2022-11-28 PROCEDURE — 99999 PR PBB SHADOW E&M-EST. PATIENT-LVL IV: ICD-10-PCS | Mod: PBBFAC,,, | Performed by: OPTOMETRIST

## 2022-11-28 RX ORDER — ALPRAZOLAM 0.5 MG/1
0.5 TABLET ORAL NIGHTLY PRN
Qty: 30 TABLET | Refills: 1 | Status: SHIPPED | OUTPATIENT
Start: 2022-11-28 | End: 2023-08-28 | Stop reason: SDUPTHER

## 2022-11-28 RX ORDER — VENLAFAXINE HYDROCHLORIDE 150 MG/1
150 CAPSULE, EXTENDED RELEASE ORAL DAILY
Qty: 30 CAPSULE | Refills: 6 | Status: SHIPPED | OUTPATIENT
Start: 2022-11-28 | End: 2023-07-03 | Stop reason: SDUPTHER

## 2022-11-28 NOTE — PROGRESS NOTES
"The patient location is:  In parking lot at [facility].    The chief complaint leading to consultation is: depression    Visit type: audiovisual    Face to Face time with patient: 16 mins  21 minutes of total time spent on the encounter, which includes face to face time and non-face to face time preparing to see the patient (eg, review of tests), Obtaining and/or reviewing separately obtained history, Documenting clinical information in the electronic or other health record, Independently interpreting results (not separately reported) and communicating results to the patient/family/caregiver, or Care coordination (not separately reported).     Each patient to whom he or she provides medical services by telemedicine is:  (1) informed of the relationship between the physician and patient and the respective role of any other health care provider with respect to management of the patient; and (2) notified that he or she may decline to receive medical services by telemedicine and may withdraw from such care at any time.    Notes:     Outpatient Psychiatry Follow-Up Visit (MD/NP)    11/28/2022    Clinical Status of Patient:  Outpatient (Ambulatory)    Chief Complaint:  Fran Higgins is a 42 y.o. female who presents today for follow-up of depression.  Met with patient.      Interval History and Content of Current Session:  Interim Events/Subjective Report/Content of Current Session:  "I've been all right."  Doing better with falling asleep but not as good with staying asleep.  Awakens at 3 am and unable to fall asleep.  Denies naps.  Retires at 9 pm.  Denies worry when she awakens.  Working 12 hrs shifts 3 or more days a week.  Stays busy on weekends with kids.  Denies sadness or anxiety.  Engaged in activities.  Does not want medication for sleep.        Prior trials:  Benadryl - inconsistent  prozac when younger worked  Trazodone - HA in the morning  Melatonin - " nightmares      Psychotherapy:  Target symptoms: depression  Why chosen therapy is appropriate versus another modality: relevant to diagnosis  Outcome monitoring methods: self-report  Therapeutic intervention type: supportive psychotherapy  Topics discussed/themes: building skills sets for symptom management, symptom recognition  The patient's response to the intervention is accepting. The patient's progress toward treatment goals is excellent.   Duration of intervention:  minutes.    Review of Systems   Constitutional:  Negative for chills and fever.   Respiratory:  Negative for cough, shortness of breath and wheezing.    Cardiovascular:  Negative for chest pain and palpitations.   Gastrointestinal:  Negative for abdominal pain, diarrhea and vomiting.   Genitourinary:  Negative for dysuria and frequency.       Past Medical, Family and Social History: The patient's past medical, family and social history have been reviewed and updated as appropriate within the electronic medical record - see encounter notes.    Compliance: yes    Side effects: None    Risk Parameters:  Patient reports no suicidal ideation  Patient reports no homicidal ideation  Patient reports no self-injurious behavior  Patient reports no violent behavior    Exam (detailed: at least 9 elements; comprehensive: all 15 elements)   Constitutional  Vitals:  Most recent vital signs, dated less than 90 days prior to this appointment, were reviewed.   There were no vitals filed for this visit.     General:  age appropriate, neatly groomed     Musculoskeletal  Muscle Strength/Tone:  no tic   Gait & Station:  not observed     Psychiatric  Speech:  spontaneous, not pressured, clearly audible   Mood:   Affect:  euthymic  congruent and appropriate   Thought Process:  goal-directed, logical   Associations:  intact   Thought Content:  normal, no suicidality, no homicidality, delusions, or paranoia   Insight:  has awareness of illness   Judgement: behavior is  adequate to circumstances   Orientation:  grossly intact   Memory: intact for content of interview, not tested   Language: grossly intact   Attention Span & Concentration:  able to focus   Fund of Knowledge:  intact and appropriate to age and level of education     Assessment and Diagnosis   Status/Progress: Based on the examination today, the patient's problem(s) is/are well controlled.  New problems have not been presented today.   Lack of compliance are not complicating management of the primary condition.  There are no active rule-out diagnoses for this patient at this time.     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  H/o recurrent MDD.          Diagnosis:  Recurrent MDD, in parital remission  Obesity  S/p bilat ooperectomy, HANNAH    Intervention/Counseling/Treatment Plan   Continue Effexor  mg daily  Continue xanax 0.5 mg as needed for now.  Discussed possible trials of other sleep meds.  Continue seeing Dr. Mahan      Return to Clinic:  6 months  in person

## 2022-11-28 NOTE — PROGRESS NOTES
HPI     Contact Lens Fit            Comments: TANYA: 11/11/2021 - Dr. Gerard          Comments    Presents today for routine/CLFU - B+L Ultra for Astigmatism. Pt reports   repalcing roughly every two months.  Pt denies sleeping in CL. Pt reports   using Optifree  PureMoist. Pt feels good and has no CL complaints.  Pt   reports no vision complaints. Pt denies eye pain and flashes--reports   floaters. Using Rewetting drops for contacts. Pt has worn glasses since 17   years old.           Last edited by Gia Greenwood, OD on 11/28/2022  1:23 PM.        ROS    Negative for: Constitutional, Gastrointestinal, Neurological, Skin,   Genitourinary, Musculoskeletal, HENT, Endocrine, Cardiovascular, Eyes,   Respiratory, Psychiatric, Allergic/Imm, Heme/Lymph  Last edited by Gia Greenwood, OD on 11/28/2022  1:06 PM.        Assessment /Plan     For exam results, see Encounter Report.    Dry eye syndrome, bilateral    Blurry vision, bilateral    Myopic astigmatism of both eyes    Fitting and adjustment of spectacles and contact lenses    Disease ruled out after examination    Pt seen today for routine eye exam.  Educated pt on today's exam findings.  Spectacle and Contact lens prescriptions released today.  Educated pt on the slight change in spectacle prescription.     RTC 1 year for CVE with DFE.

## 2022-11-29 ENCOUNTER — PATIENT MESSAGE (OUTPATIENT)
Dept: INTERNAL MEDICINE | Facility: CLINIC | Age: 42
End: 2022-11-29
Payer: COMMERCIAL

## 2022-11-29 DIAGNOSIS — M25.50 ARTHRALGIA, UNSPECIFIED JOINT: ICD-10-CM

## 2022-11-29 DIAGNOSIS — Z98.84 S/P LAPAROSCOPIC SLEEVE GASTRECTOMY: ICD-10-CM

## 2022-11-29 DIAGNOSIS — E55.9 VITAMIN D INSUFFICIENCY: ICD-10-CM

## 2022-11-29 DIAGNOSIS — Z00.00 ANNUAL PHYSICAL EXAM: Primary | ICD-10-CM

## 2022-11-29 NOTE — TELEPHONE ENCOUNTER
The patient with c/o  intense joint pain for about a month in my hands, wrists, elbows, and hips.    Requesting labs and or referral?  Please advise.

## 2022-12-05 ENCOUNTER — LAB VISIT (OUTPATIENT)
Dept: LAB | Facility: HOSPITAL | Age: 42
End: 2022-12-05
Attending: HOSPITALIST
Payer: COMMERCIAL

## 2022-12-05 ENCOUNTER — OFFICE VISIT (OUTPATIENT)
Dept: HEMATOLOGY/ONCOLOGY | Facility: CLINIC | Age: 42
End: 2022-12-05
Payer: COMMERCIAL

## 2022-12-05 ENCOUNTER — PATIENT MESSAGE (OUTPATIENT)
Dept: PSYCHIATRY | Facility: CLINIC | Age: 42
End: 2022-12-05
Payer: COMMERCIAL

## 2022-12-05 ENCOUNTER — OFFICE VISIT (OUTPATIENT)
Dept: PSYCHIATRY | Facility: CLINIC | Age: 42
End: 2022-12-05
Payer: COMMERCIAL

## 2022-12-05 VITALS — BODY MASS INDEX: 48.41 KG/M2 | WEIGHT: 290.56 LBS | HEIGHT: 65 IN

## 2022-12-05 DIAGNOSIS — G47.09 OTHER INSOMNIA: ICD-10-CM

## 2022-12-05 DIAGNOSIS — F33.41 RECURRENT MAJOR DEPRESSIVE DISORDER IN PARTIAL REMISSION: Primary | ICD-10-CM

## 2022-12-05 DIAGNOSIS — E55.9 VITAMIN D INSUFFICIENCY: ICD-10-CM

## 2022-12-05 DIAGNOSIS — Z98.84 S/P LAPAROSCOPIC SLEEVE GASTRECTOMY: ICD-10-CM

## 2022-12-05 DIAGNOSIS — F33.41 MAJOR DEPRESSIVE DISORDER, RECURRENT EPISODE, IN PARTIAL REMISSION WITH ANXIOUS DISTRESS: ICD-10-CM

## 2022-12-05 DIAGNOSIS — Z00.00 ANNUAL PHYSICAL EXAM: ICD-10-CM

## 2022-12-05 DIAGNOSIS — M25.50 ARTHRALGIA, UNSPECIFIED JOINT: ICD-10-CM

## 2022-12-05 DIAGNOSIS — D39.10 BORDERLINE EPITHELIAL NEOPLASM OF OVARY: ICD-10-CM

## 2022-12-05 DIAGNOSIS — N95.1 MENOPAUSAL SYMPTOMS: ICD-10-CM

## 2022-12-05 DIAGNOSIS — C56.9 MUCINOUS TUMOR, OF LOW MALIGNANT POTENTIAL: Primary | ICD-10-CM

## 2022-12-05 LAB
25(OH)D3+25(OH)D2 SERPL-MCNC: 44 NG/ML (ref 30–96)
ALBUMIN SERPL BCP-MCNC: 3.8 G/DL (ref 3.5–5.2)
ALP SERPL-CCNC: 69 U/L (ref 55–135)
ALT SERPL W/O P-5'-P-CCNC: 20 U/L (ref 10–44)
ANION GAP SERPL CALC-SCNC: 9 MMOL/L (ref 8–16)
AST SERPL-CCNC: 13 U/L (ref 10–40)
BASOPHILS # BLD AUTO: 0.03 K/UL (ref 0–0.2)
BASOPHILS NFR BLD: 0.7 % (ref 0–1.9)
BILIRUB SERPL-MCNC: 0.5 MG/DL (ref 0.1–1)
BUN SERPL-MCNC: 10 MG/DL (ref 6–20)
CALCIUM SERPL-MCNC: 9.2 MG/DL (ref 8.7–10.5)
CCP AB SER IA-ACNC: <0.5 U/ML
CHLORIDE SERPL-SCNC: 105 MMOL/L (ref 95–110)
CHOLEST SERPL-MCNC: 205 MG/DL (ref 120–199)
CHOLEST/HDLC SERPL: 3.5 {RATIO} (ref 2–5)
CO2 SERPL-SCNC: 26 MMOL/L (ref 23–29)
CREAT SERPL-MCNC: 0.8 MG/DL (ref 0.5–1.4)
CRP SERPL-MCNC: 4.1 MG/L (ref 0–8.2)
DIFFERENTIAL METHOD: NORMAL
EOSINOPHIL # BLD AUTO: 0.1 K/UL (ref 0–0.5)
EOSINOPHIL NFR BLD: 1.1 % (ref 0–8)
ERYTHROCYTE [DISTWIDTH] IN BLOOD BY AUTOMATED COUNT: 12.2 % (ref 11.5–14.5)
ERYTHROCYTE [SEDIMENTATION RATE] IN BLOOD BY PHOTOMETRIC METHOD: 12 MM/HR (ref 0–36)
EST. GFR  (NO RACE VARIABLE): >60 ML/MIN/1.73 M^2
ESTIMATED AVG GLUCOSE: 105 MG/DL (ref 68–131)
FERRITIN SERPL-MCNC: 51 NG/ML (ref 20–300)
GLUCOSE SERPL-MCNC: 95 MG/DL (ref 70–110)
HBA1C MFR BLD: 5.3 % (ref 4–5.6)
HCT VFR BLD AUTO: 39.2 % (ref 37–48.5)
HDLC SERPL-MCNC: 58 MG/DL (ref 40–75)
HDLC SERPL: 28.3 % (ref 20–50)
HGB BLD-MCNC: 12.7 G/DL (ref 12–16)
IMM GRANULOCYTES # BLD AUTO: 0.02 K/UL (ref 0–0.04)
IMM GRANULOCYTES NFR BLD AUTO: 0.4 % (ref 0–0.5)
IRON SERPL-MCNC: 125 UG/DL (ref 30–160)
LDLC SERPL CALC-MCNC: 125.2 MG/DL (ref 63–159)
LYMPHOCYTES # BLD AUTO: 1.7 K/UL (ref 1–4.8)
LYMPHOCYTES NFR BLD: 37.4 % (ref 18–48)
MCH RBC QN AUTO: 27.9 PG (ref 27–31)
MCHC RBC AUTO-ENTMCNC: 32.4 G/DL (ref 32–36)
MCV RBC AUTO: 86 FL (ref 82–98)
MONOCYTES # BLD AUTO: 0.3 K/UL (ref 0.3–1)
MONOCYTES NFR BLD: 6.1 % (ref 4–15)
NEUTROPHILS # BLD AUTO: 2.4 K/UL (ref 1.8–7.7)
NEUTROPHILS NFR BLD: 54.3 % (ref 38–73)
NONHDLC SERPL-MCNC: 147 MG/DL
NRBC BLD-RTO: 0 /100 WBC
PLATELET # BLD AUTO: 190 K/UL (ref 150–450)
PMV BLD AUTO: 11.5 FL (ref 9.2–12.9)
POTASSIUM SERPL-SCNC: 4 MMOL/L (ref 3.5–5.1)
PROT SERPL-MCNC: 6.7 G/DL (ref 6–8.4)
RBC # BLD AUTO: 4.56 M/UL (ref 4–5.4)
RHEUMATOID FACT SERPL-ACNC: <13 IU/ML (ref 0–15)
SATURATED IRON: 32 % (ref 20–50)
SODIUM SERPL-SCNC: 140 MMOL/L (ref 136–145)
TOTAL IRON BINDING CAPACITY: 395 UG/DL (ref 250–450)
TRANSFERRIN SERPL-MCNC: 267 MG/DL (ref 200–375)
TRANSFERRIN SERPL-MCNC: 267 MG/DL (ref 200–375)
TRIGL SERPL-MCNC: 109 MG/DL (ref 30–150)
TSH SERPL DL<=0.005 MIU/L-ACNC: 0.52 UIU/ML (ref 0.4–4)
VIT B12 SERPL-MCNC: 371 PG/ML (ref 210–950)
WBC # BLD AUTO: 4.46 K/UL (ref 3.9–12.7)

## 2022-12-05 PROCEDURE — 99999 PR PBB SHADOW E&M-EST. PATIENT-LVL IV: ICD-10-PCS | Mod: PBBFAC,,, | Performed by: PHYSICIAN ASSISTANT

## 2022-12-05 PROCEDURE — 80061 LIPID PANEL: CPT | Performed by: HOSPITALIST

## 2022-12-05 PROCEDURE — 82728 ASSAY OF FERRITIN: CPT | Performed by: HOSPITALIST

## 2022-12-05 PROCEDURE — 86235 NUCLEAR ANTIGEN ANTIBODY: CPT | Mod: 59 | Performed by: HOSPITALIST

## 2022-12-05 PROCEDURE — 3044F HG A1C LEVEL LT 7.0%: CPT | Mod: CPTII,S$GLB,, | Performed by: PHYSICIAN ASSISTANT

## 2022-12-05 PROCEDURE — 99214 OFFICE O/P EST MOD 30 MIN: CPT | Mod: S$GLB,,, | Performed by: PHYSICIAN ASSISTANT

## 2022-12-05 PROCEDURE — 3044F HG A1C LEVEL LT 7.0%: CPT | Mod: CPTII,S$GLB,, | Performed by: PSYCHOLOGIST

## 2022-12-05 PROCEDURE — 1159F MED LIST DOCD IN RCRD: CPT | Mod: CPTII,S$GLB,, | Performed by: PHYSICIAN ASSISTANT

## 2022-12-05 PROCEDURE — 3044F PR MOST RECENT HEMOGLOBIN A1C LEVEL <7.0%: ICD-10-PCS | Mod: CPTII,S$GLB,, | Performed by: PSYCHOLOGIST

## 2022-12-05 PROCEDURE — 83540 ASSAY OF IRON: CPT | Performed by: HOSPITALIST

## 2022-12-05 PROCEDURE — 80053 COMPREHEN METABOLIC PANEL: CPT | Performed by: HOSPITALIST

## 2022-12-05 PROCEDURE — 99999 PR PBB SHADOW E&M-EST. PATIENT-LVL II: CPT | Mod: PBBFAC,,, | Performed by: PSYCHOLOGIST

## 2022-12-05 PROCEDURE — 86039 ANTINUCLEAR ANTIBODIES (ANA): CPT | Performed by: HOSPITALIST

## 2022-12-05 PROCEDURE — 1159F PR MEDICATION LIST DOCUMENTED IN MEDICAL RECORD: ICD-10-PCS | Mod: CPTII,S$GLB,, | Performed by: PHYSICIAN ASSISTANT

## 2022-12-05 PROCEDURE — 86038 ANTINUCLEAR ANTIBODIES: CPT | Performed by: HOSPITALIST

## 2022-12-05 PROCEDURE — 36415 COLL VENOUS BLD VENIPUNCTURE: CPT | Performed by: HOSPITALIST

## 2022-12-05 PROCEDURE — 90832 PSYTX W PT 30 MINUTES: CPT | Mod: S$GLB,,, | Performed by: PSYCHOLOGIST

## 2022-12-05 PROCEDURE — 3008F BODY MASS INDEX DOCD: CPT | Mod: CPTII,S$GLB,, | Performed by: PHYSICIAN ASSISTANT

## 2022-12-05 PROCEDURE — 83036 HEMOGLOBIN GLYCOSYLATED A1C: CPT | Performed by: HOSPITALIST

## 2022-12-05 PROCEDURE — 86431 RHEUMATOID FACTOR QUANT: CPT | Performed by: HOSPITALIST

## 2022-12-05 PROCEDURE — 82607 VITAMIN B-12: CPT | Performed by: HOSPITALIST

## 2022-12-05 PROCEDURE — 86140 C-REACTIVE PROTEIN: CPT | Performed by: HOSPITALIST

## 2022-12-05 PROCEDURE — 3008F PR BODY MASS INDEX (BMI) DOCUMENTED: ICD-10-PCS | Mod: CPTII,S$GLB,, | Performed by: PHYSICIAN ASSISTANT

## 2022-12-05 PROCEDURE — 99214 PR OFFICE/OUTPT VISIT, EST, LEVL IV, 30-39 MIN: ICD-10-PCS | Mod: S$GLB,,, | Performed by: PHYSICIAN ASSISTANT

## 2022-12-05 PROCEDURE — 86200 CCP ANTIBODY: CPT | Performed by: HOSPITALIST

## 2022-12-05 PROCEDURE — 85025 COMPLETE CBC W/AUTO DIFF WBC: CPT | Performed by: HOSPITALIST

## 2022-12-05 PROCEDURE — 82306 VITAMIN D 25 HYDROXY: CPT | Performed by: HOSPITALIST

## 2022-12-05 PROCEDURE — 1160F RVW MEDS BY RX/DR IN RCRD: CPT | Mod: CPTII,S$GLB,, | Performed by: PHYSICIAN ASSISTANT

## 2022-12-05 PROCEDURE — 84443 ASSAY THYROID STIM HORMONE: CPT | Performed by: HOSPITALIST

## 2022-12-05 PROCEDURE — 99999 PR PBB SHADOW E&M-EST. PATIENT-LVL IV: CPT | Mod: PBBFAC,,, | Performed by: PHYSICIAN ASSISTANT

## 2022-12-05 PROCEDURE — 85652 RBC SED RATE AUTOMATED: CPT | Performed by: HOSPITALIST

## 2022-12-05 PROCEDURE — 99999 PR PBB SHADOW E&M-EST. PATIENT-LVL II: ICD-10-PCS | Mod: PBBFAC,,, | Performed by: PSYCHOLOGIST

## 2022-12-05 PROCEDURE — 90832 PR PSYCHOTHERAPY W/PATIENT, 30 MIN: ICD-10-PCS | Mod: S$GLB,,, | Performed by: PSYCHOLOGIST

## 2022-12-05 PROCEDURE — 1160F PR REVIEW ALL MEDS BY PRESCRIBER/CLIN PHARMACIST DOCUMENTED: ICD-10-PCS | Mod: CPTII,S$GLB,, | Performed by: PHYSICIAN ASSISTANT

## 2022-12-05 PROCEDURE — 3044F PR MOST RECENT HEMOGLOBIN A1C LEVEL <7.0%: ICD-10-PCS | Mod: CPTII,S$GLB,, | Performed by: PHYSICIAN ASSISTANT

## 2022-12-05 RX ORDER — PROGESTERONE 100 MG/1
100 CAPSULE ORAL NIGHTLY
Qty: 30 CAPSULE | Refills: 11 | Status: SHIPPED | OUTPATIENT
Start: 2022-12-05 | End: 2023-07-31 | Stop reason: SDUPTHER

## 2022-12-05 RX ORDER — ESTRADIOL 2 MG/1
2 TABLET ORAL DAILY
Qty: 30 TABLET | Refills: 11 | Status: SHIPPED | OUTPATIENT
Start: 2022-12-05 | End: 2023-04-24

## 2022-12-05 NOTE — PROGRESS NOTES
INFORMED CONSENT: Fran Higgins   is known to this provider and identity was confirmed via NAME and .  The patient has been informed of the risks and benefits associated with engaging in psychotherapy, the handling of protected health information, the rights of privacy and the limits of confidentiality. The patient has also been informed of the importance of reporting any suicidal or homicidal ideation to this or any provider to ensure safety of all parties, and the Fran Higgins expressed understanding. The patient was agreeable to these terms and freely participates in individual psychotherapy.      PSYCHO-ONCOLOGY NOTE/ Individual Psychotherapy     Date: 2022   Site:  [facility]        Therapeutic Intervention: Met with patient.  Outpatient - Behavior modifying psychotherapy 30 min - CPT code 50762      Patient was last seen by me on 10/10/2022    Problem list  Patient Active Problem List   Diagnosis    Depression    Left ovarian cyst    S/P RA Laparoscopic LSO    Mucinous tumor, of low malignant potential    Pericardial cyst    Scoliosis of cervical spine    Abnormal uterine bleeding (AUB)    Vaginal yeast infection    DUB (dysfunctional uterine bleeding)    Migraine with aura, not intractable    s/p robotic hysterectomy/RSO 3/9/20    Borderline epithelial neoplasm of ovary    Surgical menopause    Yeast vaginitis    Menopause    Dizziness    Galactorrhea in female- bilateral breast    Other insomnia    Major depressive disorder, recurrent episode, in partial remission with anxious distress    Prediabetes    Obesity    S/P laparoscopic sleeve gastrectomy    BMI 45.0-49.9, adult    Hidradenitis    Seborrheic dermatitis       Chief complaint/reason for encounter: sleep   Met with patient to evaluate psychosocial adaptation to diagnosis/treatment/survivorship of Ovarian    Current Medications  Current Outpatient Medications   Medication    ALPRAZolam (XANAX) 0.5 MG tablet    b complex  vitamins capsule    B3-azelaic-zinc-B6-copper-FA (NICAZEL) 600-5-10-5-1.5 mg Tab    CALCIUM CITRATE ORAL    clindamycin (CLEOCIN T) 1 % external solution    clindamycin (CLEOCIN T) 1 % lotion    cyclobenzaprine (FLEXERIL) 5 MG tablet    estrogens, conjugated, (PREMARIN) 1.25 MG tablet    finasteride (PROSCAR) 5 mg tablet    fluocinolone acetonide oiL (DERMOTIC OIL) 0.01 % Drop    galcanezumab-gnlm (EMGALITY PEN) 120 mg/mL PnIj    HUMIRA,CF, PEN 80 mg/0.8 mL PnKt    ketoconazole (NIZORAL) 2 % cream    multivitamin capsule    nystatin-triamcinolone (MYCOLOG II) cream    omeprazole (PRILOSEC) 40 MG capsule    ondansetron (ZOFRAN-ODT) 4 MG TbDL    pimecrolimus (ELIDEL) 1 % cream    spironolactone (ALDACTONE) 50 MG tablet    thiamine (VITAMIN B-1) 50 MG tablet    ubrogepant (UBRELVY) 50 mg tablet    venlafaxine (EFFEXOR-XR) 150 MG Cp24     No current facility-administered medications for this visit.       Objective:  Fran Higgins arrived late for the session.  Ms. Higgins was independently ambulatory at the time of session. The patient was fully cooperative throughout the session.  Appearance: age appropriate, appropriately  dressed, adequately  groomed  Behavior/Cooperation: friendly and cooperative  Speech: normal in rate, volume, and tone and appropriate quality, quantity and organization of sentences  Mood: euthymic  Affect: mood congruent  Thought Process: goal-directed, logical  Thought Content: normal,  No delusions or paranoia; did not appear to be responding to internal stimuli during the session  Orientation: grossly intact  Memory: Grossly intact  Attention Span/Concentration: Attends to session without distraction; reports no difficulty  Fund of Knowledge: average  Estimate of Intelligence: average from verbal skills and history  Cognition: grossly intact  Insight: patient has awareness of illness; good insight into own behavior and behavior of others  Judgment: the patient's behavior is adequate  to circumstances    Interval history and content of current session: Discussed continued sleep issues. Patient meeting with [facility] today.  Discussed diagnosis, treatment, prognosis, current adaptation to disease and treatment status, and family's adaptation to disease and treatment status. Reports to be coping in an adequate manner. Evaluated cognitive response, paying particular attention to negative intrusive thoughts of a persistent and detrimental nature. Thoughts of this type are in evidence with mild distress. Provided cognitive behavioral therapy to address negative cognitions. Identified and evaluated psychosocial and environmental stressors secondary to diagnosis and treatment.  Examined proactive behaviors that may be implemented to minimize or ameliorate psychosocial stressors secondary to diagnosis and treatment.     Risk parameters:   Patient reports no suicidal ideation  Patient reports no homicidal ideation  Patient reports no self-injurious behavior  Patient reports no violent behavior   Safety needs:  None at this time      Verbal deficits: None     Patient's response to intervention:The patient's response to intervention is accepting.     Progress toward goals and other mental status changes:  The patient's progress toward goals is good.      Progress to date:Progress as Expected      Goals from last visit: Met     Patient reported outcomes:    Distress Thermometer:   Distress Score    Distress Score: 3        Practical Problems Physical Problems                                                   Family Problems                                         Emotional Problems                                                         Spiritual/Religions Concerns     Spiritual / Baptist Concerns: No         Other Problems               PHQ ANSWERS    Q1. Little interest or pleasure in doing things: (P) Not at all (11/29/22 8185)  Q2. Feeling down, depressed, or hopeless: (P) Not at all (11/29/22  1731)  Q3. Trouble falling or staying asleep, or sleeping too much: (P) More than half the days (11/29/22 1731)  Q4. Feeling tired or having little energy: (P) More than half the days (11/29/22 1731)  Q5. Poor appetite or overeating: (P) Not at all (11/29/22 1731)  Q6. Feeling bad about yourself - or that you are a failure or have let yourself or your family down: (P) Not at all (11/29/22 1731)  Q7. Trouble concentrating on things, such as reading the newspaper or watching television: (P) Not at all (11/29/22 1731)  Q8. Moving or speaking so slowly that other people could have noticed. Or the opposite - being so fidgety or restless that you have been moving around a lot more than usual: (P) Not at all (11/29/22 1731)  Q9.  0    PHQ8 Score : (P) 4 (11/29/22 1731)  PHQ-9 Total Score: (P) 4 (11/29/22 1731)     JOSE MARIA-7 Answers    GAD7 11/29/2022   1. Feeling nervous, anxious, or on edge? 0   2. Not being able to stop or control worrying? 0   3. Worrying too much about different things? 0   4. Trouble relaxing? 1   5. Being so restless that it is hard to sit still? 0   6. Becoming easily annoyed or irritable? 0   7. Feeling afraid as if something awful might happen? 0   JOSE MARIA-7 Score 1     JOSE MARIA-7 Score: (P) 1  Interpretation: (P) Normal     Client Strengths: verbal, intelligent, successful, good social support, good insight, commitment to wellness, strong kerri, strong cultural traditions     Diagnosis:     ICD-10-CM ICD-9-CM   1. Recurrent major depressive disorder in partial remission  F33.41 296.35   2. Other insomnia  G47.09 780.52   3. Borderline epithelial neoplasm of ovary  D39.10 236.2       Treatment Plan:individual psychotherapy and consult psychiatrist for medication evaluation  Target symptoms: anxiety , sleep  Why chosen therapy is appropriate versus another modality: relevant to diagnosis, patient responds to this modality, evidence based practice  Outcome monitoring methods: self-report, observation,  checklist/rating scale  Therapeutic intervention type: insight oriented psychotherapy, behavior modifying psychotherapy  Prognosis: Good      Behavioral goals:    Exercise:   Stress management:   Social engagement:   Nutrition:   Smoking Cessation:   Therapy:  Monitor stressors in writing and bring to next visit  Implement sleep hygiene recommendations  Implement sleep restriction plan    Return to clinic: as needed    Next Session:      Length of Service (minutes direct face-to-face contact): 30    Brittani Mahan, PhD  Clinical Psychologist  [place] License #[id number]  [place] License #[id number]

## 2022-12-05 NOTE — PROGRESS NOTES
"Subjective:      Fran Higgins is a 42 y.o. female who presents for Survivorship. 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 Marky Fox in Jun 2019.  Previously a patient of Dr. Kaufman and now followed by Dr. Pro.   She has been taking premarin 1.25mg QD for menopausal symptoms. Tried vaginal progesterone but did like administering so discontinued. Continues to have night sweats, joint pain and hot flashes. She is struggling to stay and fall asleep. Mood is "normal to crazy." She has had a history of depression but worse after hyst/bso. Seeing psychiatry who has prescribed effexor 150mg Qam and feeling better.  Reports normal energy levels and a good libido.   Gastric sleeve in 2021 and lost about 70lbs. However, she "feels swollen throughout." Restarting exercise with once a week. Following the bariatric diet.  Does have a history of migraines and followed by neurology. Denies joanna.   Currently on Humira x 3 months for HS.  No prior cardiac history, family history of MI prior to age 50, or personal history of gestational DM/pre-eclampsia. She denies the following contraindications to HRT:  Vaginal bleeding, history of VTE/PE, thrombophilia, or active liver disease.       PCP: Erica Pineda MD    Routine labs: 12/5/2022  Pap smear: No result found  Mammogram: 9/15/2022 TC 9.43 %.      Lab Visit on 12/05/2022   Component Date Value Ref Range Status    Sodium 12/05/2022 140  136 - 145 mmol/L Final    Potassium 12/05/2022 4.0  3.5 - 5.1 mmol/L Final    Chloride 12/05/2022 105  95 - 110 mmol/L Final    CO2 12/05/2022 26  23 - 29 mmol/L Final    Glucose 12/05/2022 95  70 - 110 mg/dL Final    BUN 12/05/2022 10  6 - 20 mg/dL Final    Creatinine 12/05/2022 0.8  0.5 - 1.4 mg/dL Final    Calcium 12/05/2022 9.2  8.7 - 10.5 mg/dL Final    Total Protein 12/05/2022 6.7  " 6.0 - 8.4 g/dL Final    Albumin 12/05/2022 3.8  3.5 - 5.2 g/dL Final    Total Bilirubin 12/05/2022 0.5  0.1 - 1.0 mg/dL Final    Alkaline Phosphatase 12/05/2022 69  55 - 135 U/L Final    AST 12/05/2022 13  10 - 40 U/L Final    ALT 12/05/2022 20  10 - 44 U/L Final    Anion Gap 12/05/2022 9  8 - 16 mmol/L Final    eGFR 12/05/2022 >60.0  >60 mL/min/1.73 m^2 Final    WBC 12/05/2022 4.46  3.90 - 12.70 K/uL Final    RBC 12/05/2022 4.56  4.00 - 5.40 M/uL Final    Hemoglobin 12/05/2022 12.7  12.0 - 16.0 g/dL Final    Hematocrit 12/05/2022 39.2  37.0 - 48.5 % Final    MCV 12/05/2022 86  82 - 98 fL Final    MCH 12/05/2022 27.9  27.0 - 31.0 pg Final    MCHC 12/05/2022 32.4  32.0 - 36.0 g/dL Final    RDW 12/05/2022 12.2  11.5 - 14.5 % Final    Platelets 12/05/2022 190  150 - 450 K/uL Final    MPV 12/05/2022 11.5  9.2 - 12.9 fL Final    Immature Granulocytes 12/05/2022 0.4  0.0 - 0.5 % Final    Gran # (ANC) 12/05/2022 2.4  1.8 - 7.7 K/uL Final    Immature Grans (Abs) 12/05/2022 0.02  0.00 - 0.04 K/uL Final    Lymph # 12/05/2022 1.7  1.0 - 4.8 K/uL Final    Mono # 12/05/2022 0.3  0.3 - 1.0 K/uL Final    Eos # 12/05/2022 0.1  0.0 - 0.5 K/uL Final    Baso # 12/05/2022 0.03  0.00 - 0.20 K/uL Final    nRBC 12/05/2022 0  0 /100 WBC Final    Gran % 12/05/2022 54.3  38.0 - 73.0 % Final    Lymph % 12/05/2022 37.4  18.0 - 48.0 % Final    Mono % 12/05/2022 6.1  4.0 - 15.0 % Final    Eosinophil % 12/05/2022 1.1  0.0 - 8.0 % Final    Basophil % 12/05/2022 0.7  0.0 - 1.9 % Final    Differential Method 12/05/2022 Automated   Final    Cholesterol 12/05/2022 205 (H)  120 - 199 mg/dL Final    Triglycerides 12/05/2022 109  30 - 150 mg/dL Final    HDL 12/05/2022 58  40 - 75 mg/dL Final    LDL Cholesterol 12/05/2022 125.2  63.0 - 159.0 mg/dL Final    HDL/Cholesterol Ratio 12/05/2022 28.3  20.0 - 50.0 % Final    Total Cholesterol/HDL Ratio 12/05/2022 3.5  2.0 - 5.0 Final    Non-HDL Cholesterol 12/05/2022 147  mg/dL Final    TSH 12/05/2022 0.520   0.400 - 4.000 uIU/mL Final    Hemoglobin A1C 12/05/2022 5.3  4.0 - 5.6 % Final    Estimated Avg Glucose 12/05/2022 105  68 - 131 mg/dL Final    Iron 12/05/2022 125  30 - 160 ug/dL Final    Transferrin 12/05/2022 267  200 - 375 mg/dL Final    TIBC 12/05/2022 395  250 - 450 ug/dL Final    Saturated Iron 12/05/2022 32  20 - 50 % Final    Ferritin 12/05/2022 51  20.0 - 300.0 ng/mL Final    Transferrin 12/05/2022 267  200 - 375 mg/dL Final    Vitamin B-12 12/05/2022 371  210 - 950 pg/mL Final    Vit D, 25-Hydroxy 12/05/2022 44  30 - 96 ng/mL Final    Sed Rate 12/05/2022 12  0 - 36 mm/Hr Final    CRP 12/05/2022 4.1  0.0 - 8.2 mg/L Final    GENE Screen 12/05/2022 Positive (A)  Negative <1:80 Final    Rheumatoid Factor 12/05/2022 <13.0  0.0 - 15.0 IU/mL Final    CCP Antibodies 12/05/2022 <0.5  <5.0 U/mL Final    GENE PATTERN 1 12/05/2022 Homogeneous   Final    Anti Sm Antibody 12/05/2022 0.06  0.00 - 0.99 Ratio Final    Anti-Sm Interpretation 12/05/2022 Negative  Negative Final    Anti-SSA Antibody 12/05/2022 0.08  0.00 - 0.99 Ratio Final    Anti-SSA Interpretation 12/05/2022 Negative  Negative Final    Anti-SSB Antibody 12/05/2022 0.05  0.00 - 0.99 Ratio Final    Anti-SSB Interpretation 12/05/2022 Negative  Negative Final    ds DNA Ab 12/05/2022 Negative 1:10  Negative 1:10 Final    Anti Sm/RNP Antibody 12/05/2022 0.06  0.00 - 0.99 Ratio Final    Anti-Sm/RNP Interpretation 12/05/2022 Negative  Negative Final    GENE Titer 1 12/05/2022 1:160   Final   Office Visit on 10/13/2022   Component Date Value Ref Range Status    POC Molecular Influenza A Ag 10/13/2022 Negative  Negative, Not Reported Final    POC Molecular Influenza B Ag 10/13/2022 Negative  Negative, Not Reported Final     Acceptable 10/13/2022 Yes   Final    POC Rapid COVID 10/13/2022 Negative  Negative Final     Acceptable 10/13/2022 Yes   Final       Past Medical History:   Diagnosis Date    Abnormal Pap smear of cervix 2013     Abnormal uterine bleeding (AUB) 9/13/2019    Amblyopia     Anxiety     Cervical scoliosis 1994    Severe    Depression     DUB (dysfunctional uterine bleeding) 1/30/2020    Fatigue     Galactorrhea in female- bilateral breast 4/9/2020    Galactorrhea of both breasts 7/9/2020    Hx of psychiatric care     Migraine with aura, not intractable 1/30/2020    Mucinous tumor, 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    Ovarian cyst     Pericardial cyst 7/26/2019    Psychiatric problem     Sleep difficulties     Surgical menopause 3/13/2020    Therapy     Vaginal yeast infection 9/26/2019     Past Surgical History:   Procedure Laterality Date    HYSTERECTOMY  03/2020    LAPAROSCOPIC SLEEVE GASTRECTOMY N/A 6/22/2021    Procedure: GASTRECTOMY, SLEEVE, LAPAROSCOPIC; with intraoperative egd;  Surgeon: Jaziel Whitman Jr., MD;  Location: [address];  Service: General;  Laterality: N/A;    ROBOT-ASSISTED LAPAROSCOPIC ABDOMINAL HYSTERECTOMY USING DA ROMAIN XI N/A 3/9/2020    Procedure: XI ROBOTIC HYSTERECTOMY;  Surgeon: Jeff Kaufman MD;  Location: [address];  Service: Oncology;  Laterality: N/A;    ROBOT-ASSISTED LAPAROSCOPIC SALPINGO-OOPHORECTOMY Left 6/25/2019    Procedure: ROBOTIC SALPINGO-OOPHORECTOMY;  Surgeon: Marky Fox Jr., MD;  Location: [facility];  Service: OB/GYN;  Laterality: Left;    SALPINGOOPHORECTOMY Right 3/9/2020    Procedure: SALPINGO-OOPHORECTOMY  USO;  Surgeon: Jeff Kaufman MD;  Location: [address];  Service: Oncology;  Laterality: Right;    TONSILLECTOMY       Social History     Tobacco Use    Smoking status: Never    Smokeless tobacco: Never   Substance Use Topics    Alcohol use: Yes     Comment: 2-3 drinks twice a year    Drug use: No     Family History   Problem Relation Age of Onset    Diabetes Paternal Grandfather     Macular degeneration Maternal Grandmother     Cancer Maternal Grandfather         prostate cancer    Hypertension Father     Depression Father     Drug abuse  Father     Hypertension Mother     Stroke Mother 60        ASD, PFO    Depression Mother     Cancer Other         uterine cancer     Uterine cancer Other     Depression Sister     Colon cancer Neg Hx     Ovarian cancer Neg Hx     Breast cancer Neg Hx     Glaucoma Neg Hx     Melanoma Neg Hx     Heart attack Neg Hx     Heart disease Neg Hx     Hyperlipidemia Neg Hx      OB History    Para Term  AB Living   1 1 1 0 0 1   SAB IAB Ectopic Multiple Live Births   0 0 0 0 1      # Outcome Date GA Lbr Hector/2nd Weight Sex Delivery Anes PTL Lv   1 Term 13 38w6d 13:00 / 00:37 3.53 kg (7 lb 12.5 oz) F Vag-Spont EPI N TYRELL       Current Outpatient Medications:     ALPRAZolam (XANAX) 0.5 MG tablet, Take 1 tablet (0.5 mg total) by mouth nightly as needed., Disp: 30 tablet, Rfl: 1    b complex vitamins capsule, Take 1 capsule by mouth once daily., Disp: , Rfl:     B3-azelaic-zinc-B6-copper-FA (NICAZEL) 600-5-10-5-1.5 mg Tab, take 1 tablet by mouth twice daily, Disp: 60 tablet, Rfl: 5    CALCIUM CITRATE ORAL, Take 1 tablet by mouth 3 (three) times daily. chewable, Disp: , Rfl:     clindamycin (CLEOCIN T) 1 % external solution, Apply to affected areas of underarm twice a day as needed for folliculitis., Disp: 30 mL, Rfl: 3    clindamycin (CLEOCIN T) 1 % lotion, Apply to affected area two times a day., Disp: 120 mL, Rfl: 3    cyclobenzaprine (FLEXERIL) 5 MG tablet, Take 1 tablet (5 mg total) by mouth 2 (two) times daily as needed for Muscle spasms., Disp: 60 tablet, Rfl: 5    finasteride (PROSCAR) 5 mg tablet, Take 1 tablet (5 mg total) by mouth once daily., Disp: 30 tablet, Rfl: 11    fluocinolone acetonide oiL (DERMOTIC OIL) 0.01 % Drop, Use to affected ears at night, drop in canal and use to ear when flaring, Disp: 20 mL, Rfl: 3    galcanezumab-gnlm (EMGALITY PEN) 120 mg/mL PnIj, Inject 120 mg into the skin every 30 days., Disp: 1 mL, Rfl: 11    HUMIRA,CF, PEN 80 mg/0.8 mL PnKt, Starting day 29, inject 80 mg (1  pen) into the skin every 2 weeks., Disp: 2 pen, Rfl: 4    ketoconazole (NIZORAL) 2 % cream, Apply to affected area two times a day (Patient taking differently: Apply to affected area two times a day), Disp: 60 g, Rfl: 3    multivitamin capsule, Take by mouth once daily., Disp: , Rfl:     nystatin-triamcinolone (MYCOLOG II) cream, Apply topically 2 (two) times daily., Disp: 30 g, Rfl: 1    omeprazole (PRILOSEC) 40 MG capsule, Take 1 capsule (40 mg total) by mouth every morning., Disp: 30 capsule, Rfl: 1    ondansetron (ZOFRAN-ODT) 4 MG TbDL, Take 1 tablet (4 mg total) by mouth every 6 (six) hours as needed (for nausea)., Disp: 20 tablet, Rfl: 2    pimecrolimus (ELIDEL) 1 % cream, Apply to affected areas of underarm twice a day as needed for irritation. (Patient taking differently: Apply to affected areas of underarm twice a day as needed for irritation.), Disp: 30 g, Rfl: 3    spironolactone (ALDACTONE) 50 MG tablet, Start with taking 1 tablet by mouth every day, may increase to 2 tablets by mouth every day as tolerated., Disp: 60 tablet, Rfl: 4    thiamine (VITAMIN B-1) 50 MG tablet, Take 50 mg by mouth once daily., Disp: , Rfl:     ubrogepant (UBRELVY) 50 mg tablet, Take 1 tab by mouth for headaches. May repeat once 2 hours after initial dose based on response and tolerability., Disp: 16 tablet, Rfl: 5    venlafaxine (EFFEXOR-XR) 150 MG Cp24, Take 1 capsule (150 mg total) by mouth once daily., Disp: 30 capsule, Rfl: 6    estradioL (ESTRACE) 2 MG tablet, Take 1 tablet (2 mg total) by mouth once daily., Disp: 30 tablet, Rfl: 11    progesterone (PROMETRIUM) 100 MG capsule, Take 1 capsule (100 mg total) by mouth nightly., Disp: 30 capsule, Rfl: 11    The 10-year ASCVD risk score (Christofer HUITRON, et al., 2019) is: 0.5%    Values used to calculate the score:      Age: 42 years      Sex: Female      Is Non- : No      Diabetic: No      Tobacco smoker: No      Systolic Blood Pressure: 117 mmHg      Is BP  "treated: No      HDL Cholesterol: 58 mg/dL      Total Cholesterol: 205 mg/dL    Review of Systems:  General: No fever, chills.+ hot flashes  Chest: No chest pain, shortness of breath, or palpitations.  Breast: No pain, masses, or nipple discharge.  Vulva: No pain, lesions, or itching.  Vagina: No relaxation, itching, discharge, or lesions.  Abdomen: No pain, nausea, vomiting, diarrhea, or constipation.  Urinary: No incontinence, nocturia, frequency, or dysuria.  Extremities:  No leg cramps, edema, or calf pain.  Neurologic: No headaches, dizziness, or visual changes.    Objective:     Vitals:    12/05/22 1136   Weight: 131.8 kg (290 lb 9.1 oz)   Height: 5' 5" (1.651 m)   PainSc:   6     Body mass index is 48.35 kg/m².    PHYSICAL EXAM:  APPEARANCE: Well nourished, well developed, in no acute distress.  AFFECT: WNL, alert and oriented x 3  EXTREMITIES: No edema.    Assessment:    Mucinous tumor, of low malignant potential    Menopausal symptoms  -     estradioL (ESTRACE) 2 MG tablet; Take 1 tablet (2 mg total) by mouth once daily.  Dispense: 30 tablet; Refill: 11  -     progesterone (PROMETRIUM) 100 MG capsule; Take 1 capsule (100 mg total) by mouth nightly.  Dispense: 30 capsule; Refill: 11  -     Acupuncture; Future    Major depressive disorder, recurrent episode, in partial remission with anxious distress  -     Acupuncture; Future      Plan:   D/c premarin. Reviewed benefits of transdermal estradiol. Declines and would prefer oral.  Estrace 2mg QAM  Progesterone 100mg QHS  Magnesium glycinate 400-500mg QHS. Reviewed brands  Acupuncture for hot flashes, mood and migraines.  Reviewed diet. Recommend low inflammatory diet.  Follow up in 3 months.    Instructed patient to call if she experiences any side effects or has any questions.    I spent a total of 30 minutes on the day of the visit.This includes face to face time and non-face to face time preparing to see the patient (eg, review of tests), obtaining and/or " reviewing separately obtained history, documenting clinical information in the electronic or other health record, independently interpreting results and communicating results to the patient/family/caregiver, or care coordinator.

## 2022-12-06 LAB
ANA PATTERN 1: NORMAL
ANA SER QL IF: POSITIVE
ANA TITR SER IF: NORMAL {TITER}

## 2022-12-07 ENCOUNTER — PATIENT MESSAGE (OUTPATIENT)
Dept: INTERNAL MEDICINE | Facility: CLINIC | Age: 42
End: 2022-12-07
Payer: COMMERCIAL

## 2022-12-07 ENCOUNTER — PATIENT MESSAGE (OUTPATIENT)
Dept: BARIATRICS | Facility: CLINIC | Age: 42
End: 2022-12-07
Payer: COMMERCIAL

## 2022-12-07 LAB
ANTI SM ANTIBODY: 0.06 RATIO (ref 0–0.99)
ANTI SM/RNP ANTIBODY: 0.06 RATIO (ref 0–0.99)
ANTI-SM INTERPRETATION: NEGATIVE
ANTI-SM/RNP INTERPRETATION: NEGATIVE
ANTI-SSA ANTIBODY: 0.08 RATIO (ref 0–0.99)
ANTI-SSA INTERPRETATION: NEGATIVE
ANTI-SSB ANTIBODY: 0.05 RATIO (ref 0–0.99)
ANTI-SSB INTERPRETATION: NEGATIVE
DSDNA AB SER-ACNC: NORMAL [IU]/ML

## 2022-12-12 ENCOUNTER — OFFICE VISIT (OUTPATIENT)
Dept: NEUROLOGY | Facility: CLINIC | Age: 42
End: 2022-12-12
Payer: COMMERCIAL

## 2022-12-12 ENCOUNTER — HOSPITAL ENCOUNTER (OUTPATIENT)
Dept: RADIOLOGY | Facility: HOSPITAL | Age: 42
Discharge: HOME | End: 2022-12-12
Attending: HOSPITALIST
Payer: COMMERCIAL

## 2022-12-12 ENCOUNTER — CLINICAL SUPPORT (OUTPATIENT)
Dept: HEMATOLOGY/ONCOLOGY | Facility: CLINIC | Age: 42
End: 2022-12-12
Payer: COMMERCIAL

## 2022-12-12 ENCOUNTER — OFFICE VISIT (OUTPATIENT)
Dept: INTERNAL MEDICINE | Facility: CLINIC | Age: 42
End: 2022-12-12
Payer: COMMERCIAL

## 2022-12-12 VITALS
HEART RATE: 82 BPM | TEMPERATURE: 97 F | BODY MASS INDEX: 47.93 KG/M2 | HEIGHT: 65 IN | SYSTOLIC BLOOD PRESSURE: 118 MMHG | WEIGHT: 287.69 LBS | RESPIRATION RATE: 18 BRPM | DIASTOLIC BLOOD PRESSURE: 82 MMHG | OXYGEN SATURATION: 97 %

## 2022-12-12 DIAGNOSIS — M79.644 BILATERAL THUMB PAIN: ICD-10-CM

## 2022-12-12 DIAGNOSIS — M25.50 ARTHRALGIA, UNSPECIFIED JOINT: ICD-10-CM

## 2022-12-12 DIAGNOSIS — M25.532 BILATERAL WRIST PAIN: ICD-10-CM

## 2022-12-12 DIAGNOSIS — N95.1 MENOPAUSAL SYMPTOMS: ICD-10-CM

## 2022-12-12 DIAGNOSIS — F32.A DEPRESSION, UNSPECIFIED DEPRESSION TYPE: ICD-10-CM

## 2022-12-12 DIAGNOSIS — Z00.00 ANNUAL PHYSICAL EXAM: Primary | ICD-10-CM

## 2022-12-12 DIAGNOSIS — M79.645 BILATERAL THUMB PAIN: ICD-10-CM

## 2022-12-12 DIAGNOSIS — F33.41 MAJOR DEPRESSIVE DISORDER, RECURRENT EPISODE, IN PARTIAL REMISSION WITH ANXIOUS DISTRESS: ICD-10-CM

## 2022-12-12 DIAGNOSIS — G43.E09 CHRONIC MIGRAINE WITH AURA: Primary | ICD-10-CM

## 2022-12-12 DIAGNOSIS — Z98.84 S/P LAPAROSCOPIC SLEEVE GASTRECTOMY: ICD-10-CM

## 2022-12-12 DIAGNOSIS — M25.531 BILATERAL WRIST PAIN: ICD-10-CM

## 2022-12-12 DIAGNOSIS — G43.109 MIGRAINE WITH AURA AND WITHOUT STATUS MIGRAINOSUS, NOT INTRACTABLE: ICD-10-CM

## 2022-12-12 PROCEDURE — 99214 OFFICE O/P EST MOD 30 MIN: CPT | Mod: 95,,, | Performed by: PHYSICIAN ASSISTANT

## 2022-12-12 PROCEDURE — 1160F PR REVIEW ALL MEDS BY PRESCRIBER/CLIN PHARMACIST DOCUMENTED: ICD-10-PCS | Mod: CPTII,95,, | Performed by: PHYSICIAN ASSISTANT

## 2022-12-12 PROCEDURE — 3074F SYST BP LT 130 MM HG: CPT | Mod: CPTII,S$GLB,, | Performed by: HOSPITALIST

## 2022-12-12 PROCEDURE — 73130 X-RAY EXAM OF HAND: CPT | Mod: TC,50,PO

## 2022-12-12 PROCEDURE — 99999 PR PBB SHADOW E&M-EST. PATIENT-LVL I: CPT | Mod: PBBFAC,,, | Performed by: ACUPUNCTURIST

## 2022-12-12 PROCEDURE — 99999 PR PBB SHADOW E&M-EST. PATIENT-LVL I: ICD-10-PCS | Mod: PBBFAC,,, | Performed by: ACUPUNCTURIST

## 2022-12-12 PROCEDURE — 1160F RVW MEDS BY RX/DR IN RCRD: CPT | Mod: CPTII,95,, | Performed by: PHYSICIAN ASSISTANT

## 2022-12-12 PROCEDURE — 1159F MED LIST DOCD IN RCRD: CPT | Mod: CPTII,95,, | Performed by: PHYSICIAN ASSISTANT

## 2022-12-12 PROCEDURE — 3044F PR MOST RECENT HEMOGLOBIN A1C LEVEL <7.0%: ICD-10-PCS | Mod: CPTII,S$GLB,, | Performed by: HOSPITALIST

## 2022-12-12 PROCEDURE — 97813 PR ACUPUNCT W/ ELEC STIMUL 15 MIN: ICD-10-PCS | Mod: S$GLB,,, | Performed by: ACUPUNCTURIST

## 2022-12-12 PROCEDURE — 3008F BODY MASS INDEX DOCD: CPT | Mod: CPTII,S$GLB,, | Performed by: HOSPITALIST

## 2022-12-12 PROCEDURE — 3008F PR BODY MASS INDEX (BMI) DOCUMENTED: ICD-10-PCS | Mod: CPTII,S$GLB,, | Performed by: HOSPITALIST

## 2022-12-12 PROCEDURE — 99396 PREV VISIT EST AGE 40-64: CPT | Mod: S$GLB,,, | Performed by: HOSPITALIST

## 2022-12-12 PROCEDURE — 99214 PR OFFICE/OUTPT VISIT, EST, LEVL IV, 30-39 MIN: ICD-10-PCS | Mod: 95,,, | Performed by: PHYSICIAN ASSISTANT

## 2022-12-12 PROCEDURE — 99396 PR PREVENTIVE VISIT,EST,40-64: ICD-10-PCS | Mod: S$GLB,,, | Performed by: HOSPITALIST

## 2022-12-12 PROCEDURE — 3079F PR MOST RECENT DIASTOLIC BLOOD PRESSURE 80-89 MM HG: ICD-10-PCS | Mod: CPTII,S$GLB,, | Performed by: HOSPITALIST

## 2022-12-12 PROCEDURE — 3044F PR MOST RECENT HEMOGLOBIN A1C LEVEL <7.0%: ICD-10-PCS | Mod: CPTII,95,, | Performed by: PHYSICIAN ASSISTANT

## 2022-12-12 PROCEDURE — 97814 PR ACUPUNCT W/ ELEC STIMUL ADDL 15M: ICD-10-PCS | Mod: S$GLB,,, | Performed by: ACUPUNCTURIST

## 2022-12-12 PROCEDURE — 97814 ACUP 1/> W/ESTIM EA ADDL 15: CPT | Mod: S$GLB,,, | Performed by: ACUPUNCTURIST

## 2022-12-12 PROCEDURE — 99999 PR PBB SHADOW E&M-EST. PATIENT-LVL V: ICD-10-PCS | Mod: PBBFAC,,, | Performed by: HOSPITALIST

## 2022-12-12 PROCEDURE — 1159F PR MEDICATION LIST DOCUMENTED IN MEDICAL RECORD: ICD-10-PCS | Mod: CPTII,95,, | Performed by: PHYSICIAN ASSISTANT

## 2022-12-12 PROCEDURE — 97813 ACUP 1/> W/ESTIM 1ST 15 MIN: CPT | Mod: S$GLB,,, | Performed by: ACUPUNCTURIST

## 2022-12-12 PROCEDURE — 73130 XR HAND COMPLETE 3 VIEWS BILATERAL: ICD-10-PCS | Mod: 26,50,, | Performed by: RADIOLOGY

## 2022-12-12 PROCEDURE — 3044F HG A1C LEVEL LT 7.0%: CPT | Mod: CPTII,S$GLB,, | Performed by: HOSPITALIST

## 2022-12-12 PROCEDURE — 73130 X-RAY EXAM OF HAND: CPT | Mod: 26,50,, | Performed by: RADIOLOGY

## 2022-12-12 PROCEDURE — 99999 PR PBB SHADOW E&M-EST. PATIENT-LVL V: CPT | Mod: PBBFAC,,, | Performed by: HOSPITALIST

## 2022-12-12 PROCEDURE — 3079F DIAST BP 80-89 MM HG: CPT | Mod: CPTII,S$GLB,, | Performed by: HOSPITALIST

## 2022-12-12 PROCEDURE — 3044F HG A1C LEVEL LT 7.0%: CPT | Mod: CPTII,95,, | Performed by: PHYSICIAN ASSISTANT

## 2022-12-12 PROCEDURE — 3074F PR MOST RECENT SYSTOLIC BLOOD PRESSURE < 130 MM HG: ICD-10-PCS | Mod: CPTII,S$GLB,, | Performed by: HOSPITALIST

## 2022-12-12 RX ORDER — METHYLPREDNISOLONE 4 MG/1
TABLET ORAL
Qty: 21 TABLET | Refills: 0 | Status: SHIPPED | OUTPATIENT
Start: 2022-12-12 | End: 2023-01-02

## 2022-12-12 NOTE — PROGRESS NOTES
Subjective:       Patient ID: Fran Higgins is a 42 y.o. female.    Chief Complaint: Pain (Joint, neck, headaches) and Results (Hot flashes, sleep issues)    New patient establishing care for joint pain, neck pain (tension), headaches that are likely hormonal, hot flashes and sleep issues. Patient states that a lot of these symptom have been present since menopause onset and have progressively gotten worse. Patient is on hormone therapy to help with symptoms. Hot flashes are severe and most of the day. Sleep issues are present with trouble falling asleep, staying asleep and beyond. Neck pain is worse with tension or stress and goes into shoulders. Treat 1x a week for 5 weeks then eval.     Review of Systems   Genitourinary:  Positive for hot flashes.   Musculoskeletal:  Positive for arthralgias.   Neurological:  Positive for headaches.   Psychiatric/Behavioral:  Positive for sleep disturbance.        Objective:      Physical Exam    Assessment:       Problem List Items Addressed This Visit          Psychiatric    Major depressive disorder, recurrent episode, in partial remission with anxious distress     Other Visit Diagnoses       Menopausal symptoms                  Plan:       1x a week         Pre-Symptom Score: 8  Post-Symptom Score: 8     Pain (Joint, neck, headaches) and Results (Hot flashes, sleep issues)       Encounter Diagnoses   Name Primary?    Menopausal symptoms     Major depressive disorder, recurrent episode, in partial remission with anxious distress        Acupuncture points used:  4 ZHANG, Du20, ER MICHELLE, Gb34, Ht7, Pc6, Lu9 , Kd10, Li11, Lu7, REN4, REN6, CRUZ MEN, Sp10, Sp6, Sp9, SSC, St36, St40, and YIN RUIZ    ADD STIM (Lr3/Sp6)      NEEDLES IN: 26  NEEDLES OUT: 26    NEEDLES W/ STIM  AT: 9:30 AM  NEEDLES W/ STIM REMOVED AT: 10:00 AM

## 2022-12-12 NOTE — Clinical Note
Pls schedule her for 1st botox jan 12 at 12;00 (she works at [employer], cool w/ doing it during her lunch break

## 2022-12-12 NOTE — PROGRESS NOTES
Subjective:     @Patient ID: Fran Higgins is a 42 y.o. female.    Chief Complaint: Annual Exam    HPI    43 yo F with migraine, depression, obesity s/p gastric sleeve, prediabetes, hidradenitis presents for annual.     Reports having joint pain of bilateral thumbs and wrist. Reports feels swelling of hands. Endorses stiffness of joints at times. Also has b/l elbow pain and sometimes shoulder pain. Only able to take tylenol.           Lipid disorders/ASCVD risk (ages >/= 45 or >/= 20 if increased risk ): ordered    DM (>45y yearly or if obese, HTN): A1c ordered     Eye exam:   Breast Cancer (40-50y discretion of pt, 50-74y every 1-2 years): Mammogram utd 9/15/22   Cervical Cancer (Pap Smear ages 21-65 every 3 years or Pap + HPV q5 years after 30 years of age):  Done 5/13/19; s/p hysterectomy      Vaccines:   Influenza (yearly): done  Tetanus (every 10 yrs - 1st tdap): done 11/2018   covid19: will get booster     Exercise: walking         Review of Systems   Constitutional:  Negative for activity change and unexpected weight change.   HENT:  Negative for hearing loss, rhinorrhea and trouble swallowing.    Eyes:  Negative for discharge and visual disturbance.   Respiratory:  Negative for chest tightness and wheezing.    Cardiovascular:  Negative for chest pain and palpitations.   Gastrointestinal:  Negative for blood in stool, constipation, diarrhea and vomiting.   Endocrine: Negative for polydipsia and polyuria.   Genitourinary:  Negative for difficulty urinating and hematuria.   Musculoskeletal:  Positive for arthralgias and joint swelling. Negative for neck pain.   Neurological:  Positive for headaches. Negative for weakness.   Psychiatric/Behavioral:  Negative for confusion and dysphoric mood.    Past medical history, surgical history, and family medical history reviewed and updated as appropriate.    Medications and allergies reviewed.     Objective:     There were no vitals filed for this  visit.  There is no height or weight on file to calculate BMI.  Physical Exam  Vitals reviewed.   Constitutional:       General: She is not in acute distress.     Appearance: She is well-developed.   HENT:      Head: Normocephalic and atraumatic.      Right Ear: Tympanic membrane normal.      Left Ear: Tympanic membrane normal.      Mouth/Throat:      Mouth: Mucous membranes are moist.      Pharynx: No oropharyngeal exudate.   Eyes:      General:         Right eye: No discharge.         Left eye: No discharge.      Conjunctiva/sclera: Conjunctivae normal.   Cardiovascular:      Rate and Rhythm: Normal rate and regular rhythm.      Heart sounds: No murmur heard.    No friction rub.   Pulmonary:      Effort: Pulmonary effort is normal.      Breath sounds: Normal breath sounds.   Abdominal:      General: Bowel sounds are normal. There is no distension.      Palpations: Abdomen is soft.      Tenderness: There is no abdominal tenderness. There is no guarding.   Musculoskeletal:         General: Tenderness (elbows) present. Normal range of motion.      Cervical back: Normal range of motion and neck supple.      Right lower leg: No edema.      Left lower leg: No edema.      Comments: Negative phalen's sign   Lymphadenopathy:      Cervical: No cervical adenopathy.   Skin:     General: Skin is warm and dry.   Neurological:      Mental Status: She is alert and oriented to person, place, and time.   Psychiatric:         Mood and Affect: Mood normal.         Behavior: Behavior normal.       Lab Results   Component Value Date    WBC 4.46 12/05/2022    HGB 12.7 12/05/2022    HCT 39.2 12/05/2022     12/05/2022    CHOL 205 (H) 12/05/2022    TRIG 109 12/05/2022    HDL 58 12/05/2022    ALT 20 12/05/2022    AST 13 12/05/2022     12/05/2022    K 4.0 12/05/2022     12/05/2022    CREATININE 0.8 12/05/2022    BUN 10 12/05/2022    CO2 26 12/05/2022    TSH 0.520 12/05/2022    INR 1.0 07/15/2013    HGBA1C 5.3 12/05/2022        Assessment:     1. Annual physical exam    2. Bilateral thumb pain    3. Bilateral wrist pain    4. Arthralgia, unspecified joint    5. BMI 45.0-49.9, adult    6. S/P laparoscopic sleeve gastrectomy    7. Depression, unspecified depression type    8. Migraine with aura and without status migrainosus, not intractable      Plan:   Fran was seen today for annual exam.    Diagnoses and all orders for this visit:    Annual physical exam  - reviewed lab results with pt in clinic    Bilateral thumb pain  - Start medrol. Check xray to r/o cmc arthritis   -     X-Ray Hand 3 View Bilateral; Future    Bilateral wrist pain  - Start medrol. Check xray to r/o arthritis. May also have carpal tunnel   -     X-Ray Hand 3 View Bilateral; Future    Arthralgia, unspecified joint  - autoimmune workup is negative so far. Mouna positive, but does not appear to be clinically significant    - continue to monitor     BMI 45.0-49.9, adult  S/P laparoscopic sleeve gastrectomy  - continue to follow-up with bariatric     Depression, unspecified depression type  - Stable. Continue home meds and follow-up with psychiatry     Migraine with aura and without status migrainosus, not intractable  - Stable. Continue home meds and f/u with neurology     Other orders  -     methylPREDNISolone (MEDROL DOSEPACK) 4 mg tablet; use as directed on package            Eirca Pineda MD  Internal Medicine    12/12/2022

## 2022-12-12 NOTE — PROGRESS NOTES
Established Patient     The patient location is: [place]  The chief complaint leading to consultation is: headache follow up visit    Visit type: audiovisual    Face to Face time with patient: 15 min  20 minutes of total time spent on the encounter, which includes face to face time and non-face to face time preparing to see the patient (eg, review of tests), Obtaining and/or reviewing separately obtained history, Documenting clinical information in the electronic or other health record, Independently interpreting results (not separately reported) and communicating results to the patient/family/caregiver, or Care coordination (not separately reported).     Each patient to whom he or she provides medical services by telemedicine is:  (1) informed of the relationship between the physician and patient and the respective role of any other health care provider with respect to management of the patient; and (2) notified that he or she may decline to receive medical services by telemedicine and may withdraw from such care at any time.    Notes:        SUBJECTIVE:  Patient ID: Fran Higgins   Chief Complaint: Headache      History of Present Illness:  Fran Higgins is a 42 y.o. female with migraine, cervical scoliosis, obesity s/p sleeve gastrectomy, anxiety/depression, surgical menopause s/p hysterectomy/RSO for ovary cyst, insomnia who presents via virtual visit alone for follow-up of headaches.       12/12/2022 - Interval History:  Pt's ha's have worsened since last visit. They are now occurring >15/30 days per month for >3 mo, lasting > 4 hrs at a time. She tried tpx but had to d/c 2/2 depression SE. She also tried the addition of supplements including mag w/ no benefit noted. She continues to have trouble falling and staying asleep for which she recently started acupuncture for. Defers referral to sleep med, elavil, pcp at this time. Pt is interested in botox and would be an ideal candidate as she has tried  and failed multiple ppx options.   Plan: start botox next visit, continue emgality, ubrelvy 50mg prn, zofran, continue sleep strategies, rtc jan 12 to begin botox    09/19/2022 - Interval History:   Ha's have increased 1-2 months ago possibly due to worsened insomnia and stress lately. Are now occurring 8/30 days per month lasting up to 1 day at a time. Currently taking effexor and xanax for these conditions.   Ubrelvy 50mg - typically resolves HA's, infrequently needs to repeat dose  Discussed multiple options, pt agreeable w/ following. Denies current depression.   Plan: start tpx 25mg/d, continue emgality, ubrelvy 50mg prn, zofran, rtc in 3 mo or sooner if needed    Recommendations made at last Office Visit on 12/6/21:  - Discussed symptoms appear to be consistent with migraines. Discussed this with patient along with treatment options and patient agreed with the following plan  - ppx - continue emgality  - abortive - trial ubrelvy  - nausea - continue zofran  - avoid nsaids as they are c/i 2/2 gastrectomy  - avoid triptans as recommended by psychiatry 2/2 potential serotinin syndrome w/ effexor for depression  - decreased neck ROM, tightness, and cervical scoliosis - continue conservative treatments, consider PT in future  - risks, benefits, and potential side effects of emgality, ubrelvy, zofran discussed   - alternative treatment options offered   - importance of healthy diet, regular exercise and sleep hygiene in the treatment of headaches    - Start tracking headaches via Migraine [employer]ER[employer]AGS roseann on phone   - RTC 6-12 mo or sooner if needed     Treatments Tried:  emgality - helps  Effexor  Tpx - depression  Anti-htn meds - avoid 2/2 low bp  Bb - avoid 2/2 depression  Ubrelvy - helps  triptans - has been told to avoid per psychiatrist 2/2 serotonin syndrome risk  nsaids - c/i 2/2 recent gastrectomy  zofran    Current Medications:    Current Outpatient Medications:     ALPRAZolam (XANAX) 0.5 MG tablet, Take 1 tablet  (0.5 mg total) by mouth nightly as needed., Disp: 30 tablet, Rfl: 1    b complex vitamins capsule, Take 1 capsule by mouth once daily., Disp: , Rfl:     B3-azelaic-zinc-B6-copper-FA (NICAZEL) 600-5-10-5-1.5 mg Tab, take 1 tablet by mouth twice daily, Disp: 60 tablet, Rfl: 5    CALCIUM CITRATE ORAL, Take 1 tablet by mouth 3 (three) times daily. chewable, Disp: , Rfl:     clindamycin (CLEOCIN T) 1 % external solution, Apply to affected areas of underarm twice a day as needed for folliculitis., Disp: 30 mL, Rfl: 3    clindamycin (CLEOCIN T) 1 % lotion, Apply to affected area two times a day., Disp: 120 mL, Rfl: 3    cyclobenzaprine (FLEXERIL) 5 MG tablet, Take 1 tablet (5 mg total) by mouth 2 (two) times daily as needed for Muscle spasms., Disp: 60 tablet, Rfl: 5    estradioL (ESTRACE) 2 MG tablet, Take 1 tablet (2 mg total) by mouth once daily., Disp: 30 tablet, Rfl: 11    finasteride (PROSCAR) 5 mg tablet, Take 1 tablet (5 mg total) by mouth once daily., Disp: 30 tablet, Rfl: 11    fluocinolone acetonide oiL (DERMOTIC OIL) 0.01 % Drop, Use to affected ears at night, drop in canal and use to ear when flaring, Disp: 20 mL, Rfl: 3    galcanezumab-gnlm (EMGALITY PEN) 120 mg/mL PnIj, Inject 120 mg into the skin every 30 days., Disp: 1 mL, Rfl: 11    HUMIRA,CF, PEN 80 mg/0.8 mL PnKt, Starting day 29, inject 80 mg (1 pen) into the skin every 2 weeks., Disp: 2 pen, Rfl: 4    ketoconazole (NIZORAL) 2 % cream, Apply to affected area two times a day (Patient taking differently: Apply to affected area two times a day), Disp: 60 g, Rfl: 3    methylPREDNISolone (MEDROL DOSEPACK) 4 mg tablet, use as directed on package, Disp: 21 tablet, Rfl: 0    multivitamin capsule, Take by mouth once daily., Disp: , Rfl:     nystatin-triamcinolone (MYCOLOG II) cream, Apply topically 2 (two) times daily., Disp: 30 g, Rfl: 1    omeprazole (PRILOSEC) 40 MG capsule, Take 1 capsule (40 mg total) by mouth every morning., Disp: 30 capsule, Rfl: 1     ondansetron (ZOFRAN-ODT) 4 MG TbDL, Take 1 tablet (4 mg total) by mouth every 6 (six) hours as needed (for nausea)., Disp: 20 tablet, Rfl: 2    pimecrolimus (ELIDEL) 1 % cream, Apply to affected areas of underarm twice a day as needed for irritation. (Patient taking differently: Apply to affected areas of underarm twice a day as needed for irritation.), Disp: 30 g, Rfl: 3    progesterone (PROMETRIUM) 100 MG capsule, Take 1 capsule (100 mg total) by mouth nightly., Disp: 30 capsule, Rfl: 11    spironolactone (ALDACTONE) 50 MG tablet, Start with taking 1 tablet by mouth every day, may increase to 2 tablets by mouth every day as tolerated., Disp: 60 tablet, Rfl: 4    thiamine (VITAMIN B-1) 50 MG tablet, Take 50 mg by mouth once daily., Disp: , Rfl:     ubrogepant (UBRELVY) 50 mg tablet, Take 1 tab by mouth for headaches. May repeat once 2 hours after initial dose based on response and tolerability., Disp: 16 tablet, Rfl: 5    venlafaxine (EFFEXOR-XR) 150 MG Cp24, Take 1 capsule (150 mg total) by mouth once daily., Disp: 30 capsule, Rfl: 6    Review of Systems - as per HPI, otherwise a balanced 10 systems review is negative.    OBJECTIVE:  Vitals:  LMP 03/09/2020 (Exact Date)      Physical Exam:  Constitutional: she appears well-developed and well-nourished. she is well groomed. NAD   HENT:    Head: Normocephalic and atraumatic  Eyes: Conjunctivae and EOM are normal  Musculoskeletal: Normal range of motion. No joint stiffness.   Psychiatric: Mood and affect are normal    Neuro: Patient is alert and oriented to person, place, and time. Language is intact and fluent. Speech is clear and fluent. Recent and remote memory are intact.  Normal attention and concentration.  Facial movement is symmetric. Moves all 4 extremities against gravity.      Review of Data:   Notes from neuro reviewed   Labs:  Lab Visit on 12/05/2022   Component Date Value Ref Range Status    Sodium 12/05/2022 140  136 - 145 mmol/L Final    Potassium  12/05/2022 4.0  3.5 - 5.1 mmol/L Final    Chloride 12/05/2022 105  95 - 110 mmol/L Final    CO2 12/05/2022 26  23 - 29 mmol/L Final    Glucose 12/05/2022 95  70 - 110 mg/dL Final    BUN 12/05/2022 10  6 - 20 mg/dL Final    Creatinine 12/05/2022 0.8  0.5 - 1.4 mg/dL Final    Calcium 12/05/2022 9.2  8.7 - 10.5 mg/dL Final    Total Protein 12/05/2022 6.7  6.0 - 8.4 g/dL Final    Albumin 12/05/2022 3.8  3.5 - 5.2 g/dL Final    Total Bilirubin 12/05/2022 0.5  0.1 - 1.0 mg/dL Final    Alkaline Phosphatase 12/05/2022 69  55 - 135 U/L Final    AST 12/05/2022 13  10 - 40 U/L Final    ALT 12/05/2022 20  10 - 44 U/L Final    Anion Gap 12/05/2022 9  8 - 16 mmol/L Final    eGFR 12/05/2022 >60.0  >60 mL/min/1.73 m^2 Final    WBC 12/05/2022 4.46  3.90 - 12.70 K/uL Final    RBC 12/05/2022 4.56  4.00 - 5.40 M/uL Final    Hemoglobin 12/05/2022 12.7  12.0 - 16.0 g/dL Final    Hematocrit 12/05/2022 39.2  37.0 - 48.5 % Final    MCV 12/05/2022 86  82 - 98 fL Final    MCH 12/05/2022 27.9  27.0 - 31.0 pg Final    MCHC 12/05/2022 32.4  32.0 - 36.0 g/dL Final    RDW 12/05/2022 12.2  11.5 - 14.5 % Final    Platelets 12/05/2022 190  150 - 450 K/uL Final    MPV 12/05/2022 11.5  9.2 - 12.9 fL Final    Immature Granulocytes 12/05/2022 0.4  0.0 - 0.5 % Final    Gran # (ANC) 12/05/2022 2.4  1.8 - 7.7 K/uL Final    Immature Grans (Abs) 12/05/2022 0.02  0.00 - 0.04 K/uL Final    Lymph # 12/05/2022 1.7  1.0 - 4.8 K/uL Final    Mono # 12/05/2022 0.3  0.3 - 1.0 K/uL Final    Eos # 12/05/2022 0.1  0.0 - 0.5 K/uL Final    Baso # 12/05/2022 0.03  0.00 - 0.20 K/uL Final    nRBC 12/05/2022 0  0 /100 WBC Final    Gran % 12/05/2022 54.3  38.0 - 73.0 % Final    Lymph % 12/05/2022 37.4  18.0 - 48.0 % Final    Mono % 12/05/2022 6.1  4.0 - 15.0 % Final    Eosinophil % 12/05/2022 1.1  0.0 - 8.0 % Final    Basophil % 12/05/2022 0.7  0.0 - 1.9 % Final    Differential Method 12/05/2022 Automated   Final    Cholesterol 12/05/2022 205 (H)  120 - 199 mg/dL Final     Triglycerides 12/05/2022 109  30 - 150 mg/dL Final    HDL 12/05/2022 58  40 - 75 mg/dL Final    LDL Cholesterol 12/05/2022 125.2  63.0 - 159.0 mg/dL Final    HDL/Cholesterol Ratio 12/05/2022 28.3  20.0 - 50.0 % Final    Total Cholesterol/HDL Ratio 12/05/2022 3.5  2.0 - 5.0 Final    Non-HDL Cholesterol 12/05/2022 147  mg/dL Final    TSH 12/05/2022 0.520  0.400 - 4.000 uIU/mL Final    Hemoglobin A1C 12/05/2022 5.3  4.0 - 5.6 % Final    Estimated Avg Glucose 12/05/2022 105  68 - 131 mg/dL Final    Iron 12/05/2022 125  30 - 160 ug/dL Final    Transferrin 12/05/2022 267  200 - 375 mg/dL Final    TIBC 12/05/2022 395  250 - 450 ug/dL Final    Saturated Iron 12/05/2022 32  20 - 50 % Final    Ferritin 12/05/2022 51  20.0 - 300.0 ng/mL Final    Transferrin 12/05/2022 267  200 - 375 mg/dL Final    Vitamin B-12 12/05/2022 371  210 - 950 pg/mL Final    Vit D, 25-Hydroxy 12/05/2022 44  30 - 96 ng/mL Final    Sed Rate 12/05/2022 12  0 - 36 mm/Hr Final    CRP 12/05/2022 4.1  0.0 - 8.2 mg/L Final    GENE Screen 12/05/2022 Positive (A)  Negative <1:80 Final    Rheumatoid Factor 12/05/2022 <13.0  0.0 - 15.0 IU/mL Final    CCP Antibodies 12/05/2022 <0.5  <5.0 U/mL Final    GENE PATTERN 1 12/05/2022 Homogeneous   Final    Anti Sm Antibody 12/05/2022 0.06  0.00 - 0.99 Ratio Final    Anti-Sm Interpretation 12/05/2022 Negative  Negative Final    Anti-SSA Antibody 12/05/2022 0.08  0.00 - 0.99 Ratio Final    Anti-SSA Interpretation 12/05/2022 Negative  Negative Final    Anti-SSB Antibody 12/05/2022 0.05  0.00 - 0.99 Ratio Final    Anti-SSB Interpretation 12/05/2022 Negative  Negative Final    ds DNA Ab 12/05/2022 Negative 1:10  Negative 1:10 Final    Anti Sm/RNP Antibody 12/05/2022 0.06  0.00 - 0.99 Ratio Final    Anti-Sm/RNP Interpretation 12/05/2022 Negative  Negative Final    GENE Titer 1 12/05/2022 1:160   Final   Office Visit on 10/13/2022   Component Date Value Ref Range Status    POC Molecular Influenza A Ag 10/13/2022 Negative   Negative, Not Reported Final    POC Molecular Influenza B Ag 10/13/2022 Negative  Negative, Not Reported Final     Acceptable 10/13/2022 Yes   Final    POC Rapid COVID 10/13/2022 Negative  Negative Final     Acceptable 10/13/2022 Yes   Final     Imaging:  No results found for this or any previous visit.  Note: I have independently reviewed any/all imaging/labs/tests and agree with the report (s) as documented.  Any discrepancies will be as noted/demarcated by free text.  CHANTELLE CHING 12/12/2022    ASSESSMENT:  1. Chronic migraine with aura          PLAN:  - Discussed symptoms appear to be consistent with migraines. Discussed this with patient along with treatment options and patient agreed with the following plan  - ppx - continue emgality, start botox  - abortive - continue ubrelvy  - nausea - continue zofran  - trouble w/ sleep - recently started acupuncture, defers referrals to pcp or sleep med or to begin elavil due to concern w/ interaction w/ effexor  - avoid nsaids as they are c/i 2/2 gastrectomy  - avoid triptans as recommended by psychiatry 2/2 potential serotinin syndrome w/ effexor for depression  - decreased neck ROM, tightness, and cervical scoliosis - continue conservative treatments, consider PT in future  - Continue tracking headaches   - Discussed goals of therapy are to decrease the frequency, intensity, and duration of headaches  - RTC on jan 12th to begin botox    BOTOX  The patient has chronic migraines (G43.719) and suffers from headaches more than 15 days a month lasting more than 4 hours a day with no relief of symptoms despite trying multiple medications (including tpx, emgality, effexor/listed above). Botox treatment was approved for chronic migraines in October 2010. The patient will be an ideal candidate for Botox. We are planning for 3 treatments 3 months apart and aiming for at least 50% improvement in the symptoms. If we see no improvement after 3 treatments,  we will discontinue the injections.  Injection sites planned:   muscle bilaterally ( a total of 10 units divided into 2 sites)   Procerus muscle (5 units)   Frontalis muscle bilaterally (a total of 20 units divided into 4 sites)   Temporalis muscle bilaterally (a total of 40 units divided into 8 sites)   Occipitalis muscle bilaterally (a total of 30 units divided into 6 sites)   Cervical paraspinal muscles (a total of 20 units divided into 4 sites)   Trapezius muscle bilaterally (a total of 30 units divided into 6 sites)   Total Botox to be used: 155 Units   Unavoidable waste: 45 Units       Orders Placed This Encounter    Prior authorization Order         Discussed potential for teratogenicity with treatment, patient understands if her family planning status should change she will contact office immediately and we will safely adjust medications as needed.     Questions and concerns were sought and answered to the patient's stated verbal satisfaction.  The patient verbalizes understanding and agreement with the above stated treatment plan.     CC: MD Marina Manrique PA-C  [facility]   [phone]    Dr. Martino was available during today's encounter.

## 2022-12-13 ENCOUNTER — SPECIALTY PHARMACY (OUTPATIENT)
Dept: PHARMACY | Facility: CLINIC | Age: 42
End: 2022-12-13
Payer: COMMERCIAL

## 2022-12-13 NOTE — TELEPHONE ENCOUNTER
Specialty Pharmacy - Refill Coordination    Specialty Medication Orders Linked to Encounter      Flowsheet Row Most Recent Value   Medication #1 HUMIRA,CF, PEN 80 mg/0.8 mL PnKt (Order#[id number], Rx#[id number])            Refill Questions - Documented Responses      Flowsheet Row Most Recent Value   Patient Availability and HIPAA Verification    Does patient want to proceed with activity? Yes   HIPAA/medical authority confirmed? Yes   Relationship to patient of person spoken to? Self   Refill Screening Questions    Changes to allergies? No   Changes to medications? No   New conditions since last clinic visit? No   Unplanned office visit, urgent care, ED, or hospital admission in the last 4 weeks? No   How does patient/caregiver feel medication is working? Good   Financial problems or insurance changes? No   How many doses of your specialty medications were missed in the last 4 weeks? 0   Would patient like to speak to a pharmacist? No   When does the patient need to receive the medication? 12/16/22   Refill Delivery Questions    How will the patient receive the medication? [facility]   When does the patient need to receive the medication? 12/16/22   Shipping Address Home   Address in [facility] confirmed and updated if neccessary? Yes   Expected Copay ($) 5   Is the patient able to afford the medication copay? Yes   Payment Method CC on file   Days supply of Refill 28   Supplies needed? No supplies needed   Refill activity completed? Yes   Refill activity plan Refill scheduled   Shipment/Pickup Date: 12/14/22            Current Outpatient Medications   Medication Sig    ALPRAZolam (XANAX) 0.5 MG tablet Take 1 tablet (0.5 mg total) by mouth nightly as needed.    b complex vitamins capsule Take 1 capsule by mouth once daily.    B3-azelaic-zinc-B6-copper-FA (NICAZEL) 600-5-10-5-1.5 mg Tab take 1 tablet by mouth twice daily    CALCIUM CITRATE ORAL Take 1 tablet by mouth 3 (three) times daily. chewable    clindamycin  (CLEOCIN T) 1 % external solution Apply to affected areas of underarm twice a day as needed for folliculitis.    clindamycin (CLEOCIN T) 1 % lotion Apply to affected area two times a day.    cyclobenzaprine (FLEXERIL) 5 MG tablet Take 1 tablet (5 mg total) by mouth 2 (two) times daily as needed for Muscle spasms.    estradioL (ESTRACE) 2 MG tablet Take 1 tablet (2 mg total) by mouth once daily.    finasteride (PROSCAR) 5 mg tablet Take 1 tablet (5 mg total) by mouth once daily.    fluocinolone acetonide oiL (DERMOTIC OIL) 0.01 % Drop Use to affected ears at night, drop in canal and use to ear when flaring    galcanezumab-gnlm (EMGALITY PEN) 120 mg/mL PnIj Inject 120 mg into the skin every 30 days.    PITERIRA,CF, PEN 80 mg/0.8 mL PnKt Starting day 29, inject 80 mg (1 pen) into the skin every 2 weeks.    ketoconazole (NIZORAL) 2 % cream Apply to affected area two times a day (Patient taking differently: Apply to affected area two times a day)    methylPREDNISolone (MEDROL DOSEPACK) 4 mg tablet use as directed on package    multivitamin capsule Take by mouth once daily.    nystatin-triamcinolone (MYCOLOG II) cream Apply topically 2 (two) times daily.    omeprazole (PRILOSEC) 40 MG capsule Take 1 capsule (40 mg total) by mouth every morning.    ondansetron (ZOFRAN-ODT) 4 MG TbDL Take 1 tablet (4 mg total) by mouth every 6 (six) hours as needed (for nausea).    pimecrolimus (ELIDEL) 1 % cream Apply to affected areas of underarm twice a day as needed for irritation. (Patient taking differently: Apply to affected areas of underarm twice a day as needed for irritation.)    progesterone (PROMETRIUM) 100 MG capsule Take 1 capsule (100 mg total) by mouth nightly.    spironolactone (ALDACTONE) 50 MG tablet Start with taking 1 tablet by mouth every day, may increase to 2 tablets by mouth every day as tolerated.    thiamine (VITAMIN B-1) 50 MG tablet Take 50 mg by mouth once daily.    ubrogepant (UBRELVY) 50 mg tablet Take 1 tab  by mouth for headaches. May repeat once 2 hours after initial dose based on response and tolerability.    venlafaxine (EFFEXOR-XR) 150 MG Cp24 Take 1 capsule (150 mg total) by mouth once daily.   Last reviewed on 12/12/2022 11:31 AM by Marina Geronimo PA-C    Review of patient's allergies indicates:  No Known Allergies Last reviewed on  12/12/2022 11:31 AM by Marina Geronimo      Tasks added this encounter   1/6/2023 - Refill Call (Auto Added)   Tasks due within next 3 months   No tasks due.     Tripp Israel, PharmD  [facility]  [address] [address]  Phone: [phone]  Fax: [phone]

## 2022-12-15 ENCOUNTER — LAB VISIT (OUTPATIENT)
Dept: LAB | Facility: OTHER | Age: 42
End: 2022-12-15
Attending: OBSTETRICS & GYNECOLOGY
Payer: COMMERCIAL

## 2022-12-15 ENCOUNTER — OFFICE VISIT (OUTPATIENT)
Dept: GYNECOLOGIC ONCOLOGY | Facility: CLINIC | Age: 42
End: 2022-12-15
Payer: COMMERCIAL

## 2022-12-15 VITALS
HEART RATE: 86 BPM | BODY MASS INDEX: 48.41 KG/M2 | SYSTOLIC BLOOD PRESSURE: 132 MMHG | DIASTOLIC BLOOD PRESSURE: 62 MMHG | WEIGHT: 290.88 LBS

## 2022-12-15 DIAGNOSIS — L73.2 HIDRADENITIS: ICD-10-CM

## 2022-12-15 DIAGNOSIS — C56.9 MUCINOUS TUMOR, OF LOW MALIGNANT POTENTIAL: ICD-10-CM

## 2022-12-15 DIAGNOSIS — D39.10 BORDERLINE EPITHELIAL NEOPLASM OF OVARY: Primary | ICD-10-CM

## 2022-12-15 PROCEDURE — 82378 CARCINOEMBRYONIC ANTIGEN: CPT | Performed by: OBSTETRICS & GYNECOLOGY

## 2022-12-15 PROCEDURE — 3008F BODY MASS INDEX DOCD: CPT | Mod: CPTII,S$GLB,, | Performed by: OBSTETRICS & GYNECOLOGY

## 2022-12-15 PROCEDURE — 3075F PR MOST RECENT SYSTOLIC BLOOD PRESS GE 130-139MM HG: ICD-10-PCS | Mod: CPTII,S$GLB,, | Performed by: OBSTETRICS & GYNECOLOGY

## 2022-12-15 PROCEDURE — 3078F DIAST BP <80 MM HG: CPT | Mod: CPTII,S$GLB,, | Performed by: OBSTETRICS & GYNECOLOGY

## 2022-12-15 PROCEDURE — 3008F PR BODY MASS INDEX (BMI) DOCUMENTED: ICD-10-PCS | Mod: CPTII,S$GLB,, | Performed by: OBSTETRICS & GYNECOLOGY

## 2022-12-15 PROCEDURE — 3075F SYST BP GE 130 - 139MM HG: CPT | Mod: CPTII,S$GLB,, | Performed by: OBSTETRICS & GYNECOLOGY

## 2022-12-15 PROCEDURE — 99213 OFFICE O/P EST LOW 20 MIN: CPT | Mod: S$GLB,,, | Performed by: OBSTETRICS & GYNECOLOGY

## 2022-12-15 PROCEDURE — 36415 COLL VENOUS BLD VENIPUNCTURE: CPT | Performed by: OBSTETRICS & GYNECOLOGY

## 2022-12-15 PROCEDURE — 3044F PR MOST RECENT HEMOGLOBIN A1C LEVEL <7.0%: ICD-10-PCS | Mod: CPTII,S$GLB,, | Performed by: OBSTETRICS & GYNECOLOGY

## 2022-12-15 PROCEDURE — 99999 PR PBB SHADOW E&M-EST. PATIENT-LVL IV: ICD-10-PCS | Mod: PBBFAC,,, | Performed by: OBSTETRICS & GYNECOLOGY

## 2022-12-15 PROCEDURE — 99999 PR PBB SHADOW E&M-EST. PATIENT-LVL IV: CPT | Mod: PBBFAC,,, | Performed by: OBSTETRICS & GYNECOLOGY

## 2022-12-15 PROCEDURE — 1159F PR MEDICATION LIST DOCUMENTED IN MEDICAL RECORD: ICD-10-PCS | Mod: CPTII,S$GLB,, | Performed by: OBSTETRICS & GYNECOLOGY

## 2022-12-15 PROCEDURE — 99213 PR OFFICE/OUTPT VISIT, EST, LEVL III, 20-29 MIN: ICD-10-PCS | Mod: S$GLB,,, | Performed by: OBSTETRICS & GYNECOLOGY

## 2022-12-15 PROCEDURE — 3044F HG A1C LEVEL LT 7.0%: CPT | Mod: CPTII,S$GLB,, | Performed by: OBSTETRICS & GYNECOLOGY

## 2022-12-15 PROCEDURE — 3078F PR MOST RECENT DIASTOLIC BLOOD PRESSURE < 80 MM HG: ICD-10-PCS | Mod: CPTII,S$GLB,, | Performed by: OBSTETRICS & GYNECOLOGY

## 2022-12-15 PROCEDURE — 1159F MED LIST DOCD IN RCRD: CPT | Mod: CPTII,S$GLB,, | Performed by: OBSTETRICS & GYNECOLOGY

## 2022-12-15 RX ORDER — CLINDAMYCIN PHOSPHATE 10 UG/ML
LOTION TOPICAL
Qty: 120 ML | Refills: 3 | Status: CANCELLED | OUTPATIENT
Start: 2022-12-15

## 2022-12-15 RX ORDER — CYCLOBENZAPRINE HCL 5 MG
5 TABLET ORAL 2 TIMES DAILY PRN
Qty: 60 TABLET | Refills: 5 | Status: SHIPPED | OUTPATIENT
Start: 2022-12-15 | End: 2024-01-02 | Stop reason: SDUPTHER

## 2022-12-15 NOTE — TELEPHONE ENCOUNTER
No new care gaps identified.  [facility] Embedded Care Gaps. Reference number: [id number]. 12/15/2022   4:02:46 PM CST

## 2022-12-15 NOTE — PROGRESS NOTES
Subjective:       Patient ID: Fran Higgins is a 42 y.o. female.    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 who presents for routine surveillance visit.  No GYN concerns, previous patient of Dr. Kaufman.  She has no vaginal bleeding or discharge.  She is having no nausea or vomiting.  She has no bowel or bladder complaints.  She has no pain issues.      Follows with [facility] and reports working to better regulate hormone therapy.     CEA: today pending, <1.7 (4/2022), <1.7 (12/2021), 1.9 (8/2021), 1.8 (4/2021), 1.7 (1/2021), 1.9(10/2020)      HPI or ONCOLOGIC HISTORY  6/25/2019: LSO by Dr. 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  Size of tumor: 11.8 CM.  Capsule involvement cannot be determined secondary to fragmentation.  Microinvasion is present: 5 foci. Intraepithelial carcinoma is present. Benign fallopian tube and fimbriated end with no significant histopathologic alterations. Pathologic staging: pT1a      3/9/2020: Robotic assisted total hysterectomy and RSO.    PATHOLOGY:  139 g uterus with secretory endometrium and focal adenomyosis, multiple leiomyomas.  Right ovary and fallopian tube benign. (After completion hysterectomy and RSO she has had problems with depression and hormone replacement. Seen by Katia Mahan and Sherrie for depression. On  venlafaxine. Depression and VMS is better.)        6/22/2021 Gastric Sleeve    Review of Systems   Constitutional:  Negative for appetite change, chills, diaphoresis, fatigue, fever and unexpected weight change.   Respiratory:  Negative for cough, chest tightness, shortness of breath and wheezing.    Cardiovascular:  Negative for chest pain, palpitations and leg swelling.   Gastrointestinal:  Negative for abdominal distention, abdominal pain, blood in stool, constipation, diarrhea,  nausea and vomiting.   Genitourinary:  Negative for difficulty urinating, dysuria, flank pain, frequency, hematuria, pelvic pain, vaginal bleeding, vaginal discharge and vaginal pain.   Musculoskeletal:  Negative for arthralgias and back pain.   Integumentary:  Negative for color change and rash.   Neurological:  Negative for dizziness, weakness, numbness and headaches.   Hematological:  Negative for adenopathy.   Psychiatric/Behavioral:  Negative for confusion and sleep disturbance. The patient is not nervous/anxious.        Objective:      /62   Pulse 86   Wt 131.9 kg (290 lb 14.4 oz)   LMP 03/09/2020 (Exact Date)   BMI 48.41 kg/m²     Physical Exam  Vitals and nursing note reviewed. Exam conducted with a chaperone present.   Constitutional:       General: She is not in acute distress.     Appearance: Normal appearance. She is well-developed. She is obese. She is not diaphoretic.   HENT:      Head: Normocephalic and atraumatic.   Eyes:      General: No scleral icterus.  Pulmonary:      Effort: Pulmonary effort is normal. No respiratory distress.   Abdominal:      General: There is no distension.      Palpations: Abdomen is soft. There is no mass.      Tenderness: There is no abdominal tenderness.   Genitourinary:     General: Normal vulva.      Labia:         Right: No rash, tenderness or lesion.         Left: No rash, tenderness or lesion.       Vagina: Normal. No vaginal discharge or bleeding.      Uterus: Absent.       Adnexa:         Right: No mass, tenderness or fullness.          Left: No mass, tenderness or fullness.     Musculoskeletal:         General: Normal range of motion.      Cervical back: Normal range of motion.      Right lower leg: No edema.      Left lower leg: No edema.   Skin:     General: Skin is warm and dry.      Coloration: Skin is not pale.      Findings: No rash.   Neurological:      Mental Status: She is alert and oriented to person, place, and time.   Psychiatric:          Mood and Affect: Mood normal.         Behavior: Behavior normal.       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

## 2022-12-16 DIAGNOSIS — L73.2 HIDRADENITIS: ICD-10-CM

## 2022-12-16 LAB — CEA SERPL-MCNC: <1.7 NG/ML (ref 0–5)

## 2022-12-16 RX ORDER — CLINDAMYCIN PHOSPHATE 10 UG/ML
LOTION TOPICAL
Qty: 120 ML | Refills: 3 | Status: CANCELLED | OUTPATIENT
Start: 2022-12-15

## 2022-12-19 ENCOUNTER — CLINICAL SUPPORT (OUTPATIENT)
Dept: HEMATOLOGY/ONCOLOGY | Facility: CLINIC | Age: 42
End: 2022-12-19
Payer: COMMERCIAL

## 2022-12-19 DIAGNOSIS — N95.1 MENOPAUSAL SYMPTOMS: Primary | ICD-10-CM

## 2022-12-19 PROCEDURE — 97813 ACUP 1/> W/ESTIM 1ST 15 MIN: CPT | Mod: S$GLB,,, | Performed by: ACUPUNCTURIST

## 2022-12-19 PROCEDURE — 97814 ACUP 1/> W/ESTIM EA ADDL 15: CPT | Mod: S$GLB,,, | Performed by: ACUPUNCTURIST

## 2022-12-19 PROCEDURE — 99999 PR PBB SHADOW E&M-EST. PATIENT-LVL I: CPT | Mod: PBBFAC,,, | Performed by: ACUPUNCTURIST

## 2022-12-19 PROCEDURE — 97813 PR ACUPUNCT W/ ELEC STIMUL 15 MIN: ICD-10-PCS | Mod: S$GLB,,, | Performed by: ACUPUNCTURIST

## 2022-12-19 PROCEDURE — 99999 PR PBB SHADOW E&M-EST. PATIENT-LVL I: ICD-10-PCS | Mod: PBBFAC,,, | Performed by: ACUPUNCTURIST

## 2022-12-19 PROCEDURE — 97814 PR ACUPUNCT W/ ELEC STIMUL ADDL 15M: ICD-10-PCS | Mod: S$GLB,,, | Performed by: ACUPUNCTURIST

## 2022-12-20 DIAGNOSIS — L73.2 HIDRADENITIS: ICD-10-CM

## 2022-12-20 RX ORDER — CLINDAMYCIN PHOSPHATE 10 UG/ML
LOTION TOPICAL
Qty: 120 ML | Refills: 3 | Status: CANCELLED | OUTPATIENT
Start: 2022-12-15

## 2022-12-27 ENCOUNTER — CLINICAL SUPPORT (OUTPATIENT)
Dept: HEMATOLOGY/ONCOLOGY | Facility: CLINIC | Age: 42
End: 2022-12-27
Payer: COMMERCIAL

## 2022-12-27 DIAGNOSIS — N95.1 MENOPAUSAL SYMPTOMS: Primary | ICD-10-CM

## 2022-12-27 PROCEDURE — 99999 PR PBB SHADOW E&M-EST. PATIENT-LVL I: ICD-10-PCS | Mod: PBBFAC,,, | Performed by: ACUPUNCTURIST

## 2022-12-27 PROCEDURE — 97814 ACUP 1/> W/ESTIM EA ADDL 15: CPT | Mod: S$GLB,,, | Performed by: ACUPUNCTURIST

## 2022-12-27 PROCEDURE — 97814 PR ACUPUNCT W/ ELEC STIMUL ADDL 15M: ICD-10-PCS | Mod: S$GLB,,, | Performed by: ACUPUNCTURIST

## 2022-12-27 PROCEDURE — 97813 PR ACUPUNCT W/ ELEC STIMUL 15 MIN: ICD-10-PCS | Mod: S$GLB,,, | Performed by: ACUPUNCTURIST

## 2022-12-27 PROCEDURE — 97813 ACUP 1/> W/ESTIM 1ST 15 MIN: CPT | Mod: S$GLB,,, | Performed by: ACUPUNCTURIST

## 2022-12-27 PROCEDURE — 99999 PR PBB SHADOW E&M-EST. PATIENT-LVL I: CPT | Mod: PBBFAC,,, | Performed by: ACUPUNCTURIST

## 2022-12-27 NOTE — PROGRESS NOTES
Subjective:       Patient ID: Fran Higgins is a 42 y.o. female.    Chief Complaint: Results (Hot flashes) and Pain (Joint, headaches)    Patient states that symptoms seem to have decreased by about half (every 2 hours instead of every hour). Continue with care and evaluate progress after 3 more weeks of treatments.     Review of Systems   Genitourinary:  Positive for hot flashes.       Objective:      Physical Exam    Assessment:       Problem List Items Addressed This Visit    None  Visit Diagnoses       Menopausal symptoms    -  Primary            Plan:       1x a week         Pre-Symptom Score: NA  Post-Symptom Score: NA     Results (Hot flashes) and Pain (Joint, headaches)       Encounter Diagnoses   Name Primary?    Menopausal symptoms Yes       Acupuncture points used:  4 ZHANG, Du20, ER MICHELLE, Gb34, Kd10, Li20, REN4, REN6, Sp10, Sp6, Sp9, St36, and YIN RUIZ    ADD STIM (Lr3/Sp6)      NEEDLES IN: 22  NEEDLES OUT: 22    NEEDLES W/ STIM  AT: 4:35 PM  NEEDLES W/ STIM REMOVED AT: 5:05 PM

## 2022-12-29 ENCOUNTER — TELEPHONE (OUTPATIENT)
Dept: BARIATRICS | Facility: CLINIC | Age: 42
End: 2022-12-29
Payer: COMMERCIAL

## 2022-12-29 ENCOUNTER — PATIENT MESSAGE (OUTPATIENT)
Dept: BARIATRICS | Facility: CLINIC | Age: 42
End: 2022-12-29
Payer: COMMERCIAL

## 2022-12-29 NOTE — TELEPHONE ENCOUNTER
Attempted to reach patient in regards to scheduling 1 year post op. No answer. Sent portal message to patient if they're interested in following up on care.

## 2023-01-02 DIAGNOSIS — L73.2 HIDRADENITIS: ICD-10-CM

## 2023-01-02 RX ORDER — SPIRONOLACTONE 50 MG/1
TABLET, FILM COATED ORAL
Qty: 60 TABLET | Refills: 4 | Status: CANCELLED | OUTPATIENT
Start: 2023-01-02

## 2023-01-05 DIAGNOSIS — L73.2 HIDRADENITIS: ICD-10-CM

## 2023-01-06 ENCOUNTER — CLINICAL SUPPORT (OUTPATIENT)
Dept: HEMATOLOGY/ONCOLOGY | Facility: CLINIC | Age: 43
End: 2023-01-06
Payer: COMMERCIAL

## 2023-01-06 DIAGNOSIS — N95.1 MENOPAUSAL SYMPTOMS: Primary | ICD-10-CM

## 2023-01-06 PROCEDURE — 97813 PR ACUPUNCT W/ ELEC STIMUL 15 MIN: ICD-10-PCS | Mod: S$GLB,,, | Performed by: ACUPUNCTURIST

## 2023-01-06 PROCEDURE — 97814 ACUP 1/> W/ESTIM EA ADDL 15: CPT | Mod: S$GLB,,, | Performed by: ACUPUNCTURIST

## 2023-01-06 PROCEDURE — 97813 ACUP 1/> W/ESTIM 1ST 15 MIN: CPT | Mod: S$GLB,,, | Performed by: ACUPUNCTURIST

## 2023-01-06 PROCEDURE — 99999 PR PBB SHADOW E&M-EST. PATIENT-LVL I: ICD-10-PCS | Mod: PBBFAC,,, | Performed by: ACUPUNCTURIST

## 2023-01-06 PROCEDURE — 99999 PR PBB SHADOW E&M-EST. PATIENT-LVL I: CPT | Mod: PBBFAC,,, | Performed by: ACUPUNCTURIST

## 2023-01-06 PROCEDURE — 97814 PR ACUPUNCT W/ ELEC STIMUL ADDL 15M: ICD-10-PCS | Mod: S$GLB,,, | Performed by: ACUPUNCTURIST

## 2023-01-06 RX ORDER — SPIRONOLACTONE 50 MG/1
TABLET, FILM COATED ORAL
Qty: 60 TABLET | Refills: 4 | OUTPATIENT
Start: 2023-01-06

## 2023-01-08 NOTE — PROGRESS NOTES
Subjective:       Patient ID: Fran Higgins is a 42 y.o. female.    Chief Complaint: Results (Hot flashes) and Pain (joint)    Patient states that symptoms are back to being the same as when she started treatment. Reduction was present previously. Continue with care for 2 more weeks then re-evaluate progress.    Review of Systems   Genitourinary:  Positive for hot flashes.   Musculoskeletal:  Positive for arthralgias.       Objective:      Physical Exam    Assessment:       Problem List Items Addressed This Visit    None  Visit Diagnoses       Menopausal symptoms    -  Primary            Plan:       1x a week for 2 more weeks then eval         Pre-Symptom Score: NA  Post-Symptom Score: NA     Results (Hot flashes) and Pain (joint)       Encounter Diagnoses   Name Primary?    Menopausal symptoms Yes       Acupuncture points used:  4 ZHANG, Du20, ER MICHELLE, Gb34, Ht7, Pc6, Lu9 , Kd10, Li11, REN4, REN6, CRUZ MEN, Sp10, Sp6, Sp9, St36, and YIN RUIZ    ADD STIM (Lr3/Sp6)      NEEDLES IN: 25  NEEDLES OUT: 25    NEEDLES W/ STIM  AT: 9:45 AM  NEEDLES W/ STIM REMOVED AT: 10:15 AM

## 2023-01-08 NOTE — PROGRESS NOTES
Subjective:       Patient ID: Fran Higgins is a 42 y.o. female.    Chief Complaint: Pain (joint) and Results (Hot flashes)    Patient states she is having slight reduction with symptoms. Continue for a few more weeks then evaluate progress.     Review of Systems   Genitourinary:  Positive for hot flashes.   Musculoskeletal:  Positive for arthralgias.       Objective:      Physical Exam    Assessment:       Problem List Items Addressed This Visit    None  Visit Diagnoses       Menopausal symptoms    -  Primary            Plan:       1x a week for 3 more weeks         Pre-Symptom Score: NA  Post-Symptom Score: NA     Pain (joint) and Results (Hot flashes)       Encounter Diagnoses   Name Primary?    Menopausal symptoms Yes     Acupuncture points used:  4 ZHANG, Du20, ER MICHELLE, Gb34, Kd10, Li11, REN4, REN6, Sp10, Sp6, Sp9, St36, and YIN RUIZ    ADD STIM (Lr3/Sp6)      NEEDLES IN: 22  NEEDLES OUT: 22    NEEDLES W/ STIM  AT: 9:15 AM  NEEDLES W/ STIM REMOVED AT: 9:45 AM

## 2023-01-09 ENCOUNTER — PATIENT MESSAGE (OUTPATIENT)
Dept: BARIATRICS | Facility: CLINIC | Age: 43
End: 2023-01-09
Payer: COMMERCIAL

## 2023-01-10 DIAGNOSIS — L73.2 HIDRADENITIS: ICD-10-CM

## 2023-01-11 ENCOUNTER — OCCUPATIONAL HEALTH (OUTPATIENT)
Dept: URGENT CARE | Facility: CLINIC | Age: 43
End: 2023-01-11

## 2023-01-11 ENCOUNTER — PATIENT MESSAGE (OUTPATIENT)
Dept: HEMATOLOGY/ONCOLOGY | Facility: CLINIC | Age: 43
End: 2023-01-11
Payer: COMMERCIAL

## 2023-01-11 DIAGNOSIS — Z11.59 SCREENING FOR VIRAL DISEASE: Primary | ICD-10-CM

## 2023-01-11 LAB
CTP QC/QA: YES
SARS-COV-2 AG RESP QL IA.RAPID: POSITIVE

## 2023-01-11 PROCEDURE — 87811 SARS CORONAVIRUS 2 ANTIGEN POCT, MANUAL READ: ICD-10-PCS | Mod: QW,S$GLB,, | Performed by: EMERGENCY MEDICINE

## 2023-01-11 PROCEDURE — 87811 SARS-COV-2 COVID19 W/OPTIC: CPT | Mod: QW,S$GLB,, | Performed by: EMERGENCY MEDICINE

## 2023-01-11 RX ORDER — SPIRONOLACTONE 50 MG/1
TABLET, FILM COATED ORAL
Qty: 60 TABLET | Refills: 4 | OUTPATIENT
Start: 2023-01-11

## 2023-01-12 ENCOUNTER — OFFICE VISIT (OUTPATIENT)
Dept: DERMATOLOGY | Facility: CLINIC | Age: 43
End: 2023-01-12
Payer: COMMERCIAL

## 2023-01-12 DIAGNOSIS — U07.1 COVID-19: ICD-10-CM

## 2023-01-12 DIAGNOSIS — Z79.899 ENCOUNTER FOR LONG-TERM (CURRENT) USE OF OTHER MEDICATIONS: ICD-10-CM

## 2023-01-12 DIAGNOSIS — L73.2 HIDRADENITIS: Primary | ICD-10-CM

## 2023-01-12 PROCEDURE — 99214 OFFICE O/P EST MOD 30 MIN: CPT | Mod: 95,,, | Performed by: DERMATOLOGY

## 2023-01-12 PROCEDURE — 99214 PR OFFICE/OUTPT VISIT, EST, LEVL IV, 30-39 MIN: ICD-10-PCS | Mod: 95,,, | Performed by: DERMATOLOGY

## 2023-01-12 RX ORDER — ADALIMUMAB 80MG/0.8ML
KIT SUBCUTANEOUS
Qty: 2 PEN | Refills: 4 | Status: SHIPPED | OUTPATIENT
Start: 2023-01-12 | End: 2023-01-23 | Stop reason: SDUPTHER

## 2023-01-12 RX ORDER — DOXYCYCLINE 100 MG/1
100 TABLET ORAL 2 TIMES DAILY
Qty: 60 TABLET | Refills: 1 | Status: SHIPPED | OUTPATIENT
Start: 2023-01-12 | End: 2024-03-25

## 2023-01-12 NOTE — PROGRESS NOTES
Subjective:       Patient ID:  Fran Higgins is a 42 y.o. female who presents for No chief complaint on file.    Pt here today for a f/u on Hidradenitis  Currently on following  - Humira injected weekly  -     clindamycin (CLEOCIN T) 1 % lotion; AAA bid  Dispense: 120 mL; Refill: 3  -     finasteride (PROSCAR) 5 mg tablet; Take 1 tablet (5 mg total) by mouth once daily.  Dispense: 30 tablet; Refill: 11  -     spironolactone (ALDACTONE) 50 MG tablet; Start with 1 po qday, increase to 2 po qday as tolerated  Dispense: 60 tablet; Refill: 4  -     doxycycline monohydrate 100 mg Tab; Take 1 tablet (100 mg total) by mouth 2 (two) times a day. Prn - add to back   - NICAZEL 600-5-10-5-1.5 mg Tab; 1 po bid  Dispense: 60 tablet; Refill: 5     Also  Keto diet  Had gastric sleve   Still flaring1-2  x a month way better     Review of Systems   Constitutional:  Positive for fever (from currently having covid), chills, fatigue and malaise. Negative for weight loss (had gastric sleeve).   Eyes:  Negative for visual change.   Respiratory:  Negative for cough and shortness of breath.    Musculoskeletal:  Positive for joint swelling (before joint pain). Negative for arthralgias.   Skin:  Negative for abscesses.   Neurological:  Negative for focal weakness, seizures and numbness.      Objective:    Physical Exam   Constitutional: She appears well-developed and well-nourished. No distress.   Neurological: She is alert and oriented to person, place, and time. She is not disoriented.   Psychiatric: She has a normal mood and affect.   Skin:   Areas Examined (abnormalities noted in diagram):   Scalp / Hair Palpated and Inspected  Head / Face Inspection Performed  Neck Inspection Performed  Chest / Axilla Inspection Performed  Abdomen Inspection Performed  Genitals / Buttocks / Groin Inspection Performed  Back Inspection Performed  RUE Inspected  LUE Inspection Performed  RLE Inspected  LLE Inspection Performed  Nails and Digits  Inspection Performed                 Diagram Legend     Erythematous scaling macule/papule c/w actinic keratosis       Vascular papule c/w angioma      Pigmented verrucoid papule/plaque c/w seborrheic keratosis      Yellow umbilicated papule c/w sebaceous hyperplasia      Irregularly shaped tan macule c/w lentigo     1-2 mm smooth white papules consistent with Milia      Movable subcutaneous cyst with punctum c/w epidermal inclusion cyst      Subcutaneous movable cyst c/w pilar cyst      Firm pink to brown papule c/w dermatofibroma      Pedunculated fleshy papule(s) c/w skin tag(s)      Evenly pigmented macule c/w junctional nevus     Mildly variegated pigmented, slightly irregular-bordered macule c/w mildly atypical nevus      Flesh colored to evenly pigmented papule c/w intradermal nevus       Pink pearly papule/plaque c/w basal cell carcinoma      Erythematous hyperkeratotic cursted plaque c/w SCC      Surgical scar with no sign of skin cancer recurrence      Open and closed comedones      Inflammatory papules and pustules      Verrucoid papule consistent consistent with wart     Erythematous eczematous patches and plaques     Dystrophic onycholytic nail with subungual debris c/w onychomycosis     Umbilicated papule    Erythematous-base heme-crusted tan verrucoid plaque consistent with inflamed seborrheic keratosis     Erythematous Silvery Scaling Plaque c/w Psoriasis     See annotation        Assessment / Plan:        Hidradenitis  Continue previous plan add doxy for flares, stable and better controlled  -     doxycycline monohydrate 100 mg Tab; Take 1 tablet (100 mg total) by mouth 2 (two) times daily for 10 days when flaring  Dispense: 60 tablet; Refill: 1  -     HUMIRA,CF, PEN 80 mg/0.8 mL PnKt; Starting day 29, inject 80 mg (1 pen) into the skin every 2 weeks.  Dispense: 2 pen; Refill: 4  Pt here today for a f/u on Hidradenitis  Currently on following  - Humira injected weekly  -     clindamycin (CLEOCIN T)  1 % lotion; AAA bid  Dispense: 120 mL; Refill: 3  -     finasteride (PROSCAR) 5 mg tablet; Take 1 tablet (5 mg total) by mouth once daily.  Dispense: 30 tablet; Refill: 11  -     spironolactone (ALDACTONE) 50 MG tablet; Start with 1 po qday, increase to 2 po qday as tolerated  Dispense: 60 tablet; Refill: 4  -     doxycycline monohydrate 100 mg Tab; Take 1 tablet (100 mg total) by mouth 2 (two) times a day. Prn - add to back   - NICAZEL 600-5-10-5-1.5 mg Tab; 1 po bid  Dispense: 60 tablet; Refill: 5     Also  Keto diet  Had gastric sleve   Limit amount of sweets/sugar intake     Discussed benefits and risks of doxycyline therapy including but not limited to GI discomfort, esophageal irritation/ulceration, and increased sun sensitivity. Patient was counseled to take medicine with meals and at least 1 hour before lying down.       r   Encounter for long-term (current) use of other medications  -     Hepatic Function Panel; Future; Expected date: 04/12/2023  -     CBC Auto Differential; Future; Expected date: 04/12/2023    COVID-19  - stop humira until patient feels better  - can consider evusheild in future for better immunity boost      The patient location is: Home in [place]  The chief complaint leading to consultation is: Hidradenitis  Visit type: audiovisual    Face to Face time with patient: 20  25 minutes of total time spent on the encounter, which includes face to face time and non-face to face time preparing to see the patient (eg, review of tests), Obtaining and/or reviewing separately obtained history, Documenting clinical information in the electronic or other health record, Independently interpreting results (not separately reported) and communicating results to the patient/family/caregiver, or Care coordination (not separately reported).         Each patient to whom he or she provides medical services by telemedicine is:  (1) informed of the relationship between the physician and patient and the  respective role of any other health care provider with respect to management of the patient; and (2) notified that he or she may decline to receive medical services by telemedicine and may withdraw from such care at any time.         F/u 6 mo with labs ,will get labs 3 mo from now    No follow-ups on file.

## 2023-01-15 DIAGNOSIS — L73.8 BACTERIAL FOLLICULITIS: ICD-10-CM

## 2023-01-15 RX ORDER — CLINDAMYCIN PHOSPHATE 11.9 MG/ML
SOLUTION TOPICAL
Qty: 30 ML | Refills: 3 | Status: CANCELLED | OUTPATIENT
Start: 2023-01-15

## 2023-01-18 ENCOUNTER — SPECIALTY PHARMACY (OUTPATIENT)
Dept: PHARMACY | Facility: CLINIC | Age: 43
End: 2023-01-18
Payer: COMMERCIAL

## 2023-01-18 DIAGNOSIS — L73.2 HIDRADENITIS: ICD-10-CM

## 2023-01-18 RX ORDER — ADALIMUMAB 80MG/0.8ML
KIT SUBCUTANEOUS
Qty: 2 PEN | Refills: 4 | Status: CANCELLED | OUTPATIENT
Start: 2023-01-18

## 2023-01-18 NOTE — TELEPHONE ENCOUNTER
Incoming call from patient, she stated she currently has Covid and will not inject Humira until she has cleared the infection. Her next injection is due on 1/19. Patient reported she had a virtual visit with her dermatologist this week. Routing assigned pharmacist.

## 2023-01-18 NOTE — TELEPHONE ENCOUNTER
Refill request sent to print status, requested new rx be sent to [facility]. Third call attempt made to patient to assess need for refill and next injection date, LVM.     Per chart she is covid positive as of 1/11. Will pend out three more days and make one final call attempt once refills are received.

## 2023-01-20 DIAGNOSIS — L73.8 BACTERIAL FOLLICULITIS: ICD-10-CM

## 2023-01-20 DIAGNOSIS — L21.9 SEBORRHEIC DERMATITIS: ICD-10-CM

## 2023-01-20 DIAGNOSIS — L73.2 HIDRADENITIS: ICD-10-CM

## 2023-01-20 RX ORDER — ERYTHROMYCIN 20 MG/ML
SOLUTION TOPICAL
Status: CANCELLED | OUTPATIENT
Start: 2023-01-20 | End: 2024-01-20

## 2023-01-23 ENCOUNTER — CLINICAL SUPPORT (OUTPATIENT)
Dept: HEMATOLOGY/ONCOLOGY | Facility: CLINIC | Age: 43
End: 2023-01-23
Payer: COMMERCIAL

## 2023-01-23 ENCOUNTER — PATIENT MESSAGE (OUTPATIENT)
Dept: DERMATOLOGY | Facility: CLINIC | Age: 43
End: 2023-01-23
Payer: COMMERCIAL

## 2023-01-23 DIAGNOSIS — N95.1 MENOPAUSAL SYMPTOMS: Primary | ICD-10-CM

## 2023-01-23 DIAGNOSIS — L73.8 BACTERIAL FOLLICULITIS: ICD-10-CM

## 2023-01-23 DIAGNOSIS — L73.2 HIDRADENITIS: ICD-10-CM

## 2023-01-23 DIAGNOSIS — K21.9 GASTROESOPHAGEAL REFLUX DISEASE, UNSPECIFIED WHETHER ESOPHAGITIS PRESENT: ICD-10-CM

## 2023-01-23 PROCEDURE — 99999 PR PBB SHADOW E&M-EST. PATIENT-LVL I: CPT | Mod: PBBFAC,,, | Performed by: ACUPUNCTURIST

## 2023-01-23 PROCEDURE — 97814 ACUP 1/> W/ESTIM EA ADDL 15: CPT | Mod: S$GLB,,, | Performed by: ACUPUNCTURIST

## 2023-01-23 PROCEDURE — 97813 ACUP 1/> W/ESTIM 1ST 15 MIN: CPT | Mod: S$GLB,,, | Performed by: ACUPUNCTURIST

## 2023-01-23 PROCEDURE — 97814 PR ACUPUNCT W/ ELEC STIMUL ADDL 15M: ICD-10-PCS | Mod: S$GLB,,, | Performed by: ACUPUNCTURIST

## 2023-01-23 PROCEDURE — 99999 PR PBB SHADOW E&M-EST. PATIENT-LVL I: ICD-10-PCS | Mod: PBBFAC,,, | Performed by: ACUPUNCTURIST

## 2023-01-23 PROCEDURE — 97813 PR ACUPUNCT W/ ELEC STIMUL 15 MIN: ICD-10-PCS | Mod: S$GLB,,, | Performed by: ACUPUNCTURIST

## 2023-01-23 RX ORDER — OMEPRAZOLE 40 MG/1
40 CAPSULE, DELAYED RELEASE ORAL EVERY MORNING
Qty: 30 CAPSULE | Refills: 1 | Status: CANCELLED | OUTPATIENT
Start: 2023-01-23

## 2023-01-23 RX ORDER — CLINDAMYCIN PHOSPHATE 11.9 MG/ML
SOLUTION TOPICAL
Qty: 30 ML | Refills: 3 | Status: CANCELLED | OUTPATIENT
Start: 2023-01-23

## 2023-01-23 RX ORDER — SPIRONOLACTONE 50 MG/1
TABLET, FILM COATED ORAL
Qty: 60 TABLET | Refills: 4 | Status: CANCELLED | OUTPATIENT
Start: 2023-01-23

## 2023-01-23 NOTE — PROGRESS NOTES
Subjective:       Patient ID: Fran Higgins is a 42 y.o. female.    Chief Complaint: Results (Hot flashes) and Pain (joint)    Patient recovering from covid infection so symptom monitoring was skewed - will monitor progress this week to see duration and intensity of symptoms. Continue for 2-3 more weeks then evaluate.    Review of Systems   Genitourinary:  Positive for hot flashes.   Musculoskeletal:  Positive for arthralgias.       Objective:      Physical Exam    Assessment:       Problem List Items Addressed This Visit    None  Visit Diagnoses       Menopausal symptoms    -  Primary            Plan:       1x a week for 2-3 more weeks then eval         Pre-Symptom Score: NA  Post-Symptom Score: NA     Results (Hot flashes) and Pain (joint)       Encounter Diagnoses   Name Primary?    Menopausal symptoms Yes     Acupuncture points used:  4 ZHANG, Du20, ER MICHELLE, Gb34, Kd10, Ki3, Li11, REN4, REN6, CRUZ MEN, Sp10, Sp6, Sp9, St36, and YIN RUIZ    ADD STIM (Lr3/Sp6)      NEEDLES IN: 24  NEEDLES OUT: 24    NEEDLES W/ STIM  AT: 10-:40 AM  NEEDLES W/ STIM REMOVED AT: 11:10 AM

## 2023-01-24 RX ORDER — ADALIMUMAB 80MG/0.8ML
KIT SUBCUTANEOUS
Qty: 2 PEN | Refills: 4 | Status: SHIPPED | OUTPATIENT
Start: 2023-01-24 | End: 2024-01-30

## 2023-01-25 DIAGNOSIS — L73.2 HIDRADENITIS: ICD-10-CM

## 2023-01-31 DIAGNOSIS — L73.2 HIDRADENITIS: Primary | ICD-10-CM

## 2023-01-31 RX ORDER — ADALIMUMAB 40MG/0.4ML
KIT SUBCUTANEOUS
Qty: 4 PEN | Refills: 4 | Status: SHIPPED | OUTPATIENT
Start: 2023-01-31 | End: 2023-07-10 | Stop reason: SDUPTHER

## 2023-01-31 RX ORDER — ADALIMUMAB 80MG/0.8ML
KIT SUBCUTANEOUS
Qty: 2 PEN | Refills: 4 | OUTPATIENT
Start: 2023-01-31

## 2023-01-31 NOTE — TELEPHONE ENCOUNTER
Provider sent in the Humira refill approval for 40 mg weekly instead of 80 mg every 14 days. Pt is okay with injecting weekly since she is overdue to inject. Message sent to the provider to get this switched for the next refill.

## 2023-01-31 NOTE — TELEPHONE ENCOUNTER
Specialty Pharmacy - Refill Coordination    Specialty Medication Orders Linked to Encounter      Flowsheet Row Most Recent Value   Medication #1 HUMIRA,CF, PEN 40 mg/0.4 mL PnKt (Order#[id number], Rx#[id number])            Refill Questions - Documented Responses      Flowsheet Row Most Recent Value   Patient Availability and HIPAA Verification    Does patient want to proceed with activity? Yes   HIPAA/medical authority confirmed? Yes   Relationship to patient of person spoken to? Self   Refill Screening Questions    Changes to allergies? No   Changes to medications? No   New conditions since last clinic visit? No   Unplanned office visit, urgent care, ED, or hospital admission in the last 4 weeks? No   How does patient/caregiver feel medication is working? Excellent   Financial problems or insurance changes? No   How many doses of your specialty medications were missed in the last 4 weeks? 1   Why were doses missed? Felt ill or sick   Would patient like to speak to a pharmacist? No   When does the patient need to receive the medication? 02/02/23   Refill Delivery Questions    How will the patient receive the medication? [facility]   When does the patient need to receive the medication? 02/02/23   Shipping Address Home   Address in [facility] confirmed and updated if neccessary? Yes   Expected Copay ($) 0   Is the patient able to afford the medication copay? Yes   Payment Method zero copay   Days supply of Refill 28   Supplies needed? No supplies needed   Refill activity completed? Yes   Refill activity plan Refill scheduled   Shipment/Pickup Date: 02/01/23            Current Outpatient Medications   Medication Sig    ALPRAZolam (XANAX) 0.5 MG tablet Take 1 tablet (0.5 mg total) by mouth nightly as needed.    b complex vitamins capsule Take 1 capsule by mouth once daily.    B3-azelaic-zinc-B6-copper-FA (NICAZEL) 600-5-10-5-1.5 mg Tab take 1 tablet by mouth twice daily    CALCIUM CITRATE ORAL Take 1 tablet by mouth  3 (three) times daily. chewable    clindamycin (CLEOCIN T) 1 % lotion Apply to affected area two times a day.    cyclobenzaprine (FLEXERIL) 5 MG tablet Take 1 tablet (5 mg total) by mouth 2 (two) times daily as needed for Muscle spasms.    doxycycline monohydrate 100 mg Tab Take 1 tablet (100 mg total) by mouth 2 (two) times daily for 10 days when flaring    erythromycin with ethanoL (THERAMYCIN) 2 % external solution Apply topically 2 (two) times daily.    estradioL (ESTRACE) 2 MG tablet Take 1 tablet (2 mg total) by mouth once daily.    finasteride (PROSCAR) 5 mg tablet Take 1 tablet (5 mg total) by mouth once daily.    fluocinolone acetonide oiL (DERMOTIC OIL) 0.01 % Drop Use to affected ears at night, drop in canal and use to ear when flaring    galcanezumab-gnlm (EMGALITY PEN) 120 mg/mL PnIj Inject 120 mg into the skin every 30 days.    HUMIRA,CF, PEN 40 mg/0.4 mL PnKt Inject 40mg (1 pen) into the skin every 7 days    HUMIRA,CF, PEN 80 mg/0.8 mL PnKt Starting day 29, inject 80 mg (1 pen) into the skin every 2 weeks.    HUMIRA,CF, PEN 80 mg/0.8 mL PnKt Starting day 29, inject 80 mg (1 pen) into the skin every 2 weeks.    ketoconazole (NIZORAL) 2 % cream Apply to affected area two times a day (Patient taking differently: Apply to affected area two times a day)    multivitamin capsule Take by mouth once daily.    nystatin-triamcinolone (MYCOLOG II) cream Apply topically 2 (two) times daily.    omeprazole (PRILOSEC) 40 MG capsule Take 1 capsule (40 mg total) by mouth every morning.    ondansetron (ZOFRAN-ODT) 4 MG TbDL Take 1 tablet (4 mg total) by mouth every 6 (six) hours as needed (for nausea).    pimecrolimus (ELIDEL) 1 % cream Apply to affected areas of underarm twice a day as needed for irritation. (Patient taking differently: Apply to affected areas of underarm twice a day as needed for irritation.)    progesterone (PROMETRIUM) 100 MG capsule Take 1 capsule (100 mg total) by mouth nightly.    spironolactone  (ALDACTONE) 50 MG tablet Start with taking 1 tablet by mouth every day, may increase to 2 tablets by mouth every day as tolerated.    thiamine (VITAMIN B-1) 50 MG tablet Take 50 mg by mouth once daily.    ubrogepant (UBRELVY) 50 mg tablet Take 1 tab by mouth for headaches. May repeat once 2 hours after initial dose based on response and tolerability.    venlafaxine (EFFEXOR-XR) 150 MG Cp24 Take 1 capsule (150 mg total) by mouth once daily.   Last reviewed on 12/15/2022  3:10 PM by Riana Arredondo MA    Review of patient's allergies indicates:  No Known Allergies Last reviewed on  1/31/2023 12:11 PM by Ирина Turner    Tasks added this encounter   No tasks added.   Tasks due within next 3 months   1/6/2023 - Refill Call (Auto Added)     [facility]  [facility]  [address] [place] [id number]  Phone: [phone]  Fax: [phone]

## 2023-02-02 ENCOUNTER — PATIENT MESSAGE (OUTPATIENT)
Dept: DERMATOLOGY | Facility: CLINIC | Age: 43
End: 2023-02-02
Payer: COMMERCIAL

## 2023-02-02 RX ORDER — FLUCONAZOLE 200 MG/1
TABLET ORAL
Qty: 30 TABLET | Refills: 0 | Status: SHIPPED | OUTPATIENT
Start: 2023-02-02

## 2023-02-03 DIAGNOSIS — K21.9 GASTROESOPHAGEAL REFLUX DISEASE, UNSPECIFIED WHETHER ESOPHAGITIS PRESENT: ICD-10-CM

## 2023-02-03 NOTE — PATIENT INSTRUCTIONS
Understanding Sinus Problems    You dont often think about your sinuses until theres a problem. One day you realize you cant smell dinner cooking. Or you find you often have headaches or problems breathing through your nose.  Symptoms of sinus problems  Sinus problems can cause uncomfortable symptoms. Your nose may run constantly. You might have trouble sleeping at night. You may even lose your sense of smell. Other symptoms can include:  · Nasal congestion  · Fullness in ears  · Green, yellow, or bloody drainage from the nose  · Trouble tasting food  · Frequent headaches  · Facial pain  · Cough  When sinuses are blocked  If something blocks the passages in the nose or sinuses, mucus cant drain. Mucus-filled sinuses often become infected.  · Colds cause the lining of the nose and sinuses to swell and make extra mucus. A buildup of mucus can lead to a more serious infection.  · Allergies irritate turbinates and other tissues. This causes swelling, which can cause a blockage. Over time, this irritation can also lead to sacs of swollen tissue (polyps).  · Polyps may form in both the sinuses and nose. Polyps can grow large enough to clog nasal passages and block drainage.  · A crooked (deviated) septum may block nasal passages. This is often the result of an injury.  Date Last Reviewed: 11/1/2016  © 0134-5449 [employer]. [address] [place] [address], [place] [address]. All rights reserved. This information is not intended as a substitute for professional medical care. Always follow your healthcare professional's instructions.      Follow up with your doctor in a few days as needed.  Return to the urgent care or go to the ER if symptoms get worse.    Salvatore Courtney MD   details… Regular rate & rhythm, normal S1, S2; no murmurs, gallops or rubs; no S3, S4

## 2023-02-08 RX ORDER — OMEPRAZOLE 40 MG/1
40 CAPSULE, DELAYED RELEASE ORAL EVERY MORNING
Qty: 30 CAPSULE | Refills: 1 | Status: SHIPPED | OUTPATIENT
Start: 2023-02-08 | End: 2023-03-28 | Stop reason: SDUPTHER

## 2023-02-09 ENCOUNTER — PATIENT MESSAGE (OUTPATIENT)
Dept: BARIATRICS | Facility: CLINIC | Age: 43
End: 2023-02-09
Payer: COMMERCIAL

## 2023-02-14 ENCOUNTER — PATIENT MESSAGE (OUTPATIENT)
Dept: BARIATRICS | Facility: CLINIC | Age: 43
End: 2023-02-14
Payer: COMMERCIAL

## 2023-02-22 ENCOUNTER — PATIENT MESSAGE (OUTPATIENT)
Dept: BARIATRICS | Facility: CLINIC | Age: 43
End: 2023-02-22
Payer: COMMERCIAL

## 2023-02-23 ENCOUNTER — PROCEDURE VISIT (OUTPATIENT)
Dept: NEUROLOGY | Facility: CLINIC | Age: 43
End: 2023-02-23
Payer: COMMERCIAL

## 2023-02-23 VITALS
HEART RATE: 105 BPM | SYSTOLIC BLOOD PRESSURE: 122 MMHG | BODY MASS INDEX: 49.38 KG/M2 | WEIGHT: 293 LBS | DIASTOLIC BLOOD PRESSURE: 64 MMHG

## 2023-02-23 DIAGNOSIS — G43.E09 CHRONIC MIGRAINE WITH AURA: Primary | ICD-10-CM

## 2023-02-23 PROCEDURE — 64615 PR CHEMODENERVATION OF MUSCLE FOR CHRONIC MIGRAINE: ICD-10-PCS | Mod: S$GLB,,, | Performed by: PHYSICIAN ASSISTANT

## 2023-02-23 PROCEDURE — 64615 CHEMODENERV MUSC MIGRAINE: CPT | Mod: S$GLB,,, | Performed by: PHYSICIAN ASSISTANT

## 2023-02-23 NOTE — PROCEDURES
PROCEDURE NOTE:  BOTOX was performed as an indicated therapy for intractable chronic migraine headaches given that the patient failed more than 2 headache medications     A time out was conducted just before the start of the procedure to verify the correct patient and procedure, procedure location, and all relevant critical information.      Botulinum Toxin Injection Procedure      PROCEDURE PERFORMED: Botulinum toxin injection (82995)  CLINICAL INDICATION: Chronic Migraines  After risks and benefits were explained including bleeding, infection, worsening of pain, damage to the areas being injected, weakness of muscles, loss of muscle control, dysphagia if injecting the head or neck, facial droop if injecting the facial area, painful injection, allergic or other reaction to the medications being injected, and the failure of the procedure to help the problem, a signed consent was obtained.   The patient was placed in a comfortable area and the sites to be treated were identified.The area to be treated was prepped three times with alcohol and the alcohol allowed to dry. Next, a 30 gauge needle was used to inject the medication in the area to be treated.      Total Botox used: 155 Units   Unavoidable waste: 45 Units      Injection sites:    muscle bilaterally ( a total of 10 units divided into 2 sites)   Procerus muscle (5 units)   Frontalis muscle bilaterally (a total of 20 units divided into 4 sites)   Temporalis muscle bilaterally (a total of 40 units divided into 8 sites)   Occipitalis muscle bilaterally (a total of 30 units divided into 6 sites)   Cervical paraspinal muscles (a total of 20 units divided into 4 sites)   Trapezius muscle bilaterally (a total of 30 units divided into 6 sites)   Complications: none   RTC for the next Botox injection: 12 weeks     Problem List Items Addressed This Visit    None  Visit Diagnoses       Chronic migraine with aura    -  Primary    Relevant Medications     onabotulinumtoxina injection 200 Units (Completed) (Start on 2/23/2023  3:30 PM)              Marina Geronimo PA-C  [facility] Department of Neurology   [phone]

## 2023-02-28 ENCOUNTER — PATIENT MESSAGE (OUTPATIENT)
Dept: PHARMACY | Facility: CLINIC | Age: 43
End: 2023-02-28
Payer: COMMERCIAL

## 2023-02-28 ENCOUNTER — SPECIALTY PHARMACY (OUTPATIENT)
Dept: PHARMACY | Facility: CLINIC | Age: 43
End: 2023-02-28
Payer: COMMERCIAL

## 2023-02-28 DIAGNOSIS — L73.2 HIDRADENITIS: Primary | ICD-10-CM

## 2023-02-28 NOTE — TELEPHONE ENCOUNTER
"LVM and sent a my chart message regarding the Humira refill.     A request was previously sent to ask the provider to change the prescription to Humira 80 every 14 days instead of Humira 40 mg every 7 days. Provider response states that she wants us to continue dispensing Humira 40 mg every 7 days. Rx will not be changed. Provider response is below.    "HS dosing should be 40 mg once weekly unless something has changed (HS- Hidradenitis suppurtiva)"  "

## 2023-02-28 NOTE — TELEPHONE ENCOUNTER
"Response from the provider regarding changing Humira 40 mg every 7 days to Humira 80 mg every 14 days.    "HS dosing should be 40 mg once weekly unless something has changed (HS- Hidradenitis suppurtiva) "    [facility] will continue to dispense the 40 mg dose that the provider most recently prescribed, rather than changing to the 80 mg dose that the provider previously prescribed.  "

## 2023-03-02 ENCOUNTER — CLINICAL SUPPORT (OUTPATIENT)
Dept: OTHER | Facility: CLINIC | Age: 43
End: 2023-03-02
Payer: COMMERCIAL

## 2023-03-02 DIAGNOSIS — Z00.8 ENCOUNTER FOR OTHER GENERAL EXAMINATION: ICD-10-CM

## 2023-03-03 VITALS
DIASTOLIC BLOOD PRESSURE: 84 MMHG | WEIGHT: 293 LBS | HEIGHT: 65 IN | BODY MASS INDEX: 48.82 KG/M2 | SYSTOLIC BLOOD PRESSURE: 120 MMHG

## 2023-03-03 LAB
GLUCOSE SERPL-MCNC: 85 MG/DL (ref 60–140)
HDLC SERPL-MCNC: 79 MG/DL
POC CHOLESTEROL, LDL (DOCK): 141 MG/DL
POC CHOLESTEROL, TOTAL: 254 MG/DL
TRIGL SERPL-MCNC: 191 MG/DL

## 2023-03-06 ENCOUNTER — OFFICE VISIT (OUTPATIENT)
Dept: PSYCHIATRY | Facility: CLINIC | Age: 43
End: 2023-03-06
Payer: COMMERCIAL

## 2023-03-06 DIAGNOSIS — F43.9 TRAUMA AND STRESSOR-RELATED DISORDER: Primary | ICD-10-CM

## 2023-03-06 DIAGNOSIS — D39.10 BORDERLINE EPITHELIAL NEOPLASM OF OVARY: ICD-10-CM

## 2023-03-06 PROCEDURE — 90837 PSYTX W PT 60 MINUTES: CPT | Mod: S$GLB,,, | Performed by: PSYCHOLOGIST

## 2023-03-06 PROCEDURE — 1159F MED LIST DOCD IN RCRD: CPT | Mod: CPTII,S$GLB,, | Performed by: PSYCHOLOGIST

## 2023-03-06 PROCEDURE — 1159F PR MEDICATION LIST DOCUMENTED IN MEDICAL RECORD: ICD-10-PCS | Mod: CPTII,S$GLB,, | Performed by: PSYCHOLOGIST

## 2023-03-06 PROCEDURE — 99999 PR PBB SHADOW E&M-EST. PATIENT-LVL II: CPT | Mod: PBBFAC,,, | Performed by: PSYCHOLOGIST

## 2023-03-06 PROCEDURE — 90837 PR PSYCHOTHERAPY W/PATIENT, 60 MIN: ICD-10-PCS | Mod: S$GLB,,, | Performed by: PSYCHOLOGIST

## 2023-03-06 PROCEDURE — 99999 PR PBB SHADOW E&M-EST. PATIENT-LVL II: ICD-10-PCS | Mod: PBBFAC,,, | Performed by: PSYCHOLOGIST

## 2023-03-06 NOTE — TELEPHONE ENCOUNTER
Humira PA submitted and approved via [facility]. The request has been approved. The authorization is effective for a maximum of 12 fills from 03/03/2023 to 03/02/2024, as long as the member is enrolled in their current health plan. The request was approved as submitted. This request has been approved for 4 pens per 28 days.    Benefits investigation. Deductible $0, Max out of pocket $3000  Expected co pay $333, [employer] in place for $5 co pay. [facility] is in network.      LVM attempting to set up the refill.

## 2023-03-06 NOTE — PROGRESS NOTES
INFORMED CONSENT: Patient was identified using two patient-identifiers. The patient has been informed of the risks and benefits associated with engaging in psychotherapy, the handling of protected health information, the rights of privacy and the limits of confidentiality. The patient has also been informed of the importance of reporting any suicidal or homicidal ideation to this or any provider to ensure safety of all parties, and the Fran Higgins expressed understanding. The patient was agreeable to these terms and freely participates in individual psychotherapy.    PSYCHO-ONCOLOGY NOTE/ Individual Psychotherapy     Date: 3/6/2023   [facility]        Therapeutic Intervention: Met with patient.  Outpatient - Insight oriented psychotherapy 60 min - CPT code 45404      Patient was last seen by me on 12/5/2022    Problem list  Patient Active Problem List   Diagnosis    Depression    Left ovarian cyst    S/P RA Laparoscopic LSO    Mucinous tumor, of low malignant potential    Pericardial cyst    Scoliosis of cervical spine    Abnormal uterine bleeding (AUB)    Vaginal yeast infection    DUB (dysfunctional uterine bleeding)    Migraine with aura, not intractable    s/p robotic hysterectomy/RSO 3/9/20    Borderline epithelial neoplasm of ovary    Surgical menopause    Yeast vaginitis    Menopause    Dizziness    Galactorrhea in female- bilateral breast    Other insomnia    Major depressive disorder, recurrent episode, in partial remission with anxious distress    Prediabetes    Obesity    S/P laparoscopic sleeve gastrectomy    BMI 45.0-49.9, adult    Hidradenitis    Seborrheic dermatitis       Chief complaint/reason for encounter: IPV   Met with patient to evaluate psychosocial adaptation to diagnosis/treatment/survivorship of trauma    Current Medications  Current Outpatient Medications   Medication    ALPRAZolam (XANAX) 0.5 MG tablet    b complex vitamins capsule    B3-azelaic-zinc-B6-copper-FA  (NICAZEL) 600-5-10-5-1.5 mg Tab    CALCIUM CITRATE ORAL    clindamycin (CLEOCIN T) 1 % lotion    cyclobenzaprine (FLEXERIL) 5 MG tablet    doxycycline monohydrate 100 mg Tab    erythromycin with ethanoL (THERAMYCIN) 2 % external solution    estradioL (ESTRACE) 2 MG tablet    finasteride (PROSCAR) 5 mg tablet    fluconazole (DIFLUCAN) 200 MG Tab    fluocinolone acetonide oiL (DERMOTIC OIL) 0.01 % Drop    galcanezumab-gnlm (EMGALITY PEN) 120 mg/mL PnIj    HUMIRA,CF, PEN 40 mg/0.4 mL PnKt    HUMIRA,CF, PEN 80 mg/0.8 mL PnKt    HUMIRA,CF, PEN 80 mg/0.8 mL PnKt    ketoconazole (NIZORAL) 2 % cream    multivitamin capsule    nystatin-triamcinolone (MYCOLOG II) cream    omeprazole (PRILOSEC) 40 MG capsule    ondansetron (ZOFRAN-ODT) 4 MG TbDL    pimecrolimus (ELIDEL) 1 % cream    progesterone (PROMETRIUM) 100 MG capsule    spironolactone (ALDACTONE) 50 MG tablet    thiamine (VITAMIN B-1) 50 MG tablet    ubrogepant (UBRELVY) 50 mg tablet    venlafaxine (EFFEXOR-XR) 150 MG Cp24     No current facility-administered medications for this visit.       Objective:  Fran Spencer Gisela arrived  promptly for the session.    The patient was fully cooperative throughout the session.  Appearance: age appropriate, appropriately  dressed, adequately  groomed  Behavior/Cooperation: friendly and cooperative  Speech: normal in rate, volume, and tone and appropriate quality, quantity and organization of sentences  Mood: sad  Affect: mood congruent  Thought Process: goal-directed, logical  Thought Content: normal,  No delusions or paranoia; did not appear to be responding to internal stimuli during the session  Orientation: grossly intact  Memory: Grossly intact  Attention Span/Concentration: Attends to session without distraction; reports no difficulty  Fund of Knowledge: average  Estimate of Intelligence: average from verbal skills and history  Cognition: grossly intact  Insight: patient has awareness of illness; good insight into own  behavior and behavior of others  Judgment: the patient's behavior is adequate to circumstances    Interval history and content of current session: Patient with significant hsitory of IPV-verbal and pscyhological.  Discussed treatment. Reports to be coping with great difficulty. Evaluated cognitive response, paying particular attention to negative intrusive thoughts of a persistent and detrimental nature. Thoughts of this type are in evidence with moderate distress. Provided cognitive behavioral therapy to address negative cognitions. Identified and evaluated psychosocial and environmental stressors secondary to diagnosis and treatment.  Examined proactive behaviors that may be implemented to minimize or ameliorate psychosocial stressors secondary to diagnosis and treatment.     Risk parameters:   Patient reports no suicidal ideation  Patient reports no homicidal ideation  Patient reports no self-injurious behavior  Patient reports no violent behavior   Safety needs:  None at this time      Verbal deficits: None     Patient's response to intervention:The patient's response to intervention is accepting.     Progress toward goals and other mental status changes:  The patient's progress toward goals is good.      Progress to date:Progress as Expected      Goals from last visit: Met     Patient reported outcomes:    Distress Thermometer:   Distress Score    Distress Score: 4        Practical Problems Physical Problems                                                   Family Problems                                         Emotional Problems                                                         Spiritual/Religions Concerns     Spiritual / Islam Concerns: No         Other Problems    Other Problems: Relationship concerns          PHQ ANSWERS    Q1. Little interest or pleasure in doing things: (P) Not at all (03/06/23 0834)  Q2. Feeling down, depressed, or hopeless: (P) Not at all (03/06/23 0834)  Q3. Trouble falling  or staying asleep, or sleeping too much: (P) Several days (03/06/23 0834)  Q4. Feeling tired or having little energy: (P) Several days (03/06/23 0834)  Q5. Poor appetite or overeating: (P) Several days (03/06/23 0834)  Q6. Feeling bad about yourself - or that you are a failure or have let yourself or your family down: (P) Not at all (03/06/23 0834)  Q7. Trouble concentrating on things, such as reading the newspaper or watching television: (P) Not at all (03/06/23 0834)  Q8. Moving or speaking so slowly that other people could have noticed. Or the opposite - being so fidgety or restless that you have been moving around a lot more than usual: (P) Not at all (03/06/23 0834)  Q9.  0    PHQ8 Score : (P) 3 (03/06/23 0834)  PHQ-9 Total Score: (P) 3 (03/06/23 0834)     JOSE MARIA-7 Answers    GAD7 3/6/2023   1. Feeling nervous, anxious, or on edge? 1   2. Not being able to stop or control worrying? 0   3. Worrying too much about different things? 0   4. Trouble relaxing? 1   5. Being so restless that it is hard to sit still? 0   6. Becoming easily annoyed or irritable? 1   7. Feeling afraid as if something awful might happen? 0   JOSE MARIA-7 Score 3   Some encounter information is confidential and restricted. Go to Review Flowsheets activity to see all data.     JOSE MARIA-7 Score: (P) 3  Interpretation: (P) Normal     Client Strengths: verbal, intelligent, successful, good social support, good insight, commitment to wellness, strong kerri, strong cultural traditions     Diagnosis:     ICD-10-CM ICD-9-CM   1. Trauma and stressor-related disorder  F43.9 309.81     308.9   2. Borderline epithelial neoplasm of ovary  D39.10 236.2        Treatment Plan:individual psychotherapy and medication management by physician  Target symptoms:  IPV  Why chosen therapy is appropriate versus another modality: relevant to diagnosis, patient responds to this modality, evidence based practice  Outcome monitoring methods: self-report, observation, checklist/rating  scale  Therapeutic intervention type: insight oriented psychotherapy, behavior modifying psychotherapy  Prognosis: Good      Behavioral goals:    Exercise:   Stress management: Begin CPT. Review protocol. Complete LEC/PCL   Social engagement:   Nutrition:   Smoking Cessation:   Therapy:      Return to clinic: as scheduled    Next Session:   Session 1    Length of Service (minutes direct face-to-face contact): 60    Brittani Mahan, PhD  Clinical Psychologist  [place] License #[id number]  [place] License #[id number]

## 2023-03-08 ENCOUNTER — PATIENT MESSAGE (OUTPATIENT)
Dept: BARIATRICS | Facility: CLINIC | Age: 43
End: 2023-03-08
Payer: COMMERCIAL

## 2023-03-09 NOTE — TELEPHONE ENCOUNTER
Specialty Pharmacy - Refill Coordination  Specialty Pharmacy - Medication/Referral Authorization    Specialty Medication Orders Linked to Encounter      Flowsheet Row Most Recent Value   Medication #1 HUMIRA,CF, PEN 40 mg/0.4 mL PnKt (Order#[id number], Rx#[id number])            Refill Questions - Documented Responses      Flowsheet Row Most Recent Value   Patient Availability and HIPAA Verification    Does patient want to proceed with activity? Yes   HIPAA/medical authority confirmed? Yes   Relationship to patient of person spoken to? Self   Refill Screening Questions    Changes to allergies? No   Changes to medications? No   New conditions since last clinic visit? No   Unplanned office visit, urgent care, ED, or hospital admission in the last 4 weeks? No   How does patient/caregiver feel medication is working? Good   Financial problems or insurance changes? No   How many doses of your specialty medications were missed in the last 4 weeks? 0   Would patient like to speak to a pharmacist? No   When does the patient need to receive the medication? 03/13/23   Refill Delivery Questions    How will the patient receive the medication? [facility]   When does the patient need to receive the medication? 03/13/23   Shipping Address Home   Address in [facility] confirmed and updated if neccessary? Yes   Expected Copay ($) 5   Is the patient able to afford the medication copay? Yes   Payment Method CC on file   Days supply of Refill 28   Supplies needed? No supplies needed   Refill activity completed? Yes   Refill activity plan Refill scheduled   Shipment/Pickup Date: 03/09/23            Current Outpatient Medications   Medication Sig    ALPRAZolam (XANAX) 0.5 MG tablet Take 1 tablet (0.5 mg total) by mouth nightly as needed.    b complex vitamins capsule Take 1 capsule by mouth once daily.    B3-azelaic-zinc-B6-copper-FA (NICAZEL) 600-5-10-5-1.5 mg Tab take 1 tablet by mouth twice daily    CALCIUM CITRATE ORAL Take 1 tablet  by mouth 3 (three) times daily. chewable    clindamycin (CLEOCIN T) 1 % lotion Apply to affected area two times a day.    cyclobenzaprine (FLEXERIL) 5 MG tablet Take 1 tablet (5 mg total) by mouth 2 (two) times daily as needed for Muscle spasms.    doxycycline monohydrate 100 mg Tab Take 1 tablet (100 mg total) by mouth 2 (two) times daily for 10 days when flaring    erythromycin with ethanoL (THERAMYCIN) 2 % external solution Apply topically 2 (two) times daily.    estradioL (ESTRACE) 2 MG tablet Take 1 tablet (2 mg total) by mouth once daily.    finasteride (PROSCAR) 5 mg tablet Take 1 tablet (5 mg total) by mouth once daily.    fluconazole (DIFLUCAN) 200 MG Tab Take 1 tablet by mouth once if not better take another pill in 3 days    fluocinolone acetonide oiL (DERMOTIC OIL) 0.01 % Drop Use to affected ears at night, drop in canal and use to ear when flaring    galcanezumab-gnlm (EMGALITY PEN) 120 mg/mL PnIj Inject 120 mg into the skin every 30 days.    HUMIRA,CF, PEN 40 mg/0.4 mL PnKt Inject 40mg (1 pen) into the skin every 7 days    HUMIRA,CF, PEN 80 mg/0.8 mL PnKt Starting day 29, inject 80 mg (1 pen) into the skin every 2 weeks.    HUMIRA,CF, PEN 80 mg/0.8 mL PnKt Starting day 29, inject 80 mg (1 pen) into the skin every 2 weeks.    ketoconazole (NIZORAL) 2 % cream Apply to affected area two times a day (Patient taking differently: Apply to affected area two times a day)    multivitamin capsule Take by mouth once daily.    nystatin-triamcinolone (MYCOLOG II) cream Apply topically 2 (two) times daily.    omeprazole (PRILOSEC) 40 MG capsule Take 1 capsule (40 mg total) by mouth every morning.    ondansetron (ZOFRAN-ODT) 4 MG TbDL Take 1 tablet (4 mg total) by mouth every 6 (six) hours as needed (for nausea).    pimecrolimus (ELIDEL) 1 % cream Apply to affected areas of underarm twice a day as needed for irritation. (Patient taking differently: Apply to affected areas of underarm twice a day as needed for  irritation.)    progesterone (PROMETRIUM) 100 MG capsule Take 1 capsule (100 mg total) by mouth nightly.    spironolactone (ALDACTONE) 50 MG tablet Start with taking 1 tablet by mouth every day, may increase to 2 tablets by mouth every day as tolerated.    thiamine (VITAMIN B-1) 50 MG tablet Take 50 mg by mouth once daily.    ubrogepant (UBRELVY) 50 mg tablet Take 1 tab by mouth for headaches. May repeat once 2 hours after initial dose based on response and tolerability.    venlafaxine (EFFEXOR-XR) 150 MG Cp24 Take 1 capsule (150 mg total) by mouth once daily.   Last reviewed on 3/6/2023  9:04 AM by Brittani Mahan, PhD    Review of patient's allergies indicates:  No Known Allergies Last reviewed on  3/6/2023 9:04 AM by Brittani Mahan      Tasks added this encounter   4/3/2023 - Refill Call (Auto Added)   Tasks due within next 3 months   No tasks due.     [facility]  [address] [facility] [address]  Phone: [phone]  Fax: [phone]

## 2023-03-14 ENCOUNTER — PATIENT MESSAGE (OUTPATIENT)
Dept: BARIATRICS | Facility: CLINIC | Age: 43
End: 2023-03-14
Payer: COMMERCIAL

## 2023-03-28 DIAGNOSIS — K21.9 GASTROESOPHAGEAL REFLUX DISEASE, UNSPECIFIED WHETHER ESOPHAGITIS PRESENT: ICD-10-CM

## 2023-03-28 RX ORDER — OMEPRAZOLE 40 MG/1
40 CAPSULE, DELAYED RELEASE ORAL EVERY MORNING
Qty: 30 CAPSULE | Refills: 1 | Status: SHIPPED | OUTPATIENT
Start: 2023-03-28 | End: 2023-06-18 | Stop reason: SDUPTHER

## 2023-04-03 ENCOUNTER — OFFICE VISIT (OUTPATIENT)
Dept: PSYCHIATRY | Facility: CLINIC | Age: 43
End: 2023-04-03
Payer: COMMERCIAL

## 2023-04-03 DIAGNOSIS — D39.10 BORDERLINE EPITHELIAL NEOPLASM OF OVARY: ICD-10-CM

## 2023-04-03 DIAGNOSIS — F33.41 RECURRENT MAJOR DEPRESSIVE DISORDER IN PARTIAL REMISSION: ICD-10-CM

## 2023-04-03 DIAGNOSIS — F43.9 TRAUMA AND STRESSOR-RELATED DISORDER: Primary | ICD-10-CM

## 2023-04-03 PROCEDURE — 90834 PR PSYCHOTHERAPY W/PATIENT, 45 MIN: ICD-10-PCS | Mod: S$GLB,,, | Performed by: PSYCHOLOGIST

## 2023-04-03 PROCEDURE — 99999 PR PBB SHADOW E&M-EST. PATIENT-LVL II: CPT | Mod: PBBFAC,,, | Performed by: PSYCHOLOGIST

## 2023-04-03 PROCEDURE — 99999 PR PBB SHADOW E&M-EST. PATIENT-LVL II: ICD-10-PCS | Mod: PBBFAC,,, | Performed by: PSYCHOLOGIST

## 2023-04-03 PROCEDURE — 1159F MED LIST DOCD IN RCRD: CPT | Mod: CPTII,S$GLB,, | Performed by: PSYCHOLOGIST

## 2023-04-03 PROCEDURE — 90834 PSYTX W PT 45 MINUTES: CPT | Mod: S$GLB,,, | Performed by: PSYCHOLOGIST

## 2023-04-03 PROCEDURE — 1159F PR MEDICATION LIST DOCUMENTED IN MEDICAL RECORD: ICD-10-PCS | Mod: CPTII,S$GLB,, | Performed by: PSYCHOLOGIST

## 2023-04-04 NOTE — PROGRESS NOTES
INFORMED CONSENT: Patient was identified using two patient-identifiers. The patient has been informed of the risks and benefits associated with engaging in psychotherapy, the handling of protected health information, the rights of privacy and the limits of confidentiality. The patient has also been informed of the importance of reporting any suicidal or homicidal ideation to this or any provider to ensure safety of all parties, and the Fran Higgins expressed understanding. The patient was agreeable to these terms and freely participates in individual psychotherapy.    PSYCHO-ONCOLOGY NOTE/ Individual Psychotherapy     Date: 4/3/2023   Site:  [facility]        Therapeutic Intervention: Met with patient.  Outpatient - Insight oriented psychotherapy 45 min - CPT code 14879      Patient was last seen by me on 3/6/2023    Problem list  Patient Active Problem List   Diagnosis    Depression    Left ovarian cyst    S/P RA Laparoscopic LSO    Mucinous tumor, of low malignant potential    Pericardial cyst    Scoliosis of cervical spine    Abnormal uterine bleeding (AUB)    Vaginal yeast infection    DUB (dysfunctional uterine bleeding)    Migraine with aura, not intractable    s/p robotic hysterectomy/RSO 3/9/20    Borderline epithelial neoplasm of ovary    Surgical menopause    Yeast vaginitis    Menopause    Dizziness    Galactorrhea in female- bilateral breast    Other insomnia    Major depressive disorder, recurrent episode, in partial remission with anxious distress    Prediabetes    Obesity    S/P laparoscopic sleeve gastrectomy    BMI 45.0-49.9, adult    Hidradenitis    Seborrheic dermatitis       Chief complaint/reason for encounter: trauma disorder   Met with patient to evaluate psychosocial adaptation to diagnosis/treatment/survivorship of interpersonal trauma    Current Medications  Current Outpatient Medications   Medication    ALPRAZolam (XANAX) 0.5 MG tablet    b complex vitamins capsule     B3-azelaic-zinc-B6-copper-FA (NICAZEL) 600-5-10-5-1.5 mg Tab    CALCIUM CITRATE ORAL    clindamycin (CLEOCIN T) 1 % lotion    cyclobenzaprine (FLEXERIL) 5 MG tablet    doxycycline monohydrate 100 mg Tab    erythromycin with ethanoL (THERAMYCIN) 2 % external solution    estradioL (ESTRACE) 2 MG tablet    finasteride (PROSCAR) 5 mg tablet    fluconazole (DIFLUCAN) 200 MG Tab    fluocinolone acetonide oiL (DERMOTIC OIL) 0.01 % Drop    galcanezumab-gnlm (EMGALITY PEN) 120 mg/mL PnIj    HUMIRA,CF, PEN 40 mg/0.4 mL PnKt    HUMIRA,CF, PEN 80 mg/0.8 mL PnKt    HUMIRA,CF, PEN 80 mg/0.8 mL PnKt    ketoconazole (NIZORAL) 2 % cream    multivitamin capsule    nystatin-triamcinolone (MYCOLOG II) cream    omeprazole (PRILOSEC) 40 MG capsule    ondansetron (ZOFRAN-ODT) 4 MG TbDL    pimecrolimus (ELIDEL) 1 % cream    progesterone (PROMETRIUM) 100 MG capsule    spironolactone (ALDACTONE) 50 MG tablet    thiamine (VITAMIN B-1) 50 MG tablet    ubrogepant (UBRELVY) 50 mg tablet    venlafaxine (EFFEXOR-XR) 150 MG Cp24     No current facility-administered medications for this visit.       Objective:  Fran Higgins arrived late for the session.  The patient was fully cooperative throughout the session.  Appearance: age appropriate, appropriately  dressed, adequately  groomed  Behavior/Cooperation: friendly and cooperative  Speech: normal in rate, volume, and tone and appropriate quality, quantity and organization of sentences  Mood: euthymic  Affect: mood congruent  Thought Process: goal-directed, logical  Thought Content: normal,  No delusions or paranoia; did not appear to be responding to internal stimuli during the session  Orientation: grossly intact  Memory: Grossly intact  Attention Span/Concentration: Attends to session without distraction; reports no difficulty  Fund of Knowledge: average  Estimate of Intelligence: average from verbal skills and history  Cognition: grossly intact  Insight: patient has awareness of  illness; good insight into own behavior and behavior of others  Judgment: the patient's behavior is adequate to circumstances    Interval history and content of current session: The patient completed the first session of CPT for PTSD. An overview of PTSD  symptoms and a cognitive explanation of the development and maintenance of PTSD was  presented. A related rationale for treatment was provided, including the use of cognitive  restructuring to alleviate stuck points that prevent the patient from more fully emotionally  processing the traumatic event(s). The patient provided a brief description of his most  traumatic event.     Risk parameters:   Patient reports no suicidal ideation  Patient reports no homicidal ideation  Patient reports no self-injurious behavior  Patient reports no violent behavior   Safety needs:  None at this time      Verbal deficits: None     Patient's response to intervention:The patient's response to intervention is accepting.     Progress toward goals and other mental status changes:  The patient's progress toward goals is good.      Progress to date:Progress as Expected      Goals from last visit: Met     Patient reported outcomes:    Distress Thermometer:   Distress Score    Distress Score: 4        Practical Problems Physical Problems                                                   Family Problems                                         Emotional Problems                                                         Spiritual/Religions Concerns     Spiritual / Anabaptism Concerns: No         Other Problems                PHQ-9=    JOSE MARIA-7=    PHQ ANSWERS    Q1. Little interest or pleasure in doing things: (P) Several days (04/03/23 0858)  Q2. Feeling down, depressed, or hopeless: (P) Several days (04/03/23 0858)  Q3. Trouble falling or staying asleep, or sleeping too much: (P) Several days (04/03/23 0858)  Q4. Feeling tired or having little energy: (P) Several days (04/03/23 0858)  Q5. Poor  appetite or overeating: (P) Several days (04/03/23 0858)  Q6. Feeling bad about yourself - or that you are a failure or have let yourself or your family down: (P) Not at all (04/03/23 0858)  Q7. Trouble concentrating on things, such as reading the newspaper or watching television: (P) Several days (04/03/23 0858)  Q8. Moving or speaking so slowly that other people could have noticed. Or the opposite - being so fidgety or restless that you have been moving around a lot more than usual: (P) Not at all (04/03/23 0858)  Q9.  0    PHQ8 Score : (P) 6 (04/03/23 0858)  PHQ-9 Total Score: (P) 6 (04/03/23 0858)     JOSE MARIA-7 Answers    GAD7 4/3/2023   1. Feeling nervous, anxious, or on edge? 1   2. Not being able to stop or control worrying? 0   3. Worrying too much about different things? 1   4. Trouble relaxing? 0   5. Being so restless that it is hard to sit still? 0   6. Becoming easily annoyed or irritable? 1   7. Feeling afraid as if something awful might happen? 0   JOSE MARIA-7 Score 3   Some encounter information is confidential and restricted. Go to Review Flowsheets activity to see all data.     JOSE MARIA-7 Score: (P) 3  Interpretation: (P) Normal     Client Strengths: verbal, intelligent, successful, good social support, good insight, commitment to wellness, strong kerri, strong cultural traditions     Diagnosis:     ICD-10-CM ICD-9-CM   1. Trauma and stressor-related disorder  F43.9 309.81     308.9   2. 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 Recurrent major depressive disorder in partial remission  F33.41 296.35       Treatment Plan:individual psychotherapy and medication management by physician  Target symptoms:  interpersonal issues  Why chosen therapy is appropriate versus another modality: relevant to diagnosis, patient responds to this modality, evidence based practice  Outcome monitoring methods: self-report, observation, checklist/rating scale  Therapeutic intervention type: insight oriented  psychotherapy, behavior modifying psychotherapy  Prognosis: Good      Behavioral goals:    Exercise:   Stress management: Impact Statement and review handouts   Social engagement:   Nutrition:   Smoking Cessation:   Therapy:      Return to clinic: as scheduled    Next Session:   CPT session 2    Length of Service (minutes direct face-to-face contact): 45    Brittani Mahan, PhD  Clinical Psychologist  [place] License #[id number]  [place] License #[id number]

## 2023-04-05 ENCOUNTER — PATIENT MESSAGE (OUTPATIENT)
Dept: BARIATRICS | Facility: CLINIC | Age: 43
End: 2023-04-05
Payer: COMMERCIAL

## 2023-04-10 ENCOUNTER — OFFICE VISIT (OUTPATIENT)
Dept: PSYCHIATRY | Facility: CLINIC | Age: 43
End: 2023-04-10
Payer: COMMERCIAL

## 2023-04-10 ENCOUNTER — PATIENT MESSAGE (OUTPATIENT)
Dept: PSYCHIATRY | Facility: CLINIC | Age: 43
End: 2023-04-10
Payer: COMMERCIAL

## 2023-04-10 DIAGNOSIS — F33.41 RECURRENT MAJOR DEPRESSIVE DISORDER IN PARTIAL REMISSION: ICD-10-CM

## 2023-04-10 DIAGNOSIS — F43.9 TRAUMA AND STRESSOR-RELATED DISORDER: Primary | ICD-10-CM

## 2023-04-10 DIAGNOSIS — D39.10 BORDERLINE EPITHELIAL NEOPLASM OF OVARY: ICD-10-CM

## 2023-04-10 PROCEDURE — 90834 PSYTX W PT 45 MINUTES: CPT | Mod: S$GLB,,, | Performed by: PSYCHOLOGIST

## 2023-04-10 PROCEDURE — 99999 PR PBB SHADOW E&M-EST. PATIENT-LVL II: CPT | Mod: PBBFAC,,, | Performed by: PSYCHOLOGIST

## 2023-04-10 PROCEDURE — 90834 PR PSYCHOTHERAPY W/PATIENT, 45 MIN: ICD-10-PCS | Mod: S$GLB,,, | Performed by: PSYCHOLOGIST

## 2023-04-10 PROCEDURE — 1159F PR MEDICATION LIST DOCUMENTED IN MEDICAL RECORD: ICD-10-PCS | Mod: CPTII,S$GLB,, | Performed by: PSYCHOLOGIST

## 2023-04-10 PROCEDURE — 99999 PR PBB SHADOW E&M-EST. PATIENT-LVL II: ICD-10-PCS | Mod: PBBFAC,,, | Performed by: PSYCHOLOGIST

## 2023-04-10 PROCEDURE — 1159F MED LIST DOCD IN RCRD: CPT | Mod: CPTII,S$GLB,, | Performed by: PSYCHOLOGIST

## 2023-04-10 NOTE — PROGRESS NOTES
INFORMED CONSENT: Patient was identified using two patient-identifiers. The patient has been informed of the risks and benefits associated with engaging in psychotherapy, the handling of protected health information, the rights of privacy and the limits of confidentiality. The patient has also been informed of the importance of reporting any suicidal or homicidal ideation to this or any provider to ensure safety of all parties, and the Fran Higgins expressed understanding. The patient was agreeable to these terms and freely participates in individual psychotherapy.    PSYCHO-ONCOLOGY NOTE/ Individual Psychotherapy     Date: 4/10/2023   Site:  [facility]        Therapeutic Intervention: Met with patient.  Outpatient - Insight oriented psychotherapy 45 min - CPT code 78148      Patient was last seen by me on 4/3/2023    Problem list  Patient Active Problem List   Diagnosis    Depression    Left ovarian cyst    S/P RA Laparoscopic LSO    Mucinous tumor, of low malignant potential    Pericardial cyst    Scoliosis of cervical spine    Abnormal uterine bleeding (AUB)    Vaginal yeast infection    DUB (dysfunctional uterine bleeding)    Migraine with aura, not intractable    s/p robotic hysterectomy/RSO 3/9/20    Borderline epithelial neoplasm of ovary    Surgical menopause    Yeast vaginitis    Menopause    Dizziness    Galactorrhea in female- bilateral breast    Other insomnia    Major depressive disorder, recurrent episode, in partial remission with anxious distress    Prediabetes    Obesity    S/P laparoscopic sleeve gastrectomy    BMI 45.0-49.9, adult    Hidradenitis    Seborrheic dermatitis       Chief complaint/reason for encounter: depression and sleep   Met with patient to evaluate psychosocial adaptation to diagnosis/treatment/survivorship of trauma    Current Medications  Current Outpatient Medications   Medication    ALPRAZolam (XANAX) 0.5 MG tablet    b complex vitamins capsule     B3-azelaic-zinc-B6-copper-FA (NICAZEL) 600-5-10-5-1.5 mg Tab    CALCIUM CITRATE ORAL    clindamycin (CLEOCIN T) 1 % lotion    cyclobenzaprine (FLEXERIL) 5 MG tablet    doxycycline monohydrate 100 mg Tab    erythromycin with ethanoL (THERAMYCIN) 2 % external solution    estradioL (ESTRACE) 2 MG tablet    finasteride (PROSCAR) 5 mg tablet    fluconazole (DIFLUCAN) 200 MG Tab    fluocinolone acetonide oiL (DERMOTIC OIL) 0.01 % Drop    galcanezumab-gnlm (EMGALITY PEN) 120 mg/mL PnIj    HUMIRA,CF, PEN 40 mg/0.4 mL PnKt    HUMIRA,CF, PEN 80 mg/0.8 mL PnKt    HUMIRA,CF, PEN 80 mg/0.8 mL PnKt    ketoconazole (NIZORAL) 2 % cream    multivitamin capsule    nystatin-triamcinolone (MYCOLOG II) cream    omeprazole (PRILOSEC) 40 MG capsule    ondansetron (ZOFRAN-ODT) 4 MG TbDL    pimecrolimus (ELIDEL) 1 % cream    progesterone (PROMETRIUM) 100 MG capsule    spironolactone (ALDACTONE) 50 MG tablet    thiamine (VITAMIN B-1) 50 MG tablet    ubrogepant (UBRELVY) 50 mg tablet    venlafaxine (EFFEXOR-XR) 150 MG Cp24     No current facility-administered medications for this visit.       Objective:  Fran Higgins arrived late or the session.    The patient was fully cooperative throughout the session.  Appearance: age appropriate, appropriately  dressed, adequately  groomed  Behavior/Cooperation: friendly and cooperative  Speech: normal in rate, volume, and tone and appropriate quality, quantity and organization of sentences  Mood: anxious  Affect: mood congruent  Thought Process: goal-directed, logical  Thought Content: normal,  No delusions or paranoia; did not appear to be responding to internal stimuli during the session  Orientation: grossly intact  Memory: Grossly intact  Attention Span/Concentration: Attends to session without distraction; reports no difficulty  Fund of Knowledge: average  Estimate of Intelligence: average from verbal skills and history  Cognition: grossly intact  Insight: patient has awareness of  illness; good insight into own behavior and behavior of others  Judgment: the patient's behavior is adequate to circumstances    Interval history and content of current session: Patient only partially complete impact statement. Discussed avoidance and CBT model of thinking.  Discussed diagnosis and treatment. Reports to be coping in a passive manner. Evaluated cognitive response, paying particular attention to negative intrusive thoughts of a persistent and detrimental nature. Thoughts of this type are in evidence with moderate distress. Provided cognitive behavioral therapy to address negative cognitions. Identified and evaluated psychosocial and environmental stressors secondary to diagnosis and treatment.  Examined proactive behaviors that may be implemented to minimize or ameliorate psychosocial stressors secondary to diagnosis and treatment.     Risk parameters:   Patient reports no suicidal ideation  Patient reports no homicidal ideation  Patient reports no self-injurious behavior  Patient reports no violent behavior   Safety needs:  None at this time      Verbal deficits: None     Patient's response to intervention:The patient's response to intervention is accepting.     Progress toward goals and other mental status changes:  The patient's progress toward goals is limited.      Progress to date:No Progress - Continue Objectives      Goals from last visit: Attempted, not met     Patient reported outcomes:    Distress Thermometer:   Distress Score    Distress Score: 4        Practical Problems Physical Problems                                                   Family Problems                                         Emotional Problems                                                         Spiritual/Religions Concerns     Spiritual / Shinto Concerns: No         Other Problems                PHQ-9=    JOSE MARIA-7=    PHQ ANSWERS    Q1. Little interest or pleasure in doing things: (P) Several days (04/10/23  0955)  Q2. Feeling down, depressed, or hopeless: (P) Several days (04/10/23 0955)  Q3. Trouble falling or staying asleep, or sleeping too much: (P) Several days (04/10/23 0955)  Q4. Feeling tired or having little energy: (P) Several days (04/10/23 0955)  Q5. Poor appetite or overeating: (P) Several days (04/10/23 0955)  Q6. Feeling bad about yourself - or that you are a failure or have let yourself or your family down: (P) Not at all (04/10/23 0955)  Q7. Trouble concentrating on things, such as reading the newspaper or watching television: (P) Several days (04/10/23 0955)  Q8. Moving or speaking so slowly that other people could have noticed. Or the opposite - being so fidgety or restless that you have been moving around a lot more than usual: (P) Not at all (04/10/23 0955)  Q9.      PHQ8 Score : (P) 6 (04/10/23 0955)  PHQ-9 Total Score: (P) 6 (04/10/23 0955)     JOSE MARIA-7 Answers    GAD7 4/10/2023   1. Feeling nervous, anxious, or on edge? 1   2. Not being able to stop or control worrying? 1   3. Worrying too much about different things? 1   4. Trouble relaxing? 1   5. Being so restless that it is hard to sit still? 1   6. Becoming easily annoyed or irritable? 1   7. Feeling afraid as if something awful might happen? 0   JOSE MARIA-7 Score 6   Some encounter information is confidential and restricted. Go to Review Flowsheets activity to see all data.     JOSE MARIA-7 Score: (P) 6  Interpretation: (P) Mild Anxiety     Client Strengths: verbal, intelligent, successful, good social support, good insight, commitment to wellness, strong kerri, strong cultural traditions     Diagnosis:     ICD-10-CM ICD-9-CM   1. Trauma and stressor-related disorder  F43.9 309.81     308.9   2. 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 Recurrent major depressive disorder in partial remission  F33.41 296.35       Treatment Plan:individual psychotherapy and medication management by physician  Target symptoms:  trauma  Why chosen therapy is  appropriate versus another modality: relevant to diagnosis, evidence based practice  Outcome monitoring methods: self-report, observation, checklist/rating scale  Therapeutic intervention type: insight oriented psychotherapy, behavior modifying psychotherapy  Prognosis: Good      Behavioral goals:    Exercise:   Stress management: Re attempt Impact Statement   Social engagement:   Nutrition:   Smoking Cessation:   Therapy:  Monitor stressors in writing and bring to next visit    Return to clinic: 1 week    Next Session:      Length of Service (minutes direct face-to-face contact): 45    Brittani Mahan, PhD  Clinical Psychologist  [place] License #[id number]  [place] License #[id number]

## 2023-04-11 ENCOUNTER — PATIENT MESSAGE (OUTPATIENT)
Dept: BARIATRICS | Facility: CLINIC | Age: 43
End: 2023-04-11
Payer: COMMERCIAL

## 2023-04-13 ENCOUNTER — LAB VISIT (OUTPATIENT)
Dept: LAB | Facility: HOSPITAL | Age: 43
End: 2023-04-13
Attending: DERMATOLOGY
Payer: COMMERCIAL

## 2023-04-13 DIAGNOSIS — Z79.899 ENCOUNTER FOR LONG-TERM (CURRENT) USE OF OTHER MEDICATIONS: ICD-10-CM

## 2023-04-13 LAB
ALBUMIN SERPL BCP-MCNC: 4.2 G/DL (ref 3.5–5.2)
ALP SERPL-CCNC: 70 U/L (ref 55–135)
ALT SERPL W/O P-5'-P-CCNC: 19 U/L (ref 10–44)
AST SERPL-CCNC: 19 U/L (ref 10–40)
BASOPHILS # BLD AUTO: 0.02 K/UL (ref 0–0.2)
BASOPHILS NFR BLD: 0.3 % (ref 0–1.9)
BILIRUB DIRECT SERPL-MCNC: 0.1 MG/DL (ref 0.1–0.3)
BILIRUB SERPL-MCNC: 0.3 MG/DL (ref 0.1–1)
DIFFERENTIAL METHOD: ABNORMAL
EOSINOPHIL # BLD AUTO: 0.1 K/UL (ref 0–0.5)
EOSINOPHIL NFR BLD: 1.1 % (ref 0–8)
ERYTHROCYTE [DISTWIDTH] IN BLOOD BY AUTOMATED COUNT: 12.4 % (ref 11.5–14.5)
HCT VFR BLD AUTO: 41 % (ref 37–48.5)
HGB BLD-MCNC: 13 G/DL (ref 12–16)
IMM GRANULOCYTES # BLD AUTO: 0.02 K/UL (ref 0–0.04)
IMM GRANULOCYTES NFR BLD AUTO: 0.3 % (ref 0–0.5)
LYMPHOCYTES # BLD AUTO: 2.5 K/UL (ref 1–4.8)
LYMPHOCYTES NFR BLD: 38.3 % (ref 18–48)
MCH RBC QN AUTO: 27.8 PG (ref 27–31)
MCHC RBC AUTO-ENTMCNC: 31.7 G/DL (ref 32–36)
MCV RBC AUTO: 88 FL (ref 82–98)
MONOCYTES # BLD AUTO: 0.5 K/UL (ref 0.3–1)
MONOCYTES NFR BLD: 8.1 % (ref 4–15)
NEUTROPHILS # BLD AUTO: 3.3 K/UL (ref 1.8–7.7)
NEUTROPHILS NFR BLD: 51.9 % (ref 38–73)
NRBC BLD-RTO: 0 /100 WBC
PLATELET # BLD AUTO: 200 K/UL (ref 150–450)
PMV BLD AUTO: 11 FL (ref 9.2–12.9)
PROT SERPL-MCNC: 7.3 G/DL (ref 6–8.4)
RBC # BLD AUTO: 4.67 M/UL (ref 4–5.4)
WBC # BLD AUTO: 6.43 K/UL (ref 3.9–12.7)

## 2023-04-13 PROCEDURE — 36415 COLL VENOUS BLD VENIPUNCTURE: CPT | Performed by: DERMATOLOGY

## 2023-04-13 PROCEDURE — 85025 COMPLETE CBC W/AUTO DIFF WBC: CPT | Performed by: DERMATOLOGY

## 2023-04-13 PROCEDURE — 80076 HEPATIC FUNCTION PANEL: CPT | Performed by: DERMATOLOGY

## 2023-04-17 ENCOUNTER — SPECIALTY PHARMACY (OUTPATIENT)
Dept: PHARMACY | Facility: CLINIC | Age: 43
End: 2023-04-17
Payer: COMMERCIAL

## 2023-04-17 ENCOUNTER — TELEPHONE (OUTPATIENT)
Dept: INFUSION THERAPY | Facility: HOSPITAL | Age: 43
End: 2023-04-17
Payer: COMMERCIAL

## 2023-04-17 NOTE — TELEPHONE ENCOUNTER
Specialty Pharmacy - Refill Coordination    Specialty Medication Orders Linked to Encounter      Flowsheet Row Most Recent Value   Medication #1 HUMIRA,CF, PEN 40 mg/0.4 mL PnKt (Order#[id number], Rx#[id number])            Refill Questions - Documented Responses      Flowsheet Row Most Recent Value   Patient Availability and HIPAA Verification    Does patient want to proceed with activity? Yes   HIPAA/medical authority confirmed? Yes   Relationship to patient of person spoken to? Self   Refill Screening Questions    Changes to allergies? No   Changes to medications? No   New conditions since last clinic visit? No   Unplanned office visit, urgent care, ED, or hospital admission in the last 4 weeks? No   How does patient/caregiver feel medication is working? Very good   Financial problems or insurance changes? No   How many doses of your specialty medications were missed in the last 4 weeks? 0   Would patient like to speak to a pharmacist? No   When does the patient need to receive the medication? 04/21/23   Refill Delivery Questions    How will the patient receive the medication? [facility]   When does the patient need to receive the medication? 04/21/23   Shipping Address Home   Address in [facility] confirmed and updated if neccessary? Yes   Expected Copay ($) 5   Is the patient able to afford the medication copay? Yes   Payment Method CC on file   Days supply of Refill 28   Supplies needed? No supplies needed   Refill activity completed? Yes   Refill activity plan Refill scheduled   Shipment/Pickup Date: 04/19/23            Current Outpatient Medications   Medication Sig    ALPRAZolam (XANAX) 0.5 MG tablet Take 1 tablet (0.5 mg total) by mouth nightly as needed.    b complex vitamins capsule Take 1 capsule by mouth once daily.    B3-azelaic-zinc-B6-copper-FA (NICAZEL) 600-5-10-5-1.5 mg Tab take 1 tablet by mouth twice daily    CALCIUM CITRATE ORAL Take 1 tablet by mouth 3 (three) times daily. chewable     clindamycin (CLEOCIN T) 1 % lotion Apply to affected area two times a day.    cyclobenzaprine (FLEXERIL) 5 MG tablet Take 1 tablet (5 mg total) by mouth 2 (two) times daily as needed for Muscle spasms.    doxycycline monohydrate 100 mg Tab Take 1 tablet (100 mg total) by mouth 2 (two) times daily for 10 days when flaring    erythromycin with ethanoL (THERAMYCIN) 2 % external solution Apply topically 2 (two) times daily.    estradioL (ESTRACE) 2 MG tablet Take 1 tablet (2 mg total) by mouth once daily.    finasteride (PROSCAR) 5 mg tablet Take 1 tablet (5 mg total) by mouth once daily.    fluconazole (DIFLUCAN) 200 MG Tab Take 1 tablet by mouth once if not better take another pill in 3 days    fluocinolone acetonide oiL (DERMOTIC OIL) 0.01 % Drop Use to affected ears at night, drop in canal and use to ear when flaring    galcanezumab-gnlm (EMGALITY PEN) 120 mg/mL PnIj Inject 120 mg into the skin every 30 days.    HUMIRA,CF, PEN 40 mg/0.4 mL PnKt Inject 40mg (1 pen) into the skin every 7 days    HUMIRA,CF, PEN 80 mg/0.8 mL PnKt Starting day 29, inject 80 mg (1 pen) into the skin every 2 weeks.    HUMIRA,CF, PEN 80 mg/0.8 mL PnKt Starting day 29, inject 80 mg (1 pen) into the skin every 2 weeks.    ketoconazole (NIZORAL) 2 % cream Apply to affected area two times a day (Patient taking differently: Apply to affected area two times a day)    multivitamin capsule Take by mouth once daily.    nystatin-triamcinolone (MYCOLOG II) cream Apply topically 2 (two) times daily.    omeprazole (PRILOSEC) 40 MG capsule Take 1 capsule (40 mg total) by mouth every morning.    ondansetron (ZOFRAN-ODT) 4 MG TbDL Take 1 tablet (4 mg total) by mouth every 6 (six) hours as needed (for nausea).    pimecrolimus (ELIDEL) 1 % cream Apply to affected areas of underarm twice a day as needed for irritation. (Patient taking differently: Apply to affected areas of underarm twice a day as needed for irritation.)    progesterone (PROMETRIUM) 100 MG  capsule Take 1 capsule (100 mg total) by mouth nightly.    spironolactone (ALDACTONE) 50 MG tablet Start with taking 1 tablet by mouth every day, may increase to 2 tablets by mouth every day as tolerated.    thiamine (VITAMIN B-1) 50 MG tablet Take 50 mg by mouth once daily.    ubrogepant (UBRELVY) 50 mg tablet Take 1 tab by mouth for headaches. May repeat once 2 hours after initial dose based on response and tolerability.    venlafaxine (EFFEXOR-XR) 150 MG Cp24 Take 1 capsule (150 mg total) by mouth once daily.   Last reviewed on 4/10/2023  5:13 PM by Brittani Mahan, PhD    Review of patient's allergies indicates:  No Known Allergies Last reviewed on  4/10/2023 5:13 PM by Brittani Mahan      Tasks added this encounter   5/12/2023 - Refill Call (Auto Added)   Tasks due within next 3 months   No tasks due.     Hollis Rosas, PharmD  [facility]  [address] [place], [place] [id number]  [facility] [address]  Phone: [phone]  Fax: [phone]

## 2023-04-18 DIAGNOSIS — R11.0 NAUSEA: ICD-10-CM

## 2023-04-18 DIAGNOSIS — L73.2 HIDRADENITIS: ICD-10-CM

## 2023-04-18 RX ORDER — ONDANSETRON 4 MG/1
4 TABLET, ORALLY DISINTEGRATING ORAL EVERY 6 HOURS PRN
Qty: 20 TABLET | Refills: 2 | Status: SHIPPED | OUTPATIENT
Start: 2023-04-18 | End: 2024-03-06 | Stop reason: SDUPTHER

## 2023-04-18 RX ORDER — CLINDAMYCIN PHOSPHATE 10 UG/ML
LOTION TOPICAL
Qty: 120 ML | Refills: 3 | Status: CANCELLED | OUTPATIENT
Start: 2023-04-18

## 2023-04-19 DIAGNOSIS — L73.2 HIDRADENITIS: ICD-10-CM

## 2023-04-19 RX ORDER — CLINDAMYCIN PHOSPHATE 10 UG/ML
LOTION TOPICAL
Qty: 120 ML | Refills: 3 | Status: CANCELLED | OUTPATIENT
Start: 2023-04-18

## 2023-04-19 NOTE — TELEPHONE ENCOUNTER
Incoming call from patient. CC information updated and saved into [facility].

## 2023-04-21 DIAGNOSIS — L73.2 HIDRADENITIS: ICD-10-CM

## 2023-04-24 ENCOUNTER — PATIENT MESSAGE (OUTPATIENT)
Dept: PSYCHIATRY | Facility: CLINIC | Age: 43
End: 2023-04-24
Payer: COMMERCIAL

## 2023-04-24 ENCOUNTER — OFFICE VISIT (OUTPATIENT)
Dept: HEMATOLOGY/ONCOLOGY | Facility: CLINIC | Age: 43
End: 2023-04-24
Payer: COMMERCIAL

## 2023-04-24 ENCOUNTER — OFFICE VISIT (OUTPATIENT)
Dept: PSYCHIATRY | Facility: CLINIC | Age: 43
End: 2023-04-24
Payer: COMMERCIAL

## 2023-04-24 VITALS — BODY MASS INDEX: 48.82 KG/M2 | WEIGHT: 293 LBS | HEIGHT: 65 IN

## 2023-04-24 DIAGNOSIS — N95.1 MENOPAUSAL SYMPTOMS: ICD-10-CM

## 2023-04-24 DIAGNOSIS — C56.9 MUCINOUS TUMOR, OF LOW MALIGNANT POTENTIAL: Primary | ICD-10-CM

## 2023-04-24 DIAGNOSIS — F33.41 MAJOR DEPRESSIVE DISORDER, RECURRENT EPISODE, IN PARTIAL REMISSION WITH ANXIOUS DISTRESS: ICD-10-CM

## 2023-04-24 DIAGNOSIS — D39.10 BORDERLINE EPITHELIAL NEOPLASM OF OVARY: ICD-10-CM

## 2023-04-24 DIAGNOSIS — F43.9 TRAUMA AND STRESSOR-RELATED DISORDER: Primary | ICD-10-CM

## 2023-04-24 PROCEDURE — 99213 OFFICE O/P EST LOW 20 MIN: CPT | Mod: S$GLB,,, | Performed by: PHYSICIAN ASSISTANT

## 2023-04-24 PROCEDURE — 99999 PR PBB SHADOW E&M-EST. PATIENT-LVL I: ICD-10-PCS | Mod: PBBFAC,,, | Performed by: PSYCHOLOGIST

## 2023-04-24 PROCEDURE — 3008F BODY MASS INDEX DOCD: CPT | Mod: CPTII,S$GLB,, | Performed by: PHYSICIAN ASSISTANT

## 2023-04-24 PROCEDURE — 1159F MED LIST DOCD IN RCRD: CPT | Mod: CPTII,S$GLB,, | Performed by: PHYSICIAN ASSISTANT

## 2023-04-24 PROCEDURE — 99999 PR PBB SHADOW E&M-EST. PATIENT-LVL IV: CPT | Mod: PBBFAC,,, | Performed by: PHYSICIAN ASSISTANT

## 2023-04-24 PROCEDURE — 90834 PSYTX W PT 45 MINUTES: CPT | Mod: S$GLB,,, | Performed by: PSYCHOLOGIST

## 2023-04-24 PROCEDURE — 99213 PR OFFICE/OUTPT VISIT, EST, LEVL III, 20-29 MIN: ICD-10-PCS | Mod: S$GLB,,, | Performed by: PHYSICIAN ASSISTANT

## 2023-04-24 PROCEDURE — 99999 PR PBB SHADOW E&M-EST. PATIENT-LVL I: CPT | Mod: PBBFAC,,, | Performed by: PSYCHOLOGIST

## 2023-04-24 PROCEDURE — 99999 PR PBB SHADOW E&M-EST. PATIENT-LVL IV: ICD-10-PCS | Mod: PBBFAC,,, | Performed by: PHYSICIAN ASSISTANT

## 2023-04-24 PROCEDURE — 1159F PR MEDICATION LIST DOCUMENTED IN MEDICAL RECORD: ICD-10-PCS | Mod: CPTII,S$GLB,, | Performed by: PHYSICIAN ASSISTANT

## 2023-04-24 PROCEDURE — 90834 PR PSYCHOTHERAPY W/PATIENT, 45 MIN: ICD-10-PCS | Mod: S$GLB,,, | Performed by: PSYCHOLOGIST

## 2023-04-24 PROCEDURE — 3008F PR BODY MASS INDEX (BMI) DOCUMENTED: ICD-10-PCS | Mod: CPTII,S$GLB,, | Performed by: PHYSICIAN ASSISTANT

## 2023-04-24 RX ORDER — CLINDAMYCIN PHOSPHATE 10 UG/ML
LOTION TOPICAL
Qty: 120 ML | Refills: 3 | Status: SHIPPED | OUTPATIENT
Start: 2023-04-24 | End: 2024-03-25 | Stop reason: SDUPTHER

## 2023-04-24 RX ORDER — ESTRADIOL 1.25 MG/1.25G
1.25 GEL TOPICAL DAILY
Qty: 37.5 G | Refills: 11 | Status: SHIPPED | OUTPATIENT
Start: 2023-04-24 | End: 2023-07-31 | Stop reason: SDUPTHER

## 2023-04-24 NOTE — PROGRESS NOTES
Subjective:      Fran Higgins is a 42 y.o. female who presents for survivorship follow up.  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 Marky Fox in Jun 2019.  Currently followed by Dr. Pro.  At her initial visit, she reported night sweats, mood swings, insomnia, joint pain and hot flashes with premarin 1.25mg QHS. She does have a history of migraines and s/p gastric sleeve in 2021. However, she declined transdermal estradiol at that time and wanted to try oral estradiol. D/c premarin and started estrace 2mg QAM and progesterone 100mg QHS. Also added mg glycinage and acupuncture. Reports that night sweats might have improved slight and does sleep better with the progesterone and mg glycinate. However, she continues to have hot flashes, mood swings, irritability, and insomnia. Increased stress with worry about her daughter. Both have started seeing therapist that is helping. She does have weight gain despite no changes in diet.     PCP: Erica Pineda MD    Routine labs: 12/5/2022  Pap smear: No result found  Mammogram: 9/15/2022 TC 9.43 %.  WWE: Pelvic exams [id number] with Dr. Pro    Lab Visit on 04/13/2023   Component Date Value Ref Range Status    Total Protein 04/13/2023 7.3  6.0 - 8.4 g/dL Final    Albumin 04/13/2023 4.2  3.5 - 5.2 g/dL Final    Total Bilirubin 04/13/2023 0.3  0.1 - 1.0 mg/dL Final    Bilirubin, Direct 04/13/2023 0.1  0.1 - 0.3 mg/dL Final    AST 04/13/2023 19  10 - 40 U/L Final    ALT 04/13/2023 19  10 - 44 U/L Final    Alkaline Phosphatase 04/13/2023 70  55 - 135 U/L Final    WBC 04/13/2023 6.43  3.90 - 12.70 K/uL Final    RBC 04/13/2023 4.67  4.00 - 5.40 M/uL Final    Hemoglobin 04/13/2023 13.0  12.0 - 16.0 g/dL Final    Hematocrit 04/13/2023 41.0  37.0 - 48.5 % Final    MCV 04/13/2023 88  82 - 98 fL Final    MCH 04/13/2023 27.8  27.0 - 31.0  pg Final    MCHC 04/13/2023 31.7 (L)  32.0 - 36.0 g/dL Final    RDW 04/13/2023 12.4  11.5 - 14.5 % Final    Platelets 04/13/2023 200  150 - 450 K/uL Final    MPV 04/13/2023 11.0  9.2 - 12.9 fL Final    Immature Granulocytes 04/13/2023 0.3  0.0 - 0.5 % Final    Gran # (ANC) 04/13/2023 3.3  1.8 - 7.7 K/uL Final    Immature Grans (Abs) 04/13/2023 0.02  0.00 - 0.04 K/uL Final    Lymph # 04/13/2023 2.5  1.0 - 4.8 K/uL Final    Mono # 04/13/2023 0.5  0.3 - 1.0 K/uL Final    Eos # 04/13/2023 0.1  0.0 - 0.5 K/uL Final    Baso # 04/13/2023 0.02  0.00 - 0.20 K/uL Final    nRBC 04/13/2023 0  0 /100 WBC Final    Gran % 04/13/2023 51.9  38.0 - 73.0 % Final    Lymph % 04/13/2023 38.3  18.0 - 48.0 % Final    Mono % 04/13/2023 8.1  4.0 - 15.0 % Final    Eosinophil % 04/13/2023 1.1  0.0 - 8.0 % Final    Basophil % 04/13/2023 0.3  0.0 - 1.9 % Final    Differential Method 04/13/2023 Automated   Final   Clinical Support on 03/02/2023   Component Date Value Ref Range Status    POC Cholesterol, Total 03/02/2023 254 (H)  <240 MG/DL Final    POC Cholesterol, HDL 03/02/2023 79  MG/DL Final    POC Cholesterol, LDL 03/02/2023 141  <160 MG/DL Final    POC Triglycerides 03/02/2023 191 (H)  <160 MG/DL Final    POC Glucose 03/02/2023 85  60 - 140 MG/DL Final       Past Medical History:   Diagnosis Date    Abnormal Pap smear of cervix 2013    Abnormal uterine bleeding (AUB) 9/13/2019    Amblyopia     Anxiety     Cervical scoliosis 1994    Severe    Depression     DUB (dysfunctional uterine bleeding) 1/30/2020    Fatigue     Galactorrhea in female- bilateral breast 4/9/2020    Galactorrhea of both breasts 7/9/2020    Hx of psychiatric care     Migraine with aura, not intractable 1/30/2020    Mucinous tumor, 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    Ovarian cyst     Pericardial cyst 7/26/2019    Psychiatric problem     Sleep difficulties     Surgical menopause 3/13/2020    Therapy     Vaginal yeast infection 9/26/2019     Past Surgical History:    Procedure Laterality Date    HYSTERECTOMY  2020    LAPAROSCOPIC SLEEVE GASTRECTOMY N/A 2021    Procedure: GASTRECTOMY, SLEEVE, LAPAROSCOPIC; with intraoperative egd;  Surgeon: Jaziel Whitman Jr., MD;  Location: [facility] OR [address] [place] [address];  Service: General;  Laterality: N/A;    ROBOT-ASSISTED LAPAROSCOPIC ABDOMINAL HYSTERECTOMY USING DA ROMAIN XI N/A 3/9/2020    Procedure: XI ROBOTIC HYSTERECTOMY;  Surgeon: Jeff Kaufman MD;  Location: [address];  Service: Oncology;  Laterality: N/A;    ROBOT-ASSISTED LAPAROSCOPIC SALPINGO-OOPHORECTOMY Left 2019    Procedure: ROBOTIC SALPINGO-OOPHORECTOMY;  Surgeon: Marky Fox Jr., MD;  Location: [facility];  Service: OB/GYN;  Laterality: Left;    SALPINGOOPHORECTOMY Right 3/9/2020    Procedure: SALPINGO-OOPHORECTOMY  USO;  Surgeon: Jeff Kaufman MD;  Location: [facility] OR [address] [place] [address];  Service: Oncology;  Laterality: Right;    TONSILLECTOMY       Social History     Tobacco Use    Smoking status: Never    Smokeless tobacco: Never   Substance Use Topics    Alcohol use: Yes     Comment: 2-3 drinks twice a year    Drug use: No     Family History   Problem Relation Age of Onset    Diabetes Paternal Grandfather     Macular degeneration Maternal Grandmother     Cancer Maternal Grandfather         prostate cancer    Hypertension Father     Depression Father     Drug abuse Father     Hypertension Mother     Stroke Mother 60        ASD, PFO    Depression Mother     Cancer Other         uterine cancer     Uterine cancer Other     Depression Sister     Colon cancer Neg Hx     Ovarian cancer Neg Hx     Breast cancer Neg Hx     Glaucoma Neg Hx     Melanoma Neg Hx     Heart attack Neg Hx     Heart disease Neg Hx     Hyperlipidemia Neg Hx      OB History    Para Term  AB Living   1 1 1 0 0 1   SAB IAB Ectopic Multiple Live Births   0 0 0 0 1      # Outcome Date GA Lbr Hector/2nd Weight Sex Delivery Anes PTL Lv   1 Term 13 38w6d 13:00 / 00:37 3.53 kg (7 lb 12.5 oz) F  Vag-Spont EPI N TYRELL       Current Outpatient Medications:     ALPRAZolam (XANAX) 0.5 MG tablet, Take 1 tablet (0.5 mg total) by mouth nightly as needed., Disp: 30 tablet, Rfl: 1    b complex vitamins capsule, Take 1 capsule by mouth once daily., Disp: , Rfl:     B3-azelaic-zinc-B6-copper-FA (NICAZEL) 600-5-10-5-1.5 mg Tab, take 1 tablet by mouth twice daily, Disp: 60 tablet, Rfl: 5    CALCIUM CITRATE ORAL, Take 1 tablet by mouth 3 (three) times daily. chewable, Disp: , Rfl:     clindamycin (CLEOCIN T) 1 % lotion, Apply to affected area two times a day., Disp: 120 mL, Rfl: 3    cyclobenzaprine (FLEXERIL) 5 MG tablet, Take 1 tablet (5 mg total) by mouth 2 (two) times daily as needed for Muscle spasms., Disp: 60 tablet, Rfl: 5    doxycycline monohydrate 100 mg Tab, Take 1 tablet (100 mg total) by mouth 2 (two) times daily for 10 days when flaring, Disp: 60 tablet, Rfl: 1    erythromycin with ethanoL (THERAMYCIN) 2 % external solution, Apply topically 2 (two) times daily., Disp: 60 mL, Rfl: 4    finasteride (PROSCAR) 5 mg tablet, Take 1 tablet (5 mg total) by mouth once daily., Disp: 30 tablet, Rfl: 11    fluconazole (DIFLUCAN) 200 MG Tab, Take 1 tablet by mouth once if not better take another pill in 3 days, Disp: 30 tablet, Rfl: 0    fluocinolone acetonide oiL (DERMOTIC OIL) 0.01 % Drop, Use to affected ears at night, drop in canal and use to ear when flaring, Disp: 20 mL, Rfl: 3    galcanezumab-gnlm (EMGALITY PEN) 120 mg/mL PnIj, Inject 120 mg into the skin every 30 days., Disp: 1 mL, Rfl: 11    HUMIRA,CF, PEN 40 mg/0.4 mL PnKt, Inject 40mg (1 pen) into the skin every 7 days, Disp: 4 pen, Rfl: 4    HUMIRA,CF, PEN 80 mg/0.8 mL PnKt, Starting day 29, inject 80 mg (1 pen) into the skin every 2 weeks., Disp: 2 pen, Rfl: 4    HUMIRA,CF, PEN 80 mg/0.8 mL PnKt, Starting day 29, inject 80 mg (1 pen) into the skin every 2 weeks., Disp: 2 pen, Rfl: 4    ketoconazole (NIZORAL) 2 % cream, Apply to affected area two times a day  (Patient taking differently: Apply to affected area two times a day), Disp: 60 g, Rfl: 3    multivitamin capsule, Take by mouth once daily., Disp: , Rfl:     nystatin-triamcinolone (MYCOLOG II) cream, Apply topically 2 (two) times daily., Disp: 30 g, Rfl: 1    omeprazole (PRILOSEC) 40 MG capsule, Take 1 capsule (40 mg total) by mouth every morning., Disp: 30 capsule, Rfl: 1    ondansetron (ZOFRAN-ODT) 4 MG TbDL, Dissolve 1 tablet (4 mg total) by mouth every 6 (six) hours as needed (for nausea)., Disp: 20 tablet, Rfl: 2    pimecrolimus (ELIDEL) 1 % cream, Apply to affected areas of underarm twice a day as needed for irritation. (Patient taking differently: Apply to affected areas of underarm twice a day as needed for irritation.), Disp: 30 g, Rfl: 3    progesterone (PROMETRIUM) 100 MG capsule, Take 1 capsule (100 mg total) by mouth nightly., Disp: 30 capsule, Rfl: 11    spironolactone (ALDACTONE) 50 MG tablet, Start with taking 1 tablet by mouth every day, may increase to 2 tablets by mouth every day as tolerated., Disp: 60 tablet, Rfl: 4    thiamine (VITAMIN B-1) 50 MG tablet, Take 50 mg by mouth once daily., Disp: , Rfl:     ubrogepant (UBRELVY) 50 mg tablet, Take 1 tab by mouth for headaches. May repeat once 2 hours after initial dose based on response and tolerability., Disp: 16 tablet, Rfl: 5    venlafaxine (EFFEXOR-XR) 150 MG Cp24, Take 1 capsule (150 mg total) by mouth once daily., Disp: 30 capsule, Rfl: 6    estradioL 1.25 mg/1.25 gram (0.1 %) GlPk, Place 1.25 mg onto the skin once daily. To the upper inner thigh, Disp: 28 packet, Rfl: 11    Review of Systems:  General: No fever, chills, or weight loss.  Chest: No chest pain, shortness of breath, or palpitations.  Breast: No pain, masses, or nipple discharge.  Vulva: No pain, lesions, or itching.  Vagina: No relaxation, itching, discharge, or lesions.  Abdomen: No pain, nausea, vomiting, diarrhea, or constipation.  Urinary: No incontinence, nocturia,  "frequency, or dysuria.  Extremities:  No leg cramps, edema, or calf pain.  Neurologic: No  dizziness, or visual changes.    Objective:     Vitals:    04/24/23 1018   Weight: (!) 139.8 kg (308 lb 3.3 oz)   Height: 5' 5" (1.651 m)   PainSc: 0-No pain     Body mass index is 51.29 kg/m².    PHYSICAL EXAM:  APPEARANCE: Well nourished, well developed, in no acute distress.  AFFECT: WNL, alert and oriented x 3      Assessment:      Mucinous tumor, of low malignant potential    Menopausal symptoms  -     estradioL 1.25 mg/1.25 gram (0.1 %) GlPk; Place 1.25 mg onto the skin once daily. To the upper inner thigh  Dispense: 28 packet; Refill: 11  -     Estradiol; Future; Expected date: 04/24/2023  -     Testosterone, free; Future; Expected date: 04/24/2023    Major depressive disorder, recurrent episode, in partial remission with anxious distress        Plan:   She is likely not absorbing the oral estradiol.  D/c estrace 2mg  Start Divigel 1.25mg QD to upper inner thigh.  Counseled on use  Continue Progesterone 100mg QHS and Mg Glycinate  Continue working with Dr. Mahan.  Follow up in 3 months with labs one week prior.    Instructed patient to call if she experiences any side effects or has any questions.    I spent a total of 25 minutes on the day of the visit.This includes face to face time and non-face to face time preparing to see the patient (eg, review of tests), obtaining and/or reviewing separately obtained history, documenting clinical information in the electronic or other health record, independently interpreting results and communicating results to the patient/family/caregiver, or care coordinator.      "

## 2023-04-24 NOTE — PROGRESS NOTES
INFORMED CONSENT: Patient was identified using two patient-identifiers. The patient has been informed of the risks and benefits associated with engaging in psychotherapy, the handling of protected health information, the rights of privacy and the limits of confidentiality. The patient has also been informed of the importance of reporting any suicidal or homicidal ideation to this or any provider to ensure safety of all parties, and the Fran Higgins expressed understanding. The patient was agreeable to these terms and freely participates in individual psychotherapy.    PSYCHO-ONCOLOGY NOTE/ Individual Psychotherapy     Date: 4/24/2023   Site:  [facility]        Therapeutic Intervention: Met with patient.  Outpatient - Insight oriented psychotherapy 45 min - CPT code 51859      Patient was last seen by me on 4/10/2023    Problem list  Patient Active Problem List   Diagnosis    Depression    Left ovarian cyst    S/P RA Laparoscopic LSO    Mucinous tumor, of low malignant potential    Pericardial cyst    Scoliosis of cervical spine    Abnormal uterine bleeding (AUB)    Vaginal yeast infection    DUB (dysfunctional uterine bleeding)    Migraine with aura, not intractable    s/p robotic hysterectomy/RSO 3/9/20    Borderline epithelial neoplasm of ovary    Surgical menopause    Yeast vaginitis    Menopause    Dizziness    Galactorrhea in female- bilateral breast    Other insomnia    Major depressive disorder, recurrent episode, in partial remission with anxious distress    Prediabetes    Obesity    S/P laparoscopic sleeve gastrectomy    BMI 45.0-49.9, adult    Hidradenitis    Seborrheic dermatitis       Chief complaint/reason for encounter: interpersonal   Met with patient to evaluate psychosocial adaptation to diagnosis/treatment/survivorship of trauma    Current Medications  Current Outpatient Medications   Medication    ALPRAZolam (XANAX) 0.5 MG tablet    b complex vitamins capsule     B3-azelaic-zinc-B6-copper-FA (NICAZEL) 600-5-10-5-1.5 mg Tab    CALCIUM CITRATE ORAL    clindamycin (CLEOCIN T) 1 % lotion    cyclobenzaprine (FLEXERIL) 5 MG tablet    doxycycline monohydrate 100 mg Tab    erythromycin with ethanoL (THERAMYCIN) 2 % external solution    estradioL (ESTRACE) 2 MG tablet    finasteride (PROSCAR) 5 mg tablet    fluconazole (DIFLUCAN) 200 MG Tab    fluocinolone acetonide oiL (DERMOTIC OIL) 0.01 % Drop    galcanezumab-gnlm (EMGALITY PEN) 120 mg/mL PnIj    HUMIRA,CF, PEN 40 mg/0.4 mL PnKt    HUMIRA,CF, PEN 80 mg/0.8 mL PnKt    HUMIRA,CF, PEN 80 mg/0.8 mL PnKt    ketoconazole (NIZORAL) 2 % cream    multivitamin capsule    nystatin-triamcinolone (MYCOLOG II) cream    omeprazole (PRILOSEC) 40 MG capsule    ondansetron (ZOFRAN-ODT) 4 MG TbDL    pimecrolimus (ELIDEL) 1 % cream    progesterone (PROMETRIUM) 100 MG capsule    spironolactone (ALDACTONE) 50 MG tablet    thiamine (VITAMIN B-1) 50 MG tablet    ubrogepant (UBRELVY) 50 mg tablet    venlafaxine (EFFEXOR-XR) 150 MG Cp24     No current facility-administered medications for this visit.       Objective:  Fran Higgins arrived promptly for the session.   The patient was fully cooperative throughout the session.  Appearance: age appropriate, appropriately  dressed, adequately  groomed  Behavior/Cooperation: friendly and cooperative  Speech: normal in rate, volume, and tone and appropriate quality, quantity and organization of sentences  Mood: anxious  Affect: mood congruent  Thought Process: goal-directed, logical  Thought Content: normal,  No delusions or paranoia; did not appear to be responding to internal stimuli during the session  Orientation: grossly intact  Memory: Grossly intact  Attention Span/Concentration: Attends to session without distraction; reports no difficulty  Fund of Knowledge: average  Estimate of Intelligence: average from verbal skills and history  Cognition: grossly intact  Insight: patient has awareness of  illness; good insight into own behavior and behavior of others  Judgment: the patient's behavior is adequate to circumstances    Interval history and content of current session: The patient did complete the  practice related to writing an Impact Statement describing the impact of his traumatic  experiences on his thoughts and beliefs about himself, others, and the world. We discussed  the assignment in session, with an emphasis on identifying stuck points in his thinking that  interfere with recovery. These stuck points were added to the log. The relationships amongst  thoughts, feelings, and behaviors were reviewed, and an example from his discussion about  the impact of his trauma on his life was used to illustrate the cognitive model.      Risk parameters:   Patient reports no suicidal ideation  Patient reports no homicidal ideation  Patient reports no self-injurious behavior  Patient reports no violent behavior   Safety needs:  None at this time      Verbal deficits: None     Patient's response to intervention:The patient's response to intervention is accepting.     Progress toward goals and other mental status changes:  The patient's progress toward goals is good.      Progress to date:Progress as Expected      Goals from last visit: Met     Patient reported outcomes:    Distress Thermometer:   Distress Score    Distress Score: 7        Practical Problems Physical Problems                                                   Family Problems                                         Emotional Problems                                                         Spiritual/Religions Concerns     Spiritual / Mandaen Concerns: No         Other Problems                PHQ-9=    JOSE MARIA-7=    PHQ ANSWERS    Q1. Little interest or pleasure in doing things: (P) Several days (04/24/23 0854)  Q2. Feeling down, depressed, or hopeless: (P) Not at all (04/24/23 0854)  Q3. Trouble falling or staying asleep, or sleeping too much: (P) More  than half the days (04/24/23 0854)  Q4. Feeling tired or having little energy: (P) More than half the days (04/24/23 0854)  Q5. Poor appetite or overeating: (P) More than half the days (04/24/23 0854)  Q6. Feeling bad about yourself - or that you are a failure or have let yourself or your family down: (P) Not at all (04/24/23 0854)  Q7. Trouble concentrating on things, such as reading the newspaper or watching television: (P) More than half the days (04/24/23 0854)  Q8. Moving or speaking so slowly that other people could have noticed. Or the opposite - being so fidgety or restless that you have been moving around a lot more than usual: (P) Not at all (04/24/23 0854)  Q9.      PHQ8 Score : (P) 9 (04/24/23 0854)  PHQ-9 Total Score: (P) 9 (04/24/23 0854)     JOSE MARIA-7 Answers    GAD7 4/24/2023   1. Feeling nervous, anxious, or on edge? 1   2. Not being able to stop or control worrying? 1   3. Worrying too much about different things? 1   4. Trouble relaxing? 1   5. Being so restless that it is hard to sit still? 1   6. Becoming easily annoyed or irritable? 1   7. Feeling afraid as if something awful might happen? 1   JOSE MARIA-7 Score 7   Some encounter information is confidential and restricted. Go to Review Flowsheets activity to see all data.     JOSE MARIA-7 Score: (P) 7  Interpretation: (P) Mild Anxiety     Client Strengths: verbal, intelligent, successful, good social support, good insight, commitment to wellness, strong kerri, strong cultural traditions     Diagnosis:     ICD-10-CM ICD-9-CM   1. Trauma and stressor-related disorder  F43.9 309.81     308.9   2. Borderline epithelial neoplasm of ovary  D39.10 236.2       Treatment Plan:individual psychotherapy and medication management by physician  Target symptoms:  trauma  Why chosen therapy is appropriate versus another modality: relevant to diagnosis, patient responds to this modality, evidence based practice  Outcome monitoring methods: self-report, observation,  checklist/rating scale  Therapeutic intervention type: insight oriented psychotherapy, behavior modifying psychotherapy  Prognosis: Good      Behavioral goals:   The patient  agreed to complete A-B-C Worksheets daily to monitor his thoughts, feelings, and behaviors  until the next session.    Return to clinic: 1 week    Next Session:      Length of Service (minutes direct face-to-face contact): 45    Brittani Mahan, PhD  Clinical Psychologist  [place] License #[id number]  [place] License #[id number]

## 2023-05-01 ENCOUNTER — OFFICE VISIT (OUTPATIENT)
Dept: PSYCHIATRY | Facility: CLINIC | Age: 43
End: 2023-05-01
Payer: COMMERCIAL

## 2023-05-01 ENCOUNTER — PATIENT MESSAGE (OUTPATIENT)
Dept: PSYCHIATRY | Facility: CLINIC | Age: 43
End: 2023-05-01
Payer: COMMERCIAL

## 2023-05-01 DIAGNOSIS — D39.10 BORDERLINE EPITHELIAL NEOPLASM OF OVARY: ICD-10-CM

## 2023-05-01 DIAGNOSIS — F43.9 TRAUMA AND STRESSOR-RELATED DISORDER: Primary | ICD-10-CM

## 2023-05-01 PROCEDURE — 90837 PSYTX W PT 60 MINUTES: CPT | Mod: S$GLB,,, | Performed by: PSYCHOLOGIST

## 2023-05-01 PROCEDURE — 99999 PR PBB SHADOW E&M-EST. PATIENT-LVL II: ICD-10-PCS | Mod: PBBFAC,,, | Performed by: PSYCHOLOGIST

## 2023-05-01 PROCEDURE — 90837 PR PSYCHOTHERAPY W/PATIENT, 60 MIN: ICD-10-PCS | Mod: S$GLB,,, | Performed by: PSYCHOLOGIST

## 2023-05-01 PROCEDURE — 1159F MED LIST DOCD IN RCRD: CPT | Mod: CPTII,S$GLB,, | Performed by: PSYCHOLOGIST

## 2023-05-01 PROCEDURE — 1159F PR MEDICATION LIST DOCUMENTED IN MEDICAL RECORD: ICD-10-PCS | Mod: CPTII,S$GLB,, | Performed by: PSYCHOLOGIST

## 2023-05-01 PROCEDURE — 99999 PR PBB SHADOW E&M-EST. PATIENT-LVL II: CPT | Mod: PBBFAC,,, | Performed by: PSYCHOLOGIST

## 2023-05-01 NOTE — PROGRESS NOTES
INFORMED CONSENT: Patient was identified using two patient-identifiers. The patient has been informed of the risks and benefits associated with engaging in psychotherapy, the handling of protected health information, the rights of privacy and the limits of confidentiality. The patient has also been informed of the importance of reporting any suicidal or homicidal ideation to this or any provider to ensure safety of all parties, and the Fran Higgins expressed understanding. The patient was agreeable to these terms and freely participates in individual psychotherapy.    PSYCHO-ONCOLOGY NOTE/ Individual Psychotherapy     Date: 5/1/2023   [facility]:  [facility]        Therapeutic Intervention: Met with patient.  Outpatient - Insight oriented psychotherapy 60 min - CPT code 09565      Patient was last seen by me on 4/24/2023    Problem list  Patient Active Problem List   Diagnosis    Depression    Left ovarian cyst    S/P RA Laparoscopic LSO    Mucinous tumor, of low malignant potential    Pericardial cyst    Scoliosis of cervical spine    Abnormal uterine bleeding (AUB)    Vaginal yeast infection    DUB (dysfunctional uterine bleeding)    Migraine with aura, not intractable    s/p robotic hysterectomy/RSO 3/9/20    Borderline epithelial neoplasm of ovary    Surgical menopause    Yeast vaginitis    Menopause    Dizziness    Galactorrhea in female- bilateral breast    Other insomnia    Major depressive disorder, recurrent episode, in partial remission with anxious distress    Prediabetes    Obesity    S/P laparoscopic sleeve gastrectomy    BMI 45.0-49.9, adult    Hidradenitis    Seborrheic dermatitis       Chief complaint/reason for encounter: depression and anxiety   Met with patient to evaluate psychosocial adaptation to diagnosis/treatment/survivorship of trauma    Current Medications  Current Outpatient Medications   Medication    ALPRAZolam (XANAX) 0.5 MG tablet    b complex vitamins capsule     B3-azelaic-zinc-B6-copper-FA (NICAZEL) 600-5-10-5-1.5 mg Tab    CALCIUM CITRATE ORAL    clindamycin (CLEOCIN T) 1 % lotion    cyclobenzaprine (FLEXERIL) 5 MG tablet    doxycycline monohydrate 100 mg Tab    erythromycin with ethanoL (THERAMYCIN) 2 % external solution    estradioL 1.25 mg/1.25 gram (0.1 %) GlPk    finasteride (PROSCAR) 5 mg tablet    fluconazole (DIFLUCAN) 200 MG Tab    fluocinolone acetonide oiL (DERMOTIC OIL) 0.01 % Drop    galcanezumab-gnlm (EMGALITY PEN) 120 mg/mL PnIj    HUMIRA,CF, PEN 40 mg/0.4 mL PnKt    HUMIRA,CF, PEN 80 mg/0.8 mL PnKt    HUMIRA,CF, PEN 80 mg/0.8 mL PnKt    ketoconazole (NIZORAL) 2 % cream    multivitamin capsule    nystatin-triamcinolone (MYCOLOG II) cream    omeprazole (PRILOSEC) 40 MG capsule    ondansetron (ZOFRAN-ODT) 4 MG TbDL    pimecrolimus (ELIDEL) 1 % cream    progesterone (PROMETRIUM) 100 MG capsule    spironolactone (ALDACTONE) 50 MG tablet    thiamine (VITAMIN B-1) 50 MG tablet    ubrogepant (UBRELVY) 50 mg tablet    venlafaxine (EFFEXOR-XR) 150 MG Cp24     No current facility-administered medications for this visit.       Objective:  Fran Higgins arrived promptly for the session.  The patient was fully cooperative throughout the session.  Appearance: age appropriate, appropriately  dressed, adequately  groomed  Behavior/Cooperation: friendly and cooperative  Speech: normal in rate, volume, and tone and appropriate quality, quantity and organization of sentences  Mood: euthymic  Affect: mood congruent  Thought Process: goal-directed, logical  Thought Content: normal,  No delusions or paranoia; did not appear to be responding to internal stimuli during the session  Orientation: grossly intact  Attention Span/Concentration: Attends to session without distraction; reports no difficulty  Insight: patient has awareness of illness; good insight into own behavior and behavior of others  Judgment: the patient's behavior is adequate to circumstances    Interval  history and content of current session: This was the third session of CPT for PTSD. She did complete A-B-C Worksheets daily, identifying his thoughts, feelings, and behaviors. These worksheets were used to further illustrate the relationships among thoughts, feelings, and behaviors to daily events. Additional stuck points were added to the log. Some initial challenging of dysfunctional thoughts was introduced.     Risk parameters:   Patient reports no suicidal ideation  Patient reports no homicidal ideation  Patient reports no self-injurious behavior  Patient reports no violent behavior   Safety needs:  None at this time      Verbal deficits: None     Patient's response to intervention:The patient's response to intervention is accepting.     Progress toward goals and other mental status changes:  The patient's progress toward goals is good.      Progress to date:Progress as Expected      Goals from last visit: Met     Patient reported outcomes:    Distress Thermometer:   Distress Score    Distress Score: 7        Practical Problems Physical Problems                                                   Family Problems                                         Emotional Problems                                                         Spiritual/Religions Concerns     Spiritual / Episcopal Concerns: No         Other Problems                PHQ-9=    JOSE MARIA-7=    PHQ ANSWERS    Q1. Little interest or pleasure in doing things: (P) Several days (05/01/23 0940)  Q2. Feeling down, depressed, or hopeless: (P) Not at all (05/01/23 0940)  Q3. Trouble falling or staying asleep, or sleeping too much: (P) Several days (05/01/23 0940)  Q4. Feeling tired or having little energy: (P) Several days (05/01/23 0940)  Q5. Poor appetite or overeating: (P) Several days (05/01/23 0940)  Q6. Feeling bad about yourself - or that you are a failure or have let yourself or your family down: (P) Not at all (05/01/23 0940)  Q7. Trouble concentrating on  things, such as reading the newspaper or watching television: (P) Several days (05/01/23 0940)  Q8. Moving or speaking so slowly that other people could have noticed. Or the opposite - being so fidgety or restless that you have been moving around a lot more than usual: (P) Not at all (05/01/23 0940)  Q9.  0    PHQ8 Score : (P) 5 (05/01/23 0940)  PHQ-9 Total Score: (P) 5 (05/01/23 0940)     JOSE MARIA-7 Answers    GAD7 5/1/2023   1. Feeling nervous, anxious, or on edge? 1   2. Not being able to stop or control worrying? 1   3. Worrying too much about different things? 1   4. Trouble relaxing? 1   5. Being so restless that it is hard to sit still? 1   6. Becoming easily annoyed or irritable? 1   7. Feeling afraid as if something awful might happen? 1   JOSE MARIA-7 Score 7   Some encounter information is confidential and restricted. Go to Review Flowsheets activity to see all data.     JOSE MARIA-7 Score: (P) 7  Interpretation: (P) Mild Anxiety     Client Strengths: verbal, intelligent, successful, good social support, good insight, commitment to wellness, strong kerri, strong cultural traditions     Diagnosis:     ICD-10-CM ICD-9-CM   1. Trauma and stressor-related disorder  F43.9 309.81     308.9   2. Borderline epithelial neoplasm of ovary  D39.10 236.2       Treatment Plan:individual psychotherapy and medication management by physician  Target symptoms:  trauma  Why chosen therapy is appropriate versus another modality: relevant to diagnosis, patient responds to this modality, evidence based practice  Outcome monitoring methods: self-report, observation, checklist/rating scale  Therapeutic intervention type: insight oriented psychotherapy, behavior modifying psychotherapy  Prognosis: Good      Behavioral goals:    Exercise:   Stress management: Trauma account and continue ABC worksheets   Social engagement:   Nutrition:   Smoking Cessation:   Therapy:      Return to clinic: 1 week    Next Session:      Length of Service (minutes  direct face-to-face contact): 60    Brittani Mahan, PhD  Clinical Psychologist  [place] License #[id number]  [place] License #[id number]

## 2023-05-03 ENCOUNTER — PATIENT MESSAGE (OUTPATIENT)
Dept: BARIATRICS | Facility: CLINIC | Age: 43
End: 2023-05-03
Payer: COMMERCIAL

## 2023-05-08 ENCOUNTER — PATIENT MESSAGE (OUTPATIENT)
Dept: PSYCHIATRY | Facility: CLINIC | Age: 43
End: 2023-05-08
Payer: COMMERCIAL

## 2023-05-09 ENCOUNTER — PATIENT MESSAGE (OUTPATIENT)
Dept: BARIATRICS | Facility: CLINIC | Age: 43
End: 2023-05-09
Payer: COMMERCIAL

## 2023-05-12 ENCOUNTER — SPECIALTY PHARMACY (OUTPATIENT)
Dept: PHARMACY | Facility: CLINIC | Age: 43
End: 2023-05-12
Payer: COMMERCIAL

## 2023-05-12 NOTE — TELEPHONE ENCOUNTER
Specialty Pharmacy - Refill Coordination    Specialty Medication Orders Linked to Encounter      Flowsheet Row Most Recent Value   Medication #1 HUMIRA,CF, PEN 40 mg/0.4 mL PnKt (Order#[id number], Rx#[id number])            Refill Questions - Documented Responses      Flowsheet Row Most Recent Value   Refill Screening Questions    Changes to allergies? No   Changes to medications? No   New conditions since last clinic visit? No   Unplanned office visit, urgent care, ED, or hospital admission in the last 4 weeks? No   How does patient/caregiver feel medication is working? Good   Financial problems or insurance changes? No   How many doses of your specialty medications were missed in the last 4 weeks? 0   Would patient like to speak to a pharmacist? No   When does the patient need to receive the medication? 05/18/23   Refill Delivery Questions    How will the patient receive the medication? [facility]   When does the patient need to receive the medication? 05/18/23   Shipping Address Home   Address in [facility] confirmed and updated if neccessary? Yes   Expected Copay ($) 5   Is the patient able to afford the medication copay? Yes   Payment Method CC on file   Days supply of Refill 28   Supplies needed? No supplies needed   Refill activity completed? Yes   Refill activity plan Refill scheduled   Shipment/Pickup Date: 05/12/23            Current Outpatient Medications   Medication Sig    ALPRAZolam (XANAX) 0.5 MG tablet Take 1 tablet (0.5 mg total) by mouth nightly as needed.    b complex vitamins capsule Take 1 capsule by mouth once daily.    B3-azelaic-zinc-B6-copper-FA (NICAZEL) 600-5-10-5-1.5 mg Tab take 1 tablet by mouth twice daily    CALCIUM CITRATE ORAL Take 1 tablet by mouth 3 (three) times daily. chewable    clindamycin (CLEOCIN T) 1 % lotion Apply to affected area two times a day.    cyclobenzaprine (FLEXERIL) 5 MG tablet Take 1 tablet (5 mg total) by mouth 2 (two) times daily as needed for Muscle spasms.     doxycycline monohydrate 100 mg Tab Take 1 tablet (100 mg total) by mouth 2 (two) times daily for 10 days when flaring    erythromycin with ethanoL (THERAMYCIN) 2 % external solution Apply topically 2 (two) times daily.    estradioL 1.25 mg/1.25 gram (0.1 %) GlPk Place 1.25 mg onto the skin once daily. To the upper inner thigh    finasteride (PROSCAR) 5 mg tablet Take 1 tablet (5 mg total) by mouth once daily.    fluconazole (DIFLUCAN) 200 MG Tab Take 1 tablet by mouth once if not better take another pill in 3 days    fluocinolone acetonide oiL (DERMOTIC OIL) 0.01 % Drop Use to affected ears at night, drop in canal and use to ear when flaring    galcanezumab-gnlm (EMGALITY PEN) 120 mg/mL PnIj Inject 120 mg into the skin every 30 days.    HUMIRA,CF, PEN 40 mg/0.4 mL PnKt Inject 40mg (1 pen) into the skin every 7 days    HUMIRA,CF, PEN 80 mg/0.8 mL PnKt Starting day 29, inject 80 mg (1 pen) into the skin every 2 weeks.    HUMIRA,CF, PEN 80 mg/0.8 mL PnKt Starting day 29, inject 80 mg (1 pen) into the skin every 2 weeks.    ketoconazole (NIZORAL) 2 % cream Apply to affected area two times a day (Patient taking differently: Apply to affected area two times a day)    multivitamin capsule Take by mouth once daily.    nystatin-triamcinolone (MYCOLOG II) cream Apply topically 2 (two) times daily.    omeprazole (PRILOSEC) 40 MG capsule Take 1 capsule (40 mg total) by mouth every morning.    ondansetron (ZOFRAN-ODT) 4 MG TbDL Dissolve 1 tablet (4 mg total) by mouth every 6 (six) hours as needed (for nausea).    pimecrolimus (ELIDEL) 1 % cream Apply to affected areas of underarm twice a day as needed for irritation. (Patient taking differently: Apply to affected areas of underarm twice a day as needed for irritation.)    progesterone (PROMETRIUM) 100 MG capsule Take 1 capsule (100 mg total) by mouth nightly.    spironolactone (ALDACTONE) 50 MG tablet Start with taking 1 tablet by mouth every day, may increase to 2 tablets by  mouth every day as tolerated.    thiamine (VITAMIN B-1) 50 MG tablet Take 50 mg by mouth once daily.    ubrogepant (UBRELVY) 50 mg tablet Take 1 tab by mouth for headaches. May repeat once 2 hours after initial dose based on response and tolerability.    venlafaxine (EFFEXOR-XR) 150 MG Cp24 Take 1 capsule (150 mg total) by mouth once daily.   Last reviewed on 5/1/2023  9:59 AM by Brittani Mahan, PhD    Review of patient's allergies indicates:  No Known Allergies Last reviewed on  5/1/2023 9:59 AM by Brittani Mahan      Tasks added this encounter   No tasks added.   Tasks due within next 3 months   5/12/2023 - Refill Coordination Outreach (1 time occurrence)     [facility]  [address] [place] [id number]  Phone: [phone]  Fax: [phone]

## 2023-05-17 ENCOUNTER — TELEPHONE (OUTPATIENT)
Dept: INFUSION THERAPY | Facility: HOSPITAL | Age: 43
End: 2023-05-17
Payer: COMMERCIAL

## 2023-05-19 ENCOUNTER — OFFICE VISIT (OUTPATIENT)
Dept: PSYCHIATRY | Facility: CLINIC | Age: 43
End: 2023-05-19
Payer: COMMERCIAL

## 2023-05-19 DIAGNOSIS — F43.9 TRAUMA AND STRESSOR-RELATED DISORDER: Primary | ICD-10-CM

## 2023-05-19 DIAGNOSIS — D39.10 BORDERLINE EPITHELIAL NEOPLASM OF OVARY: ICD-10-CM

## 2023-05-19 PROCEDURE — 99999 PR PBB SHADOW E&M-EST. PATIENT-LVL II: ICD-10-PCS | Mod: PBBFAC,,, | Performed by: PSYCHOLOGIST

## 2023-05-19 PROCEDURE — 90832 PSYTX W PT 30 MINUTES: CPT | Mod: S$GLB,,, | Performed by: PSYCHOLOGIST

## 2023-05-19 PROCEDURE — 99999 PR PBB SHADOW E&M-EST. PATIENT-LVL II: CPT | Mod: PBBFAC,,, | Performed by: PSYCHOLOGIST

## 2023-05-19 PROCEDURE — 1159F MED LIST DOCD IN RCRD: CPT | Mod: CPTII,S$GLB,, | Performed by: PSYCHOLOGIST

## 2023-05-19 PROCEDURE — 90832 PR PSYCHOTHERAPY W/PATIENT, 30 MIN: ICD-10-PCS | Mod: S$GLB,,, | Performed by: PSYCHOLOGIST

## 2023-05-19 PROCEDURE — 1159F PR MEDICATION LIST DOCUMENTED IN MEDICAL RECORD: ICD-10-PCS | Mod: CPTII,S$GLB,, | Performed by: PSYCHOLOGIST

## 2023-05-25 ENCOUNTER — PROCEDURE VISIT (OUTPATIENT)
Dept: NEUROLOGY | Facility: CLINIC | Age: 43
End: 2023-05-25
Payer: COMMERCIAL

## 2023-05-25 VITALS
WEIGHT: 293 LBS | SYSTOLIC BLOOD PRESSURE: 122 MMHG | HEART RATE: 82 BPM | BODY MASS INDEX: 51.36 KG/M2 | DIASTOLIC BLOOD PRESSURE: 74 MMHG

## 2023-05-25 DIAGNOSIS — G43.E09 CHRONIC MIGRAINE WITH AURA: Primary | ICD-10-CM

## 2023-05-25 PROCEDURE — 64615 CHEMODENERV MUSC MIGRAINE: CPT | Mod: S$GLB,,, | Performed by: PHYSICIAN ASSISTANT

## 2023-05-25 PROCEDURE — 64615 PR CHEMODENERVATION OF MUSCLE FOR CHRONIC MIGRAINE: ICD-10-PCS | Mod: S$GLB,,, | Performed by: PHYSICIAN ASSISTANT

## 2023-05-25 NOTE — TELEPHONE ENCOUNTER
Having abnormal uterine bleeding on Micronor.  She is not sexually active in the past 8 months.    Will send in a prescription for estradiol 0.5 mg daily for 14 days.    She has a follow-up appointment with me for follow-up of mucinous tumor of low malignant potential.   [facility] Pregnancy And Lactation Text: There is insufficient data to evaluate drug-associated risk for major birth defects, miscarriage, or other adverse maternal or fetal outcomes.  There is a pregnancy registry that monitors pregnancy outcomes in pregnant persons exposed to the medication during pregnancy.  It is unknown if this medication is excreted in breast milk.  Do not breastfeed during treatment and for about 4 weeks after the last dose.

## 2023-05-25 NOTE — PROCEDURES
PROCEDURE NOTE:  BOTOX was performed as an indicated therapy for intractable chronic migraine headaches given that the patient failed more than 2 headache medications     A time out was conducted just before the start of the procedure to verify the correct patient and procedure, procedure location, and all relevant critical information.      Botulinum Toxin Injection Procedure      PROCEDURE PERFORMED: Botulinum toxin injection (68089)  CLINICAL INDICATION: Chronic Migraines  After risks and benefits were explained including bleeding, infection, worsening of pain, damage to the areas being injected, weakness of muscles, loss of muscle control, dysphagia if injecting the head or neck, facial droop if injecting the facial area, painful injection, allergic or other reaction to the medications being injected, and the failure of the procedure to help the problem, a signed consent was obtained.   The patient was placed in a comfortable area and the sites to be treated were identified.The area to be treated was prepped three times with alcohol and the alcohol allowed to dry. Next, a 30 gauge needle was used to inject the medication in the area to be treated.      Total Botox used: 155 Units   Unavoidable waste: 45 Units      Injection sites:    muscle bilaterally ( a total of 10 units divided into 2 sites)   Procerus muscle (5 units)   Frontalis muscle bilaterally (a total of 20 units divided into 4 sites)   Temporalis muscle bilaterally (a total of 40 units divided into 8 sites)   Occipitalis muscle bilaterally (a total of 30 units divided into 6 sites)   Cervical paraspinal muscles (a total of 20 units divided into 4 sites)   Trapezius muscle bilaterally (a total of 30 units divided into 6 sites)   Complications: none   RTC for the next Botox injection: 12 weeks     Problem List Items Addressed This Visit    None  Visit Diagnoses       Chronic migraine with aura    -  Primary    Relevant Medications     onabotulinumtoxina injection 200 Units (Completed) (Start on 5/25/2023 10:15 AM)              Marina Geronimo PA-C  [facility] Department of Neurology   [phone]

## 2023-06-01 ENCOUNTER — E-VISIT (OUTPATIENT)
Dept: INTERNAL MEDICINE | Facility: CLINIC | Age: 43
End: 2023-06-01
Payer: COMMERCIAL

## 2023-06-01 DIAGNOSIS — N39.0 URINARY TRACT INFECTION WITHOUT HEMATURIA, SITE UNSPECIFIED: Primary | ICD-10-CM

## 2023-06-01 PROCEDURE — 99421 OL DIG E/M SVC 5-10 MIN: CPT | Mod: ,,, | Performed by: HOSPITALIST

## 2023-06-01 PROCEDURE — 99421 PR E&M, ONLINE DIGIT, EST, < 7 DAYS, 5-10 MINS: ICD-10-PCS | Mod: ,,, | Performed by: HOSPITALIST

## 2023-06-01 RX ORDER — NITROFURANTOIN 25; 75 MG/1; MG/1
100 CAPSULE ORAL 2 TIMES DAILY
Qty: 14 CAPSULE | Refills: 0 | Status: SHIPPED | OUTPATIENT
Start: 2023-06-01

## 2023-06-01 NOTE — PROGRESS NOTES
Patient ID: Fran Higgins is a 42 y.o. female.    Chief Complaint: Dysuria    The patient initiated a request through [employer] [facility]) on 6/1/2023 for evaluation and management with a chief complaint of Dysuria     I evaluated the questionnaire submission on 6/1/23.    Ohs Peq Evisit Uti Questionnaire    6/1/2023 12:53 PM CDT - Filed by Patient   Do you agree to participate in an E-Visit? Yes   If you have any of the following problems, please present to your local ER or call 911:  I acknowledge   What is the main issue that you would like for your doctor to address today? Bladder pain/ bladder spasms   Are you able to take your vital signs? No   Are you currently pregnant, could you be pregnant, or are you breast feeding? None of the above   What symptoms do you currently have? Difficulty passing urine;  Change in urine appearance or smell   When did your symptoms first appear? 6/1/2023   List what you have done or taken to help your symptoms. Bladder spasmed on the way to the restroom and i could not hold urine in. No pain when urinating and does not appear different (color/smell) but bladder feels inflamed and still spasming   Please indicate whether you have had the following symptoms during the past 24 hours     Urgent urination (a sudden and uncontrollable urge to urinate) Moderate   Frequent urination of small amounts of urine (going to the toilet very often) None   Burning pain when urinating None   Incomplete bladder emptying (still feel like you need to urinate again after urination) None   Pain not associated with urination in the lower abdomen below the belly button) Moderate   What does your urine look like? Clear   Blood seen in the urine None   Flank pain (pain in one or both sides of the lower back) None   Abnormal Vaginal Discharge (abnormal amount, color and/or odor) None   Discharge from the urethra (urinary opening) without urination None   High body temperature/fever? None-<99.5   Please rate  how much discomfort you have experience because of the symptoms in the past 24 hours: Moderate   Please indicate how the symptoms have interfered with your every day activities/work in the past 24 hours: Moderate   Please indicate how these symptoms have interfered with your social activities (visiting people, meeting with friends, etc.) in the past 24 hours? Moderate   Are you a diabetic? No   Please indicate whether you have the following at the time of completion of this questionnaire: None of the above   Provide any information you feel is important to your history not asked above    Please attach any relevant images or files (if you have performed a home test for UTI, please submit a photo of results)          Recent Labs Obtained:  No visits with results within 7 Day(s) from this visit.   Latest known visit with results is:   Lab Visit on 04/13/2023   Component Date Value Ref Range Status    Total Protein 04/13/2023 7.3  6.0 - 8.4 g/dL Final    Albumin 04/13/2023 4.2  3.5 - 5.2 g/dL Final    Total Bilirubin 04/13/2023 0.3  0.1 - 1.0 mg/dL Final    Comment: For infants and newborns, interpretation of results should be based  on gestational age, weight and in agreement with clinical  observations.    Premature Infant recommended reference ranges:  Up to 24 hours.............<8.0 mg/dL  Up to 48 hours............<12.0 mg/dL  3-5 days..................<15.0 mg/dL  6-29 days.................<15.0 mg/dL      Bilirubin, Direct 04/13/2023 0.1  0.1 - 0.3 mg/dL Final    AST 04/13/2023 19  10 - 40 U/L Final    ALT 04/13/2023 19  10 - 44 U/L Final    Alkaline Phosphatase 04/13/2023 70  55 - 135 U/L Final    WBC 04/13/2023 6.43  3.90 - 12.70 K/uL Final    RBC 04/13/2023 4.67  4.00 - 5.40 M/uL Final    Hemoglobin 04/13/2023 13.0  12.0 - 16.0 g/dL Final    Hematocrit 04/13/2023 41.0  37.0 - 48.5 % Final    MCV 04/13/2023 88  82 - 98 fL Final    MCH 04/13/2023 27.8  27.0 - 31.0 pg Final    MCHC 04/13/2023 31.7 (L)  32.0 -  36.0 g/dL Final    RDW 04/13/2023 12.4  11.5 - 14.5 % Final    Platelets 04/13/2023 200  150 - 450 K/uL Final    MPV 04/13/2023 11.0  9.2 - 12.9 fL Final    Immature Granulocytes 04/13/2023 0.3  0.0 - 0.5 % Final    Gran # (ANC) 04/13/2023 3.3  1.8 - 7.7 K/uL Final    Immature Grans (Abs) 04/13/2023 0.02  0.00 - 0.04 K/uL Final    Comment: Mild elevation in immature granulocytes is non specific and   can be seen in a variety of conditions including stress response,   acute inflammation, trauma and pregnancy. Correlation with other   laboratory and clinical findings is essential.      Lymph # 04/13/2023 2.5  1.0 - 4.8 K/uL Final    Mono # 04/13/2023 0.5  0.3 - 1.0 K/uL Final    Eos # 04/13/2023 0.1  0.0 - 0.5 K/uL Final    Baso # 04/13/2023 0.02  0.00 - 0.20 K/uL Final    nRBC 04/13/2023 0  0 /100 WBC Final    Gran % 04/13/2023 51.9  38.0 - 73.0 % Final    Lymph % 04/13/2023 38.3  18.0 - 48.0 % Final    Mono % 04/13/2023 8.1  4.0 - 15.0 % Final    Eosinophil % 04/13/2023 1.1  0.0 - 8.0 % Final    Basophil % 04/13/2023 0.3  0.0 - 1.9 % Final    Differential Method 04/13/2023 Automated   Final       Encounter Diagnosis   Name Primary?    Urinary tract infection without hematuria, site unspecified Yes        Orders Placed This Encounter   Procedures    Urine culture     Standing Status:   Future     Standing Expiration Date:   7/30/2024    Urinalysis     Standing Status:   Future     Standing Expiration Date:   8/1/2024     Order Specific Question:   Collection Type     Answer:   Urine, Clean Catch      Medications Ordered This Encounter   Medications    nitrofurantoin, macrocrystal-monohydrate, (MACROBID) 100 MG capsule     Sig: Take 1 capsule (100 mg total) by mouth 2 (two) times daily.     Dispense:  14 capsule     Refill:  0        No follow-ups on file.      E-Visit Time Tracking:    Day 1 Time (in minutes): 5     Total Time (in minutes): 5

## 2023-06-02 ENCOUNTER — PATIENT MESSAGE (OUTPATIENT)
Dept: PHARMACY | Facility: CLINIC | Age: 43
End: 2023-06-02
Payer: COMMERCIAL

## 2023-06-05 ENCOUNTER — OFFICE VISIT (OUTPATIENT)
Dept: PSYCHIATRY | Facility: CLINIC | Age: 43
End: 2023-06-05
Payer: COMMERCIAL

## 2023-06-05 DIAGNOSIS — F43.9 TRAUMA AND STRESSOR-RELATED DISORDER: Primary | ICD-10-CM

## 2023-06-05 DIAGNOSIS — D39.10 BORDERLINE EPITHELIAL NEOPLASM OF OVARY: ICD-10-CM

## 2023-06-05 PROCEDURE — 1159F MED LIST DOCD IN RCRD: CPT | Mod: CPTII,S$GLB,, | Performed by: PSYCHOLOGIST

## 2023-06-05 PROCEDURE — 99999 PR PBB SHADOW E&M-EST. PATIENT-LVL II: CPT | Mod: PBBFAC,,, | Performed by: PSYCHOLOGIST

## 2023-06-05 PROCEDURE — 1159F PR MEDICATION LIST DOCUMENTED IN MEDICAL RECORD: ICD-10-PCS | Mod: CPTII,S$GLB,, | Performed by: PSYCHOLOGIST

## 2023-06-05 PROCEDURE — 90834 PSYTX W PT 45 MINUTES: CPT | Mod: S$GLB,,, | Performed by: PSYCHOLOGIST

## 2023-06-05 PROCEDURE — 99999 PR PBB SHADOW E&M-EST. PATIENT-LVL II: ICD-10-PCS | Mod: PBBFAC,,, | Performed by: PSYCHOLOGIST

## 2023-06-05 PROCEDURE — 90834 PR PSYCHOTHERAPY W/PATIENT, 45 MIN: ICD-10-PCS | Mod: S$GLB,,, | Performed by: PSYCHOLOGIST

## 2023-06-05 NOTE — PROGRESS NOTES
INFORMED CONSENT: Patient was identified using two patient-identifiers. The patient has been informed of the risks and benefits associated with engaging in psychotherapy, the handling of protected health information, the rights of privacy and the limits of confidentiality. The patient has also been informed of the importance of reporting any suicidal or homicidal ideation to this or any provider to ensure safety of all parties, and the Fran Higgins expressed understanding. The patient was agreeable to these terms and freely participates in individual psychotherapy.    PSYCHO-ONCOLOGY NOTE/ Individual Psychotherapy     Date: 6/5/2023   Site:  [facility]        Therapeutic Intervention: Met with patient.  Outpatient - Insight oriented psychotherapy 45 min - CPT code 36091      Patient was last seen by me on 5/19/2023    Problem list  Patient Active Problem List   Diagnosis    Depression    Left ovarian cyst    S/P RA Laparoscopic LSO    Mucinous tumor, of low malignant potential    Pericardial cyst    Scoliosis of cervical spine    Abnormal uterine bleeding (AUB)    Vaginal yeast infection    DUB (dysfunctional uterine bleeding)    Migraine with aura, not intractable    s/p robotic hysterectomy/RSO 3/9/20    Borderline epithelial neoplasm of ovary    Surgical menopause    Yeast vaginitis    Menopause    Dizziness    Galactorrhea in female- bilateral breast    Other insomnia    Major depressive disorder, recurrent episode, in partial remission with anxious distress    Prediabetes    Obesity    S/P laparoscopic sleeve gastrectomy    BMI 45.0-49.9, adult    Hidradenitis    Seborrheic dermatitis       Chief complaint/reason for encounter: personal trauma   Met with patient to evaluate psychosocial adaptation to diagnosis/treatment/survivorship    Current Medications  Current Outpatient Medications   Medication    ALPRAZolam (XANAX) 0.5 MG tablet    b complex vitamins capsule    B3-azelaic-zinc-B6-copper-FA  (NICAZEL) 600-5-10-5-1.5 mg Tab    CALCIUM CITRATE ORAL    clindamycin (CLEOCIN T) 1 % lotion    cyclobenzaprine (FLEXERIL) 5 MG tablet    doxycycline monohydrate 100 mg Tab    erythromycin with ethanoL (THERAMYCIN) 2 % external solution    estradioL 1.25 mg/1.25 gram (0.1 %) GlPk    finasteride (PROSCAR) 5 mg tablet    fluconazole (DIFLUCAN) 200 MG Tab    fluocinolone acetonide oiL (DERMOTIC OIL) 0.01 % Drop    galcanezumab-gnlm (EMGALITY PEN) 120 mg/mL PnIj    HUMIRA,CF, PEN 40 mg/0.4 mL PnKt    HUMIRA,CF, PEN 80 mg/0.8 mL PnKt    HUMIRA,CF, PEN 80 mg/0.8 mL PnKt    ketoconazole (NIZORAL) 2 % cream    multivitamin capsule    nitrofurantoin, macrocrystal-monohydrate, (MACROBID) 100 MG capsule    nystatin-triamcinolone (MYCOLOG II) cream    omeprazole (PRILOSEC) 40 MG capsule    ondansetron (ZOFRAN-ODT) 4 MG TbDL    pimecrolimus (ELIDEL) 1 % cream    progesterone (PROMETRIUM) 100 MG capsule    spironolactone (ALDACTONE) 50 MG tablet    thiamine (VITAMIN B-1) 50 MG tablet    ubrogepant (UBRELVY) 50 mg tablet    venlafaxine (EFFEXOR-XR) 150 MG Cp24     No current facility-administered medications for this visit.       Objective:  Fran Spencer Gisela arrived latefor the session.     The patient was fully cooperative throughout the session.  Appearance: age appropriate, appropriately  dressed, adequately  groomed  Behavior/Cooperation: friendly and cooperative  Speech: normal in rate, volume, and tone and appropriate quality, quantity and organization of sentences  Mood: anxious  Affect: mood congruent  Thought Process: goal-directed, logical  Thought Content: normal,  No delusions or paranoia; did not appear to be responding to internal stimuli during the session  Orientation: grossly intact  Attention Span/Concentration: Attends to session without distraction; reports no difficulty  Insight: patient has awareness of illness; good insight into own behavior and behavior of others  Judgment: the patient's behavior is  adequate to circumstances    Interval history and content of current session: Patient has missed several appointments. Trauma processing interrupted.  Discussed treatment. Completed trauma account in session Reports to be coping with great difficulty. Evaluated cognitive response, paying particular attention to negative intrusive thoughts of a persistent and detrimental nature. Thoughts of this type are in evidence with moderate distress. Provided cognitive behavioral therapy to address negative cognitions. Identified and evaluated psychosocial and environmental stressors secondary to diagnosis and treatment.  Examined proactive behaviors that may be implemented to minimize or ameliorate psychosocial stressors secondary to diagnosis and treatment.     Risk parameters:   Patient reports no suicidal ideation  Patient reports no homicidal ideation  Patient reports no self-injurious behavior  Patient reports no violent behavior   Safety needs:  None at this time      Verbal deficits: None     Patient's response to intervention:The patient's response to intervention is accepting.     Progress toward goals and other mental status changes:  The patient's progress toward goals is limited.      Progress to date:No Progress - Continue Objectives      Goals from last visit: Attempted, not met     Patient reported outcomes:    Distress Thermometer:   Distress Score    Distress Score: 8        Practical Problems Physical Problems                                                   Family Problems                                         Emotional Problems                                                         Spiritual/Religions Concerns     Spiritual / Samaritan Concerns: No         Other Problems               PHQ ANSWERS    Q1. Little interest or pleasure in doing things: (P) Several days (06/05/23 0858)  Q2. Feeling down, depressed, or hopeless: (P) Several days (06/05/23 0858)  Q3. Trouble falling or staying asleep, or  sleeping too much: (P) Several days (06/05/23 0858)  Q4. Feeling tired or having little energy: (P) Several days (06/05/23 0858)  Q5. Poor appetite or overeating: (P) Several days (06/05/23 0858)  Q6. Feeling bad about yourself - or that you are a failure or have let yourself or your family down: (P) Not at all (06/05/23 0858)  Q7. Trouble concentrating on things, such as reading the newspaper or watching television: (P) Several days (06/05/23 0858)  Q8. Moving or speaking so slowly that other people could have noticed. Or the opposite - being so fidgety or restless that you have been moving around a lot more than usual: (P) Several days (06/05/23 0858)  Q9.  0    PHQ8 Score : (P) 7 (06/05/23 0858)  PHQ-9 Total Score: (P) 7 (06/05/23 0858)     JOSE MARIA-7 Answers    GAD7 6/5/2023   1. Feeling nervous, anxious, or on edge? 1   2. Not being able to stop or control worrying? 1   3. Worrying too much about different things? 1   4. Trouble relaxing? 1   5. Being so restless that it is hard to sit still? 1   6. Becoming easily annoyed or irritable? 1   7. Feeling afraid as if something awful might happen? 0   JOSE MARIA-7 Score 6   Some encounter information is confidential and restricted. Go to Review Flowsheets activity to see all data.     JOSE MARIA-7 Score: (P) 6  Interpretation: (P) Mild Anxiety     Client Strengths: verbal, intelligent, successful, good social support, good insight, commitment to wellness, strong kerri, strong cultural traditions     Diagnosis:     ICD-10-CM ICD-9-CM   1. Trauma and stressor-related disorder  F43.9 309.81     308.9   2. Borderline epithelial neoplasm of ovary  D39.10 236.2       Treatment Plan:individual psychotherapy  Target symptoms:  trauma  Why chosen therapy is appropriate versus another modality: relevant to diagnosis, patient responds to this modality, evidence based practice  Outcome monitoring methods: self-report, observation, checklist/rating scale  Therapeutic intervention type: insight  oriented psychotherapy, behavior modifying psychotherapy  Prognosis: Good      Behavioral goals:    Exercise:   Stress management: Complete second truama account and listen daily   Social engagement:   Nutrition:   Smoking Cessation:   Therapy:  Increase daily self-care and attention to health management    Return to clinic: as scheduled    Next Session:      Length of Service (minutes direct face-to-face contact): 45    Brittani Mahan, PhD  Clinical Psychologist  [place] License #[id number]  [place] License #[id number]

## 2023-06-07 ENCOUNTER — PATIENT MESSAGE (OUTPATIENT)
Dept: BARIATRICS | Facility: CLINIC | Age: 43
End: 2023-06-07
Payer: COMMERCIAL

## 2023-06-12 ENCOUNTER — OFFICE VISIT (OUTPATIENT)
Dept: PSYCHIATRY | Facility: CLINIC | Age: 43
End: 2023-06-12
Payer: COMMERCIAL

## 2023-06-12 DIAGNOSIS — F43.9 TRAUMA AND STRESSOR-RELATED DISORDER: Primary | ICD-10-CM

## 2023-06-12 DIAGNOSIS — D39.10 BORDERLINE EPITHELIAL NEOPLASM OF OVARY: ICD-10-CM

## 2023-06-12 PROCEDURE — 90834 PR PSYCHOTHERAPY W/PATIENT, 45 MIN: ICD-10-PCS | Mod: S$GLB,,, | Performed by: PSYCHOLOGIST

## 2023-06-12 PROCEDURE — 90834 PSYTX W PT 45 MINUTES: CPT | Mod: S$GLB,,, | Performed by: PSYCHOLOGIST

## 2023-06-12 NOTE — PROGRESS NOTES
INFORMED CONSENT: Patient was identified using two patient-identifiers. The patient has been informed of the risks and benefits associated with engaging in psychotherapy, the handling of protected health information, the rights of privacy and the limits of confidentiality. The patient has also been informed of the importance of reporting any suicidal or homicidal ideation to this or any provider to ensure safety of all parties, and the Fran Higgins expressed understanding. The patient was agreeable to these terms and freely participates in individual psychotherapy.    PSYCHO-ONCOLOGY NOTE/ Individual Psychotherapy     Date: 6/12/2023   Site:  [facility]        Therapeutic Intervention: Met with patient.  Outpatient - Behavior modifying psychotherapy 45 min - CPT code 34550      Patient was last seen by me on 6/5/2023    Problem list  Patient Active Problem List   Diagnosis    Depression    Left ovarian cyst    S/P RA Laparoscopic LSO    Mucinous tumor, of low malignant potential    Pericardial cyst    Scoliosis of cervical spine    Abnormal uterine bleeding (AUB)    Vaginal yeast infection    DUB (dysfunctional uterine bleeding)    Migraine with aura, not intractable    s/p robotic hysterectomy/RSO 3/9/20    Borderline epithelial neoplasm of ovary    Surgical menopause    Yeast vaginitis    Menopause    Dizziness    Galactorrhea in female- bilateral breast    Other insomnia    Major depressive disorder, recurrent episode, in partial remission with anxious distress    Prediabetes    Obesity    S/P laparoscopic sleeve gastrectomy    BMI 45.0-49.9, adult    Hidradenitis    Seborrheic dermatitis       Chief complaint/reason for encounter: truama and avoidance   Met with patient to evaluate psychosocial adaptation to diagnosis/treatment/survivorship of IPV    Current Medications  Current Outpatient Medications   Medication    ALPRAZolam (XANAX) 0.5 MG tablet    b complex vitamins capsule     B3-azelaic-zinc-B6-copper-FA (NICAZEL) 600-5-10-5-1.5 mg Tab    CALCIUM CITRATE ORAL    clindamycin (CLEOCIN T) 1 % lotion    cyclobenzaprine (FLEXERIL) 5 MG tablet    doxycycline monohydrate 100 mg Tab    erythromycin with ethanoL (THERAMYCIN) 2 % external solution    estradioL 1.25 mg/1.25 gram (0.1 %) GlPk    finasteride (PROSCAR) 5 mg tablet    fluconazole (DIFLUCAN) 200 MG Tab    fluocinolone acetonide oiL (DERMOTIC OIL) 0.01 % Drop    galcanezumab-gnlm (EMGALITY PEN) 120 mg/mL PnIj    HUMIRA,CF, PEN 40 mg/0.4 mL PnKt    HUMIRA,CF, PEN 80 mg/0.8 mL PnKt    HUMIRA,CF, PEN 80 mg/0.8 mL PnKt    ketoconazole (NIZORAL) 2 % cream    multivitamin capsule    nitrofurantoin, macrocrystal-monohydrate, (MACROBID) 100 MG capsule    nystatin-triamcinolone (MYCOLOG II) cream    omeprazole (PRILOSEC) 40 MG capsule    ondansetron (ZOFRAN-ODT) 4 MG TbDL    pimecrolimus (ELIDEL) 1 % cream    progesterone (PROMETRIUM) 100 MG capsule    spironolactone (ALDACTONE) 50 MG tablet    thiamine (VITAMIN B-1) 50 MG tablet    ubrogepant (UBRELVY) 50 mg tablet    venlafaxine (EFFEXOR-XR) 150 MG Cp24     No current facility-administered medications for this visit.       Objective:  Fran Spencer Gisela arrived late for the session.    The patient was fully cooperative throughout the session.  Appearance: age appropriate, appropriately  dressed, adequately  groomed  Behavior/Cooperation: friendly and cooperative  Speech: normal in rate, volume, and tone and appropriate quality, quantity and organization of sentences  Mood: anxious  Affect: mood congruent  Thought Process: goal-directed, logical  Thought Content: normal,  No delusions or paranoia; did not appear to be responding to internal stimuli during the session  Orientation: grossly intact  Attention Span/Concentration: Attends to session without distraction; reports no difficulty  Insight: patient has awareness of illness; good insight into own behavior and behavior of  others  Judgment: the patient's behavior is adequate to circumstances    Interval history and content of current session: Patient completed assignment. Engaged in 2nd trauma account recording today.   Discussed treatment. Reports to be coping with great difficulty. Evaluated cognitive response, paying particular attention to negative intrusive thoughts of a persistent and detrimental nature. Thoughts of this type are in evidence with moderate distress. Provided cognitive behavioral therapy to address negative cognitions. Identified and evaluated psychosocial and environmental stressors secondary to diagnosis and treatment.  Examined proactive behaviors that may be implemented to minimize or ameliorate psychosocial stressors secondary to diagnosis and treatment.     Risk parameters:   Patient reports no suicidal ideation  Patient reports no homicidal ideation  Patient reports no self-injurious behavior  Patient reports no violent behavior   Safety needs:  None at this time      Verbal deficits: None     Patient's response to intervention:The patient's response to intervention is accepting.     Progress toward goals and other mental status changes:  The patient's progress toward goals is good.      Progress to date:Progress as Expected      Goals from last visit: Met     Patient reported outcomes:    Distress Thermometer:   Distress Score    Distress Score: 8        Practical Problems Physical Problems                                                   Family Problems                                         Emotional Problems                                                         Spiritual/Religions Concerns     Spiritual / Amish Concerns: No         Other Problems                PHQ-9=    JOSE MARIA-7=    PHQ ANSWERS    Q1. Little interest or pleasure in doing things: (P) Several days (06/12/23 0911)  Q2. Feeling down, depressed, or hopeless: (P) Several days (06/12/23 0911)  Q3. Trouble falling or staying asleep, or  sleeping too much: (P) Several days (06/12/23 0911)  Q4. Feeling tired or having little energy: (P) Several days (06/12/23 0911)  Q5. Poor appetite or overeating: (P) Several days (06/12/23 0911)  Q6. Feeling bad about yourself - or that you are a failure or have let yourself or your family down: (P) Several days (06/12/23 0911)  Q7. Trouble concentrating on things, such as reading the newspaper or watching television: (P) Several days (06/12/23 0911)  Q8. Moving or speaking so slowly that other people could have noticed. Or the opposite - being so fidgety or restless that you have been moving around a lot more than usual: (P) Several days (06/12/23 0911)  Q9.      PHQ8 Score : (P) 8 (06/12/23 0911)  PHQ-9 Total Score: (P) 8 (06/12/23 0911)     JOSE MARIA-7 Answers    GAD7 6/12/2023   1. Feeling nervous, anxious, or on edge? 2   2. Not being able to stop or control worrying? 2   3. Worrying too much about different things? 2   4. Trouble relaxing? 2   5. Being so restless that it is hard to sit still? 2   6. Becoming easily annoyed or irritable? 2   7. Feeling afraid as if something awful might happen? 2   JOSE MARIA-7 Score 14   Some encounter information is confidential and restricted. Go to Review Flowsheets activity to see all data.     JOSE MARIA-7 Score: (P) 14  Interpretation: (P) Moderate Anxiety     Client Strengths: verbal, intelligent, successful, good social support, good insight, commitment to wellness, strong kerri, strong cultural traditions     Diagnosis:     ICD-10-CM ICD-9-CM   1. Trauma and stressor-related disorder  F43.9 309.81     308.9   2. Borderline epithelial neoplasm of ovary  D39.10 236.2       Treatment Plan:individual psychotherapy  Target symptoms:  trauma  Why chosen therapy is appropriate versus another modality: relevant to diagnosis, patient responds to this modality, evidence based practice  Outcome monitoring methods: self-report, observation, checklist/rating scale  Therapeutic intervention type:  insight oriented psychotherapy, behavior modifying psychotherapy  Prognosis: Fair      Behavioral goals:    Exercise:   Stress management: Complete listening to more distressing trauma account, complete challenging question worksheet daily   Social engagement:   Nutrition:   Smoking Cessation:   Therapy:      Return to clinic: as scheduled    Next Session:      Length of Service (minutes direct face-to-face contact): 45    Brittani Mahan, PhD  Clinical Psychologist  [place] License #[id number]  [place] License #[id number]

## 2023-06-13 ENCOUNTER — SPECIALTY PHARMACY (OUTPATIENT)
Dept: PHARMACY | Facility: CLINIC | Age: 43
End: 2023-06-13
Payer: COMMERCIAL

## 2023-06-13 ENCOUNTER — PATIENT MESSAGE (OUTPATIENT)
Dept: BARIATRICS | Facility: CLINIC | Age: 43
End: 2023-06-13
Payer: COMMERCIAL

## 2023-06-13 NOTE — TELEPHONE ENCOUNTER
Specialty Pharmacy - Refill Coordination    Specialty Medication Orders Linked to Encounter      Flowsheet Row Most Recent Value   Medication #1 HUMIRA,CF, PEN 40 mg/0.4 mL PnKt (Order#[id number], Rx#[id number])            Refill Questions - Documented Responses      Flowsheet Row Most Recent Value   Patient Availability and HIPAA Verification    Does patient want to proceed with activity? Yes   HIPAA/medical authority confirmed? Yes   Relationship to patient of person spoken to? Self   Refill Screening Questions    Changes to allergies? No   Changes to medications? No   New conditions since last clinic visit? No   Unplanned office visit, urgent care, ED, or hospital admission in the last 4 weeks? No   How does patient/caregiver feel medication is working? Good   Financial problems or insurance changes? No   How many doses of your specialty medications were missed in the last 4 weeks? 0   Would patient like to speak to a pharmacist? No   When does the patient need to receive the medication? 06/17/23   Refill Delivery Questions    How will the patient receive the medication? [facility]   When does the patient need to receive the medication? 06/17/23   Shipping Address Home   Address in [facility] confirmed and updated if neccessary? Yes   Expected Copay ($) 5   Is the patient able to afford the medication copay? Yes   Payment Method zero copay   Days supply of Refill 28   Supplies needed? No supplies needed   Refill activity completed? Yes   Refill activity plan Refill scheduled   Shipment/Pickup Date: 06/15/23            Current Outpatient Medications   Medication Sig    ALPRAZolam (XANAX) 0.5 MG tablet Take 1 tablet (0.5 mg total) by mouth nightly as needed.    b complex vitamins capsule Take 1 capsule by mouth once daily.    B3-azelaic-zinc-B6-copper-FA (NICAZEL) 600-5-10-5-1.5 mg Tab take 1 tablet by mouth twice daily    CALCIUM CITRATE ORAL Take 1 tablet by mouth 3 (three) times daily. chewable    clindamycin  (CLEOCIN T) 1 % lotion Apply to affected area two times a day.    cyclobenzaprine (FLEXERIL) 5 MG tablet Take 1 tablet (5 mg total) by mouth 2 (two) times daily as needed for Muscle spasms.    doxycycline monohydrate 100 mg Tab Take 1 tablet (100 mg total) by mouth 2 (two) times daily for 10 days when flaring    erythromycin with ethanoL (THERAMYCIN) 2 % external solution Apply topically 2 (two) times daily.    estradioL 1.25 mg/1.25 gram (0.1 %) GlPk Place 1.25 mg onto the skin once daily. To the upper inner thigh    finasteride (PROSCAR) 5 mg tablet Take 1 tablet (5 mg total) by mouth once daily.    fluconazole (DIFLUCAN) 200 MG Tab Take 1 tablet by mouth once if not better take another pill in 3 days    fluocinolone acetonide oiL (DERMOTIC OIL) 0.01 % Drop Use to affected ears at night, drop in canal and use to ear when flaring    galcanezumab-gnlm (EMGALITY PEN) 120 mg/mL PnIj Inject 120 mg into the skin every 30 days.    HUMIRA,CF, PEN 40 mg/0.4 mL PnKt Inject 40mg (1 pen) into the skin every 7 days    HUMIRA,CF, PEN 80 mg/0.8 mL PnKt Starting day 29, inject 80 mg (1 pen) into the skin every 2 weeks.    HUMIRA,CF, PEN 80 mg/0.8 mL PnKt Starting day 29, inject 80 mg (1 pen) into the skin every 2 weeks.    ketoconazole (NIZORAL) 2 % cream Apply to affected area two times a day (Patient taking differently: Apply to affected area two times a day)    multivitamin capsule Take by mouth once daily.    nitrofurantoin, macrocrystal-monohydrate, (MACROBID) 100 MG capsule Take 1 capsule (100 mg total) by mouth 2 (two) times daily.    nystatin-triamcinolone (MYCOLOG II) cream Apply topically 2 (two) times daily.    omeprazole (PRILOSEC) 40 MG capsule Take 1 capsule (40 mg total) by mouth every morning.    ondansetron (ZOFRAN-ODT) 4 MG TbDL Dissolve 1 tablet (4 mg total) by mouth every 6 (six) hours as needed (for nausea).    pimecrolimus (ELIDEL) 1 % cream Apply to affected areas of underarm twice a day as needed for  irritation. (Patient taking differently: Apply to affected areas of underarm twice a day as needed for irritation.)    progesterone (PROMETRIUM) 100 MG capsule Take 1 capsule (100 mg total) by mouth nightly.    spironolactone (ALDACTONE) 50 MG tablet Start with taking 1 tablet by mouth every day, may increase to 2 tablets by mouth every day as tolerated.    thiamine (VITAMIN B-1) 50 MG tablet Take 50 mg by mouth once daily.    ubrogepant (UBRELVY) 50 mg tablet Take 1 tab by mouth for headaches. May repeat once 2 hours after initial dose based on response and tolerability.    venlafaxine (EFFEXOR-XR) 150 MG Cp24 Take 1 capsule (150 mg total) by mouth once daily.   Last reviewed on 6/5/2023  9:10 AM by Brittani Mahan, PhD    Review of patient's allergies indicates:  No Known Allergies Last reviewed on  6/5/2023 9:10 AM by Brittani Mahan      Tasks added this encounter   No tasks added.   Tasks due within next 3 months   6/16/2023 - Refill Coordination Outreach (1 time occurrence)     Vladislav Billings, PharmD  [facility]  [address] [facility] [address]  Phone: [phone]  Fax: [phone]

## 2023-06-18 DIAGNOSIS — K21.9 GASTROESOPHAGEAL REFLUX DISEASE, UNSPECIFIED WHETHER ESOPHAGITIS PRESENT: ICD-10-CM

## 2023-06-18 DIAGNOSIS — G47.09 OTHER INSOMNIA: ICD-10-CM

## 2023-06-18 DIAGNOSIS — L73.2 HIDRADENITIS: ICD-10-CM

## 2023-06-18 RX ORDER — OMEPRAZOLE 40 MG/1
40 CAPSULE, DELAYED RELEASE ORAL EVERY MORNING
Qty: 30 CAPSULE | Refills: 1 | Status: SHIPPED | OUTPATIENT
Start: 2023-06-18 | End: 2023-07-31 | Stop reason: SDUPTHER

## 2023-06-18 RX ORDER — SPIRONOLACTONE 50 MG/1
TABLET, FILM COATED ORAL
Qty: 60 TABLET | Refills: 4 | Status: CANCELLED | OUTPATIENT
Start: 2023-06-18

## 2023-06-19 RX ORDER — ALPRAZOLAM 0.5 MG/1
0.5 TABLET ORAL NIGHTLY PRN
Qty: 30 TABLET | Refills: 1 | OUTPATIENT
Start: 2023-06-19 | End: 2023-07-19

## 2023-06-20 DIAGNOSIS — G47.09 OTHER INSOMNIA: ICD-10-CM

## 2023-06-20 DIAGNOSIS — L73.2 HIDRADENITIS: ICD-10-CM

## 2023-06-20 RX ORDER — SPIRONOLACTONE 50 MG/1
TABLET, FILM COATED ORAL
Qty: 60 TABLET | Refills: 4 | Status: CANCELLED | OUTPATIENT
Start: 2023-06-18

## 2023-06-20 RX ORDER — ALPRAZOLAM 0.5 MG/1
0.5 TABLET ORAL NIGHTLY PRN
Qty: 30 TABLET | Refills: 1 | OUTPATIENT
Start: 2023-06-20 | End: 2023-07-20

## 2023-06-21 DIAGNOSIS — L73.2 HIDRADENITIS: ICD-10-CM

## 2023-06-21 RX ORDER — SPIRONOLACTONE 50 MG/1
TABLET, FILM COATED ORAL
Qty: 60 TABLET | Refills: 4 | Status: CANCELLED | OUTPATIENT
Start: 2023-06-18

## 2023-06-23 ENCOUNTER — OFFICE VISIT (OUTPATIENT)
Dept: PSYCHIATRY | Facility: CLINIC | Age: 43
End: 2023-06-23
Payer: COMMERCIAL

## 2023-06-23 DIAGNOSIS — L73.2 HIDRADENITIS: ICD-10-CM

## 2023-06-23 DIAGNOSIS — D39.10 BORDERLINE EPITHELIAL NEOPLASM OF OVARY: ICD-10-CM

## 2023-06-23 DIAGNOSIS — F43.9 TRAUMA AND STRESSOR-RELATED DISORDER: Primary | ICD-10-CM

## 2023-06-23 PROCEDURE — 90837 PSYTX W PT 60 MINUTES: CPT | Mod: S$GLB,,, | Performed by: PSYCHOLOGIST

## 2023-06-23 PROCEDURE — 99999 PR PBB SHADOW E&M-EST. PATIENT-LVL II: ICD-10-PCS | Mod: PBBFAC,,, | Performed by: PSYCHOLOGIST

## 2023-06-23 PROCEDURE — 1159F MED LIST DOCD IN RCRD: CPT | Mod: CPTII,S$GLB,, | Performed by: PSYCHOLOGIST

## 2023-06-23 PROCEDURE — 99999 PR PBB SHADOW E&M-EST. PATIENT-LVL II: CPT | Mod: PBBFAC,,, | Performed by: PSYCHOLOGIST

## 2023-06-23 PROCEDURE — 1159F PR MEDICATION LIST DOCUMENTED IN MEDICAL RECORD: ICD-10-PCS | Mod: CPTII,S$GLB,, | Performed by: PSYCHOLOGIST

## 2023-06-23 PROCEDURE — 90837 PR PSYCHOTHERAPY W/PATIENT, 60 MIN: ICD-10-PCS | Mod: S$GLB,,, | Performed by: PSYCHOLOGIST

## 2023-06-23 RX ORDER — SPIRONOLACTONE 50 MG/1
TABLET, FILM COATED ORAL
Qty: 60 TABLET | Refills: 4 | Status: CANCELLED | OUTPATIENT
Start: 2023-06-18

## 2023-06-23 NOTE — PROGRESS NOTES
INFORMED CONSENT: Patient was identified using two patient-identifiers. The patient has been informed of the risks and benefits associated with engaging in psychotherapy, the handling of protected health information, the rights of privacy and the limits of confidentiality. The patient has also been informed of the importance of reporting any suicidal or homicidal ideation to this or any provider to ensure safety of all parties, and the Fran Higgins expressed understanding. The patient was agreeable to these terms and freely participates in individual psychotherapy.    PSYCHO-ONCOLOGY NOTE/ Individual Psychotherapy     Date: 6/23/2023   Site:  [facility]        Therapeutic Intervention: Met with patient.  Outpatient - Insight oriented psychotherapy 60 min - CPT code 03866      Patient was last seen by me on 6/12/2023    Problem list  Patient Active Problem List   Diagnosis    Depression    Left ovarian cyst    S/P RA Laparoscopic LSO    Mucinous tumor, of low malignant potential    Pericardial cyst    Scoliosis of cervical spine    Abnormal uterine bleeding (AUB)    Vaginal yeast infection    DUB (dysfunctional uterine bleeding)    Migraine with aura, not intractable    s/p robotic hysterectomy/RSO 3/9/20    Borderline epithelial neoplasm of ovary    Surgical menopause    Yeast vaginitis    Menopause    Dizziness    Galactorrhea in female- bilateral breast    Other insomnia    Major depressive disorder, recurrent episode, in partial remission with anxious distress    Prediabetes    Obesity    S/P laparoscopic sleeve gastrectomy    BMI 45.0-49.9, adult    Hidradenitis    Seborrheic dermatitis       Chief complaint/reason for encounter: interpersonal   Met with patient to evaluate psychosocial adaptation to diagnosis/treatment/survivorship of IPV    Current Medications  Current Outpatient Medications   Medication    ALPRAZolam (XANAX) 0.5 MG tablet    b complex vitamins capsule     B3-azelaic-zinc-B6-copper-FA (NICAZEL) 600-5-10-5-1.5 mg Tab    CALCIUM CITRATE ORAL    clindamycin (CLEOCIN T) 1 % lotion    cyclobenzaprine (FLEXERIL) 5 MG tablet    doxycycline monohydrate 100 mg Tab    erythromycin with ethanoL (THERAMYCIN) 2 % external solution    estradioL 1.25 mg/1.25 gram (0.1 %) GlPk    finasteride (PROSCAR) 5 mg tablet    fluconazole (DIFLUCAN) 200 MG Tab    fluocinolone acetonide oiL (DERMOTIC OIL) 0.01 % Drop    galcanezumab-gnlm (EMGALITY PEN) 120 mg/mL PnIj    HUMIRA,CF, PEN 40 mg/0.4 mL PnKt    HUMIRA,CF, PEN 80 mg/0.8 mL PnKt    HUMIRA,CF, PEN 80 mg/0.8 mL PnKt    ketoconazole (NIZORAL) 2 % cream    multivitamin capsule    nitrofurantoin, macrocrystal-monohydrate, (MACROBID) 100 MG capsule    nystatin-triamcinolone (MYCOLOG II) cream    omeprazole (PRILOSEC) 40 MG capsule    ondansetron (ZOFRAN-ODT) 4 MG TbDL    pimecrolimus (ELIDEL) 1 % cream    progesterone (PROMETRIUM) 100 MG capsule    spironolactone (ALDACTONE) 50 MG tablet    thiamine (VITAMIN B-1) 50 MG tablet    ubrogepant (UBRELVY) 50 mg tablet    venlafaxine (EFFEXOR-XR) 150 MG Cp24     No current facility-administered medications for this visit.       Objective:  Fran Spencer Gisela arrived promptly for the session.    The patient was fully cooperative throughout the session.  Appearance: age appropriate, appropriately  dressed, adequately  groomed  Behavior/Cooperation: friendly and cooperative  Speech: normal in rate, volume, and tone and appropriate quality, quantity and organization of sentences  Mood: euthymic  Affect: mood congruent  Thought Process: goal-directed, logical  Thought Content: normal,  No delusions or paranoia; did not appear to be responding to internal stimuli during the session  Orientation: grossly intact  Attention Span/Concentration: Attends to session without distraction; reports no difficulty  Insight: patient has awareness of illness; good insight into own behavior and behavior of  others  Judgment: the patient's behavior is adequate to circumstances    Interval history and content of current session: Reviewed challenging questions worksheet. Patient partially completed. Exposure completed fully.  Discussed  avoidance . Reports to be coping with great difficulty. Evaluated cognitive response, paying particular attention to negative intrusive thoughts of a persistent and detrimental nature. Thoughts of this type are in evidence with severe distress. Provided cognitive behavioral therapy to address negative cognitions. Identified and evaluated psychosocial and environmental stressors secondary to diagnosis and treatment.  Examined proactive behaviors that may be implemented to minimize or ameliorate psychosocial stressors secondary to diagnosis and treatment.     Risk parameters:   Patient reports no suicidal ideation  Patient reports no homicidal ideation  Patient reports no self-injurious behavior  Patient reports no violent behavior   Safety needs:  None at this time      Verbal deficits: None     Patient's response to intervention:The patient's response to intervention is accepting.     Progress toward goals and other mental status changes:  The patient's progress toward goals is fair .      Progress to date:Progress - Ongoing, but Slow      Goals from last visit: Attempted, partially met     Patient reported outcomes:    Distress Thermometer:   Distress Score    Distress Score: 9        Practical Problems Physical Problems                                                   Family Problems                                         Emotional Problems                                                         Spiritual/Religions Concerns     Spiritual / Taoist Concerns: No         Other Problems                 PHQ ANSWERS    Q1. Little interest or pleasure in doing things: (P) More than half the days (06/23/23 0950)  Q2. Feeling down, depressed, or hopeless: (P) Several days (06/23/23 0950)  Q3.  Trouble falling or staying asleep, or sleeping too much: (P) Nearly every day (06/23/23 0950)  Q4. Feeling tired or having little energy: (P) Nearly every day (06/23/23 0950)  Q5. Poor appetite or overeating: (P) Nearly every day (06/23/23 0950)  Q6. Feeling bad about yourself - or that you are a failure or have let yourself or your family down: (P) Not at all (06/23/23 0950)  Q7. Trouble concentrating on things, such as reading the newspaper or watching television: (P) Several days (06/23/23 0950)  Q8. Moving or speaking so slowly that other people could have noticed. Or the opposite - being so fidgety or restless that you have been moving around a lot more than usual: (P) Not at all (06/23/23 0950)  Q9.  0    PHQ8 Score : (P) 13 (06/23/23 0950)  PHQ-9 Total Score: (P) 13 (06/23/23 0950)     JOSE MARIA-7 Answers    GAD7 6/23/2023   1. Feeling nervous, anxious, or on edge? 2   2. Not being able to stop or control worrying? 2   3. Worrying too much about different things? 2   4. Trouble relaxing? 2   5. Being so restless that it is hard to sit still? 2   6. Becoming easily annoyed or irritable? 2   7. Feeling afraid as if something awful might happen? 2   JOSE MARIA-7 Score 14   Some encounter information is confidential and restricted. Go to Review Flowsheets activity to see all data.     JOSE MARIA-7 Score: (P) 14  Interpretation: (P) Moderate Anxiety     Client Strengths: verbal, intelligent, successful, good social support, good insight, commitment to wellness, strong kerri, strong cultural traditions     Diagnosis:     ICD-10-CM ICD-9-CM   1. Trauma and stressor-related disorder  F43.9 309.81     308.9   2. Borderline epithelial neoplasm of ovary  D39.10 236.2        Treatment Plan:individual psychotherapy and medication management by physician  Target symptoms: anxiety avoidance, trauma  Why chosen therapy is appropriate versus another modality: relevant to diagnosis, patient responds to this modality, evidence based  practice  Outcome monitoring methods: self-report, observation, checklist/rating scale  Therapeutic intervention type: insight oriented psychotherapy, behavior modifying psychotherapy  Prognosis: Good      Behavioral goals:    Exercise:   Stress management: Complete Challenging Worksheets with category identification   Social engagement:   Nutrition:   Smoking Cessation:   Therapy:  Increase daily self-care and attention to health management  Pleasant events scheduling and increased social interaction    Return to clinic: as scheduled    Next Session:      Length of Service (minutes direct face-to-face contact): 60    Brittani Mahan, PhD  Clinical Psychologist  [place] License #[id number]  [place] License #[id number]

## 2023-06-29 ENCOUNTER — PATIENT MESSAGE (OUTPATIENT)
Dept: DERMATOLOGY | Facility: CLINIC | Age: 43
End: 2023-06-29
Payer: COMMERCIAL

## 2023-06-29 DIAGNOSIS — L73.2 HIDRADENITIS: ICD-10-CM

## 2023-07-03 DIAGNOSIS — L73.2 HIDRADENITIS: ICD-10-CM

## 2023-07-03 RX ORDER — SPIRONOLACTONE 50 MG/1
TABLET, FILM COATED ORAL
Qty: 60 TABLET | Refills: 4 | OUTPATIENT
Start: 2023-07-03

## 2023-07-03 RX ORDER — VENLAFAXINE HYDROCHLORIDE 150 MG/1
150 CAPSULE, EXTENDED RELEASE ORAL DAILY
Qty: 30 CAPSULE | Refills: 1 | Status: SHIPPED | OUTPATIENT
Start: 2023-07-03 | End: 2023-08-28 | Stop reason: SDUPTHER

## 2023-07-03 RX ORDER — SPIRONOLACTONE 50 MG/1
TABLET, FILM COATED ORAL
Qty: 60 TABLET | Refills: 4 | Status: CANCELLED | OUTPATIENT
Start: 2023-07-03

## 2023-07-05 DIAGNOSIS — L73.2 HIDRADENITIS: ICD-10-CM

## 2023-07-05 RX ORDER — SPIRONOLACTONE 50 MG/1
TABLET, FILM COATED ORAL
Qty: 60 TABLET | Refills: 4 | Status: CANCELLED | OUTPATIENT
Start: 2023-07-03

## 2023-07-07 ENCOUNTER — PATIENT MESSAGE (OUTPATIENT)
Dept: INTERNAL MEDICINE | Facility: CLINIC | Age: 43
End: 2023-07-07
Payer: COMMERCIAL

## 2023-07-07 ENCOUNTER — PATIENT MESSAGE (OUTPATIENT)
Dept: DERMATOLOGY | Facility: CLINIC | Age: 43
End: 2023-07-07
Payer: COMMERCIAL

## 2023-07-07 DIAGNOSIS — L73.2 HIDRADENITIS: ICD-10-CM

## 2023-07-07 RX ORDER — SPIRONOLACTONE 50 MG/1
TABLET, FILM COATED ORAL
Qty: 60 TABLET | Refills: 4 | Status: CANCELLED | OUTPATIENT
Start: 2023-07-03

## 2023-07-07 NOTE — TELEPHONE ENCOUNTER
spironolactone (ALDACTONE) 50 MG tablet 60 tablet 4 1/24/2023  No   Sig: Start with taking 1 tablet by mouth every day, may increase to 2 tablets by mouth every day as tolerated.   Sent to pharmacy as: spironolactone (ALDACTONE) 50 MG tablet   Class: Normal   Notes to Pharmacy: .   Order: [id number]   Date/Time Signed: 1/24/2023 10:05

## 2023-07-07 NOTE — TELEPHONE ENCOUNTER
No care due was identified.  Health [facility] Embedded Care Due Messages. Reference number: [id number].   7/07/2023 4:37:07 PM CDT

## 2023-07-07 NOTE — TELEPHONE ENCOUNTER
Last annual 12/12/22    The patient is requesting a refill on spirolactone for HS.  Please see her [employer] message for further explanation.

## 2023-07-10 DIAGNOSIS — L73.2 HIDRADENITIS: ICD-10-CM

## 2023-07-10 RX ORDER — SPIRONOLACTONE 50 MG/1
TABLET, FILM COATED ORAL
Qty: 60 TABLET | Refills: 4 | Status: SHIPPED | OUTPATIENT
Start: 2023-07-10 | End: 2024-03-25

## 2023-07-10 NOTE — TELEPHONE ENCOUNTER
-     spironolactone (ALDACTONE) 50 MG tablet; Start with 1 po qday, increase to 2 po qday as tolerated  Dispense: 60 tablet; Refill: 4

## 2023-07-11 RX ORDER — ADALIMUMAB 40MG/0.4ML
KIT SUBCUTANEOUS
Qty: 4 PEN | Refills: 4 | Status: ACTIVE | OUTPATIENT
Start: 2023-07-11 | End: 2024-01-30 | Stop reason: SDUPTHER

## 2023-07-11 RX ORDER — SPIRONOLACTONE 50 MG/1
TABLET, FILM COATED ORAL
Qty: 60 TABLET | Refills: 4 | Status: SHIPPED | OUTPATIENT
Start: 2023-07-11 | End: 2023-09-18 | Stop reason: SDUPTHER

## 2023-07-14 ENCOUNTER — OFFICE VISIT (OUTPATIENT)
Dept: PSYCHIATRY | Facility: CLINIC | Age: 43
End: 2023-07-14
Payer: COMMERCIAL

## 2023-07-14 DIAGNOSIS — F43.9 TRAUMA AND STRESSOR-RELATED DISORDER: Primary | ICD-10-CM

## 2023-07-14 DIAGNOSIS — D39.10 BORDERLINE EPITHELIAL NEOPLASM OF OVARY: ICD-10-CM

## 2023-07-14 PROCEDURE — 1159F PR MEDICATION LIST DOCUMENTED IN MEDICAL RECORD: ICD-10-PCS | Mod: CPTII,S$GLB,, | Performed by: PSYCHOLOGIST

## 2023-07-14 PROCEDURE — 90834 PSYTX W PT 45 MINUTES: CPT | Mod: S$GLB,,, | Performed by: PSYCHOLOGIST

## 2023-07-14 PROCEDURE — 99999 PR PBB SHADOW E&M-EST. PATIENT-LVL II: CPT | Mod: PBBFAC,,, | Performed by: PSYCHOLOGIST

## 2023-07-14 PROCEDURE — 90834 PR PSYCHOTHERAPY W/PATIENT, 45 MIN: ICD-10-PCS | Mod: S$GLB,,, | Performed by: PSYCHOLOGIST

## 2023-07-14 PROCEDURE — 99999 PR PBB SHADOW E&M-EST. PATIENT-LVL II: ICD-10-PCS | Mod: PBBFAC,,, | Performed by: PSYCHOLOGIST

## 2023-07-14 PROCEDURE — 1159F MED LIST DOCD IN RCRD: CPT | Mod: CPTII,S$GLB,, | Performed by: PSYCHOLOGIST

## 2023-07-14 NOTE — PROGRESS NOTES
INFORMED CONSENT: Patient was identified using two patient-identifiers. The patient has been informed of the risks and benefits associated with engaging in psychotherapy, the handling of protected health information, the rights of privacy and the limits of confidentiality. The patient has also been informed of the importance of reporting any suicidal or homicidal ideation to this or any provider to ensure safety of all parties, and the Fran Higgins expressed understanding. The patient was agreeable to these terms and freely participates in individual psychotherapy.    PSYCHO-ONCOLOGY NOTE/ Individual Psychotherapy     Date: 7/14/2023   Site:  [facility]        Therapeutic Intervention: Met with patient.  Outpatient - Insight oriented psychotherapy 45 min - CPT code 30320      Patient was last seen by me on 6/23/2023    Problem list  Patient Active Problem List   Diagnosis    Depression    Left ovarian cyst    S/P RA Laparoscopic LSO    Mucinous tumor, of low malignant potential    Pericardial cyst    Scoliosis of cervical spine    Abnormal uterine bleeding (AUB)    Vaginal yeast infection    DUB (dysfunctional uterine bleeding)    Migraine with aura, not intractable    s/p robotic hysterectomy/RSO 3/9/20    Borderline epithelial neoplasm of ovary    Surgical menopause    Yeast vaginitis    Menopause    Dizziness    Galactorrhea in female- bilateral breast    Other insomnia    Major depressive disorder, recurrent episode, in partial remission with anxious distress    Prediabetes    Obesity    S/P laparoscopic sleeve gastrectomy    BMI 45.0-49.9, adult    Hidradenitis    Seborrheic dermatitis       Chief complaint/reason for encounter: interpersonal   Met with patient to evaluate psychosocial adaptation to diagnosis/treatment/survivorship of IPV    Current Medications  Current Outpatient Medications   Medication    ALPRAZolam (XANAX) 0.5 MG tablet    b complex vitamins capsule     B3-azelaic-zinc-B6-copper-FA (NICAZEL) 600-5-10-5-1.5 mg Tab    CALCIUM CITRATE ORAL    clindamycin (CLEOCIN T) 1 % lotion    cyclobenzaprine (FLEXERIL) 5 MG tablet    doxycycline monohydrate 100 mg Tab    erythromycin with ethanoL (THERAMYCIN) 2 % external solution    estradioL 1.25 mg/1.25 gram (0.1 %) GlPk    finasteride (PROSCAR) 5 mg tablet    fluconazole (DIFLUCAN) 200 MG Tab    fluocinolone acetonide oiL (DERMOTIC OIL) 0.01 % Drop    galcanezumab-gnlm (EMGALITY PEN) 120 mg/mL PnIj    HUMIRA,CF, PEN 40 mg/0.4 mL PnKt    HUMIRA,CF, PEN 80 mg/0.8 mL PnKt    HUMIRA,CF, PEN 80 mg/0.8 mL PnKt    ketoconazole (NIZORAL) 2 % cream    multivitamin capsule    nitrofurantoin, macrocrystal-monohydrate, (MACROBID) 100 MG capsule    nystatin-triamcinolone (MYCOLOG II) cream    omeprazole (PRILOSEC) 40 MG capsule    ondansetron (ZOFRAN-ODT) 4 MG TbDL    pimecrolimus (ELIDEL) 1 % cream    progesterone (PROMETRIUM) 100 MG capsule    spironolactone (ALDACTONE) 50 MG tablet    spironolactone (ALDACTONE) 50 MG tablet    thiamine (VITAMIN B-1) 50 MG tablet    ubrogepant (UBRELVY) 50 mg tablet    venlafaxine (EFFEXOR-XR) 150 MG Cp24     No current facility-administered medications for this visit.       Objective:  Fran Spencer Gisela arrived late for the session.   The patient was fully cooperative throughout the session.  Appearance: age appropriate, appropriately  dressed, adequately  groomed  Behavior/Cooperation: friendly and cooperative  Speech: normal in rate, volume, and tone and appropriate quality, quantity and organization of sentences  Mood: anxious  Affect: mood congruent  Thought Process: goal-directed, logical  Thought Content: normal,  No delusions or paranoia; did not appear to be responding to internal stimuli during the session  Orientation: grossly intact  Attention Span/Concentration: Attends to session without distraction; reports no difficulty  Insight: patient has awareness of illness; good insight into  own behavior and behavior of others  Judgment: the patient's behavior is adequate to circumstances    Interval history and content of current session: Distress increased due to increased work schedule and bullying incident with her daughter.   The patient completed her practice  assignment related to identifying patterns of problematic thinking. The Challenging Beliefs Worksheet was introduced as a method of self-guided cognitive restructuring. An example  stuck point was used to illustrate the use of the worksheet.     SHe is increasingly able to challenge his own maladaptive thinking. The five themes targeted in the remainder of the  treatment were introduced, with a focus on safety for exploration in the next session.      Risk parameters:   Patient reports no suicidal ideation  Patient reports no homicidal ideation  Patient reports no self-injurious behavior  Patient reports no violent behavior   Safety needs:  None at this time      Verbal deficits: None     Patient's response to intervention:The patient's response to intervention is accepting.     Progress toward goals and other mental status changes:  The patient's progress toward goals is fair .      Progress to date:Progress - Ongoing, but Slow      Goals from last visit: Met     Patient reported outcomes:    Distress Thermometer:   Distress Score    Distress Score: 10 - Extreme Distress        Practical Problems Physical Problems                                                   Family Problems                                         Emotional Problems                                                         Spiritual/Religions Concerns     Spiritual / Faith Concerns: No         Other Problems               PHQ ANSWERS    Q1. Little interest or pleasure in doing things: (P) Nearly every day (07/14/23 1001)  Q2. Feeling down, depressed, or hopeless: (P) Several days (07/14/23 1001)  Q3. Trouble falling or staying asleep, or sleeping too much: (P) Nearly  every day (07/14/23 1001)  Q4. Feeling tired or having little energy: (P) Nearly every day (07/14/23 1001)  Q5. Poor appetite or overeating: (P) Nearly every day (07/14/23 1001)  Q6. Feeling bad about yourself - or that you are a failure or have let yourself or your family down: (P) Not at all (07/14/23 1001)  Q7. Trouble concentrating on things, such as reading the newspaper or watching television: (P) Nearly every day (07/14/23 1001)  Q8. Moving or speaking so slowly that other people could have noticed. Or the opposite - being so fidgety or restless that you have been moving around a lot more than usual: (P) Nearly every day (07/14/23 1001)  Q9.  0    PHQ8 Score : (P) 19 (07/14/23 1001)  PHQ-9 Total Score: (P) 19 (07/14/23 1001)     JOSE MARIA-7 Answers    GAD7 7/14/2023   1. Feeling nervous, anxious, or on edge? 3   2. Not being able to stop or control worrying? 3   3. Worrying too much about different things? 3   4. Trouble relaxing? 3   5. Being so restless that it is hard to sit still? 3   6. Becoming easily annoyed or irritable? 3   7. Feeling afraid as if something awful might happen? 0   JOSE MARIA-7 Score 18   Some encounter information is confidential and restricted. Go to Review Flowsheets activity to see all data.     JOSE MARIA-7 Score: (P) 18  Interpretation: (P) Severe Anxiety     Client Strengths: verbal, intelligent, successful, good social support, good insight, commitment to wellness, strong kerri, strong cultural traditions     Diagnosis:     ICD-10-CM ICD-9-CM   1. Trauma and stressor-related disorder  F43.9 309.81     308.9   2. Borderline epithelial neoplasm of ovary  D39.10 236.2       Treatment Plan:individual psychotherapy and medication management by physician  Target symptoms:  IPV  Why chosen therapy is appropriate versus another modality: relevant to diagnosis, patient responds to this modality, evidence based practice  Outcome monitoring methods: self-report, observation, checklist/rating  scale  Therapeutic intervention type: insight oriented psychotherapy, behavior modifying psychotherapy  Prognosis: Good      Behavioral goals:   The patient agreed to complete a Challenging Beliefs Worksheet each day about stuck points before the next session and to read the materials related to safety stuck points.    Return to clinic: as scheduled    Next Session:      Length of Service (minutes direct face-to-face contact): 45    Brittani Mahan, PhD  Clinical Psychologist  [place] License #[id number]  [place] License #[id number]

## 2023-07-19 ENCOUNTER — LAB VISIT (OUTPATIENT)
Dept: LAB | Facility: HOSPITAL | Age: 43
End: 2023-07-19
Attending: PHYSICIAN ASSISTANT
Payer: COMMERCIAL

## 2023-07-19 DIAGNOSIS — N95.1 MENOPAUSAL SYMPTOMS: ICD-10-CM

## 2023-07-19 LAB — ESTRADIOL SERPL-MCNC: 69 PG/ML

## 2023-07-19 PROCEDURE — 84402 ASSAY OF FREE TESTOSTERONE: CPT | Performed by: PHYSICIAN ASSISTANT

## 2023-07-19 PROCEDURE — 36415 COLL VENOUS BLD VENIPUNCTURE: CPT | Performed by: PHYSICIAN ASSISTANT

## 2023-07-19 PROCEDURE — 82670 ASSAY OF TOTAL ESTRADIOL: CPT | Performed by: PHYSICIAN ASSISTANT

## 2023-07-24 LAB — TESTOST FREE SERPL-MCNC: 0.7 PG/ML

## 2023-07-28 ENCOUNTER — OFFICE VISIT (OUTPATIENT)
Dept: PSYCHIATRY | Facility: CLINIC | Age: 43
End: 2023-07-28
Payer: COMMERCIAL

## 2023-07-28 DIAGNOSIS — F43.9 TRAUMA AND STRESSOR-RELATED DISORDER: Primary | ICD-10-CM

## 2023-07-28 DIAGNOSIS — D39.10 BORDERLINE EPITHELIAL NEOPLASM OF OVARY: ICD-10-CM

## 2023-07-28 PROCEDURE — 1159F PR MEDICATION LIST DOCUMENTED IN MEDICAL RECORD: ICD-10-PCS | Mod: CPTII,S$GLB,, | Performed by: PSYCHOLOGIST

## 2023-07-28 PROCEDURE — 1159F MED LIST DOCD IN RCRD: CPT | Mod: CPTII,S$GLB,, | Performed by: PSYCHOLOGIST

## 2023-07-28 PROCEDURE — 99999 PR PBB SHADOW E&M-EST. PATIENT-LVL II: CPT | Mod: PBBFAC,,, | Performed by: PSYCHOLOGIST

## 2023-07-28 PROCEDURE — 90834 PR PSYCHOTHERAPY W/PATIENT, 45 MIN: ICD-10-PCS | Mod: S$GLB,,, | Performed by: PSYCHOLOGIST

## 2023-07-28 PROCEDURE — 90834 PSYTX W PT 45 MINUTES: CPT | Mod: S$GLB,,, | Performed by: PSYCHOLOGIST

## 2023-07-28 PROCEDURE — 99999 PR PBB SHADOW E&M-EST. PATIENT-LVL II: ICD-10-PCS | Mod: PBBFAC,,, | Performed by: PSYCHOLOGIST

## 2023-07-28 NOTE — PROGRESS NOTES
INFORMED CONSENT: Patient was identified using two patient-identifiers. The patient has been informed of the risks and benefits associated with engaging in psychotherapy, the handling of protected health information, the rights of privacy and the limits of confidentiality. The patient has also been informed of the importance of reporting any suicidal or homicidal ideation to this or any provider to ensure safety of all parties, and the Fran Higgins expressed understanding. The patient was agreeable to these terms and freely participates in individual psychotherapy.    PSYCHO-ONCOLOGY NOTE/ Individual Psychotherapy     Date: 7/28/2023   Site:  [facility]        Therapeutic Intervention: Met with patient.  Outpatient - Insight oriented psychotherapy 45 min - CPT code 04449      Patient was last seen by me on 7/14/2023    Problem list  Patient Active Problem List   Diagnosis    Depression    Left ovarian cyst    S/P RA Laparoscopic LSO    Mucinous tumor, of low malignant potential    Pericardial cyst    Scoliosis of cervical spine    Abnormal uterine bleeding (AUB)    Vaginal yeast infection    DUB (dysfunctional uterine bleeding)    Migraine with aura, not intractable    s/p robotic hysterectomy/RSO 3/9/20    Borderline epithelial neoplasm of ovary    Surgical menopause    Yeast vaginitis    Menopause    Dizziness    Galactorrhea in female- bilateral breast    Other insomnia    Major depressive disorder, recurrent episode, in partial remission with anxious distress    Prediabetes    Obesity    S/P laparoscopic sleeve gastrectomy    BMI 45.0-49.9, adult    Hidradenitis    Seborrheic dermatitis       Chief complaint/reason for encounter: interpersonal   Met with patient to evaluate psychosocial adaptation to diagnosis/treatment/survivorship of IPV    Current Medications  Current Outpatient Medications   Medication    ALPRAZolam (XANAX) 0.5 MG tablet    b complex vitamins capsule     B3-azelaic-zinc-B6-copper-FA (NICAZEL) 600-5-10-5-1.5 mg Tab    CALCIUM CITRATE ORAL    clindamycin (CLEOCIN T) 1 % lotion    cyclobenzaprine (FLEXERIL) 5 MG tablet    doxycycline monohydrate 100 mg Tab    erythromycin with ethanoL (THERAMYCIN) 2 % external solution    estradioL 1.25 mg/1.25 gram (0.1 %) GlPk    finasteride (PROSCAR) 5 mg tablet    fluconazole (DIFLUCAN) 200 MG Tab    fluocinolone acetonide oiL (DERMOTIC OIL) 0.01 % Drop    galcanezumab-gnlm (EMGALITY PEN) 120 mg/mL PnIj    HUMIRA,CF, PEN 40 mg/0.4 mL PnKt    HUMIRA,CF, PEN 80 mg/0.8 mL PnKt    HUMIRA,CF, PEN 80 mg/0.8 mL PnKt    ketoconazole (NIZORAL) 2 % cream    multivitamin capsule    nitrofurantoin, macrocrystal-monohydrate, (MACROBID) 100 MG capsule    nystatin-triamcinolone (MYCOLOG II) cream    omeprazole (PRILOSEC) 40 MG capsule    ondansetron (ZOFRAN-ODT) 4 MG TbDL    pimecrolimus (ELIDEL) 1 % cream    progesterone (PROMETRIUM) 100 MG capsule    spironolactone (ALDACTONE) 50 MG tablet    spironolactone (ALDACTONE) 50 MG tablet    thiamine (VITAMIN B-1) 50 MG tablet    ubrogepant (UBRELVY) 50 mg tablet    venlafaxine (EFFEXOR-XR) 150 MG Cp24     No current facility-administered medications for this visit.       Objective:  Fran Spencer Gisela arrived late for the session.  The patient was fully cooperative throughout the session.  Appearance: age appropriate, appropriately  dressed, adequately  groomed  Behavior/Cooperation: friendly and cooperative  Speech: normal in rate, volume, and tone and appropriate quality, quantity and organization of sentences  Mood: euthymic  Affect: mood congruent  Thought Process: goal-directed, logical  Thought Content: normal,  No delusions or paranoia; did not appear to be responding to internal stimuli during the session  Orientation: grossly intact  Attention Span/Concentration: Attends to session without distraction; reports no difficulty  Insight: patient has awareness of illness; good insight into  own behavior and behavior of others  Judgment: the patient's behavior is adequate to circumstances    Interval history and content of current session:  The patient completed practice  assignment related to daily completion of the Challenging Beliefs Worksheet. Examples from  these worksheets were reviewed to offer further cognitive restructuring and to fine-tune  completion of the worksheets. Safety-related stuck points were specifically targeted. Stuck  points related to trust were introduced, and she agreed to read materials related to this theme.       Risk parameters:   Patient reports no suicidal ideation  Patient reports no homicidal ideation  Patient reports no self-injurious behavior  Patient reports no violent behavior   Safety needs:  None at this time      Verbal deficits: None     Patient's response to intervention:The patient's response to intervention is accepting.     Progress toward goals and other mental status changes:  The patient's progress toward goals is fair .      Progress to date:Progress - Ongoing, but Slow      Goals from last visit: Met     Patient reported outcomes:    Distress Thermometer:   Distress Score    Distress Score: 3        Practical Problems Physical Problems                                                   Family Problems                                         Emotional Problems                                                         Spiritual/Religions Concerns     Spiritual / Religion Concerns: No         Other Problems                 PHQ ANSWERS    Q1. Little interest or pleasure in doing things: (P) Not at all (07/27/23 0740)  Q2. Feeling down, depressed, or hopeless: (P) Not at all (07/27/23 0740)  Q3. Trouble falling or staying asleep, or sleeping too much: (P) More than half the days (07/27/23 0740)  Q4. Feeling tired or having little energy: (P) More than half the days (07/27/23 0740)  Q5. Poor appetite or overeating: (P) Not at all (07/27/23 0740)  Q6. Feeling  bad about yourself - or that you are a failure or have let yourself or your family down: (P) Not at all (07/27/23 0740)  Q7. Trouble concentrating on things, such as reading the newspaper or watching television: (P) Not at all (07/27/23 0740)  Q8. Moving or speaking so slowly that other people could have noticed. Or the opposite - being so fidgety or restless that you have been moving around a lot more than usual: (P) Not at all (07/27/23 0740)  Q9.  0    PHQ8 Score : (P) 4 (07/27/23 0740)  PHQ-9 Total Score: (P) 4 (07/27/23 0740)     JOSE MARIA-7 Answers    GAD7 7/27/2023   1. Feeling nervous, anxious, or on edge? 0   2. Not being able to stop or control worrying? 0   3. Worrying too much about different things? 0   4. Trouble relaxing? 1   5. Being so restless that it is hard to sit still? 0   6. Becoming easily annoyed or irritable? 0   7. Feeling afraid as if something awful might happen? 0   JOSE MARIA-7 Score 1   Some encounter information is confidential and restricted. Go to Review Flowsheets activity to see all data.     JOSE MARIA-7 Score: (P) 1  Interpretation: (P) Normal     Client Strengths: verbal, intelligent, successful, good social support, good insight, commitment to wellness, strong kerri, strong cultural traditions     Diagnosis:     ICD-10-CM ICD-9-CM   1. Trauma and stressor-related disorder  F43.9 309.81     308.9   2. Borderline epithelial neoplasm of ovary  D39.10 236.2       Treatment Plan:individual psychotherapy and medication management by physician  Target symptoms:  IPV  Why chosen therapy is appropriate versus another modality: relevant to diagnosis, patient responds to this modality, evidence based practice  Outcome monitoring methods: self-report, observation, checklist/rating scale  Therapeutic intervention type: insight oriented psychotherapy, behavior modifying psychotherapy  Prognosis: Good      Behavioral goals:   The patient also agreed to complete a Challenging Beliefs Worksheet each day about  stuck  points before the next session an d read Safety related worksheets.    Return to clinic: as scheduled    Next Session:      Length of Service (minutes direct face-to-face contact): 45    Brittani Mahan, PhD  Clinical Psychologist  [place] License #[id number]  [place] License #[id number]

## 2023-07-31 ENCOUNTER — PATIENT MESSAGE (OUTPATIENT)
Dept: OBSTETRICS AND GYNECOLOGY | Facility: CLINIC | Age: 43
End: 2023-07-31

## 2023-07-31 ENCOUNTER — OFFICE VISIT (OUTPATIENT)
Dept: OBSTETRICS AND GYNECOLOGY | Facility: CLINIC | Age: 43
End: 2023-07-31
Payer: COMMERCIAL

## 2023-07-31 DIAGNOSIS — Z12.31 SCREENING MAMMOGRAM, ENCOUNTER FOR: ICD-10-CM

## 2023-07-31 DIAGNOSIS — C56.9 MUCINOUS TUMOR, OF LOW MALIGNANT POTENTIAL: ICD-10-CM

## 2023-07-31 DIAGNOSIS — N95.1 MENOPAUSAL SYMPTOMS: Primary | ICD-10-CM

## 2023-07-31 DIAGNOSIS — L73.2 HIDRADENITIS: ICD-10-CM

## 2023-07-31 DIAGNOSIS — K21.9 GASTROESOPHAGEAL REFLUX DISEASE, UNSPECIFIED WHETHER ESOPHAGITIS PRESENT: ICD-10-CM

## 2023-07-31 PROCEDURE — 1159F PR MEDICATION LIST DOCUMENTED IN MEDICAL RECORD: ICD-10-PCS | Mod: CPTII,95,, | Performed by: PHYSICIAN ASSISTANT

## 2023-07-31 PROCEDURE — 1160F PR REVIEW ALL MEDS BY PRESCRIBER/CLIN PHARMACIST DOCUMENTED: ICD-10-PCS | Mod: CPTII,95,, | Performed by: PHYSICIAN ASSISTANT

## 2023-07-31 PROCEDURE — 1160F RVW MEDS BY RX/DR IN RCRD: CPT | Mod: CPTII,95,, | Performed by: PHYSICIAN ASSISTANT

## 2023-07-31 PROCEDURE — 99213 PR OFFICE/OUTPT VISIT, EST, LEVL III, 20-29 MIN: ICD-10-PCS | Mod: 95,,, | Performed by: PHYSICIAN ASSISTANT

## 2023-07-31 PROCEDURE — 1159F MED LIST DOCD IN RCRD: CPT | Mod: CPTII,95,, | Performed by: PHYSICIAN ASSISTANT

## 2023-07-31 PROCEDURE — 99213 OFFICE O/P EST LOW 20 MIN: CPT | Mod: 95,,, | Performed by: PHYSICIAN ASSISTANT

## 2023-07-31 RX ORDER — FINASTERIDE 5 MG/1
5 TABLET, FILM COATED ORAL DAILY
Qty: 30 TABLET | Refills: 11 | Status: CANCELLED | OUTPATIENT
Start: 2023-07-31 | End: 2024-07-30

## 2023-07-31 RX ORDER — ESTRADIOL 1.25 MG/1.25G
1.25 GEL TOPICAL DAILY
Qty: 37.5 G | Refills: 11 | Status: SHIPPED | OUTPATIENT
Start: 2023-07-31

## 2023-07-31 RX ORDER — OMEPRAZOLE 40 MG/1
40 CAPSULE, DELAYED RELEASE ORAL EVERY MORNING
Qty: 30 CAPSULE | Refills: 1 | Status: SHIPPED | OUTPATIENT
Start: 2023-07-31 | End: 2023-10-18 | Stop reason: SDUPTHER

## 2023-07-31 RX ORDER — PROGESTERONE 100 MG/1
100 CAPSULE ORAL NIGHTLY
Qty: 30 CAPSULE | Refills: 11 | Status: SHIPPED | OUTPATIENT
Start: 2023-07-31 | End: 2024-07-30

## 2023-07-31 NOTE — PROGRESS NOTES
"The patient location is: Home  The chief complaint leading to consultation is: Survivorship follow up    Visit type: audiovisual    Face to Face time with patient: 10 minutes  15 minutes of total time spent on the encounter, which includes face to face time and non-face to face time preparing to see the patient (eg, review of tests), Obtaining and/or reviewing separately obtained history, Documenting clinical information in the electronic or other health record, Independently interpreting results (not separately reported) and communicating results to the patient/family/caregiver, or Care coordination (not separately reported).         Each patient to whom he or she provides medical services by telemedicine is:  (1) informed of the relationship between the physician and patient and the respective role of any other health care provider with respect to management of the patient; and (2) notified that he or she may decline to receive medical services by telemedicine and may withdraw from such care at any time.    Notes:   Subjective:      Fran Higgins is a 42 y.o. female who is s/p gastric sleeve and hx of migraines presents for survivorship follow up. At her last visit on 4/24/2023 she continued to have hot flashes, mood swings, irritability, and insomnia on estrace 2mg Qam and progesterone 100mg QHS. She agreed to try transdermal estradiol and switched to divigel 1.25mg QD. Feeling better with this. Hot flashes and night sweats have improved. Sleeping better and mood is "ok." Just got back from vacation and feeling well. Would like to continue the divigel with progesterone 100mg QHS.   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 Marky Fox in Jun 2019.  Currently followed by Dr. Pro.    7/19/2023 Labs  Estradiol 69  Free testosterone 0.7    PCP: Erica Pineda MD  "   Routine labs: 12/5/2022  Pap smear: No result found  Mammogram: 9/15/2022 TC 9.43 %.  WWE: Pelvic exams [id number] with Dr. Pro    Lab Visit on 07/19/2023   Component Date Value Ref Range Status    Estradiol 07/19/2023 69  See Text pg/mL Final    Testosterone, Free 07/19/2023 0.7  pg/mL Final       Past Medical History:   Diagnosis Date    Abnormal Pap smear of cervix 2013    Abnormal uterine bleeding (AUB) 9/13/2019    Amblyopia     Anxiety     Cervical scoliosis 1994    Severe    Depression     DUB (dysfunctional uterine bleeding) 1/30/2020    Fatigue     Galactorrhea in female- bilateral breast 4/9/2020    Galactorrhea of both breasts 7/9/2020    Hx of psychiatric care     Migraine with aura, not intractable 1/30/2020    Mucinous tumor, 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    Ovarian cyst     Pericardial cyst 7/26/2019    Psychiatric problem     Sleep difficulties     Surgical menopause 3/13/2020    Therapy     Vaginal yeast infection 9/26/2019     Past Surgical History:   Procedure Laterality Date    HYSTERECTOMY  03/2020    LAPAROSCOPIC SLEEVE GASTRECTOMY N/A 6/22/2021    Procedure: GASTRECTOMY, SLEEVE, LAPAROSCOPIC; with intraoperative egd;  Surgeon: Jaziel Whitman Jr., MD;  Location: [address];  Service: General;  Laterality: N/A;    ROBOT-ASSISTED LAPAROSCOPIC ABDOMINAL HYSTERECTOMY USING DA ROMAIN XI N/A 3/9/2020    Procedure: XI ROBOTIC HYSTERECTOMY;  Surgeon: Jeff Kaufman MD;  Location: [address];  Service: Oncology;  Laterality: N/A;    ROBOT-ASSISTED LAPAROSCOPIC SALPINGO-OOPHORECTOMY Left 6/25/2019    Procedure: ROBOTIC SALPINGO-OOPHORECTOMY;  Surgeon: Marky Fox Jr., MD;  Location: [facility];  Service: OB/GYN;  Laterality: Left;    SALPINGOOPHORECTOMY Right 3/9/2020    Procedure: SALPINGO-OOPHORECTOMY  USO;  Surgeon: Jeff Kaufman MD;  Location: [address];  Service: Oncology;  Laterality: Right;    TONSILLECTOMY       Social History     Tobacco Use    Smoking status:  Never    Smokeless tobacco: Never   Substance Use Topics    Alcohol use: Yes     Comment: 2-3 drinks twice a year    Drug use: No     Family History   Problem Relation Age of Onset    Diabetes Paternal Grandfather     Macular degeneration Maternal Grandmother     Cancer Maternal Grandfather         prostate cancer    Hypertension Father     Depression Father     Drug abuse Father     Hypertension Mother     Stroke Mother 60        ASD, PFO    Depression Mother     Cancer Other         uterine cancer     Uterine cancer Other     Depression Sister     Colon cancer Neg Hx     Ovarian cancer Neg Hx     Breast cancer Neg Hx     Glaucoma Neg Hx     Melanoma Neg Hx     Heart attack Neg Hx     Heart disease Neg Hx     Hyperlipidemia Neg Hx      OB History    Para Term  AB Living   1 1 1 0 0 1   SAB IAB Ectopic Multiple Live Births   0 0 0 0 1      # Outcome Date GA Lbr Hector/2nd Weight Sex Delivery Anes PTL Lv   1 Term 13 38w6d 13:00 / 00:37 3.53 kg (7 lb 12.5 oz) F Vag-Spont EPI N TYRELL       Current Outpatient Medications:     ALPRAZolam (XANAX) 0.5 MG tablet, Take 1 tablet (0.5 mg total) by mouth nightly as needed., Disp: 30 tablet, Rfl: 1    b complex vitamins capsule, Take 1 capsule by mouth once daily., Disp: , Rfl:     B3-azelaic-zinc-B6-copper-FA (NICAZEL) 600-5-10-5-1.5 mg Tab, take 1 tablet by mouth twice daily, Disp: 60 tablet, Rfl: 5    CALCIUM CITRATE ORAL, Take 1 tablet by mouth 3 (three) times daily. chewable, Disp: , Rfl:     clindamycin (CLEOCIN T) 1 % lotion, Apply to affected area two times a day., Disp: 120 mL, Rfl: 3    cyclobenzaprine (FLEXERIL) 5 MG tablet, Take 1 tablet (5 mg total) by mouth 2 (two) times daily as needed for Muscle spasms., Disp: 60 tablet, Rfl: 5    doxycycline monohydrate 100 mg Tab, Take 1 tablet (100 mg total) by mouth 2 (two) times daily for 10 days when flaring, Disp: 60 tablet, Rfl: 1    erythromycin with ethanoL (THERAMYCIN) 2 % external solution, Apply  topically 2 (two) times daily., Disp: 60 mL, Rfl: 4    estradioL 1.25 mg/1.25 gram (0.1 %) GlPk, Place 1.25 mg onto the skin once daily. To the upper inner thigh, Disp: 37.5 g, Rfl: 11    finasteride (PROSCAR) 5 mg tablet, Take 1 tablet (5 mg total) by mouth once daily., Disp: 30 tablet, Rfl: 11    fluconazole (DIFLUCAN) 200 MG Tab, Take 1 tablet by mouth once if not better take another pill in 3 days, Disp: 30 tablet, Rfl: 0    fluocinolone acetonide oiL (DERMOTIC OIL) 0.01 % Drop, Use to affected ears at night, drop in canal and use to ear when flaring, Disp: 20 mL, Rfl: 3    galcanezumab-gnlm (EMGALITY PEN) 120 mg/mL PnIj, Inject 120 mg into the skin every 30 days., Disp: 1 mL, Rfl: 11    HUMIRA,CF, PEN 40 mg/0.4 mL PnKt, Inject 40mg (1 pen) into the skin every 7 days, Disp: 4 pen , Rfl: 4    HUMIRA,CF, PEN 80 mg/0.8 mL PnKt, Starting day 29, inject 80 mg (1 pen) into the skin every 2 weeks., Disp: 2 pen, Rfl: 4    HUMIRA,CF, PEN 80 mg/0.8 mL PnKt, Starting day 29, inject 80 mg (1 pen) into the skin every 2 weeks., Disp: 2 pen, Rfl: 4    ketoconazole (NIZORAL) 2 % cream, Apply to affected area two times a day (Patient taking differently: Apply to affected area two times a day), Disp: 60 g, Rfl: 3    multivitamin capsule, Take by mouth once daily., Disp: , Rfl:     nitrofurantoin, macrocrystal-monohydrate, (MACROBID) 100 MG capsule, Take 1 capsule (100 mg total) by mouth 2 (two) times daily., Disp: 14 capsule, Rfl: 0    nystatin-triamcinolone (MYCOLOG II) cream, Apply topically 2 (two) times daily., Disp: 30 g, Rfl: 1    omeprazole (PRILOSEC) 40 MG capsule, Take 1 capsule (40 mg total) by mouth every morning., Disp: 30 capsule, Rfl: 1    ondansetron (ZOFRAN-ODT) 4 MG TbDL, Dissolve 1 tablet (4 mg total) by mouth every 6 (six) hours as needed (for nausea)., Disp: 20 tablet, Rfl: 2    pimecrolimus (ELIDEL) 1 % cream, Apply to affected areas of underarm twice a day as needed for irritation. (Patient taking  differently: Apply to affected areas of underarm twice a day as needed for irritation.), Disp: 30 g, Rfl: 3    progesterone (PROMETRIUM) 100 MG capsule, Take 1 capsule (100 mg total) by mouth nightly., Disp: 30 capsule, Rfl: 11    spironolactone (ALDACTONE) 50 MG tablet, Start with taking 1 tablet by mouth every day, may increase to 2 tablets by mouth every day as tolerated., Disp: 60 tablet, Rfl: 4    spironolactone (ALDACTONE) 50 MG tablet, Start with taking 1 tablet by mouth every day, may increase to 2 tablets by mouth every day as tolerated., Disp: 60 tablet, Rfl: 4    thiamine (VITAMIN B-1) 50 MG tablet, Take 50 mg by mouth once daily., Disp: , Rfl:     ubrogepant (UBRELVY) 50 mg tablet, Take 1 tab by mouth for headaches. May repeat once 2 hours after initial dose based on response and tolerability., Disp: 16 tablet, Rfl: 5    venlafaxine (EFFEXOR-XR) 150 MG Cp24, Take 1 capsule (150 mg total) by mouth once daily., Disp: 30 capsule, Rfl: 1    Review of Systems:  General: No fever, chills, or weight loss.  Chest: No chest pain, shortness of breath, or palpitations.  Breast: No pain, masses, or nipple discharge.  Vulva: No pain, lesions, or itching.  Vagina: No relaxation, itching, discharge, or lesions.  Abdomen: No pain, nausea, vomiting, diarrhea, or constipation.  Urinary: No incontinence, nocturia, frequency, or dysuria.  Extremities:  No leg cramps, edema, or calf pain.  Neurologic: No headaches, dizziness, or visual changes.    Objective:   There were no vitals filed for this visit.  There is no height or weight on file to calculate BMI.    PHYSICAL EXAM:  APPEARANCE: Well nourished, well developed, in no acute distress.  AFFECT: WNL, alert and oriented x 3      Assessment:      Menopausal symptoms  -     estradioL 1.25 mg/1.25 gram (0.1 %) GlPk; Place 1.25 mg onto the skin once daily. To the upper inner thigh  Dispense: 37.5 g; Refill: 11  -     progesterone (PROMETRIUM) 100 MG capsule; Take 1 capsule  (100 mg total) by mouth nightly.  Dispense: 30 capsule; Refill: 11    Screening mammogram, encounter for  -     Mammo Digital Screening Bilat w/ Patrick; Future; Expected date: 07/31/2023    Mucinous tumor, of low malignant potential        Plan:   Continue Divigel 1.25mg  daily and Progesterone 100mg QHS  Annual mammogram due 9/2023  Continue follow ups with gyn onc.  Follow up annually or sooner PRN.    Instructed patient to call if she experiences any side effects or has any questions.    I spent a total of 15 minutes on the day of the visit.This includes face to face time and non-face to face time preparing to see the patient (eg, review of tests), obtaining and/or reviewing separately obtained history, documenting clinical information in the electronic or other health record, independently interpreting results and communicating results to the patient/family/caregiver, or care coordinator.

## 2023-08-02 ENCOUNTER — PATIENT MESSAGE (OUTPATIENT)
Dept: BARIATRICS | Facility: CLINIC | Age: 43
End: 2023-08-02
Payer: COMMERCIAL

## 2023-08-02 ENCOUNTER — PATIENT MESSAGE (OUTPATIENT)
Dept: PHARMACY | Facility: CLINIC | Age: 43
End: 2023-08-02
Payer: COMMERCIAL

## 2023-08-04 ENCOUNTER — OFFICE VISIT (OUTPATIENT)
Dept: PSYCHIATRY | Facility: CLINIC | Age: 43
End: 2023-08-04
Payer: COMMERCIAL

## 2023-08-04 DIAGNOSIS — D39.10 BORDERLINE EPITHELIAL NEOPLASM OF OVARY: ICD-10-CM

## 2023-08-04 DIAGNOSIS — F43.9 TRAUMA AND STRESSOR-RELATED DISORDER: Primary | ICD-10-CM

## 2023-08-04 PROCEDURE — 90834 PSYTX W PT 45 MINUTES: CPT | Mod: S$GLB,,, | Performed by: PSYCHOLOGIST

## 2023-08-04 PROCEDURE — 99999 PR PBB SHADOW E&M-EST. PATIENT-LVL II: CPT | Mod: PBBFAC,,, | Performed by: PSYCHOLOGIST

## 2023-08-04 PROCEDURE — 90834 PR PSYCHOTHERAPY W/PATIENT, 45 MIN: ICD-10-PCS | Mod: S$GLB,,, | Performed by: PSYCHOLOGIST

## 2023-08-04 PROCEDURE — 99999 PR PBB SHADOW E&M-EST. PATIENT-LVL II: ICD-10-PCS | Mod: PBBFAC,,, | Performed by: PSYCHOLOGIST

## 2023-08-04 PROCEDURE — 1159F PR MEDICATION LIST DOCUMENTED IN MEDICAL RECORD: ICD-10-PCS | Mod: CPTII,S$GLB,, | Performed by: PSYCHOLOGIST

## 2023-08-04 PROCEDURE — 1159F MED LIST DOCD IN RCRD: CPT | Mod: CPTII,S$GLB,, | Performed by: PSYCHOLOGIST

## 2023-08-04 NOTE — PROGRESS NOTES
INFORMED CONSENT: Patient was identified using two patient-identifiers. The patient has been informed of the risks and benefits associated with engaging in psychotherapy, the handling of protected health information, the rights of privacy and the limits of confidentiality. The patient has also been informed of the importance of reporting any suicidal or homicidal ideation to this or any provider to ensure safety of all parties, and the Fran Higgins expressed understanding. The patient was agreeable to these terms and freely participates in individual psychotherapy.    PSYCHO-ONCOLOGY NOTE/ Individual Psychotherapy     Date: 8/4/2023   Site:  [facility]        Therapeutic Intervention: Met with patient.  Outpatient - Behavior modifying psychotherapy 45 min - CPT code 03198      Patient was last seen by me on 7/28/2023    Problem list  Patient Active Problem List   Diagnosis    Depression    Left ovarian cyst    S/P RA Laparoscopic LSO    Mucinous tumor, of low malignant potential    Pericardial cyst    Scoliosis of cervical spine    Abnormal uterine bleeding (AUB)    Vaginal yeast infection    DUB (dysfunctional uterine bleeding)    Migraine with aura, not intractable    s/p robotic hysterectomy/RSO 3/9/20    Borderline epithelial neoplasm of ovary    Surgical menopause    Yeast vaginitis    Menopause    Dizziness    Galactorrhea in female- bilateral breast    Other insomnia    Major depressive disorder, recurrent episode, in partial remission with anxious distress    Prediabetes    Obesity    S/P laparoscopic sleeve gastrectomy    BMI 45.0-49.9, adult    Hidradenitis    Seborrheic dermatitis       Chief complaint/reason for encounter: interpersonal   Met with patient to evaluate psychosocial adaptation to diagnosis/treatment/survivorship of IPV    Current Medications  Current Outpatient Medications   Medication    ALPRAZolam (XANAX) 0.5 MG tablet    b complex vitamins capsule     B3-azelaic-zinc-B6-copper-FA (NICAZEL) 600-5-10-5-1.5 mg Tab    CALCIUM CITRATE ORAL    clindamycin (CLEOCIN T) 1 % lotion    cyclobenzaprine (FLEXERIL) 5 MG tablet    doxycycline monohydrate 100 mg Tab    erythromycin with ethanoL (THERAMYCIN) 2 % external solution    estradioL 1.25 mg/1.25 gram (0.1 %) GlPk    finasteride (PROSCAR) 5 mg tablet    fluconazole (DIFLUCAN) 200 MG Tab    fluocinolone acetonide oiL (DERMOTIC OIL) 0.01 % Drop    galcanezumab-gnlm (EMGALITY PEN) 120 mg/mL PnIj    HUMIRA,CF, PEN 40 mg/0.4 mL PnKt    HUMIRA,CF, PEN 80 mg/0.8 mL PnKt    HUMIRA,CF, PEN 80 mg/0.8 mL PnKt    ketoconazole (NIZORAL) 2 % cream    multivitamin capsule    nitrofurantoin, macrocrystal-monohydrate, (MACROBID) 100 MG capsule    nystatin-triamcinolone (MYCOLOG II) cream    omeprazole (PRILOSEC) 40 MG capsule    ondansetron (ZOFRAN-ODT) 4 MG TbDL    pimecrolimus (ELIDEL) 1 % cream    progesterone (PROMETRIUM) 100 MG capsule    spironolactone (ALDACTONE) 50 MG tablet    spironolactone (ALDACTONE) 50 MG tablet    thiamine (VITAMIN B-1) 50 MG tablet    ubrogepant (UBRELVY) 50 mg tablet    venlafaxine (EFFEXOR-XR) 150 MG Cp24     No current facility-administered medications for this visit.       Objective:  Fran Spencer Gisela arrived promptly for the session.   The patient was fully cooperative throughout the session.  Appearance: age appropriate, appropriately  dressed, adequately  groomed  Behavior/Cooperation: friendly and cooperative  Speech: normal in rate, volume, and tone and appropriate quality, quantity and organization of sentences  Mood: anxious  Affect: mood congruent  Thought Process: goal-directed, logical  Thought Content: normal,  No delusions or paranoia; did not appear to be responding to internal stimuli during the session  Orientation: grossly intact  Attention Span/Concentration: Attends to session without distraction; reports no difficulty  Insight: patient has awareness of illness; good insight  into own behavior and behavior of others  Judgment: the patient's behavior is adequate to circumstances    Interval history and content of current session: The patient partially completed practice assignment related to daily completion of the Challenging Beliefs Worksheet. Examples from these worksheets were reviewed to offer further cognitive restructuring and to fine-tune completion of the worksheets. Trust-related stuck points were specifically targeted.      Risk parameters:   Patient reports no suicidal ideation  Patient reports no homicidal ideation  Patient reports no self-injurious behavior  Patient reports no violent behavior   Safety needs:  None at this time      Verbal deficits: None     Patient's response to intervention:The patient's response to intervention is accepting.     Progress toward goals and other mental status changes:  The patient's progress toward goals is fair .      Progress to date:No Progress - Continue Objectives      Goals from last visit: Attempted, not met     Patient reported outcomes:    Distress Thermometer:   Distress Score    Distress Score: 9        Practical Problems Physical Problems                                                   Family Problems                                         Emotional Problems                                                         Spiritual/Religions Concerns     Spiritual / Taoism Concerns: No         Other Problems                 PHQ ANSWERS    Q1. Little interest or pleasure in doing things: (P) Not at all (08/04/23 0948)  Q2. Feeling down, depressed, or hopeless: (P) Not at all (08/04/23 0948)  Q3. Trouble falling or staying asleep, or sleeping too much: (P) Nearly every day (08/04/23 0948)  Q4. Feeling tired or having little energy: (P) Nearly every day (08/04/23 0948)  Q5. Poor appetite or overeating: (P) Nearly every day (08/04/23 0948)  Q6. Feeling bad about yourself - or that you are a failure or have let yourself or your family  down: (P) Not at all (08/04/23 0948)  Q7. Trouble concentrating on things, such as reading the newspaper or watching television: (P) Nearly every day (08/04/23 0948)  Q8. Moving or speaking so slowly that other people could have noticed. Or the opposite - being so fidgety or restless that you have been moving around a lot more than usual: (P) Not at all (08/04/23 0948)  Q9.  0    PHQ8 Score : (P) 12 (08/04/23 0948)  PHQ-9 Total Score: (P) 12 (08/04/23 0948)     JOSE MARIA-7 Answers    GAD7 8/4/2023   1. Feeling nervous, anxious, or on edge? 1   2. Not being able to stop or control worrying? 0   3. Worrying too much about different things? 0   4. Trouble relaxing? 3   5. Being so restless that it is hard to sit still? 0   6. Becoming easily annoyed or irritable? 3   7. Feeling afraid as if something awful might happen? 0   JOSE MARIA-7 Score 7   Some encounter information is confidential and restricted. Go to Review Flowsheets activity to see all data.     JOSE MARIA-7 Score: (P) 7  Interpretation: (P) Mild Anxiety     Client Strengths: verbal, intelligent, successful, good social support, good insight, commitment to wellness, strong kerri, strong cultural traditions     Diagnosis:     ICD-10-CM ICD-9-CM   1. Trauma and stressor-related disorder  F43.9 309.81     308.9   2. Borderline epithelial neoplasm of ovary  D39.10 236.2       Treatment Plan:individual psychotherapy and medication management by physician  Target symptoms: depression, anxiety   Why chosen therapy is appropriate versus another modality: relevant to diagnosis, patient responds to this modality, evidence based practice  Outcome monitoring methods: self-report, observation, checklist/rating scale  Therapeutic intervention type: insight oriented psychotherapy, behavior modifying psychotherapy  Prognosis: Fair      Behavioral goals:   Reduce Trust model with notes.       Return to clinic: 1 week    Next Session:   Power and Control     Length of Service (minutes direct  face-to-face contact): 45    Brittani Mahan, PhD  Clinical Psychologist  [place] License #[id number]  [place] License #[id number]

## 2023-08-09 NOTE — TELEPHONE ENCOUNTER
-     finasteride (PROSCAR) 5 mg tablet; Take 1 tablet (5 mg total) by mouth once daily.  Dispense: 30 tablet; Refill: 11

## 2023-08-11 ENCOUNTER — OFFICE VISIT (OUTPATIENT)
Dept: PSYCHIATRY | Facility: CLINIC | Age: 43
End: 2023-08-11
Payer: COMMERCIAL

## 2023-08-11 DIAGNOSIS — F43.9 TRAUMA AND STRESSOR-RELATED DISORDER: Primary | ICD-10-CM

## 2023-08-11 DIAGNOSIS — D39.10 BORDERLINE EPITHELIAL NEOPLASM OF OVARY: ICD-10-CM

## 2023-08-11 PROCEDURE — 99999 PR PBB SHADOW E&M-EST. PATIENT-LVL II: ICD-10-PCS | Mod: PBBFAC,,, | Performed by: PSYCHOLOGIST

## 2023-08-11 PROCEDURE — 90837 PSYTX W PT 60 MINUTES: CPT | Mod: S$GLB,,, | Performed by: PSYCHOLOGIST

## 2023-08-11 PROCEDURE — 1159F MED LIST DOCD IN RCRD: CPT | Mod: CPTII,S$GLB,, | Performed by: PSYCHOLOGIST

## 2023-08-11 PROCEDURE — 1159F PR MEDICATION LIST DOCUMENTED IN MEDICAL RECORD: ICD-10-PCS | Mod: CPTII,S$GLB,, | Performed by: PSYCHOLOGIST

## 2023-08-11 PROCEDURE — 99999 PR PBB SHADOW E&M-EST. PATIENT-LVL II: CPT | Mod: PBBFAC,,, | Performed by: PSYCHOLOGIST

## 2023-08-11 PROCEDURE — 90837 PR PSYCHOTHERAPY W/PATIENT, 60 MIN: ICD-10-PCS | Mod: S$GLB,,, | Performed by: PSYCHOLOGIST

## 2023-08-11 NOTE — PROGRESS NOTES
INFORMED CONSENT: Patient was identified using two patient-identifiers. The patient has been informed of the risks and benefits associated with engaging in psychotherapy, the handling of protected health information, the rights of privacy and the limits of confidentiality. The patient has also been informed of the importance of reporting any suicidal or homicidal ideation to this or any provider to ensure safety of all parties, and the Fran Higgins expressed understanding. The patient was agreeable to these terms and freely participates in individual psychotherapy.    PSYCHO-ONCOLOGY NOTE/ Individual Psychotherapy     Date: 8/11/2023   Site:  [facility]        Therapeutic Intervention: Met with patient.  Outpatient - Insight oriented psychotherapy 60 min - CPT code 87220      Patient was last seen by me on 8/4/2023    Problem list  Patient Active Problem List   Diagnosis    Depression    Left ovarian cyst    S/P RA Laparoscopic LSO    Mucinous tumor, of low malignant potential    Pericardial cyst    Scoliosis of cervical spine    Abnormal uterine bleeding (AUB)    Vaginal yeast infection    DUB (dysfunctional uterine bleeding)    Migraine with aura, not intractable    s/p robotic hysterectomy/RSO 3/9/20    Borderline epithelial neoplasm of ovary    Surgical menopause    Yeast vaginitis    Menopause    Dizziness    Galactorrhea in female- bilateral breast    Other insomnia    Major depressive disorder, recurrent episode, in partial remission with anxious distress    Prediabetes    Obesity    S/P laparoscopic sleeve gastrectomy    BMI 45.0-49.9, adult    Hidradenitis    Seborrheic dermatitis       Chief complaint/reason for encounter: anxiety and interpersonal   Met with patient to evaluate psychosocial adaptation to diagnosis/treatment/survivorship of IPV    Current Medications  Current Outpatient Medications   Medication    ALPRAZolam (XANAX) 0.5 MG tablet    b complex vitamins capsule     B3-azelaic-zinc-B6-copper-FA (NICAZEL) 600-5-10-5-1.5 mg Tab    CALCIUM CITRATE ORAL    clindamycin (CLEOCIN T) 1 % lotion    cyclobenzaprine (FLEXERIL) 5 MG tablet    doxycycline monohydrate 100 mg Tab    erythromycin with ethanoL (THERAMYCIN) 2 % external solution    estradioL 1.25 mg/1.25 gram (0.1 %) GlPk    finasteride (PROSCAR) 5 mg tablet    fluconazole (DIFLUCAN) 200 MG Tab    fluocinolone acetonide oiL (DERMOTIC OIL) 0.01 % Drop    galcanezumab-gnlm (EMGALITY PEN) 120 mg/mL PnIj    HUMIRA,CF, PEN 40 mg/0.4 mL PnKt    HUMIRA,CF, PEN 80 mg/0.8 mL PnKt    HUMIRA,CF, PEN 80 mg/0.8 mL PnKt    ketoconazole (NIZORAL) 2 % cream    multivitamin capsule    nitrofurantoin, macrocrystal-monohydrate, (MACROBID) 100 MG capsule    nystatin-triamcinolone (MYCOLOG II) cream    omeprazole (PRILOSEC) 40 MG capsule    ondansetron (ZOFRAN-ODT) 4 MG TbDL    pimecrolimus (ELIDEL) 1 % cream    progesterone (PROMETRIUM) 100 MG capsule    spironolactone (ALDACTONE) 50 MG tablet    spironolactone (ALDACTONE) 50 MG tablet    thiamine (VITAMIN B-1) 50 MG tablet    ubrogepant (UBRELVY) 50 mg tablet    venlafaxine (EFFEXOR-XR) 150 MG Cp24     No current facility-administered medications for this visit.       Objective:  Fran Spencer Gisela arrived promptly for the session.   The patient was fully cooperative throughout the session.  Appearance: age appropriate, appropriately  dressed, adequately  groomed  Behavior/Cooperation: friendly and cooperative  Speech: normal in rate, volume, and tone and appropriate quality, quantity and organization of sentences  Mood: euthymic  Affect: mood congruent  Thought Process: goal-directed, logical  Thought Content: normal,  No delusions or paranoia; did not appear to be responding to internal stimuli during the session  Orientation: grossly intact  Attention Span/Concentration: Attends to session without distraction; reports no difficulty  Insight: patient has awareness of illness; good insight  into own behavior and behavior of others  Judgment: the patient's behavior is adequate to circumstances    Interval history and content of current session: Reviewed Power/Control Discussed  power trust issues related to trauma . Reports to be coping with great difficulty. Evaluated cognitive response, paying particular attention to negative intrusive thoughts of a persistent and detrimental nature. Thoughts of this type are in evidence with moderate distress.   Risk parameters:   Patient reports no suicidal ideation  Patient reports no homicidal ideation  Patient reports no self-injurious behavior  Patient reports no violent behavior   Safety needs:  None at this time      Verbal deficits: None     Patient's response to intervention:The patient's response to intervention is accepting.     Progress toward goals and other mental status changes:  The patient's progress toward goals is fair .      Progress to date:Progress - Ongoing, but Slow      Goals from last visit: Met     Patient reported outcomes:    Distress Thermometer:   Distress Score    Distress Score: 9        Practical Problems Physical Problems                                                   Family Problems                                         Emotional Problems                                                         Spiritual/Religions Concerns     Spiritual / Latter day Concerns: No         Other Problems                 PHQ ANSWERS    Q1. Little interest or pleasure in doing things: (P) More than half the days (08/11/23 0948)  Q2. Feeling down, depressed, or hopeless: (P) Not at all (08/11/23 0948)  Q3. Trouble falling or staying asleep, or sleeping too much: (P) Nearly every day (08/11/23 0948)  Q4. Feeling tired or having little energy: (P) Nearly every day (08/11/23 0948)  Q5. Poor appetite or overeating: (P) Nearly every day (08/11/23 0948)  Q6. Feeling bad about yourself - or that you are a failure or have let yourself or your family down: (P)  Not at all (08/11/23 0948)  Q7. Trouble concentrating on things, such as reading the newspaper or watching television: (P) More than half the days (08/11/23 0948)  Q8. Moving or speaking so slowly that other people could have noticed. Or the opposite - being so fidgety or restless that you have been moving around a lot more than usual: (P) Not at all (08/11/23 0948)  Q9.  0    PHQ8 Score : (P) 13 (08/11/23 0948)  PHQ-9 Total Score: (P) 13 (08/11/23 0948)     JOSE MARIA-7 Answers    GAD7 8/11/2023   1. Feeling nervous, anxious, or on edge? 3   2. Not being able to stop or control worrying? 1   3. Worrying too much about different things? 1   4. Trouble relaxing? 3   5. Being so restless that it is hard to sit still? 1   6. Becoming easily annoyed or irritable? 2   7. Feeling afraid as if something awful might happen? 0   JOSE MARIA-7 Score 11   Some encounter information is confidential and restricted. Go to Review Flowsheets activity to see all data.     JOSE MARIA-7 Score: (P) 11  Interpretation: (P) Moderate Anxiety     Client Strengths: verbal, intelligent, successful, good social support, good insight, commitment to wellness, strong kerri, strong cultural traditions     Diagnosis:     ICD-10-CM ICD-9-CM   1. Trauma and stressor-related disorder  F43.9 309.81     308.9   2. Borderline epithelial neoplasm of ovary  D39.10 236.2       Treatment Plan:individual psychotherapy and medication management by physician  Target symptoms: anxiety   Why chosen therapy is appropriate versus another modality: relevant to diagnosis, patient responds to this modality, evidence based practice  Outcome monitoring methods: self-report, observation, checklist/rating scale  Therapeutic intervention type: behavior modifying psychotherapy  Prognosis: Fair      Behavioral goals:    Exercise:   Stress management:   Social engagement:   Nutrition:   Smoking Cessation:   Therapy:    Return to clinic: as scheduled    Next Session:      Length of Service  (minutes direct face-to-face contact): 60    Brittani Mahan, PhD  Clinical Psychologist  [place] License #[id number]  [place] License #[id number]

## 2023-08-15 DIAGNOSIS — L73.2 HIDRADENITIS: ICD-10-CM

## 2023-08-15 RX ORDER — FINASTERIDE 5 MG/1
5 TABLET, FILM COATED ORAL DAILY
Qty: 30 TABLET | Refills: 11 | Status: CANCELLED | OUTPATIENT
Start: 2023-08-15 | End: 2024-08-14

## 2023-08-17 ENCOUNTER — PROCEDURE VISIT (OUTPATIENT)
Dept: NEUROLOGY | Facility: CLINIC | Age: 43
End: 2023-08-17
Payer: COMMERCIAL

## 2023-08-17 VITALS — DIASTOLIC BLOOD PRESSURE: 87 MMHG | SYSTOLIC BLOOD PRESSURE: 123 MMHG | HEART RATE: 106 BPM

## 2023-08-17 DIAGNOSIS — G43.E09 CHRONIC MIGRAINE WITH AURA: Primary | ICD-10-CM

## 2023-08-17 DIAGNOSIS — L73.2 HIDRADENITIS: ICD-10-CM

## 2023-08-17 PROCEDURE — 64615 CHEMODENERV MUSC MIGRAINE: CPT | Mod: S$GLB,,, | Performed by: PHYSICIAN ASSISTANT

## 2023-08-17 PROCEDURE — 64615 PR CHEMODENERVATION OF MUSCLE FOR CHRONIC MIGRAINE: ICD-10-PCS | Mod: S$GLB,,, | Performed by: PHYSICIAN ASSISTANT

## 2023-08-17 PROCEDURE — 99499 NO LOS: ICD-10-PCS | Mod: S$GLB,,, | Performed by: PHYSICIAN ASSISTANT

## 2023-08-17 PROCEDURE — 99499 UNLISTED E&M SERVICE: CPT | Mod: S$GLB,,, | Performed by: PHYSICIAN ASSISTANT

## 2023-08-17 NOTE — PROCEDURES
"SUBJECTIVE:  Patient ID: Fran Higgins  Chief Complaint: Botulinum Toxin Injection and Follow-up      History of Present Illness:  Fran Higgins is a 42 y.o. female with  with migraine, cervical scoliosis, obesity s/p sleeve gastrectomy, anxiety/depression, surgical menopause s/p hysterectomy/RSO for ovary cyst, insomnia  who presents to clinic alone for follow-up of headaches and Botox injections.       08/17/2023- Interval History: botox  Pt feels botox has been helpful. Is not trackign but states "I noticed a change" after the last round. She states she experiences approximately 20/30 ha days per month, hwoever isn't tracking. Will track for next visit.   Plan: continue botox, emgality, ubrelvy 50mg prn, zofran, rtc in 2 mo for f/up and 12 wks for next botox    05/25/2023 - botox #2    02/23/2023 - botox #1    12/12/2022 - Interval History:  Pt's ha's have worsened since last visit. They are now occurring >15/30 days per month for >3 mo, lasting > 4 hrs at a time. She tried tpx but had to d/c 2/2 depression SE. She also tried the addition of supplements including mag w/ no benefit noted. She continues to have trouble falling and staying asleep for which she recently started acupuncture for. Defers referral to sleep med, elavil, pcp at this time. Pt is interested in botox and would be an ideal candidate as she has tried and failed multiple ppx options.   Plan: start botox next visit, continue emgality, ubrelvy 50mg prn, zofran, continue sleep strategies, rtc jan 12 to begin botox     09/19/2022 - Interval History:   Ha's have increased 1-2 months ago possibly due to worsened insomnia and stress lately. Are now occurring 8/30 days per month lasting up to 1 day at a time. Currently taking effexor and xanax for these conditions.   Ubrelvy 50mg - typically resolves HA's, infrequently needs to repeat dose  Discussed multiple options, pt agreeable w/ following. Denies current depression.   Plan: start " tpx 25mg/d, continue emgality, ubrelvy 50mg prn, zofran, rtc in 3 mo or sooner if needed     Recommendations made at last Office Visit on 12/6/21:  - Discussed symptoms appear to be consistent with migraines. Discussed this with patient along with treatment options and patient agreed with the following plan  - ppx - continue emgality  - abortive - trial ubrelvy  - nausea - continue zofran  - avoid nsaids as they are c/i 2/2 gastrectomy  - avoid triptans as recommended by psychiatry 2/2 potential serotinin syndrome w/ effexor for depression  - decreased neck ROM, tightness, and cervical scoliosis - continue conservative treatments, consider PT in future  - risks, benefits, and potential side effects of emgality, ubrelvy, zofran discussed   - alternative treatment options offered   - importance of healthy diet, regular exercise and sleep hygiene in the treatment of headaches    - Start tracking headaches via Migraine Buddy roseann on phone   - RTC 6-12 mo or sooner if needed      Treatments Tried:  emgality - helps  Effexor  Tpx - depression  Anti-htn meds - avoid 2/2 low bp  Bb - avoid 2/2 depression  Ubrelvy - helps  triptans - has been told to avoid per psychiatrist 2/2 serotonin syndrome risk  nsaids - c/i 2/2 recent gastrectomy  zofran       Current Medications:    Current Outpatient Medications:     b complex vitamins capsule, Take 1 capsule by mouth once daily., Disp: , Rfl:     B3-azelaic-zinc-B6-copper-FA (NICAZEL) 600-5-10-5-1.5 mg Tab, take 1 tablet by mouth twice daily, Disp: 60 tablet, Rfl: 5    CALCIUM CITRATE ORAL, Take 1 tablet by mouth 3 (three) times daily. chewable, Disp: , Rfl:     clindamycin (CLEOCIN T) 1 % lotion, Apply to affected area two times a day., Disp: 120 mL, Rfl: 3    cyclobenzaprine (FLEXERIL) 5 MG tablet, Take 1 tablet (5 mg total) by mouth 2 (two) times daily as needed for Muscle spasms., Disp: 60 tablet, Rfl: 5    doxycycline monohydrate 100 mg Tab, Take 1 tablet (100 mg total) by  mouth 2 (two) times daily for 10 days when flaring, Disp: 60 tablet, Rfl: 1    erythromycin with ethanoL (THERAMYCIN) 2 % external solution, Apply topically 2 (two) times daily., Disp: 60 mL, Rfl: 4    estradioL 1.25 mg/1.25 gram (0.1 %) GlPk, Place 1.25 mg onto the skin once daily. To the upper inner thigh, Disp: 37.5 g, Rfl: 11    finasteride (PROSCAR) 5 mg tablet, Take 1 tablet (5 mg total) by mouth once daily., Disp: 30 tablet, Rfl: 11    fluconazole (DIFLUCAN) 200 MG Tab, Take 1 tablet by mouth once if not better take another pill in 3 days, Disp: 30 tablet, Rfl: 0    fluocinolone acetonide oiL (DERMOTIC OIL) 0.01 % Drop, Use to affected ears at night, drop in canal and use to ear when flaring, Disp: 20 mL, Rfl: 3    galcanezumab-gnlm (EMGALITY PEN) 120 mg/mL PnIj, Inject 120 mg into the skin every 30 days., Disp: 1 mL, Rfl: 11    HUMIRA,CF, PEN 40 mg/0.4 mL PnKt, Inject 40mg (1 pen) into the skin every 7 days, Disp: 4 pen , Rfl: 4    HUMIRA,CF, PEN 80 mg/0.8 mL PnKt, Starting day 29, inject 80 mg (1 pen) into the skin every 2 weeks., Disp: 2 pen, Rfl: 4    HUMIRA,CF, PEN 80 mg/0.8 mL PnKt, Starting day 29, inject 80 mg (1 pen) into the skin every 2 weeks., Disp: 2 pen, Rfl: 4    ketoconazole (NIZORAL) 2 % cream, Apply to affected area two times a day (Patient taking differently: Apply to affected area two times a day), Disp: 60 g, Rfl: 3    multivitamin capsule, Take by mouth once daily., Disp: , Rfl:     nitrofurantoin, macrocrystal-monohydrate, (MACROBID) 100 MG capsule, Take 1 capsule (100 mg total) by mouth 2 (two) times daily., Disp: 14 capsule, Rfl: 0    nystatin-triamcinolone (MYCOLOG II) cream, Apply topically 2 (two) times daily., Disp: 30 g, Rfl: 1    omeprazole (PRILOSEC) 40 MG capsule, Take 1 capsule (40 mg total) by mouth every morning., Disp: 30 capsule, Rfl: 1    ondansetron (ZOFRAN-ODT) 4 MG TbDL, Dissolve 1 tablet (4 mg total) by mouth every 6 (six) hours as needed (for nausea)., Disp: 20  tablet, Rfl: 2    pimecrolimus (ELIDEL) 1 % cream, Apply to affected areas of underarm twice a day as needed for irritation. (Patient taking differently: Apply to affected areas of underarm twice a day as needed for irritation.), Disp: 30 g, Rfl: 3    progesterone (PROMETRIUM) 100 MG capsule, Take 1 capsule (100 mg total) by mouth nightly., Disp: 30 capsule, Rfl: 11    spironolactone (ALDACTONE) 50 MG tablet, Start with taking 1 tablet by mouth every day, may increase to 2 tablets by mouth every day as tolerated., Disp: 60 tablet, Rfl: 4    spironolactone (ALDACTONE) 50 MG tablet, Start with taking 1 tablet by mouth every day, may increase to 2 tablets by mouth every day as tolerated., Disp: 60 tablet, Rfl: 4    thiamine (VITAMIN B-1) 50 MG tablet, Take 50 mg by mouth once daily., Disp: , Rfl:     ubrogepant (UBRELVY) 50 mg tablet, Take 1 tab by mouth for headaches. May repeat once 2 hours after initial dose based on response and tolerability., Disp: 16 tablet, Rfl: 5    venlafaxine (EFFEXOR-XR) 150 MG Cp24, Take 1 capsule (150 mg total) by mouth once daily., Disp: 30 capsule, Rfl: 1    ALPRAZolam (XANAX) 0.5 MG tablet, Take 1 tablet (0.5 mg total) by mouth nightly as needed., Disp: 30 tablet, Rfl: 1  No current facility-administered medications for this visit.    Review of Systems - as per HPI, otherwise a balanced 10 systems review is negative.    OBJECTIVE:  Vitals:  /87   Pulse 106   LMP 01/17/2020     Physical Exam:  Constitutional: she appears well-developed and well-nourished. she is well groomed. NAD   HENT:    Head: Normocephalic and atraumatic  Eyes: Conjunctivae and EOM are normal  Musculoskeletal: Normal range of motion. No joint stiffness.   Skin: Skin is warm and dry.  Psychiatric: Mood and affect are normal    Neuro: Patient is alert and oriented to person, place, and time. Language is intact and fluent.  Recent and remote memory are intact.  Normal attention and concentration.  Facial  movement is symmetric.  Gait is normal.     Review of Data:   Notes from neuro reviewed.   Labs:  Lab Visit on 07/19/2023   Component Date Value Ref Range Status    Estradiol 07/19/2023 69  See Text pg/mL Final    Testosterone, Free 07/19/2023 0.7  pg/mL Final     Imaging:  No results found. However, due to the size of the patient record, not all encounters were searched. Please check Results Review for a complete set of results.    Note: I have independently reviewed any/all imaging/labs/tests and agree with the report (s) as documented.  Any discrepancies will be as noted/demarcated by free text.  CHANTELLE CHING 8/17/2023    ASSESSMENT:  1. Chronic migraine with aura        PLAN:  - Discussed symptoms appear to be consistent with migraines. Discussed this with patient along with treatment options and patient agreed with the following plan  - ppx - continue emgality, botox  - Botox administered in clinic for Chronic Migraine (see below)   - abortive - continue ubrelvy  - nausea - continue zofran  - trouble w/ sleep - recently started acupuncture, defers referrals to pcp or sleep med or to begin elavil due to concern w/ interaction w/ effexor  - avoid nsaids as they are c/i 2/2 gastrectomy  - avoid triptans as recommended by psychiatry 2/2 potential serotinin syndrome w/ effexor for depression  - decreased neck ROM, tightness, and cervical scoliosis - continue conservative treatments, consider PT in future  - RTC in 12 weeks for repeat Botox injections or sooner if needed     Orders Placed This Encounter    Prior authorization Order    onabotulinumtoxina injection 200 Units       All questions and concerns addressed.  Patient verbalizes understanding and is agreeable with the above stated treatment plan.      PROCEDURE NOTE:  BOTOX was performed as an indicated therapy for intractable chronic migraine headaches given that the patient failed more than 2 headache medications    Two patient identifiers were confirmed with  the patient prior to performing this procedure. A time out to determine correct patient and and agreement on procedure performed was conducted prior to the procedure.      Botulinum Toxin Injection Procedure   Procedure: Botulinum toxin injection (63499)  After risks and benefits were explained including bleeding, infection, worsening of pain, damage to the areas being injected, weakness of muscles, loss of muscle control, dysphagia if injecting the head or neck, facial droop if injecting the facial area, painful injection, allergic or other reaction to the medications being injected, and the failure of the procedure to help the problem, a signed consent was obtained.   The patient was placed in a comfortable area and the sites to be treated were identified.The area to be treated was prepped three times with alcohol and the alcohol allowed to dry. Next, a 30 gauge needle was used to inject the medication in the area to be treated.      Total Botox used: 155 Units   Botox wastage: 45 Units     Injection sites:    muscle bilaterally ( a total of 10 units divided into 2 sites)   Procerus muscle (5 units)   Frontalis muscle bilaterally (a total of 20 units divided into 4 sites)   Temporalis muscle bilaterally (a total of 40 units divided into 8 sites)   Occipitalis muscle bilaterally (a total of 30 units divided into 6 sites)   Cervical paraspinal muscles (a total of 20 units divided into 4 sites)   Trapezius muscle bilaterally (a total of 30 units divided into 6 sites)   Complications: none   RTC for the next Botox injection: 12 weeks     The patient tolerated the procedure well and did not experience any complications.     CC: Erica Pineda MD Sarena Patel, PA-C  [facility] Department of Neurology   [phone]

## 2023-08-22 ENCOUNTER — PATIENT MESSAGE (OUTPATIENT)
Dept: NEUROLOGY | Facility: CLINIC | Age: 43
End: 2023-08-22
Payer: COMMERCIAL

## 2023-08-22 ENCOUNTER — PATIENT MESSAGE (OUTPATIENT)
Dept: PSYCHIATRY | Facility: CLINIC | Age: 43
End: 2023-08-22
Payer: COMMERCIAL

## 2023-08-22 ENCOUNTER — PATIENT MESSAGE (OUTPATIENT)
Dept: DERMATOLOGY | Facility: CLINIC | Age: 43
End: 2023-08-22
Payer: COMMERCIAL

## 2023-08-22 RX ORDER — FINASTERIDE 5 MG/1
5 TABLET, FILM COATED ORAL DAILY
Qty: 30 TABLET | Refills: 11 | OUTPATIENT
Start: 2023-08-22 | End: 2024-08-21

## 2023-08-25 ENCOUNTER — PATIENT MESSAGE (OUTPATIENT)
Dept: PSYCHIATRY | Facility: CLINIC | Age: 43
End: 2023-08-25
Payer: COMMERCIAL

## 2023-08-28 ENCOUNTER — OFFICE VISIT (OUTPATIENT)
Dept: PSYCHIATRY | Facility: CLINIC | Age: 43
End: 2023-08-28
Payer: COMMERCIAL

## 2023-08-28 DIAGNOSIS — G47.09 OTHER INSOMNIA: ICD-10-CM

## 2023-08-28 DIAGNOSIS — F33.41 RECURRENT MAJOR DEPRESSIVE DISORDER IN PARTIAL REMISSION: Primary | ICD-10-CM

## 2023-08-28 DIAGNOSIS — L73.2 HIDRADENITIS: ICD-10-CM

## 2023-08-28 DIAGNOSIS — G43.109 MIGRAINE WITH AURA AND WITHOUT STATUS MIGRAINOSUS, NOT INTRACTABLE: ICD-10-CM

## 2023-08-28 PROCEDURE — 1160F RVW MEDS BY RX/DR IN RCRD: CPT | Mod: CPTII,S$GLB,, | Performed by: PSYCHIATRY & NEUROLOGY

## 2023-08-28 PROCEDURE — 99999 PR PBB SHADOW E&M-EST. PATIENT-LVL II: CPT | Mod: PBBFAC,,, | Performed by: PSYCHIATRY & NEUROLOGY

## 2023-08-28 PROCEDURE — 1159F MED LIST DOCD IN RCRD: CPT | Mod: CPTII,S$GLB,, | Performed by: PSYCHIATRY & NEUROLOGY

## 2023-08-28 PROCEDURE — 1160F PR REVIEW ALL MEDS BY PRESCRIBER/CLIN PHARMACIST DOCUMENTED: ICD-10-PCS | Mod: CPTII,S$GLB,, | Performed by: PSYCHIATRY & NEUROLOGY

## 2023-08-28 PROCEDURE — 1159F PR MEDICATION LIST DOCUMENTED IN MEDICAL RECORD: ICD-10-PCS | Mod: CPTII,S$GLB,, | Performed by: PSYCHIATRY & NEUROLOGY

## 2023-08-28 PROCEDURE — 99213 PR OFFICE/OUTPT VISIT, EST, LEVL III, 20-29 MIN: ICD-10-PCS | Mod: S$GLB,,, | Performed by: PSYCHIATRY & NEUROLOGY

## 2023-08-28 PROCEDURE — 99213 OFFICE O/P EST LOW 20 MIN: CPT | Mod: S$GLB,,, | Performed by: PSYCHIATRY & NEUROLOGY

## 2023-08-28 PROCEDURE — 99999 PR PBB SHADOW E&M-EST. PATIENT-LVL II: ICD-10-PCS | Mod: PBBFAC,,, | Performed by: PSYCHIATRY & NEUROLOGY

## 2023-08-28 RX ORDER — UBROGEPANT 50 MG/1
TABLET ORAL
Qty: 16 TABLET | Refills: 5 | Status: CANCELLED | OUTPATIENT
Start: 2023-08-28

## 2023-08-28 RX ORDER — VENLAFAXINE HYDROCHLORIDE 150 MG/1
150 CAPSULE, EXTENDED RELEASE ORAL DAILY
Qty: 30 CAPSULE | Refills: 6 | Status: SHIPPED | OUTPATIENT
Start: 2023-08-28 | End: 2024-08-27

## 2023-08-28 RX ORDER — ALPRAZOLAM 0.5 MG/1
0.5 TABLET ORAL NIGHTLY PRN
Qty: 30 TABLET | Refills: 1 | Status: SHIPPED | OUTPATIENT
Start: 2023-08-28 | End: 2023-11-29 | Stop reason: SDUPTHER

## 2023-08-28 NOTE — PROGRESS NOTES
"  Outpatient Psychiatry Follow-Up Visit (MD/NP)    8/28/2023    Clinical Status of Patient:  Outpatient (Ambulatory)    Chief Complaint:  Fran Higgins is a 43 y.o. female who presents today for follow-up of depression.  Met with patient.      Interval History and Content of Current Session:  Interim Events/Subjective Report/Content of Current Session:  "I've been okay."  Sleep is better and not takign xanax as much, perhaps once a week and q 2 weeks.  Mg and botox have helped with migraines.  Sleeping 5-6 hrs which she reports is good for her.  Having fewer night sweats and hot flashes.  Mood is stable.  Denies innapropriate sadness.  Reports enjoying trips with family.  Never desires to die.      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            Prior trials:  Benadryl - inconsistent  prozac when younger worked  Trazodone - HA in the morning  Melatonin - nightmares      Psychotherapy:  Target symptoms: depression  Why chosen therapy is appropriate versus another modality: relevant to diagnosis  Outcome monitoring methods: self-report  Therapeutic intervention type: supportive psychotherapy  Topics discussed/themes: building skills sets for symptom management, symptom recognition  The patient's response to the intervention is accepting. The patient's progress toward treatment goals is excellent.   Duration of intervention:  minutes.    Review of Systems   Constitutional:  Negative for chills, fever and weight loss.   Respiratory:  Negative for cough, shortness of breath and wheezing.    Cardiovascular:  Negative for chest pain and palpitations.       Past Medical, Family and Social History: The patient's past medical, family and social history have been reviewed and updated as appropriate within the electronic medical record - see encounter notes.    Compliance: yes    Side effects: None    Risk Parameters:  Patient reports no suicidal ideation  Patient reports no homicidal ideation  Patient reports " no self-injurious behavior  Patient reports no violent behavior    Exam (detailed: at least 9 elements; comprehensive: all 15 elements)   Constitutional  Vitals:  Most recent vital signs, dated less than 90 days prior to this appointment, were reviewed.   There were no vitals filed for this visit.     General:  age appropriate, casually dressed, neatly groomed, obese     Musculoskeletal  Muscle Strength/Tone:  no dyskinesia, no tremor   Gait & Station:  non-ataxic     Psychiatric  Speech:  spontaneous, not pressured, clearly audible   Mood:   Affect:  euthymic  congruent and appropriate   Thought Process:  goal-directed, logical   Associations:  intact   Thought Content:  normal, no suicidality, no homicidality, delusions, or paranoia   Insight:  has awareness of illness   Judgement: behavior is adequate to circumstances   Orientation:  grossly intact   Memory: intact for content of interview, not tested   Language: grossly intact   Attention Span & Concentration:  able to focus   Fund of Knowledge:  intact and appropriate to age and level of education     Assessment and Diagnosis   Status/Progress: Based on the examination today, the patient's problem(s) is/are well controlled.  New problems have not been presented today.   Lack of compliance are not complicating management of the primary condition.  There are no active rule-out diagnoses for this patient at this time.     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  H/o recurrent MDD.          Diagnosis:  Recurrent MDD, in parital remission  Obesity  S/p bilat ooperectomy, HANNAH    Intervention/Counseling/Treatment Plan   Continue Effexor  mg daily  Continue xanax 0.5 mg as needed for now.  Discussed possible trials of other sleep meds.  Continue seeing Dr. Mahan      Return to Clinic:  6 months  in person

## 2023-09-01 DIAGNOSIS — G43.109 MIGRAINE WITH AURA AND WITHOUT STATUS MIGRAINOSUS, NOT INTRACTABLE: ICD-10-CM

## 2023-09-01 RX ORDER — FINASTERIDE 5 MG/1
5 TABLET, FILM COATED ORAL DAILY
Qty: 30 TABLET | Refills: 11 | Status: SHIPPED | OUTPATIENT
Start: 2023-09-01 | End: 2023-09-18 | Stop reason: SDUPTHER

## 2023-09-01 NOTE — TELEPHONE ENCOUNTER
Last ordered: 9/19/22    Last seen: 8/17/23 - botox  Upcoming appt: 9/29/23 - follow up     11/02/23 - botox

## 2023-09-05 ENCOUNTER — LAB VISIT (OUTPATIENT)
Dept: LAB | Facility: HOSPITAL | Age: 43
End: 2023-09-05
Attending: OBSTETRICS & GYNECOLOGY
Payer: COMMERCIAL

## 2023-09-05 ENCOUNTER — OFFICE VISIT (OUTPATIENT)
Dept: GYNECOLOGIC ONCOLOGY | Facility: CLINIC | Age: 43
End: 2023-09-05
Payer: COMMERCIAL

## 2023-09-05 VITALS
HEIGHT: 64 IN | WEIGHT: 293 LBS | SYSTOLIC BLOOD PRESSURE: 128 MMHG | BODY MASS INDEX: 50.02 KG/M2 | DIASTOLIC BLOOD PRESSURE: 77 MMHG | HEART RATE: 88 BPM

## 2023-09-05 DIAGNOSIS — D39.10 BORDERLINE EPITHELIAL NEOPLASM OF OVARY: ICD-10-CM

## 2023-09-05 DIAGNOSIS — D39.10 BORDERLINE EPITHELIAL NEOPLASM OF OVARY: Primary | ICD-10-CM

## 2023-09-05 DIAGNOSIS — C56.9 MUCINOUS TUMOR, OF LOW MALIGNANT POTENTIAL: ICD-10-CM

## 2023-09-05 LAB — CEA SERPL-MCNC: 1.8 NG/ML (ref 0–5)

## 2023-09-05 PROCEDURE — 1159F PR MEDICATION LIST DOCUMENTED IN MEDICAL RECORD: ICD-10-PCS | Mod: CPTII,S$GLB,, | Performed by: OBSTETRICS & GYNECOLOGY

## 2023-09-05 PROCEDURE — 99214 OFFICE O/P EST MOD 30 MIN: CPT | Mod: S$GLB,,, | Performed by: OBSTETRICS & GYNECOLOGY

## 2023-09-05 PROCEDURE — 99214 PR OFFICE/OUTPT VISIT, EST, LEVL IV, 30-39 MIN: ICD-10-PCS | Mod: S$GLB,,, | Performed by: OBSTETRICS & GYNECOLOGY

## 2023-09-05 PROCEDURE — 3008F PR BODY MASS INDEX (BMI) DOCUMENTED: ICD-10-PCS | Mod: CPTII,S$GLB,, | Performed by: OBSTETRICS & GYNECOLOGY

## 2023-09-05 PROCEDURE — 3078F DIAST BP <80 MM HG: CPT | Mod: CPTII,S$GLB,, | Performed by: OBSTETRICS & GYNECOLOGY

## 2023-09-05 PROCEDURE — 99999 PR PBB SHADOW E&M-EST. PATIENT-LVL III: CPT | Mod: PBBFAC,,, | Performed by: OBSTETRICS & GYNECOLOGY

## 2023-09-05 PROCEDURE — 36415 COLL VENOUS BLD VENIPUNCTURE: CPT | Performed by: OBSTETRICS & GYNECOLOGY

## 2023-09-05 PROCEDURE — 3074F PR MOST RECENT SYSTOLIC BLOOD PRESSURE < 130 MM HG: ICD-10-PCS | Mod: CPTII,S$GLB,, | Performed by: OBSTETRICS & GYNECOLOGY

## 2023-09-05 PROCEDURE — 3078F PR MOST RECENT DIASTOLIC BLOOD PRESSURE < 80 MM HG: ICD-10-PCS | Mod: CPTII,S$GLB,, | Performed by: OBSTETRICS & GYNECOLOGY

## 2023-09-05 PROCEDURE — 3074F SYST BP LT 130 MM HG: CPT | Mod: CPTII,S$GLB,, | Performed by: OBSTETRICS & GYNECOLOGY

## 2023-09-05 PROCEDURE — 1159F MED LIST DOCD IN RCRD: CPT | Mod: CPTII,S$GLB,, | Performed by: OBSTETRICS & GYNECOLOGY

## 2023-09-05 PROCEDURE — 99999 PR PBB SHADOW E&M-EST. PATIENT-LVL III: ICD-10-PCS | Mod: PBBFAC,,, | Performed by: OBSTETRICS & GYNECOLOGY

## 2023-09-05 PROCEDURE — 3008F BODY MASS INDEX DOCD: CPT | Mod: CPTII,S$GLB,, | Performed by: OBSTETRICS & GYNECOLOGY

## 2023-09-05 PROCEDURE — 82378 CARCINOEMBRYONIC ANTIGEN: CPT | Performed by: OBSTETRICS & GYNECOLOGY

## 2023-09-05 RX ORDER — UBROGEPANT 50 MG/1
TABLET ORAL
Qty: 16 TABLET | Refills: 5 | Status: SHIPPED | OUTPATIENT
Start: 2023-09-05

## 2023-09-06 ENCOUNTER — PATIENT MESSAGE (OUTPATIENT)
Dept: BARIATRICS | Facility: CLINIC | Age: 43
End: 2023-09-06
Payer: COMMERCIAL

## 2023-09-06 NOTE — PROGRESS NOTES
Subjective:      Patient ID: Fran Higgins is a 43 y.o. female.    Chief Complaint: Follow-up (8 month )      HPI  INTERVAL HISTORY  CC: h/o right ovarian mucinous borderline tumor     Fran Higgins is a 43 y.o. who presents for routine surveillance visit.  No GYN concerns, previous patient of Dr. Kaufman.  She has no vaginal bleeding or discharge.  She is having no nausea or vomiting.  She has no bowel or bladder complaints.  She has no pain issues.       Follows with [facility] for HRT and well woman care.      CEA 1.8 today, normal      HPI or ONCOLOGIC HISTORY  6/25/2019: LSO by Dr. 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  Size of tumor: 11.8 CM.  Capsule involvement cannot be determined secondary to fragmentation.  Microinvasion is present: 5 foci. Intraepithelial carcinoma is present. Benign fallopian tube and fimbriated end with no significant histopathologic alterations. Pathologic staging: pT1a      3/9/2020: Robotic assisted total hysterectomy and RSO.    PATHOLOGY:  139 g uterus with secretory endometrium and focal adenomyosis, multiple leiomyomas.  Right ovary and fallopian tube benign. (After completion hysterectomy and RSO she has had problems with depression and hormone replacement. Seen by Katia Mahan and Sherrie for depression. On  venlafaxine. Depression and VMS is better.)        6/22/2021 Gastric Sleeve  Review of Systems   Constitutional:  Negative for appetite change, chills, fatigue and fever.   HENT:  Negative for mouth sores.    Respiratory:  Negative for cough and shortness of breath.    Cardiovascular:  Negative for leg swelling.   Gastrointestinal:  Negative for abdominal pain, blood in stool, constipation and diarrhea.   Endocrine: Negative for cold intolerance.   Genitourinary:  Negative for dysuria and vaginal bleeding.   Musculoskeletal:  Negative for myalgias.   Skin:  Negative for rash.    Allergic/Immunologic: Negative.    Neurological:  Negative for weakness and numbness.   Hematological:  Negative for adenopathy. Does not bruise/bleed easily.   Psychiatric/Behavioral:  Negative for confusion.        Objective:   Physical Exam:   Constitutional: She is oriented to person, place, and time. She appears well-developed and well-nourished.    HENT:   Head: Normocephalic and atraumatic.    Eyes: Pupils are equal, round, and reactive to light. EOM are normal.    Neck: No thyromegaly present.    Cardiovascular:  Normal rate, regular rhythm and intact distal pulses.             Pulmonary/Chest: Effort normal and breath sounds normal. No respiratory distress. She has no wheezes.        Abdominal: Soft. Bowel sounds are normal. She exhibits no distension and no mass. There is no abdominal tenderness.     Genitourinary:    Vagina and rectum normal.      Pelvic exam was performed with patient supine.   There is no lesion on the right labia. There is no lesion on the left labia. Right adnexum displays no mass. Left adnexum displays no mass. Vaginal cuff normal.  Cervix is absent.Uterus is absent.           Musculoskeletal: Normal range of motion and moves all extremeties.      Lymphadenopathy:     She has no cervical adenopathy. No inguinal adenopathy noted on the right or left side.        Right: No supraclavicular adenopathy present.        Left: No supraclavicular adenopathy present.    Neurological: She is alert and oriented to person, place, and time.    Skin: Skin is warm and dry. No rash noted.    Psychiatric: She has a normal mood and affect.       Assessment:     1. Borderline epithelial neoplasm of ovary        Plan:     Orders Placed This Encounter   Procedures    CEA     No evidence of disease on today's exam  CEA normal and stable  Feels well, no new complaints.   RTC in 6 months for routine surveillance with CEA    I spent approximately 30 minutes reviewing the available records and evaluating the  patient, out of which over 50% of the time was spent face to face with the patient in counseling and coordinating this patient's care.

## 2023-09-08 ENCOUNTER — OFFICE VISIT (OUTPATIENT)
Dept: PSYCHIATRY | Facility: CLINIC | Age: 43
End: 2023-09-08
Payer: COMMERCIAL

## 2023-09-08 DIAGNOSIS — F43.9 TRAUMA AND STRESSOR-RELATED DISORDER: Primary | ICD-10-CM

## 2023-09-08 DIAGNOSIS — D39.10 BORDERLINE EPITHELIAL NEOPLASM OF OVARY: ICD-10-CM

## 2023-09-08 PROCEDURE — 99999 PR PBB SHADOW E&M-EST. PATIENT-LVL II: CPT | Mod: PBBFAC,,, | Performed by: PSYCHOLOGIST

## 2023-09-08 PROCEDURE — 90834 PR PSYCHOTHERAPY W/PATIENT, 45 MIN: ICD-10-PCS | Mod: S$GLB,,, | Performed by: PSYCHOLOGIST

## 2023-09-08 PROCEDURE — 99999 PR PBB SHADOW E&M-EST. PATIENT-LVL II: ICD-10-PCS | Mod: PBBFAC,,, | Performed by: PSYCHOLOGIST

## 2023-09-08 PROCEDURE — 90834 PSYTX W PT 45 MINUTES: CPT | Mod: S$GLB,,, | Performed by: PSYCHOLOGIST

## 2023-09-08 NOTE — PROGRESS NOTES
INFORMED CONSENT: Patient was identified using two patient-identifiers. The patient has been informed of the risks and benefits associated with engaging in psychotherapy, the handling of protected health information, the rights of privacy and the limits of confidentiality. The patient has also been informed of the importance of reporting any suicidal or homicidal ideation to this or any provider to ensure safety of all parties, and the Fran Higgins expressed understanding. The patient was agreeable to these terms and freely participates in individual psychotherapy.    PSYCHO-ONCOLOGY NOTE/ Individual Psychotherapy     Date: 9/8/2023   Site:  [facility]        Therapeutic Intervention: Met with patient.  Outpatient - Insight oriented psychotherapy 45 min - CPT code 59257      Patient was last seen by me on 8/11/2023    Problem list  Patient Active Problem List   Diagnosis    Depression    Left ovarian cyst    S/P RA Laparoscopic LSO    Mucinous tumor, of low malignant potential    Pericardial cyst    Scoliosis of cervical spine    Abnormal uterine bleeding (AUB)    Vaginal yeast infection    DUB (dysfunctional uterine bleeding)    Migraine with aura, not intractable    s/p robotic hysterectomy/RSO 3/9/20    Borderline epithelial neoplasm of ovary    Surgical menopause    Yeast vaginitis    Menopause    Dizziness    Galactorrhea in female- bilateral breast    Other insomnia    Major depressive disorder, recurrent episode, in partial remission with anxious distress    Prediabetes    Obesity    S/P laparoscopic sleeve gastrectomy    BMI 45.0-49.9, adult    Hidradenitis    Seborrheic dermatitis       Chief complaint/reason for encounter: interpersonal   Met with patient to evaluate psychosocial adaptation to diagnosis/treatment/survivorship of trauma    Current Medications  Current Outpatient Medications   Medication    ALPRAZolam (XANAX) 0.5 MG tablet    b complex vitamins capsule     B3-azelaic-zinc-B6-copper-FA (NICAZEL) 600-5-10-5-1.5 mg Tab    CALCIUM CITRATE ORAL    clindamycin (CLEOCIN T) 1 % lotion    cyclobenzaprine (FLEXERIL) 5 MG tablet    doxycycline monohydrate 100 mg Tab    erythromycin with ethanoL (THERAMYCIN) 2 % external solution    estradioL 1.25 mg/1.25 gram (0.1 %) GlPk    finasteride (PROSCAR) 5 mg tablet    fluconazole (DIFLUCAN) 200 MG Tab    fluocinolone acetonide oiL (DERMOTIC OIL) 0.01 % Drop    galcanezumab-gnlm (EMGALITY PEN) 120 mg/mL PnIj    HUMIRA,CF, PEN 40 mg/0.4 mL PnKt    HUMIRA,CF, PEN 80 mg/0.8 mL PnKt    HUMIRA,CF, PEN 80 mg/0.8 mL PnKt    ketoconazole (NIZORAL) 2 % cream    multivitamin capsule    nitrofurantoin, macrocrystal-monohydrate, (MACROBID) 100 MG capsule    nystatin-triamcinolone (MYCOLOG II) cream    omeprazole (PRILOSEC) 40 MG capsule    ondansetron (ZOFRAN-ODT) 4 MG TbDL    pimecrolimus (ELIDEL) 1 % cream    progesterone (PROMETRIUM) 100 MG capsule    spironolactone (ALDACTONE) 50 MG tablet    spironolactone (ALDACTONE) 50 MG tablet    thiamine (VITAMIN B-1) 50 MG tablet    ubrogepant (UBRELVY) 50 mg tablet    venlafaxine (EFFEXOR-XR) 150 MG Cp24     No current facility-administered medications for this visit.       Objective:  Fran Spencer Gisela arrived promptly for the session.    The patient was fully cooperative throughout the session.  Appearance: age appropriate, appropriately  dressed, adequately  groomed  Behavior/Cooperation: friendly and cooperative  Speech: normal in rate, volume, and tone and appropriate quality, quantity and organization of sentences  Mood: euthymic  Affect: mood congruent  Thought Process: goal-directed, logical  Thought Content: normal,  No delusions or paranoia; did not appear to be responding to internal stimuli during the session  Orientation: grossly intact  Attention Span/Concentration: Attends to session without distraction; reports no difficulty  Insight: patient has awareness of illness; good insight  into own behavior and behavior of others  Judgment: the patient's behavior is adequate to circumstances    Interval history and content of current session: Reviewed Power and Control situations and worksheets.   Discussed  esteem . Reports to be coping in an adequate manner. Evaluated cognitive response, paying particular attention to negative intrusive thoughts of a persistent and detrimental nature. Thoughts of this type are in evidence with mild distress. Provided cognitive behavioral therapy to address negative cognitions. Identified and evaluated psychosocial and environmental stressors secondary to diagnosis and treatment.  Examined proactive behaviors that may be implemented to minimize or ameliorate psychosocial stressors secondary to diagnosis and treatment.     Risk parameters:   Patient reports no suicidal ideation  Patient reports no homicidal ideation  Patient reports no self-injurious behavior  Patient reports no violent behavior   Safety needs:  None at this time      Verbal deficits: None     Patient's response to intervention:The patient's response to intervention is accepting.     Progress toward goals and other mental status changes:  The patient's progress toward goals is good.      Progress to date:Progress - Ongoing, but Slow      Goals from last visit: Met     Patient reported outcomes:    Distress Thermometer:   Distress Score    Distress Score: 5        Practical Problems Physical Problems                                                   Family Problems                                         Emotional Problems                                                         Spiritual/Religions Concerns     Spiritual / Pentecostal Concerns: No         Other Problems               PHQ ANSWERS    Q1. Little interest or pleasure in doing things: (P) Several days (09/08/23 0900)  Q2. Feeling down, depressed, or hopeless: (P) Not at all (09/08/23 0900)  Q3. Trouble falling or staying asleep, or sleeping  too much: (P) Several days (09/08/23 0900)  Q4. Feeling tired or having little energy: (P) Several days (09/08/23 0900)  Q5. Poor appetite or overeating: (P) Several days (09/08/23 0900)  Q6. Feeling bad about yourself - or that you are a failure or have let yourself or your family down: (P) Not at all (09/08/23 0900)  Q7. Trouble concentrating on things, such as reading the newspaper or watching television: (P) Not at all (09/08/23 0900)  Q8. Moving or speaking so slowly that other people could have noticed. Or the opposite - being so fidgety or restless that you have been moving around a lot more than usual: (P) Not at all (09/08/23 0900)  Q9.      PHQ8 Score : (P) 4 (09/08/23 0900)  PHQ-9 Total Score: (P) 4 (09/08/23 0900)     JOSE MARIA-7 Answers        9/8/2023     9:00 AM   GAD7   1. Feeling nervous, anxious, or on edge? 1   2. Not being able to stop or control worrying? 1   3. Worrying too much about different things? 1   4. Trouble relaxing? 1   5. Being so restless that it is hard to sit still? 0   6. Becoming easily annoyed or irritable? 1   7. Feeling afraid as if something awful might happen? 0   JOSE MARIA-7 Score 5     JOSE MARIA-7 Score: (P) 5  Interpretation: (P) Mild Anxiety     Client Strengths: verbal, intelligent, successful, good social support, good insight, commitment to wellness, strong kerri, strong cultural traditions     Diagnosis:     ICD-10-CM ICD-9-CM   1. Trauma and stressor-related disorder  F43.9 309.81     308.9   2. Borderline epithelial neoplasm of ovary  D39.10 236.2       Treatment Plan:individual psychotherapy and medication management by physician  Target symptoms: anxiety   Why chosen therapy is appropriate versus another modality: relevant to diagnosis, patient responds to this modality, evidence based practice  Outcome monitoring methods: self-report, observation, checklist/rating scale  Therapeutic intervention type: insight oriented psychotherapy, behavior modifying psychotherapy  Prognosis:  Good      Behavioral goals:    Exercise:   Stress management: Esteem Module   Social engagement:   Nutrition:   Smoking Cessation:   Therapy:  Increase daily self-care and attention to health management  Pleasant events scheduling and increased social interaction  Monitor stressors in writing and bring to next visit    Return to clinic: as scheduled    Next Session:    Esteem Module  Length of Service (minutes direct face-to-face contact): 45    Brittani Mahan, PhD  Clinical Psychologist  [place] License #[id number]  [place] License #[id number]

## 2023-09-12 ENCOUNTER — PATIENT MESSAGE (OUTPATIENT)
Dept: BARIATRICS | Facility: CLINIC | Age: 43
End: 2023-09-12
Payer: COMMERCIAL

## 2023-09-18 ENCOUNTER — OFFICE VISIT (OUTPATIENT)
Dept: DERMATOLOGY | Facility: CLINIC | Age: 43
End: 2023-09-18
Payer: COMMERCIAL

## 2023-09-18 ENCOUNTER — HOSPITAL ENCOUNTER (OUTPATIENT)
Dept: RADIOLOGY | Facility: HOSPITAL | Age: 43
Discharge: HOME | End: 2023-09-18
Attending: PHYSICIAN ASSISTANT
Payer: COMMERCIAL

## 2023-09-18 VITALS — WEIGHT: 293 LBS | HEIGHT: 64 IN | BODY MASS INDEX: 50.02 KG/M2

## 2023-09-18 DIAGNOSIS — Z12.31 SCREENING MAMMOGRAM, ENCOUNTER FOR: ICD-10-CM

## 2023-09-18 DIAGNOSIS — Z79.899 ENCOUNTER FOR LONG-TERM (CURRENT) USE OF OTHER MEDICATIONS: ICD-10-CM

## 2023-09-18 DIAGNOSIS — L73.2 HIDRADENITIS: Primary | ICD-10-CM

## 2023-09-18 PROCEDURE — 77067 MAMMO DIGITAL SCREENING BILAT WITH TOMO: ICD-10-PCS | Mod: 26,,, | Performed by: RADIOLOGY

## 2023-09-18 PROCEDURE — 77063 MAMMO DIGITAL SCREENING BILAT WITH TOMO: ICD-10-PCS | Mod: 26,,, | Performed by: RADIOLOGY

## 2023-09-18 PROCEDURE — 77067 SCR MAMMO BI INCL CAD: CPT | Mod: TC

## 2023-09-18 PROCEDURE — 99214 OFFICE O/P EST MOD 30 MIN: CPT | Mod: 95,,, | Performed by: DERMATOLOGY

## 2023-09-18 PROCEDURE — 77067 SCR MAMMO BI INCL CAD: CPT | Mod: 26,,, | Performed by: RADIOLOGY

## 2023-09-18 PROCEDURE — 1160F RVW MEDS BY RX/DR IN RCRD: CPT | Mod: CPTII,95,, | Performed by: DERMATOLOGY

## 2023-09-18 PROCEDURE — 1159F PR MEDICATION LIST DOCUMENTED IN MEDICAL RECORD: ICD-10-PCS | Mod: CPTII,95,, | Performed by: DERMATOLOGY

## 2023-09-18 PROCEDURE — 77063 BREAST TOMOSYNTHESIS BI: CPT | Mod: 26,,, | Performed by: RADIOLOGY

## 2023-09-18 PROCEDURE — 1159F MED LIST DOCD IN RCRD: CPT | Mod: CPTII,95,, | Performed by: DERMATOLOGY

## 2023-09-18 PROCEDURE — 1160F PR REVIEW ALL MEDS BY PRESCRIBER/CLIN PHARMACIST DOCUMENTED: ICD-10-PCS | Mod: CPTII,95,, | Performed by: DERMATOLOGY

## 2023-09-18 PROCEDURE — 99214 PR OFFICE/OUTPT VISIT, EST, LEVL IV, 30-39 MIN: ICD-10-PCS | Mod: 95,,, | Performed by: DERMATOLOGY

## 2023-09-18 RX ORDER — SPIRONOLACTONE 50 MG/1
TABLET, FILM COATED ORAL
Qty: 60 TABLET | Refills: 5 | Status: SHIPPED | OUTPATIENT
Start: 2023-09-18 | End: 2024-03-25

## 2023-09-18 RX ORDER — FINASTERIDE 5 MG/1
5 TABLET, FILM COATED ORAL DAILY
Qty: 30 TABLET | Refills: 11 | Status: SHIPPED | OUTPATIENT
Start: 2023-09-18 | End: 2024-09-17

## 2023-09-18 NOTE — ASSESSMENT & PLAN NOTE
Today's Plan:    Continue  - Humira injected weekly  -     clindamycin (CLEOCIN T) 1 % lotion; AAA bid  Dispense: 120 mL; Refill: 3  -     finasteride (PROSCAR) 5 mg tablet; Take 1 tablet (5 mg total) by mouth once daily.  Dispense: 30 tablet; Refill: 11  -     spironolactone (ALDACTONE) 50 MG tablet; Start with 1 po qday, increase to 2 po qday as tolerated  Dispense: 60 tablet; Refill: 4  -     doxycycline monohydrate 100 mg Tab; Take 1 tablet (100 mg total) by mouth 2 (two) times a day. Prn - add to back   - NICAZEL 600-5-10-5-1.5 mg Tab; 1 po bid  Dispense: 60 tablet; Refill: 5     Also  Keto diet  Had gastric sleve     Will plan on doing Deroofing procedure to lesions around waist

## 2023-09-18 NOTE — PROGRESS NOTES
Patient Information  Name: Fran Higgins  : 1980  MRN: [id number]     Referring Physician:  Dr. Barrera ref. provider found   Primary Care Physician:  Erica Lindsey MD   Date of Visit: 2023      Subjective:       Fran Higgins is a 43 y.o. female who presents for No chief complaint on file.      HPI    Patient was last seen in Dermatology: 2023.    Prior notes by myself reviewed.   Clinical documentation obtained by nursing staff reviewed.    Review of Systems     Objective:    Physical Exam       Diagram Legend     Erythematous scaling macule/papule c/w actinic keratosis       Vascular papule c/w angioma      Pigmented verrucoid papule/plaque c/w seborrheic keratosis      Yellow umbilicated papule c/w sebaceous hyperplasia      Irregularly shaped tan macule c/w lentigo     1-2 mm smooth white papules consistent with Milia      Movable subcutaneous cyst with punctum c/w epidermal inclusion cyst      Subcutaneous movable cyst c/w pilar cyst      Firm pink to brown papule c/w dermatofibroma      Pedunculated fleshy papule(s) c/w skin tag(s)      Evenly pigmented macule c/w junctional nevus     Mildly variegated pigmented, slightly irregular-bordered macule c/w mildly atypical nevus      Flesh colored to evenly pigmented papule c/w intradermal nevus       Pink pearly papule/plaque c/w basal cell carcinoma      Erythematous hyperkeratotic cursted plaque c/w SCC      Surgical scar with no sign of skin cancer recurrence      Open and closed comedones      Inflammatory papules and pustules      Verrucoid papule consistent consistent with wart     Erythematous eczematous patches and plaques     Dystrophic onycholytic nail with subungual debris c/w onychomycosis     Umbilicated papule    Erythematous-base heme-crusted tan verrucoid plaque consistent with inflamed seborrheic keratosis     Erythematous Silvery Scaling Plaque c/w Psoriasis     See annotation          [] Data  reviewed    [] Prior external notes reviewed    [] Independent review of test    [] Management discussed with another provider    [] Independent historian    Assessment / Plan:        Hidradenitis  -     spironolactone (ALDACTONE) 50 MG tablet; Start with taking 1 tablet by mouth every day, may increase to 2 tablets by mouth every day as tolerated.  Dispense: 60 tablet; Refill: 5  -     finasteride (PROSCAR) 5 mg tablet; Take 1 tablet (5 mg total) by mouth once daily.  Dispense: 30 tablet; Refill: 11    Encounter for long-term (current) use of other medications  -     CBC Auto Differential; Future; Expected date: 09/18/2023  -     Comprehensive Metabolic Panel; Future; Expected date: 09/18/2023  -     Quantiferon Gold TB; Future; Expected date: 09/18/2023      Hidradenitis  Today's Plan:    Continue  - Humira injected weekly  -     clindamycin (CLEOCIN T) 1 % lotion; AAA bid  Dispense: 120 mL; Refill: 3  -     finasteride (PROSCAR) 5 mg tablet; Take 1 tablet (5 mg total) by mouth once daily.  Dispense: 30 tablet; Refill: 11  -     spironolactone (ALDACTONE) 50 MG tablet; Start with 1 po qday, increase to 2 po qday as tolerated  Dispense: 60 tablet; Refill: 4  -     doxycycline monohydrate 100 mg Tab; Take 1 tablet (100 mg total) by mouth 2 (two) times a day. Prn - add to back   - NICAZEL 600-5-10-5-1.5 mg Tab; 1 po bid  Dispense: 60 tablet; Refill: 5     Also  Keto diet  Had gastric sleve     Will plan on doing Deroofing procedure to lesions around waist    Discussed  benefits and risks of Humira including but not limited to injection site reactions, increased risk of infection, decreased tumor surveillance, optic neuritis. Patient informed to discontinue Humira and notify our office if an infection develops.  Patient counseled to avoid live vaccines.    Will need CBC and LFTs q 3 q 3 - 6 months. Will need Quantiferon gold annually.    Patient still flaring around waistline and in groin but better than previous.  Waistline is the most bothersome       The patient location is: Home  The chief complaint leading to consultation is: Hidradentitis    Visit type: audiovisual    Face to Face time with patient: 15 minutes  25 minutes of total time spent on the encounter, which includes face to face time and non-face to face time preparing to see the patient (eg, review of tests), Obtaining and/or reviewing separately obtained history, Documenting clinical information in the electronic or other health record, Independently interpreting results (not separately reported) and communicating results to the patient/family/caregiver, or Care coordination (not separately reported).         Each patient to whom he or she provides medical services by telemedicine is:  (1) informed of the relationship between the physician and patient and the respective role of any other health care provider with respect to management of the patient; and (2) notified that he or she may decline to receive medical services by telemedicine and may withdraw from such care at any time.          LOS NUMBER AND COMPLEXITY OF PROBLEMS    COMPLEXITY OF DATA RISK TOTAL TIME (m)   39264  69323 [] 1 self-limited or minor problem [] Minimal to none [] No treatment recommended or patient to monitor. Reassurance.  15-29  10-19   84078  52370 Low  [] 2 or more self limited or minor problems  [] 1 stable chronic illness  [] 1 acute, uncomplicated illness or injury Limited (2)  [] Prior external notes from each unique source  [] Review result of each unique test  [] Order each unique test  OR [] Independent historian Low  []  OTC medications   []  Discussed/Decision for minor skin surgery (no risk factors) 30-44  20-29   57305  53765 Moderate  []  1 or more chronic unstable illness (not at goal or progression or exacerbation) or SE of treatment  []  2 or more stable chronic illnesses  []  1 acute illness with systemic symptoms  []  1 acute complicated injury  []  1 undiagnosed  new problem with uncertain prognosis Moderate (1/3 below)  []  3 or more data items        *Now includes independent historian  []  Independent interpretation of a test  []  Discuss management/test with another provider Moderate  []  Prescription drug mgmt  []  Discussed/Decision for Minor surgery with risk factors  []  Mgmt limited by social determinates 45-59  30-39   29332  37254 High  []  1 or more chronic illness with severe exacerbation, progression or SE of treatment  []  1 acute or chronic illness/injury that poses a threat to life or bodily function Extensive (2/3 below)  []  3 or more data items        *Now includes independent historian.  []  Independent interpretation of a test  []  Discuss management/test with another provider High  []  Major surgery with risk discussed  []  Drug therapy requiring intensive monitoring for toxicity  []  Hospitalization  []  Decision for DNR 60-74  40-54

## 2023-09-22 ENCOUNTER — TELEPHONE (OUTPATIENT)
Dept: PODIATRY | Facility: CLINIC | Age: 43
End: 2023-09-22
Payer: COMMERCIAL

## 2023-09-22 NOTE — TELEPHONE ENCOUNTER
Spoke to pt and rescheduled appt. Original appt: 9/25/2023  New Appt:10/16/2023. Pt verbalized understanding.

## 2023-09-22 NOTE — TELEPHONE ENCOUNTER
Left  for pt with callback number of [phone] in regards to r/s pt appt due to provider being out of clinic. Sent pt portal message.

## 2023-09-25 ENCOUNTER — OFFICE VISIT (OUTPATIENT)
Dept: PODIATRY | Facility: CLINIC | Age: 43
End: 2023-09-25
Payer: COMMERCIAL

## 2023-09-25 VITALS
HEIGHT: 64 IN | DIASTOLIC BLOOD PRESSURE: 87 MMHG | BODY MASS INDEX: 50.02 KG/M2 | WEIGHT: 293 LBS | TEMPERATURE: 98 F | HEART RATE: 95 BPM | RESPIRATION RATE: 18 BRPM | SYSTOLIC BLOOD PRESSURE: 126 MMHG | OXYGEN SATURATION: 95 %

## 2023-09-25 DIAGNOSIS — M79.2 NEUROGENIC PAIN OF RIGHT FOOT: Primary | ICD-10-CM

## 2023-09-25 PROCEDURE — 64450 PR NERVE BLOCK INJ, ANES/STEROID, OTHER PERIPHERAL: ICD-10-PCS | Mod: RT,S$GLB,, | Performed by: STUDENT IN AN ORGANIZED HEALTH CARE EDUCATION/TRAINING PROGRAM

## 2023-09-25 PROCEDURE — 99203 PR OFFICE/OUTPT VISIT, NEW, LEVL III, 30-44 MIN: ICD-10-PCS | Mod: 25,S$GLB,, | Performed by: STUDENT IN AN ORGANIZED HEALTH CARE EDUCATION/TRAINING PROGRAM

## 2023-09-25 PROCEDURE — 3008F BODY MASS INDEX DOCD: CPT | Mod: CPTII,S$GLB,, | Performed by: STUDENT IN AN ORGANIZED HEALTH CARE EDUCATION/TRAINING PROGRAM

## 2023-09-25 PROCEDURE — 1159F MED LIST DOCD IN RCRD: CPT | Mod: CPTII,S$GLB,, | Performed by: STUDENT IN AN ORGANIZED HEALTH CARE EDUCATION/TRAINING PROGRAM

## 2023-09-25 PROCEDURE — 3079F DIAST BP 80-89 MM HG: CPT | Mod: CPTII,S$GLB,, | Performed by: STUDENT IN AN ORGANIZED HEALTH CARE EDUCATION/TRAINING PROGRAM

## 2023-09-25 PROCEDURE — 3079F PR MOST RECENT DIASTOLIC BLOOD PRESSURE 80-89 MM HG: ICD-10-PCS | Mod: CPTII,S$GLB,, | Performed by: STUDENT IN AN ORGANIZED HEALTH CARE EDUCATION/TRAINING PROGRAM

## 2023-09-25 PROCEDURE — 99999 PR PBB SHADOW E&M-EST. PATIENT-LVL III: ICD-10-PCS | Mod: PBBFAC,,, | Performed by: STUDENT IN AN ORGANIZED HEALTH CARE EDUCATION/TRAINING PROGRAM

## 2023-09-25 PROCEDURE — 3074F SYST BP LT 130 MM HG: CPT | Mod: CPTII,S$GLB,, | Performed by: STUDENT IN AN ORGANIZED HEALTH CARE EDUCATION/TRAINING PROGRAM

## 2023-09-25 PROCEDURE — 3008F PR BODY MASS INDEX (BMI) DOCUMENTED: ICD-10-PCS | Mod: CPTII,S$GLB,, | Performed by: STUDENT IN AN ORGANIZED HEALTH CARE EDUCATION/TRAINING PROGRAM

## 2023-09-25 PROCEDURE — 99999 PR PBB SHADOW E&M-EST. PATIENT-LVL III: CPT | Mod: PBBFAC,,, | Performed by: STUDENT IN AN ORGANIZED HEALTH CARE EDUCATION/TRAINING PROGRAM

## 2023-09-25 PROCEDURE — 64450 NJX AA&/STRD OTHER PN/BRANCH: CPT | Mod: RT,S$GLB,, | Performed by: STUDENT IN AN ORGANIZED HEALTH CARE EDUCATION/TRAINING PROGRAM

## 2023-09-25 PROCEDURE — 1159F PR MEDICATION LIST DOCUMENTED IN MEDICAL RECORD: ICD-10-PCS | Mod: CPTII,S$GLB,, | Performed by: STUDENT IN AN ORGANIZED HEALTH CARE EDUCATION/TRAINING PROGRAM

## 2023-09-25 PROCEDURE — 3074F PR MOST RECENT SYSTOLIC BLOOD PRESSURE < 130 MM HG: ICD-10-PCS | Mod: CPTII,S$GLB,, | Performed by: STUDENT IN AN ORGANIZED HEALTH CARE EDUCATION/TRAINING PROGRAM

## 2023-09-25 PROCEDURE — 99203 OFFICE O/P NEW LOW 30 MIN: CPT | Mod: 25,S$GLB,, | Performed by: STUDENT IN AN ORGANIZED HEALTH CARE EDUCATION/TRAINING PROGRAM

## 2023-09-25 RX ORDER — DEXAMETHASONE SODIUM PHOSPHATE 4 MG/ML
4 INJECTION, SOLUTION INTRA-ARTICULAR; INTRALESIONAL; INTRAMUSCULAR; INTRAVENOUS; SOFT TISSUE
Status: COMPLETED | OUTPATIENT
Start: 2023-09-25 | End: 2023-09-25

## 2023-09-25 RX ADMIN — DEXAMETHASONE SODIUM PHOSPHATE 4 MG: 4 INJECTION, SOLUTION INTRA-ARTICULAR; INTRALESIONAL; INTRAMUSCULAR; INTRAVENOUS; SOFT TISSUE at 02:09

## 2023-09-25 NOTE — PROCEDURES
Injection Procedure    After time out was performed, the procedure site was marked and the patient was prepped aseptically. A diagnostic and therapeutic injection of 8 mg dexamethasone and 1 mL 1% lidocaine plain was given under sterile technique using a 25g x 1.5 needle into the right foot at the medial calcaneal nerves at site of worst pain. Adhesive bandage applied.     The patient had no adverse reactions to the medication. Pain improved. Patient was reminded to call the clinic immediately for any adverse side effects as explained in clinic today.

## 2023-09-25 NOTE — PROGRESS NOTES
Subjective:     Patient    Fran Higgins is a 43 y.o. female.    Problem    New to [facility]. Right heel pain for 1 month. No injury or change in activity prior. Pain is worse after rest but otherwise pretty consistent throughout the day. No numbness, tingling, burning.  Has tried Voltaren and Tylenol. Had similar episode of pain in the left heel, underwent steroid injections x4 and the issue resolved. Admits to back pain, with right sided sciatica, untreated.     History    History obtained from patient and review of medical records.     Past Medical History:   Diagnosis Date    Abnormal Pap smear of cervix 2013    Abnormal uterine bleeding (AUB) 9/13/2019    Amblyopia     Anxiety     Cervical scoliosis 1994    Severe    Depression     DUB (dysfunctional uterine bleeding) 1/30/2020    Fatigue     Galactorrhea in female- bilateral breast 4/9/2020    Galactorrhea of both breasts 7/9/2020    Hx of psychiatric care     Migraine with aura, not intractable 1/30/2020    Mucinous tumor, 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    Ovarian cyst     Pericardial cyst 7/26/2019    Psychiatric problem     Sleep difficulties     Surgical menopause 3/13/2020    Therapy     Vaginal yeast infection 9/26/2019       Past Surgical History:   Procedure Laterality Date    HYSTERECTOMY  03/2020    LAPAROSCOPIC SLEEVE GASTRECTOMY N/A 6/22/2021    Procedure: GASTRECTOMY, SLEEVE, LAPAROSCOPIC; with intraoperative egd;  Surgeon: Jaziel Whitman Jr., MD;  Location: [address];  Service: General;  Laterality: N/A;    ROBOT-ASSISTED LAPAROSCOPIC ABDOMINAL HYSTERECTOMY USING DA ROMAIN XI N/A 3/9/2020    Procedure: XI ROBOTIC HYSTERECTOMY;  Surgeon: Jeff Kaufman MD;  Location: [address];  Service: Oncology;  Laterality: N/A;    ROBOT-ASSISTED LAPAROSCOPIC SALPINGO-OOPHORECTOMY Left 6/25/2019    Procedure: ROBOTIC SALPINGO-OOPHORECTOMY;  Surgeon: Marky Fox Jr., MD;  [facility];  Service: OB/GYN;   Laterality: Left;    SALPINGOOPHORECTOMY Right 3/9/2020    Procedure: SALPINGO-OOPHORECTOMY  USO;  Surgeon: Jeff Kaufman MD;  Location: [facility] OR [address] [place] [address];  Service: Oncology;  Laterality: Right;    TONSILLECTOMY          Objective:     Vitals  Wt Readings from Last 1 Encounters:   09/25/23 (!) 141.2 kg (311 lb 4.6 oz)     Temp Readings from Last 1 Encounters:   09/25/23 98.4 °F (36.9 °C)     BP Readings from Last 1 Encounters:   09/25/23 126/87     Pulse Readings from Last 1 Encounters:   09/25/23 95       Dermatological Exam    Skin:  Pedal hair growth, skin color, and skin texture normal on right  Pedal hair growth, skin color, and skin texture normal on left    Nails:  All nails normal in length, thickness, color    Vascular Exam    Arteries:  Posterior tibial artery palpable on right  Dorsalis pedis artery palpable on right  Posterior tibial artery palpable on left  Dorsalis pedis artery palpable on left    Veins:  Superficial veins unremarkable on right  Superficial veins unremarkable on left    Swelling:  None on right  None on left    Neurological Exam    Sweetser touch test:  6/6 sites sensed, light touch intact    Provocation Sign:  + at  medial calcaneal nerves  on right    Tinel Sign:  - at all major ankle and foot nerves on right    Slump Test:  - on right    Musculoskeletal Exam    Footwear:  Casual on right  Casual on left    Gait Exam:   Ambulatory Status: Ambulatory  Gait: Normal  Assistive Devices: None    Foot Progression Angle:  Normal on right  Normal on left     Right Lower Extremity Additional Findings:  Pain on palpation of medial heel extending towards proximal plantar fascia, negative windlass test.   Right foot and ankle function, strength, and range of motion unremarkable except as noted above.     Left Lower Extremity Additional Findings:  Left foot and ankle function, strength, and range of motion unremarkable except as noted above.    Imaging and Other Tests    Imaging:  Independently  reviewed and interpreted imaging, findings are as follows: N/A     Assessment:     Encounter Diagnosis   Name Primary?    Neurogenic pain of right foot Yes        Plan:     I counseled the patient on her conditions, their implications and medical management.    Neurogenic pain of right foot: acute  -Pain along distribution of branches of tibial nerve, primarily medial calcaneal branches.   -Performed dexamethasone injection at site of worst pain, see procedure note.  -If not improved then will consider oral steroids, topical or oral nerve pain medications.     Return to clinic PRN.

## 2023-09-26 DIAGNOSIS — L73.2 HIDRADENITIS: ICD-10-CM

## 2023-09-26 RX ORDER — B3/AZEL AC/ZINC/B6/COPPER/FA 600-5-500
TABLET ORAL
Qty: 60 TABLET | Refills: 5 | Status: CANCELLED | OUTPATIENT
Start: 2023-09-26

## 2023-09-27 ENCOUNTER — LAB VISIT (OUTPATIENT)
Dept: LAB | Facility: HOSPITAL | Age: 43
End: 2023-09-27
Attending: DERMATOLOGY
Payer: COMMERCIAL

## 2023-09-27 DIAGNOSIS — Z79.899 ENCOUNTER FOR LONG-TERM (CURRENT) USE OF OTHER MEDICATIONS: ICD-10-CM

## 2023-09-27 PROCEDURE — 86480 TB TEST CELL IMMUN MEASURE: CPT | Performed by: DERMATOLOGY

## 2023-09-28 LAB
GAMMA INTERFERON BACKGROUND BLD IA-ACNC: 0.01 IU/ML
M TB IFN-G CD4+ BCKGRND COR BLD-ACNC: 0.01 IU/ML
M TB IFN-G CD4+ BCKGRND COR BLD-ACNC: 0.02 IU/ML
MITOGEN IGNF BCKGRD COR BLD-ACNC: 9.99 IU/ML
TB GOLD PLUS: NEGATIVE

## 2023-09-29 ENCOUNTER — OFFICE VISIT (OUTPATIENT)
Dept: NEUROLOGY | Facility: CLINIC | Age: 43
End: 2023-09-29
Payer: COMMERCIAL

## 2023-09-29 ENCOUNTER — PATIENT MESSAGE (OUTPATIENT)
Dept: PSYCHIATRY | Facility: CLINIC | Age: 43
End: 2023-09-29
Payer: COMMERCIAL

## 2023-09-29 ENCOUNTER — OFFICE VISIT (OUTPATIENT)
Dept: PSYCHIATRY | Facility: CLINIC | Age: 43
End: 2023-09-29
Payer: COMMERCIAL

## 2023-09-29 ENCOUNTER — PATIENT MESSAGE (OUTPATIENT)
Dept: NEUROLOGY | Facility: CLINIC | Age: 43
End: 2023-09-29
Payer: COMMERCIAL

## 2023-09-29 DIAGNOSIS — F43.9 TRAUMA AND STRESSOR-RELATED DISORDER: Primary | ICD-10-CM

## 2023-09-29 DIAGNOSIS — D39.10 BORDERLINE EPITHELIAL NEOPLASM OF OVARY: ICD-10-CM

## 2023-09-29 DIAGNOSIS — G43.109 MIGRAINE WITH AURA AND WITHOUT STATUS MIGRAINOSUS, NOT INTRACTABLE: ICD-10-CM

## 2023-09-29 DIAGNOSIS — G47.09 OTHER INSOMNIA: ICD-10-CM

## 2023-09-29 PROCEDURE — 1160F PR REVIEW ALL MEDS BY PRESCRIBER/CLIN PHARMACIST DOCUMENTED: ICD-10-PCS | Mod: CPTII,95,, | Performed by: PHYSICIAN ASSISTANT

## 2023-09-29 PROCEDURE — 1159F MED LIST DOCD IN RCRD: CPT | Mod: CPTII,95,, | Performed by: PHYSICIAN ASSISTANT

## 2023-09-29 PROCEDURE — 99999 PR PBB SHADOW E&M-EST. PATIENT-LVL I: CPT | Mod: PBBFAC,,, | Performed by: PSYCHOLOGIST

## 2023-09-29 PROCEDURE — 90834 PR PSYCHOTHERAPY W/PATIENT, 45 MIN: ICD-10-PCS | Mod: S$GLB,,, | Performed by: PSYCHOLOGIST

## 2023-09-29 PROCEDURE — 99214 PR OFFICE/OUTPT VISIT, EST, LEVL IV, 30-39 MIN: ICD-10-PCS | Mod: 95,,, | Performed by: PHYSICIAN ASSISTANT

## 2023-09-29 PROCEDURE — 99214 OFFICE O/P EST MOD 30 MIN: CPT | Mod: 95,,, | Performed by: PHYSICIAN ASSISTANT

## 2023-09-29 PROCEDURE — 90834 PSYTX W PT 45 MINUTES: CPT | Mod: S$GLB,,, | Performed by: PSYCHOLOGIST

## 2023-09-29 PROCEDURE — 1159F PR MEDICATION LIST DOCUMENTED IN MEDICAL RECORD: ICD-10-PCS | Mod: CPTII,95,, | Performed by: PHYSICIAN ASSISTANT

## 2023-09-29 PROCEDURE — 1160F RVW MEDS BY RX/DR IN RCRD: CPT | Mod: CPTII,95,, | Performed by: PHYSICIAN ASSISTANT

## 2023-09-29 PROCEDURE — 99999 PR PBB SHADOW E&M-EST. PATIENT-LVL I: ICD-10-PCS | Mod: PBBFAC,,, | Performed by: PSYCHOLOGIST

## 2023-09-29 RX ORDER — GALCANEZUMAB 120 MG/ML
120 INJECTION, SOLUTION SUBCUTANEOUS
Qty: 1 ML | Refills: 11 | Status: ACTIVE | OUTPATIENT
Start: 2023-09-29

## 2023-09-29 NOTE — PROGRESS NOTES
Established Patient     The patient location is: [place]  The chief complaint leading to consultation is: headache follow up visit    Visit type: audiovisual    Face to Face time with patient: 5 min  13 minutes of total time spent on the encounter, which includes face to face time and non-face to face time preparing to see the patient (eg, review of tests), Obtaining and/or reviewing separately obtained history, Documenting clinical information in the electronic or other health record, Independently interpreting results (not separately reported) and communicating results to the patient/family/caregiver, or Care coordination (not separately reported).     Each patient to whom he or she provides medical services by telemedicine is:  (1) informed of the relationship between the physician and patient and the respective role of any other health care provider with respect to management of the patient; and (2) notified that he or she may decline to receive medical services by telemedicine and may withdraw from such care at any time.    Notes:        SUBJECTIVE:  Patient ID: Fran Higgins   Chief Complaint: Headache      History of Present Illness:  Fran Higgins is a 43 y.o. female with migraine, cervical scoliosis, obesity s/p sleeve gastrectomy, anxiety/depression, surgical menopause s/p hysterectomy/RSO for ovary cyst, insomnia who presents via virtual visit alone for follow-up of headaches.       09/29/2023 - Interval History:  Patient has had >50% reduction in Ha's since beginning botox. Prior to botox pt's Ha's were occurring >15/30 days per month. Since beginning botox, she experiences 5 HA days in 3 months. Most HA's are now mild in severity. Had 1 severe HA in this span that lasted 2 days but was eventually aborted w/ ubrelvy. HA's last 2 hrs - 2 days. As pt has experienced an improvement in Ha's with sustained reduction will continue botox as is clinically indicated.   Plan: continue botox, emgality,  "ubrelvy 50-100mg prn, zofran, continue sleep strategies, rtc for next botox    08/17/2023- Interval History: botox #3  Pt feels botox has been helpful. Is not trackign but states "I noticed a change" after the last round. She states she experiences approximately 20/30 ha days per month, hwoever isn't tracking. Will track for next visit.   Plan: continue botox, emgality, ubrelvy 50mg prn, zofran, rtc in 2 mo for f/up and 12 wks for next botox     05/25/2023 - botox #2     02/23/2023 - botox #1      12/12/2022 - Interval History:  Pt's ha's have worsened since last visit. They are now occurring >15/30 days per month for >3 mo, lasting > 4 hrs at a time. She tried tpx but had to d/c 2/2 depression SE. She also tried the addition of supplements including mag w/ no benefit noted. She continues to have trouble falling and staying asleep for which she recently started acupuncture for. Defers referral to sleep med, elavil, pcp at this time. Pt is interested in botox and would be an ideal candidate as she has tried and failed multiple ppx options.   Plan: start botox next visit, continue emgality, ubrelvy 50mg prn, zofran, continue sleep strategies, rtc jan 12 to begin botox    09/19/2022 - Interval History:   Ha's have increased 1-2 months ago possibly due to worsened insomnia and stress lately. Are now occurring 8/30 days per month lasting up to 1 day at a time. Currently taking effexor and xanax for these conditions.   Ubrelvy 50mg - typically resolves HA's, infrequently needs to repeat dose  Discussed multiple options, pt agreeable w/ following. Denies current depression.   Plan: start tpx 25mg/d, continue emgality, ubrelvy 50mg prn, zofran, rtc in 3 mo or sooner if needed    Recommendations made at last Office Visit on 12/6/21:  - Discussed symptoms appear to be consistent with migraines. Discussed this with patient along with treatment options and patient agreed with the following plan  - ppx - continue emgality  - " abortive - trial ubrelvy  - nausea - continue zofran  - avoid nsaids as they are c/i 2/2 gastrectomy  - avoid triptans as recommended by psychiatry 2/2 potential serotinin syndrome w/ effexor for depression  - decreased neck ROM, tightness, and cervical scoliosis - continue conservative treatments, consider PT in future  - risks, benefits, and potential side effects of emgality, ubrelvy, zofran discussed   - alternative treatment options offered   - importance of healthy diet, regular exercise and sleep hygiene in the treatment of headaches    - Start tracking headaches via Migraine Buddy roseann on phone   - RTC 6-12 mo or sooner if needed     Treatments Tried:  emgality - helps  Effexor  Tpx - depression  Anti-htn meds - avoid 2/2 low bp  Bb - avoid 2/2 depression  Botox - helps greatly  Ubrelvy - helps  triptans - has been told to avoid per psychiatrist 2/2 serotonin syndrome risk  nsaids - c/i 2/2 recent gastrectomy  zofran    Current Medications:    Current Outpatient Medications:     ALPRAZolam (XANAX) 0.5 MG tablet, Take 1 tablet (0.5 mg total) by mouth nightly as needed for Insomnia or Anxiety., Disp: 30 tablet, Rfl: 1    b complex vitamins capsule, Take 1 capsule by mouth once daily., Disp: , Rfl:     B3-azelaic-zinc-B6-copper-FA (NICAZEL) 600-5-10-5-1.5 mg Tab, take 1 tablet by mouth twice daily, Disp: 60 tablet, Rfl: 5    CALCIUM CITRATE ORAL, Take 1 tablet by mouth 3 (three) times daily. chewable, Disp: , Rfl:     clindamycin (CLEOCIN T) 1 % lotion, Apply to affected area two times a day., Disp: 120 mL, Rfl: 3    cyclobenzaprine (FLEXERIL) 5 MG tablet, Take 1 tablet (5 mg total) by mouth 2 (two) times daily as needed for Muscle spasms., Disp: 60 tablet, Rfl: 5    doxycycline monohydrate 100 mg Tab, Take 1 tablet (100 mg total) by mouth 2 (two) times daily for 10 days when flaring, Disp: 60 tablet, Rfl: 1    erythromycin with ethanoL (THERAMYCIN) 2 % external solution, Apply topically 2 (two) times daily.,  Disp: 60 mL, Rfl: 4    estradioL 1.25 mg/1.25 gram (0.1 %) GlPk, Place 1.25 mg onto the skin once daily. To the upper inner thigh, Disp: 37.5 g, Rfl: 11    finasteride (PROSCAR) 5 mg tablet, Take 1 tablet (5 mg total) by mouth once daily., Disp: 30 tablet, Rfl: 11    fluconazole (DIFLUCAN) 200 MG Tab, Take 1 tablet by mouth once if not better take another pill in 3 days, Disp: 30 tablet, Rfl: 0    fluocinolone acetonide oiL (DERMOTIC OIL) 0.01 % Drop, Use to affected ears at night, drop in canal and use to ear when flaring, Disp: 20 mL, Rfl: 3    galcanezumab-gnlm (EMGALITY PEN) 120 mg/mL PnIj, Inject 120 mg into the skin every 30 days., Disp: 1 mL, Rfl: 11    HUMIRA,CF, PEN 40 mg/0.4 mL PnKt, Inject 40mg (1 pen) into the skin every 7 days, Disp: 4 pen , Rfl: 4    HUMIRA,CF, PEN 80 mg/0.8 mL PnKt, Starting day 29, inject 80 mg (1 pen) into the skin every 2 weeks., Disp: 2 pen, Rfl: 4    HUMIRA,CF, PEN 80 mg/0.8 mL PnKt, Starting day 29, inject 80 mg (1 pen) into the skin every 2 weeks., Disp: 2 pen, Rfl: 4    ketoconazole (NIZORAL) 2 % cream, Apply to affected area two times a day (Patient taking differently: Apply to affected area two times a day), Disp: 60 g, Rfl: 3    multivitamin capsule, Take by mouth once daily., Disp: , Rfl:     nitrofurantoin, macrocrystal-monohydrate, (MACROBID) 100 MG capsule, Take 1 capsule (100 mg total) by mouth 2 (two) times daily., Disp: 14 capsule, Rfl: 0    nystatin-triamcinolone (MYCOLOG II) cream, Apply topically 2 (two) times daily., Disp: 30 g, Rfl: 1    omeprazole (PRILOSEC) 40 MG capsule, Take 1 capsule (40 mg total) by mouth every morning., Disp: 30 capsule, Rfl: 1    ondansetron (ZOFRAN-ODT) 4 MG TbDL, Dissolve 1 tablet (4 mg total) by mouth every 6 (six) hours as needed (for nausea)., Disp: 20 tablet, Rfl: 2    pimecrolimus (ELIDEL) 1 % cream, Apply to affected areas of underarm twice a day as needed for irritation. (Patient taking differently: Apply to affected areas of  underarm twice a day as needed for irritation.), Disp: 30 g, Rfl: 3    progesterone (PROMETRIUM) 100 MG capsule, Take 1 capsule (100 mg total) by mouth nightly., Disp: 30 capsule, Rfl: 11    spironolactone (ALDACTONE) 50 MG tablet, Start with taking 1 tablet by mouth every day, may increase to 2 tablets by mouth every day as tolerated., Disp: 60 tablet, Rfl: 4    spironolactone (ALDACTONE) 50 MG tablet, Start with taking 1 tablet by mouth every day, may increase to 2 tablets by mouth every day as tolerated., Disp: 60 tablet, Rfl: 5    thiamine (VITAMIN B-1) 50 MG tablet, Take 50 mg by mouth once daily., Disp: , Rfl:     ubrogepant (UBRELVY) 50 mg tablet, Take 1 tab by mouth for headaches. May repeat once 2 hours after initial dose based on response and tolerability., Disp: 16 tablet, Rfl: 5    venlafaxine (EFFEXOR-XR) 150 MG Cp24, Take 1 capsule (150 mg total) by mouth once daily., Disp: 30 capsule, Rfl: 6    Review of Systems - as per HPI, otherwise a balanced 10 systems review is negative.    OBJECTIVE:  Vitals:  LMP 01/17/2020      Physical Exam:  Constitutional: she appears well-developed and well-nourished. she is well groomed. NAD   HENT:    Head: Normocephalic and atraumatic  Eyes: Conjunctivae and EOM are normal  Musculoskeletal: Normal range of motion. No joint stiffness.   Psychiatric: Mood and affect are normal    Neuro: Patient is alert and oriented to person, place, and time. Language is intact and fluent. Speech is clear and fluent. Recent and remote memory are intact.  Normal attention and concentration.  Facial movement is symmetric. Moves all 4 extremities against gravity.      Review of Data:   Notes from neuro reviewed   Labs:  Lab Visit on 09/27/2023   Component Date Value Ref Range Status    WBC 09/27/2023 9.29  3.90 - 12.70 K/uL Final    RBC 09/27/2023 4.56  4.00 - 5.40 M/uL Final    Hemoglobin 09/27/2023 13.1  12.0 - 16.0 g/dL Final    Hematocrit 09/27/2023 39.3  37.0 - 48.5 % Final    MCV  09/27/2023 86  82 - 98 fL Final    MCH 09/27/2023 28.7  27.0 - 31.0 pg Final    MCHC 09/27/2023 33.3  32.0 - 36.0 g/dL Final    RDW 09/27/2023 12.5  11.5 - 14.5 % Final    Platelets 09/27/2023 240  150 - 450 K/uL Final    MPV 09/27/2023 11.8  9.2 - 12.9 fL Final    Immature Granulocytes 09/27/2023 0.4  0.0 - 0.5 % Final    Gran # (ANC) 09/27/2023 4.3  1.8 - 7.7 K/uL Final    Immature Grans (Abs) 09/27/2023 0.04  0.00 - 0.04 K/uL Final    Lymph # 09/27/2023 4.1  1.0 - 4.8 K/uL Final    Mono # 09/27/2023 0.8  0.3 - 1.0 K/uL Final    Eos # 09/27/2023 0.1  0.0 - 0.5 K/uL Final    Baso # 09/27/2023 0.04  0.00 - 0.20 K/uL Final    nRBC 09/27/2023 0  0 /100 WBC Final    Gran % 09/27/2023 46.5  38.0 - 73.0 % Final    Lymph % 09/27/2023 43.8  18.0 - 48.0 % Final    Mono % 09/27/2023 8.3  4.0 - 15.0 % Final    Eosinophil % 09/27/2023 0.6  0.0 - 8.0 % Final    Basophil % 09/27/2023 0.4  0.0 - 1.9 % Final    Differential Method 09/27/2023 Automated   Final    Sodium 09/27/2023 140  136 - 145 mmol/L Final    Potassium 09/27/2023 4.1  3.5 - 5.1 mmol/L Final    Chloride 09/27/2023 105  95 - 110 mmol/L Final    CO2 09/27/2023 28  23 - 29 mmol/L Final    Glucose 09/27/2023 99  70 - 110 mg/dL Final    BUN 09/27/2023 17  6 - 20 mg/dL Final    Creatinine 09/27/2023 1.0  0.5 - 1.4 mg/dL Final    Calcium 09/27/2023 9.6  8.7 - 10.5 mg/dL Final    Total Protein 09/27/2023 7.4  6.0 - 8.4 g/dL Final    Albumin 09/27/2023 4.3  3.5 - 5.2 g/dL Final    Total Bilirubin 09/27/2023 0.2  0.1 - 1.0 mg/dL Final    Alkaline Phosphatase 09/27/2023 71  55 - 135 U/L Final    AST 09/27/2023 17  10 - 40 U/L Final    ALT 09/27/2023 18  10 - 44 U/L Final    eGFR 09/27/2023 >60.0  >60 mL/min/1.73 m^2 Final    Anion Gap 09/27/2023 7 (L)  8 - 16 mmol/L Final   Lab Visit on 09/27/2023   Component Date Value Ref Range Status    NIL 09/27/2023 0.76586  IU/mL Final    TB1 - Nil 09/27/2023 0.016  IU/mL Final    TB2 - Nil 09/27/2023 0.011  IU/mL Final    Mitogen -  Nil 09/27/2023 9.989  IU/mL Final    TB Gold Plus 09/27/2023 Negative  Negative Final   Lab Visit on 09/05/2023   Component Date Value Ref Range Status    CEA 09/05/2023 1.8  0.0 - 5.0 ng/mL Final   Lab Visit on 07/19/2023   Component Date Value Ref Range Status    Estradiol 07/19/2023 69  See Text pg/mL Final    Testosterone, Free 07/19/2023 0.7  pg/mL Final     Imaging:  No results found for this or any previous visit.  Note: I have independently reviewed any/all imaging/labs/tests and agree with the report (s) as documented.  Any discrepancies will be as noted/demarcated by free text.  CHANTELLE CHING 9/29/2023    ASSESSMENT:  1. Migraine with aura and without status migrainosus, not intractable            PLAN:  - Discussed symptoms appear to be consistent with migraines. Discussed this with patient along with treatment options and patient agreed with the following plan  - ppx - continue emgality, botox  - abortive - continue ubrelvy  - nausea - continue zofran  - trouble w/ sleep - recently started acupuncture, defers referrals to pcp or sleep med or to begin elavil due to concern w/ interaction w/ effexor  - avoid nsaids as they are c/i 2/2 gastrectomy  - avoid triptans as recommended by psychiatry 2/2 potential serotinin syndrome w/ effexor for depression  - decreased neck ROM, tightness, and cervical scoliosis - continue conservative treatments, consider PT in future  - Continue tracking headaches   - Discussed goals of therapy are to decrease the frequency, intensity, and duration of headaches  - RTC for next botox    BOTOX  The patient has chronic migraines (G43.719) and suffers from headaches more than 15 days a month lasting more than 4 hours a day with no relief of symptoms despite trying multiple medications (including tpx, emgality, effexor/listed above). Botox treatment was approved for chronic migraines in October 2010. The patient will be an ideal candidate for Botox. We are planning for 3 treatments 3  months apart and aiming for at least 50% improvement in the symptoms. If we see no improvement after 3 treatments, we will discontinue the injections.  Injection sites planned:   muscle bilaterally ( a total of 10 units divided into 2 sites)   Procerus muscle (5 units)   Frontalis muscle bilaterally (a total of 20 units divided into 4 sites)   Temporalis muscle bilaterally (a total of 40 units divided into 8 sites)   Occipitalis muscle bilaterally (a total of 30 units divided into 6 sites)   Cervical paraspinal muscles (a total of 20 units divided into 4 sites)   Trapezius muscle bilaterally (a total of 30 units divided into 6 sites)   Total Botox to be used: 155 Units   Unavoidable waste: 45 Units       Orders Placed This Encounter    galcanezumab-gnlm (EMGALITY PEN) 120 mg/mL PnIj           Discussed potential for teratogenicity with treatment, patient understands if her family planning status should change she will contact office immediately and we will safely adjust medications as needed.     Questions and concerns were sought and answered to the patient's stated verbal satisfaction.  The patient verbalizes understanding and agreement with the above stated treatment plan.     CC: Erica Pineda MD Sarena Patel, PA-C  [facility]   [phone]    Dr. Martino was available during today's encounter.

## 2023-09-29 NOTE — PROGRESS NOTES
INFORMED CONSENT: Patient was identified using two patient-identifiers. The patient has been informed of the risks and benefits associated with engaging in psychotherapy, the handling of protected health information, the rights of privacy and the limits of confidentiality. The patient has also been informed of the importance of reporting any suicidal or homicidal ideation to this or any provider to ensure safety of all parties, and the Fran Higgins expressed understanding. The patient was agreeable to these terms and freely participates in individual psychotherapy.    PSYCHO-ONCOLOGY NOTE/ Individual Psychotherapy     Date: 9/29/2023   Site:  [facility]        Therapeutic Intervention: Met with patient.  Outpatient - Insight oriented psychotherapy 45 min - CPT code 18043      Patient was last seen by me on 9/8/2023    Problem list  Patient Active Problem List   Diagnosis    Depression    Left ovarian cyst    S/P RA Laparoscopic LSO    Mucinous tumor, of low malignant potential    Pericardial cyst    Scoliosis of cervical spine    Abnormal uterine bleeding (AUB)    Vaginal yeast infection    DUB (dysfunctional uterine bleeding)    Migraine with aura, not intractable    s/p robotic hysterectomy/RSO 3/9/20    Borderline epithelial neoplasm of ovary    Surgical menopause    Yeast vaginitis    Menopause    Dizziness    Galactorrhea in female- bilateral breast    Other insomnia    Major depressive disorder, recurrent episode, in partial remission with anxious distress    Prediabetes    Obesity    S/P laparoscopic sleeve gastrectomy    BMI 45.0-49.9, adult    Hidradenitis    Seborrheic dermatitis       Chief complaint/reason for encounter: trauma   Met with patient to evaluate psychosocial adaptation to diagnosis/treatment/survivorship of IPV    Current Medications  Current Outpatient Medications   Medication    ALPRAZolam (XANAX) 0.5 MG tablet    b complex vitamins capsule    B3-azelaic-zinc-B6-copper-FA  (NICAZEL) 600-5-10-5-1.5 mg Tab    CALCIUM CITRATE ORAL    clindamycin (CLEOCIN T) 1 % lotion    cyclobenzaprine (FLEXERIL) 5 MG tablet    doxycycline monohydrate 100 mg Tab    erythromycin with ethanoL (THERAMYCIN) 2 % external solution    estradioL 1.25 mg/1.25 gram (0.1 %) GlPk    finasteride (PROSCAR) 5 mg tablet    fluconazole (DIFLUCAN) 200 MG Tab    fluocinolone acetonide oiL (DERMOTIC OIL) 0.01 % Drop    galcanezumab-gnlm (EMGALITY PEN) 120 mg/mL PnIj    HUMIRA,CF, PEN 40 mg/0.4 mL PnKt    HUMIRA,CF, PEN 80 mg/0.8 mL PnKt    HUMIRA,CF, PEN 80 mg/0.8 mL PnKt    ketoconazole (NIZORAL) 2 % cream    multivitamin capsule    nitrofurantoin, macrocrystal-monohydrate, (MACROBID) 100 MG capsule    nystatin-triamcinolone (MYCOLOG II) cream    omeprazole (PRILOSEC) 40 MG capsule    ondansetron (ZOFRAN-ODT) 4 MG TbDL    pimecrolimus (ELIDEL) 1 % cream    progesterone (PROMETRIUM) 100 MG capsule    spironolactone (ALDACTONE) 50 MG tablet    spironolactone (ALDACTONE) 50 MG tablet    thiamine (VITAMIN B-1) 50 MG tablet    ubrogepant (UBRELVY) 50 mg tablet    venlafaxine (EFFEXOR-XR) 150 MG Cp24     No current facility-administered medications for this visit.       Objective:  Fran Spencer Gisela arrived promptly for the session.    The patient was fully cooperative throughout the session.  Appearance: age appropriate, appropriately  dressed, adequately  groomed  Behavior/Cooperation: friendly and cooperative  Speech: normal in rate, volume, and tone and appropriate quality, quantity and organization of sentences  Mood: euthymic  Affect: mood congruent  Thought Process: goal-directed, logical  Thought Content: normal,  No delusions or paranoia; did not appear to be responding to internal stimuli during the session  Orientation: grossly intact  Attention Span/Concentration: Attends to session without distraction; reports no difficulty  Insight: patient has awareness of illness; good insight into own behavior and behavior  of others  Judgment: the patient's behavior is adequate to circumstances    Interval history and content of current session: Reviewed esteem module.  Discussed  intimacy module . Reports to be coping in an adequate manner. Evaluated cognitive response, paying particular attention to negative intrusive thoughts of a persistent and detrimental nature. Thoughts of this type are in evidence with moderate distress. Provided cognitive behavioral therapy to address negative cognitions. Identified and evaluated psychosocial and environmental stressors secondary to diagnosis and treatment.  Examined proactive behaviors that may be implemented to minimize or ameliorate psychosocial stressors secondary to diagnosis and treatment.     Risk parameters:   Patient reports no suicidal ideation  Patient reports no homicidal ideation  Patient reports no self-injurious behavior  Patient reports no violent behavior   Safety needs:  None at this time      Verbal deficits: None     Patient's response to intervention:The patient's response to intervention is accepting.     Progress toward goals and other mental status changes:  The patient's progress toward goals is fair .      Progress to date:Progress - Ongoing, but Slow      Goals from last visit: Met     Patient reported outcomes:    Distress Thermometer:   Distress Score    Distress Score: 4        Practical Problems Physical Problems                                                   Family Problems                                         Emotional Problems                                                         Spiritual/Religions Concerns     Spiritual / Judaism Concerns: No         Other Problems               PHQ ANSWERS    Q1. Little interest or pleasure in doing things: (P) Not at all (09/29/23 0855)  Q2. Feeling down, depressed, or hopeless: (P) Not at all (09/29/23 0855)  Q3. Trouble falling or staying asleep, or sleeping too much: (P) Nearly every day (09/29/23 0855)  Q4.  Feeling tired or having little energy: (P) Nearly every day (09/29/23 0855)  Q5. Poor appetite or overeating: (P) Several days (09/29/23 0855)  Q6. Feeling bad about yourself - or that you are a failure or have let yourself or your family down: (P) Not at all (09/29/23 0855)  Q7. Trouble concentrating on things, such as reading the newspaper or watching television: (P) Not at all (09/29/23 0855)  Q8. Moving or speaking so slowly that other people could have noticed. Or the opposite - being so fidgety or restless that you have been moving around a lot more than usual: (P) Not at all (09/29/23 0855)  Q9.  0    PHQ8 Score : (P) 7 (09/29/23 0855)  PHQ-9 Total Score: (P) 7 (09/29/23 0855)     JOSE MARIA-7 Answers        9/29/2023     8:54 AM   GAD7   1. Feeling nervous, anxious, or on edge? 1   2. Not being able to stop or control worrying? 0   3. Worrying too much about different things? 0   4. Trouble relaxing? 1   5. Being so restless that it is hard to sit still? 0   6. Becoming easily annoyed or irritable? 0   7. Feeling afraid as if something awful might happen? 0   JOSE MARIA-7 Score 2     JOSE MARIA-7 Score: (P) 2  Interpretation: (P) Normal     Client Strengths: verbal, intelligent, successful, good social support, good insight, commitment to wellness, strong kerri, strong cultural traditions     Diagnosis:     ICD-10-CM ICD-9-CM   1. Trauma and stressor-related disorder  F43.9 309.81     308.9   2. 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 Other insomnia  G47.09 780.52        Treatment Plan:individual psychotherapy and medication management by physician  Target symptoms:  trauma  Why chosen therapy is appropriate versus another modality: relevant to diagnosis, patient responds to this modality, evidence based practice  Outcome monitoring methods: self-report, observation, checklist/rating scale  Therapeutic intervention type: insight oriented psychotherapy, behavior modifying psychotherapy  Prognosis:  Good      Behavioral goals:    Exercise:   Stress management: 4 self compliment phrases, review intimacy    Social engagement:   Nutrition:   Smoking Cessation:   Therapy:  Increase daily self-care and attention to health management  Pleasant events scheduling and increased social interaction    Return to clinic: as scheduled    Next Session:      Length of Service (minutes direct face-to-face contact): 45    Brittani Mahan, PhD  Clinical Psychologist  [place] License #[id number]  [place] License #[id number]

## 2023-10-02 DIAGNOSIS — L73.2 HIDRADENITIS: ICD-10-CM

## 2023-10-02 RX ORDER — B3/AZEL AC/ZINC/B6/COPPER/FA 600-5-500
TABLET ORAL
Qty: 60 TABLET | Refills: 5 | Status: CANCELLED | OUTPATIENT
Start: 2023-09-26

## 2023-10-04 ENCOUNTER — PATIENT MESSAGE (OUTPATIENT)
Dept: BARIATRICS | Facility: CLINIC | Age: 43
End: 2023-10-04
Payer: COMMERCIAL

## 2023-10-10 ENCOUNTER — PATIENT MESSAGE (OUTPATIENT)
Dept: BARIATRICS | Facility: CLINIC | Age: 43
End: 2023-10-10
Payer: COMMERCIAL

## 2023-10-18 DIAGNOSIS — K21.9 GASTROESOPHAGEAL REFLUX DISEASE, UNSPECIFIED WHETHER ESOPHAGITIS PRESENT: ICD-10-CM

## 2023-10-19 RX ORDER — OMEPRAZOLE 40 MG/1
40 CAPSULE, DELAYED RELEASE ORAL EVERY MORNING
Qty: 30 CAPSULE | Refills: 1 | Status: SHIPPED | OUTPATIENT
Start: 2023-10-19 | End: 2023-11-29 | Stop reason: SDUPTHER

## 2023-10-25 ENCOUNTER — TELEPHONE (OUTPATIENT)
Dept: NEUROLOGY | Facility: CLINIC | Age: 43
End: 2023-10-25
Payer: COMMERCIAL

## 2023-10-25 NOTE — TELEPHONE ENCOUNTER
Attempted to call the patient to inform that [facility] will not be available on 11/09/23 for pt's botox. LVM to return the call and reschedule after 11/09/23

## 2023-11-14 ENCOUNTER — PROCEDURE VISIT (OUTPATIENT)
Dept: NEUROLOGY | Facility: CLINIC | Age: 43
End: 2023-11-14
Payer: COMMERCIAL

## 2023-11-14 ENCOUNTER — PATIENT MESSAGE (OUTPATIENT)
Dept: BARIATRICS | Facility: CLINIC | Age: 43
End: 2023-11-14
Payer: COMMERCIAL

## 2023-11-14 ENCOUNTER — TELEPHONE (OUTPATIENT)
Dept: NEUROLOGY | Facility: CLINIC | Age: 43
End: 2023-11-14
Payer: COMMERCIAL

## 2023-11-14 VITALS
BODY MASS INDEX: 53.43 KG/M2 | SYSTOLIC BLOOD PRESSURE: 130 MMHG | DIASTOLIC BLOOD PRESSURE: 90 MMHG | HEART RATE: 92 BPM | WEIGHT: 293 LBS

## 2023-11-14 DIAGNOSIS — G43.E09 CHRONIC MIGRAINE WITH AURA WITHOUT STATUS MIGRAINOSUS, NOT INTRACTABLE: Primary | ICD-10-CM

## 2023-11-14 PROCEDURE — 64615 CHEMODENERV MUSC MIGRAINE: CPT | Mod: S$GLB,,, | Performed by: PHYSICIAN ASSISTANT

## 2023-11-14 PROCEDURE — 64615 PR CHEMODENERVATION OF MUSCLE FOR CHRONIC MIGRAINE: ICD-10-PCS | Mod: S$GLB,,, | Performed by: PHYSICIAN ASSISTANT

## 2023-11-14 NOTE — PROCEDURES
"Established Patient  Procedure Note   SUBJECTIVE:  Patient ID: Fran Higgins  Chief Complaint: Botulinum Toxin Injection      History of Present Illness:  Fran Higgins is a 43 y.o. female with  with migraine, cervical scoliosis, obesity s/p sleeve gastrectomy, anxiety/depression, surgical menopause s/p hysterectomy/RSO for ovary cyst, insomnia  who presents to clinic alone for follow-up of headaches and Botox injections.       11/14/2023-  botox    09/29/2023 - Interval History:  Patient has had >50% reduction in Ha's since beginning botox. Prior to botox pt's Ha's were occurring >15/30 days per month. Since beginning botox, she experiences 5 HA days in 3 months. Most HA's are now mild in severity. Had 1 severe HA in this span that lasted 2 days but was eventually aborted w/ ubrelvy. HA's last 2 hrs - 2 days. As pt has experienced an improvement in Ha's with sustained reduction will continue botox as is clinically indicated.   Plan: continue botox, emgality, ubrelvy 50-100mg prn, zofran, continue sleep strategies, rtc for next botox    08/17/2023- Interval History: botox #3  Pt feels botox has been helpful. Is not trackign but states "I noticed a change" after the last round. She states she experiences approximately 20/30 ha days per month, hwoever isn't tracking. Will track for next visit.   Plan: continue botox, emgality, ubrelvy 50mg prn, zofran, rtc in 2 mo for f/up and 12 wks for next botox    05/25/2023 - botox #2    02/23/2023 - botox #1    12/12/2022 - Interval History:  Pt's ha's have worsened since last visit. They are now occurring >15/30 days per month for >3 mo, lasting > 4 hrs at a time. She tried tpx but had to d/c 2/2 depression SE. She also tried the addition of supplements including mag w/ no benefit noted. She continues to have trouble falling and staying asleep for which she recently started acupuncture for. Defers referral to sleep med, elavil, pcp at this time. Pt is " interested in botox and would be an ideal candidate as she has tried and failed multiple ppx options.   Plan: start botox next visit, continue emgality, ubrelvy 50mg prn, zofran, continue sleep strategies, rtc jan 12 to begin botox     09/19/2022 - Interval History:   Ha's have increased 1-2 months ago possibly due to worsened insomnia and stress lately. Are now occurring 8/30 days per month lasting up to 1 day at a time. Currently taking effexor and xanax for these conditions.   Ubrelvy 50mg - typically resolves HA's, infrequently needs to repeat dose  Discussed multiple options, pt agreeable w/ following. Denies current depression.   Plan: start tpx 25mg/d, continue emgality, ubrelvy 50mg prn, zofran, rtc in 3 mo or sooner if needed     Recommendations made at last Office Visit on 12/6/21:  - Discussed symptoms appear to be consistent with migraines. Discussed this with patient along with treatment options and patient agreed with the following plan  - ppx - continue emgality  - abortive - trial ubrelvy  - nausea - continue zofran  - avoid nsaids as they are c/i 2/2 gastrectomy  - avoid triptans as recommended by psychiatry 2/2 potential serotinin syndrome w/ effexor for depression  - decreased neck ROM, tightness, and cervical scoliosis - continue conservative treatments, consider PT in future  - risks, benefits, and potential side effects of emgality, ubrelvy, zofran discussed   - alternative treatment options offered   - importance of healthy diet, regular exercise and sleep hygiene in the treatment of headaches    - Start tracking headaches via Migraine Rajinder roseann on phone   - RTC 6-12 mo or sooner if needed      Treatments Tried:  emgality - helps  Effexor  Tpx - depression  Anti-htn meds - avoid 2/2 low bp  Bb - avoid 2/2 depression  Ubrelvy - helps  triptans - has been told to avoid per psychiatrist 2/2 serotonin syndrome risk  nsaids - c/i 2/2 recent gastrectomy  zofran       Current Medications:    Current  Outpatient Medications:     ALPRAZolam (XANAX) 0.5 MG tablet, Take 1 tablet (0.5 mg total) by mouth nightly as needed for Insomnia or Anxiety., Disp: 30 tablet, Rfl: 1    b complex vitamins capsule, Take 1 capsule by mouth once daily., Disp: , Rfl:     B3-azelaic-zinc-B6-copper-FA (NICAZEL) 600-5-10-5-1.5 mg Tab, take 1 tablet by mouth twice daily, Disp: 60 tablet, Rfl: 5    CALCIUM CITRATE ORAL, Take 1 tablet by mouth 3 (three) times daily. chewable, Disp: , Rfl:     clindamycin (CLEOCIN T) 1 % lotion, Apply to affected area two times a day., Disp: 120 mL, Rfl: 3    cyclobenzaprine (FLEXERIL) 5 MG tablet, Take 1 tablet (5 mg total) by mouth 2 (two) times daily as needed for Muscle spasms., Disp: 60 tablet, Rfl: 5    doxycycline monohydrate 100 mg Tab, Take 1 tablet (100 mg total) by mouth 2 (two) times daily for 10 days when flaring, Disp: 60 tablet, Rfl: 1    erythromycin with ethanoL (THERAMYCIN) 2 % external solution, Apply topically 2 (two) times daily., Disp: 60 mL, Rfl: 4    estradioL 1.25 mg/1.25 gram (0.1 %) GlPk, Place 1.25 mg onto the skin once daily. To the upper inner thigh, Disp: 37.5 g, Rfl: 11    finasteride (PROSCAR) 5 mg tablet, Take 1 tablet (5 mg total) by mouth once daily., Disp: 30 tablet, Rfl: 11    fluconazole (DIFLUCAN) 200 MG Tab, Take 1 tablet by mouth once if not better take another pill in 3 days, Disp: 30 tablet, Rfl: 0    fluocinolone acetonide oiL (DERMOTIC OIL) 0.01 % Drop, Use to affected ears at night, drop in canal and use to ear when flaring, Disp: 20 mL, Rfl: 3    galcanezumab-gnlm (EMGALITY PEN) 120 mg/mL PnIj, Inject 120 mg into the skin every 30 days., Disp: 1 mL, Rfl: 11    HUMIRA,CF, PEN 40 mg/0.4 mL PnKt, Inject 40mg (1 pen) into the skin every 7 days, Disp: 4 pen , Rfl: 4    HUMIRA,CF, PEN 80 mg/0.8 mL PnKt, Starting day 29, inject 80 mg (1 pen) into the skin every 2 weeks., Disp: 2 pen, Rfl: 4    HUMIRA,CF, PEN 80 mg/0.8 mL PnKt, Starting day 29, inject 80 mg (1 pen)  into the skin every 2 weeks., Disp: 2 pen, Rfl: 4    ketoconazole (NIZORAL) 2 % cream, Apply to affected area two times a day (Patient taking differently: Apply to affected area two times a day), Disp: 60 g, Rfl: 3    multivitamin capsule, Take by mouth once daily., Disp: , Rfl:     nitrofurantoin, macrocrystal-monohydrate, (MACROBID) 100 MG capsule, Take 1 capsule (100 mg total) by mouth 2 (two) times daily., Disp: 14 capsule, Rfl: 0    nystatin-triamcinolone (MYCOLOG II) cream, Apply topically 2 (two) times daily., Disp: 30 g, Rfl: 1    omeprazole (PRILOSEC) 40 MG capsule, Take 1 capsule (40 mg total) by mouth every morning., Disp: 30 capsule, Rfl: 1    ondansetron (ZOFRAN-ODT) 4 MG TbDL, Dissolve 1 tablet (4 mg total) by mouth every 6 (six) hours as needed (for nausea)., Disp: 20 tablet, Rfl: 2    pimecrolimus (ELIDEL) 1 % cream, Apply to affected areas of underarm twice a day as needed for irritation. (Patient taking differently: Apply to affected areas of underarm twice a day as needed for irritation.), Disp: 30 g, Rfl: 3    progesterone (PROMETRIUM) 100 MG capsule, Take 1 capsule (100 mg total) by mouth nightly., Disp: 30 capsule, Rfl: 11    spironolactone (ALDACTONE) 50 MG tablet, Start with taking 1 tablet by mouth every day, may increase to 2 tablets by mouth every day as tolerated., Disp: 60 tablet, Rfl: 4    spironolactone (ALDACTONE) 50 MG tablet, Start with taking 1 tablet by mouth every day, may increase to 2 tablets by mouth every day as tolerated., Disp: 60 tablet, Rfl: 5    thiamine (VITAMIN B-1) 50 MG tablet, Take 50 mg by mouth once daily., Disp: , Rfl:     ubrogepant (UBRELVY) 50 mg tablet, Take 1 tab by mouth for headaches. May repeat once 2 hours after initial dose based on response and tolerability., Disp: 16 tablet, Rfl: 5    venlafaxine (EFFEXOR-XR) 150 MG Cp24, Take 1 capsule (150 mg total) by mouth once daily., Disp: 30 capsule, Rfl: 6  No current facility-administered medications for this  visit.    Review of Systems - as per HPI, otherwise a balanced 10 systems review is negative.    OBJECTIVE:  Vitals:  BP (!) 130/90   Pulse 92   Wt (!) 141.2 kg (311 lb 4.6 oz)   LMP 01/17/2020   BMI 53.43 kg/m²     Physical Exam:  Constitutional: she appears well-developed and well-nourished. she is well groomed. NAD   HENT:    Head: Normocephalic and atraumatic  Eyes: Conjunctivae and EOM are normal  Musculoskeletal: Normal range of motion. No joint stiffness.   Skin: Skin is warm and dry.  Psychiatric: Mood and affect are normal    Neuro: Patient is alert and oriented to person, place, and time. Language is intact and fluent.  Recent and remote memory are intact.  Normal attention and concentration.  Facial movement is symmetric.  Gait is normal.     Review of Data:   Notes from neuro reviewed.   Labs:  Lab Visit on 09/27/2023   Component Date Value Ref Range Status    WBC 09/27/2023 9.29  3.90 - 12.70 K/uL Final    RBC 09/27/2023 4.56  4.00 - 5.40 M/uL Final    Hemoglobin 09/27/2023 13.1  12.0 - 16.0 g/dL Final    Hematocrit 09/27/2023 39.3  37.0 - 48.5 % Final    MCV 09/27/2023 86  82 - 98 fL Final    MCH 09/27/2023 28.7  27.0 - 31.0 pg Final    MCHC 09/27/2023 33.3  32.0 - 36.0 g/dL Final    RDW 09/27/2023 12.5  11.5 - 14.5 % Final    Platelets 09/27/2023 240  150 - 450 K/uL Final    MPV 09/27/2023 11.8  9.2 - 12.9 fL Final    Immature Granulocytes 09/27/2023 0.4  0.0 - 0.5 % Final    Gran # (ANC) 09/27/2023 4.3  1.8 - 7.7 K/uL Final    Immature Grans (Abs) 09/27/2023 0.04  0.00 - 0.04 K/uL Final    Lymph # 09/27/2023 4.1  1.0 - 4.8 K/uL Final    Mono # 09/27/2023 0.8  0.3 - 1.0 K/uL Final    Eos # 09/27/2023 0.1  0.0 - 0.5 K/uL Final    Baso # 09/27/2023 0.04  0.00 - 0.20 K/uL Final    nRBC 09/27/2023 0  0 /100 WBC Final    Gran % 09/27/2023 46.5  38.0 - 73.0 % Final    Lymph % 09/27/2023 43.8  18.0 - 48.0 % Final    Mono % 09/27/2023 8.3  4.0 - 15.0 % Final    Eosinophil % 09/27/2023 0.6  0.0 - 8.0 %  Final    Basophil % 09/27/2023 0.4  0.0 - 1.9 % Final    Differential Method 09/27/2023 Automated   Final    Sodium 09/27/2023 140  136 - 145 mmol/L Final    Potassium 09/27/2023 4.1  3.5 - 5.1 mmol/L Final    Chloride 09/27/2023 105  95 - 110 mmol/L Final    CO2 09/27/2023 28  23 - 29 mmol/L Final    Glucose 09/27/2023 99  70 - 110 mg/dL Final    BUN 09/27/2023 17  6 - 20 mg/dL Final    Creatinine 09/27/2023 1.0  0.5 - 1.4 mg/dL Final    Calcium 09/27/2023 9.6  8.7 - 10.5 mg/dL Final    Total Protein 09/27/2023 7.4  6.0 - 8.4 g/dL Final    Albumin 09/27/2023 4.3  3.5 - 5.2 g/dL Final    Total Bilirubin 09/27/2023 0.2  0.1 - 1.0 mg/dL Final    Alkaline Phosphatase 09/27/2023 71  55 - 135 U/L Final    AST 09/27/2023 17  10 - 40 U/L Final    ALT 09/27/2023 18  10 - 44 U/L Final    eGFR 09/27/2023 >60.0  >60 mL/min/1.73 m^2 Final    Anion Gap 09/27/2023 7 (L)  8 - 16 mmol/L Final   Lab Visit on 09/27/2023   Component Date Value Ref Range Status    NIL 09/27/2023 0.15501  IU/mL Final    TB1 - Nil 09/27/2023 0.016  IU/mL Final    TB2 - Nil 09/27/2023 0.011  IU/mL Final    Mitogen - Nil 09/27/2023 9.989  IU/mL Final    TB Gold Plus 09/27/2023 Negative  Negative Final   Lab Visit on 09/05/2023   Component Date Value Ref Range Status    CEA 09/05/2023 1.8  0.0 - 5.0 ng/mL Final     Imaging:  No results found. However, due to the size of the patient record, not all encounters were searched. Please check Results Review for a complete set of results.    Note: I have independently reviewed any/all imaging/labs/tests and agree with the report (s) as documented.  Any discrepancies will be as noted/demarcated by free text.  CHANTELLE CHING 11/14/2023    ASSESSMENT:  1. Chronic migraine with aura without status migrainosus, not intractable          PLAN:  - Discussed symptoms appear to be consistent with migraines. Discussed this with patient along with treatment options and patient agreed with the following plan  - ppx - continue  emgality, botox  - Botox administered in clinic for Chronic Migraine (see below)   - abortive - continue ubrelvy  - nausea - continue zofran  - trouble w/ sleep - recently started acupuncture, defers referrals to pcp or sleep med or to begin elavil due to concern w/ interaction w/ effexor  - avoid nsaids as they are c/i 2/2 gastrectomy  - avoid triptans as recommended by psychiatry 2/2 potential serotinin syndrome w/ effexor for depression  - decreased neck ROM, tightness, and cervical scoliosis - continue conservative treatments, consider PT in future  - RTC in 12 weeks for repeat Botox injections or sooner if needed     Orders Placed This Encounter    onabotulinumtoxina injection 200 Units         All questions and concerns addressed.  Patient verbalizes understanding and is agreeable with the above stated treatment plan.      PROCEDURE NOTE:  BOTOX was performed as an indicated therapy for intractable chronic migraine headaches given that the patient failed more than 2 headache medications    Two patient identifiers were confirmed with the patient prior to performing this procedure. A time out to determine correct patient and and agreement on procedure performed was conducted prior to the procedure.      Botulinum Toxin Injection Procedure   Procedure: Botulinum toxin injection (23294)  After risks and benefits were explained including bleeding, infection, worsening of pain, damage to the areas being injected, weakness of muscles, loss of muscle control, dysphagia if injecting the head or neck, facial droop if injecting the facial area, painful injection, allergic or other reaction to the medications being injected, and the failure of the procedure to help the problem, a signed consent was obtained.   The patient was placed in a comfortable area and the sites to be treated were identified.The area to be treated was prepped three times with alcohol and the alcohol allowed to dry. Next, a 30 gauge needle was used  to inject the medication in the area to be treated.      Total Botox used: 155 Units   Botox wastage: 45 Units     Injection sites:    muscle bilaterally ( a total of 10 units divided into 2 sites)   Procerus muscle (5 units)   Frontalis muscle bilaterally (a total of 20 units divided into 4 sites)   Temporalis muscle bilaterally (a total of 40 units divided into 8 sites)   Occipitalis muscle bilaterally (a total of 30 units divided into 6 sites)   Cervical paraspinal muscles (a total of 20 units divided into 4 sites)   Trapezius muscle bilaterally (a total of 30 units divided into 6 sites)   Complications: none   RTC for the next Botox injection: 12 weeks     The patient tolerated the procedure well and did not experience any complications.     CC: Erica Pineda MD Sarena Patel, PA-C  [facility] Department of Neurology   [phone]

## 2023-11-14 NOTE — TELEPHONE ENCOUNTER
Pt was seen by provider.     ----- Message from Tushar Vicente sent at 11/14/2023 10:56 AM CST -----  Regarding: appt  Contact: [phone]  Pt calling to let provider know she is on the way accidentally went to [place]  aware of 15 min period ... Pls call and adv@[phone]

## 2023-11-17 ENCOUNTER — OFFICE VISIT (OUTPATIENT)
Dept: BARIATRICS | Facility: CLINIC | Age: 43
End: 2023-11-17
Payer: COMMERCIAL

## 2023-11-17 VITALS
DIASTOLIC BLOOD PRESSURE: 80 MMHG | HEART RATE: 106 BPM | SYSTOLIC BLOOD PRESSURE: 124 MMHG | BODY MASS INDEX: 50.02 KG/M2 | WEIGHT: 293 LBS | OXYGEN SATURATION: 96 % | HEIGHT: 64 IN

## 2023-11-17 DIAGNOSIS — E66.01 CLASS 3 SEVERE OBESITY DUE TO EXCESS CALORIES WITH SERIOUS COMORBIDITY AND BODY MASS INDEX (BMI) OF 50.0 TO 59.9 IN ADULT: Primary | ICD-10-CM

## 2023-11-17 DIAGNOSIS — Z98.84 S/P LAPAROSCOPIC SLEEVE GASTRECTOMY: ICD-10-CM

## 2023-11-17 DIAGNOSIS — Z71.3 ENCOUNTER FOR WEIGHT LOSS COUNSELING: ICD-10-CM

## 2023-11-17 PROCEDURE — 99999 PR PBB SHADOW E&M-EST. PATIENT-LVL III: CPT | Mod: PBBFAC,,, | Performed by: STUDENT IN AN ORGANIZED HEALTH CARE EDUCATION/TRAINING PROGRAM

## 2023-11-17 PROCEDURE — 3079F DIAST BP 80-89 MM HG: CPT | Mod: CPTII,S$GLB,, | Performed by: STUDENT IN AN ORGANIZED HEALTH CARE EDUCATION/TRAINING PROGRAM

## 2023-11-17 PROCEDURE — 3074F SYST BP LT 130 MM HG: CPT | Mod: CPTII,S$GLB,, | Performed by: STUDENT IN AN ORGANIZED HEALTH CARE EDUCATION/TRAINING PROGRAM

## 2023-11-17 PROCEDURE — 1160F RVW MEDS BY RX/DR IN RCRD: CPT | Mod: CPTII,S$GLB,, | Performed by: STUDENT IN AN ORGANIZED HEALTH CARE EDUCATION/TRAINING PROGRAM

## 2023-11-17 PROCEDURE — 99213 PR OFFICE/OUTPT VISIT, EST, LEVL III, 20-29 MIN: ICD-10-PCS | Mod: S$GLB,,, | Performed by: STUDENT IN AN ORGANIZED HEALTH CARE EDUCATION/TRAINING PROGRAM

## 2023-11-17 PROCEDURE — 3079F PR MOST RECENT DIASTOLIC BLOOD PRESSURE 80-89 MM HG: ICD-10-PCS | Mod: CPTII,S$GLB,, | Performed by: STUDENT IN AN ORGANIZED HEALTH CARE EDUCATION/TRAINING PROGRAM

## 2023-11-17 PROCEDURE — 1159F PR MEDICATION LIST DOCUMENTED IN MEDICAL RECORD: ICD-10-PCS | Mod: CPTII,S$GLB,, | Performed by: STUDENT IN AN ORGANIZED HEALTH CARE EDUCATION/TRAINING PROGRAM

## 2023-11-17 PROCEDURE — 3074F PR MOST RECENT SYSTOLIC BLOOD PRESSURE < 130 MM HG: ICD-10-PCS | Mod: CPTII,S$GLB,, | Performed by: STUDENT IN AN ORGANIZED HEALTH CARE EDUCATION/TRAINING PROGRAM

## 2023-11-17 PROCEDURE — 99213 OFFICE O/P EST LOW 20 MIN: CPT | Mod: S$GLB,,, | Performed by: STUDENT IN AN ORGANIZED HEALTH CARE EDUCATION/TRAINING PROGRAM

## 2023-11-17 PROCEDURE — 1160F PR REVIEW ALL MEDS BY PRESCRIBER/CLIN PHARMACIST DOCUMENTED: ICD-10-PCS | Mod: CPTII,S$GLB,, | Performed by: STUDENT IN AN ORGANIZED HEALTH CARE EDUCATION/TRAINING PROGRAM

## 2023-11-17 PROCEDURE — 99999 PR PBB SHADOW E&M-EST. PATIENT-LVL III: ICD-10-PCS | Mod: PBBFAC,,, | Performed by: STUDENT IN AN ORGANIZED HEALTH CARE EDUCATION/TRAINING PROGRAM

## 2023-11-17 PROCEDURE — 3008F BODY MASS INDEX DOCD: CPT | Mod: CPTII,S$GLB,, | Performed by: STUDENT IN AN ORGANIZED HEALTH CARE EDUCATION/TRAINING PROGRAM

## 2023-11-17 PROCEDURE — 3008F PR BODY MASS INDEX (BMI) DOCUMENTED: ICD-10-PCS | Mod: CPTII,S$GLB,, | Performed by: STUDENT IN AN ORGANIZED HEALTH CARE EDUCATION/TRAINING PROGRAM

## 2023-11-17 PROCEDURE — 1159F MED LIST DOCD IN RCRD: CPT | Mod: CPTII,S$GLB,, | Performed by: STUDENT IN AN ORGANIZED HEALTH CARE EDUCATION/TRAINING PROGRAM

## 2023-11-22 ENCOUNTER — TELEPHONE (OUTPATIENT)
Dept: BARIATRICS | Facility: CLINIC | Age: 43
End: 2023-11-22
Payer: COMMERCIAL

## 2023-11-22 ENCOUNTER — PATIENT MESSAGE (OUTPATIENT)
Dept: BARIATRICS | Facility: CLINIC | Age: 43
End: 2023-11-22
Payer: COMMERCIAL

## 2023-11-29 ENCOUNTER — PATIENT MESSAGE (OUTPATIENT)
Dept: ADMINISTRATIVE | Facility: OTHER | Age: 43
End: 2023-11-29
Payer: COMMERCIAL

## 2023-11-29 ENCOUNTER — PATIENT MESSAGE (OUTPATIENT)
Dept: NEUROLOGY | Facility: CLINIC | Age: 43
End: 2023-11-29
Payer: COMMERCIAL

## 2023-11-29 DIAGNOSIS — G47.09 OTHER INSOMNIA: ICD-10-CM

## 2023-11-29 DIAGNOSIS — K21.9 GASTROESOPHAGEAL REFLUX DISEASE, UNSPECIFIED WHETHER ESOPHAGITIS PRESENT: ICD-10-CM

## 2023-11-29 RX ORDER — OMEPRAZOLE 40 MG/1
40 CAPSULE, DELAYED RELEASE ORAL EVERY MORNING
Qty: 30 CAPSULE | Refills: 1 | Status: SHIPPED | OUTPATIENT
Start: 2023-11-29 | End: 2024-02-18 | Stop reason: SDUPTHER

## 2023-11-29 RX ORDER — ALPRAZOLAM 0.5 MG/1
0.5 TABLET ORAL NIGHTLY PRN
Qty: 30 TABLET | Refills: 1 | Status: SHIPPED | OUTPATIENT
Start: 2023-11-29

## 2023-12-12 ENCOUNTER — PATIENT MESSAGE (OUTPATIENT)
Dept: BARIATRICS | Facility: CLINIC | Age: 43
End: 2023-12-12
Payer: COMMERCIAL

## 2023-12-13 ENCOUNTER — PATIENT MESSAGE (OUTPATIENT)
Dept: BARIATRICS | Facility: CLINIC | Age: 43
End: 2023-12-13
Payer: COMMERCIAL

## 2023-12-18 DIAGNOSIS — L73.8 BACTERIAL FOLLICULITIS: ICD-10-CM

## 2023-12-18 NOTE — TELEPHONE ENCOUNTER
Please see the attached refill request.    Patient last seen on 09/18/2023.    Assessment / Plan:         Hidradenitis  -     spironolactone (ALDACTONE) 50 MG tablet; Start with taking 1 tablet by mouth every day, may increase to 2 tablets by mouth every day as tolerated.  Dispense: 60 tablet; Refill: 5  -     finasteride (PROSCAR) 5 mg tablet; Take 1 tablet (5 mg total) by mouth once daily.  Dispense: 30 tablet; Refill: 11     Encounter for long-term (current) use of other medications  -     CBC Auto Differential; Future; Expected date: 09/18/2023  -     Comprehensive Metabolic Panel; Future; Expected date: 09/18/2023  -     Quantiferon Gold TB; Future; Expected date: 09/18/2023        Hidradenitis  Today's Plan:    Continue  - Humira injected weekly  -     clindamycin (CLEOCIN T) 1 % lotion; AAA bid  Dispense: 120 mL; Refill: 3  -     finasteride (PROSCAR) 5 mg tablet; Take 1 tablet (5 mg total) by mouth once daily.  Dispense: 30 tablet; Refill: 11  -     spironolactone (ALDACTONE) 50 MG tablet; Start with 1 po qday, increase to 2 po qday as tolerated  Dispense: 60 tablet; Refill: 4  -     doxycycline monohydrate 100 mg Tab; Take 1 tablet (100 mg total) by mouth 2 (two) times a day. Prn - add to back   - NICAZEL 600-5-10-5-1.5 mg Tab; 1 po bid  Dispense: 60 tablet; Refill: 5     Also  Keto diet  Had gastric sleve      Will plan on doing Deroofing procedure to lesions around waist     Discussed  benefits and risks of Humira including but not limited to injection site reactions, increased risk of infection, decreased tumor surveillance, optic neuritis. Patient informed to discontinue Humira and notify our office if an infection develops.  Patient counseled to avoid live vaccines.     Will need CBC and LFTs q 3 q 3 - 6 months. Will need Quantiferon gold annually.     Patient still flaring around waistline and in groin but better than previous. Waistline is the most bothersome         The patient location is:  Home  The chief complaint leading to consultation is: Hidradentitis     Visit type: audiovisual     Face to Face time with patient: 15 minutes  25 minutes of total time spent on the encounter, which includes face to face time and non-face to face time preparing to see the patient (eg, review of tests), Obtaining and/or reviewing separately obtained history, Documenting clinical information in the electronic or other health record, Independently interpreting results (not separately reported) and communicating results to the patient/family/caregiver, or Care coordination (not separately reported).            Each patient to whom he or she provides medical services by telemedicine is:  (1) informed of the relationship between the physician and patient and the respective role of any other health care provider with respect to management of the patient; and (2) notified that he or she may decline to receive medical services by telemedicine and may withdraw from such care at any time.              LOS NUMBER AND COMPLEXITY OF PROBLEMS    COMPLEXITY OF DATA RISK TOTAL TIME (m)   39928  33079 [] 1 self-limited or minor problem [] Minimal to none [] No treatment recommended or patient to monitor. Reassurance.  15-29  10-19   59495  14650 Low  [] 2 or more self limited or minor problems  [] 1 stable chronic illness  [] 1 acute, uncomplicated illness or injury Limited (2)  [] Prior external notes from each unique source  [] Review result of each unique test  [] Order each unique test  OR [] Independent historian Low  []  OTC medications   []  Discussed/Decision for minor skin surgery (no risk factors) 30-44  20-29   14876  93709 Moderate  []  1 or more chronic unstable illness (not at goal or progression or exacerbation) or SE of treatment  []  2 or more stable chronic illnesses  []  1 acute illness with systemic symptoms  []  1 acute complicated injury  []  1 undiagnosed new problem with uncertain prognosis Moderate (1/3  below)  []  3 or more data items        *Now includes independent historian  []  Independent interpretation of a test  []  Discuss management/test with another provider Moderate  []  Prescription drug mgmt  []  Discussed/Decision for Minor surgery with risk factors  []  Mgmt limited by social determinates 45-59  30-39   62708  97300 High  []  1 or more chronic illness with severe exacerbation, progression or SE of treatment  []  1 acute or chronic illness/injury that poses a threat to life or bodily function Extensive (2/3 below)  []  3 or more data items        *Now includes independent historian.  []  Independent interpretation of a test  []  Discuss management/test with another provider High  []  Major surgery with risk discussed  []  Drug therapy requiring intensive monitoring for toxicity  []  Hospitalization  []  Decision for DNR 60-74  40-54                                     Electronically signed by Ирина Turner MD at 9/18/2023  1:40 PM

## 2023-12-26 RX ORDER — ERYTHROMYCIN 20 MG/ML
SOLUTION TOPICAL 2 TIMES DAILY
Qty: 60 ML | Refills: 4 | OUTPATIENT
Start: 2023-12-26 | End: 2024-12-25

## 2023-12-28 ENCOUNTER — PATIENT MESSAGE (OUTPATIENT)
Dept: ADMINISTRATIVE | Facility: OTHER | Age: 43
End: 2023-12-28
Payer: COMMERCIAL

## 2024-01-02 RX ORDER — CYCLOBENZAPRINE HCL 5 MG
5 TABLET ORAL 2 TIMES DAILY PRN
Qty: 60 TABLET | Refills: 5 | Status: SHIPPED | OUTPATIENT
Start: 2024-01-02

## 2024-01-02 NOTE — TELEPHONE ENCOUNTER
No care due was identified.  [facility] Embedded Care Due Messages. Reference number: [id number].   1/02/2024 8:20:02 AM CST

## 2024-01-09 ENCOUNTER — PATIENT MESSAGE (OUTPATIENT)
Dept: BARIATRICS | Facility: CLINIC | Age: 44
End: 2024-01-09
Payer: COMMERCIAL

## 2024-01-30 DIAGNOSIS — L73.2 HIDRADENITIS: ICD-10-CM

## 2024-01-30 RX ORDER — ADALIMUMAB 40MG/0.4ML
KIT SUBCUTANEOUS
Qty: 4 PEN | Refills: 2 | Status: ACTIVE | OUTPATIENT
Start: 2024-01-30

## 2024-02-01 ENCOUNTER — PATIENT MESSAGE (OUTPATIENT)
Dept: DERMATOLOGY | Facility: CLINIC | Age: 44
End: 2024-02-01
Payer: COMMERCIAL

## 2024-02-08 ENCOUNTER — PROCEDURE VISIT (OUTPATIENT)
Dept: NEUROLOGY | Facility: CLINIC | Age: 44
End: 2024-02-08
Payer: COMMERCIAL

## 2024-02-08 ENCOUNTER — PATIENT MESSAGE (OUTPATIENT)
Dept: NEUROLOGY | Facility: CLINIC | Age: 44
End: 2024-02-08
Payer: COMMERCIAL

## 2024-02-08 VITALS — HEART RATE: 104 BPM | DIASTOLIC BLOOD PRESSURE: 81 MMHG | SYSTOLIC BLOOD PRESSURE: 138 MMHG

## 2024-02-08 DIAGNOSIS — G43.E09 CHRONIC MIGRAINE WITH AURA WITHOUT STATUS MIGRAINOSUS, NOT INTRACTABLE: Primary | ICD-10-CM

## 2024-02-08 PROCEDURE — 64615 CHEMODENERV MUSC MIGRAINE: CPT | Mod: S$GLB,,, | Performed by: PHYSICIAN ASSISTANT

## 2024-02-08 PROCEDURE — 99499 UNLISTED E&M SERVICE: CPT | Mod: S$GLB,,, | Performed by: PHYSICIAN ASSISTANT

## 2024-02-08 NOTE — PROCEDURES
"Established Patient  Procedure Note   SUBJECTIVE:  Patient ID: Fran Higgins  Chief Complaint: Headache      History of Present Illness:  Fran Higgins is a 43 y.o. female with  with migraine, cervical scoliosis, obesity s/p sleeve gastrectomy, anxiety/depression, surgical menopause s/p hysterectomy/RSO for ovary cyst, insomnia  who presents to clinic alone for follow-up of headaches and Botox injections.       02/08/2024- Interval History: botox  Pt's ha's remain well controlled. Reviewed HA diary. Occurring 1-2/30 days per month. Duration 2-14 hrs. Severity 4-10/10. Pt takes ubrelvy 50-100mg 1st line and tylenol 2nd line. Finds current regimen to be helpful. Is amenable to weanign off emgality by next visit.   Prior to initiation of botox the patient was experiencing >15 days of headache lasting 4 or more hours and has had sustained reduction.   Plan: continue botox, wean off emgality, continue ubrelvy 50-100mg 1st line, zofran prn, sleep strategies, rtc 12 wks for next botox    11/14/2023-  botox    09/29/2023 - Interval History:  Patient has had >50% reduction in Ha's since beginning botox. Prior to botox pt's Ha's were occurring >15/30 days per month. Since beginning botox, she experiences 5 HA days in 3 months. Most HA's are now mild in severity. Had 1 severe HA in this span that lasted 2 days but was eventually aborted w/ ubrelvy. HA's last 2 hrs - 2 days. As pt has experienced an improvement in Ha's with sustained reduction will continue botox as is clinically indicated.   Plan: continue botox, emgality, ubrelvy 50-100mg prn, zofran, continue sleep strategies, rtc for next botox    08/17/2023- Interval History: botox #3  Pt feels botox has been helpful. Is not trackign but states "I noticed a change" after the last round. She states she experiences approximately 20/30 ha days per month, hwoever isn't tracking. Will track for next visit.   Plan: continue botox, emgality, ubrelvy 50mg prn, " zofran, rtc in 2 mo for f/up and 12 wks for next botox    05/25/2023 - botox #2    02/23/2023 - botox #1    12/12/2022 - Interval History:  Pt's ha's have worsened since last visit. They are now occurring >15/30 days per month for >3 mo, lasting > 4 hrs at a time. She tried tpx but had to d/c 2/2 depression SE. She also tried the addition of supplements including mag w/ no benefit noted. She continues to have trouble falling and staying asleep for which she recently started acupuncture for. Defers referral to sleep med, elavil, pcp at this time. Pt is interested in botox and would be an ideal candidate as she has tried and failed multiple ppx options.   Plan: start botox next visit, continue emgality, ubrelvy 50mg prn, zofran, continue sleep strategies, rtc jan 12 to begin botox     09/19/2022 - Interval History:   Ha's have increased 1-2 months ago possibly due to worsened insomnia and stress lately. Are now occurring 8/30 days per month lasting up to 1 day at a time. Currently taking effexor and xanax for these conditions.   Ubrelvy 50mg - typically resolves HA's, infrequently needs to repeat dose  Discussed multiple options, pt agreeable w/ following. Denies current depression.   Plan: start tpx 25mg/d, continue emgality, ubrelvy 50mg prn, zofran, rtc in 3 mo or sooner if needed     Recommendations made at last Office Visit on 12/6/21:  - Discussed symptoms appear to be consistent with migraines. Discussed this with patient along with treatment options and patient agreed with the following plan  - ppx - continue emgality  - abortive - trial ubrelvy  - nausea - continue zofran  - avoid nsaids as they are c/i 2/2 gastrectomy  - avoid triptans as recommended by psychiatry 2/2 potential serotinin syndrome w/ effexor for depression  - decreased neck ROM, tightness, and cervical scoliosis - continue conservative treatments, consider PT in future  - risks, benefits, and potential side effects of emgality, ubrelvy,  zofran discussed   - alternative treatment options offered   - importance of healthy diet, regular exercise and sleep hygiene in the treatment of headaches    - Start tracking headaches via Migraine [facility] roseann on phone   - RTC 6-12 mo or sooner if needed      Treatments Tried:  emgality - helps  Effexor  Tpx - depression  Anti-htn meds - avoid 2/2 low bp  Bb - avoid 2/2 depression  Ubrelvy - helps  triptans - has been told to avoid per psychiatrist 2/2 serotonin syndrome risk  nsaids - c/i 2/2 recent gastrectomy  zofran       Current Medications:    Current Outpatient Medications:     ALPRAZolam (XANAX) 0.5 MG tablet, Take 1 tablet (0.5 mg total) by mouth nightly as needed for Insomnia or Anxiety., Disp: 30 tablet, Rfl: 1    b complex vitamins capsule, Take 1 capsule by mouth once daily., Disp: , Rfl:     B3-azelaic-zinc-B6-copper-FA (NICAZEL) 600-5-10-5-1.5 mg Tab, take 1 tablet by mouth twice daily, Disp: 60 tablet, Rfl: 5    CALCIUM CITRATE ORAL, Take 1 tablet by mouth 3 (three) times daily. chewable, Disp: , Rfl:     clindamycin (CLEOCIN T) 1 % lotion, Apply to affected area two times a day., Disp: 120 mL, Rfl: 3    cyclobenzaprine (FLEXERIL) 5 MG tablet, Take 1 tablet (5 mg total) by mouth 2 (two) times daily as needed for Muscle spasms., Disp: 60 tablet, Rfl: 5    doxycycline monohydrate 100 mg Tab, Take 1 tablet (100 mg total) by mouth 2 (two) times daily for 10 days when flaring, Disp: 60 tablet, Rfl: 1    estradioL 1.25 mg/1.25 gram (0.1 %) GlPk, Place 1.25 mg onto the skin once daily. To the upper inner thigh, Disp: 37.5 g, Rfl: 11    finasteride (PROSCAR) 5 mg tablet, Take 1 tablet (5 mg total) by mouth once daily., Disp: 30 tablet, Rfl: 11    fluconazole (DIFLUCAN) 200 MG Tab, Take 1 tablet by mouth once if not better take another pill in 3 days, Disp: 30 tablet, Rfl: 0    fluocinolone acetonide oiL (DERMOTIC OIL) 0.01 % Drop, Use to affected ears at night, drop in canal and use to ear when flaring, Disp:  20 mL, Rfl: 3    galcanezumab-gnlm (EMGALITY PEN) 120 mg/mL PnIj, Inject 120 mg into the skin every 30 days., Disp: 1 mL, Rfl: 11    HUMIRA,CF, PEN 40 mg/0.4 mL PnKt, Inject 40mg (1 pen) into the skin every 7 days, Disp: 4 pen , Rfl: 2    ketoconazole (NIZORAL) 2 % cream, Apply to affected area two times a day (Patient taking differently: Apply to affected area two times a day), Disp: 60 g, Rfl: 3    multivitamin capsule, Take by mouth once daily., Disp: , Rfl:     nitrofurantoin, macrocrystal-monohydrate, (MACROBID) 100 MG capsule, Take 1 capsule (100 mg total) by mouth 2 (two) times daily., Disp: 14 capsule, Rfl: 0    nystatin-triamcinolone (MYCOLOG II) cream, Apply topically 2 (two) times daily., Disp: 30 g, Rfl: 1    omeprazole (PRILOSEC) 40 MG capsule, Take 1 capsule (40 mg total) by mouth every morning., Disp: 30 capsule, Rfl: 1    ondansetron (ZOFRAN-ODT) 4 MG TbDL, Dissolve 1 tablet (4 mg total) by mouth every 6 (six) hours as needed (for nausea)., Disp: 20 tablet, Rfl: 2    pimecrolimus (ELIDEL) 1 % cream, Apply to affected areas of underarm twice a day as needed for irritation. (Patient taking differently: Apply to affected areas of underarm twice a day as needed for irritation.), Disp: 30 g, Rfl: 3    progesterone (PROMETRIUM) 100 MG capsule, Take 1 capsule (100 mg total) by mouth nightly., Disp: 30 capsule, Rfl: 11    spironolactone (ALDACTONE) 50 MG tablet, Start with taking 1 tablet by mouth every day, may increase to 2 tablets by mouth every day as tolerated., Disp: 60 tablet, Rfl: 4    spironolactone (ALDACTONE) 50 MG tablet, Start with taking 1 tablet by mouth every day, may increase to 2 tablets by mouth every day as tolerated., Disp: 60 tablet, Rfl: 5    thiamine (VITAMIN B-1) 50 MG tablet, Take 50 mg by mouth once daily., Disp: , Rfl:     ubrogepant (UBRELVY) 50 mg tablet, Take 1 tab by mouth for headaches. May repeat once 2 hours after initial dose based on response and tolerability., Disp: 16  tablet, Rfl: 5    venlafaxine (EFFEXOR-XR) 150 MG Cp24, Take 1 capsule (150 mg total) by mouth once daily., Disp: 30 capsule, Rfl: 6    erythromycin with ethanoL (THERAMYCIN) 2 % external solution, Apply topically 2 (two) times daily., Disp: 60 mL, Rfl: 4  No current facility-administered medications for this visit.    Review of Systems - as per HPI, otherwise a balanced 10 systems review is negative.    OBJECTIVE:  Vitals:  /81   Pulse 104   LMP 01/17/2020     Physical Exam:  Constitutional: she appears well-developed and well-nourished. she is well groomed. NAD   HENT:    Head: Normocephalic and atraumatic  Eyes: Conjunctivae and EOM are normal  Musculoskeletal: Normal range of motion. No joint stiffness.   Skin: Skin is warm and dry.  Psychiatric: Mood and affect are normal    Neuro: Patient is alert and oriented to person, place, and time. Language is intact and fluent.  Recent and remote memory are intact.  Normal attention and concentration.  Facial movement is symmetric.  Gait is normal.     Review of Data:   Notes from neuro reviewed.   Labs:  No visits with results within 3 Month(s) from this visit.   Latest known visit with results is:   Lab Visit on 09/27/2023   Component Date Value Ref Range Status    WBC 09/27/2023 9.29  3.90 - 12.70 K/uL Final    RBC 09/27/2023 4.56  4.00 - 5.40 M/uL Final    Hemoglobin 09/27/2023 13.1  12.0 - 16.0 g/dL Final    Hematocrit 09/27/2023 39.3  37.0 - 48.5 % Final    MCV 09/27/2023 86  82 - 98 fL Final    MCH 09/27/2023 28.7  27.0 - 31.0 pg Final    MCHC 09/27/2023 33.3  32.0 - 36.0 g/dL Final    RDW 09/27/2023 12.5  11.5 - 14.5 % Final    Platelets 09/27/2023 240  150 - 450 K/uL Final    MPV 09/27/2023 11.8  9.2 - 12.9 fL Final    Immature Granulocytes 09/27/2023 0.4  0.0 - 0.5 % Final    Gran # (ANC) 09/27/2023 4.3  1.8 - 7.7 K/uL Final    Immature Grans (Abs) 09/27/2023 0.04  0.00 - 0.04 K/uL Final    Lymph # 09/27/2023 4.1  1.0 - 4.8 K/uL Final    Mono #  09/27/2023 0.8  0.3 - 1.0 K/uL Final    Eos # 09/27/2023 0.1  0.0 - 0.5 K/uL Final    Baso # 09/27/2023 0.04  0.00 - 0.20 K/uL Final    nRBC 09/27/2023 0  0 /100 WBC Final    Gran % 09/27/2023 46.5  38.0 - 73.0 % Final    Lymph % 09/27/2023 43.8  18.0 - 48.0 % Final    Mono % 09/27/2023 8.3  4.0 - 15.0 % Final    Eosinophil % 09/27/2023 0.6  0.0 - 8.0 % Final    Basophil % 09/27/2023 0.4  0.0 - 1.9 % Final    Differential Method 09/27/2023 Automated   Final    Sodium 09/27/2023 140  136 - 145 mmol/L Final    Potassium 09/27/2023 4.1  3.5 - 5.1 mmol/L Final    Chloride 09/27/2023 105  95 - 110 mmol/L Final    CO2 09/27/2023 28  23 - 29 mmol/L Final    Glucose 09/27/2023 99  70 - 110 mg/dL Final    BUN 09/27/2023 17  6 - 20 mg/dL Final    Creatinine 09/27/2023 1.0  0.5 - 1.4 mg/dL Final    Calcium 09/27/2023 9.6  8.7 - 10.5 mg/dL Final    Total Protein 09/27/2023 7.4  6.0 - 8.4 g/dL Final    Albumin 09/27/2023 4.3  3.5 - 5.2 g/dL Final    Total Bilirubin 09/27/2023 0.2  0.1 - 1.0 mg/dL Final    Alkaline Phosphatase 09/27/2023 71  55 - 135 U/L Final    AST 09/27/2023 17  10 - 40 U/L Final    ALT 09/27/2023 18  10 - 44 U/L Final    eGFR 09/27/2023 >60.0  >60 mL/min/1.73 m^2 Final    Anion Gap 09/27/2023 7 (L)  8 - 16 mmol/L Final     Imaging:  No results found. However, due to the size of the patient record, not all encounters were searched. Please check Results Review for a complete set of results.    Note: I have independently reviewed any/all imaging/labs/tests and agree with the report (s) as documented.  Any discrepancies will be as noted/demarcated by free text.  CHANTELLE CHING 2/8/2024    ASSESSMENT:  1. Chronic migraine with aura without status migrainosus, not intractable          PLAN:  - Discussed symptoms appear to be consistent with migraines. Discussed this with patient along with treatment options and patient agreed with the following plan  - ppx - continue  botox, wean off emgality  - Botox administered in  clinic for Chronic Migraine (see below)   - abortive - continue ubrelvy  - nausea - continue zofran  - trouble w/ sleep - recently started acupuncture, defers referrals to pcp or sleep med or to begin elavil due to concern w/ interaction w/ effexor  - avoid nsaids as they are c/i 2/2 gastrectomy  - avoid triptans as recommended by psychiatry 2/2 potential serotinin syndrome w/ effexor for depression  - decreased neck ROM, tightness, and cervical scoliosis - continue conservative treatments, consider PT in future  - RTC in 12 weeks for repeat Botox injections or sooner if needed     Orders Placed This Encounter    onabotulinumtoxina injection 200 Units         All questions and concerns addressed.  Patient verbalizes understanding and is agreeable with the above stated treatment plan.      PROCEDURE NOTE:  BOTOX was performed as an indicated therapy for intractable chronic migraine headaches given that the patient failed more than 2 headache medications    Two patient identifiers were confirmed with the patient prior to performing this procedure. A time out to determine correct patient and and agreement on procedure performed was conducted prior to the procedure.      Botulinum Toxin Injection Procedure   Procedure: Botulinum toxin injection (72506)  After risks and benefits were explained including bleeding, infection, worsening of pain, damage to the areas being injected, weakness of muscles, loss of muscle control, dysphagia if injecting the head or neck, facial droop if injecting the facial area, painful injection, allergic or other reaction to the medications being injected, and the failure of the procedure to help the problem, a signed consent was obtained.   The patient was placed in a comfortable area and the sites to be treated were identified.The area to be treated was prepped three times with alcohol and the alcohol allowed to dry. Next, a 30 gauge needle was used to inject the medication in the area to be  treated.      Total Botox used: 155 Units   Botox wastage: 45 Units     Injection sites:    muscle bilaterally ( a total of 10 units divided into 2 sites)   Procerus muscle (5 units)   Frontalis muscle bilaterally (a total of 20 units divided into 4 sites)   Temporalis muscle bilaterally (a total of 40 units divided into 8 sites)   Occipitalis muscle bilaterally (a total of 30 units divided into 6 sites)   Cervical paraspinal muscles (a total of 20 units divided into 4 sites)   Trapezius muscle bilaterally (a total of 30 units divided into 6 sites)   Complications: none   RTC for the next Botox injection: 12 weeks     The patient tolerated the procedure well and did not experience any complications.     CC: Erica Pineda MD Sarena Patel, PA-C  [facility] Department of Neurology   [phone]

## 2024-02-14 ENCOUNTER — PATIENT MESSAGE (OUTPATIENT)
Dept: BARIATRICS | Facility: CLINIC | Age: 44
End: 2024-02-14
Payer: COMMERCIAL

## 2024-02-18 DIAGNOSIS — K21.9 GASTROESOPHAGEAL REFLUX DISEASE, UNSPECIFIED WHETHER ESOPHAGITIS PRESENT: ICD-10-CM

## 2024-02-19 RX ORDER — OMEPRAZOLE 40 MG/1
40 CAPSULE, DELAYED RELEASE ORAL EVERY MORNING
Qty: 30 CAPSULE | Refills: 1 | Status: SHIPPED | OUTPATIENT
Start: 2024-02-19 | End: 2024-04-03 | Stop reason: SDUPTHER

## 2024-02-20 ENCOUNTER — PATIENT MESSAGE (OUTPATIENT)
Dept: BARIATRICS | Facility: CLINIC | Age: 44
End: 2024-02-20
Payer: COMMERCIAL

## 2024-02-29 ENCOUNTER — PATIENT MESSAGE (OUTPATIENT)
Dept: BARIATRICS | Facility: CLINIC | Age: 44
End: 2024-02-29
Payer: COMMERCIAL

## 2024-03-06 ENCOUNTER — HOSPITAL ENCOUNTER (OUTPATIENT)
Dept: RADIOLOGY | Facility: HOSPITAL | Age: 44
Discharge: HOME | End: 2024-03-06
Attending: PHYSICAL MEDICINE & REHABILITATION
Payer: COMMERCIAL

## 2024-03-06 ENCOUNTER — OFFICE VISIT (OUTPATIENT)
Dept: PHYSICAL MEDICINE AND REHAB | Facility: CLINIC | Age: 44
End: 2024-03-06
Payer: COMMERCIAL

## 2024-03-06 ENCOUNTER — PATIENT MESSAGE (OUTPATIENT)
Dept: BARIATRICS | Facility: CLINIC | Age: 44
End: 2024-03-06
Payer: COMMERCIAL

## 2024-03-06 VITALS
SYSTOLIC BLOOD PRESSURE: 130 MMHG | HEIGHT: 64 IN | BODY MASS INDEX: 50.02 KG/M2 | HEART RATE: 80 BPM | WEIGHT: 293 LBS | DIASTOLIC BLOOD PRESSURE: 80 MMHG | TEMPERATURE: 99 F

## 2024-03-06 DIAGNOSIS — M41.9 SCOLIOSIS, UNSPECIFIED SCOLIOSIS TYPE, UNSPECIFIED SPINAL REGION: ICD-10-CM

## 2024-03-06 DIAGNOSIS — R11.0 NAUSEA: ICD-10-CM

## 2024-03-06 DIAGNOSIS — M54.50 ACUTE LEFT-SIDED LOW BACK PAIN WITHOUT SCIATICA: ICD-10-CM

## 2024-03-06 DIAGNOSIS — M54.50 ACUTE LEFT-SIDED LOW BACK PAIN WITHOUT SCIATICA: Primary | ICD-10-CM

## 2024-03-06 DIAGNOSIS — M54.2 NECK PAIN: ICD-10-CM

## 2024-03-06 PROCEDURE — 99999 PR PBB SHADOW E&M-EST. PATIENT-LVL V: CPT | Mod: PBBFAC,,, | Performed by: PHYSICAL MEDICINE & REHABILITATION

## 2024-03-06 PROCEDURE — 3008F BODY MASS INDEX DOCD: CPT | Mod: CPTII,S$GLB,, | Performed by: PHYSICAL MEDICINE & REHABILITATION

## 2024-03-06 PROCEDURE — 1159F MED LIST DOCD IN RCRD: CPT | Mod: CPTII,S$GLB,, | Performed by: PHYSICAL MEDICINE & REHABILITATION

## 2024-03-06 PROCEDURE — 72110 X-RAY EXAM L-2 SPINE 4/>VWS: CPT | Mod: TC

## 2024-03-06 PROCEDURE — 3075F SYST BP GE 130 - 139MM HG: CPT | Mod: CPTII,S$GLB,, | Performed by: PHYSICAL MEDICINE & REHABILITATION

## 2024-03-06 PROCEDURE — 3079F DIAST BP 80-89 MM HG: CPT | Mod: CPTII,S$GLB,, | Performed by: PHYSICAL MEDICINE & REHABILITATION

## 2024-03-06 PROCEDURE — 99204 OFFICE O/P NEW MOD 45 MIN: CPT | Mod: S$GLB,,, | Performed by: PHYSICAL MEDICINE & REHABILITATION

## 2024-03-06 PROCEDURE — 1160F RVW MEDS BY RX/DR IN RCRD: CPT | Mod: CPTII,S$GLB,, | Performed by: PHYSICAL MEDICINE & REHABILITATION

## 2024-03-06 PROCEDURE — 72110 X-RAY EXAM L-2 SPINE 4/>VWS: CPT | Mod: 26,,, | Performed by: RADIOLOGY

## 2024-03-06 RX ORDER — GABAPENTIN 100 MG/1
100 CAPSULE ORAL 3 TIMES DAILY
Qty: 90 CAPSULE | Refills: 2 | Status: SHIPPED | OUTPATIENT
Start: 2024-03-06

## 2024-03-06 RX ORDER — METHYLPREDNISOLONE 4 MG/1
TABLET ORAL
Qty: 21 EACH | Refills: 0 | Status: SHIPPED | OUTPATIENT
Start: 2024-03-06 | End: 2024-03-27

## 2024-03-06 NOTE — PROGRESS NOTES
Subjective:     Patient ID: Fran Higgins is a 43 y.o. female.    Chief Complaint: Back Pain    Ms Higgins is a 44 yo female here for evaluation of back pain. 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  She is not doing workers comp.  She has scoliosis in upper back, but this is left lower back.  The pain is not radiating now, she will have some left leg pain at times but not all the time.  The pain is worse with bending and lifting and sitting.  The pain is better walking, lying down.  The pain is stabbing and sharp pain. There is tingling going down the leg but not all the time.  The pain is 7/10 now, worst 10/10 when hurt her back, best 3/10 the third day all day.  She feels like hard to say because lives with upper back pain from scoliosis.  She will take tylenol and flexeril at night.  She cannot have nsaids because of gastric sleeve.  She has tried voltaren gel and rest.      She does have neck and upper back pain that is chronic for 25 years.  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, of low malignant potential  No date: Ovarian cyst  7/26/2019: Pericardial cyst  No date: Psychiatric problem  No date: Sleep difficulties  3/13/2020: Surgical menopause  No date: Therapy  9/26/2019: Vaginal yeast infection    Past Surgical History:  03/2020: HYSTERECTOMY  6/22/2021: LAPAROSCOPIC SLEEVE  GASTRECTOMY; N/A      Comment:  Procedure: GASTRECTOMY, SLEEVE, LAPAROSCOPIC; with                intraoperative egd;  Surgeon: Jaziel Whitman Jr., MD;  Location: [facility] OR [address] [place] [address];  Service: General;                 Laterality: N/A;  3/9/2020: ROBOT-ASSISTED LAPAROSCOPIC ABDOMINAL HYSTERECTOMY USING DA   ROMAIN XI; N/A      Comment:  Procedure: XI ROBOTIC HYSTERECTOMY;  Surgeon: Jeff Kaufman MD;  Location: [address];  Service:                Oncology;  Laterality: N/A;  6/25/2019: ROBOT-ASSISTED LAPAROSCOPIC SALPINGO-OOPHORECTOMY; Left      Comment:  Procedure: ROBOTIC SALPINGO-OOPHORECTOMY;  Surgeon:                Marky Fox Jr., MD;  Location: [facility];  Service:                OB/GYN;  Laterality: Left;  3/9/2020: SALPINGOOPHORECTOMY; Right      Comment:  Procedure: SALPINGO-OOPHORECTOMY  USO;  Surgeon: Jeff Kaufman MD;  Location: [facility] OR [address] [place] [address];  Service:                Oncology;  Laterality: Right;  No date: TONSILLECTOMY    Review of patient's family history indicates:  Problem: Diabetes      Relation: Paternal Grandfather          Age of Onset: (Not Specified)  Problem: Macular degeneration      Relation: Maternal Grandmother          Age of Onset: (Not Specified)  Problem: Cancer      Relation: Maternal Grandfather          Age of Onset: (Not Specified)          Comment: prostate cancer  Problem: Hypertension      Relation: Father          Age of Onset: (Not Specified)  Problem: Depression      Relation: Father          Age of Onset: (Not Specified)  Problem: Drug abuse      Relation: Father          Age of Onset: (Not Specified)  Problem: Hypertension      Relation: Mother          Age of Onset: (Not Specified)  Problem: Stroke      Relation: Mother          Age of Onset: 60          Comment: ASD, PFO  Problem: Depression      Relation: Mother          Age of Onset: (Not Specified)  Problem: Cancer      Relation: Other          Age of Onset:  (Not Specified)          Comment: uterine cancer   Problem: Uterine cancer      Relation: Other          Age of Onset: (Not Specified)  Problem: Depression      Relation: Sister          Age of Onset: (Not Specified)  Problem: Colon cancer      Relation: Neg Hx          Age of Onset: (Not Specified)  Problem: Ovarian cancer      Relation: Neg Hx          Age of Onset: (Not Specified)  Problem: Breast cancer      Relation: Neg Hx          Age of Onset: (Not Specified)  Problem: Glaucoma      Relation: Neg Hx          Age of Onset: (Not Specified)  Problem: Melanoma      Relation: Neg Hx          Age of Onset: (Not Specified)  Problem: Heart attack      Relation: Neg Hx          Age of Onset: (Not Specified)  Problem: Heart disease      Relation: Neg Hx          Age of Onset: (Not Specified)  Problem: Hyperlipidemia      Relation: Neg Hx          Age of Onset: (Not Specified)      Social History    Socioeconomic History      Marital status: Single      Number of children: 1    Occupational History      Occupation: Nurse circulator    Tobacco Use      Smoking status: Never      Smokeless tobacco: Never    Substance and Sexual Activity      Alcohol use: Yes        Comment: 2-3 drinks twice a year      Drug use: No      Sexual activity: Not Currently        Partners: Male        Birth control/protection: None, Condom    Other Topics      Concerns:        Are you pregnant or think you may be?: No        Breast-feeding: No    Social Determinants of Health  Financial Resource Strain: Patient Declined (11/17/2023)      Overall Financial Resource Strain (CARDIA)          Difficulty of Paying Living Expenses: Patient declined  Food Insecurity: Patient Declined (11/17/2023)      Hunger Vital Sign          Worried About Running Out of Food in the Last Year: Patient declined           Ran Out of Food in the Last Year: Patient declined  Transportation Needs: Patient Declined (11/17/2023)      PRAPARE - Transportation          Lack  of Transportation (Medical): Patient declined          Lack of Transportation (Non-Medical): Patient declined  Physical Activity: Insufficiently Active (11/17/2023)      Exercise Vital Sign          Days of Exercise per Week: 3 days          Minutes of Exercise per Session: 30 min  Stress: Stress Concern Present (11/17/2023)      [employer] - Occupational Stress Questionnaire          Feeling of Stress : To some extent  Social Connections: Unknown (11/17/2023)      Social Connection and Isolation Panel [NHANES]          Frequency of Communication with Friends and Family: Patient declined           Frequency of Social Gatherings with Friends and Family: Patient declined          Active Member of Clubs or Organizations: Patient declined          Attends Club or Organization Meetings: Patient declined          Marital Status: Patient declined  Housing Stability: Low Risk  (11/17/2023)      Housing Stability Vital Sign          Unable to Pay for Housing in the Last Year: No          Number of Places Lived in the Last Year: 1          Unstable Housing in the Last Year: No    Current Outpatient Medications:  ALPRAZolam (XANAX) 0.5 MG tablet, Take 1 tablet (0.5 mg total) by mouth nightly as needed for Insomnia or Anxiety., Disp: 30 tablet, Rfl: 1  b complex vitamins capsule, Take 1 capsule by mouth once daily., Disp: , Rfl:   B3-azelaic-zinc-B6-copper-FA (NICAZEL) 600-5-10-5-1.5 mg Tab, take 1 tablet by mouth twice daily, Disp: 60 tablet, Rfl: 5  CALCIUM CITRATE ORAL, Take 1 tablet by mouth 3 (three) times daily. chewable, Disp: , Rfl:   clindamycin (CLEOCIN T) 1 % lotion, Apply to affected area two times a day., Disp: 120 mL, Rfl: 3  cyclobenzaprine (FLEXERIL) 5 MG tablet, Take 1 tablet (5 mg total) by mouth 2 (two) times daily as needed for Muscle spasms., Disp: 60 tablet, Rfl: 5  doxycycline monohydrate 100 mg Tab, Take 1 tablet (100 mg total) by mouth 2 (two) times daily for 10 days when  flaring, Disp: 60 tablet, Rfl: 1  erythromycin with ethanoL (THERAMYCIN) 2 % external solution, Apply topically 2 (two) times daily., Disp: 60 mL, Rfl: 4  estradioL 1.25 mg/1.25 gram (0.1 %) GlPk, Place 1.25 mg onto the skin once daily. To the upper inner thigh, Disp: 37.5 g, Rfl: 11  finasteride (PROSCAR) 5 mg tablet, Take 1 tablet (5 mg total) by mouth once daily., Disp: 30 tablet, Rfl: 11  fluconazole (DIFLUCAN) 200 MG Tab, Take 1 tablet by mouth once if not better take another pill in 3 days, Disp: 30 tablet, Rfl: 0  fluocinolone acetonide oiL (DERMOTIC OIL) 0.01 % Drop, Use to affected ears at night, drop in canal and use to ear when flaring, Disp: 20 mL, Rfl: 3  galcanezumab-gnlm (EMGALITY PEN) 120 mg/mL PnIj, Inject 120 mg into the skin every 30 days., Disp: 1 mL, Rfl: 11  HUMIRA,CF, PEN 40 mg/0.4 mL PnKt, Inject 40mg (1 pen) into the skin every 7 days, Disp: 4 pen , Rfl: 2  ketoconazole (NIZORAL) 2 % cream, Apply to affected area two times a day (Patient taking differently: Apply to affected area two times a day), Disp: 60 g, Rfl: 3  multivitamin capsule, Take by mouth once daily., Disp: , Rfl:   nitrofurantoin, macrocrystal-monohydrate, (MACROBID) 100 MG capsule, Take 1 capsule (100 mg total) by mouth 2 (two) times daily., Disp: 14 capsule, Rfl: 0  nystatin-triamcinolone (MYCOLOG II) cream, Apply topically 2 (two) times daily., Disp: 30 g, Rfl: 1  omeprazole (PRILOSEC) 40 MG capsule, Take 1 capsule (40 mg total) by mouth every morning., Disp: 30 capsule, Rfl: 1  ondansetron (ZOFRAN-ODT) 4 MG TbDL, Dissolve 1 tablet (4 mg total) by mouth every 6 (six) hours as needed (for nausea)., Disp: 20 tablet, Rfl: 2  pimecrolimus (ELIDEL) 1 % cream, Apply to affected areas of underarm twice a day as needed for irritation. (Patient taking differently: Apply to affected areas of underarm twice a day as needed for irritation.), Disp: 30 g, Rfl: 3  progesterone (PROMETRIUM) 100 MG capsule, Take 1 capsule (100 mg total) by  mouth nightly., Disp: 30 capsule, Rfl: 11  spironolactone (ALDACTONE) 50 MG tablet, Start with taking 1 tablet by mouth every day, may increase to 2 tablets by mouth every day as tolerated., Disp: 60 tablet, Rfl: 4  spironolactone (ALDACTONE) 50 MG tablet, Start with taking 1 tablet by mouth every day, may increase to 2 tablets by mouth every day as tolerated., Disp: 60 tablet, Rfl: 5  thiamine (VITAMIN B-1) 50 MG tablet, Take 50 mg by mouth once daily., Disp: , Rfl:   ubrogepant (UBRELVY) 50 mg tablet, Take 1 tablet by mouth for headaches. May repeat once 2 hours after initial dose based on response and tolerability., Disp: 16 tablet, Rfl: 5  venlafaxine (EFFEXOR-XR) 150 MG Cp24, Take 1 capsule (150 mg total) by mouth once daily., Disp: 30 capsule, Rfl: 6    No current facility-administered medications for this visit.      Review of patient's allergies indicates:  No Known Allergies          Review of Systems   Constitutional: Negative for weight gain and weight loss.   Cardiovascular:  Negative for chest pain.   Respiratory:  Negative for shortness of breath.    Musculoskeletal:  Positive for back pain (left). Negative for joint pain and joint swelling.   Gastrointestinal:  Negative for abdominal pain, bowel incontinence, nausea and vomiting.   Genitourinary:  Negative for bladder incontinence. Dysuria: back of the left leg.  Neurological:  Positive for paresthesias. Negative for numbness.        Objective:     General: Fran is well-developed, well-nourished, appears stated age, in no acute distress, alert and oriented to time, place and person.     General    Vitals reviewed.  Constitutional: She is oriented to person, place, and time. She appears well-developed and well-nourished.   HENT:   Head: Normocephalic and atraumatic.   Pulmonary/Chest: Effort normal.   Neurological: She is alert and oriented to person, place, and time.   Psychiatric: She has a normal mood and affect. Her behavior is normal. Judgment  and thought content normal.     General Musculoskeletal Exam   Gait: normal     Right Ankle/Foot Exam     Tests   Heel Walk: able to perform  Tiptoe Walk: able to perform    Left Ankle/Foot Exam     Tests   Heel Walk: able to perform  Tiptoe Walk: able to perform  Back (L-Spine & T-Spine) / Neck (C-Spine) Exam     Tenderness Left paramedian tenderness of the Sacrum.     Back (L-Spine & T-Spine) Range of Motion   Extension:  10 (with pain)   Flexion:  90   Lateral bend right:  10   Lateral bend left:  10     Spinal Sensation   Right Side Sensation  C-Spine Level: normal   L-Spine Level: normal  S-Spine Level: normal  Left Side Sensation  C-Spine Level: normal  L-Spine Level: normal  S-Spine Level: normal    Back (L-Spine & T-Spine) Tests   Right Side Tests  Straight leg raise:        Sitting SLR: > 70 degrees    Left Side Tests  Straight leg raise:       Sitting SLR: > 70 degrees      Other   She has no scoliosis .  Spinal Kyphosis:  Absent    Comments:  Neg KAUSHIK bilaterally      Muscle Strength   Right Upper Extremity   Biceps: 5/5   Deltoid:  5/5  Triceps:  5/5  Wrist extension: 5/5   Finger Flexors:  5/5  Left Upper Extremity  Biceps: 5/5   Deltoid:  5/5  Triceps:  5/5  Wrist extension: 5/5   Finger Flexors:  5/5  Right Lower Extremity   Hip Flexion: 5/5   Quadriceps:  5/5   Anterior tibial:  5/5   EHL:  5/5  Left Lower Extremity   Hip Flexion: 5/5   Quadriceps:  5/5   Anterior tibial:  5/5   EHL:  5/5    Reflexes     Left Side  Biceps:  2+  Triceps:  2+  Brachioradialis:  2+  Achilles:  2+  Left Ash's Sign:  Absent  Babinski Sign:  absent  Quadriceps:  2+    Right Side   Biceps:  2+  Triceps:  2+  Brachioradialis:  2+  Achilles:  2+  Right Ash's Sign:  absent  Babinski Sign:  absent  Quadriceps:  2+    Vascular Exam     Right Pulses        Carotid:                  2+    Left Pulses        Carotid:                  2+          Assessment:     1. Acute left-sided low back pain without sciatica    2.  Scoliosis, unspecified scoliosis type, unspecified spinal region    3. Neck pain         Plan:     Orders Placed This Encounter    X-Ray Lumbar Spine Ap Lateral w/Flex Ext    Ambulatory referral/consult to [facility]    methylPREDNISolone (MEDROL DOSEPACK) 4 mg tablet    gabapentin (NEURONTIN) 100 MG capsule     We discussed back pain and the nature of back pain.  We discussed that it will likely improve and that it is not one thing that causes the pain but an accumulation of multiple things that we do.  Pain started 3/1.  We discussed inflammation and working on things to make it better  She has chronic neck and upper back, healthy back pattern 1 cervical  We discussed posture sitting and the importance of trying to sit better.  We discussed getting head over spine and watching lifting mechanics  We discussed the benefits of therapy and exercise and continuing to move.  We discussed healthy back for her chronic neck pain.  We discussed pt for her back, but will wait.  She will try treat your own back book  Medrol dose phong, no nsaids due to sleeve surgery  X-ray lumbar spine  Flexeril trying 2.5 during the day to help, she has 5mg and 10mg she will use at night  Gabapentin 100 TID to try for stabbing pain.    RTC 4 months    More than 50% of the total time  of 45 minutes was spent face to face in counseling on diagnosis and treatment options. I also counseled patient  on common and most usual side effect of prescribed medications.  I reviewed Primary care , and other specialty's notes to better coordinate patient's care. All questions were answered, and patient voiced understanding.           Follow-up: No follow-ups on file. If there are any questions prior to this, the patient was instructed to contact the office.

## 2024-03-07 RX ORDER — ONDANSETRON 4 MG/1
4 TABLET, ORALLY DISINTEGRATING ORAL EVERY 6 HOURS PRN
Qty: 20 TABLET | Refills: 2 | Status: SHIPPED | OUTPATIENT
Start: 2024-03-07

## 2024-03-11 ENCOUNTER — TELEPHONE (OUTPATIENT)
Dept: GYNECOLOGIC ONCOLOGY | Facility: CLINIC | Age: 44
End: 2024-03-11
Payer: COMMERCIAL

## 2024-03-11 ENCOUNTER — PATIENT MESSAGE (OUTPATIENT)
Dept: GYNECOLOGIC ONCOLOGY | Facility: CLINIC | Age: 44
End: 2024-03-11
Payer: COMMERCIAL

## 2024-03-11 ENCOUNTER — LAB VISIT (OUTPATIENT)
Dept: LAB | Facility: HOSPITAL | Age: 44
End: 2024-03-11
Attending: OBSTETRICS & GYNECOLOGY
Payer: COMMERCIAL

## 2024-03-11 DIAGNOSIS — D39.10 BORDERLINE EPITHELIAL NEOPLASM OF OVARY: Primary | ICD-10-CM

## 2024-03-11 DIAGNOSIS — D39.10 BORDERLINE EPITHELIAL NEOPLASM OF OVARY: ICD-10-CM

## 2024-03-11 DIAGNOSIS — C56.9 MUCINOUS TUMOR, OF LOW MALIGNANT POTENTIAL: ICD-10-CM

## 2024-03-11 DIAGNOSIS — R97.0 ELEVATED CEA: ICD-10-CM

## 2024-03-11 LAB — CEA SERPL-MCNC: 2.5 NG/ML (ref 0–5)

## 2024-03-11 PROCEDURE — 36415 COLL VENOUS BLD VENIPUNCTURE: CPT | Performed by: OBSTETRICS & GYNECOLOGY

## 2024-03-11 PROCEDURE — 82378 CARCINOEMBRYONIC ANTIGEN: CPT | Performed by: OBSTETRICS & GYNECOLOGY

## 2024-03-11 NOTE — TELEPHONE ENCOUNTER
Called patient to review CEA which is 2.5 (previously 1.8).  No answer. Left voicemail.   Recommend CT imaging for further evaluation.

## 2024-03-12 ENCOUNTER — TELEPHONE (OUTPATIENT)
Dept: GYNECOLOGIC ONCOLOGY | Facility: CLINIC | Age: 44
End: 2024-03-12
Payer: COMMERCIAL

## 2024-03-12 NOTE — TELEPHONE ENCOUNTER
Pt confirmed CT scan appt 03/13/24 6:00 PM located at [facility]. Pt verbalized understanding.     ----- Message from Kaci Pro MD sent at 3/11/2024  3:36 PM CDT -----  Regarding: schedule CT  PLease schedule patient for CT prior to her visit with me on 3/19 if at all possible. I called and messaged her through the portal.   TY

## 2024-03-13 ENCOUNTER — HOSPITAL ENCOUNTER (OUTPATIENT)
Dept: RADIOLOGY | Facility: HOSPITAL | Age: 44
Discharge: HOME | End: 2024-03-13
Attending: OBSTETRICS & GYNECOLOGY
Payer: COMMERCIAL

## 2024-03-13 DIAGNOSIS — D39.10 BORDERLINE EPITHELIAL NEOPLASM OF OVARY: ICD-10-CM

## 2024-03-13 DIAGNOSIS — R97.0 ELEVATED CEA: ICD-10-CM

## 2024-03-13 PROCEDURE — A9698 NON-RAD CONTRAST MATERIALNOC: HCPCS | Performed by: OBSTETRICS & GYNECOLOGY

## 2024-03-13 PROCEDURE — 74177 CT ABD & PELVIS W/CONTRAST: CPT | Mod: TC

## 2024-03-13 PROCEDURE — 25500020 PHARM REV CODE 255: Performed by: OBSTETRICS & GYNECOLOGY

## 2024-03-13 PROCEDURE — 71260 CT THORAX DX C+: CPT | Mod: 26,,, | Performed by: RADIOLOGY

## 2024-03-13 PROCEDURE — 74177 CT ABD & PELVIS W/CONTRAST: CPT | Mod: 26,,, | Performed by: RADIOLOGY

## 2024-03-13 RX ADMIN — BARIUM SULFATE 450 ML: 20 SUSPENSION ORAL at 06:03

## 2024-03-13 RX ADMIN — IOHEXOL 100 ML: 350 INJECTION, SOLUTION INTRAVENOUS at 06:03

## 2024-03-19 ENCOUNTER — OFFICE VISIT (OUTPATIENT)
Dept: GYNECOLOGIC ONCOLOGY | Facility: CLINIC | Age: 44
End: 2024-03-19
Payer: COMMERCIAL

## 2024-03-19 ENCOUNTER — LAB VISIT (OUTPATIENT)
Dept: LAB | Facility: HOSPITAL | Age: 44
End: 2024-03-19
Payer: COMMERCIAL

## 2024-03-19 VITALS
HEART RATE: 105 BPM | SYSTOLIC BLOOD PRESSURE: 126 MMHG | HEIGHT: 64 IN | DIASTOLIC BLOOD PRESSURE: 79 MMHG | BODY MASS INDEX: 50.02 KG/M2 | WEIGHT: 293 LBS

## 2024-03-19 DIAGNOSIS — Q24.8 PERICARDIAL CYST: Primary | ICD-10-CM

## 2024-03-19 DIAGNOSIS — Z11.3 SCREEN FOR STD (SEXUALLY TRANSMITTED DISEASE): ICD-10-CM

## 2024-03-19 DIAGNOSIS — D39.10 BORDERLINE EPITHELIAL NEOPLASM OF OVARY: ICD-10-CM

## 2024-03-19 LAB
HCV AB SERPL QL IA: NORMAL
HIV 1+2 AB+HIV1 P24 AG SERPL QL IA: NORMAL

## 2024-03-19 PROCEDURE — 99214 OFFICE O/P EST MOD 30 MIN: CPT | Mod: S$GLB,,, | Performed by: OBSTETRICS & GYNECOLOGY

## 2024-03-19 PROCEDURE — 3074F SYST BP LT 130 MM HG: CPT | Mod: CPTII,S$GLB,, | Performed by: OBSTETRICS & GYNECOLOGY

## 2024-03-19 PROCEDURE — 99999 PR PBB SHADOW E&M-EST. PATIENT-LVL III: CPT | Mod: PBBFAC,,, | Performed by: OBSTETRICS & GYNECOLOGY

## 2024-03-19 PROCEDURE — 3008F BODY MASS INDEX DOCD: CPT | Mod: CPTII,S$GLB,, | Performed by: OBSTETRICS & GYNECOLOGY

## 2024-03-19 PROCEDURE — 86803 HEPATITIS C AB TEST: CPT | Performed by: OBSTETRICS & GYNECOLOGY

## 2024-03-19 PROCEDURE — 36415 COLL VENOUS BLD VENIPUNCTURE: CPT | Performed by: OBSTETRICS & GYNECOLOGY

## 2024-03-19 PROCEDURE — 3078F DIAST BP <80 MM HG: CPT | Mod: CPTII,S$GLB,, | Performed by: OBSTETRICS & GYNECOLOGY

## 2024-03-19 PROCEDURE — 86592 SYPHILIS TEST NON-TREP QUAL: CPT | Performed by: OBSTETRICS & GYNECOLOGY

## 2024-03-19 PROCEDURE — 87389 HIV-1 AG W/HIV-1&-2 AB AG IA: CPT | Performed by: OBSTETRICS & GYNECOLOGY

## 2024-03-20 LAB — RPR SER QL: NORMAL

## 2024-03-25 ENCOUNTER — OFFICE VISIT (OUTPATIENT)
Dept: DERMATOLOGY | Facility: CLINIC | Age: 44
End: 2024-03-25
Payer: COMMERCIAL

## 2024-03-25 DIAGNOSIS — L73.2 HIDRADENITIS: ICD-10-CM

## 2024-03-25 DIAGNOSIS — L73.2 HIDRADENITIS SUPPURATIVA: Primary | ICD-10-CM

## 2024-03-25 PROCEDURE — 1160F RVW MEDS BY RX/DR IN RCRD: CPT | Mod: CPTII,S$GLB,, | Performed by: DERMATOLOGY

## 2024-03-25 PROCEDURE — 99214 OFFICE O/P EST MOD 30 MIN: CPT | Mod: S$GLB,,, | Performed by: DERMATOLOGY

## 2024-03-25 PROCEDURE — 1159F MED LIST DOCD IN RCRD: CPT | Mod: CPTII,S$GLB,, | Performed by: DERMATOLOGY

## 2024-03-25 PROCEDURE — G2211 COMPLEX E/M VISIT ADD ON: HCPCS | Mod: S$GLB,,, | Performed by: DERMATOLOGY

## 2024-03-25 PROCEDURE — 99999 PR PBB SHADOW E&M-EST. PATIENT-LVL IV: CPT | Mod: PBBFAC,,, | Performed by: DERMATOLOGY

## 2024-03-25 PROCEDURE — 87070 CULTURE OTHR SPECIMN AEROBIC: CPT | Performed by: DERMATOLOGY

## 2024-03-25 RX ORDER — SPIRONOLACTONE 50 MG/1
TABLET, FILM COATED ORAL
Qty: 60 TABLET | Refills: 3 | Status: SHIPPED | OUTPATIENT
Start: 2024-03-25

## 2024-03-25 RX ORDER — CLINDAMYCIN PHOSPHATE 10 UG/ML
LOTION TOPICAL
Qty: 120 ML | Refills: 3 | Status: SHIPPED | OUTPATIENT
Start: 2024-03-25

## 2024-03-25 RX ORDER — METFORMIN HYDROCHLORIDE 500 MG/1
TABLET, EXTENDED RELEASE ORAL
Qty: 30 TABLET | Refills: 3 | Status: SHIPPED | OUTPATIENT
Start: 2024-03-25

## 2024-03-25 NOTE — PROGRESS NOTES
Subjective:      Patient ID: Fran Higgins is a 43 y.o. female.    Chief Complaint: No chief complaint on file.      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 who presents for routine surveillance visit.  No GYN concerns, previous patient of Dr. Kaufman.  She has no vaginal bleeding or discharge.  She is having no nausea or vomiting.  She has no bowel or bladder complaints.  She has no pain issues.       Follows with [facility] for HRT and well woman care.      CEA 1.8 >2.5 (normal 0-3)  CT due to increased CEA from baseline 3/13/2024 shows no evidence of disease.   Desires STD screening.      _________________  HPI or ONCOLOGIC HISTORY  6/25/2019: LSO by Dr. 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  Size of tumor: 11.8 CM.  Capsule involvement cannot be determined secondary to fragmentation.  Microinvasion is present: 5 foci. Intraepithelial carcinoma is present. Benign fallopian tube and fimbriated end with no significant histopathologic alterations. Pathologic staging: pT1a      3/9/2020: Robotic assisted total hysterectomy and RSO.    PATHOLOGY:  139 g uterus with secretory endometrium and focal adenomyosis, multiple leiomyomas.  Right ovary and fallopian tube benign. (After completion hysterectomy and RSO she has had problems with depression and hormone replacement. Seen by Katia Mahan and Sherrie for depression. On  venlafaxine. Depression and VMS is better.)        6/22/2021 Gastric Sleeve  Review of Systems   Constitutional:  Negative for appetite change, chills, fatigue and fever.   HENT:  Negative for mouth sores.    Respiratory:  Negative for cough and shortness of breath.    Cardiovascular:  Negative for leg swelling.   Gastrointestinal:  Negative for abdominal pain, blood in stool, constipation and diarrhea.   Endocrine: Negative for cold intolerance.    Genitourinary:  Negative for dysuria and vaginal bleeding.   Musculoskeletal:  Negative for myalgias.   Skin:  Negative for rash.   Allergic/Immunologic: Negative.    Neurological:  Negative for weakness and numbness.   Hematological:  Negative for adenopathy. Does not bruise/bleed easily.   Psychiatric/Behavioral:  Negative for confusion.        Objective:   Physical Exam:   Constitutional: She is oriented to person, place, and time. She appears well-developed and well-nourished.    HENT:   Head: Normocephalic and atraumatic.    Eyes: Pupils are equal, round, and reactive to light. EOM are normal.    Neck: No thyromegaly present.    Cardiovascular:  Normal rate, regular rhythm and intact distal pulses.             Pulmonary/Chest: Effort normal and breath sounds normal. No respiratory distress. She has no wheezes.        Abdominal: Soft. Bowel sounds are normal. She exhibits no distension and no mass. There is no abdominal tenderness.     Genitourinary:    Vagina and rectum normal.      Pelvic exam was performed with patient supine.   There is no lesion on the right labia. There is no lesion on the left labia. Right adnexum displays no mass. Left adnexum displays no mass. Cervix is absent.Uterus is absent.           Musculoskeletal: Normal range of motion and moves all extremeties.      Lymphadenopathy:     She has no cervical adenopathy. No inguinal adenopathy noted on the right or left side.        Right: No supraclavicular adenopathy present.        Left: No supraclavicular adenopathy present.    Neurological: She is alert and oriented to person, place, and time.    Skin: Skin is warm and dry. No rash noted.    Psychiatric: She has a normal mood and affect.       Assessment:     1. Pericardial cyst    2. Screen for STD (sexually transmitted disease)    3. Borderline epithelial neoplasm of ovary        Plan:     Orders Placed This Encounter   Procedures    RPR    HIV 1/2 Ag/Ab (4th Gen)    HEPATITIS C ANTIBODY     CEA    Ambulatory referral/consult to Cardiology     No evidence of disease on today's exam  Imaging without evidence of disease.   Feels well, no new complaints.   RTC in 6 months for routine surveillance with CEA.  STD screening as requested.     I spent approximately 30 minutes reviewing the available records and evaluating the patient, out of which over 50% of the time was spent face to face with the patient in counseling and coordinating this patient's care.

## 2024-03-25 NOTE — ASSESSMENT & PLAN NOTE
"Today's Plan:    LIFESTYLE:    Continue weight loss and dietary modifications   Avoid sugars and processed foods  Limit "white" foods ie. Bread, rice, pasta, potatoes  - recommend limiting to 1/2 cup per serving  Limit dairy     Keep skin cool as overheating can flare HS    Avoid shaving affected areas and wearing tight fitting clothing as friction exacerbates disease;     TREATMENT:  D/c finasteride    Continue:  Increase Clindamycin solution 1x/day to "quiet affected areas" and 2x/day to flared affected areas  Spironolactone (aldactone) at 100mg every day  Humira 40mg SQ every week  - needs cbc    Start:  Wash affected areas with Antimicrobial Washes - daily to affected areas; lather into affected areas and let sit for 5 minutes prior to rinsing:  Benzoyl peroxide 5 - 10% - rinse thoroughly as can bleach towels/clothing  Hibiclens (Chlorhexidine)  Zinc pyrithione (Zinc bar available on Amazon or Head and Shoulders shampoo)  CLn wash (dilute bleach) - available on Amazon  Turmeric (with black pepper) supplement at 500mg to 1 gram per day (available over the counter and on Amazon)   Can cause upset stomach  OR  Take Zinc/Copper supplement daily - can order combo supplement from Amazon    Check HgA1C: Metformin ER at 500mg every night with dinner -- can decrease # and severity of lesions   SE: diarrhea        For flare:  Dilute bleach to affected areas: compresses daily to affected/flared area(s) - can use CLn wash on warm wet rag as compress  If have groin involvement, may want to take dilute bleach baths (daily when flared; 2x/week for maintenance).       RESOURCE:  [employer]      FOR FLARES (new painful bump):  Message office through [facility] for injection which will often hasten resolution of tender, red bump    Can apply warm/hot compresses on a painful, deep lump    If notice a foul-smelling drainage, can use Hydrogen Peroxide to cleanse area followed by application of Medi-honey to open area (apply " Medi-honey 1x/day)

## 2024-03-25 NOTE — PROGRESS NOTES
"  Subjective:      Patient ID:  Fran Higgins is a 43 y.o. female who presents for   Chief Complaint   Patient presents with    Hidradenitis Suppurativa     1st visit     3/25/24 This is patient's 1st visit to [facility] for Hidradenitis Suppurativa.     Year started: age 16  Location started: bilateral axilla, groin  Family history of HS: none  Smokes: none  Current weight: 313# (stable)  Frequency of flares: 1 - 2x/month    Last seen 1/12/23 by [facility] for same.     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    Pt reports flaring 1-2 times per month. C/o flare on lower abdomen. Constantly flared per pt. Tender+      Review of Systems   Constitutional:  Negative for weight loss and weight gain.   Gastrointestinal:  Negative for diarrhea.   Genitourinary:  Negative for irregular periods (hysterectomy 2019/2020).   Musculoskeletal:  Positive for arthralgias (has "bad spine" 2/2 scoliosis - shoulders and hips).   Skin:  Positive for abscesses (lower abdomen).   Psychiatric/Behavioral:  Positive for depressed mood (on effexor and occ xanax - controlled) and anxiety (controlled on meds).    Hematologic/Lymphatic: Does not bruise/bleed easily.       Objective:   Physical Exam   Constitutional: She appears well-developed and well-nourished. She is obese.  No distress.   Neurological: She is alert and oriented to person, place, and time. She is not disoriented. "   Psychiatric: She has a normal mood and affect.   Skin:   Areas Examined (abnormalities noted in diagram):   Chest / Axilla Inspection Performed  Abdomen Inspection Performed  Genitals / Buttocks / Groin Inspection Performed  RLE Inspected  LLE Inspection Performed           Diagram Legend     Erythematous scaling macule/papule c/w actinic keratosis       Vascular papule c/w angioma      Pigmented verrucoid papule/plaque c/w seborrheic keratosis      Yellow umbilicated papule c/w sebaceous hyperplasia      Irregularly shaped tan macule c/w lentigo     1-2 mm smooth white papules consistent with Milia      Movable subcutaneous cyst with punctum c/w epidermal inclusion cyst      Subcutaneous movable cyst c/w pilar cyst      Firm pink to brown papule c/w dermatofibroma      Pedunculated fleshy papule(s) c/w skin tag(s)      Evenly pigmented macule c/w junctional nevus     Mildly variegated pigmented, slightly irregular-bordered macule c/w mildly atypical nevus      Flesh colored to evenly pigmented papule c/w intradermal nevus       Pink pearly papule/plaque c/w basal cell carcinoma      Erythematous hyperkeratotic cursted plaque c/w SCC      Surgical scar with no sign of skin cancer recurrence      Open and closed comedones      Inflammatory papules and pustules      Verrucoid papule consistent consistent with wart     Erythematous eczematous patches and plaques     Dystrophic onycholytic nail with subungual debris c/w onychomycosis     Umbilicated papule    Erythematous-base heme-crusted tan verrucoid plaque consistent with inflamed seborrheic keratosis     Erythematous Silvery Scaling Plaque c/w Psoriasis     See annotation      Assessment / Plan:        Hidradenitis suppurativa  -     CBC Auto Differential; Future; Expected date: 03/25/2024  -     HEMOGLOBIN A1C; Future; Expected date: 03/25/2024  -     Aerobic culture  -     clindamycin (CLEOCIN T) 1 % lotion; Apply to affected area two times a day.  Dispense:  "120 mL; Refill: 3  -     spironolactone (ALDACTONE) 50 MG tablet; Take 2 po qday  Dispense: 60 tablet; Refill: 3  -     metFORMIN (GLUCOPHAGE-XR) 500 MG ER 24hr tablet; Take 1 po qday  Dispense: 30 tablet; Refill: 3        Hidradenitis  Today's Plan:    LIFESTYLE:    Continue weight loss and dietary modifications   Avoid sugars and processed foods  Limit "white" foods ie. Bread, rice, pasta, potatoes  - recommend limiting to 1/2 cup per serving  Limit dairy     Keep skin cool as overheating can flare HS    Avoid shaving affected areas and wearing tight fitting clothing as friction exacerbates disease;     TREATMENT:  D/c finasteride    Continue:  Increase Clindamycin solution 1x/day to "quiet affected areas" and 2x/day to flared affected areas  Spironolactone (aldactone) at 100mg every day  Humira 40mg SQ every week  - needs cbc    Start:  Wash affected areas with Antimicrobial Washes - daily to affected areas; lather into affected areas and let sit for 5 minutes prior to rinsing:  Benzoyl peroxide 5 - 10% - rinse thoroughly as can bleach towels/clothing  Hibiclens (Chlorhexidine)  Zinc pyrithione (Zinc bar available on [employer] or [employer] Should[employer] shampoo)  CLn wash (dilute bleach) - available on Amazon  Turmeric (with black pepper) supplement at 500mg to 1 gram per day (available over the counter and on Amazon)   Can cause upset stomach  OR  Take Zinc/Copper supplement daily - can order combo supplement from [employer]    Check HgA1C: Metformin ER at 500mg every night with dinner -- can decrease # and severity of lesions   SE: diarrhea        For flare:  Dilute bleach to affected areas: compresses daily to affected/flared area(s) - can use CLn wash on warm wet rag as compress  If have groin involvement, may want to take dilute bleach baths (daily when flared; 2x/week for maintenance).       RESOURCE:  [employer]      FOR FLARES (new painful bump):  [facility] which will often " hasten resolution of tender, red bump    Can apply warm/hot compresses on a painful, deep lump    If notice a foul-smelling drainage, can use Hydrogen Peroxide to cleanse area followed by application of Medi-honey to open area (apply Medi-honey 1x/day)                               Follow up in about 3 months (around 6/25/2024) for HS clinic.

## 2024-03-25 NOTE — PATIENT INSTRUCTIONS
"LIFESTYLE:    Continue weight loss and dietary modifications   Avoid sugars and processed foods  Limit "white" foods ie. Bread, rice, pasta, potatoes  - recommend limiting to 1/2 cup per serving  Limit dairy     Keep skin cool as overheating can flare HS    Avoid shaving affected areas and wearing tight fitting clothing as friction exacerbates disease;     TREATMENT:  D/c finasteride    Continue:  Increase Clindamycin solution 1x/day to "quiet affected areas" and 2x/day to flared affected areas  Spironolactone (aldactone) at 100mg every day  Humira 40mg SQ every week     Start:  Wash affected areas with Antimicrobial Washes - daily to affected areas; lather into affected areas and let sit for 5 minutes prior to rinsing:  Benzoyl peroxide 5 - 10% - rinse thoroughly as can bleach towels/clothing  Hibiclens (Chlorhexidine)  Zinc pyrithione (Zinc bar available on [employer] or Head and Shoulders shampoo)  CLn wash (dilute bleach) - available on Amazon  Turmeric (with black pepper) supplement at 500mg to 1 gram per day (available over the counter and on Amazon)   Can cause upset stomach  OR  Take Zinc/Copper supplement daily - can order combo supplement from [employer]    Metformin ER at 500mg every night with dinner -- can decrease # and severity of lesions   SE: diarrhea        For flare:  Dilute bleach to affected areas: compresses daily to affected/flared area(s) - can use CLn wash on warm wet rag as compress  If have groin involvement, may want to take dilute bleach baths (daily when flared; 2x/week for maintenance).     RESOURCE:  [employer]      FOR FLARES (new painful bump):  Message office through [facility] for injection which will often hasten resolution of tender, red bump    Can apply warm/hot compresses on a painful, deep lump    If notice a foul-smelling drainage, can use Hydrogen Peroxide to cleanse area followed by application of Medi-honey to open area (apply Medi-honey 1x/day)                           "

## 2024-03-26 RX ORDER — VENLAFAXINE HYDROCHLORIDE 150 MG/1
150 CAPSULE, EXTENDED RELEASE ORAL DAILY
Qty: 30 CAPSULE | Refills: 6 | OUTPATIENT
Start: 2024-03-26 | End: 2025-03-26

## 2024-03-28 LAB — BACTERIA SPEC AEROBE CULT: NORMAL

## 2024-03-28 RX ORDER — VENLAFAXINE HYDROCHLORIDE 150 MG/1
150 CAPSULE, EXTENDED RELEASE ORAL DAILY
Qty: 30 CAPSULE | Refills: 6 | OUTPATIENT
Start: 2024-03-28 | End: 2025-03-28

## 2024-04-01 ENCOUNTER — OFFICE VISIT (OUTPATIENT)
Dept: CARDIOLOGY | Facility: CLINIC | Age: 44
End: 2024-04-01
Payer: COMMERCIAL

## 2024-04-01 ENCOUNTER — LAB VISIT (OUTPATIENT)
Dept: LAB | Facility: HOSPITAL | Age: 44
End: 2024-04-01
Attending: DERMATOLOGY
Payer: COMMERCIAL

## 2024-04-01 VITALS
BODY MASS INDEX: 53.47 KG/M2 | SYSTOLIC BLOOD PRESSURE: 125 MMHG | DIASTOLIC BLOOD PRESSURE: 86 MMHG | WEIGHT: 293 LBS | HEART RATE: 85 BPM

## 2024-04-01 DIAGNOSIS — Q24.8 PERICARDIAL CYST: Primary | ICD-10-CM

## 2024-04-01 DIAGNOSIS — L73.2 HIDRADENITIS SUPPURATIVA: ICD-10-CM

## 2024-04-01 LAB
BASOPHILS # BLD AUTO: 0.03 K/UL (ref 0–0.2)
BASOPHILS NFR BLD: 0.5 % (ref 0–1.9)
DIFFERENTIAL METHOD BLD: NORMAL
EOSINOPHIL # BLD AUTO: 0.1 K/UL (ref 0–0.5)
EOSINOPHIL NFR BLD: 1.3 % (ref 0–8)
ERYTHROCYTE [DISTWIDTH] IN BLOOD BY AUTOMATED COUNT: 12.8 % (ref 11.5–14.5)
ESTIMATED AVG GLUCOSE: 114 MG/DL (ref 68–131)
HBA1C MFR BLD: 5.6 % (ref 4–5.6)
HCT VFR BLD AUTO: 41.6 % (ref 37–48.5)
HGB BLD-MCNC: 13.3 G/DL (ref 12–16)
IMM GRANULOCYTES # BLD AUTO: 0.01 K/UL (ref 0–0.04)
IMM GRANULOCYTES NFR BLD AUTO: 0.2 % (ref 0–0.5)
LYMPHOCYTES # BLD AUTO: 1.9 K/UL (ref 1–4.8)
LYMPHOCYTES NFR BLD: 34.5 % (ref 18–48)
MCH RBC QN AUTO: 27.9 PG (ref 27–31)
MCHC RBC AUTO-ENTMCNC: 32 G/DL (ref 32–36)
MCV RBC AUTO: 87 FL (ref 82–98)
MONOCYTES # BLD AUTO: 0.4 K/UL (ref 0.3–1)
MONOCYTES NFR BLD: 7.5 % (ref 4–15)
NEUTROPHILS # BLD AUTO: 3.1 K/UL (ref 1.8–7.7)
NEUTROPHILS NFR BLD: 56 % (ref 38–73)
NRBC BLD-RTO: 0 /100 WBC
PLATELET # BLD AUTO: 201 K/UL (ref 150–450)
PMV BLD AUTO: 11.8 FL (ref 9.2–12.9)
RBC # BLD AUTO: 4.77 M/UL (ref 4–5.4)
WBC # BLD AUTO: 5.48 K/UL (ref 3.9–12.7)

## 2024-04-01 PROCEDURE — 85025 COMPLETE CBC W/AUTO DIFF WBC: CPT | Performed by: DERMATOLOGY

## 2024-04-01 PROCEDURE — 93000 ELECTROCARDIOGRAM COMPLETE: CPT | Mod: S$GLB,,, | Performed by: INTERNAL MEDICINE

## 2024-04-01 PROCEDURE — 3008F BODY MASS INDEX DOCD: CPT | Mod: CPTII,S$GLB,, | Performed by: INTERNAL MEDICINE

## 2024-04-01 PROCEDURE — 83036 HEMOGLOBIN GLYCOSYLATED A1C: CPT | Performed by: DERMATOLOGY

## 2024-04-01 PROCEDURE — 3074F SYST BP LT 130 MM HG: CPT | Mod: CPTII,S$GLB,, | Performed by: INTERNAL MEDICINE

## 2024-04-01 PROCEDURE — 36415 COLL VENOUS BLD VENIPUNCTURE: CPT | Mod: PO | Performed by: DERMATOLOGY

## 2024-04-01 PROCEDURE — 1160F RVW MEDS BY RX/DR IN RCRD: CPT | Mod: CPTII,S$GLB,, | Performed by: INTERNAL MEDICINE

## 2024-04-01 PROCEDURE — 3079F DIAST BP 80-89 MM HG: CPT | Mod: CPTII,S$GLB,, | Performed by: INTERNAL MEDICINE

## 2024-04-01 PROCEDURE — 99204 OFFICE O/P NEW MOD 45 MIN: CPT | Mod: S$GLB,,, | Performed by: INTERNAL MEDICINE

## 2024-04-01 PROCEDURE — 99999 PR PBB SHADOW E&M-EST. PATIENT-LVL V: CPT | Mod: PBBFAC,,, | Performed by: INTERNAL MEDICINE

## 2024-04-01 PROCEDURE — 1159F MED LIST DOCD IN RCRD: CPT | Mod: CPTII,S$GLB,, | Performed by: INTERNAL MEDICINE

## 2024-04-01 NOTE — PROGRESS NOTES
HISTORY:    43-year-old female with a history of pericardial cyst, migraines, and depression presenting for initial evaluation by me.    Comes in for evaluation for a pericardial cyst.    The patient denies any symptoms of chest pain, shortness of breath, or dyspnea on exertion.    Active in her life. 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 Feels good.    The patient denies any previous history of myocardial infarction, coronary artery disease, peripheral arterial disease, stroke, congestive heart failure, or cardiomyopathy.    Tolerates spironolactone 50 x 1 for acne.    PHYSICAL EXAM:    Vitals:    04/01/24 1306   BP: 125/86   Pulse: 85       NAD, A+Ox3.  No jvd, no bruit.  RRR nml s1,s2. No murmurs.  CTA B no wheezes or crackles.  No edema.    LABS/STUDIES (imaging reviewed during clinic visit):    September 2023 CBC and CMP normal.  2022 /HDL 58//.  A1c and TSH normal.    ECG today demonstrates sinus rhythm with no Q-waves or ST changes.    DSE 2021 normal LV size and function at baseline.  No evidence of ischemia with stress.  CT Chest March 2024, enlarging pericardial cyst (4.5x3.5cm along the right pericardal margin <- 3.9x3.7cm in 2019).     ASSESSMENT & PLAN:    1. Pericardial cyst        Orders Placed This Encounter    IN OFFICE EKG 12-LEAD (to [facility])    Echo        Asymptomatic pericardial cyst. Check TTE. Unremarkable TTE in 2019 when it was first noted.    Follow up if symptoms worsen or fail to improve.      Belia Cervantes MD

## 2024-04-02 ENCOUNTER — PATIENT MESSAGE (OUTPATIENT)
Dept: OBSTETRICS AND GYNECOLOGY | Facility: CLINIC | Age: 44
End: 2024-04-02
Payer: COMMERCIAL

## 2024-04-02 DIAGNOSIS — N95.1 MENOPAUSAL SYMPTOMS: Primary | ICD-10-CM

## 2024-04-02 RX ORDER — VENLAFAXINE HYDROCHLORIDE 150 MG/1
150 CAPSULE, EXTENDED RELEASE ORAL DAILY
Qty: 30 CAPSULE | Refills: 6 | OUTPATIENT
Start: 2024-04-02 | End: 2025-04-02

## 2024-04-03 ENCOUNTER — PATIENT MESSAGE (OUTPATIENT)
Dept: BARIATRICS | Facility: CLINIC | Age: 44
End: 2024-04-03
Payer: COMMERCIAL

## 2024-04-03 ENCOUNTER — PATIENT MESSAGE (OUTPATIENT)
Dept: PSYCHIATRY | Facility: CLINIC | Age: 44
End: 2024-04-03
Payer: COMMERCIAL

## 2024-04-03 DIAGNOSIS — K21.9 GASTROESOPHAGEAL REFLUX DISEASE, UNSPECIFIED WHETHER ESOPHAGITIS PRESENT: ICD-10-CM

## 2024-04-03 LAB
OHS QRS DURATION: 100 MS
OHS QTC CALCULATION: 448 MS

## 2024-04-03 RX ORDER — OMEPRAZOLE 40 MG/1
40 CAPSULE, DELAYED RELEASE ORAL EVERY MORNING
Qty: 30 CAPSULE | Refills: 1 | Status: SHIPPED | OUTPATIENT
Start: 2024-04-03 | End: 2024-06-09 | Stop reason: SDUPTHER

## 2024-04-03 RX ORDER — VENLAFAXINE HYDROCHLORIDE 150 MG/1
150 CAPSULE, EXTENDED RELEASE ORAL DAILY
Qty: 30 CAPSULE | Refills: 6 | OUTPATIENT
Start: 2024-04-03 | End: 2025-04-03

## 2024-04-05 RX ORDER — VENLAFAXINE HYDROCHLORIDE 150 MG/1
150 CAPSULE, EXTENDED RELEASE ORAL DAILY
Qty: 30 CAPSULE | Refills: 0 | Status: SHIPPED | OUTPATIENT
Start: 2024-04-05 | End: 2024-05-14 | Stop reason: SDUPTHER

## 2024-04-09 ENCOUNTER — PATIENT MESSAGE (OUTPATIENT)
Dept: BARIATRICS | Facility: CLINIC | Age: 44
End: 2024-04-09
Payer: COMMERCIAL

## 2024-04-09 RX ORDER — ESTRADIOL 2 MG/1
2 TABLET ORAL DAILY
Qty: 30 TABLET | Refills: 11 | Status: SHIPPED | OUTPATIENT
Start: 2024-04-09 | End: 2025-04-09

## 2024-04-11 ENCOUNTER — PATIENT MESSAGE (OUTPATIENT)
Dept: ADMINISTRATIVE | Facility: OTHER | Age: 44
End: 2024-04-11
Payer: COMMERCIAL

## 2024-04-21 ENCOUNTER — PATIENT MESSAGE (OUTPATIENT)
Dept: OTHER | Facility: OTHER | Age: 44
End: 2024-04-21
Payer: COMMERCIAL

## 2024-04-22 ENCOUNTER — PATIENT OUTREACH (OUTPATIENT)
Dept: OTHER | Facility: OTHER | Age: 44
End: 2024-04-22
Payer: COMMERCIAL

## 2024-04-22 NOTE — PROGRESS NOTES
Connected Back: Patient Outreach    Welcome Call Complete on 4/22.        Patient Outreach from 4/22/2024 in [employer]    4/22/2024    [id number]       Is there anything you'd like me to know before you get started? Suzyeinsarah expressed she suffers with neck and thoracic pain, along with low back pain.   Does any position or activity typically cause you pain? N/A   What is the best contact method for outreach? [facility]   When do you plan to start the program. 4/23/2024

## 2024-04-22 NOTE — PROGRESS NOTES
Connected Back: Patient Outreach    Yellow Flag - Patient cleared by clinical team as eligible.         Patient Outreach from 4/22/2024 in [employer]     4/22/2024    1126       Switch patient path to kneeling program No, this patient can perform kneeling movments.   After clinical review, please document if patient is eligible or ineligible for program. Eligible

## 2024-04-24 ENCOUNTER — PATIENT MESSAGE (OUTPATIENT)
Dept: NEUROLOGY | Facility: CLINIC | Age: 44
End: 2024-04-24
Payer: COMMERCIAL

## 2024-04-25 ENCOUNTER — PROCEDURE VISIT (OUTPATIENT)
Dept: NEUROLOGY | Facility: CLINIC | Age: 44
End: 2024-04-25
Payer: COMMERCIAL

## 2024-04-25 VITALS — SYSTOLIC BLOOD PRESSURE: 143 MMHG | HEART RATE: 84 BPM | DIASTOLIC BLOOD PRESSURE: 76 MMHG

## 2024-04-25 DIAGNOSIS — G43.E09 CHRONIC MIGRAINE WITH AURA WITHOUT STATUS MIGRAINOSUS, NOT INTRACTABLE: Primary | ICD-10-CM

## 2024-04-25 PROCEDURE — 64615 CHEMODENERV MUSC MIGRAINE: CPT | Mod: S$GLB,,, | Performed by: PHYSICIAN ASSISTANT

## 2024-04-25 NOTE — PROCEDURES
Established Patient  Procedure Note   SUBJECTIVE:  Patient ID: Fran Higgins  Chief Complaint: Headache      History of Present Illness:  Fran Higgins is a 43 y.o. female with  with migraine, cervical scoliosis, obesity s/p sleeve gastrectomy, anxiety/depression, surgical menopause s/p hysterectomy/RSO for ovary cyst, insomnia  who presents to clinic alone for follow-up of headaches and Botox injections.       04/25/2024- Interval History: botox  Per pt 's HA diary, ha's have remained well controlled weaning off emgality. Experienced 5 HA days since last visit. Callahan's can last 3 - 7 hrs. Discussed using ubrelvy up to 100mg and at onset.   Plan: continue botox, d/c emgality, continue ubrelvy 50-100mg 1st line, zofran prn, sleep strategies, rtc 12 wks for next botox    02/08/2024- Interval History: botox  Pt's ha's remain well controlled. Reviewed HA diary. Occurring 1-2/30 days per month. Duration 2-14 hrs. Severity 4-10/10. Pt takes ubrelvy 50-100mg 1st line and tylenol 2nd line. Finds current regimen to be helpful. Is amenable to weanign off emgality by next visit.   Prior to initiation of botox the patient was experiencing >15 days of headache lasting 4 or more hours and has had sustained reduction.   Plan: continue botox, wean off emgality, continue ubrelvy 50-100mg 1st line, zofran prn, sleep strategies, rtc 12 wks for next botox    11/14/2023-  botox    09/29/2023 - Interval History:  Patient has had >50% reduction in Ha's since beginning botox. Prior to botox pt's Ha's were occurring >15/30 days per month. Since beginning botox, she experiences 5 HA days in 3 months. Most HA's are now mild in severity. Had 1 severe HA in this span that lasted 2 days but was eventually aborted w/ ubrelvy. HA's last 2 hrs - 2 days. As pt has experienced an improvement in Ha's with sustained reduction will continue botox as is clinically indicated.   Plan: continue botox, emgality, ubrelvy 50-100mg prn, zofran,  "continue sleep strategies, rtc for next botox    08/17/2023- Interval History: botox #3  Pt feels botox has been helpful. Is not trackign but states "I noticed a change" after the last round. She states she experiences approximately 20/30 ha days per month, hwoever isn't tracking. Will track for next visit.   Plan: continue botox, emgality, ubrelvy 50mg prn, zofran, rtc in 2 mo for f/up and 12 wks for next botox    05/25/2023 - botox #2    02/23/2023 - botox #1    12/12/2022 - Interval History:  Pt's ha's have worsened since last visit. They are now occurring >15/30 days per month for >3 mo, lasting > 4 hrs at a time. She tried tpx but had to d/c 2/2 depression SE. She also tried the addition of supplements including mag w/ no benefit noted. She continues to have trouble falling and staying asleep for which she recently started acupuncture for. Defers referral to sleep med, elavil, pcp at this time. Pt is interested in botox and would be an ideal candidate as she has tried and failed multiple ppx options.   Plan: start botox next visit, continue emgality, ubrelvy 50mg prn, zofran, continue sleep strategies, rtc jan 12 to begin botox     09/19/2022 - Interval History:   Ha's have increased 1-2 months ago possibly due to worsened insomnia and stress lately. Are now occurring 8/30 days per month lasting up to 1 day at a time. Currently taking effexor and xanax for these conditions.   Ubrelvy 50mg - typically resolves HA's, infrequently needs to repeat dose  Discussed multiple options, pt agreeable w/ following. Denies current depression.   Plan: start tpx 25mg/d, continue emgality, ubrelvy 50mg prn, zofran, rtc in 3 mo or sooner if needed     Recommendations made at last Office Visit on 12/6/21:  - Discussed symptoms appear to be consistent with migraines. Discussed this with patient along with treatment options and patient agreed with the following plan  - ppx - continue emgality  - abortive - trial ubrelvy  - nausea " - continue zofran  - avoid nsaids as they are c/i 2/2 gastrectomy  - avoid triptans as recommended by psychiatry 2/2 potential serotinin syndrome w/ effexor for depression  - decreased neck ROM, tightness, and cervical scoliosis - continue conservative treatments, consider PT in future  - risks, benefits, and potential side effects of emgality, ubrelvy, zofran discussed   - alternative treatment options offered   - importance of healthy diet, regular exercise and sleep hygiene in the treatment of headaches    - Start tracking headaches via Migraine Buddy roseann on phone   - RTC 6-12 mo or sooner if needed      Treatments Tried:  emgality - helps  Effexor  Tpx - depression  Anti-htn meds - avoid 2/2 low bp  Bb - avoid 2/2 depression  Ubrelvy - helps  triptans - has been told to avoid per psychiatrist 2/2 serotonin syndrome risk  nsaids - c/i 2/2 recent gastrectomy  zofran       Current Medications:    Current Outpatient Medications:     ALPRAZolam (XANAX) 0.5 MG tablet, Take 1 tablet (0.5 mg total) by mouth nightly as needed for Insomnia or Anxiety., Disp: 30 tablet, Rfl: 1    b complex vitamins capsule, Take 1 capsule by mouth once daily., Disp: , Rfl:     B3-azelaic-zinc-B6-copper-FA (NICAZEL) 600-5-10-5-1.5 mg Tab, take 1 tablet by mouth twice daily, Disp: 60 tablet, Rfl: 5    CALCIUM CITRATE ORAL, Take 1 tablet by mouth 3 (three) times daily. chewable, Disp: , Rfl:     clindamycin (CLEOCIN T) 1 % lotion, Apply to affected area two times a day., Disp: 120 mL, Rfl: 3    cyclobenzaprine (FLEXERIL) 5 MG tablet, Take 1 tablet (5 mg total) by mouth 2 (two) times daily as needed for Muscle spasms., Disp: 60 tablet, Rfl: 5    estradioL (ESTRACE) 2 MG tablet, Take 1 tablet (2 mg total) by mouth once daily., Disp: 30 tablet, Rfl: 11    finasteride (PROSCAR) 5 mg tablet, Take 1 tablet (5 mg total) by mouth once daily., Disp: 30 tablet, Rfl: 11    fluconazole (DIFLUCAN) 200 MG Tab, Take 1 tablet by mouth once if not better take  another pill in 3 days, Disp: 30 tablet, Rfl: 0    fluocinolone acetonide oiL (DERMOTIC OIL) 0.01 % Drop, Use to affected ears at night, drop in canal and use to ear when flaring, Disp: 20 mL, Rfl: 3    gabapentin (NEURONTIN) 100 MG capsule, Take 1 capsule (100 mg total) by mouth 3 (three) times daily., Disp: 90 capsule, Rfl: 2    galcanezumab-gnlm (EMGALITY PEN) 120 mg/mL PnIj, Inject 120 mg into the skin every 30 days., Disp: 1 mL, Rfl: 11    HUMIRA,CF, PEN 40 mg/0.4 mL PnKt, Inject 40mg (1 pen) into the skin every 7 days, Disp: 4 pen , Rfl: 2    ketoconazole (NIZORAL) 2 % cream, Apply to affected area two times a day (Patient taking differently: Apply to affected area two times a day), Disp: 60 g, Rfl: 3    metFORMIN (GLUCOPHAGE-XR) 500 MG ER 24hr tablet, Take 1 tablet by mouth daily., Disp: 30 tablet, Rfl: 3    multivitamin capsule, Take by mouth once daily., Disp: , Rfl:     nitrofurantoin, macrocrystal-monohydrate, (MACROBID) 100 MG capsule, Take 1 capsule (100 mg total) by mouth 2 (two) times daily., Disp: 14 capsule, Rfl: 0    nystatin-triamcinolone (MYCOLOG II) cream, Apply topically 2 (two) times daily., Disp: 30 g, Rfl: 1    omeprazole (PRILOSEC) 40 MG capsule, Take 1 capsule (40 mg total) by mouth every morning., Disp: 30 capsule, Rfl: 1    ondansetron (ZOFRAN-ODT) 4 MG TbDL, Dissolve 1 tablet (4 mg total) by mouth every 6 (six) hours as needed (for nausea)., Disp: 20 tablet, Rfl: 2    pimecrolimus (ELIDEL) 1 % cream, Apply to affected areas of underarm twice a day as needed for irritation. (Patient taking differently: Apply to affected areas of underarm twice a day as needed for irritation.), Disp: 30 g, Rfl: 3    progesterone (PROMETRIUM) 100 MG capsule, Take 1 capsule (100 mg total) by mouth nightly., Disp: 30 capsule, Rfl: 11    spironolactone (ALDACTONE) 50 MG tablet, Take 2 tablets by mouth daily., Disp: 60 tablet, Rfl: 3    thiamine (VITAMIN B-1) 50 MG tablet, Take 50 mg by mouth once daily.,  Disp: , Rfl:     ubrogepant (UBRELVY) 50 mg tablet, Take 1 tablet by mouth for headaches. May repeat once 2 hours after initial dose based on response and tolerability., Disp: 16 tablet, Rfl: 5    venlafaxine (EFFEXOR-XR) 150 MG Cp24, Take 1 capsule (150 mg total) by mouth once daily., Disp: 30 capsule, Rfl: 0    erythromycin with ethanoL (THERAMYCIN) 2 % external solution, Apply topically 2 (two) times daily., Disp: 60 mL, Rfl: 4  No current facility-administered medications for this visit.    Review of Systems - as per HPI, otherwise a balanced 10 systems review is negative.    OBJECTIVE:  Vitals:  BP (!) 143/76   Pulse 84   LMP 01/17/2020     Physical Exam:  Constitutional: she appears well-developed and well-nourished. she is well groomed. NAD   HENT:    Head: Normocephalic and atraumatic  Eyes: Conjunctivae and EOM are normal  Musculoskeletal: Normal range of motion. No joint stiffness.   Skin: Skin is warm and dry.  Psychiatric: Mood and affect are normal    Neuro: Patient is alert and oriented to person, place, and time. Language is intact and fluent.  Recent and remote memory are intact.  Normal attention and concentration.  Facial movement is symmetric.  Gait is normal.     Review of Data:   Notes from neuro reviewed.   Labs:  Office Visit on 04/01/2024   Component Date Value Ref Range Status    QRS Duration 04/01/2024 100  ms Final    [facility] QTC Calculation 04/01/2024 448  ms Final   Lab Visit on 04/01/2024   Component Date Value Ref Range Status    WBC 04/01/2024 5.48  3.90 - 12.70 K/uL Final    RBC 04/01/2024 4.77  4.00 - 5.40 M/uL Final    Hemoglobin 04/01/2024 13.3  12.0 - 16.0 g/dL Final    Hematocrit 04/01/2024 41.6  37.0 - 48.5 % Final    MCV 04/01/2024 87  82 - 98 fL Final    MCH 04/01/2024 27.9  27.0 - 31.0 pg Final    MCHC 04/01/2024 32.0  32.0 - 36.0 g/dL Final    RDW 04/01/2024 12.8  11.5 - 14.5 % Final    Platelets 04/01/2024 201  150 - 450 K/uL Final    MPV 04/01/2024 11.8  9.2 - 12.9 fL Final     Immature Granulocytes 04/01/2024 0.2  0.0 - 0.5 % Final    Gran # (ANC) 04/01/2024 3.1  1.8 - 7.7 K/uL Final    Immature Grans (Abs) 04/01/2024 0.01  0.00 - 0.04 K/uL Final    Lymph # 04/01/2024 1.9  1.0 - 4.8 K/uL Final    Mono # 04/01/2024 0.4  0.3 - 1.0 K/uL Final    Eos # 04/01/2024 0.1  0.0 - 0.5 K/uL Final    Baso # 04/01/2024 0.03  0.00 - 0.20 K/uL Final    nRBC 04/01/2024 0  0 /100 WBC Final    Gran % 04/01/2024 56.0  38.0 - 73.0 % Final    Lymph % 04/01/2024 34.5  18.0 - 48.0 % Final    Mono % 04/01/2024 7.5  4.0 - 15.0 % Final    Eosinophil % 04/01/2024 1.3  0.0 - 8.0 % Final    Basophil % 04/01/2024 0.5  0.0 - 1.9 % Final    Differential Method 04/01/2024 Automated   Final    Hemoglobin A1C 04/01/2024 5.6  4.0 - 5.6 % Final    Estimated Avg Glucose 04/01/2024 114  68 - 131 mg/dL Final   Office Visit on 03/25/2024   Component Date Value Ref Range Status    Aerobic Bacterial Culture 03/25/2024 Skin alejandra,  no predominant organism   Final   Lab Visit on 03/19/2024   Component Date Value Ref Range Status    RPR 03/19/2024 Non-reactive  Non-reactive Final    HIV 1/2 Ag/Ab 03/19/2024 Non-reactive  Non-reactive Final    Hepatitis C Ab 03/19/2024 Non-reactive  Non-reactive Final   Lab Visit on 03/11/2024   Component Date Value Ref Range Status    CEA 03/11/2024 2.5  0.0 - 5.0 ng/mL Final     Imaging:  No results found. However, due to the size of the patient record, not all encounters were searched. Please check Results Review for a complete set of results.    Note: I have independently reviewed any/all imaging/labs/tests and agree with the report (s) as documented.  Any discrepancies will be as noted/demarcated by free text.  CHANTELLE CHING 4/25/2024    ASSESSMENT:  1. Chronic migraine with aura without status migrainosus, not intractable          PLAN:  - Discussed symptoms appear to be consistent with migraines. Discussed this with patient along with treatment options and patient agreed with the following  plan  - ppx - continue  botox, wean off emgality  - Botox administered in clinic for Chronic Migraine (see below)   - abortive - continue ubrelvy  - nausea - continue zofran  - trouble w/ sleep - recently started acupuncture, defers referrals to pcp or sleep med or to begin elavil due to concern w/ interaction w/ effexor  - avoid nsaids as they are c/i 2/2 gastrectomy  - avoid triptans as recommended by psychiatry 2/2 potential serotinin syndrome w/ effexor for depression  - decreased neck ROM, tightness, and cervical scoliosis - continue conservative treatments, consider PT in future  - RTC in 12 weeks for repeat Botox injections or sooner if needed     Orders Placed This Encounter    onabotulinumtoxina injection 200 Units           All questions and concerns addressed.  Patient verbalizes understanding and is agreeable with the above stated treatment plan.      PROCEDURE NOTE:  BOTOX was performed as an indicated therapy for intractable chronic migraine headaches given that the patient failed more than 2 headache medications    Two patient identifiers were confirmed with the patient prior to performing this procedure. A time out to determine correct patient and and agreement on procedure performed was conducted prior to the procedure.      Botulinum Toxin Injection Procedure   Procedure: Botulinum toxin injection (36995)  After risks and benefits were explained including bleeding, infection, worsening of pain, damage to the areas being injected, weakness of muscles, loss of muscle control, dysphagia if injecting the head or neck, facial droop if injecting the facial area, painful injection, allergic or other reaction to the medications being injected, and the failure of the procedure to help the problem, a signed consent was obtained.   The patient was placed in a comfortable area and the sites to be treated were identified.The area to be treated was prepped three times with alcohol and the alcohol allowed to dry.  Next, a 30 gauge needle was used to inject the medication in the area to be treated.      Total Botox used: 155 Units   Botox wastage: 45 Units     Injection sites:    muscle bilaterally ( a total of 10 units divided into 2 sites)   Procerus muscle (5 units)   Frontalis muscle bilaterally (a total of 20 units divided into 4 sites)   Temporalis muscle bilaterally (a total of 40 units divided into 8 sites)   Occipitalis muscle bilaterally (a total of 30 units divided into 6 sites)   Cervical paraspinal muscles (a total of 20 units divided into 4 sites)   Trapezius muscle bilaterally (a total of 30 units divided into 6 sites)   Complications: none   RTC for the next Botox injection: 12 weeks     The patient tolerated the procedure well and did not experience any complications.     CC: Erica Pineda MD Sarena Patel, PA-C  [facility] Department of Neurology   [phone]

## 2024-05-06 ENCOUNTER — PATIENT OUTREACH (OUTPATIENT)
Dept: OTHER | Facility: OTHER | Age: 44
End: 2024-05-06
Payer: COMMERCIAL

## 2024-05-07 ENCOUNTER — PATIENT MESSAGE (OUTPATIENT)
Dept: PSYCHIATRY | Facility: CLINIC | Age: 44
End: 2024-05-07
Payer: COMMERCIAL

## 2024-05-07 ENCOUNTER — TELEPHONE (OUTPATIENT)
Dept: PSYCHIATRY | Facility: CLINIC | Age: 44
End: 2024-05-07
Payer: COMMERCIAL

## 2024-05-07 NOTE — TELEPHONE ENCOUNTER
Spoke to patient by phone at Dr. Mahan's request. Patient discussed events leading to her boyfriend's death yesterday and her self-blame. Discussed re-setting through focus on basics (eating, rest, sleeping, time with loved ones) and not engaging in self-blame. She is aware that Dr. Mahan will be out until 5/9. She was offered an appointment with another psychologist tomorrow, if needed. She does not want that, at present, but is aware of how to reach me, if she changes her mind.  No current suicidal ideation.    TODD Toro, PhD

## 2024-05-08 ENCOUNTER — PATIENT OUTREACH (OUTPATIENT)
Dept: OTHER | Facility: OTHER | Age: 44
End: 2024-05-08
Payer: COMMERCIAL

## 2024-05-08 ENCOUNTER — OFFICE VISIT (OUTPATIENT)
Dept: PSYCHIATRY | Facility: CLINIC | Age: 44
End: 2024-05-08
Payer: COMMERCIAL

## 2024-05-08 DIAGNOSIS — F33.41 MAJOR DEPRESSIVE DISORDER, RECURRENT EPISODE, IN PARTIAL REMISSION WITH ANXIOUS DISTRESS: Primary | ICD-10-CM

## 2024-05-08 DIAGNOSIS — F43.21 COMPLICATED BEREAVEMENT: ICD-10-CM

## 2024-05-08 PROCEDURE — 1160F RVW MEDS BY RX/DR IN RCRD: CPT | Mod: CPTII,S$GLB,, | Performed by: PSYCHOLOGIST

## 2024-05-08 PROCEDURE — 90837 PSYTX W PT 60 MINUTES: CPT | Mod: S$GLB,,, | Performed by: PSYCHOLOGIST

## 2024-05-08 PROCEDURE — 3044F HG A1C LEVEL LT 7.0%: CPT | Mod: CPTII,S$GLB,, | Performed by: PSYCHOLOGIST

## 2024-05-08 PROCEDURE — 99999 PR PBB SHADOW E&M-EST. PATIENT-LVL II: CPT | Mod: PBBFAC,,, | Performed by: PSYCHOLOGIST

## 2024-05-08 PROCEDURE — 1159F MED LIST DOCD IN RCRD: CPT | Mod: CPTII,S$GLB,, | Performed by: PSYCHOLOGIST

## 2024-05-08 NOTE — PROGRESS NOTES
PSYCHO-ONCOLOGY NOTE/ Individual Psychotherapy     Date: 5/8/2024   Site:  [facility]        Therapeutic Intervention: Met with patient.    Outpatient - Behavior modifying psychotherapy 60 min - CPT code 06126 and Outpatient - Supportive psychotherapy 60 min - CPT Code 54996  Urgent add on patient of Dr. aMhan    Problem list  Patient Active Problem List   Diagnosis    Depression    Left ovarian cyst    S/P RA Laparoscopic LSO    Mucinous tumor, of low malignant potential    Pericardial cyst    Scoliosis of cervical spine    Abnormal uterine bleeding (AUB)    Vaginal yeast infection    DUB (dysfunctional uterine bleeding)    Migraine with aura, not intractable    s/p robotic hysterectomy/RSO 3/9/20    Borderline epithelial neoplasm of ovary    Surgical menopause    Yeast vaginitis    Menopause    Dizziness    Galactorrhea in female- bilateral breast    Other insomnia    Major depressive disorder, recurrent episode, in partial remission with anxious distress    Prediabetes    Obesity    S/P laparoscopic sleeve gastrectomy    BMI 45.0-49.9, adult    Hidradenitis    Seborrheic dermatitis    Complicated bereavement       Chief complaint/reason for encounter: depression, sleep, and grief     Current Medications  Current Outpatient Medications   Medication    ALPRAZolam (XANAX) 0.5 MG tablet    b complex vitamins capsule    B3-azelaic-zinc-B6-copper-FA (NICAZEL) 600-5-10-5-1.5 mg Tab    CALCIUM CITRATE ORAL    clindamycin (CLEOCIN T) 1 % lotion    cyclobenzaprine (FLEXERIL) 5 MG tablet    erythromycin with ethanoL (THERAMYCIN) 2 % external solution    estradioL (ESTRACE) 2 MG tablet    finasteride (PROSCAR) 5 mg tablet    fluconazole (DIFLUCAN) 200 MG Tab    fluocinolone acetonide oiL (DERMOTIC OIL) 0.01 % Drop    gabapentin (NEURONTIN) 100 MG capsule    galcanezumab-gnlm (EMGALITY PEN) 120 mg/mL PnIj    HUMIRA,CF, PEN 40 mg/0.4 mL PnKt    ketoconazole (NIZORAL) 2 % cream    metFORMIN (GLUCOPHAGE-XR) 500 MG ER 24hr  tablet    multivitamin capsule    nitrofurantoin, macrocrystal-monohydrate, (MACROBID) 100 MG capsule    nystatin-triamcinolone (MYCOLOG II) cream    omeprazole (PRILOSEC) 40 MG capsule    ondansetron (ZOFRAN-ODT) 4 MG TbDL    pimecrolimus (ELIDEL) 1 % cream    progesterone (PROMETRIUM) 100 MG capsule    spironolactone (ALDACTONE) 50 MG tablet    thiamine (VITAMIN B-1) 50 MG tablet    ubrogepant (UBRELVY) 50 mg tablet    venlafaxine (EFFEXOR-XR) 150 MG Cp24     No current facility-administered medications for this visit.       Objective:  Fran Higgins arrived promptly for the session (called in to request an urgent visit).   Ms. Higgins was independently ambulatory at the time of session. The patient was fully cooperative throughout the session.  Appearance: age appropriate, casually  dressed, adequately  groomed  Behavior/Cooperation: friendly and cooperative  Speech: normal in rate, volume, and tone and appropriate quality, quantity and organization of sentences  Mood: sad  Affect: profusely tearful  Thought Process: goal-directed, logical  Thought Content: normal,  No delusions or paranoia; did not appear to be responding to internal stimuli during the session  Orientation: grossly intact  Memory: Grossly intact  Attention Span/Concentration: Attends to session without distraction; reports no difficulty  Estimate of Intelligence: average from verbal skills and history  Cognition: grossly intact  Insight: patient has awareness of illness; adequate insight into own behavior and behavior of others  Judgment: the patient's behavior is adequate to circumstances    Interval history and content of current session: Patient discussed events and activities since her boyfriend's death.  Reports to be coping with significant difficulty. Evaluated cognitive response, paying particular attention to negative intrusive thoughts of a persistent and detrimental nature. Thoughts of this type are in evidence with severe  distress. Provided cognitive behavioral therapy to address negative cognitions.  Created a list of alternative thoughts to replace self-blaming cognitions. Patient's mother is staying with her due to her distress (safe, so far). Patient's daughter is also distressed. Discussed supporting daughter through grief.      is releasing the body, but  arrangements are not yet set.    Patient has the support of close friends during this time. She has been calling off of work, daily. She does not have spiritual or Amish beliefs which support her during bereavement. She was able to sleep last night. She has not taken her PRN Xanax.        Risk parameters:   Patient reports no suicidal ideation  Patient reports no homicidal ideation  Patient reports no self-injurious behavior  Patient reports no violent behavior   Safety needs:  None at this time      Verbal deficits: None     Patient's response to intervention:The patient's response to intervention is accepting.     Progress toward goals:  n/a        Patient reported outcomes:      Distress thermometer:       2024    11:32 AM 2023     8:54 AM 2023     8:59 AM 2023     9:47 AM 2023     7:40 AM 2023    10:00 AM 2023     9:48 AM   DISTRESS SCREENING   Distress Score 10 - Extreme Distress 4 5 9    9 3 10 - Extreme Distress 9   Practical Concerns Taking care of myself;Taking care of others;Work Work/School None of these Work/School    Work/School None of these None of these ;Insurance/Financial;Work/School   Social Concerns Relationship with spouse or partner Dealing with Children None of these None of these    None of these None of these Family Health Issues None of these   Emotional Concerns Worry or anxiety;Sadness or depression;Loss of interest or enjoyment;Grief or loss;Fear;Loneliness Worry Nervousness;Worry;Loss of Interest None of these    None of these None of these Depression;Fears;Loss of Interest  Nervousness;Worry;Loss of Interest   Retire Spiritual or Mormon Concerns  No No No    No No No No   Spiritual or Mormon Concerns None of these         Physical Concerns Sleep;Memory or concentration;Changes in eating Pain Fatigue;Pain Fatigue;Sleep    Fatigue;Sleep Fatigue;Sleep Fatigue;Pain;Skin Dry/Itchy;Tingling in hands or feet Fatigue           PHQ-9= PHQ ANSWERS    Q1. Little interest or pleasure in doing things: Nearly every day (05/08/24 1133)  Q2. Feeling down, depressed, or hopeless: Nearly every day (05/08/24 1133)  Q3. Trouble falling or staying asleep, or sleeping too much: Nearly every day (05/08/24 1133)  Q4. Feeling tired or having little energy: Nearly every day (05/08/24 1133)  Q5. Poor appetite or overeating: Nearly every day (05/08/24 1133)  Q6. Feeling bad about yourself - or that you are a failure or have let yourself or your family down: Nearly every day (05/08/24 1133)  Q7. Trouble concentrating on things, such as reading the newspaper or watching television: Nearly every day (05/08/24 1133)  Q8. Moving or speaking so slowly that other people could have noticed. Or the opposite - being so fidgety or restless that you have been moving around a lot more than usual: Nearly every day (05/08/24 1133)  Q9.      PHQ8 Score : 24 (05/08/24 1133)  PHQ-9 Total Score: 24 (05/08/24 1133)        JOSE MARIA-7=      5/8/2024    11:32 AM 9/29/2023     8:54 AM 9/8/2023     9:00 AM   GAD7   1. Feeling nervous, anxious, or on edge? 3 1 1   2. Not being able to stop or control worrying? 3 0 1   3. Worrying too much about different things? 3 0 1   4. Trouble relaxing? 3 1 1   5. Being so restless that it is hard to sit still? 3 0 0   6. Becoming easily annoyed or irritable? 0 0 1   7. Feeling afraid as if something awful might happen? 3 0 0   JOSE MARIA-7 Score 18 2 5            Client Strengths: verbal, intelligent, successful, good social support, good insight, commitment to wellness    Treatment Plan:individual  "psychotherapy and medication management by physician  Target symptoms: depression, anxiety , grief  Why chosen therapy is appropriate versus another modality: relevant to diagnosis, patient responds to this modality, evidence based practice  Outcome monitoring methods: self-report, checklist/rating scale  Therapeutic intervention type: behavior modifying psychotherapy, supportive psychotherapy  Prognosis: Fair    Goals from last visit: n/a   Behavioral goals prior to next visit:    Exercise:   Stress management: self-care   Social engagement: ask a friend to come over this evening for support   Nutrition:  nutrition as able   Smoking Cessation:   Therapy: re-read rational thoughts when "brain going in circles"    Return to clinic: with TIFFANY Rahman     Length of Service (minutes direct face-to-face contact): 60    Diagnosis:     ICD-10-CM ICD-9-CM   1. Major depressive disorder, recurrent episode, in partial remission with anxious distress  F33.41 296.35   2. Complicated bereavement  F43.21 309.0       Billy Toro, PhD  [place] License #[id number]  [place] License #[id number]    "

## 2024-05-10 ENCOUNTER — TELEPHONE (OUTPATIENT)
Dept: HEMATOLOGY/ONCOLOGY | Facility: CLINIC | Age: 44
End: 2024-05-10
Payer: COMMERCIAL

## 2024-05-10 NOTE — TELEPHONE ENCOUNTER
LVM for pt to offer f/u appt w/ Dr. Mahan per staff message received.  MA instructed pt. in voicemail to call the office number for scheduling.         [place], [place] ext [phone]

## 2024-05-10 NOTE — TELEPHONE ENCOUNTER
Pt returned call to clinic. MA spoke w/ pt in regards to scheduling f/u w/ Dr. Mahan. Pt approved to schedule for in person visit on Monday 5/13 @ 4PM.     [place] ext [phone]

## 2024-05-13 ENCOUNTER — PATIENT OUTREACH (OUTPATIENT)
Dept: OTHER | Facility: OTHER | Age: 44
End: 2024-05-13
Payer: COMMERCIAL

## 2024-05-13 ENCOUNTER — OFFICE VISIT (OUTPATIENT)
Dept: PSYCHIATRY | Facility: CLINIC | Age: 44
End: 2024-05-13
Payer: COMMERCIAL

## 2024-05-13 ENCOUNTER — PATIENT MESSAGE (OUTPATIENT)
Dept: PSYCHIATRY | Facility: CLINIC | Age: 44
End: 2024-05-13
Payer: COMMERCIAL

## 2024-05-13 DIAGNOSIS — F43.21 GRIEF: Primary | ICD-10-CM

## 2024-05-13 DIAGNOSIS — F43.9 TRAUMA AND STRESSOR-RELATED DISORDER: ICD-10-CM

## 2024-05-13 PROCEDURE — 3044F HG A1C LEVEL LT 7.0%: CPT | Mod: CPTII,S$GLB,, | Performed by: PSYCHOLOGIST

## 2024-05-13 PROCEDURE — 99999 PR PBB SHADOW E&M-EST. PATIENT-LVL I: CPT | Mod: PBBFAC,,, | Performed by: PSYCHOLOGIST

## 2024-05-13 PROCEDURE — 90837 PSYTX W PT 60 MINUTES: CPT | Mod: S$GLB,,, | Performed by: PSYCHOLOGIST

## 2024-05-13 NOTE — PROGRESS NOTES
INFORMED CONSENT: Patient was identified using two patient-identifiers. The patient has been informed of the risks and benefits associated with engaging in psychotherapy, the handling of protected health information, the rights of privacy and the limits of confidentiality. The patient has also been informed of the importance of reporting any suicidal or homicidal ideation to this or any provider to ensure safety of all parties, and the Fran Higgins expressed understanding. The patient was agreeable to these terms and freely participates in individual psychotherapy.    PSYCHO-ONCOLOGY NOTE/ Individual Psychotherapy     Date: 5/13/2024   Site:  [facility]        Therapeutic Intervention: Met with patient.  Outpatient - Supportive psychotherapy 60 min - CPT Code 71142      Patient was last seen by me on 9/29/2023    Problem list  Patient Active Problem List   Diagnosis    Depression    Left ovarian cyst    S/P RA Laparoscopic LSO    Mucinous tumor, of low malignant potential    Pericardial cyst    Scoliosis of cervical spine    Abnormal uterine bleeding (AUB)    Vaginal yeast infection    DUB (dysfunctional uterine bleeding)    Migraine with aura, not intractable    s/p robotic hysterectomy/RSO 3/9/20    Borderline epithelial neoplasm of ovary    Surgical menopause    Yeast vaginitis    Menopause    Dizziness    Galactorrhea in female- bilateral breast    Other insomnia    Major depressive disorder, recurrent episode, in partial remission with anxious distress    Prediabetes    Obesity    S/P laparoscopic sleeve gastrectomy    BMI 45.0-49.9, adult    Hidradenitis    Seborrheic dermatitis    Complicated bereavement       Chief complaint/reason for encounter: grief   Met with patient to evaluate psychosocial adaptation to diagnosis/treatment/survivorship of recent death of partner    Current Medications  Current Outpatient Medications   Medication    ALPRAZolam (XANAX) 0.5 MG tablet    b complex vitamins  capsule    B3-azelaic-zinc-B6-copper-FA (NICAZEL) 600-5-10-5-1.5 mg Tab    CALCIUM CITRATE ORAL    clindamycin (CLEOCIN T) 1 % lotion    cyclobenzaprine (FLEXERIL) 5 MG tablet    erythromycin with ethanoL (THERAMYCIN) 2 % external solution    estradioL (ESTRACE) 2 MG tablet    finasteride (PROSCAR) 5 mg tablet    fluconazole (DIFLUCAN) 200 MG Tab    fluocinolone acetonide oiL (DERMOTIC OIL) 0.01 % Drop    gabapentin (NEURONTIN) 100 MG capsule    galcanezumab-gnlm (EMGALITY PEN) 120 mg/mL PnIj    HUMIRA,CF, PEN 40 mg/0.4 mL PnKt    ketoconazole (NIZORAL) 2 % cream    metFORMIN (GLUCOPHAGE-XR) 500 MG ER 24hr tablet    multivitamin capsule    nitrofurantoin, macrocrystal-monohydrate, (MACROBID) 100 MG capsule    nystatin-triamcinolone (MYCOLOG II) cream    omeprazole (PRILOSEC) 40 MG capsule    ondansetron (ZOFRAN-ODT) 4 MG TbDL    pimecrolimus (ELIDEL) 1 % cream    progesterone (PROMETRIUM) 100 MG capsule    spironolactone (ALDACTONE) 50 MG tablet    thiamine (VITAMIN B-1) 50 MG tablet    ubrogepant (UBRELVY) 50 mg tablet    venlafaxine (EFFEXOR-XR) 150 MG Cp24     No current facility-administered medications for this visit.       Objective:  Fran Higgins arrived promptly for the session.   The patient was fully cooperative throughout the session.  Appearance: age appropriate, appropriately  dressed, adequately  groomed  Behavior/Cooperation: friendly and cooperative  Speech: normal in rate, volume, and tone and appropriate quality, quantity and organization of sentences  Mood: sad  Affect: mood congruent, crying  Thought Process: goal-directed, logical  Thought Content: normal,  No delusions or paranoia; did not appear to be responding to internal stimuli during the session  Orientation: grossly intact  Attention Span/Concentration: Attends to session without distraction; reports no difficulty  Insight: patient has awareness of illness; good insight into own behavior and behavior of others  Judgment: the  patient's behavior is adequate to circumstances    Interval history and content of current session:  Discussed  details surrounding partners death. Discussed and processed negative self evaluations and guilt. . Reports to be coping with significant difficulty. Evaluated cognitive response, paying particular attention to negative intrusive thoughts of a persistent and detrimental nature. Thoughts of this type are in evidence with severe distress. Provided cognitive behavioral therapy to address negative cognitions. Identified and evaluated psychosocial and environmental stressors secondary to diagnosis and treatment.  Examined proactive behaviors that may be implemented to minimize or ameliorate psychosocial stressors secondary to diagnosis and treatment.     Risk parameters:   Patient reports no suicidal ideation  Patient reports no homicidal ideation  Patient reports no self-injurious behavior  Patient reports no violent behavior   Safety needs:  None at this time      Verbal deficits: None     Patient's response to intervention:The patient's response to intervention is accepting.     Progress toward goals and other mental status changes:  The patient's progress toward goals is fair .      Progress to date:Progress - Ongoing, but Slow      Goals from last visit: Attempted, partially met     Patient reported outcomes:    Distress Thermometer:   Distress Score    Distress Score: 10 - Extreme Distress        Practical Problems Physical Problems                                                   Family Problems                                         Emotional Problems                                                         Spiritual/Religions Concerns               Other Problems               PHQ ANSWERS    Q1. Little interest or pleasure in doing things: (P) Nearly every day (05/13/24 6886)  Q2. Feeling down, depressed, or hopeless: (P) Nearly every day (05/13/24 9316)  Q3. Trouble falling or staying asleep, or  sleeping too much: (P) Nearly every day (05/13/24 1546)  Q4. Feeling tired or having little energy: (P) Nearly every day (05/13/24 1546)  Q5. Poor appetite or overeating: (P) Nearly every day (05/13/24 1546)  Q6. Feeling bad about yourself - or that you are a failure or have let yourself or your family down: (P) Nearly every day (05/13/24 1546)  Q7. Trouble concentrating on things, such as reading the newspaper or watching television: (P) Nearly every day (05/13/24 1546)  Q8. Moving or speaking so slowly that other people could have noticed. Or the opposite - being so fidgety or restless that you have been moving around a lot more than usual: (P) Nearly every day (05/13/24 1546)  Q9.  0    PHQ8 Score : (P) 24 (05/13/24 1546)  PHQ-9 Total Score: (P) 24 (05/13/24 1546)     JOSE MARIA-7 Answers        5/13/2024     3:46 PM   GAD7   1. Feeling nervous, anxious, or on edge? 3   2. Not being able to stop or control worrying? 3   3. Worrying too much about different things? 3   4. Trouble relaxing? 3   5. Being so restless that it is hard to sit still? 3   6. Becoming easily annoyed or irritable? 3   7. Feeling afraid as if something awful might happen? 3   JOSE MARIA-7 Score 21     JOSE MARIA-7 Score: (P) 21  Interpretation: (P) Severe Anxiety     Client Strengths: verbal, intelligent, successful, good social support, good insight, commitment to wellness, strong kerri, strong cultural traditions     Diagnosis:     ICD-10-CM ICD-9-CM   1. Grief  F43.21 309.0   2. Trauma and stressor-related disorder  F43.9 309.81     308.9       Treatment Plan:individual psychotherapy and medication management by physician  Target symptoms: grief  Why chosen therapy is appropriate versus another modality: relevant to diagnosis, patient responds to this modality, evidence based practice  Outcome monitoring methods: self-report, observation, checklist/rating scale  Therapeutic intervention type: supportive psychotherapy  Prognosis: Good      Behavioral goals:     Exercise:   Stress management:   Social engagement:   Nutrition:   Smoking Cessation:   Therapy:  Increase daily self-care and attention to health management    Return to clinic: as scheduled    Next Session:      Length of Service (minutes direct face-to-face contact): 60    Brittani Mahan, PhD  Clinical Psychologist  [place] License #[id number]  [place] License #[id number]

## 2024-05-13 NOTE — PROGRESS NOTES
Connected Back: Patient Outreach    Non-Adherence - Patient had a death in the family week of 5/6. Closing out task.

## 2024-05-14 ENCOUNTER — PATIENT MESSAGE (OUTPATIENT)
Dept: BARIATRICS | Facility: CLINIC | Age: 44
End: 2024-05-14
Payer: COMMERCIAL

## 2024-05-14 ENCOUNTER — PATIENT MESSAGE (OUTPATIENT)
Dept: INTERNAL MEDICINE | Facility: CLINIC | Age: 44
End: 2024-05-14
Payer: COMMERCIAL

## 2024-05-14 DIAGNOSIS — F33.41 MAJOR DEPRESSIVE DISORDER, RECURRENT EPISODE, IN PARTIAL REMISSION WITH ANXIOUS DISTRESS: Primary | ICD-10-CM

## 2024-05-14 DIAGNOSIS — Z00.00 ANNUAL PHYSICAL EXAM: Primary | ICD-10-CM

## 2024-05-14 RX ORDER — VENLAFAXINE HYDROCHLORIDE 150 MG/1
150 CAPSULE, EXTENDED RELEASE ORAL DAILY
Qty: 30 CAPSULE | Refills: 1 | Status: SHIPPED | OUTPATIENT
Start: 2024-05-14 | End: 2024-07-15

## 2024-05-14 NOTE — TELEPHONE ENCOUNTER
The patient is wanting to know if you can send a refill for venlafaxine (EFFEXOR-XR) 150 MG Cp24     Usually gets her refills from psych. She made an appointment but it is not until July.    She has an upcoming appointment with you for this month on the 27th.  Are you able to refill until she sees Psych?    Patient only has three doses left.

## 2024-05-15 RX ORDER — VENLAFAXINE HYDROCHLORIDE 150 MG/1
150 CAPSULE, EXTENDED RELEASE ORAL DAILY
Qty: 30 CAPSULE | Refills: 0 | OUTPATIENT
Start: 2024-05-15

## 2024-05-20 ENCOUNTER — OFFICE VISIT (OUTPATIENT)
Dept: PSYCHIATRY | Facility: CLINIC | Age: 44
End: 2024-05-20
Payer: COMMERCIAL

## 2024-05-20 DIAGNOSIS — D39.10 BORDERLINE EPITHELIAL NEOPLASM OF OVARY: ICD-10-CM

## 2024-05-20 DIAGNOSIS — F43.9 TRAUMA AND STRESSOR-RELATED DISORDER: ICD-10-CM

## 2024-05-20 DIAGNOSIS — F43.21 GRIEF: Primary | ICD-10-CM

## 2024-05-20 PROCEDURE — 3044F HG A1C LEVEL LT 7.0%: CPT | Mod: CPTII,S$GLB,, | Performed by: PSYCHOLOGIST

## 2024-05-20 PROCEDURE — 99999 PR PBB SHADOW E&M-EST. PATIENT-LVL I: CPT | Mod: PBBFAC,,, | Performed by: PSYCHOLOGIST

## 2024-05-20 PROCEDURE — 90832 PSYTX W PT 30 MINUTES: CPT | Mod: S$GLB,,, | Performed by: PSYCHOLOGIST

## 2024-05-24 ENCOUNTER — LAB VISIT (OUTPATIENT)
Dept: LAB | Facility: HOSPITAL | Age: 44
End: 2024-05-24
Attending: HOSPITALIST
Payer: COMMERCIAL

## 2024-05-24 DIAGNOSIS — Z00.00 ANNUAL PHYSICAL EXAM: ICD-10-CM

## 2024-05-24 DIAGNOSIS — N39.0 URINARY TRACT INFECTION WITHOUT HEMATURIA, SITE UNSPECIFIED: ICD-10-CM

## 2024-05-24 LAB
ALBUMIN SERPL BCP-MCNC: 4 G/DL (ref 3.5–5.2)
ALP SERPL-CCNC: 62 U/L (ref 55–135)
ALT SERPL W/O P-5'-P-CCNC: 20 U/L (ref 10–44)
ANION GAP SERPL CALC-SCNC: 8 MMOL/L (ref 8–16)
AST SERPL-CCNC: 17 U/L (ref 10–40)
BASOPHILS # BLD AUTO: 0.03 K/UL (ref 0–0.2)
BASOPHILS NFR BLD: 0.6 % (ref 0–1.9)
BILIRUB SERPL-MCNC: 0.3 MG/DL (ref 0.1–1)
BILIRUB UR QL STRIP: NEGATIVE
BUN SERPL-MCNC: 7 MG/DL (ref 6–20)
CALCIUM SERPL-MCNC: 9.6 MG/DL (ref 8.7–10.5)
CHLORIDE SERPL-SCNC: 106 MMOL/L (ref 95–110)
CHOLEST SERPL-MCNC: 209 MG/DL (ref 120–199)
CHOLEST/HDLC SERPL: 3.8 {RATIO} (ref 2–5)
CLARITY UR REFRACT.AUTO: CLEAR
CO2 SERPL-SCNC: 27 MMOL/L (ref 23–29)
COLOR UR AUTO: YELLOW
CREAT SERPL-MCNC: 0.9 MG/DL (ref 0.5–1.4)
DIFFERENTIAL METHOD BLD: NORMAL
EOSINOPHIL # BLD AUTO: 0.1 K/UL (ref 0–0.5)
EOSINOPHIL NFR BLD: 1.3 % (ref 0–8)
ERYTHROCYTE [DISTWIDTH] IN BLOOD BY AUTOMATED COUNT: 12.7 % (ref 11.5–14.5)
EST. GFR  (NO RACE VARIABLE): >60 ML/MIN/1.73 M^2
ESTIMATED AVG GLUCOSE: 105 MG/DL (ref 68–131)
GLUCOSE SERPL-MCNC: 96 MG/DL (ref 70–110)
GLUCOSE UR QL STRIP: NEGATIVE
HBA1C MFR BLD: 5.3 % (ref 4–5.6)
HCT VFR BLD AUTO: 38.8 % (ref 37–48.5)
HDLC SERPL-MCNC: 55 MG/DL (ref 40–75)
HDLC SERPL: 26.3 % (ref 20–50)
HGB BLD-MCNC: 12.7 G/DL (ref 12–16)
HGB UR QL STRIP: NEGATIVE
IMM GRANULOCYTES # BLD AUTO: 0.01 K/UL (ref 0–0.04)
IMM GRANULOCYTES NFR BLD AUTO: 0.2 % (ref 0–0.5)
KETONES UR QL STRIP: NEGATIVE
LDLC SERPL CALC-MCNC: 124.6 MG/DL (ref 63–159)
LEUKOCYTE ESTERASE UR QL STRIP: NEGATIVE
LYMPHOCYTES # BLD AUTO: 1.6 K/UL (ref 1–4.8)
LYMPHOCYTES NFR BLD: 34 % (ref 18–48)
MCH RBC QN AUTO: 28.5 PG (ref 27–31)
MCHC RBC AUTO-ENTMCNC: 32.7 G/DL (ref 32–36)
MCV RBC AUTO: 87 FL (ref 82–98)
MONOCYTES # BLD AUTO: 0.4 K/UL (ref 0.3–1)
MONOCYTES NFR BLD: 8.2 % (ref 4–15)
NEUTROPHILS # BLD AUTO: 2.7 K/UL (ref 1.8–7.7)
NEUTROPHILS NFR BLD: 55.7 % (ref 38–73)
NITRITE UR QL STRIP: NEGATIVE
NONHDLC SERPL-MCNC: 154 MG/DL
NRBC BLD-RTO: 0 /100 WBC
PH UR STRIP: 8 [PH] (ref 5–8)
PLATELET # BLD AUTO: 197 K/UL (ref 150–450)
PMV BLD AUTO: 12.7 FL (ref 9.2–12.9)
POTASSIUM SERPL-SCNC: 4.9 MMOL/L (ref 3.5–5.1)
PROT SERPL-MCNC: 6.9 G/DL (ref 6–8.4)
PROT UR QL STRIP: NEGATIVE
RBC # BLD AUTO: 4.45 M/UL (ref 4–5.4)
SODIUM SERPL-SCNC: 141 MMOL/L (ref 136–145)
SP GR UR STRIP: 1.02 (ref 1–1.03)
TRIGL SERPL-MCNC: 147 MG/DL (ref 30–150)
TSH SERPL DL<=0.005 MIU/L-ACNC: 0.82 UIU/ML (ref 0.4–4)
URN SPEC COLLECT METH UR: NORMAL
WBC # BLD AUTO: 4.76 K/UL (ref 3.9–12.7)

## 2024-05-24 PROCEDURE — 81003 URINALYSIS AUTO W/O SCOPE: CPT | Performed by: HOSPITALIST

## 2024-05-24 PROCEDURE — 36415 COLL VENOUS BLD VENIPUNCTURE: CPT | Performed by: HOSPITALIST

## 2024-05-24 PROCEDURE — 83036 HEMOGLOBIN GLYCOSYLATED A1C: CPT | Performed by: HOSPITALIST

## 2024-05-24 PROCEDURE — 80053 COMPREHEN METABOLIC PANEL: CPT | Performed by: HOSPITALIST

## 2024-05-24 PROCEDURE — 84443 ASSAY THYROID STIM HORMONE: CPT | Performed by: HOSPITALIST

## 2024-05-24 PROCEDURE — 80061 LIPID PANEL: CPT | Performed by: HOSPITALIST

## 2024-05-24 PROCEDURE — 85025 COMPLETE CBC W/AUTO DIFF WBC: CPT | Performed by: HOSPITALIST

## 2024-05-27 ENCOUNTER — OFFICE VISIT (OUTPATIENT)
Dept: INTERNAL MEDICINE | Facility: CLINIC | Age: 44
End: 2024-05-27
Payer: COMMERCIAL

## 2024-05-27 VITALS
DIASTOLIC BLOOD PRESSURE: 76 MMHG | HEART RATE: 93 BPM | OXYGEN SATURATION: 96 % | BODY MASS INDEX: 48.82 KG/M2 | WEIGHT: 293 LBS | HEIGHT: 65 IN | SYSTOLIC BLOOD PRESSURE: 110 MMHG | TEMPERATURE: 98 F | RESPIRATION RATE: 16 BRPM

## 2024-05-27 DIAGNOSIS — R73.03 PREDIABETES: ICD-10-CM

## 2024-05-27 DIAGNOSIS — Z00.00 ANNUAL PHYSICAL EXAM: Primary | ICD-10-CM

## 2024-05-27 DIAGNOSIS — F33.41 MAJOR DEPRESSIVE DISORDER, RECURRENT EPISODE, IN PARTIAL REMISSION WITH ANXIOUS DISTRESS: ICD-10-CM

## 2024-05-27 DIAGNOSIS — E66.01 CLASS 3 SEVERE OBESITY WITH SERIOUS COMORBIDITY AND BODY MASS INDEX (BMI) OF 50.0 TO 59.9 IN ADULT, UNSPECIFIED OBESITY TYPE: ICD-10-CM

## 2024-05-27 PROCEDURE — 99396 PREV VISIT EST AGE 40-64: CPT | Mod: S$GLB,,, | Performed by: HOSPITALIST

## 2024-05-27 PROCEDURE — 3074F SYST BP LT 130 MM HG: CPT | Mod: CPTII,S$GLB,, | Performed by: HOSPITALIST

## 2024-05-27 PROCEDURE — 99999 PR PBB SHADOW E&M-EST. PATIENT-LVL V: CPT | Mod: PBBFAC,,, | Performed by: HOSPITALIST

## 2024-05-27 PROCEDURE — 3078F DIAST BP <80 MM HG: CPT | Mod: CPTII,S$GLB,, | Performed by: HOSPITALIST

## 2024-05-27 PROCEDURE — 3044F HG A1C LEVEL LT 7.0%: CPT | Mod: CPTII,S$GLB,, | Performed by: HOSPITALIST

## 2024-05-27 PROCEDURE — 3008F BODY MASS INDEX DOCD: CPT | Mod: CPTII,S$GLB,, | Performed by: HOSPITALIST

## 2024-05-27 PROCEDURE — 1159F MED LIST DOCD IN RCRD: CPT | Mod: CPTII,S$GLB,, | Performed by: HOSPITALIST

## 2024-05-27 PROCEDURE — 1160F RVW MEDS BY RX/DR IN RCRD: CPT | Mod: CPTII,S$GLB,, | Performed by: HOSPITALIST

## 2024-05-27 NOTE — PROGRESS NOTES
Subjective:     @Patient ID: Fran Higgins is a 43 y.o. female.    Chief Complaint: Annual Exam    HPI  44 yo F with migraine, depression, obesity s/p gastric sleeve, prediabetes, hidradenitis, borderline epithelial neoplasm of ovary presents for annual.     Pt reports that she found her significant other dead in his car 3 weeks ago. Has been down since. She denies SI. She is seeing a therapist and reports she has good familial support. Reports she has returned to work and that has helped her     Lipid disorders/ASCVD risk (ages >/= 45 or >/= 20 if increased risk ): ordered    DM (>45y yearly or if obese, HTN): A1c ordered     Breast Cancer (40-50y discretion of pt, 50-74y every 1-2 years): Mammogram utd 9/18/23   Cervical Cancer (Pap Smear ages 21-65 every 3 years or Pap + HPV q5 years after 30 years of age):  Done 5/13/19; s/p hysterectomy      Vaccines:   Influenza (yearly): done  Tetanus (every 10 yrs - 1st tdap): done 11/2018   covid19: due for booster      Exercise: walking    Review of Systems   Constitutional:  Negative for activity change, chills and fever.   HENT:  Negative for congestion, hearing loss, sore throat and trouble swallowing.    Eyes:  Negative for pain, discharge and visual disturbance.   Respiratory:  Negative for cough, chest tightness, shortness of breath and wheezing.    Cardiovascular:  Negative for chest pain, palpitations and leg swelling.   Gastrointestinal:  Negative for abdominal pain, constipation, diarrhea, nausea and vomiting.   Endocrine: Negative for polydipsia and polyuria.   Genitourinary:  Negative for difficulty urinating, dysuria and hematuria.   Musculoskeletal:  Negative for arthralgias and back pain.   Skin:  Negative for rash and wound.   Neurological:  Positive for headaches. Negative for dizziness and weakness.   Psychiatric/Behavioral:  Positive for dysphoric mood. Negative for agitation and confusion.      Past medical history, surgical history, and family  "medical history reviewed and updated as appropriate.    Medications and allergies reviewed.     Objective:     Vitals:    05/27/24 1434   BP: 110/76   BP Location: Right arm   Patient Position: Sitting   BP Method: Large (Manual)   Pulse: 93   Resp: 16   Temp: 97.6 °F (36.4 °C)   TempSrc: Temporal   SpO2: 96%   Weight: (!) 141.7 kg (312 lb 6.3 oz)   Height: 5' 5" (1.651 m)     Body mass index is 51.98 kg/m².  Physical Exam  Vitals reviewed.   Constitutional:       General: She is not in acute distress.     Appearance: She is well-developed.   HENT:      Head: Normocephalic and atraumatic.      Right Ear: Tympanic membrane normal.      Left Ear: Tympanic membrane normal.      Mouth/Throat:      Mouth: Mucous membranes are moist.      Pharynx: No oropharyngeal exudate.   Eyes:      General:         Right eye: No discharge.         Left eye: No discharge.      Conjunctiva/sclera: Conjunctivae normal.   Cardiovascular:      Rate and Rhythm: Normal rate and regular rhythm.      Heart sounds: No murmur heard.     No friction rub.   Pulmonary:      Effort: Pulmonary effort is normal.      Breath sounds: Normal breath sounds.   Abdominal:      General: Bowel sounds are normal. There is no distension.      Palpations: Abdomen is soft.      Tenderness: There is no abdominal tenderness. There is no guarding.   Musculoskeletal:         General: Normal range of motion.      Cervical back: Normal range of motion and neck supple.      Right lower leg: No edema.      Left lower leg: No edema.   Lymphadenopathy:      Cervical: No cervical adenopathy.   Skin:     General: Skin is warm and dry.   Neurological:      Mental Status: She is alert and oriented to person, place, and time.   Psychiatric:         Mood and Affect: Mood is depressed.         Behavior: Behavior normal.       The 10-year ASCVD risk score (Christofer DK, et al., 2019) is: 0.5%    Values used to calculate the score:      Age: 43 years      Sex: Female      Is " Non- : No      Diabetic: No      Tobacco smoker: No      Systolic Blood Pressure: 110 mmHg      Is BP treated: No      HDL Cholesterol: 55 mg/dL      Total Cholesterol: 209 mg/dL    Lab Results   Component Value Date    WBC 4.76 05/24/2024    HGB 12.7 05/24/2024    HCT 38.8 05/24/2024     05/24/2024    CHOL 209 (H) 05/24/2024    TRIG 147 05/24/2024    HDL 55 05/24/2024    ALT 20 05/24/2024    AST 17 05/24/2024     05/24/2024    K 4.9 05/24/2024     05/24/2024    CREATININE 0.9 05/24/2024    BUN 7 05/24/2024    CO2 27 05/24/2024    TSH 0.821 05/24/2024    INR 1.0 07/15/2013    HGBA1C 5.3 05/24/2024       Assessment:     1. Annual physical exam    2. Prediabetes    3. Class 3 severe obesity with serious comorbidity and body mass index (BMI) of 50.0 to 59.9 in adult, unspecified obesity type    4. Major depressive disorder, recurrent episode, in partial remission with anxious distress      Plan:   Fran was seen today for annual exam.    Diagnoses and all orders for this visit:    Annual physical exam  - labs reviewed in clinic with patient    Prediabetes  - stable.  Continue to monitor    Class 3 severe obesity with serious comorbidity and body mass index (BMI) of 50.0 to 59.9 in adult, unspecified obesity type  - Chronic. Pt will consider upcoming digital weight loss program     Major depressive disorder, recurrent episode, in partial remission with anxious distress  - chronic. Worsened due to recent death of significant other. Pt will continue f/u with psychiatry and continue home meds         Rtc 1 year and prn     Erica Pineda MD  Internal Medicine    5/27/2024

## 2024-05-29 ENCOUNTER — OFFICE VISIT (OUTPATIENT)
Dept: PSYCHIATRY | Facility: CLINIC | Age: 44
End: 2024-05-29
Payer: COMMERCIAL

## 2024-05-29 DIAGNOSIS — F43.21 GRIEF: Primary | ICD-10-CM

## 2024-05-29 DIAGNOSIS — F43.9 TRAUMA AND STRESSOR-RELATED DISORDER: ICD-10-CM

## 2024-05-29 DIAGNOSIS — D39.10 BORDERLINE EPITHELIAL NEOPLASM OF OVARY: ICD-10-CM

## 2024-05-29 PROCEDURE — 3044F HG A1C LEVEL LT 7.0%: CPT | Mod: CPTII,S$GLB,, | Performed by: PSYCHOLOGIST

## 2024-05-29 PROCEDURE — 99999 PR PBB SHADOW E&M-EST. PATIENT-LVL II: CPT | Mod: PBBFAC,,, | Performed by: PSYCHOLOGIST

## 2024-05-29 PROCEDURE — 1159F MED LIST DOCD IN RCRD: CPT | Mod: CPTII,S$GLB,, | Performed by: PSYCHOLOGIST

## 2024-05-29 PROCEDURE — 90834 PSYTX W PT 45 MINUTES: CPT | Mod: S$GLB,,, | Performed by: PSYCHOLOGIST

## 2024-05-29 NOTE — PROGRESS NOTES
INFORMED CONSENT: Patient was identified using two patient-identifiers. The patient has been informed of the risks and benefits associated with engaging in psychotherapy, the handling of protected health information, the rights of privacy and the limits of confidentiality. The patient has also been informed of the importance of reporting any suicidal or homicidal ideation to this or any provider to ensure safety of all parties, and the Fran Higgins expressed understanding. The patient was agreeable to these terms and freely participates in individual psychotherapy.    PSYCHO-ONCOLOGY NOTE/ Individual Psychotherapy     Date: 5/29/2024   Site:  [facility]        Therapeutic Intervention: Met with patient.  Outpatient - Supportive psychotherapy 45 min - CPT Code 40096      Patient was last seen by me on 5/20/2024    Problem list  Patient Active Problem List   Diagnosis    Depression    Left ovarian cyst    S/P RA Laparoscopic LSO    Mucinous tumor, of low malignant potential    Pericardial cyst    Scoliosis of cervical spine    Abnormal uterine bleeding (AUB)    Vaginal yeast infection    DUB (dysfunctional uterine bleeding)    Migraine with aura, not intractable    s/p robotic hysterectomy/RSO 3/9/20    Borderline epithelial neoplasm of ovary    Surgical menopause    Yeast vaginitis    Menopause    Dizziness    Galactorrhea in female- bilateral breast    Other insomnia    Major depressive disorder, recurrent episode, in partial remission with anxious distress    Prediabetes    Obesity    S/P laparoscopic sleeve gastrectomy    BMI 45.0-49.9, adult    Hidradenitis    Seborrheic dermatitis    Complicated bereavement       Chief complaint/reason for encounter: grief   Met with patient to evaluate psychosocial adaptation to diagnosis/treatment/survivorship of death of partner    Current Medications  Current Outpatient Medications   Medication    ALPRAZolam (XANAX) 0.5 MG tablet    b complex vitamins capsule     B3-azelaic-zinc-B6-copper-FA (NICAZEL) 600-5-10-5-1.5 mg Tab    CALCIUM CITRATE ORAL    clindamycin (CLEOCIN T) 1 % lotion    cyclobenzaprine (FLEXERIL) 5 MG tablet    erythromycin with ethanoL (THERAMYCIN) 2 % external solution    estradioL (ESTRACE) 2 MG tablet    finasteride (PROSCAR) 5 mg tablet    fluconazole (DIFLUCAN) 200 MG Tab    fluocinolone acetonide oiL (DERMOTIC OIL) 0.01 % Drop    gabapentin (NEURONTIN) 100 MG capsule    galcanezumab-gnlm (EMGALITY PEN) 120 mg/mL PnIj    HUMIRA,CF, PEN 40 mg/0.4 mL PnKt    ketoconazole (NIZORAL) 2 % cream    metFORMIN (GLUCOPHAGE-XR) 500 MG ER 24hr tablet    multivitamin capsule    nystatin-triamcinolone (MYCOLOG II) cream    omeprazole (PRILOSEC) 40 MG capsule    ondansetron (ZOFRAN-ODT) 4 MG TbDL    pimecrolimus (ELIDEL) 1 % cream    progesterone (PROMETRIUM) 100 MG capsule    spironolactone (ALDACTONE) 50 MG tablet    thiamine (VITAMIN B-1) 50 MG tablet    ubrogepant (UBRELVY) 50 mg tablet    venlafaxine (EFFEXOR-XR) 150 MG Cp24     No current facility-administered medications for this visit.       Objective:  Fran Spencer Gisela arrived promptly for the session.    The patient was fully cooperative throughout the session.  Appearance: age appropriate, appropriately  dressed, adequately  groomed  Behavior/Cooperation: friendly and cooperative  Speech: normal in rate, volume, and tone and appropriate quality, quantity and organization of sentences  Mood: dysthymic  Affect: mood congruent  Thought Process: goal-directed, logical  Thought Content: normal,  No delusions or paranoia; did not appear to be responding to internal stimuli during the session  Orientation: grossly intact  Attention Span/Concentration: Attends to session without distraction; reports no difficulty  Insight: patient has awareness of illness; good insight into own behavior and behavior of others  Judgment: the patient's behavior is adequate to circumstances    Interval history and content of  current session: Shweta preparing for  services.  Discussed stress management  Reports to be coping with great difficulty. Evaluated cognitive response, paying particular attention to negative intrusive thoughts of a persistent and detrimental nature. Thoughts of this type are in evidence with severe distress. Provided cognitive behavioral therapy to address negative cognitions. Identified and evaluated psychosocial and environmental stressors secondary to diagnosis and treatment.  Examined proactive behaviors that may be implemented to minimize or ameliorate psychosocial stressors secondary to diagnosis and treatment.     Risk parameters:   Patient reports no suicidal ideation  Patient reports no homicidal ideation  Patient reports no self-injurious behavior  Patient reports no violent behavior   Safety needs:  None at this time      Verbal deficits: None     Patient's response to intervention:The patient's response to intervention is accepting.     Progress toward goals and other mental status changes:  The patient's progress toward goals is fair .      Progress to date:Progress as Expected      Goals from last visit: Met     Patient reported outcomes:    Distress Thermometer:   Distress Score    Distress Score: 10 - Extreme Distress        Practical Problems Physical Problems                                                   Family Problems                                         Emotional Problems                                                         Spiritual/Religions Concerns               Other Problems               PHQ ANSWERS    Q1. Little interest or pleasure in doing things: (P) Nearly every day (24 1328)  Q2. Feeling down, depressed, or hopeless: (P) Nearly every day (24 1328)  Q3. Trouble falling or staying asleep, or sleeping too much: (P) Nearly every day (24 1328)  Q4. Feeling tired or having little energy: (P) Nearly every day (24 1328)  Q5. Poor appetite or  overeating: (P) Nearly every day (05/29/24 1328)  Q6. Feeling bad about yourself - or that you are a failure or have let yourself or your family down: (P) Not at all (05/29/24 1328)  Q7. Trouble concentrating on things, such as reading the newspaper or watching television: (P) Several days (05/29/24 1328)  Q8. Moving or speaking so slowly that other people could have noticed. Or the opposite - being so fidgety or restless that you have been moving around a lot more than usual: (P) Several days (05/29/24 1328)  Q9.  0    PHQ8 Score : (P) 17 (05/29/24 1328)  PHQ-9 Total Score: (P) 17 (05/29/24 1328)     JOSE MARIA-7 Answers        5/29/2024     1:28 PM   GAD7   1. Feeling nervous, anxious, or on edge? 3   2. Not being able to stop or control worrying? 3   3. Worrying too much about different things? 3   4. Trouble relaxing? 3   5. Being so restless that it is hard to sit still? 3   6. Becoming easily annoyed or irritable? 3   7. Feeling afraid as if something awful might happen? 3   JOSE MARIA-7 Score 21     JOSE MARIA-7 Score: (P) 21  Interpretation: (P) Severe Anxiety     Client Strengths: verbal, intelligent, successful, good social support, good insight, commitment to wellness, strong kerri, strong cultural traditions     Diagnosis:     ICD-10-CM ICD-9-CM   1. Grief  F43.21 309.0   2. Trauma and stressor-related disorder  F43.9 309.81     308.9   3. Borderline epithelial neoplasm of ovary  D39.10 236.2       Treatment Plan:individual psychotherapy and medication management by physician  Target symptoms: grief  Why chosen therapy is appropriate versus another modality: relevant to diagnosis, patient responds to this modality, evidence based practice  Outcome monitoring methods: self-report, observation, checklist/rating scale  Therapeutic intervention type: insight oriented psychotherapy, behavior modifying psychotherapy  Prognosis: Good      Behavioral goals:    Exercise:   Stress management:   Social  engagement:   Nutrition:   Smoking Cessation:   Therapy:  Increase daily self-care and attention to health management  Pleasant events scheduling and increased social interaction  Monitor stressors in writing and bring to next visit    Return to clinic: as scheduled    Next Session:      Length of Service (minutes direct face-to-face contact): 45    Brittani Mahan, PhD  Clinical Psychologist  [place] License #[id number]  [place] License #[id number]

## 2024-05-29 NOTE — PROGRESS NOTES
INFORMED CONSENT: Patient was identified using two patient-identifiers. The patient has been informed of the risks and benefits associated with engaging in psychotherapy, the handling of protected health information, the rights of privacy and the limits of confidentiality. The patient has also been informed of the importance of reporting any suicidal or homicidal ideation to this or any provider to ensure safety of all parties, and the Fran Higgins expressed understanding. The patient was agreeable to these terms and freely participates in individual psychotherapy.    PSYCHO-ONCOLOGY NOTE/ Individual Psychotherapy     Date: 5/20/2024   Site:  [facility]        Therapeutic Intervention: Met with patient.  Outpatient - Supportive psychotherapy 30 min - CPT Code 85291      Patient was last seen by me on 5/13/2024    Problem list  Patient Active Problem List   Diagnosis    Depression    Left ovarian cyst    S/P RA Laparoscopic LSO    Mucinous tumor, of low malignant potential    Pericardial cyst    Scoliosis of cervical spine    Abnormal uterine bleeding (AUB)    Vaginal yeast infection    DUB (dysfunctional uterine bleeding)    Migraine with aura, not intractable    s/p robotic hysterectomy/RSO 3/9/20    Borderline epithelial neoplasm of ovary    Surgical menopause    Yeast vaginitis    Menopause    Dizziness    Galactorrhea in female- bilateral breast    Other insomnia    Major depressive disorder, recurrent episode, in partial remission with anxious distress    Prediabetes    Obesity    S/P laparoscopic sleeve gastrectomy    BMI 45.0-49.9, adult    Hidradenitis    Seborrheic dermatitis    Complicated bereavement       Chief complaint/reason for encounter: grief   Met with patient to evaluate psychosocial adaptation to diagnosis/treatment/survivorship of trauma    Current Medications  Current Outpatient Medications   Medication    ALPRAZolam (XANAX) 0.5 MG tablet    b complex vitamins capsule     B3-azelaic-zinc-B6-copper-FA (NICAZEL) 600-5-10-5-1.5 mg Tab    CALCIUM CITRATE ORAL    clindamycin (CLEOCIN T) 1 % lotion    cyclobenzaprine (FLEXERIL) 5 MG tablet    erythromycin with ethanoL (THERAMYCIN) 2 % external solution    estradioL (ESTRACE) 2 MG tablet    finasteride (PROSCAR) 5 mg tablet    fluconazole (DIFLUCAN) 200 MG Tab    fluocinolone acetonide oiL (DERMOTIC OIL) 0.01 % Drop    gabapentin (NEURONTIN) 100 MG capsule    galcanezumab-gnlm (EMGALITY PEN) 120 mg/mL PnIj    HUMIRA,CF, PEN 40 mg/0.4 mL PnKt    ketoconazole (NIZORAL) 2 % cream    metFORMIN (GLUCOPHAGE-XR) 500 MG ER 24hr tablet    multivitamin capsule    nystatin-triamcinolone (MYCOLOG II) cream    omeprazole (PRILOSEC) 40 MG capsule    ondansetron (ZOFRAN-ODT) 4 MG TbDL    pimecrolimus (ELIDEL) 1 % cream    progesterone (PROMETRIUM) 100 MG capsule    spironolactone (ALDACTONE) 50 MG tablet    thiamine (VITAMIN B-1) 50 MG tablet    ubrogepant (UBRELVY) 50 mg tablet    venlafaxine (EFFEXOR-XR) 150 MG Cp24     No current facility-administered medications for this visit.       Objective:  Fran Spencer Gisela arrived 30 mins late for the session.    The patient was fully cooperative throughout the session.  Appearance: age appropriate, appropriately  dressed, adequately  groomed  Behavior/Cooperation: friendly and cooperative  Speech: normal in rate, volume, and tone and appropriate quality, quantity and organization of sentences  Mood: depressed  Affect: mood congruent  Thought Process: goal-directed, logical  Thought Content: normal,  No delusions or paranoia; did not appear to be responding to internal stimuli during the session  Orientation: grossly intact  Attention Span/Concentration: Attends to session without distraction; reports no difficulty  Insight: patient has awareness of illness; good insight into own behavior and behavior of others  Judgment: the patient's behavior is adequate to circumstances    Interval history and content  of current session: Patient successfully increased eating. Engaging in BA.  Discussed  grief . Reports to be coping with significant difficulty. Evaluated cognitive response, paying particular attention to negative intrusive thoughts of a persistent and detrimental nature. Thoughts of this type are in evidence with severe distress. Provided cognitive behavioral therapy to address negative cognitions. Identified and evaluated psychosocial and environmental stressors secondary to diagnosis and treatment.  Examined proactive behaviors that may be implemented to minimize or ameliorate psychosocial stressors secondary to diagnosis and treatment.     Risk parameters:   Patient reports no suicidal ideation  Patient reports no homicidal ideation  Patient reports no self-injurious behavior  Patient reports no violent behavior   Safety needs:  None at this time      Verbal deficits: None     Patient's response to intervention:The patient's response to intervention is accepting.     Progress toward goals and other mental status changes:  The patient's progress toward goals is good.      Progress to date:Progress as Expected      Goals from last visit: Met     Patient reported outcomes:    Distress Thermometer:   Distress Score    Distress Score: 10 - Extreme Distress        Practical Problems Physical Problems                                                   Family Problems                                         Emotional Problems                                                         Spiritual/Religions Concerns               Other Problems               PHQ ANSWERS    Q1. Little interest or pleasure in doing things: (P) Nearly every day (05/20/24 0814)  Q2. Feeling down, depressed, or hopeless: (P) Nearly every day (05/20/24 0814)  Q3. Trouble falling or staying asleep, or sleeping too much: (P) Nearly every day (05/20/24 0814)  Q4. Feeling tired or having little energy: (P) Nearly every day (05/20/24 0814)  Q5. Poor  appetite or overeating: (P) Nearly every day (05/20/24 0814)  Q6. Feeling bad about yourself - or that you are a failure or have let yourself or your family down: (P) Nearly every day (05/20/24 0814)  Q7. Trouble concentrating on things, such as reading the newspaper or watching television: (P) Nearly every day (05/20/24 0814)  Q8. Moving or speaking so slowly that other people could have noticed. Or the opposite - being so fidgety or restless that you have been moving around a lot more than usual: (P) Nearly every day (05/20/24 0814)  Q9.      PHQ8 Score : (P) 24 (05/20/24 0814)  PHQ-9 Total Score: (P) 24 (05/20/24 0814)     JOSE MARIA-7 Answers        5/20/2024     8:14 AM   GAD7   1. Feeling nervous, anxious, or on edge? 3   2. Not being able to stop or control worrying? 3   3. Worrying too much about different things? 3   4. Trouble relaxing? 3   5. Being so restless that it is hard to sit still? 3   6. Becoming easily annoyed or irritable? 3   7. Feeling afraid as if something awful might happen? 3   JOSE MARIA-7 Score 21     JOSE MARIA-7 Score: (P) 21  Interpretation: (P) Severe Anxiety     Client Strengths: verbal, intelligent, successful, good social support, good insight, commitment to wellness, strong kerri, strong cultural traditions     Diagnosis:     ICD-10-CM ICD-9-CM   1. Grief  F43.21 309.0   2. Trauma and stressor-related disorder  F43.9 309.81     308.9   3. Borderline epithelial neoplasm of ovary  D39.10 236.2       Treatment Plan:individual psychotherapy  Target symptoms: grief  Why chosen therapy is appropriate versus another modality: relevant to diagnosis, patient responds to this modality, evidence based practice  Outcome monitoring methods: self-report, observation, checklist/rating scale  Therapeutic intervention type: insight oriented psychotherapy, behavior modifying psychotherapy  Prognosis: Good      Behavioral goals:    Exercise:   Stress management:   Social engagement:   Nutrition:   Smoking  Cessation:   Therapy:  Increase daily self-care and attention to health management  Pleasant events scheduling and increased social interaction  Monitor stressors in writing and bring to next visit    Return to clinic: as scheduled    Next Session:      Length of Service (minutes direct face-to-face contact): 30    Brittani Mahan, PhD  Clinical Psychologist  [place] License #[id number]  [place] License #[id number]

## 2024-06-03 ENCOUNTER — HOSPITAL ENCOUNTER (OUTPATIENT)
Dept: CARDIOLOGY | Facility: HOSPITAL | Age: 44
Discharge: HOME | End: 2024-06-03
Attending: INTERNAL MEDICINE
Payer: COMMERCIAL

## 2024-06-03 VITALS
HEART RATE: 75 BPM | SYSTOLIC BLOOD PRESSURE: 122 MMHG | HEIGHT: 65 IN | BODY MASS INDEX: 48.82 KG/M2 | WEIGHT: 293 LBS | DIASTOLIC BLOOD PRESSURE: 70 MMHG

## 2024-06-03 DIAGNOSIS — Q24.8 PERICARDIAL CYST: ICD-10-CM

## 2024-06-03 LAB
ASCENDING AORTA: 2.65 CM
AV INDEX (PROSTH): 0.82
AV MEAN GRADIENT: 4 MMHG
AV PEAK GRADIENT: 7 MMHG
AV VALVE AREA BY VELOCITY RATIO: 3.03 CM²
AV VALVE AREA: 2.87 CM²
AV VELOCITY RATIO: 0.87
BSA FOR ECHO PROCEDURE: 2.55 M2
CV ECHO LV RWT: 0.35 CM
DOP CALC AO PEAK VEL: 1.28 M/S
DOP CALC AO VTI: 26.7 CM
DOP CALC LVOT AREA: 3.5 CM2
DOP CALC LVOT DIAMETER: 2.11 CM
DOP CALC LVOT PEAK VEL: 1.11 M/S
DOP CALC LVOT STROKE VOLUME: 76.75 CM3
DOP CALC MV VTI: 17.12 CM
DOP CALCLVOT PEAK VEL VTI: 21.96 CM
E WAVE DECELERATION TIME: 123.87 MSEC
E/A RATIO: 1.47
E/E' RATIO: 5.14 M/S
ECHO LV POSTERIOR WALL: 0.87 CM (ref 0.6–1.1)
FRACTIONAL SHORTENING: 36 % (ref 28–44)
HR MV ECHO: 75 BPM
INTERVENTRICULAR SEPTUM: 0.87 CM (ref 0.6–1.1)
LA MAJOR: 4.69 CM
LA MINOR: 4.52 CM
LA WIDTH: 3.38 CM
LEFT ATRIUM SIZE: 3.24 CM
LEFT ATRIUM VOLUME INDEX MOD: 16.6 ML/M2
LEFT ATRIUM VOLUME INDEX: 17.9 ML/M2
LEFT ATRIUM VOLUME MOD: 39.75 CM3
LEFT ATRIUM VOLUME: 42.85 CM3
LEFT INTERNAL DIMENSION IN SYSTOLE: 3.19 CM (ref 2.1–4)
LEFT VENTRICLE DIASTOLIC VOLUME INDEX: 48.89 ML/M2
LEFT VENTRICLE DIASTOLIC VOLUME: 116.85 ML
LEFT VENTRICLE MASS INDEX: 63 G/M2
LEFT VENTRICLE SYSTOLIC VOLUME INDEX: 17 ML/M2
LEFT VENTRICLE SYSTOLIC VOLUME: 40.65 ML
LEFT VENTRICULAR INTERNAL DIMENSION IN DIASTOLE: 4.97 CM (ref 3.5–6)
LEFT VENTRICULAR MASS: 149.82 G
LV LATERAL E/E' RATIO: 4.8 M/S
LV SEPTAL E/E' RATIO: 5.54 M/S
MV A" WAVE DURATION": 10.28 MSEC
MV MEAN GRADIENT: 1 MMHG
MV PEAK A VEL: 0.49 M/S
MV PEAK E VEL: 0.72 M/S
MV PEAK GRADIENT: 2 MMHG
MV STENOSIS PRESSURE HALF TIME: 35.92 MS
MV VALVE AREA BY CONTINUITY EQUATION: 4.48 CM2
MV VALVE AREA P 1/2 METHOD: 6.12 CM2
OHS CV RV/LV RATIO: 0.56 CM
PULM VEIN S/D RATIO: 1.68
PV PEAK D VEL: 0.25 M/S
PV PEAK S VEL: 0.42 M/S
RA MAJOR: 4.49 CM
RA PRESSURE ESTIMATED: 3 MMHG
RA WIDTH: 3.02 CM
RIGHT VENTRICULAR END-DIASTOLIC DIMENSION: 2.77 CM
SINUS: 2.74 CM
STJ: 2.52 CM
TDI LATERAL: 0.15 M/S
TDI SEPTAL: 0.13 M/S
TDI: 0.14 M/S
TRICUSPID ANNULAR PLANE SYSTOLIC EXCURSION: 1.95 CM
Z-SCORE OF LEFT VENTRICULAR DIMENSION IN END DIASTOLE: -7.67
Z-SCORE OF LEFT VENTRICULAR DIMENSION IN END SYSTOLE: -5.52

## 2024-06-03 PROCEDURE — 93306 TTE W/DOPPLER COMPLETE: CPT

## 2024-06-03 PROCEDURE — 93306 TTE W/DOPPLER COMPLETE: CPT | Mod: 26,,, | Performed by: INTERNAL MEDICINE

## 2024-06-09 DIAGNOSIS — K21.9 GASTROESOPHAGEAL REFLUX DISEASE, UNSPECIFIED WHETHER ESOPHAGITIS PRESENT: ICD-10-CM

## 2024-06-10 ENCOUNTER — PATIENT OUTREACH (OUTPATIENT)
Dept: OTHER | Facility: OTHER | Age: 44
End: 2024-06-10
Payer: COMMERCIAL

## 2024-06-10 ENCOUNTER — OFFICE VISIT (OUTPATIENT)
Dept: PSYCHIATRY | Facility: CLINIC | Age: 44
End: 2024-06-10
Payer: COMMERCIAL

## 2024-06-10 DIAGNOSIS — D39.10 BORDERLINE EPITHELIAL NEOPLASM OF OVARY: ICD-10-CM

## 2024-06-10 DIAGNOSIS — F43.21 GRIEF: Primary | ICD-10-CM

## 2024-06-10 DIAGNOSIS — F43.9 TRAUMA AND STRESSOR-RELATED DISORDER: ICD-10-CM

## 2024-06-10 PROCEDURE — 3044F HG A1C LEVEL LT 7.0%: CPT | Mod: CPTII,S$GLB,, | Performed by: PSYCHOLOGIST

## 2024-06-10 PROCEDURE — 90834 PSYTX W PT 45 MINUTES: CPT | Mod: S$GLB,,, | Performed by: PSYCHOLOGIST

## 2024-06-10 PROCEDURE — 99999 PR PBB SHADOW E&M-EST. PATIENT-LVL II: CPT | Mod: PBBFAC,,, | Performed by: PSYCHOLOGIST

## 2024-06-10 PROCEDURE — 1159F MED LIST DOCD IN RCRD: CPT | Mod: CPTII,S$GLB,, | Performed by: PSYCHOLOGIST

## 2024-06-10 RX ORDER — OMEPRAZOLE 40 MG/1
40 CAPSULE, DELAYED RELEASE ORAL EVERY MORNING
Qty: 30 CAPSULE | Refills: 1 | Status: SHIPPED | OUTPATIENT
Start: 2024-06-10

## 2024-06-10 NOTE — PROGRESS NOTES
Connected Back: Patient Outreach    Patient has not completed exercises in 30+ days. Sent Vouch message for program removal. Closing program episode.

## 2024-06-10 NOTE — PROGRESS NOTES
INFORMED CONSENT: Patient was identified using two patient-identifiers. The patient has been informed of the risks and benefits associated with engaging in psychotherapy, the handling of protected health information, the rights of privacy and the limits of confidentiality. The patient has also been informed of the importance of reporting any suicidal or homicidal ideation to this or any provider to ensure safety of all parties, and the Fran Higgins expressed understanding. The patient was agreeable to these terms and freely participates in individual psychotherapy.    PSYCHO-ONCOLOGY NOTE/ Individual Psychotherapy     Date: 6/10/2024   Site:  [facility]        Therapeutic Intervention: Met with patient.  Outpatient - Supportive psychotherapy 45 min - CPT Code 92856      Patient was last seen by me on 5/29/2024    Problem list  Patient Active Problem List   Diagnosis    Depression    Left ovarian cyst    S/P RA Laparoscopic LSO    Mucinous tumor, of low malignant potential    Pericardial cyst    Scoliosis of cervical spine    Abnormal uterine bleeding (AUB)    Vaginal yeast infection    DUB (dysfunctional uterine bleeding)    Migraine with aura, not intractable    s/p robotic hysterectomy/RSO 3/9/20    Borderline epithelial neoplasm of ovary    Surgical menopause    Yeast vaginitis    Menopause    Dizziness    Galactorrhea in female- bilateral breast    Other insomnia    Major depressive disorder, recurrent episode, in partial remission with anxious distress    Prediabetes    Obesity    S/P laparoscopic sleeve gastrectomy    BMI 45.0-49.9, adult    Hidradenitis    Seborrheic dermatitis    Complicated bereavement       Chief complaint/reason for encounter: grief   Met with patient to evaluate psychosocial adaptation to diagnosis/treatment/survivorship of death of partner    Current Medications  Current Outpatient Medications   Medication    ALPRAZolam (XANAX) 0.5 MG tablet    b complex vitamins capsule     B3-azelaic-zinc-B6-copper-FA (NICAZEL) 600-5-10-5-1.5 mg Tab    CALCIUM CITRATE ORAL    clindamycin (CLEOCIN T) 1 % lotion    cyclobenzaprine (FLEXERIL) 5 MG tablet    erythromycin with ethanoL (THERAMYCIN) 2 % external solution    estradioL (ESTRACE) 2 MG tablet    finasteride (PROSCAR) 5 mg tablet    fluconazole (DIFLUCAN) 200 MG Tab    fluocinolone acetonide oiL (DERMOTIC OIL) 0.01 % Drop    gabapentin (NEURONTIN) 100 MG capsule    galcanezumab-gnlm (EMGALITY PEN) 120 mg/mL PnIj    HUMIRA,CF, PEN 40 mg/0.4 mL PnKt    ketoconazole (NIZORAL) 2 % cream    metFORMIN (GLUCOPHAGE-XR) 500 MG ER 24hr tablet    multivitamin capsule    nystatin-triamcinolone (MYCOLOG II) cream    omeprazole (PRILOSEC) 40 MG capsule    ondansetron (ZOFRAN-ODT) 4 MG TbDL    pimecrolimus (ELIDEL) 1 % cream    progesterone (PROMETRIUM) 100 MG capsule    spironolactone (ALDACTONE) 50 MG tablet    thiamine (VITAMIN B-1) 50 MG tablet    ubrogepant (UBRELVY) 50 mg tablet    venlafaxine (EFFEXOR-XR) 150 MG Cp24     No current facility-administered medications for this visit.       Objective:  Fran Spencer Gisela arrived promptly for the session. The patient was fully cooperative throughout the session.  Appearance: age appropriate, appropriately  dressed, adequately  groomed  Behavior/Cooperation: friendly and cooperative  Speech: normal in rate, volume, and tone and appropriate quality, quantity and organization of sentences  Mood: sad  Affect: mood congruent  Thought Process: goal-directed, logical  Thought Content: normal,  No delusions or paranoia; did not appear to be responding to internal stimuli during the session  Orientation: grossly intact  Attention Span/Concentration: Attends to session without distraction; reports no difficulty  Insight: patient has awareness of illness; good insight into own behavior and behavior of others  Judgment: the patient's behavior is adequate to circumstances    Interval history and content of current  session: Patient attended  services.  Discussed diagnosis, treatment, prognosis, current adaptation to disease and treatment status, and family's adaptation to disease and treatment status. Reports to be coping with great difficulty. Evaluated cognitive response, paying particular attention to negative intrusive thoughts of a persistent and detrimental nature. Thoughts of this type are in evidence with severe distress. Provided cognitive behavioral therapy to address negative cognitions. Identified and evaluated psychosocial and environmental stressors secondary to diagnosis and treatment.  Examined proactive behaviors that may be implemented to minimize or ameliorate psychosocial stressors secondary to diagnosis and treatment.     Risk parameters:   Patient reports no suicidal ideation  Patient reports no homicidal ideation  Patient reports no self-injurious behavior  Patient reports no violent behavior   Safety needs:  None at this time      Verbal deficits: None     Patient's response to intervention:The patient's response to intervention is accepting.     Progress toward goals and other mental status changes:  The patient's progress toward goals is good.      Progress to date:Progress as Expected      Goals from last visit: Met     Patient reported outcomes:    Distress Thermometer:   Distress Score    Distress Score: 10 - Extreme Distress        Practical Problems Physical Problems                                                   Family Problems                                         Emotional Problems                                                         Spiritual/Religions Concerns               Other Problems               PHQ ANSWERS    Q1. Little interest or pleasure in doing things: (P) Several days (06/10/24 1549)  Q2. Feeling down, depressed, or hopeless: (P) Several days (06/10/24 1549)  Q3. Trouble falling or staying asleep, or sleeping too much: (P) Nearly every day (06/10/24 1549)  Q4.  Feeling tired or having little energy: (P) Nearly every day (06/10/24 1549)  Q5. Poor appetite or overeating: (P) Nearly every day (06/10/24 1549)  Q6. Feeling bad about yourself - or that you are a failure or have let yourself or your family down: (P) Not at all (06/10/24 1549)  Q7. Trouble concentrating on things, such as reading the newspaper or watching television: (P) Several days (06/10/24 1549)  Q8. Moving or speaking so slowly that other people could have noticed. Or the opposite - being so fidgety or restless that you have been moving around a lot more than usual: (P) Several days (06/10/24 1549)  Q9.  0    PHQ8 Score : (P) 13 (06/10/24 1549)  PHQ-9 Total Score: (P) 13 (06/10/24 1549)     JOSE MARIA-7 Answers        6/10/2024     3:48 PM   GAD7   1. Feeling nervous, anxious, or on edge? 1   2. Not being able to stop or control worrying? 1   3. Worrying too much about different things? 1   4. Trouble relaxing? 1   5. Being so restless that it is hard to sit still? 1   6. Becoming easily annoyed or irritable? 1   7. Feeling afraid as if something awful might happen? 1   JOSE MARIA-7 Score 7     JOSE MARIA-7 Score: (P) 7  Interpretation: (P) Mild Anxiety     Client Strengths: verbal, intelligent, successful, good social support, good insight, commitment to wellness, strong kerri, strong cultural traditions     Diagnosis:     ICD-10-CM ICD-9-CM   1. Grief  F43.21 309.0   2. Trauma and stressor-related disorder  F43.9 309.81     308.9   3. Borderline epithelial neoplasm of ovary  D39.10 236.2        Treatment Plan:individual psychotherapy and medication management by physician  Target symptoms: grief  Why chosen therapy is appropriate versus another modality: relevant to diagnosis, patient responds to this modality, evidence based practice  Outcome monitoring methods: self-report, observation, checklist/rating scale  Therapeutic intervention type: insight oriented psychotherapy, behavior modifying psychotherapy  Prognosis:  Good      Behavioral goals:    Exercise:   Stress management:   Social engagement:   Nutrition:   Smoking Cessation:   Therapy:  Increase daily self-care and attention to health management  Pleasant events scheduling and increased social interaction  Monitor stressors in writing and bring to next visit    Return to clinic: as scheduled    Next Session:      Length of Service (minutes direct face-to-face contact): 45    Brittani Mahan, PhD  Clinical Psychologist  [place] License #[id number]  [place] License #[id number]

## 2024-06-11 ENCOUNTER — PATIENT MESSAGE (OUTPATIENT)
Dept: BARIATRICS | Facility: CLINIC | Age: 44
End: 2024-06-11
Payer: COMMERCIAL

## 2024-07-03 ENCOUNTER — PATIENT MESSAGE (OUTPATIENT)
Dept: BARIATRICS | Facility: CLINIC | Age: 44
End: 2024-07-03
Payer: COMMERCIAL

## 2024-07-05 ENCOUNTER — TELEPHONE (OUTPATIENT)
Dept: PSYCHIATRY | Facility: CLINIC | Age: 44
End: 2024-07-05
Payer: COMMERCIAL

## 2024-07-05 DIAGNOSIS — G47.09 OTHER INSOMNIA: ICD-10-CM

## 2024-07-05 DIAGNOSIS — G43.109 MIGRAINE WITH AURA AND WITHOUT STATUS MIGRAINOSUS, NOT INTRACTABLE: ICD-10-CM

## 2024-07-05 DIAGNOSIS — L73.2 HIDRADENITIS SUPPURATIVA: ICD-10-CM

## 2024-07-05 RX ORDER — ALPRAZOLAM 0.5 MG/1
0.5 TABLET ORAL NIGHTLY PRN
Qty: 30 TABLET | Refills: 0 | Status: SHIPPED | OUTPATIENT
Start: 2024-07-05

## 2024-07-05 NOTE — TELEPHONE ENCOUNTER
No care due was identified.  [facility] Embedded Care Due Messages. Reference number: [id number].   7/05/2024 4:18:51 PM CDT

## 2024-07-07 RX ORDER — VENLAFAXINE HYDROCHLORIDE 150 MG/1
150 CAPSULE, EXTENDED RELEASE ORAL DAILY
Qty: 90 CAPSULE | Refills: 3 | Status: SHIPPED | OUTPATIENT
Start: 2024-07-07

## 2024-07-07 NOTE — TELEPHONE ENCOUNTER
Refill Routing Note   Medication(s) are not appropriate for processing by [facility] for the following reason(s):        New or recently adjusted medication    [facility] action(s):  Defer               Appointments  past 12m or future 3m with PCP    Date Provider   Last Visit   5/27/2024 Erica Pineda MD   Next Visit   Visit date not found Erica Pineda MD   ED visits in past 90 days: 0        Note composed:2:34 AM 07/07/2024

## 2024-07-08 RX ORDER — METFORMIN HYDROCHLORIDE 500 MG/1
TABLET, EXTENDED RELEASE ORAL
Qty: 30 TABLET | Refills: 3 | Status: SHIPPED | OUTPATIENT
Start: 2024-07-08

## 2024-07-08 RX ORDER — UBROGEPANT 50 MG/1
TABLET ORAL
Qty: 16 TABLET | Refills: 5 | Status: SHIPPED | OUTPATIENT
Start: 2024-07-08

## 2024-07-09 ENCOUNTER — PATIENT MESSAGE (OUTPATIENT)
Dept: BARIATRICS | Facility: CLINIC | Age: 44
End: 2024-07-09
Payer: COMMERCIAL

## 2024-07-18 ENCOUNTER — PATIENT MESSAGE (OUTPATIENT)
Dept: NEUROLOGY | Facility: CLINIC | Age: 44
End: 2024-07-18

## 2024-07-18 ENCOUNTER — PROCEDURE VISIT (OUTPATIENT)
Dept: NEUROLOGY | Facility: CLINIC | Age: 44
End: 2024-07-18
Payer: COMMERCIAL

## 2024-07-18 VITALS — DIASTOLIC BLOOD PRESSURE: 87 MMHG | HEART RATE: 86 BPM | SYSTOLIC BLOOD PRESSURE: 150 MMHG

## 2024-07-18 DIAGNOSIS — G43.109 MIGRAINE WITH AURA AND WITHOUT STATUS MIGRAINOSUS, NOT INTRACTABLE: ICD-10-CM

## 2024-07-18 DIAGNOSIS — G43.E09 CHRONIC MIGRAINE WITH AURA WITHOUT STATUS MIGRAINOSUS, NOT INTRACTABLE: Primary | ICD-10-CM

## 2024-07-18 PROCEDURE — 64615 CHEMODENERV MUSC MIGRAINE: CPT | Mod: S$GLB,,, | Performed by: PHYSICIAN ASSISTANT

## 2024-07-18 RX ORDER — UBROGEPANT 100 MG/1
TABLET ORAL
Qty: 16 TABLET | Refills: 5 | Status: SHIPPED | OUTPATIENT
Start: 2024-07-18

## 2024-07-18 NOTE — PROCEDURES
Established Patient  Procedure Note   SUBJECTIVE:  Patient ID: Fran Higgins  Chief Complaint: Injections      History of Present Illness:  Fran Higgins is a 43 y.o. female with  with migraine, cervical scoliosis, obesity s/p sleeve gastrectomy, anxiety/depression, surgical menopause s/p hysterectomy/RSO for ovary cyst, insomnia  who presents to clinic alone for follow-up of headaches and Botox injections.       07/18/2024- Interval History: botox  Per HA diary, pt has had 1-2/30 ha days per month. She has been treating w/ ubrelvy 100mg at onset and finds this more effective. She is happy w/ current regimen as ha's are well controlled despite large amt of stress recently due to partners passing. Has a good support system and therapist. Will continue current regimen.   Plan: continue botox, continue ubrelvy 100mg prn, zofran prn, sleep strategies, rtc 12 wks for next botox    04/25/2024- Interval History: botox  Per pt 's HA diary, ha's have remained well controlled weaning off emgality. Experienced 5 HA days since last visit. Callahan's can last 3 - 7 hrs. Discussed using ubrelvy up to 100mg and at onset.   Plan: continue botox, d/c emgality, continue ubrelvy 50-100mg 1st line, zofran prn, sleep strategies, rtc 12 wks for next botox    02/08/2024- Interval History: botox  Pt's ha's remain well controlled. Reviewed HA diary. Occurring 1-2/30 days per month. Duration 2-14 hrs. Severity 4-10/10. Pt takes ubrelvy 50-100mg 1st line and tylenol 2nd line. Finds current regimen to be helpful. Is amenable to weanign off emgality by next visit.   Prior to initiation of botox the patient was experiencing >15 days of headache lasting 4 or more hours and has had sustained reduction.   Plan: continue botox, wean off emgality, continue ubrelvy 50-100mg 1st line, zofran prn, sleep strategies, rtc 12 wks for next botox    11/14/2023-  botox    09/29/2023 - Interval History:  Patient has had >50% reduction in Ha's since  "beginning botox. Prior to botox pt's Ha's were occurring >15/30 days per month. Since beginning botox, she experiences 5 HA days in 3 months. Most HA's are now mild in severity. Had 1 severe HA in this span that lasted 2 days but was eventually aborted w/ ubrelvy. HA's last 2 hrs - 2 days. As pt has experienced an improvement in Ha's with sustained reduction will continue botox as is clinically indicated.   Plan: continue botox, emgality, ubrelvy 50-100mg prn, zofran, continue sleep strategies, rtc for next botox    08/17/2023- Interval History: botox #3  Pt feels botox has been helpful. Is not trackign but states "I noticed a change" after the last round. She states she experiences approximately 20/30 ha days per month, hwoever isn't tracking. Will track for next visit.   Plan: continue botox, emgality, ubrelvy 50mg prn, zofran, rtc in 2 mo for f/up and 12 wks for next botox    05/25/2023 - botox #2    02/23/2023 - botox #1    12/12/2022 - Interval History:  Pt's ha's have worsened since last visit. They are now occurring >15/30 days per month for >3 mo, lasting > 4 hrs at a time. She tried tpx but had to d/c 2/2 depression SE. She also tried the addition of supplements including mag w/ no benefit noted. She continues to have trouble falling and staying asleep for which she recently started acupuncture for. Defers referral to sleep med, elavil, pcp at this time. Pt is interested in botox and would be an ideal candidate as she has tried and failed multiple ppx options.   Plan: start botox next visit, continue emgality, ubrelvy 50mg prn, zofran, continue sleep strategies, rtc jan 12 to begin botox     09/19/2022 - Interval History:   Ha's have increased 1-2 months ago possibly due to worsened insomnia and stress lately. Are now occurring 8/30 days per month lasting up to 1 day at a time. Currently taking effexor and xanax for these conditions.   Ubrelvy 50mg - typically resolves HA's, infrequently needs to repeat " dose  Discussed multiple options, pt agreeable w/ following. Denies current depression.   Plan: start tpx 25mg/d, continue emgality, ubrelvy 50mg prn, zofran, rtc in 3 mo or sooner if needed     Recommendations made at last Office Visit on 12/6/21:  - Discussed symptoms appear to be consistent with migraines. Discussed this with patient along with treatment options and patient agreed with the following plan  - ppx - continue emgality  - abortive - trial ubrelvy  - nausea - continue zofran  - avoid nsaids as they are c/i 2/2 gastrectomy  - avoid triptans as recommended by psychiatry 2/2 potential serotinin syndrome w/ effexor for depression  - decreased neck ROM, tightness, and cervical scoliosis - continue conservative treatments, consider PT in future  - risks, benefits, and potential side effects of emgality, ubrelvy, zofran discussed   - alternative treatment options offered   - importance of healthy diet, regular exercise and sleep hygiene in the treatment of headaches    - Start tracking headaches via Migraine [facility]dy roseann on phone   - RTC 6-12 mo or sooner if needed      Treatments Tried:  emgality - helps  Effexor  Tpx - depression  Anti-htn meds - avoid 2/2 low bp  Bb - avoid 2/2 depression  Ubrelvy - helps  triptans - has been told to avoid per psychiatrist 2/2 serotonin syndrome risk  nsaids - c/i 2/2 recent gastrectomy  zofran       Current Medications:    Current Outpatient Medications:     ALPRAZolam (XANAX) 0.5 MG tablet, Take 1 tablet (0.5 mg total) by mouth nightly as needed for Insomnia or Anxiety., Disp: 30 tablet, Rfl: 0    b complex vitamins capsule, Take 1 capsule by mouth once daily., Disp: , Rfl:     B3-azelaic-zinc-B6-copper-FA (NICAZEL) 600-5-10-5-1.5 mg Tab, take 1 tablet by mouth twice daily, Disp: 60 tablet, Rfl: 5    CALCIUM CITRATE ORAL, Take 1 tablet by mouth 3 (three) times daily. chewable, Disp: , Rfl:     clindamycin (CLEOCIN T) 1 % lotion, Apply to affected area two times a day.,  Disp: 120 mL, Rfl: 3    cyclobenzaprine (FLEXERIL) 5 MG tablet, Take 1 tablet (5 mg total) by mouth 2 (two) times daily as needed for Muscle spasms., Disp: 60 tablet, Rfl: 5    estradioL (ESTRACE) 2 MG tablet, Take 1 tablet (2 mg total) by mouth once daily., Disp: 30 tablet, Rfl: 11    finasteride (PROSCAR) 5 mg tablet, Take 1 tablet (5 mg total) by mouth once daily., Disp: 30 tablet, Rfl: 11    fluconazole (DIFLUCAN) 200 MG Tab, Take 1 tablet by mouth once if not better take another pill in 3 days, Disp: 30 tablet, Rfl: 0    fluocinolone acetonide oiL (DERMOTIC OIL) 0.01 % Drop, Use to affected ears at night, drop in canal and use to ear when flaring, Disp: 20 mL, Rfl: 3    gabapentin (NEURONTIN) 100 MG capsule, Take 1 capsule (100 mg total) by mouth 3 (three) times daily., Disp: 90 capsule, Rfl: 2    galcanezumab-gnlm (EMGALITY PEN) 120 mg/mL PnIj, Inject 120 mg into the skin every 30 days., Disp: 1 mL, Rfl: 11    HUMIRA,CF, PEN 40 mg/0.4 mL PnKt, Inject 40mg (1 pen) into the skin every 7 days, Disp: 4 pen , Rfl: 2    ketoconazole (NIZORAL) 2 % cream, Apply to affected area two times a day (Patient taking differently: Apply to affected area two times a day), Disp: 60 g, Rfl: 3    metFORMIN (GLUCOPHAGE-XR) 500 MG ER 24hr tablet, Take 1 tablet by mouth daily., Disp: 30 tablet, Rfl: 3    multivitamin capsule, Take by mouth once daily., Disp: , Rfl:     nystatin-triamcinolone (MYCOLOG II) cream, Apply topically 2 (two) times daily., Disp: 30 g, Rfl: 1    omeprazole (PRILOSEC) 40 MG capsule, Take 1 capsule (40 mg total) by mouth every morning., Disp: 30 capsule, Rfl: 1    ondansetron (ZOFRAN-ODT) 4 MG TbDL, Dissolve 1 tablet (4 mg total) by mouth every 6 (six) hours as needed (for nausea)., Disp: 20 tablet, Rfl: 2    pimecrolimus (ELIDEL) 1 % cream, Apply to affected areas of underarm twice a day as needed for irritation. (Patient taking differently: Apply to affected areas of underarm twice a day as needed for  irritation.), Disp: 30 g, Rfl: 3    progesterone (PROMETRIUM) 100 MG capsule, Take 1 capsule (100 mg total) by mouth nightly., Disp: 30 capsule, Rfl: 11    spironolactone (ALDACTONE) 50 MG tablet, Take 2 tablets by mouth daily., Disp: 60 tablet, Rfl: 3    thiamine (VITAMIN B-1) 50 MG tablet, Take 50 mg by mouth once daily., Disp: , Rfl:     venlafaxine (EFFEXOR-XR) 150 MG Cp24, Take 1 capsule (150 mg total) by mouth once daily., Disp: 90 capsule, Rfl: 3    erythromycin with ethanoL (THERAMYCIN) 2 % external solution, Apply topically 2 (two) times daily., Disp: 60 mL, Rfl: 4    ubrogepant (UBRELVY) 100 mg tablet, Take 1 tablet by mouth at the onset of a headache. May repeat based on response and tolerability after more than 2 hours if needed. Do not take more than 200mg in a 24 hour span., Disp: 16 tablet, Rfl: 5  No current facility-administered medications for this visit.    Review of Systems - as per HPI, otherwise a balanced 10 systems review is negative.    OBJECTIVE:  Vitals:  BP (!) 150/87 (BP Location: Left arm, Patient Position: Sitting, BP Method: Large (Automatic))   Pulse 86   LMP 01/17/2020     Physical Exam:  Constitutional: she appears well-developed and well-nourished. she is well groomed. NAD   HENT:    Head: Normocephalic and atraumatic  Eyes: Conjunctivae and EOM are normal  Musculoskeletal: Normal range of motion. No joint stiffness.   Skin: Skin is warm and dry.  Psychiatric: Mood and affect are normal    Neuro: Patient is alert and oriented to person, place, and time. Language is intact and fluent.  Recent and remote memory are intact.  Normal attention and concentration.  Facial movement is symmetric.  Gait is normal.     Review of Data:   Notes from neuro reviewed.   Labs:  Hospital Outpatient Visit on 06/03/2024   Component Date Value Ref Range Status    RA Width 06/03/2024 3.02  cm Final    LA volume (mod) 06/03/2024 39.75  cm3 Final    Left Atrium Major Axis 06/03/2024 4.69  cm Final     "Left Atrium Minor Axis 06/03/2024 4.52  cm Final    RA Major Axis 06/03/2024 4.49  cm Final    LV Diastolic Volume 06/03/2024 116.85  mL Final    LV Systolic Volume 06/03/2024 40.65  mL Final    PV Peak D Brad 06/03/2024 0.25  m/s Final    PV Peak S Brad 06/03/2024 0.42  m/s Final    MV Peak A Brad 06/03/2024 0.49  m/s Final    MV stenosis pressure 1/2 time 06/03/2024 35.92  ms Final    MV VTI 06/03/2024 17.12  cm Final    MV Peak E Brad 06/03/2024 0.72  m/s Final    MV peak gradient 06/03/2024 2  mmHg Final    Ao VTI 06/03/2024 26.70  cm Final    Ao peak brad 06/03/2024 1.28  m/s Final    LVOT peak VTI 06/03/2024 21.96  cm Final    LVOT peak brad 06/03/2024 1.11  m/s Final    LVOT diameter 06/03/2024 2.11  cm Final    MV "A" wave duration 06/03/2024 10.28  msec Final    E wave deceleration time 06/03/2024 123.87  msec Final    MV mean gradient 06/03/2024 1  mmHg Final    AV mean gradient 06/03/2024 4  mmHg Final    TAPSE 06/03/2024 1.95  cm Final    RVDD 06/03/2024 2.77  cm Final    LA size 06/03/2024 3.24  cm Final    Ascending aorta 06/03/2024 2.65  cm Final    STJ 06/03/2024 2.52  cm Final    Sinus 06/03/2024 2.74  cm Final    LVIDs 06/03/2024 3.19  2.1 - 4.0 cm Final    Posterior Wall 06/03/2024 0.87  0.6 - 1.1 cm Final    IVS 06/03/2024 0.87  0.6 - 1.1 cm Final    LVIDd 06/03/2024 4.97  3.5 - 6.0 cm Final    TDI LATERAL 06/03/2024 0.15  m/s Final    LA WIDTH 06/03/2024 3.38  cm Final    TDI SEPTAL 06/03/2024 0.13  m/s Final    LV LATERAL E/E' RATIO 06/03/2024 4.80  m/s Final    LV SEPTAL E/E' RATIO 06/03/2024 5.54  m/s Final    RV/LV Ratio 06/03/2024 0.56  cm Final    FS 06/03/2024 36  28 - 44 % Final    LA volume 06/03/2024 42.85  cm3 Final    LV mass 06/03/2024 149.82  g Final    Left Ventricle Relative Wall Thick* 06/03/2024 0.35  cm Final    AV valve area 06/03/2024 2.87  cm² Final    AV Velocity Ratio 06/03/2024 0.87   Final    AV index (prosthetic) 06/03/2024 0.82   Final    MV valve area p 1/2 method " 06/03/2024 6.12  cm2 Final    MV valve area by continuity eq 06/03/2024 4.48  cm2 Final    E/A ratio 06/03/2024 1.47   Final    Mean e' 06/03/2024 0.14  m/s Final    Pulm vein S/D ratio 06/03/2024 1.68   Final    LVOT area 06/03/2024 3.5  cm2 Final    LVOT stroke volume 06/03/2024 76.75  cm3 Final    AV peak gradient 06/03/2024 7  mmHg Final    E/E' ratio 06/03/2024 5.14  m/s Final    LIDIA by Velocity Ratio 06/03/2024 3.03  cm² Final    BSA 06/03/2024 2.55  m2 Final    LV Systolic Volume Index 06/03/2024 17.0  mL/m2 Final    LV Diastolic Volume Index 06/03/2024 48.89  mL/m2 Final    LV Mass Index 06/03/2024 63  g/m2 Final    LA Volume Index 06/03/2024 17.9  mL/m2 Final    LA Volume Index (Mod) 06/03/2024 16.6  mL/m2 Final    ZLVIDS 06/03/2024 -5.52   Final    ZLVIDD 06/03/2024 -7.67   Final    Mitral Valve Heart Rate 06/03/2024 75  bpm Final    Est. RA pres 06/03/2024 3  mmHg Final   Lab Visit on 05/24/2024   Component Date Value Ref Range Status    Specimen UA 05/24/2024 Urine, Clean Catch   Final    Color, UA 05/24/2024 Yellow  Yellow, Straw, Lakshmi Final    Appearance, UA 05/24/2024 Clear  Clear Final    pH, UA 05/24/2024 8.0  5.0 - 8.0 Final    Specific Gravity, UA 05/24/2024 1.020  1.005 - 1.030 Final    Protein, UA 05/24/2024 Negative  Negative Final    Glucose, UA 05/24/2024 Negative  Negative Final    Ketones, UA 05/24/2024 Negative  Negative Final    Bilirubin (UA) 05/24/2024 Negative  Negative Final    Occult Blood UA 05/24/2024 Negative  Negative Final    Nitrite, UA 05/24/2024 Negative  Negative Final    Leukocytes, UA 05/24/2024 Negative  Negative Final    TSH 05/24/2024 0.821  0.400 - 4.000 uIU/mL Final    WBC 05/24/2024 4.76  3.90 - 12.70 K/uL Final    RBC 05/24/2024 4.45  4.00 - 5.40 M/uL Final    Hemoglobin 05/24/2024 12.7  12.0 - 16.0 g/dL Final    Hematocrit 05/24/2024 38.8  37.0 - 48.5 % Final    MCV 05/24/2024 87  82 - 98 fL Final    MCH 05/24/2024 28.5  27.0 - 31.0 pg Final    MCHC 05/24/2024  32.7  32.0 - 36.0 g/dL Final    RDW 05/24/2024 12.7  11.5 - 14.5 % Final    Platelets 05/24/2024 197  150 - 450 K/uL Final    MPV 05/24/2024 12.7  9.2 - 12.9 fL Final    Immature Granulocytes 05/24/2024 0.2  0.0 - 0.5 % Final    Gran # (ANC) 05/24/2024 2.7  1.8 - 7.7 K/uL Final    Immature Grans (Abs) 05/24/2024 0.01  0.00 - 0.04 K/uL Final    Lymph # 05/24/2024 1.6  1.0 - 4.8 K/uL Final    Mono # 05/24/2024 0.4  0.3 - 1.0 K/uL Final    Eos # 05/24/2024 0.1  0.0 - 0.5 K/uL Final    Baso # 05/24/2024 0.03  0.00 - 0.20 K/uL Final    nRBC 05/24/2024 0  0 /100 WBC Final    Gran % 05/24/2024 55.7  38.0 - 73.0 % Final    Lymph % 05/24/2024 34.0  18.0 - 48.0 % Final    Mono % 05/24/2024 8.2  4.0 - 15.0 % Final    Eosinophil % 05/24/2024 1.3  0.0 - 8.0 % Final    Basophil % 05/24/2024 0.6  0.0 - 1.9 % Final    Differential Method 05/24/2024 Automated   Final    Cholesterol 05/24/2024 209 (H)  120 - 199 mg/dL Final    Triglycerides 05/24/2024 147  30 - 150 mg/dL Final    HDL 05/24/2024 55  40 - 75 mg/dL Final    LDL Cholesterol 05/24/2024 124.6  63.0 - 159.0 mg/dL Final    HDL/Cholesterol Ratio 05/24/2024 26.3  20.0 - 50.0 % Final    Total Cholesterol/HDL Ratio 05/24/2024 3.8  2.0 - 5.0 Final    Non-HDL Cholesterol 05/24/2024 154  mg/dL Final    Hemoglobin A1C 05/24/2024 5.3  4.0 - 5.6 % Final    Estimated Avg Glucose 05/24/2024 105  68 - 131 mg/dL Final    Sodium 05/24/2024 141  136 - 145 mmol/L Final    Potassium 05/24/2024 4.9  3.5 - 5.1 mmol/L Final    Chloride 05/24/2024 106  95 - 110 mmol/L Final    CO2 05/24/2024 27  23 - 29 mmol/L Final    Glucose 05/24/2024 96  70 - 110 mg/dL Final    BUN 05/24/2024 7  6 - 20 mg/dL Final    Creatinine 05/24/2024 0.9  0.5 - 1.4 mg/dL Final    Calcium 05/24/2024 9.6  8.7 - 10.5 mg/dL Final    Total Protein 05/24/2024 6.9  6.0 - 8.4 g/dL Final    Albumin 05/24/2024 4.0  3.5 - 5.2 g/dL Final    Total Bilirubin 05/24/2024 0.3  0.1 - 1.0 mg/dL Final    Alkaline Phosphatase 05/24/2024 62   55 - 135 U/L Final    AST 05/24/2024 17  10 - 40 U/L Final    ALT 05/24/2024 20  10 - 44 U/L Final    eGFR 05/24/2024 >60.0  >60 mL/min/1.73 m^2 Final    Anion Gap 05/24/2024 8  8 - 16 mmol/L Final     Imaging:  No results found. However, due to the size of the patient record, not all encounters were searched. Please check Results Review for a complete set of results.    Note: I have independently reviewed any/all imaging/labs/tests and agree with the report (s) as documented.  Any discrepancies will be as noted/demarcated by free text.  CHANTELLE CHING 7/18/2024    ASSESSMENT:  1. Chronic migraine with aura without status migrainosus, not intractable    2. Migraine with aura and without status migrainosus, not intractable          PLAN:  - Discussed symptoms appear to be consistent with migraines. Discussed this with patient along with treatment options and patient agreed with the following plan  - ppx - continue  botox  - Botox administered in clinic for Chronic Migraine (see below)   - abortive - continue ubrelvy  - nausea - continue zofran  - trouble w/ sleep - recently started acupuncture, defers referrals to pcp or sleep med or to begin elavil due to concern w/ interaction w/ effexor  - avoid nsaids as they are c/i 2/2 gastrectomy  - avoid triptans as recommended by psychiatry 2/2 potential serotinin syndrome w/ effexor for depression  - decreased neck ROM, tightness, and cervical scoliosis - continue conservative treatments, consider PT in future  - increased stress recently 2/2 partner's passing, continue therapy and mgmt per pcp  - RTC in 12 weeks for repeat Botox injections or sooner if needed     Orders Placed This Encounter    ubrogepant (UBRELVY) 100 mg tablet    onabotulinumtoxina injection 200 Units           All questions and concerns addressed.  Patient verbalizes understanding and is agreeable with the above stated treatment plan.      PROCEDURE NOTE:  BOTOX was performed as an indicated therapy for  intractable chronic migraine headaches given that the patient failed more than 2 headache medications    Two patient identifiers were confirmed with the patient prior to performing this procedure. A time out to determine correct patient and and agreement on procedure performed was conducted prior to the procedure.      Botulinum Toxin Injection Procedure   Procedure: Botulinum toxin injection (70011)  After risks and benefits were explained including bleeding, infection, worsening of pain, damage to the areas being injected, weakness of muscles, loss of muscle control, dysphagia if injecting the head or neck, facial droop if injecting the facial area, painful injection, allergic or other reaction to the medications being injected, and the failure of the procedure to help the problem, a signed consent was obtained.   The patient was placed in a comfortable area and the sites to be treated were identified.The area to be treated was prepped three times with alcohol and the alcohol allowed to dry. Next, a 30 gauge needle was used to inject the medication in the area to be treated.      Total Botox used: 155 Units   Botox wastage: 45 Units     Injection sites:    muscle bilaterally ( a total of 10 units divided into 2 sites)   Procerus muscle (5 units)   Frontalis muscle bilaterally (a total of 20 units divided into 4 sites)   Temporalis muscle bilaterally (a total of 40 units divided into 8 sites)   Occipitalis muscle bilaterally (a total of 30 units divided into 6 sites)   Cervical paraspinal muscles (a total of 20 units divided into 4 sites)   Trapezius muscle bilaterally (a total of 30 units divided into 6 sites)   Complications: none   RTC for the next Botox injection: 12 weeks     The patient tolerated the procedure well and did not experience any complications.     CC: Erica Pineda MD Sarena Patel, PA-C  [facility]   [phone]

## 2024-07-22 ENCOUNTER — OFFICE VISIT (OUTPATIENT)
Dept: PSYCHIATRY | Facility: CLINIC | Age: 44
End: 2024-07-22
Payer: COMMERCIAL

## 2024-07-22 DIAGNOSIS — G47.09 OTHER INSOMNIA: ICD-10-CM

## 2024-07-22 DIAGNOSIS — F33.41 RECURRENT MAJOR DEPRESSIVE DISORDER IN PARTIAL REMISSION: Primary | ICD-10-CM

## 2024-07-22 PROCEDURE — 1159F MED LIST DOCD IN RCRD: CPT | Mod: CPTII,95,, | Performed by: PSYCHIATRY & NEUROLOGY

## 2024-07-22 PROCEDURE — 1160F RVW MEDS BY RX/DR IN RCRD: CPT | Mod: CPTII,95,, | Performed by: PSYCHIATRY & NEUROLOGY

## 2024-07-22 PROCEDURE — 3044F HG A1C LEVEL LT 7.0%: CPT | Mod: CPTII,95,, | Performed by: PSYCHIATRY & NEUROLOGY

## 2024-07-22 PROCEDURE — 99213 OFFICE O/P EST LOW 20 MIN: CPT | Mod: 95,,, | Performed by: PSYCHIATRY & NEUROLOGY

## 2024-07-22 RX ORDER — ALPRAZOLAM 0.5 MG/1
0.5 TABLET ORAL NIGHTLY PRN
Qty: 30 TABLET | Refills: 5 | Status: SHIPPED | OUTPATIENT
Start: 2024-07-22

## 2024-07-22 NOTE — PROGRESS NOTES
"The patient location is: home  The chief complaint leading to consultation is: depression    Visit type: audiovisual    Face to Face time with patient: 17 min  25 minutes of total time spent on the encounter, which includes face to face time and non-face to face time preparing to see the patient (eg, review of tests), Obtaining and/or reviewing separately obtained history, Documenting clinical information in the electronic or other health record, Independently interpreting results (not separately reported) and communicating results to the patient/family/caregiver, or Care coordination (not separately reported).     Each patient to whom he or she provides medical services by telemedicine is:  (1) informed of the relationship between the physician and patient and the respective role of any other health care provider with respect to management of the patient; and (2) notified that he or she may decline to receive medical services by telemedicine and may withdraw from such care at any time.    Notes:     Outpatient Psychiatry Follow-Up Visit (MD/NP)    7/22/2024    Clinical Status of Patient:  Outpatient (Ambulatory)    Chief Complaint:  Fran Higgins is a 43 y.o. female who presents today for follow-up of depression.  Met with patient.      Interval History and Content of Current Session:  Interim Events/Subjective Report/Content of Current Session:  "Eh, I've been okayish"  Boyfriend passed 2 mo ago.  They were together a year, living together.  She found him dead in his car.  Feels she was handling it "not so well" but now doing better.   Has been taking xanax to help her sleep, getting 7 hrs uninterrupted more or less nightly.  Does not feel inappropriately sad.  Went on a previously planned vacation with her daughter.  Gets out with friends.  Work is a good distraction.  Never desired to die.  Did not eat well for about 2 weeks while she was out from work.  Appetite is returning after no appetite for a " month.  Focus and concentration are okay.          Prior trials:  Benadryl - inconsistent  prozac when younger worked  Trazodone - HA in the morning  Melatonin - nightmares      Psychotherapy:  Target symptoms: depression  Why chosen therapy is appropriate versus another modality: relevant to diagnosis  Outcome monitoring methods: self-report  Therapeutic intervention type: supportive psychotherapy  Topics discussed/themes: building skills sets for symptom management, symptom recognition  The patient's response to the intervention is accepting. The patient's progress toward treatment goals is excellent.   Duration of intervention:  minutes.    ROS    Past Medical, Family and Social History: The patient's past medical, family and social history have been reviewed and updated as appropriate within the electronic medical record - see encounter notes.    Compliance: yes    Side effects: None    Risk Parameters:  Patient reports no suicidal ideation  Patient reports no homicidal ideation  Patient reports no self-injurious behavior  Patient reports no violent behavior    Exam (detailed: at least 9 elements; comprehensive: all 15 elements)   Constitutional  Vitals:  Most recent vital signs, dated less than 90 days prior to this appointment, were reviewed.   There were no vitals filed for this visit.     General:  age appropriate, casually dressed, neatly groomed     Musculoskeletal  Muscle Strength/Tone:  no dyskinesia, no tremor   Gait & Station:  Not observed     Psychiatric  Speech:  spontaneous, not pressured, clearly audible   Mood:   Affect:  euthymic  congruent and appropriate   Thought Process:  goal-directed, logical   Associations:  intact   Thought Content:  normal, no suicidality, no homicidality, delusions, or paranoia   Insight:  has awareness of illness   Judgement: behavior is adequate to circumstances   Orientation:  grossly intact   Memory: intact for content of interview, not tested   Language: grossly  intact   Attention Span & Concentration:  able to focus   Fund of Knowledge:  intact and appropriate to age and level of education     Assessment and Diagnosis   Status/Progress: Based on the examination today, the patient's problem(s) is/are well controlled.  New problems have not been presented today.   Lack of compliance are not complicating management of the primary condition.  There are no active rule-out diagnoses for this patient at this time.     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  H/o recurrent MDD.          Diagnosis:  Recurrent MDD, in parital remission  Bereavement  Obesity  S/p bilat ooperectomy, HANNAH    Intervention/Counseling/Treatment Plan   Continue Effexor  mg daily  Continue xanax 0.5 mg as needed for now.  Discussed attempting d/c in the future  Continue seeing Dr. Mahan      Return to Clinic:  6 months  in person

## 2024-07-23 DIAGNOSIS — L73.2 HIDRADENITIS: ICD-10-CM

## 2024-07-24 RX ORDER — ADALIMUMAB 40MG/0.4ML
KIT SUBCUTANEOUS
Qty: 4 PEN | Refills: 2 | Status: ACTIVE | OUTPATIENT
Start: 2024-07-24

## 2024-08-06 DIAGNOSIS — K21.9 GASTROESOPHAGEAL REFLUX DISEASE, UNSPECIFIED WHETHER ESOPHAGITIS PRESENT: ICD-10-CM

## 2024-08-06 DIAGNOSIS — L73.2 HIDRADENITIS SUPPURATIVA: ICD-10-CM

## 2024-08-06 RX ORDER — SPIRONOLACTONE 50 MG/1
TABLET, FILM COATED ORAL
Qty: 60 TABLET | Refills: 3 | Status: SHIPPED | OUTPATIENT
Start: 2024-08-06

## 2024-08-06 RX ORDER — OMEPRAZOLE 40 MG/1
40 CAPSULE, DELAYED RELEASE ORAL EVERY MORNING
Qty: 30 CAPSULE | Refills: 1 | Status: SHIPPED | OUTPATIENT
Start: 2024-08-06

## 2024-08-12 ENCOUNTER — PATIENT MESSAGE (OUTPATIENT)
Dept: BARIATRICS | Facility: CLINIC | Age: 44
End: 2024-08-12
Payer: COMMERCIAL

## 2024-08-22 ENCOUNTER — PATIENT MESSAGE (OUTPATIENT)
Dept: ADMINISTRATIVE | Facility: OTHER | Age: 44
End: 2024-08-22
Payer: COMMERCIAL

## 2024-08-23 ENCOUNTER — TELEPHONE (OUTPATIENT)
Dept: PAIN MEDICINE | Facility: CLINIC | Age: 44
End: 2024-08-23
Payer: COMMERCIAL

## 2024-08-31 ENCOUNTER — PATIENT MESSAGE (OUTPATIENT)
Dept: NEUROLOGY | Facility: CLINIC | Age: 44
End: 2024-08-31
Payer: COMMERCIAL

## 2024-09-03 ENCOUNTER — PATIENT MESSAGE (OUTPATIENT)
Dept: NEUROLOGY | Facility: CLINIC | Age: 44
End: 2024-09-03
Payer: COMMERCIAL

## 2024-09-03 ENCOUNTER — PATIENT MESSAGE (OUTPATIENT)
Dept: BARIATRICS | Facility: CLINIC | Age: 44
End: 2024-09-03
Payer: COMMERCIAL

## 2024-09-06 ENCOUNTER — PATIENT MESSAGE (OUTPATIENT)
Dept: SPINE | Facility: CLINIC | Age: 44
End: 2024-09-06
Payer: COMMERCIAL

## 2024-09-06 ENCOUNTER — TELEPHONE (OUTPATIENT)
Dept: SPINE | Facility: CLINIC | Age: 44
End: 2024-09-06
Payer: COMMERCIAL

## 2024-09-10 ENCOUNTER — PATIENT MESSAGE (OUTPATIENT)
Dept: BARIATRICS | Facility: CLINIC | Age: 44
End: 2024-09-10
Payer: COMMERCIAL

## 2024-09-16 ENCOUNTER — LAB VISIT (OUTPATIENT)
Dept: LAB | Facility: HOSPITAL | Age: 44
End: 2024-09-16
Attending: OBSTETRICS & GYNECOLOGY
Payer: COMMERCIAL

## 2024-09-16 DIAGNOSIS — D39.10 BORDERLINE EPITHELIAL NEOPLASM OF OVARY: ICD-10-CM

## 2024-09-16 LAB — CEA SERPL-MCNC: 2.1 NG/ML (ref 0–5)

## 2024-09-16 PROCEDURE — 82378 CARCINOEMBRYONIC ANTIGEN: CPT | Performed by: OBSTETRICS & GYNECOLOGY

## 2024-09-16 PROCEDURE — 36415 COLL VENOUS BLD VENIPUNCTURE: CPT | Performed by: OBSTETRICS & GYNECOLOGY

## 2024-09-19 ENCOUNTER — OFFICE VISIT (OUTPATIENT)
Dept: DERMATOLOGY | Facility: CLINIC | Age: 44
End: 2024-09-19
Payer: COMMERCIAL

## 2024-09-19 DIAGNOSIS — L73.2 HIDRADENITIS SUPPURATIVA: Primary | ICD-10-CM

## 2024-09-19 DIAGNOSIS — L73.2 HIDRADENITIS: ICD-10-CM

## 2024-09-19 PROBLEM — B37.31 VAGINAL YEAST INFECTION: Status: RESOLVED | Noted: 2019-09-26 | Resolved: 2024-09-19

## 2024-09-19 PROBLEM — R73.03 PREDIABETES: Status: RESOLVED | Noted: 2020-12-22 | Resolved: 2024-09-19

## 2024-09-19 PROBLEM — B37.31 YEAST VAGINITIS: Status: RESOLVED | Noted: 2020-03-18 | Resolved: 2024-09-19

## 2024-09-19 PROCEDURE — 99999 PR PBB SHADOW E&M-EST. PATIENT-LVL II: CPT | Mod: PBBFAC,,, | Performed by: DERMATOLOGY

## 2024-09-19 PROCEDURE — 1160F RVW MEDS BY RX/DR IN RCRD: CPT | Mod: CPTII,S$GLB,, | Performed by: DERMATOLOGY

## 2024-09-19 PROCEDURE — 3044F HG A1C LEVEL LT 7.0%: CPT | Mod: CPTII,S$GLB,, | Performed by: DERMATOLOGY

## 2024-09-19 PROCEDURE — G2211 COMPLEX E/M VISIT ADD ON: HCPCS | Mod: S$GLB,,, | Performed by: DERMATOLOGY

## 2024-09-19 PROCEDURE — 99214 OFFICE O/P EST MOD 30 MIN: CPT | Mod: S$GLB,,, | Performed by: DERMATOLOGY

## 2024-09-19 PROCEDURE — 1159F MED LIST DOCD IN RCRD: CPT | Mod: CPTII,S$GLB,, | Performed by: DERMATOLOGY

## 2024-09-19 NOTE — PROGRESS NOTES
"  Subjective:      Patient ID:  Fran Higgins is a 44 y.o. female who presents for   Chief Complaint   Patient presents with    Hidradenitis Suppurativa     F/u     Patient with Hidradenitis suppurativa. Last seen on 3/25/2024 for 1st HS visit.    Condition overall -Same    C/o new lesion(s) Bilateral axilla and under stomach - location.Comes and goes.    Current treatment regimen:  Humira 40 mg SQ q week   Bpo wash alternating with hibiclens qday  Clindamycin soln qday  Spironolactone 100 mg qday  Metformin 500mg qday      Has not yet started:  Turmeric  Zinc/copper     Lifestyle modifications - diet, smoking, shaving etc  Not smoking.  Still shaving.  Not avoiding sugars, processed foods or "white" foods.         Review of Systems   Constitutional:  Negative for weight loss (had gastric sleeve 2021) and weight gain.   Gastrointestinal:  Negative for diarrhea.   Genitourinary:  Negative for irregular periods (hysterectomy 2019/2020).   Musculoskeletal:  Positive for arthralgias (has "bad spine" 2/2 scoliosis - shoulders and hips).   Skin:  Positive for abscesses.   Psychiatric/Behavioral:  Positive for depressed mood (on effexor and occ xanax - controlled) and anxiety (controlled on meds).    Hematologic/Lymphatic: Does not bruise/bleed easily.       Objective:   Physical Exam   Constitutional: She appears well-developed and well-nourished. She is obese.  No distress.   Neurological: She is alert and oriented to person, place, and time. She is not disoriented.   Psychiatric: She has a normal mood and affect.   Skin:   Areas Examined (abnormalities noted in diagram):   Chest / Axilla Inspection Performed  Abdomen Inspection Performed  Genitals / Buttocks / Groin Inspection Performed  RLE Inspected  LLE Inspection Performed           Diagram Legend     Erythematous scaling macule/papule c/w actinic keratosis       Vascular papule c/w angioma      Pigmented verrucoid papule/plaque c/w seborrheic keratosis     " " Yellow umbilicated papule c/w sebaceous hyperplasia      Irregularly shaped tan macule c/w lentigo     1-2 mm smooth white papules consistent with Milia      Movable subcutaneous cyst with punctum c/w epidermal inclusion cyst      Subcutaneous movable cyst c/w pilar cyst      Firm pink to brown papule c/w dermatofibroma      Pedunculated fleshy papule(s) c/w skin tag(s)      Evenly pigmented macule c/w junctional nevus     Mildly variegated pigmented, slightly irregular-bordered macule c/w mildly atypical nevus      Flesh colored to evenly pigmented papule c/w intradermal nevus       Pink pearly papule/plaque c/w basal cell carcinoma      Erythematous hyperkeratotic cursted plaque c/w SCC      Surgical scar with no sign of skin cancer recurrence      Open and closed comedones      Inflammatory papules and pustules      Verrucoid papule consistent consistent with wart     Erythematous eczematous patches and plaques     Dystrophic onycholytic nail with subungual debris c/w onychomycosis     Umbilicated papule    Erythematous-base heme-crusted tan verrucoid plaque consistent with inflamed seborrheic keratosis     Erythematous Silvery Scaling Plaque c/w Psoriasis     See annotation  Lab Results   Component Value Date    WBC 4.76 05/24/2024    HGB 12.7 05/24/2024    HCT 38.8 05/24/2024    MCV 87 05/24/2024     05/24/2024       Lab Results   Component Value Date    TBGOLDPLUS Negative 09/27/2023     Lab Results   Component Value Date    HGBA1C 5.3 05/24/2024         Assessment / Plan:        Hidradenitis suppurativa  -     Quantiferon Gold TB; Future; Expected date: 09/19/2024    Hidradenitis      Hidradenitis suppurativa  Today's Plan:      Today's Plan:    LIFESTYLE:     Recommend weight loss and dietary modifications   Avoid sugars and processed foods  Limit "white" foods ie. Bread, rice, pasta, potatoes  - recommend limiting to 1/2 cup per serving  Limit dairy      Keep skin cool as overheating can flare HS   " "  Avoid shaving affected areas and wearing tight fitting clothing as friction exacerbates disease;      TREATMENT:     Continue:  Medicated wash:  Wash affected areas with Antimicrobial Washes - daily to affected areas; lather into affected areas and let sit for 5 minutes prior to rinsing:  Benzoyl peroxide 5 - 10% - rinse thoroughly as can bleach towels/clothing  Hibiclens (Chlorhexidine)  Zinc pyrithione (Zinc bar available on Amazon or Head and Shoulders shampoo)  CLn wash (dilute bleach) - available on Amazon  Clindamycin solution 1x/day to "quiet affected areas" and 2x/day to flared affected areas  Spironolactone (aldactone) at 100mg every day  Humira 40mg SQ every week  - needs quant gold (OSP)  Metformin ER at 500mg every night with dinner      Start:  Turmeric (with black pepper) supplement at 500mg to 1 gram per day (available over the counter and on Amazon)        Can cause upset stomach  OR  Take Zinc/Copper supplement daily - can order combo supplement from Amazon           For flare:  Dilute bleach to affected areas: compresses daily to affected/flared area(s) - can use CLn wash on warm wet rag as compress  If have groin involvement, may want to take dilute bleach baths (daily when flared; 2x/week for maintenance).         RESOURCE:  -foundation.org        FOR FLARES (new painful bump):  Message office through [facility] for injection which will often hasten resolution of tender, red bump     Can apply warm/hot compresses on a painful, deep lump     If notice a foul-smelling drainage, can use Hydrogen Peroxide to cleanse area followed by application of Medi-honey to open area (apply Medi-honey 1x/day)                              Follow up in about 6 months (around 3/19/2025).    "

## 2024-09-19 NOTE — PATIENT INSTRUCTIONS
"Today's Plan:    LIFESTYLE:     Recommend weight loss and dietary modifications   Avoid sugars and processed foods  Limit "white" foods ie. Bread, rice, pasta, potatoes  - recommend limiting to 1/2 cup per serving  Limit dairy      Keep skin cool as overheating can flare HS     Avoid shaving affected areas and wearing tight fitting clothing as friction exacerbates disease;      TREATMENT:     Continue:  Medicated wash:  Wash affected areas with Antimicrobial Washes - daily to affected areas; lather into affected areas and let sit for 5 minutes prior to rinsing:  Benzoyl peroxide 5 - 10% - rinse thoroughly as can bleach towels/clothing  Hibiclens (Chlorhexidine)  Zinc pyrithione (Zinc bar available on Amazon or Head and Shoulders shampoo)  CLn wash (dilute bleach) - available on Amazon  Clindamycin solution 1x/day to "quiet affected areas" and 2x/day to flared affected areas  Spironolactone (aldactone) at 100mg every day  Humira 40mg SQ every week  - needs quant gold (OSP)  Metformin ER at 500mg every night with dinner      Start:  Turmeric (with black pepper) supplement at 500mg to 1 gram per day (available over the counter and on Amazon)        Can cause upset stomach  OR  Take Zinc/Copper supplement daily - can order combo supplement from Amazon           For flare:  Dilute bleach to affected areas: compresses daily to affected/flared area(s) - can use CLn wash on warm wet rag as compress  If have groin involvement, may want to take dilute bleach baths (daily when flared; 2x/week for maintenance).         RESOURCE:  -foundation.org        FOR FLARES (new painful bump):  [facility] injection which will often hasten resolution of tender, red bump     Can apply warm/hot compresses on a painful, deep lump     If notice a foul-smelling drainage, can use Hydrogen Peroxide to cleanse area followed by application of Medi-honey to open area (apply Medi-honey 1x/day)                          "

## 2024-09-19 NOTE — ASSESSMENT & PLAN NOTE
"Today's Plan:      Today's Plan:    LIFESTYLE:     Recommend weight loss and dietary modifications   Avoid sugars and processed foods  Limit "white" foods ie. Bread, rice, pasta, potatoes  - recommend limiting to 1/2 cup per serving  Limit dairy      Keep skin cool as overheating can flare HS     Avoid shaving affected areas and wearing tight fitting clothing as friction exacerbates disease;      TREATMENT:     Continue:  Medicated wash:  Wash affected areas with Antimicrobial Washes - daily to affected areas; lather into affected areas and let sit for 5 minutes prior to rinsing:  Benzoyl peroxide 5 - 10% - rinse thoroughly as can bleach towels/clothing  Hibiclens (Chlorhexidine)  Zinc pyrithione (Zinc bar available on Amazon or Head and Shoulders shampoo)  CLn wash (dilute bleach) - available on Amazon  Clindamycin solution 1x/day to "quiet affected areas" and 2x/day to flared affected areas  Spironolactone (aldactone) at 100mg every day  Humira 40mg SQ every week  - needs quant gold (OSP)  Metformin ER at 500mg every night with dinner      Start:  Turmeric (with black pepper) supplement at 500mg to 1 gram per day (available over the counter and on Amazon)        Can cause upset stomach  OR  Take Zinc/Copper supplement daily - can order combo supplement from Amazon           For flare:  Dilute bleach to affected areas: compresses daily to affected/flared area(s) - can use CLn wash on warm wet rag as compress  If have groin involvement, may want to take dilute bleach baths (daily when flared; 2x/week for maintenance).         RESOURCE:  -foundation.org        FOR FLARES (new painful bump):  [facility] which will often hasten resolution of tender, red bump     Can apply warm/hot compresses on a painful, deep lump     If notice a foul-smelling drainage, can use Hydrogen Peroxide to cleanse area followed by application of Medi-honey to open area (apply Medi-honey 1x/day)            "

## 2024-09-25 ENCOUNTER — OFFICE VISIT (OUTPATIENT)
Dept: PAIN MEDICINE | Facility: CLINIC | Age: 44
End: 2024-09-25
Payer: COMMERCIAL

## 2024-09-25 VITALS
SYSTOLIC BLOOD PRESSURE: 127 MMHG | HEIGHT: 65 IN | DIASTOLIC BLOOD PRESSURE: 85 MMHG | BODY MASS INDEX: 48.82 KG/M2 | WEIGHT: 293 LBS | HEART RATE: 86 BPM

## 2024-09-25 DIAGNOSIS — M51.37 DDD (DEGENERATIVE DISC DISEASE), LUMBOSACRAL: ICD-10-CM

## 2024-09-25 DIAGNOSIS — M54.16 LUMBAR RADICULOPATHY: ICD-10-CM

## 2024-09-25 DIAGNOSIS — Z12.31 OTHER SCREENING MAMMOGRAM: ICD-10-CM

## 2024-09-25 DIAGNOSIS — M54.9 DORSALGIA, UNSPECIFIED: Primary | ICD-10-CM

## 2024-09-25 PROCEDURE — 99999 PR PBB SHADOW E&M-EST. PATIENT-LVL V: CPT | Mod: PBBFAC,,, | Performed by: EMERGENCY MEDICINE

## 2024-09-25 PROCEDURE — 1159F MED LIST DOCD IN RCRD: CPT | Mod: CPTII,S$GLB,, | Performed by: EMERGENCY MEDICINE

## 2024-09-25 PROCEDURE — 3079F DIAST BP 80-89 MM HG: CPT | Mod: CPTII,S$GLB,, | Performed by: EMERGENCY MEDICINE

## 2024-09-25 PROCEDURE — 99204 OFFICE O/P NEW MOD 45 MIN: CPT | Mod: S$GLB,,, | Performed by: EMERGENCY MEDICINE

## 2024-09-25 PROCEDURE — 3074F SYST BP LT 130 MM HG: CPT | Mod: CPTII,S$GLB,, | Performed by: EMERGENCY MEDICINE

## 2024-09-25 PROCEDURE — 3008F BODY MASS INDEX DOCD: CPT | Mod: CPTII,S$GLB,, | Performed by: EMERGENCY MEDICINE

## 2024-09-25 PROCEDURE — 3044F HG A1C LEVEL LT 7.0%: CPT | Mod: CPTII,S$GLB,, | Performed by: EMERGENCY MEDICINE

## 2024-09-25 RX ORDER — METHOCARBAMOL 500 MG/1
500 TABLET, FILM COATED ORAL 3 TIMES DAILY PRN
Qty: 90 TABLET | Refills: 0 | Status: SHIPPED | OUTPATIENT
Start: 2024-09-25 | End: 2024-10-25

## 2024-09-25 NOTE — PROGRESS NOTES
Chronic Pain - New Patient Visit  Chief Complaint   Patient presents with    Back Pain    Foot Pain    Leg Pain        Original HPI 09/25/2024: Fran Higgins is a 44 y.o. year old female patient who has a past medical history of Abnormal Pap smear of cervix, Abnormal uterine bleeding (AUB), Amblyopia, Anxiety, Cervical scoliosis, Depression, DUB (dysfunctional uterine bleeding), Fatigue, Galactorrhea in female- bilateral breast, Galactorrhea of both breasts, psychiatric care, Migraine with aura, not intractable, Mucinous tumor, of low malignant potential, Ovarian cyst, Pericardial cyst, Psychiatric problem, Sleep difficulties, Surgical menopause, Therapy, and Vaginal yeast infection. She presents in referral from No ref. provider found for lower back pain for over 20 years.     Original Pain Description:  The pain is located in the back and is radiating to the right hip, leg and the heel posteriorly . The pain is described as numbing and tingling. Exacerbating factors: daily activities. Mitigating factors medications. Symptoms interfere with daily activity. The patient feels like symptoms have been unchanged. Patient denies significant motor weakness. No red flags.     PAIN SCORES:  Best: Pain is 3  Current: Pain is 3  Worst: Pain is 10    6 weeks of Conservative therapy:  PT: Completed for neck  Chiro:  HEP: Participating    Treatments / Medications:   Neurontin 100 mg PRN qHS - makes her feel like zombie  Xanax 0.5 mg  for insomnia and anxiety  Flexeril 5 mg PRN    Antiplatelets/Anticoagulants/Others:  Humira    Interventional Pain Procedures:   NA    IMAGING:    XR LUMBAR SPINE AP AND LAT WITH FLEX/EXT   03/07/2024  On neutral lateral view, there is grade 1 AL L5 on S1. Disc space height loss primarily involving L4-L5 and L5-S1 noting endplate degenerative changes primarily at L5-S1.  There is lower lumbar facet arthropathy.  There are pars defects at L5.  On flex/ex no new or worsening listhesis or  facet malalignment.  AP spinal alignment is remarkable for mild dextroscoliotic curvature.  The bilateral sacroiliac joints are intact.     Past Surgical History:   Procedure Laterality Date    HYSTERECTOMY  03/2020    LAPAROSCOPIC SLEEVE GASTRECTOMY N/A 6/22/2021    Procedure: GASTRECTOMY, SLEEVE, LAPAROSCOPIC; with intraoperative egd;  Surgeon: Jaziel Whitman Jr., MD;  Location: [address];  Service: General;  Laterality: N/A;    ROBOT-ASSISTED LAPAROSCOPIC ABDOMINAL HYSTERECTOMY USING DA ROMAIN XI N/A 3/9/2020    Procedure: XI ROBOTIC HYSTERECTOMY;  Surgeon: Jeff Kaufman MD;  Location: [address];  Service: Oncology;  Laterality: N/A;    ROBOT-ASSISTED LAPAROSCOPIC SALPINGO-OOPHORECTOMY Left 6/25/2019    Procedure: ROBOTIC SALPINGO-OOPHORECTOMY;  Surgeon: Marky Fox Jr., MD;  Location: [facility];  Service: OB/GYN;  Laterality: Left;    SALPINGOOPHORECTOMY Right 3/9/2020    Procedure: SALPINGO-OOPHORECTOMY  USO;  Surgeon: Jeff Kaufman MD;  Location: [address];  Service: Oncology;  Laterality: Right;    TONSILLECTOMY         Social History     Socioeconomic History    Marital status: Single    Number of children: 1   Occupational History    Occupation: Nurse circulator   Tobacco Use    Smoking status: Never    Smokeless tobacco: Never   Substance and Sexual Activity    Alcohol use: Yes     Comment: 2-3 drinks twice a year    Drug use: No    Sexual activity: Not Currently     Partners: Male     Birth control/protection: None, Condom   Other Topics Concern    Are you pregnant or think you may be? No    Breast-feeding No     Social Determinants of Health     Financial Resource Strain: Patient Declined (11/17/2023)    Overall Financial Resource Strain (CARDIA)     Difficulty of Paying Living Expenses: Patient declined   Food Insecurity: Patient Declined (11/17/2023)    Hunger Vital Sign     Worried About Running Out of Food in the Last Year: Patient declined     Ran Out of Food in the  "Last Year: Patient declined   Transportation Needs: Patient Declined (11/17/2023)    PRAPARE - Transportation     Lack of Transportation (Medical): Patient declined     Lack of Transportation (Non-Medical): Patient declined   Physical Activity: Insufficiently Active (11/17/2023)    Exercise Vital Sign     Days of Exercise per Week: 3 days     Minutes of Exercise per Session: 30 min   Stress: Stress Concern Present (11/17/2023)    [employer] [employer] - Occupational Stress Questionnaire     Feeling of Stress : To some extent   Housing Stability: Low Risk  (11/17/2023)    Housing Stability Vital Sign     Unable to Pay for Housing in the Last Year: No     Number of Places Lived in the Last Year: 1     Unstable Housing in the Last Year: No       Medications/Allergies: See med card    ROS:  GENERAL: No fever. No chills. No fatigue. Denies weight loss. Denies weight gain.  Back / musculoskeletal / neuro : See HPI    VITALS:   Vitals:    09/25/24 1303   BP: 127/85   Pulse: 86   Weight: (!) 142.7 kg (314 lb 9.5 oz)   Height: 5' 5" (1.651 m)   PainSc:   3   PainLoc: Back     Body mass index is 52.35 kg/m².      9/25/2024     1:09 PM   Last 3 PDI Scores   Pain Disability Index (PDI) 35       PHYSICAL EXAM:   GENERAL: Well appearing, in no acute distress, alert and oriented x3.  PSYCH:  Mood and affect appropriate.  SKIN: Skin color, texture, turgor normal, no rashes or lesions.  HEENT:  Normocephalic, atraumatic. Cranial nerves grossly intact.  NECK: No pain to palpation over the cervical paraspinous muscles. No pain to palpation over facets. No pain with neck flexion, extension, or lateral flexion.   PULM: No evidence of respiratory difficulty, symmetric chest rise.  GI:  Non-distended  BACK: Normal range of motion. No pain to palpation over the spinous processes. No pain to palpation over facet joints. No pain with axial loading. There is no pain with palpation over the sacroiliac joints bilaterally. "   EXTREMITIES: No deformities, edema, or skin discoloration.   MUSCULOSKELETAL: Shoulder, hip, and knee provocative maneuvers are negative. No atrophy is noted.  NEURO: Sensation is equal and appropriate bilaterally. Bilateral upper and lower extremity strength is normal and symmetric. Bilateral upper and lower extremity coordination and muscle stretch reflexes are physiologic and symmetric. Plantar response are downgoing. Straight leg raising in the supine position is negative to radicular pain.   GAIT: normal.      LABS:    Lab Results   Component Value Date    HGBA1C 5.3 05/24/2024       Lab Results   Component Value Date    CREATININE 0.9 05/24/2024         ASSESSMENT: 44 y.o. year old female with pain, consistent with:    Encounter Diagnoses   Name Primary?    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  - Patient can continue with medications for now since they are providing benefits, using them appropriately, and without side effects.  - Counseled patient regarding the importance of activity modification and physical therapy.  - Will consider right L4/5 and L5/S1 TFESI  - Medications:  No NSAIDs due to gastric bypass  - Therapy: Referral to Physical therapy for Lumbar stabilization, core strengthening, and a home exercise program.  We have also provided the [facility] spine conditioning program PDF to the patient for HEP  - Imaging: Reviewed available imaging with patient and answered any questions they had regarding study.Order MRI of the Lumbar Spine to help evaluate possible source of pain.  - The patient's pathophysiology was explained in detail with reference to x-rays, models,  other visual aids as appropriate.   - Follow up visit: return to clinic in 1 week after imaging    Arun Garcia MD  09/25/2024

## 2024-09-27 ENCOUNTER — HOSPITAL ENCOUNTER (OUTPATIENT)
Dept: RADIOLOGY | Facility: HOSPITAL | Age: 44
Discharge: HOME | End: 2024-09-27
Attending: EMERGENCY MEDICINE
Payer: COMMERCIAL

## 2024-09-27 DIAGNOSIS — M54.9 DORSALGIA, UNSPECIFIED: ICD-10-CM

## 2024-09-27 PROCEDURE — 72148 MRI LUMBAR SPINE W/O DYE: CPT | Mod: TC

## 2024-09-27 PROCEDURE — 72148 MRI LUMBAR SPINE W/O DYE: CPT | Mod: 26,,, | Performed by: RADIOLOGY

## 2024-09-30 ENCOUNTER — LAB VISIT (OUTPATIENT)
Dept: LAB | Facility: HOSPITAL | Age: 44
End: 2024-09-30
Attending: DERMATOLOGY
Payer: COMMERCIAL

## 2024-09-30 DIAGNOSIS — L73.2 HIDRADENITIS SUPPURATIVA: ICD-10-CM

## 2024-09-30 PROCEDURE — 86480 TB TEST CELL IMMUN MEASURE: CPT | Performed by: DERMATOLOGY

## 2024-10-01 ENCOUNTER — PATIENT MESSAGE (OUTPATIENT)
Dept: BARIATRICS | Facility: CLINIC | Age: 44
End: 2024-10-01
Payer: COMMERCIAL

## 2024-10-01 ENCOUNTER — PATIENT MESSAGE (OUTPATIENT)
Dept: INTERNAL MEDICINE | Facility: CLINIC | Age: 44
End: 2024-10-01
Payer: COMMERCIAL

## 2024-10-01 LAB
GAMMA INTERFERON BACKGROUND BLD IA-ACNC: 0.04 IU/ML
M TB IFN-G CD4+ BCKGRND COR BLD-ACNC: -0 IU/ML
M TB IFN-G CD4+ BCKGRND COR BLD-ACNC: 0.01 IU/ML
MITOGEN IGNF BCKGRD COR BLD-ACNC: 9.96 IU/ML
TB GOLD PLUS: NEGATIVE

## 2024-10-02 ENCOUNTER — HOSPITAL ENCOUNTER (OUTPATIENT)
Dept: RADIOLOGY | Facility: HOSPITAL | Age: 44
Discharge: HOME | End: 2024-10-02
Attending: HOSPITALIST
Payer: COMMERCIAL

## 2024-10-02 VITALS — HEIGHT: 65 IN | WEIGHT: 293 LBS | BODY MASS INDEX: 48.82 KG/M2

## 2024-10-02 DIAGNOSIS — Z12.31 OTHER SCREENING MAMMOGRAM: ICD-10-CM

## 2024-10-02 PROCEDURE — 77063 BREAST TOMOSYNTHESIS BI: CPT | Mod: TC

## 2024-10-02 PROCEDURE — 77067 SCR MAMMO BI INCL CAD: CPT | Mod: TC

## 2024-10-04 ENCOUNTER — OFFICE VISIT (OUTPATIENT)
Dept: PAIN MEDICINE | Facility: CLINIC | Age: 44
End: 2024-10-04
Payer: COMMERCIAL

## 2024-10-04 DIAGNOSIS — M51.379 DEGENERATION OF INTERVERTEBRAL DISC OF LUMBOSACRAL REGION, UNSPECIFIED WHETHER PAIN PRESENT: ICD-10-CM

## 2024-10-04 DIAGNOSIS — M54.16 LUMBAR RADICULOPATHY: Primary | ICD-10-CM

## 2024-10-04 PROCEDURE — 99214 OFFICE O/P EST MOD 30 MIN: CPT | Mod: 95,,, | Performed by: EMERGENCY MEDICINE

## 2024-10-04 PROCEDURE — 3044F HG A1C LEVEL LT 7.0%: CPT | Mod: CPTII,95,, | Performed by: EMERGENCY MEDICINE

## 2024-10-04 NOTE — PROGRESS NOTES
Chronic Pain-Tele-Medicine-Established Note (Follow up visit)      The patient location is:  Home  The chief complaint leading to consultation is:  Follow-up  Visit type: Virtual visit with synchronous audio and video  Total time spent with patient:  10 minutes  Each patient to whom he or she provides medical services by telemedicine is:  (1) informed of the relationship between the physician and patient and the respective role of any other health care provider with respect to management of the patient; and (2) notified that he or she may decline to receive medical services by telemedicine and may withdraw from such care at any time.    Interval History 10/04/2024:  Since their last visit the pain has been unchanged. They are participating in physical therapy and are participating in home exercise plan.    Original HPI 09/25/2024: Fran Higgins is a 44 y.o. year old female patient who has a past medical history of Abnormal Pap smear of cervix, Abnormal uterine bleeding (AUB), Amblyopia, Anxiety, Cervical scoliosis, Depression, DUB (dysfunctional uterine bleeding), Fatigue, Galactorrhea in female- bilateral breast, Galactorrhea of both breasts, psychiatric care, Migraine with aura, not intractable, Mucinous tumor, of low malignant potential, Ovarian cyst, Pericardial cyst, Psychiatric problem, Sleep difficulties, Surgical menopause, Therapy, and Vaginal yeast infection. She presents in referral from No ref. provider found for lower back pain for over 20 years.     Original Pain Description:  The pain is located in the back and is radiating to the right hip, leg and the heel posteriorly . The pain is described as numbing and tingling. Exacerbating factors: daily activities. Mitigating factors medications. Symptoms interfere with daily activity. The patient feels like symptoms have been unchanged. Patient denies significant motor weakness. No red flags.     PAIN SCORES:  Best: Pain is 3  Current: Pain is  3  Worst: Pain is 10    6 weeks of Conservative therapy:  PT: Completed for neck  Chiro:  HEP: Participating    Treatments / Medications:   Neurontin 100 mg PRN qHS - makes her feel like zombie  Xanax 0.5 mg  for insomnia and anxiety  Flexeril 5 mg PRN    Antiplatelets/Anticoagulants/Others:  Humira    Interventional Pain Procedures:   NA    IMAGING:    MRI LUMBAR SPINE WITHOUT CONTRAST   09/28/2024  6 Lumbar vetrebra, lowest level considered L5/S1   L2/3: BBPDB->mild BL neural foraminal narrowing and mild spinal canal stenosis  L3/4: BBPDB & facet arthropathy->mild BL neural foraminal narrowing and mild spinal canal stenosis  L4/5: BBPDB & facet arthropathy->mild BL neural foraminal narrowing and mild spinal canal stenosis.  L5/1: BBPDB & facet arthropathy->mod right / severe left neural foraminal narrowing and mild spinal canal stenosis. Grade 1 AL. BL L5 pars defects     XR LUMBAR SPINE AP AND LAT WITH FLEX/EXT   03/07/2024  On neutral lateral view, there is grade 1 AL L5 on S1. Disc space height loss primarily involving L4-L5 and L5-S1 noting endplate degenerative changes primarily at L5-S1.  There is lower lumbar facet arthropathy.  There are pars defects at L5.  On flex/ex no new or worsening listhesis or facet malalignment.  AP spinal alignment is remarkable for mild dextroscoliotic curvature.  The bilateral sacroiliac joints are intact.     Past Surgical History:   Procedure Laterality Date    HYSTERECTOMY  03/2020    LAPAROSCOPIC SLEEVE GASTRECTOMY N/A 6/22/2021    Procedure: GASTRECTOMY, SLEEVE, LAPAROSCOPIC; with intraoperative egd;  Surgeon: Jaziel Whitman Jr., MD;  Location: [facility] OR [address] [place] [address];  Service: General;  Laterality: N/A;    ROBOT-ASSISTED LAPAROSCOPIC ABDOMINAL HYSTERECTOMY USING DA ROMAIN XI N/A 3/9/2020    Procedure: XI ROBOTIC HYSTERECTOMY;  Surgeon: Jeff Kaufman MD;  Location: [facility] OR [address] [place] [address];  Service: Oncology;  Laterality: N/A;    ROBOT-ASSISTED LAPAROSCOPIC SALPINGO-OOPHORECTOMY  Left 6/25/2019    Procedure: ROBOTIC SALPINGO-OOPHORECTOMY;  Surgeon: Marky Fox Jr., MD;  Location: [facility];  Service: OB/GYN;  Laterality: Left;    SALPINGOOPHORECTOMY Right 3/9/2020    Procedure: SALPINGO-OOPHORECTOMY  USO;  Surgeon: Jeff Kaufman MD;  Location: [address];  Service: Oncology;  Laterality: Right;    TONSILLECTOMY         Social History     Socioeconomic History    Marital status: Single    Number of children: 1   Occupational History    Occupation: Nurse circulator   Tobacco Use    Smoking status: Never    Smokeless tobacco: Never   Substance and Sexual Activity    Alcohol use: Yes     Comment: 2-3 drinks twice a year    Drug use: No    Sexual activity: Not Currently     Partners: Male     Birth control/protection: None, Condom   Other Topics Concern    Are you pregnant or think you may be? No    Breast-feeding No     Social Drivers of Health     Financial Resource Strain: Patient Declined (11/17/2023)    Overall Financial Resource Strain (CARDIA)     Difficulty of Paying Living Expenses: Patient declined   Food Insecurity: Patient Declined (11/17/2023)    Hunger Vital Sign     Worried About Running Out of Food in the Last Year: Patient declined     Ran Out of Food in the Last Year: Patient declined   Transportation Needs: Patient Declined (11/17/2023)    PRAPARE - Transportation     Lack of Transportation (Medical): Patient declined     Lack of Transportation (Non-Medical): Patient declined   Physical Activity: Insufficiently Active (11/17/2023)    Exercise Vital Sign     Days of Exercise per Week: 3 days     Minutes of Exercise per Session: 30 min   Stress: Stress Concern Present (11/17/2023)    Citizen of [place] of Occupational [employer] - Occupational Stress Questionnaire     Feeling of Stress : To some extent   Housing Stability: Low Risk  (11/17/2023)    Housing Stability Vital Sign     Unable to Pay for Housing in the Last Year: No     Number of Places Lived in the Last Year: 1      Unstable Housing in the Last Year: No       Medications/Allergies: See med card    ROS:  GENERAL: No fever. No chills. No fatigue. Denies weight loss. Denies weight gain.  Back / musculoskeletal / neuro : See HPI    VITALS:   There were no vitals filed for this visit.    There is no height or weight on file to calculate BMI.      9/25/2024     1:09 PM   Last 3 PDI Scores   Pain Disability Index (PDI) 35       Physical Exam:  General appearance: Well appearing, in no acute distress, alert and oriented x3.  Psych:  Mood and affect appropriate.    PHYSICAL EXAM (Previously):   GENERAL: Well appearing, in no acute distress, alert and oriented x3.  PSYCH:  Mood and affect appropriate.  SKIN: Skin color, texture, turgor normal, no rashes or lesions.  HEENT:  Normocephalic, atraumatic. Cranial nerves grossly intact.  NECK: No pain to palpation over the cervical paraspinous muscles. No pain to palpation over facets. No pain with neck flexion, extension, or lateral flexion.   PULM: No evidence of respiratory difficulty, symmetric chest rise.  GI:  Non-distended  BACK: Normal range of motion. No pain to palpation over the spinous processes. No pain to palpation over facet joints. No pain with axial loading. There is no pain with palpation over the sacroiliac joints bilaterally.   EXTREMITIES: No deformities, edema, or skin discoloration.   MUSCULOSKELETAL: Shoulder, hip, and knee provocative maneuvers are negative. No atrophy is noted.  NEURO: Sensation is equal and appropriate bilaterally. Bilateral upper and lower extremity strength is normal and symmetric. Bilateral upper and lower extremity coordination and muscle stretch reflexes are physiologic and symmetric. Plantar response are downgoing. Straight leg raising in the supine position is negative to radicular pain.   GAIT: normal.      LABS:    Lab Results   Component Value Date    HGBA1C 5.3 05/24/2024       Lab Results   Component Value Date    CREATININE 0.9  05/24/2024         ASSESSMENT: 44 y.o. year old female with pain, consistent with:    No diagnosis found.      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  - Patient can continue with medications for now since they are providing benefits, using them appropriately, and without side effects.  - Counseled patient regarding the importance of activity modification and physical therapy.  - Will consider right L4/5 and L5/S1 TFESI  - Medications:  No NSAIDs due to gastric bypass  - Imaging: Reviewed available imaging with patient and answered any questions they had regarding study.  - The patient's pathophysiology was explained in detail with reference to x-rays, models, other visual aids as appropriate.   - Follow up visit: return to clinic 3-4 weeks after procedure  Arun Garcia MD  10/04/2024

## 2024-10-07 ENCOUNTER — TELEPHONE (OUTPATIENT)
Dept: BARIATRICS | Facility: CLINIC | Age: 44
End: 2024-10-07
Payer: COMMERCIAL

## 2024-10-07 ENCOUNTER — TELEPHONE (OUTPATIENT)
Dept: PAIN MEDICINE | Facility: CLINIC | Age: 44
End: 2024-10-07
Payer: COMMERCIAL

## 2024-10-07 DIAGNOSIS — K21.9 GASTROESOPHAGEAL REFLUX DISEASE, UNSPECIFIED WHETHER ESOPHAGITIS PRESENT: ICD-10-CM

## 2024-10-07 DIAGNOSIS — M51.379 DEGENERATION OF INTERVERTEBRAL DISC OF LUMBOSACRAL REGION, UNSPECIFIED WHETHER PAIN PRESENT: ICD-10-CM

## 2024-10-07 DIAGNOSIS — M54.16 LUMBAR RADICULOPATHY: Primary | ICD-10-CM

## 2024-10-07 RX ORDER — OMEPRAZOLE 40 MG/1
40 CAPSULE, DELAYED RELEASE ORAL EVERY MORNING
Qty: 30 CAPSULE | Refills: 1 | Status: SHIPPED | OUTPATIENT
Start: 2024-10-07

## 2024-10-07 NOTE — TELEPHONE ENCOUNTER
Spoke with pt, confirmed BMI of  52, s/p Sleeve Dr. Whitman 6/22/2021, pt interested in revision for insufficient wt loss and with c/o chronic GERD. pt completed FS appointment 10/7/2024, medical criteria reviewed including 6 msd and requirement copied below., dashboard updated, discussed assigned nurse coordinator and program, scheduled Initial consult visits with RAH and Dietician. Explained I would be sending a copy of our Bariatric Patient Handbook, Surgery and Nutrition booklets for review, and the instructions to complete the online seminar before scheduled appointments. Pt verbalized understanding.    Copied from 10/7/24 media:  A revision or 2nd bariatric procedure is allowed upon medical review under the following circumstances:  - Medically necessary corrective procedure or revision needed due to medical complications from the initial procedure.  - Unsuccessful procedure due to insufficient weight loss; must be at least 2 years post initial procedure and show documentation of  inadequate weight loss and compliance with postprocedural instructions.

## 2024-10-07 NOTE — TELEPHONE ENCOUNTER
----- Message from Arun Bobby MD sent at 10/5/2024  7:17 AM CDT -----  Regarding: Order for CARLA MCLEOD    Patient Name: CARLA MCLEOD([id number])  Sex: Female  : 1980      PCP: RUBEN RIDLEY    Center: [facility]     Types of orders made on 10/05/2024: Procedure Request    Order Date:10/5/2024  Ordering User:ARUN BOBBY [[id number]]  Encounter Provider:Arun Bobby MD [[id number]]  Authorizing Provider: Arun Bobby MD [[id number]]  Department:[facility] PAIN MANAGEMENT[[id number]]    Common Order Information  Procedure -> Transforaminal Injection (Specify level and laterality) Cmt: right             L4/5 and L5/S1    Order Specific Information  Order: Procedure Order to Pain Management [Custom: [id number]]  Order #:          [id number]: 1 FUTURE    Priority: Routine  Class: Clinic Performed    Future Order Information      Expires on:10/05/2025            Expected by:10/05/2024                   Associated Diagnoses      M54.16 Lumbar radiculopathy      M51.379 Degeneration of intervertebral disc of lumbosacral region,       unspecified whether pain present      Physician -> Jose         Facility Name: -> [facility]           Priority: Routine  Class: Clinic Performed    Future Order Information      Expires on:10/05/2025            Expected by:10/05/2024                   Associated Diagnoses      M54.16 Lumbar radiculopathy      M51.379 Degeneration of intervertebral disc of lumbosacral region,       unspecified whether pain present      Procedure -> Transforaminal Injection (Specify level and laterality) Cmt:                 right L4/5 and L5/S1        Physician -> Jose         Facility Name: -> [facility]

## 2024-10-10 ENCOUNTER — CLINICAL SUPPORT (OUTPATIENT)
Dept: REHABILITATION | Facility: HOSPITAL | Age: 44
End: 2024-10-10
Attending: EMERGENCY MEDICINE
Payer: COMMERCIAL

## 2024-10-10 ENCOUNTER — PATIENT MESSAGE (OUTPATIENT)
Dept: ADMINISTRATIVE | Facility: OTHER | Age: 44
End: 2024-10-10
Payer: COMMERCIAL

## 2024-10-10 ENCOUNTER — OFFICE VISIT (OUTPATIENT)
Dept: BARIATRICS | Facility: CLINIC | Age: 44
End: 2024-10-10
Payer: COMMERCIAL

## 2024-10-10 ENCOUNTER — PATIENT MESSAGE (OUTPATIENT)
Dept: BARIATRICS | Facility: CLINIC | Age: 44
End: 2024-10-10

## 2024-10-10 ENCOUNTER — HOSPITAL ENCOUNTER (OUTPATIENT)
Dept: RADIOLOGY | Facility: HOSPITAL | Age: 44
Discharge: HOME | End: 2024-10-10
Attending: NURSE PRACTITIONER
Payer: COMMERCIAL

## 2024-10-10 VITALS
OXYGEN SATURATION: 98 % | BODY MASS INDEX: 50.02 KG/M2 | HEIGHT: 64 IN | SYSTOLIC BLOOD PRESSURE: 120 MMHG | WEIGHT: 293 LBS | HEART RATE: 92 BPM | DIASTOLIC BLOOD PRESSURE: 72 MMHG

## 2024-10-10 DIAGNOSIS — K21.9 GASTROESOPHAGEAL REFLUX DISEASE, UNSPECIFIED WHETHER ESOPHAGITIS PRESENT: ICD-10-CM

## 2024-10-10 DIAGNOSIS — E66.813 CLASS 3 SEVERE OBESITY WITH SERIOUS COMORBIDITY AND BODY MASS INDEX (BMI) OF 50.0 TO 59.9 IN ADULT, UNSPECIFIED OBESITY TYPE: ICD-10-CM

## 2024-10-10 DIAGNOSIS — Z98.84 S/P LAPAROSCOPIC SLEEVE GASTRECTOMY: ICD-10-CM

## 2024-10-10 DIAGNOSIS — E66.01 CLASS 3 SEVERE OBESITY WITH SERIOUS COMORBIDITY AND BODY MASS INDEX (BMI) OF 50.0 TO 59.9 IN ADULT, UNSPECIFIED OBESITY TYPE: ICD-10-CM

## 2024-10-10 DIAGNOSIS — M51.379 DDD (DEGENERATIVE DISC DISEASE), LUMBOSACRAL: ICD-10-CM

## 2024-10-10 DIAGNOSIS — Z98.84 S/P LAPAROSCOPIC SLEEVE GASTRECTOMY: Primary | ICD-10-CM

## 2024-10-10 DIAGNOSIS — M54.16 LUMBAR RADICULOPATHY: ICD-10-CM

## 2024-10-10 DIAGNOSIS — M54.50 LUMBAR PAIN: Primary | ICD-10-CM

## 2024-10-10 PROCEDURE — 1160F RVW MEDS BY RX/DR IN RCRD: CPT | Mod: CPTII,S$GLB,, | Performed by: NURSE PRACTITIONER

## 2024-10-10 PROCEDURE — 71046 X-RAY EXAM CHEST 2 VIEWS: CPT | Mod: 26,,, | Performed by: INTERNAL MEDICINE

## 2024-10-10 PROCEDURE — 97161 PT EVAL LOW COMPLEX 20 MIN: CPT

## 2024-10-10 PROCEDURE — 3074F SYST BP LT 130 MM HG: CPT | Mod: CPTII,S$GLB,, | Performed by: NURSE PRACTITIONER

## 2024-10-10 PROCEDURE — 97110 THERAPEUTIC EXERCISES: CPT

## 2024-10-10 PROCEDURE — 71046 X-RAY EXAM CHEST 2 VIEWS: CPT | Mod: TC

## 2024-10-10 PROCEDURE — 3078F DIAST BP <80 MM HG: CPT | Mod: CPTII,S$GLB,, | Performed by: NURSE PRACTITIONER

## 2024-10-10 PROCEDURE — 3044F HG A1C LEVEL LT 7.0%: CPT | Mod: CPTII,S$GLB,, | Performed by: NURSE PRACTITIONER

## 2024-10-10 PROCEDURE — 99999 PR PBB SHADOW E&M-EST. PATIENT-LVL V: CPT | Mod: PBBFAC,,, | Performed by: NURSE PRACTITIONER

## 2024-10-10 PROCEDURE — 3008F BODY MASS INDEX DOCD: CPT | Mod: CPTII,S$GLB,, | Performed by: NURSE PRACTITIONER

## 2024-10-10 PROCEDURE — 99205 OFFICE O/P NEW HI 60 MIN: CPT | Mod: S$GLB,,, | Performed by: NURSE PRACTITIONER

## 2024-10-10 PROCEDURE — 1159F MED LIST DOCD IN RCRD: CPT | Mod: CPTII,S$GLB,, | Performed by: NURSE PRACTITIONER

## 2024-10-10 NOTE — PLAN OF CARE
[facility]   Physical Therapy Initial Evaluation      Name: Fran Simmonsespack  Clinic Number: [id number]    Therapy Diagnosis:   Encounter Diagnoses   Name Primary?    Lumbar radiculopathy     DDD (degenerative disc disease), lumbosacral     Lumbar pain Yes        Physician: Arun Garcia MD    Physician Orders: PT Eval and Treat   Medical Diagnosis from Referral:   M54.16 (ICD-10-CM) - Lumbar radiculopathy   M51.37 (ICD-10-CM) - DDD (degenerative disc disease), lumbosacral     Evaluation Date: 10/10/2024  Authorization Period Expiration: 9/25/24  Plan of Care Expiration: 11/22/24  Progress Note Due: 11/10/24  Visit # / Visits authorized: 1/ 1   FOTO: 1/ 3    Precautions: Standard     Time In: 4:51 pm   Time Out: 5:20 pm   Total Billable Time: 29 minutes    Subjective     Date of onset: chronic     History of current condition - Fran reports: that she was born with spinal conditions. Pt stated that her upper back has an extreme curve and lower back has defects. Pt stated that she has been experiencing pain shooting down (B) LE, (R) > (L), extending to her heels. Pt stated that she has been experiencing this nerve pain for a few years, but got worse over last few months.  Pt stated that her upper back has been bothering her since she was 16 years old and lower back has been bothering her for about 4 years. Pt stated that she is scheduled to get injection on 10/24/24.     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 [place]   Prior Level of Function: chronic back pain  Current Level of Function: report of pain/difficulty performing aggravating factors listed below     Pain:  Current 3/10, worst 10/10, best 3/10   Location: (B) lumbar radiating into posterior (B) LE  to heels   Description: burning, sharp, throbbing   Aggravating Factors: weather, if doing any repetitive movements, sleeping in wrong position   Easing Factors: ice, medicine     Patients goals: to have less pain, more comfort, to learn some exercise techniques      Medical History:   Past Medical History:   Diagnosis Date    Abnormal Pap smear of cervix 2013    Abnormal uterine bleeding (AUB) 9/13/2019    Amblyopia     Anxiety     Cervical scoliosis 1994    Severe    Depression     DUB (dysfunctional uterine bleeding) 1/30/2020    Fatigue     Galactorrhea in female- bilateral breast 4/9/2020    Galactorrhea of both breasts 7/9/2020    Hx of psychiatric care     Migraine with aura, not intractable 1/30/2020    Mucinous tumor, 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    Ovarian cyst     Pericardial cyst 7/26/2019    Psychiatric problem     Sleep difficulties     Surgical menopause 3/13/2020    Therapy     Vaginal yeast infection 9/26/2019       Surgical History:   Fran Higgins  has a past surgical history that includes Tonsillectomy; Robot-assisted laparoscopic salpingo-oophorectomy (Left, 6/25/2019); Robot-assisted laparoscopic abdominal hysterectomy using da Gerardo Xi (N/A, 3/9/2020); Salpingoophorectomy (Right, 3/9/2020); Hysterectomy (03/2020); and Laparoscopic sleeve gastrectomy (N/A, 6/22/2021).    Medications:   Fran has a current medication list which includes the following prescription(s): alprazolam, b complex vitamins, calcium citrate, clindamycin, cyclobenzaprine, estradiol, fluconazole, fluocinolone acetonide oil, gabapentin, emgality pen, humira(cf) pen, ketoconazole, metformin, methocarbamol, multivitamin, nystatin-triamcinolone, omeprazole, ondansetron, pimecrolimus, progesterone, spironolactone, thiamine, ubrelvy, and venlafaxine.    Allergies:   Review of patient's allergies indicates:  No Known Allergies     Objective      Lumbar AROM: Pain/Dysfunction with Movement:   Flexion 80 degrees    "  Extension 20 degrees     Right side bending 25 degrees Report of pulling in (R) lumbar region    Left side bending 25 degrees Report of pulling in (L) lumbar region      Hip Right  Left  Pain/Dysfunction with Movement    AROM MMT AROM MMT NT = not tested   Flexion WFL 4+/5 WFL 4+/5    Extension NT NT NT NT Pt performed good bridge against gravity   Abduction WFL 4/5 WFL 4/5    External rotation WFL 4/5 WFL 4/5       Knee Right  Left  Pain/Dysfunction with Movement    AROM MMT AROM MMT    Flexion WFL 5/5 WFL 5/5    Extension WFL 4+/5 WFL 4+/5      Ankle Right  Left  Pain/Dysfunction with Movement    AROM MMT AROM MMT    Plantarflexion WFL 5/5 WFL 5/5    Dorsiflexion WFL 5/5 WFL 5/5        90/90 Hamstring Test: (R) =lacking 5 degrees,  (L) = lacking 5 degrees      Intake Outcome Measure for FOTO Lumbar Survey    Therapist reviewed FOTO scores for Fran Higgins on 10/10/2024.   FOTO report - see Media section or FOTO account episode details.    Intake Score: 52%         Treatment     Total Treatment time (time-based codes) separate from Evaluation: 12 minutes     Fran received the treatments listed below:      therapeutic exercises to develop strength for 10 minutes including:  Bridge 2x10 3"   SL clams 2x10 3" B  SL hip abduction 2x10 B       neuromuscular re-education activities to improve: stabilization/muscle activation for 2 minutes. The following activities were included:  TA bracing 5" x10         Patient Education and Home Exercises     Education provided:   - HEP - stop performing particular therex/activity if increases pain - pt verbalized understanding    Written Home Exercises Provided: Yes. Exercises were reviewed and Fran was able to demonstrate them prior to the end of the session.  Fran demonstrated good  understanding of the education provided. See EMR under Patient Instructions for exercises provided during therapy sessions.    Assessment     Fran is a 44 y.o. female referred to " outpatient Physical Therapy with a medical diagnosis of Lumbar radiculopathy and DDD (degenerative disc disease), lumbosacral. Patient presents with report chronic back pain, report of pulling in lumbar region when performed (B) lumbar SB AROM, and decreased LE MMT scores noted above. Pt will benefit from skilled PT.     Patient prognosis is Fair.   Patient will benefit from skilled outpatient Physical Therapy to address the deficits stated above and in the chart below, provide patient /family education, and to maximize patientt's level of independence.     Plan of care discussed with patient: Yes  Patient's spiritual, cultural and educational needs considered and patient is agreeable to the plan of care and goals as stated below:     Anticipated Barriers for therapy: chronicity of symptoms, scoliosis     Medical Necessity is demonstrated by the following  History  Co-morbidities and personal factors that may impact the plan of care [] LOW: no personal factors / co-morbidities  [x] MODERATE: 1-2 personal factors / co-morbidities  [] HIGH: 3+ personal factors / co-morbidities    Moderate / High Support Documentation:   Co-morbidities affecting plan of care: BMI, scoliosis     Personal Factors:   no deficits     Examination  Body Structures and Functions, activity limitations and participation restrictions that may impact the plan of care [x] LOW: addressing 1-2 elements  [] MODERATE: 3+ elements  [] HIGH: 4+ elements (please support below)    Moderate / High Support Documentation: ROM, strength     Clinical Presentation [x] LOW: stable  [] MODERATE: Evolving  [] HIGH: Unstable     Decision Making/ Complexity Score: low       Goals:  Short Term Goals: 2 weeks   Pt will be compliant with HEP to supplement PT with decreasing pain and improving functional mobility    Long Term Goals: 6 weeks   Pt will improve FOTO score to at least 61 in order to demo improved functional mobility  Pt will improve LE MMT scores by at  least 1/2 grade where deficits noted in order to improve strength for functional tasks  Pt will report back pain </= 5/10 at worst in order to be able to perform ADLs with less difficulty   Plan     Plan of care Certification: 10/10/2024 to 11/22/24.    Outpatient Physical Therapy 2 times weekly for 6 weeks to include the following interventions: Gait Training, Manual Therapy, Moist Heat/ Ice, Neuromuscular Re-ed, Patient Education, Therapeutic Activities, Therapeutic Exercise, and IASTM, dry needling, and modalities prn .     Yasmine Mclean, PT        Physician's Signature: _________________________________________ Date: ________________

## 2024-10-10 NOTE — PROGRESS NOTES
BARIATRIC NEW PATIENT EVALUATION    CHIEF COMPLAINT:   Morbid obesity, body mass index is 54.08 kg/m². and inability to maintain weight loss.    S/p sleeve 2021 with Dr. Whitman     HPI:  Fran Higgins is a 44 y.o. morbidly obese female. Her current body mass index is 54.08 kg/m². She has multiple associated comorbidities including GERD on daily medications and depression.  She has struggled with GERD since about 6 months after surgery and was not able to taper off PPI. Her current exercise includes walking 3 times a week. She denies any history of eating disorder such as anorexia, bulimia, or taking laxatives for weight loss, and denies any addiction including illicit substances, alcohol, or gambling.  Patient states she has a good  support system.  She lives with family .  She is currently employed   .  She  endorses a 2 year history of GERD. 6 months after sleeve.   The patient's goal is 220 lbs.    ESS: Score of 5, reviewed 10/10/2024.  Does not need Sleep Study.    Pre op weight-352  Lowest-260  Current-315  IBW-141      GERD Questionnaire:   Acid relief medication-y  Typical heartburn-y  Regurgitation-y  Dysphagia solids-y  Dysphagia liquids-n  Hoarseness-y  Sore throat-y  Cough -n  Asthma-n  Chest pain-n   Water brash-y  Globus-y  Nausea-n  Vomiting-n       PAST MEDICAL HISTORY:  Past Medical History:   Diagnosis Date    Abnormal Pap smear of cervix 2013    Abnormal uterine bleeding (AUB) 9/13/2019    Amblyopia     Anxiety     Cervical scoliosis 1994    Severe    Depression     DUB (dysfunctional uterine bleeding) 1/30/2020    Fatigue     Galactorrhea in female- bilateral breast 4/9/2020    Galactorrhea of both breasts 7/9/2020    Hx of psychiatric care     Migraine with aura, not intractable 1/30/2020    Mucinous tumor, 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    Ovarian cyst     Pericardial cyst 7/26/2019    Psychiatric problem     Sleep difficulties     Surgical menopause 3/13/2020    Therapy      Vaginal yeast infection 9/26/2019       PAST SURGICAL HISTORY:  Past Surgical History:   Procedure Laterality Date    HYSTERECTOMY  03/2020    LAPAROSCOPIC SLEEVE GASTRECTOMY N/A 6/22/2021    Procedure: GASTRECTOMY, SLEEVE, LAPAROSCOPIC; with intraoperative egd;  Surgeon: Jaziel Whitman Jr., MD;  Location: [address];  Service: General;  Laterality: N/A;    ROBOT-ASSISTED LAPAROSCOPIC ABDOMINAL HYSTERECTOMY USING DA ROMAIN XI N/A 3/9/2020    Procedure: XI ROBOTIC HYSTERECTOMY;  Surgeon: Jeff Kaufman MD;  Location: [address];  Service: Oncology;  Laterality: N/A;    ROBOT-ASSISTED LAPAROSCOPIC SALPINGO-OOPHORECTOMY Left 6/25/2019    Procedure: ROBOTIC SALPINGO-OOPHORECTOMY;  Surgeon: Marky Fox Jr., MD;  Location: [facility];  Service: OB/GYN;  Laterality: Left;    SALPINGOOPHORECTOMY Right 3/9/2020    Procedure: SALPINGO-OOPHORECTOMY  USO;  Surgeon: Jeff Kaufman MD;  Location: [address];  Service: Oncology;  Laterality: Right;    TONSILLECTOMY         FAMILY HISTORY:  Family History   Problem Relation Name Age of Onset    Diabetes Paternal Grandfather      Macular degeneration Maternal Grandmother 93     Cancer Maternal Grandfather          prostate cancer    Hypertension Father      Depression Father      Drug abuse Father      Hypertension Mother      Stroke Mother  60        ASD, PFO    Depression Mother      Cancer Other paternal Great grandmoth         uterine cancer     Uterine cancer Other pat great GM     Depression Sister      Colon cancer Neg Hx      Ovarian cancer Neg Hx      Breast cancer Neg Hx      Glaucoma Neg Hx      Melanoma Neg Hx      Heart attack Neg Hx      Heart disease Neg Hx      Hyperlipidemia Neg Hx          SOCIAL HISTORY:  Social History     Socioeconomic History    Marital status: Single    Number of children: 1   Occupational History    Occupation: Nurse circulator   Tobacco Use    Smoking status: Never    Smokeless tobacco: Never   Substance and Sexual  Activity    Alcohol use: Yes     Comment: 2-3 drinks twice a year    Drug use: No    Sexual activity: Not Currently     Partners: Male     Birth control/protection: None, Condom   Other Topics Concern    Are you pregnant or think you may be? No    Breast-feeding No     Social Drivers of Health     Financial Resource Strain: Patient Declined (11/17/2023)    Overall Financial Resource Strain (CARDIA)     Difficulty of Paying Living Expenses: Patient declined   Food Insecurity: Patient Declined (11/17/2023)    Hunger Vital Sign     Worried About Running Out of Food in the Last Year: Patient declined     Ran Out of Food in the Last Year: Patient declined   Transportation Needs: Patient Declined (11/17/2023)    PRAPARE - Transportation     Lack of Transportation (Medical): Patient declined     Lack of Transportation (Non-Medical): Patient declined   Physical Activity: Insufficiently Active (11/17/2023)    Exercise Vital Sign     Days of Exercise per Week: 3 days     Minutes of Exercise per Session: 30 min   Stress: Stress Concern Present (11/17/2023)    [place] [employer] - Occupational Stress Questionnaire     Feeling of Stress : To some extent   Housing Stability: Low Risk  (11/17/2023)    Housing Stability Vital Sign     Unable to Pay for Housing in the Last Year: No     Number of Places Lived in the Last Year: 1     Unstable Housing in the Last Year: No       MEDICATIONS:    Current Outpatient Medications:     ALPRAZolam (XANAX) 0.5 MG tablet, Take 1 tablet (0.5 mg total) by mouth nightly as needed for Insomnia or Anxiety., Disp: 30 tablet, Rfl: 5    b complex vitamins capsule, Take 1 capsule by mouth once daily., Disp: , Rfl:     CALCIUM CITRATE ORAL, Take 1 tablet by mouth 3 (three) times daily. chewable, Disp: , Rfl:     clindamycin (CLEOCIN T) 1 % lotion, Apply to affected area two times a day., Disp: 120 mL, Rfl: 3    cyclobenzaprine (FLEXERIL) 5 MG tablet, Take 1 tablet (5 mg total) by  mouth 2 (two) times daily as needed for Muscle spasms., Disp: 60 tablet, Rfl: 5    estradioL (ESTRACE) 2 MG tablet, Take 1 tablet (2 mg total) by mouth once daily., Disp: 30 tablet, Rfl: 11    fluconazole (DIFLUCAN) 200 MG Tab, Take 1 tablet by mouth once if not better take another pill in 3 days, Disp: 30 tablet, Rfl: 0    fluocinolone acetonide oiL (DERMOTIC OIL) 0.01 % Drop, Use to affected ears at night, drop in canal and use to ear when flaring, Disp: 20 mL, Rfl: 3    gabapentin (NEURONTIN) 100 MG capsule, Take 1 capsule (100 mg total) by mouth 3 (three) times daily., Disp: 90 capsule, Rfl: 2    galcanezumab-gnlm (EMGALITY PEN) 120 mg/mL PnIj, Inject 120 mg into the skin every 30 days., Disp: 1 mL, Rfl: 11    HUMIRA,CF, PEN 40 mg/0.4 mL PnKt, Inject 40mg (1 pen) into the skin every 7 days, Disp: 4 pen , Rfl: 2    ketoconazole (NIZORAL) 2 % cream, Apply to affected area two times a day (Patient taking differently: Apply to affected area two times a day), Disp: 60 g, Rfl: 3    metFORMIN (GLUCOPHAGE-XR) 500 MG ER 24hr tablet, Take 1 tablet by mouth daily., Disp: 30 tablet, Rfl: 3    methocarbamoL (ROBAXIN) 500 MG Tab, Take 1 tablet (500 mg total) by mouth 3 (three) times daily as needed (muscle tightness and spasms)., Disp: 90 tablet, Rfl: 0    multivitamin capsule, Take by mouth once daily., Disp: , Rfl:     nystatin-triamcinolone (MYCOLOG II) cream, Apply topically 2 (two) times daily., Disp: 30 g, Rfl: 1    omeprazole (PRILOSEC) 40 MG capsule, Take 1 capsule (40 mg total) by mouth every morning., Disp: 30 capsule, Rfl: 1    ondansetron (ZOFRAN-ODT) 4 MG TbDL, Dissolve 1 tablet (4 mg total) by mouth every 6 (six) hours as needed (for nausea)., Disp: 20 tablet, Rfl: 2    pimecrolimus (ELIDEL) 1 % cream, Apply to affected areas of underarm twice a day as needed for irritation. (Patient taking differently: Apply to affected areas of underarm twice a day as needed for irritation.), Disp: 30 g, Rfl: 3     "spironolactone (ALDACTONE) 50 MG tablet, Take 2 tablets by mouth daily., Disp: 60 tablet, Rfl: 3    thiamine (VITAMIN B-1) 50 MG tablet, Take 50 mg by mouth once daily., Disp: , Rfl:     ubrogepant (UBRELVY) 100 mg tablet, Take 1 tablet by mouth at the onset of a headache. May repeat based on response and tolerability after more than 2 hours if needed. Do not take more than 200mg in a 24 hour span., Disp: 16 tablet, Rfl: 5    venlafaxine (EFFEXOR-XR) 150 MG Cp24, Take 1 capsule (150 mg total) by mouth once daily., Disp: 90 capsule, Rfl: 3    progesterone (PROMETRIUM) 100 MG capsule, Take 1 capsule (100 mg total) by mouth nightly., Disp: 30 capsule, Rfl: 11    ALLERGIES:  Review of patient's allergies indicates:  No Known Allergies    Review of Systems   Constitutional:  Negative for chills and fever.   HENT:  Negative for ear pain, nosebleeds and sore throat.    Eyes:  Negative for blurred vision and double vision.   Respiratory:  Negative for cough and shortness of breath.    Cardiovascular:  Negative for chest pain, palpitations, orthopnea, claudication and leg swelling.   Gastrointestinal:  Negative for abdominal pain, constipation, diarrhea, heartburn, nausea and vomiting.   Genitourinary:  Negative for dysuria and urgency.   Musculoskeletal:  Negative for back pain and joint pain.   Skin:  Negative for rash.   Neurological:  Negative for dizziness, tingling, focal weakness and headaches.   Endo/Heme/Allergies:  Does not bruise/bleed easily.   Psychiatric/Behavioral:  Negative for depression and suicidal ideas.        Vitals:    10/10/24 1411   BP: 120/72   Pulse: 92   SpO2: 98%   Weight: (!) 142.9 kg (315 lb 0.6 oz)   Height: 5' 4" (1.626 m)   PainSc: 0-No pain       Physical Exam  Vitals and nursing note reviewed.   Constitutional:       Appearance: She is well-developed. She is morbidly obese.   HENT:      Head: Normocephalic.      Nose: Nose normal.      Mouth/Throat:      Mouth: Mucous membranes are moist. "   Eyes:      Extraocular Movements: Extraocular movements intact.   Cardiovascular:      Rate and Rhythm: Normal rate and regular rhythm.      Heart sounds: Normal heart sounds.   Pulmonary:      Effort: Pulmonary effort is normal.      Breath sounds: Normal breath sounds.   Abdominal:      General: Bowel sounds are normal.      Palpations: Abdomen is soft.   Musculoskeletal:         General: Normal range of motion.      Cervical back: Normal range of motion.   Skin:     General: Skin is warm and dry.      Capillary Refill: Capillary refill takes less than 2 seconds.   Neurological:      Mental Status: She is alert and oriented to person, place, and time.   Psychiatric:         Mood and Affect: Mood normal.          DIAGNOSIS:  1. Morbid obesity, body mass index is 54.08 kg/m². and inability to maintain weight loss.  2. Co-morbidities: GERD on daily medications and depression.     PLAN:  The patient is a potential candidate for Bariatric Surgery. The patient is interested in revisional surgery with Dr. Whitman. The surgery and post-op care was discussed in detail with the patient. All questions were answered.    Revision surgery and post-op care were discussed in detail with the patient. All questions were answered. Discussed at length that additional bariatric surgery is a only tool to aid in weight loss and that she needs to be committed to the diet and exercise for successful weight loss. Discussed expected weight loss outcomes are limited without significant diet and lifestyle modification. Discussed with patient that additional bariatric surgery is not the easy way out and that it will take plenty of dedication on the patient's part to be successful. Also discussed weight regain if the patient strays from the diet guidelines or exercise requirements. Patient verbalized understanding and agrees to begin work-up with RD and psychology. She will also work with bariatrician to aid in wt loss efforts. Plan to  proceed with additional work-up when diet is back on track and pt is in agreement.       Estimated Goal weight is 225-230 lbs.    Pt is a candidate for revision due to GERD.   Pt has complaints of severe reflux. See GERD questionnaire.   Pt has reached maximal medical therapy with PPI and pepcid prn   Diagnostics pending UGI and EGD.   Medical necessary revision for complication of GERD not weight loss.       ORDERS:  1. Chest X-Ray, EKG, EGD with Bravo, and Barium UGI  2. Bariatric Dietician Consult  3. Bariatric Labs: Per orders.    PCP: Erica Pineda MD  RTC: As scheduled.      This includes 60 mins face to face time and non-face to face time preparing to see the patient (eg, review of tests), obtaining and/or reviewing separately obtained history, documenting clinical information in the electronic or other health record, independently interpreting results and communicating results to the patient/family/caregiver, or care coordinator.

## 2024-10-10 NOTE — PATIENT INSTRUCTIONS
Prior to surgery you will need to complete:  - Dietitian consult and follow up appointments as needed  - Labs  - Chest X-ray  - EKG  - UGI  - EGD with Bravo     In preparation for bariatric surgery, please complete the following:   Discuss your current medications with your primary care provider, remember your medications will need to be crushed, chewable, or in liquid form for the first 2-4 weeks after a gastric bypass or sleeve.  For a gastric band, your medications will need to be crushed indefinitely.    If you take a blood thinner such as: Coumadin (warfarin), Pradaxa (dabigatran), or Plavix (clopidogrel), you will need to speak with your prescribing provider on how or if this medication can be stopped before surgery.   If you take a medication for depression or anxiety, remember to discuss this with the psychologist or psychiatrist that you see.   If you take medication for arthritis on a daily basis that is considered a non-steroidal anti-inflammatory (NSAID), please discuss with your prescribing physician an alternative medication.  After having gastric bypass or gastric sleeve, this group of medications is not appropriate to take due to increased risk of bleeding stomach ulcers.      DEFINITIONS  Appointments: Pre-scheduled meetings or consultations with any physician, advanced practice provider, dietitian, or psychologist, and labs, imaging studies, sleep studies, etc.   Late cancellation: Cancelling an appointment 24-48 hours prior to scheduled time.  No-Show appointment:  is when   You do NOT arrive to your appointment at the time its scheduled.  You call to cancel or cancel via [employer] less than 24 hours in advance of your scheduled appointment  You show up 15 minutes AFTER your scheduled appointment time without any notification of being late.     POLICY  You are allowed up to 3 cancellations for appointments.   After 3 cancellations your case will be placed on hold for 2 months and after that time  you can resume the program.   You are allowed only 1 no-show for an appointment.   You will be re-scheduled one time and if there is a 2nd no-show at any point, your case will be placed on hold for 3 months.  After 3 months you can resume the program.     Upon resuming the program after being placed on hold for either above mentioned reasons, if you have a late cancel or no show for any appointment, the bariatric team will review if youre an appropriate candidate for surgery at the monthly meeting.

## 2024-10-11 ENCOUNTER — LAB VISIT (OUTPATIENT)
Dept: LAB | Facility: HOSPITAL | Age: 44
End: 2024-10-11
Attending: NURSE PRACTITIONER
Payer: COMMERCIAL

## 2024-10-11 DIAGNOSIS — E66.01 CLASS 3 SEVERE OBESITY WITH SERIOUS COMORBIDITY AND BODY MASS INDEX (BMI) OF 50.0 TO 59.9 IN ADULT, UNSPECIFIED OBESITY TYPE: ICD-10-CM

## 2024-10-11 DIAGNOSIS — Z98.84 S/P LAPAROSCOPIC SLEEVE GASTRECTOMY: ICD-10-CM

## 2024-10-11 DIAGNOSIS — E66.813 CLASS 3 SEVERE OBESITY WITH SERIOUS COMORBIDITY AND BODY MASS INDEX (BMI) OF 50.0 TO 59.9 IN ADULT, UNSPECIFIED OBESITY TYPE: ICD-10-CM

## 2024-10-11 DIAGNOSIS — K21.9 GASTROESOPHAGEAL REFLUX DISEASE, UNSPECIFIED WHETHER ESOPHAGITIS PRESENT: ICD-10-CM

## 2024-10-11 LAB
25(OH)D3+25(OH)D2 SERPL-MCNC: 39 NG/ML (ref 30–96)
ALBUMIN SERPL BCP-MCNC: 3.8 G/DL (ref 3.5–5.2)
ALP SERPL-CCNC: 71 U/L (ref 55–135)
ALT SERPL W/O P-5'-P-CCNC: 14 U/L (ref 10–44)
ANION GAP SERPL CALC-SCNC: 11 MMOL/L (ref 8–16)
AST SERPL-CCNC: 14 U/L (ref 10–40)
BASOPHILS # BLD AUTO: 0.04 K/UL (ref 0–0.2)
BASOPHILS NFR BLD: 0.8 % (ref 0–1.9)
BILIRUB DIRECT SERPL-MCNC: 0.1 MG/DL (ref 0.1–0.3)
BILIRUB SERPL-MCNC: 0.3 MG/DL (ref 0.1–1)
BUN SERPL-MCNC: 11 MG/DL (ref 6–20)
CALCIUM SERPL-MCNC: 9.3 MG/DL (ref 8.7–10.5)
CHLORIDE SERPL-SCNC: 105 MMOL/L (ref 95–110)
CHOLEST SERPL-MCNC: 247 MG/DL (ref 120–199)
CHOLEST/HDLC SERPL: 4.1 {RATIO} (ref 2–5)
CO2 SERPL-SCNC: 23 MMOL/L (ref 23–29)
CREAT SERPL-MCNC: 0.8 MG/DL (ref 0.5–1.4)
DIFFERENTIAL METHOD BLD: NORMAL
EOSINOPHIL # BLD AUTO: 0 K/UL (ref 0–0.5)
EOSINOPHIL NFR BLD: 0.8 % (ref 0–8)
ERYTHROCYTE [DISTWIDTH] IN BLOOD BY AUTOMATED COUNT: 12.6 % (ref 11.5–14.5)
EST. GFR  (NO RACE VARIABLE): >60 ML/MIN/1.73 M^2
ESTIMATED AVG GLUCOSE: 105 MG/DL (ref 68–131)
FOLATE SERPL-MCNC: 11.6 NG/ML (ref 4–24)
GLUCOSE SERPL-MCNC: 93 MG/DL (ref 70–110)
HBA1C MFR BLD: 5.3 % (ref 4–5.6)
HCT VFR BLD AUTO: 37.4 % (ref 37–48.5)
HDLC SERPL-MCNC: 60 MG/DL (ref 40–75)
HDLC SERPL: 24.3 % (ref 20–50)
HGB BLD-MCNC: 12.6 G/DL (ref 12–16)
IMM GRANULOCYTES # BLD AUTO: 0.01 K/UL (ref 0–0.04)
IMM GRANULOCYTES NFR BLD AUTO: 0.2 % (ref 0–0.5)
IRON SERPL-MCNC: 108 UG/DL (ref 30–160)
LDLC SERPL CALC-MCNC: 150.6 MG/DL (ref 63–159)
LYMPHOCYTES # BLD AUTO: 1.8 K/UL (ref 1–4.8)
LYMPHOCYTES NFR BLD: 33.8 % (ref 18–48)
MAGNESIUM SERPL-MCNC: 1.9 MG/DL (ref 1.6–2.6)
MCH RBC QN AUTO: 28.8 PG (ref 27–31)
MCHC RBC AUTO-ENTMCNC: 33.7 G/DL (ref 32–36)
MCV RBC AUTO: 86 FL (ref 82–98)
MONOCYTES # BLD AUTO: 0.4 K/UL (ref 0.3–1)
MONOCYTES NFR BLD: 6.8 % (ref 4–15)
NEUTROPHILS # BLD AUTO: 3.1 K/UL (ref 1.8–7.7)
NEUTROPHILS NFR BLD: 57.6 % (ref 38–73)
NONHDLC SERPL-MCNC: 187 MG/DL
NRBC BLD-RTO: 0 /100 WBC
PHOSPHATE SERPL-MCNC: 3.3 MG/DL (ref 2.7–4.5)
PLATELET # BLD AUTO: 189 K/UL (ref 150–450)
PMV BLD AUTO: 12.3 FL (ref 9.2–12.9)
POTASSIUM SERPL-SCNC: 4 MMOL/L (ref 3.5–5.1)
PROT SERPL-MCNC: 6.8 G/DL (ref 6–8.4)
RBC # BLD AUTO: 4.37 M/UL (ref 4–5.4)
SATURATED IRON: 24 % (ref 20–50)
SODIUM SERPL-SCNC: 139 MMOL/L (ref 136–145)
T3 SERPL-MCNC: 121 NG/DL (ref 60–180)
T4 FREE SERPL-MCNC: 0.89 NG/DL (ref 0.71–1.51)
T4 SERPL-MCNC: 7.6 UG/DL (ref 4.5–11.5)
TOTAL IRON BINDING CAPACITY: 447 UG/DL (ref 250–450)
TRANSFERRIN SERPL-MCNC: 302 MG/DL (ref 200–375)
TRIGL SERPL-MCNC: 182 MG/DL (ref 30–150)
TSH SERPL DL<=0.005 MIU/L-ACNC: 0.8 UIU/ML (ref 0.4–4)
VIT B12 SERPL-MCNC: 428 PG/ML (ref 210–950)
WBC # BLD AUTO: 5.29 K/UL (ref 3.9–12.7)

## 2024-10-11 PROCEDURE — 83540 ASSAY OF IRON: CPT | Performed by: NURSE PRACTITIONER

## 2024-10-11 PROCEDURE — 84439 ASSAY OF FREE THYROXINE: CPT | Performed by: NURSE PRACTITIONER

## 2024-10-11 PROCEDURE — 83036 HEMOGLOBIN GLYCOSYLATED A1C: CPT | Performed by: NURSE PRACTITIONER

## 2024-10-11 PROCEDURE — 84425 ASSAY OF VITAMIN B-1: CPT | Performed by: NURSE PRACTITIONER

## 2024-10-11 PROCEDURE — 80061 LIPID PANEL: CPT | Performed by: NURSE PRACTITIONER

## 2024-10-11 PROCEDURE — 80048 BASIC METABOLIC PNL TOTAL CA: CPT | Performed by: NURSE PRACTITIONER

## 2024-10-11 PROCEDURE — 80076 HEPATIC FUNCTION PANEL: CPT | Performed by: NURSE PRACTITIONER

## 2024-10-11 PROCEDURE — 82746 ASSAY OF FOLIC ACID SERUM: CPT | Performed by: NURSE PRACTITIONER

## 2024-10-11 PROCEDURE — 86677 HELICOBACTER PYLORI ANTIBODY: CPT | Performed by: NURSE PRACTITIONER

## 2024-10-11 PROCEDURE — 83735 ASSAY OF MAGNESIUM: CPT | Performed by: NURSE PRACTITIONER

## 2024-10-11 PROCEDURE — 82607 VITAMIN B-12: CPT | Performed by: NURSE PRACTITIONER

## 2024-10-11 PROCEDURE — 84100 ASSAY OF PHOSPHORUS: CPT | Performed by: NURSE PRACTITIONER

## 2024-10-11 PROCEDURE — 82306 VITAMIN D 25 HYDROXY: CPT | Performed by: NURSE PRACTITIONER

## 2024-10-11 PROCEDURE — 84480 ASSAY TRIIODOTHYRONINE (T3): CPT | Performed by: NURSE PRACTITIONER

## 2024-10-11 PROCEDURE — 84436 ASSAY OF TOTAL THYROXINE: CPT | Performed by: NURSE PRACTITIONER

## 2024-10-11 PROCEDURE — 85025 COMPLETE CBC W/AUTO DIFF WBC: CPT | Performed by: NURSE PRACTITIONER

## 2024-10-11 PROCEDURE — 84443 ASSAY THYROID STIM HORMONE: CPT | Performed by: NURSE PRACTITIONER

## 2024-10-12 LAB — H PYLORI IGG SERPL QL IA: NEGATIVE

## 2024-10-14 ENCOUNTER — PATIENT MESSAGE (OUTPATIENT)
Dept: BARIATRICS | Facility: CLINIC | Age: 44
End: 2024-10-14

## 2024-10-14 ENCOUNTER — CLINICAL SUPPORT (OUTPATIENT)
Dept: BARIATRICS | Facility: CLINIC | Age: 44
End: 2024-10-14
Payer: COMMERCIAL

## 2024-10-14 ENCOUNTER — HOSPITAL ENCOUNTER (OUTPATIENT)
Dept: CARDIOLOGY | Facility: CLINIC | Age: 44
Discharge: HOME | End: 2024-10-14
Payer: COMMERCIAL

## 2024-10-14 DIAGNOSIS — K21.9 GASTROESOPHAGEAL REFLUX DISEASE, UNSPECIFIED WHETHER ESOPHAGITIS PRESENT: ICD-10-CM

## 2024-10-14 DIAGNOSIS — E66.813 CLASS 3 SEVERE OBESITY WITH SERIOUS COMORBIDITY AND BODY MASS INDEX (BMI) OF 50.0 TO 59.9 IN ADULT, UNSPECIFIED OBESITY TYPE: ICD-10-CM

## 2024-10-14 DIAGNOSIS — E66.01 CLASS 3 SEVERE OBESITY WITH SERIOUS COMORBIDITY AND BODY MASS INDEX (BMI) OF 50.0 TO 59.9 IN ADULT, UNSPECIFIED OBESITY TYPE: ICD-10-CM

## 2024-10-14 DIAGNOSIS — Z98.84 S/P LAPAROSCOPIC SLEEVE GASTRECTOMY: ICD-10-CM

## 2024-10-14 DIAGNOSIS — E66.01 MORBID OBESITY WITH BODY MASS INDEX (BMI) OF 40.0 OR HIGHER: ICD-10-CM

## 2024-10-14 DIAGNOSIS — Z71.3 DIETARY COUNSELING AND SURVEILLANCE: Primary | ICD-10-CM

## 2024-10-14 LAB
OHS QRS DURATION: 108 MS
OHS QTC CALCULATION: 441 MS

## 2024-10-14 PROCEDURE — 97802 MEDICAL NUTRITION INDIV IN: CPT | Mod: 95,,, | Performed by: DIETITIAN, REGISTERED

## 2024-10-14 PROCEDURE — 93010 ELECTROCARDIOGRAM REPORT: CPT | Mod: S$GLB,,, | Performed by: INTERNAL MEDICINE

## 2024-10-14 NOTE — PROGRESS NOTES
The patient location is:  Patient Home   The chief complaint leading to consultation is: morbid obesity in work up for possible revision  Visit type: Virtual visit with synchronous audio and video  Total time spent with patient: 45 minutes  Each patient to whom he or she provides medical services by telemedicine is:  (1) informed of the relationship between the physician and patient and the respective role of any other health care provider with respect to management of the patient; and (2) notified that he or she may decline to receive medical services by telemedicine and may withdraw from such care at any time.  Nutrition Handout located in AVS section of pt's [facility] and/or sent as a message via [facility] portal.  Pt informed of handout and how to access this information in [facility].  NUTRITIONAL CONSULT    Referring Physician: Jaziel Whitman M.D.   Reason for MNT Referral: Initial assessment for  sleeve  to bypass or MANNY-S  work-up  Sleeve surgery with J Carlos in 2021    PAST MEDICAL HISTORY:   44 y.o. female    Weight history includes She has multiple associated comorbidities including GERD on daily medications and depression.  She has struggled with GERD since about 6 months after surgery and was not able to taper off PPI. Her current exercise includes walking 3 times a week. She denies any history of eating disorder such as anorexia, bulimia, or taking laxatives for weight loss, and denies any addiction including illicit substances, alcohol, or gambling.  Patient states she has a good  support system.  She lives with family .  She is currently employed   .  She  endorses a 2 year history of GERD. 6 months after sleeve.   Prilosec   Pre op weight-352  Lowest weight after surgery-260 within 6 months post surgery  Current-315 lb  IBW-138.  Dieting attempts include walking, bariatric surgery and bariatric meal plan .    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  Height: 5'4"  Weight:  315 lbs  IBW: 138 lbs  BMI: 54.08  Personal goal weight: 220 lbs    Goal for Bariatric Surgery: to lose weight and resolution of reflux    DAILY NUTRITIONAL NEEDS: pre-op nutritional bariatric guidelines to promote weight loss  2204-1239 Calories    Grams Protein    NUTRITION & HEALTH HISTORY:  Greatest challenge:  work 10-12 hours 5 days a week with 30 min lunch break    Current diet recall: some protein shakes cause severe reflux ie Muscle Milk or Premier   Fairlife, Slate is okay   Skips breakfast or drinks protein shake  S: apple with peanut butter   L: ground turkey or chicken with green vegetables  D: mom (sleeve surgery) cooks or graze cheese or  granola,or grapes or pineapple        Current Diet:  Meal pattern: 2 meals , 0-1 protein shake and numerous snacks  Protein supplements: Fairlife or Slate 4-5 times per week   Snacking: 3 / day  Vegetables: Likes a variety. Eats daily.  Fruits: Likes a variety. Eats daily.  Beverages: water, sugar-free beverages/powders/drops, and diet/unsweetened tea  Dining out/delivery: Weekly. Mostly fast food, restaurants, take-out, and delivery .  Cooking at home: Weekly. Mostly baked/roast, grilled/broiled, air-fried, and steamed beef, pork, chicken/turkey, vegetables, and potaotes sweet    Exercise:  Past exercise: " "Fair    Current exercise: Adequate walk with daughter 3 times per week; started PT last week twice a week     Restrictions to Exercise:  back pain    Vitamins / Minerals / Herbs:   Alive potency B vitamins  Calcium in Alive    Labs:   None available at time of visit    Food Allergies:   None reported    Social:  Works regular daytime shifts.  Lives with daughter 11 years old.  Grocery shopping and food prep self or mom.  Patient believes the household will be supportive after surgery.  Alcohol:  one drink once every 2 months .  Smoking: None.    ASSESSMENT:  Patient reports attempts at weight loss, only to regain lost weight.  Patient demonstrated knowledge of healthy eating behaviors and exercise patterns; admits to not eating healthy and not exercising at this point.  Patient states willingness and demonstrates willingness to change lifestyle and make behavior modifications as evidenced by good exercise, dietary changes, and including protein drinks.    Barriers to Education: none    Stage of change: action    NUTRITION DIAGNOSIS:    Morbid Obesity related to Excessive carbohydrate intake, Excessive calorie intake, and Inadequate protein intake as evidence by BMI.    BARIATRIC DIET DISCUSSION/PLAN:  Discussed diet after surgery and related to patient's food record.  Reviewed nutrition guidelines for before and after surgery.  Answered all questions.  Work on Bariatric Nutrition Checklist.  Work on expanding variety of vegetables.  Work on gradually cutting back on starchy CHO in the diet.  Begin trying various protein supplements to determine preference  1200-calorie diet.  5-6 meals per day  Reduce frequency of eating out/delivery.  More grocery shopping and meal preparation at home.  Increase exercise  increase protein  Work on cutting out sugar-sweetened beverages  Work on cutting out "junk foods"  Return to clinic    RECOMMENDATIONS:  Pt is a potential candidate for bariatric surgery    Follow up in one month. " Needs additional visits with RD.    Patient verbalized understanding.    Communicated nutrition plan with bariatric team.    SESSION TIME:  60 minutes

## 2024-10-15 LAB — VIT B1 BLD-MCNC: 67 UG/L (ref 38–122)

## 2024-10-15 NOTE — PROGRESS NOTES
Patient ID: Fran Higgins is a 44 y.o. White female    Subjective  Chief Complaint: patient presents for medical weight loss management.    Contraindications to GLP-1 receptor agonist therapy:   Denies personal or family history of MTC, personal history of MEN2, history of allergic reaction while taking a GLP-1 receptor agonist, and history of pancreatitis while taking a GLP-1 receptor agonist     Co-morbidities: none    History of weight loss therapy:  Pt previously used Ozempic around 2020 and believes she was on it for ~2 months and lost 12 lbs during that time. Pt denies issues tolerating the medication.    Weight loss history:  Starting weight:    10/10/2024   Recent Readings    Weight (lbs) 311 lb    BMI 51.75 BMI      Objective  Lab Results   Component Value Date     10/11/2024     05/24/2024     09/27/2023     Lab Results   Component Value Date    K 4.0 10/11/2024    K 4.9 05/24/2024    K 4.1 09/27/2023     Lab Results   Component Value Date     10/11/2024     05/24/2024     09/27/2023     Lab Results   Component Value Date    CO2 23 10/11/2024    CO2 27 05/24/2024    CO2 28 09/27/2023     Lab Results   Component Value Date    BUN 11 10/11/2024    BUN 7 05/24/2024    BUN 17 09/27/2023     Lab Results   Component Value Date    GLU 93 10/11/2024    GLU 96 05/24/2024    GLU 99 09/27/2023     Lab Results   Component Value Date    CALCIUM 9.3 10/11/2024    CALCIUM 9.6 05/24/2024    CALCIUM 9.6 09/27/2023     Lab Results   Component Value Date    PROT 6.8 10/11/2024    PROT 6.9 05/24/2024    PROT 7.4 09/27/2023     Lab Results   Component Value Date    ALBUMIN 3.8 10/11/2024    ALBUMIN 4.0 05/24/2024    ALBUMIN 4.3 09/27/2023     Lab Results   Component Value Date    BILITOT 0.3 10/11/2024    BILITOT 0.3 05/24/2024    BILITOT 0.2 09/27/2023     Lab Results   Component Value Date    AST 14 10/11/2024    AST 17 05/24/2024    AST 17 09/27/2023     Lab Results   Component  Value Date    ALT 14 10/11/2024    ALT 20 05/24/2024    ALT 18 09/27/2023     Lab Results   Component Value Date    ANIONGAP 11 10/11/2024    ANIONGAP 8 05/24/2024    ANIONGAP 7 (L) 09/27/2023     Lab Results   Component Value Date    CREATININE 0.8 10/11/2024    CREATININE 0.9 05/24/2024    CREATININE 1.0 09/27/2023     Lab Results   Component Value Date    EGFRNORACEVR >60.0 10/11/2024    EGFRNORACEVR >60.0 05/24/2024    EGFRNORACEVR >60.0 09/27/2023     Assessment/Plan  -Pt qualifies for GLP-1 RA therapy based on BMI greater than or equal to 30 kg/m2  -Initiate Wegovy 0.25 mg once weekly for 1 month  -Then increase to Wegovy 0.5 mg once weekly for 1 month  -Then increase to Wegovy 1 mg once weekly  -RTC in 3 months    Patient consented to pharmacist management via collaborative practice.

## 2024-10-16 ENCOUNTER — PATIENT MESSAGE (OUTPATIENT)
Dept: NEUROLOGY | Facility: CLINIC | Age: 44
End: 2024-10-16
Payer: COMMERCIAL

## 2024-10-16 ENCOUNTER — OFFICE VISIT (OUTPATIENT)
Dept: INTERNAL MEDICINE | Facility: CLINIC | Age: 44
End: 2024-10-16
Payer: COMMERCIAL

## 2024-10-16 ENCOUNTER — PATIENT MESSAGE (OUTPATIENT)
Dept: INTERNAL MEDICINE | Facility: CLINIC | Age: 44
End: 2024-10-16

## 2024-10-16 DIAGNOSIS — E66.9 OBESITY, UNSPECIFIED CLASS, UNSPECIFIED OBESITY TYPE, UNSPECIFIED WHETHER SERIOUS COMORBIDITY PRESENT: Primary | ICD-10-CM

## 2024-10-16 PROCEDURE — 99499 UNLISTED E&M SERVICE: CPT | Mod: 95,,,

## 2024-10-16 RX ORDER — SEMAGLUTIDE 1 MG/.5ML
1 INJECTION, SOLUTION SUBCUTANEOUS
Qty: 2 ML | Refills: 0 | Status: SHIPPED | OUTPATIENT
Start: 2024-10-16

## 2024-10-16 RX ORDER — SEMAGLUTIDE 0.25 MG/.5ML
0.25 INJECTION, SOLUTION SUBCUTANEOUS
Qty: 2 ML | Refills: 0 | Status: SHIPPED | OUTPATIENT
Start: 2024-10-16

## 2024-10-16 RX ORDER — SEMAGLUTIDE 0.5 MG/.5ML
0.5 INJECTION, SOLUTION SUBCUTANEOUS
Qty: 2 ML | Refills: 0 | Status: SHIPPED | OUTPATIENT
Start: 2024-10-16

## 2024-10-16 NOTE — PATIENT INSTRUCTIONS
Wegovy Patient Education  Savings Card  Follow this link to sign up for your Wegovy savings card. You will need this information when the [place] [facility] pharmacy contacts you to help pay for your prescription.  Wegovy Savings Card    Dosing Schedule      Choosing an Injection Site and Using your Pen       - Pens are stored in the refrigerator and can be removed 20-30 minutes before the injection to allow the medication to come to room temperature to avoid irritation, burning, or bruising with injection.     What to Expect      Rare, but Serious Side Effects  - Wegovy may cause pancreatitis or gallstones. If you experience severe abdominal pain that may radiate to the back and may or may not be accompanied by vomiting, you are encouraged to seek medical attention to assess your symptoms.     Reproduction, Pregnancy, and Lactation Considerations  - Wegovy is not recommended for use in patients who are currently pregnant or breastfeeding.   - If you plan to become pregnant, the use of this medication is not recommended at the time of conception. It is recommended that this medication should be discontinued at least 2 months prior to conception.     Patient's using Oral Contraceptives  - Use of medications like Wegovy may decrease the effectiveness of your oral hormonal contraceptives.   - You are encouraged to add a barrier method of contraception for 4 weeks after initiation and for 4 weeks after each dose titration of Wegovy to minimize this potential risk.     Missed or Changing Your Dosing Schedule  - If you miss a dose of Wegovy, take it as soon as possible, within 5 days of your scheduled dose. If more than 5 days have passed, skip the missed dose and take your next dose on your regularly scheduled day.  - If you would like to change the day of the week you take your Wegovy dose, make sure there are at least 2 days between doses.

## 2024-10-17 ENCOUNTER — PROCEDURE VISIT (OUTPATIENT)
Dept: NEUROLOGY | Facility: CLINIC | Age: 44
End: 2024-10-17
Payer: COMMERCIAL

## 2024-10-17 ENCOUNTER — TELEPHONE (OUTPATIENT)
Dept: PAIN MEDICINE | Facility: CLINIC | Age: 44
End: 2024-10-17
Payer: COMMERCIAL

## 2024-10-17 VITALS — HEART RATE: 88 BPM | DIASTOLIC BLOOD PRESSURE: 80 MMHG | SYSTOLIC BLOOD PRESSURE: 116 MMHG

## 2024-10-17 DIAGNOSIS — G43.E09 CHRONIC MIGRAINE WITH AURA WITHOUT STATUS MIGRAINOSUS, NOT INTRACTABLE: Primary | ICD-10-CM

## 2024-10-17 NOTE — TELEPHONE ENCOUNTER
LVM to confirm procedure with Dr. Garcia for 10/24. To call back if on abx last two weeks to reschedule.      Sent pt portal msg    Hold Rx Wegovy 7d      ND

## 2024-10-17 NOTE — PROCEDURES
Established Patient  Procedure Note   SUBJECTIVE:  Patient ID: Fran Higgins  Chief Complaint: Headache      History of Present Illness:  Fran Higgins is a 44 y.o. female with  with migraine, cervical scoliosis, obesity s/p sleeve gastrectomy, anxiety/depression, surgical menopause s/p hysterectomy/RSO for ovary cyst, insomnia  who presents to clinic alone for follow-up of headaches and Botox injections.       10/17/2024- Interval History: botox  Patient has had >50% reduction in Ha's since beginning botox. Prior to botox pt's Ha's were occurring >15/30 days per month. Since beginning botox, they are occurring 1-2/30 days per month. As pt has experienced an improvement in Ha's, with sustained reduction, will continue botox as is clinically indicated.   Plan: continue botox, continue ubrelvy 100mg prn, zofran prn, sleep strategies, rtc 12 wks for next botox    07/18/2024- Interval History: botox   Per HA diary, pt has had 1-2/30 ha days per month. She has been treating w/ ubrelvy 100mg at onset and finds this more effective. She is happy w/ current regimen as ha's are well controlled despite large amt of stress recently due to partners passing. Has a good support system and therapist. Will continue current regimen.   Plan: continue botox, continue ubrelvy 100mg prn, zofran prn, sleep strategies, rtc 12 wks for next botox    04/25/2024- Interval History: botox  Per pt 's HA diary, ha's have remained well controlled weaning off emgality. Experienced 5 HA days since last visit. Callahan's can last 3 - 7 hrs. Discussed using ubrelvy up to 100mg and at onset.   Plan: continue botox, d/c emgality, continue ubrelvy 50-100mg 1st line, zofran prn, sleep strategies, rtc 12 wks for next botox    02/08/2024- Interval History: botox  Pt's ha's remain well controlled. Reviewed HA diary. Occurring 1-2/30 days per month. Duration 2-14 hrs. Severity 4-10/10. Pt takes ubrelvy 50-100mg 1st line and tylenol 2nd line. Finds  "current regimen to be helpful. Is amenable to weanign off emgality by next visit.   Prior to initiation of botox the patient was experiencing >15 days of headache lasting 4 or more hours and has had sustained reduction.   Plan: continue botox, wean off emgality, continue ubrelvy 50-100mg 1st line, zofran prn, sleep strategies, rtc 12 wks for next botox    11/14/2023-  botox    09/29/2023 - Interval History:  Patient has had >50% reduction in Ha's since beginning botox. Prior to botox pt's Ha's were occurring >15/30 days per month. Since beginning botox, she experiences 5 HA days in 3 months. Most HA's are now mild in severity. Had 1 severe HA in this span that lasted 2 days but was eventually aborted w/ ubrelvy. HA's last 2 hrs - 2 days. As pt has experienced an improvement in Ha's with sustained reduction will continue botox as is clinically indicated.   Plan: continue botox, emgality, ubrelvy 50-100mg prn, zofran, continue sleep strategies, rtc for next botox    08/17/2023- Interval History: botox #3  Pt feels botox has been helpful. Is not trackign but states "I noticed a change" after the last round. She states she experiences approximately 20/30 ha days per month, hwoever isn't tracking. Will track for next visit.   Plan: continue botox, emgality, ubrelvy 50mg prn, zofran, rtc in 2 mo for f/up and 12 wks for next botox    05/25/2023 - botox #2    02/23/2023 - botox #1    12/12/2022 - Interval History:  Pt's ha's have worsened since last visit. They are now occurring >15/30 days per month for >3 mo, lasting > 4 hrs at a time. She tried tpx but had to d/c 2/2 depression SE. She also tried the addition of supplements including mag w/ no benefit noted. She continues to have trouble falling and staying asleep for which she recently started acupuncture for. Defers referral to sleep med, elavil, pcp at this time. Pt is interested in botox and would be an ideal candidate as she has tried and failed multiple ppx " options.   Plan: start botox next visit, continue emgality, ubrelvy 50mg prn, zofran, continue sleep strategies, rtc jan 12 to begin botox     09/19/2022 - Interval History:   Ha's have increased 1-2 months ago possibly due to worsened insomnia and stress lately. Are now occurring 8/30 days per month lasting up to 1 day at a time. Currently taking effexor and xanax for these conditions.   Ubrelvy 50mg - typically resolves HA's, infrequently needs to repeat dose  Discussed multiple options, pt agreeable w/ following. Denies current depression.   Plan: start tpx 25mg/d, continue emgality, ubrelvy 50mg prn, zofran, rtc in 3 mo or sooner if needed     Recommendations made at last Office Visit on 12/6/21:  - Discussed symptoms appear to be consistent with migraines. Discussed this with patient along with treatment options and patient agreed with the following plan  - ppx - continue emgality  - abortive - trial ubrelvy  - nausea - continue zofran  - avoid nsaids as they are c/i 2/2 gastrectomy  - avoid triptans as recommended by psychiatry 2/2 potential serotinin syndrome w/ effexor for depression  - decreased neck ROM, tightness, and cervical scoliosis - continue conservative treatments, consider PT in future  - risks, benefits, and potential side effects of emgality, ubrelvy, zofran discussed   - alternative treatment options offered   - importance of healthy diet, regular exercise and sleep hygiene in the treatment of headaches    - Start tracking headaches via Migraine Rajinder roseann on phone   - RTC 6-12 mo or sooner if needed      Treatments Tried:  emgality - helps  Effexor  Tpx - depression  Anti-htn meds - avoid 2/2 low bp  Bb - avoid 2/2 depression  Ubrelvy - helps  triptans - has been told to avoid per psychiatrist 2/2 serotonin syndrome risk  nsaids - c/i 2/2 recent gastrectomy  zofran       Current Medications:    Current Outpatient Medications:     ALPRAZolam (XANAX) 0.5 MG tablet, Take 1 tablet (0.5 mg total) by  mouth nightly as needed for Insomnia or Anxiety., Disp: 30 tablet, Rfl: 5    b complex vitamins capsule, Take 1 capsule by mouth once daily., Disp: , Rfl:     CALCIUM CITRATE ORAL, Take 1 tablet by mouth 3 (three) times daily. chewable, Disp: , Rfl:     clindamycin (CLEOCIN T) 1 % lotion, Apply to affected area two times a day., Disp: 120 mL, Rfl: 3    cyclobenzaprine (FLEXERIL) 5 MG tablet, Take 1 tablet (5 mg total) by mouth 2 (two) times daily as needed for Muscle spasms., Disp: 60 tablet, Rfl: 5    estradioL (ESTRACE) 2 MG tablet, Take 1 tablet (2 mg total) by mouth once daily., Disp: 30 tablet, Rfl: 11    fluconazole (DIFLUCAN) 200 MG Tab, Take 1 tablet by mouth once if not better take another pill in 3 days, Disp: 30 tablet, Rfl: 0    fluocinolone acetonide oiL (DERMOTIC OIL) 0.01 % Drop, Use to affected ears at night, drop in canal and use to ear when flaring, Disp: 20 mL, Rfl: 3    gabapentin (NEURONTIN) 100 MG capsule, Take 1 capsule (100 mg total) by mouth 3 (three) times daily., Disp: 90 capsule, Rfl: 2    galcanezumab-gnlm (EMGALITY PEN) 120 mg/mL PnIj, Inject 120 mg into the skin every 30 days., Disp: 1 mL, Rfl: 11    HUMIRA,CF, PEN 40 mg/0.4 mL PnKt, Inject 40mg (1 pen) into the skin every 7 days, Disp: 4 pen , Rfl: 2    ketoconazole (NIZORAL) 2 % cream, Apply to affected area two times a day (Patient taking differently: Apply to affected area two times a day), Disp: 60 g, Rfl: 3    metFORMIN (GLUCOPHAGE-XR) 500 MG ER 24hr tablet, Take 1 tablet by mouth daily., Disp: 30 tablet, Rfl: 3    methocarbamoL (ROBAXIN) 500 MG Tab, Take 1 tablet (500 mg total) by mouth 3 (three) times daily as needed (muscle tightness and spasms)., Disp: 90 tablet, Rfl: 0    multivitamin capsule, Take by mouth once daily., Disp: , Rfl:     nystatin-triamcinolone (MYCOLOG II) cream, Apply topically 2 (two) times daily., Disp: 30 g, Rfl: 1    omeprazole (PRILOSEC) 40 MG capsule, Take 1 capsule (40 mg total) by mouth every  morning., Disp: 30 capsule, Rfl: 1    ondansetron (ZOFRAN-ODT) 4 MG TbDL, Dissolve 1 tablet (4 mg total) by mouth every 6 (six) hours as needed (for nausea)., Disp: 20 tablet, Rfl: 2    pimecrolimus (ELIDEL) 1 % cream, Apply to affected areas of underarm twice a day as needed for irritation. (Patient taking differently: Apply to affected areas of underarm twice a day as needed for irritation.), Disp: 30 g, Rfl: 3    semaglutide, weight loss, (WEGOVY) 0.25 mg/0.5 mL PnIj, Inject 0.25 mg into the skin every 7 days., Disp: 2 mL, Rfl: 0    semaglutide, weight loss, (WEGOVY) 0.5 mg/0.5 mL PnIj, Inject 0.5 mg into the skin every 7 days., Disp: 2 mL, Rfl: 0    semaglutide, weight loss, (WEGOVY) 1 mg/0.5 mL PnIj, Inject 1 mg into the skin every 7 days., Disp: 2 mL, Rfl: 0    spironolactone (ALDACTONE) 50 MG tablet, Take 2 tablets by mouth daily., Disp: 60 tablet, Rfl: 3    thiamine (VITAMIN B-1) 50 MG tablet, Take 50 mg by mouth once daily., Disp: , Rfl:     ubrogepant (UBRELVY) 100 mg tablet, Take 1 tablet by mouth at the onset of a headache. May repeat based on response and tolerability after more than 2 hours if needed. Do not take more than 200mg in a 24 hour span., Disp: 16 tablet, Rfl: 5    venlafaxine (EFFEXOR-XR) 150 MG Cp24, Take 1 capsule (150 mg total) by mouth once daily., Disp: 90 capsule, Rfl: 3    progesterone (PROMETRIUM) 100 MG capsule, Take 1 capsule (100 mg total) by mouth nightly., Disp: 30 capsule, Rfl: 11  No current facility-administered medications for this visit.    Review of Systems - as per HPI, otherwise a balanced 10 systems review is negative.    OBJECTIVE:  Vitals:  /80 (BP Location: Left arm, Patient Position: Sitting)   Pulse 88   LMP 01/17/2020     Physical Exam:  Constitutional: she appears well-developed and well-nourished. she is well groomed. NAD   HENT:    Head: Normocephalic and atraumatic  Eyes: Conjunctivae and EOM are normal  Musculoskeletal: Normal range of motion. No  joint stiffness.   Skin: Skin is warm and dry.  Psychiatric: Mood and affect are normal    Neuro: Patient is alert and oriented to person, place, and time. Language is intact and fluent.  Recent and remote memory are intact.  Normal attention and concentration.  Facial movement is symmetric.  Gait is normal.     Review of Data:   Notes from neuro reviewed.   Labs:  Hospital Outpatient Visit on 10/14/2024   Component Date Value Ref Range Status    QRS Duration 10/14/2024 108  ms Final    [facility] QTC Calculation 10/14/2024 441  ms Final   Lab Visit on 10/11/2024   Component Date Value Ref Range Status    Sodium 10/11/2024 139  136 - 145 mmol/L Final    Potassium 10/11/2024 4.0  3.5 - 5.1 mmol/L Final    Chloride 10/11/2024 105  95 - 110 mmol/L Final    CO2 10/11/2024 23  23 - 29 mmol/L Final    Glucose 10/11/2024 93  70 - 110 mg/dL Final    BUN 10/11/2024 11  6 - 20 mg/dL Final    Creatinine 10/11/2024 0.8  0.5 - 1.4 mg/dL Final    Calcium 10/11/2024 9.3  8.7 - 10.5 mg/dL Final    Anion Gap 10/11/2024 11  8 - 16 mmol/L Final    eGFR 10/11/2024 >60.0  >60 mL/min/1.73 m^2 Final    WBC 10/11/2024 5.29  3.90 - 12.70 K/uL Final    RBC 10/11/2024 4.37  4.00 - 5.40 M/uL Final    Hemoglobin 10/11/2024 12.6  12.0 - 16.0 g/dL Final    Hematocrit 10/11/2024 37.4  37.0 - 48.5 % Final    MCV 10/11/2024 86  82 - 98 fL Final    MCH 10/11/2024 28.8  27.0 - 31.0 pg Final    MCHC 10/11/2024 33.7  32.0 - 36.0 g/dL Final    RDW 10/11/2024 12.6  11.5 - 14.5 % Final    Platelets 10/11/2024 189  150 - 450 K/uL Final    MPV 10/11/2024 12.3  9.2 - 12.9 fL Final    Immature Granulocytes 10/11/2024 0.2  0.0 - 0.5 % Final    Gran # (ANC) 10/11/2024 3.1  1.8 - 7.7 K/uL Final    Immature Grans (Abs) 10/11/2024 0.01  0.00 - 0.04 K/uL Final    Lymph # 10/11/2024 1.8  1.0 - 4.8 K/uL Final    Mono # 10/11/2024 0.4  0.3 - 1.0 K/uL Final    Eos # 10/11/2024 0.0  0.0 - 0.5 K/uL Final    Baso # 10/11/2024 0.04  0.00 - 0.20 K/uL Final    nRBC 10/11/2024 0  0  /100 WBC Final    Gran % 10/11/2024 57.6  38.0 - 73.0 % Final    Lymph % 10/11/2024 33.8  18.0 - 48.0 % Final    Mono % 10/11/2024 6.8  4.0 - 15.0 % Final    Eosinophil % 10/11/2024 0.8  0.0 - 8.0 % Final    Basophil % 10/11/2024 0.8  0.0 - 1.9 % Final    Differential Method 10/11/2024 Automated   Final    Folate 10/11/2024 11.6  4.0 - 24.0 ng/mL Final    H Pylori IgG Interp 10/11/2024 Negative  Negative Final    Hemoglobin A1C 10/11/2024 5.3  4.0 - 5.6 % Final    Estimated Avg Glucose 10/11/2024 105  68 - 131 mg/dL Final    Total Protein 10/11/2024 6.8  6.0 - 8.4 g/dL Final    Albumin 10/11/2024 3.8  3.5 - 5.2 g/dL Final    Total Bilirubin 10/11/2024 0.3  0.1 - 1.0 mg/dL Final    Bilirubin, Direct 10/11/2024 0.1  0.1 - 0.3 mg/dL Final    AST 10/11/2024 14  10 - 40 U/L Final    ALT 10/11/2024 14  10 - 44 U/L Final    Alkaline Phosphatase 10/11/2024 71  55 - 135 U/L Final    Iron 10/11/2024 108  30 - 160 ug/dL Final    Transferrin 10/11/2024 302  200 - 375 mg/dL Final    TIBC 10/11/2024 447  250 - 450 ug/dL Final    Saturated Iron 10/11/2024 24  20 - 50 % Final    Cholesterol 10/11/2024 247 (H)  120 - 199 mg/dL Final    Triglycerides 10/11/2024 182 (H)  30 - 150 mg/dL Final    HDL 10/11/2024 60  40 - 75 mg/dL Final    LDL Cholesterol 10/11/2024 150.6  63.0 - 159.0 mg/dL Final    HDL/Cholesterol Ratio 10/11/2024 24.3  20.0 - 50.0 % Final    Total Cholesterol/HDL Ratio 10/11/2024 4.1  2.0 - 5.0 Final    Non-HDL Cholesterol 10/11/2024 187  mg/dL Final    Magnesium 10/11/2024 1.9  1.6 - 2.6 mg/dL Final    Phosphorus 10/11/2024 3.3  2.7 - 4.5 mg/dL Final    T3, Total 10/11/2024 121  60 - 180 ng/dL Final    T4, Total 10/11/2024 7.6  4.5 - 11.5 ug/dL Final    TSH 10/11/2024 0.804  0.400 - 4.000 uIU/mL Final    Free T4 10/11/2024 0.89  0.71 - 1.51 ng/dL Final    Vitamin B-12 10/11/2024 428  210 - 950 pg/mL Final    Thiamine 10/11/2024 67  38 - 122 ug/L Final    Vit D, 25-Hydroxy 10/11/2024 39  30 - 96 ng/mL Final   Lab Visit  on 09/30/2024   Component Date Value Ref Range Status    NIL 09/30/2024 0.64720  IU/mL Final    TB1 - Nil 09/30/2024 -0.002  IU/mL Final    TB2 - Nil 09/30/2024 0.010  IU/mL Final    Mitogen - Nil 09/30/2024 9.961  IU/mL Final    TB Gold Plus 09/30/2024 Negative  Negative Final   Lab Visit on 09/16/2024   Component Date Value Ref Range Status    CEA 09/16/2024 2.1  0.0 - 5.0 ng/mL Final     Imaging:  No results found. However, due to the size of the patient record, not all encounters were searched. Please check Results Review for a complete set of results.    Note: I have independently reviewed any/all imaging/labs/tests and agree with the report (s) as documented.  Any discrepancies will be as noted/demarcated by free text.  CHANTELLE CHING 10/17/2024    ASSESSMENT:  1. Chronic migraine with aura without status migrainosus, not intractable          PLAN:  - Discussed symptoms appear to be consistent with migraines. Discussed this with patient along with treatment options and patient agreed with the following plan  - ppx - continue  botox  - Botox administered in clinic for Chronic Migraine (see below)   - abortive - continue ubrelvy  - nausea - continue zofran  - trouble w/ sleep - recently started acupuncture, defers referrals to pcp or sleep med or to begin elavil due to concern w/ interaction w/ effexor  - avoid nsaids as they are c/i 2/2 gastrectomy  - avoid triptans as recommended by psychiatry 2/2 potential serotinin syndrome w/ effexor for depression  - decreased neck ROM, tightness, and cervical scoliosis - continue conservative treatments, consider PT in future  - increased stress recently 2/2 partner's passing, continue therapy and mgmt per pcp  - RTC in 12 weeks for repeat Botox injections or sooner if needed     Orders Placed This Encounter    onabotulinumtoxina injection 200 Units           All questions and concerns addressed.  Patient verbalizes understanding and is agreeable with the above stated  treatment plan.      PROCEDURE NOTE:  BOTOX was performed as an indicated therapy for intractable chronic migraine headaches given that the patient failed more than 2 headache medications    Two patient identifiers were confirmed with the patient prior to performing this procedure. A time out to determine correct patient and and agreement on procedure performed was conducted prior to the procedure.      Botulinum Toxin Injection Procedure   Procedure: Botulinum toxin injection (18370)  After risks and benefits were explained including bleeding, infection, worsening of pain, damage to the areas being injected, weakness of muscles, loss of muscle control, dysphagia if injecting the head or neck, facial droop if injecting the facial area, painful injection, allergic or other reaction to the medications being injected, and the failure of the procedure to help the problem, a signed consent was obtained.   The patient was placed in a comfortable area and the sites to be treated were identified.The area to be treated was prepped three times with alcohol and the alcohol allowed to dry. Next, a 30 gauge needle was used to inject the medication in the area to be treated.      Total Botox used: 155 Units   Botox wastage: 45 Units     Injection sites:    muscle bilaterally ( a total of 10 units divided into 2 sites)   Procerus muscle (5 units)   Frontalis muscle bilaterally (a total of 20 units divided into 4 sites)   Temporalis muscle bilaterally (a total of 40 units divided into 8 sites)   Occipitalis muscle bilaterally (a total of 30 units divided into 6 sites)   Cervical paraspinal muscles (a total of 20 units divided into 4 sites)   Trapezius muscle bilaterally (a total of 30 units divided into 6 sites)   Complications: none   RTC for the next Botox injection: 12 weeks     The patient tolerated the procedure well and did not experience any complications.     CC: Erica Pineda MD Sarena Patel,  PA-C [facility] Neu[facility]   [phone]

## 2024-10-21 ENCOUNTER — HOSPITAL ENCOUNTER (OUTPATIENT)
Dept: RADIOLOGY | Facility: HOSPITAL | Age: 44
Discharge: HOME | End: 2024-10-21
Attending: NURSE PRACTITIONER
Payer: COMMERCIAL

## 2024-10-21 DIAGNOSIS — K21.9 GASTROESOPHAGEAL REFLUX DISEASE, UNSPECIFIED WHETHER ESOPHAGITIS PRESENT: ICD-10-CM

## 2024-10-21 DIAGNOSIS — E66.01 CLASS 3 SEVERE OBESITY WITH SERIOUS COMORBIDITY AND BODY MASS INDEX (BMI) OF 50.0 TO 59.9 IN ADULT, UNSPECIFIED OBESITY TYPE: ICD-10-CM

## 2024-10-21 DIAGNOSIS — Z98.84 S/P LAPAROSCOPIC SLEEVE GASTRECTOMY: ICD-10-CM

## 2024-10-21 DIAGNOSIS — E66.813 CLASS 3 SEVERE OBESITY WITH SERIOUS COMORBIDITY AND BODY MASS INDEX (BMI) OF 50.0 TO 59.9 IN ADULT, UNSPECIFIED OBESITY TYPE: ICD-10-CM

## 2024-10-21 PROCEDURE — A9698 NON-RAD CONTRAST MATERIALNOC: HCPCS | Performed by: NURSE PRACTITIONER

## 2024-10-21 PROCEDURE — 74240 X-RAY XM UPR GI TRC 1CNTRST: CPT | Mod: TC,FY

## 2024-10-21 PROCEDURE — 74240 X-RAY XM UPR GI TRC 1CNTRST: CPT | Mod: 26,,, | Performed by: RADIOLOGY

## 2024-10-21 PROCEDURE — 25500020 PHARM REV CODE 255: Performed by: NURSE PRACTITIONER

## 2024-10-21 RX ADMIN — BARIUM SULFATE 245 ML: 0.6 SUSPENSION ORAL at 09:10

## 2024-10-22 ENCOUNTER — OFFICE VISIT (OUTPATIENT)
Dept: GYNECOLOGIC ONCOLOGY | Facility: CLINIC | Age: 44
End: 2024-10-22
Payer: COMMERCIAL

## 2024-10-22 VITALS
OXYGEN SATURATION: 98 % | TEMPERATURE: 98 F | HEIGHT: 64 IN | HEART RATE: 89 BPM | DIASTOLIC BLOOD PRESSURE: 80 MMHG | BODY MASS INDEX: 50.02 KG/M2 | RESPIRATION RATE: 18 BRPM | WEIGHT: 293 LBS | SYSTOLIC BLOOD PRESSURE: 122 MMHG

## 2024-10-22 DIAGNOSIS — D39.10 BORDERLINE EPITHELIAL NEOPLASM OF OVARY: Primary | ICD-10-CM

## 2024-10-22 DIAGNOSIS — C56.9 MUCINOUS TUMOR, OF LOW MALIGNANT POTENTIAL: ICD-10-CM

## 2024-10-22 PROCEDURE — 3079F DIAST BP 80-89 MM HG: CPT | Mod: CPTII,S$GLB,, | Performed by: OBSTETRICS & GYNECOLOGY

## 2024-10-22 PROCEDURE — 99214 OFFICE O/P EST MOD 30 MIN: CPT | Mod: S$GLB,,, | Performed by: OBSTETRICS & GYNECOLOGY

## 2024-10-22 PROCEDURE — 3044F HG A1C LEVEL LT 7.0%: CPT | Mod: CPTII,S$GLB,, | Performed by: OBSTETRICS & GYNECOLOGY

## 2024-10-22 PROCEDURE — 3008F BODY MASS INDEX DOCD: CPT | Mod: CPTII,S$GLB,, | Performed by: OBSTETRICS & GYNECOLOGY

## 2024-10-22 PROCEDURE — 1160F RVW MEDS BY RX/DR IN RCRD: CPT | Mod: CPTII,S$GLB,, | Performed by: OBSTETRICS & GYNECOLOGY

## 2024-10-22 PROCEDURE — 3074F SYST BP LT 130 MM HG: CPT | Mod: CPTII,S$GLB,, | Performed by: OBSTETRICS & GYNECOLOGY

## 2024-10-22 PROCEDURE — 99999 PR PBB SHADOW E&M-EST. PATIENT-LVL V: CPT | Mod: PBBFAC,,, | Performed by: OBSTETRICS & GYNECOLOGY

## 2024-10-22 PROCEDURE — 1159F MED LIST DOCD IN RCRD: CPT | Mod: CPTII,S$GLB,, | Performed by: OBSTETRICS & GYNECOLOGY

## 2024-10-22 NOTE — PROGRESS NOTES
Subjective:      Patient ID: Fran Higgins is a 44 y.o. female.    Chief Complaint: Follow-up (7 mth follow up )      HPI  INTERVAL HISTORY  CC: h/o right ovarian mucinous borderline tumor     Fran Higgins is a 44 y.o. who presents for routine surveillance visit.  No GYN concerns, previous patient of Dr. Kaufman.  She has no vaginal bleeding or discharge.  She is having no nausea or vomiting.  She has no bowel or bladder complaints.  She has no pain issues.       Follows with [facility] for HRT and well woman care.      CEA 1.8 >2.5 (normal 0-3)>2.1    CT due to increased CEA from baseline 3/13/2024 shows no evidence of disease.     Disease free interval 4.5 years.     _________________  HPI or ONCOLOGIC HISTORY  6/25/2019: LSO by Dr. 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  Size of tumor: 11.8 CM.  Capsule involvement cannot be determined secondary to fragmentation.  Microinvasion is present: 5 foci. Intraepithelial carcinoma is present. Benign fallopian tube and fimbriated end with no significant histopathologic alterations. Pathologic staging: pT1a      3/9/2020: Robotic assisted total hysterectomy and RSO.    PATHOLOGY:  139 g uterus with secretory endometrium and focal adenomyosis, multiple leiomyomas.  Right ovary and fallopian tube benign. (After completion hysterectomy and RSO she has had problems with depression and hormone replacement. Seen by Katia Mahan and Sherrie for depression. On  venlafaxine. Depression and VMS is better.)        6/22/2021 Gastric Sleeve    Review of Systems   Constitutional:  Negative for appetite change, chills, fatigue and fever.   HENT:  Negative for mouth sores.    Respiratory:  Negative for cough and shortness of breath.    Cardiovascular:  Negative for leg swelling.   Gastrointestinal:  Negative for abdominal pain, blood in stool, constipation and diarrhea.   Endocrine: Negative for  "cold intolerance.   Genitourinary:  Negative for dysuria and vaginal bleeding.   Musculoskeletal:  Negative for myalgias.   Skin:  Negative for rash.   Allergic/Immunologic: Negative.    Neurological:  Negative for weakness and numbness.   Hematological:  Negative for adenopathy. Does not bruise/bleed easily.   Psychiatric/Behavioral:  Negative for confusion.      /80 (BP Location: Left arm, Patient Position: Sitting)   Pulse 89   Temp 98.2 °F (36.8 °C) (Oral)   Resp 18   Ht 5' 4" (1.626 m)   Wt (!) 138.8 kg (306 lb)   LMP 01/17/2020   SpO2 98%   BMI 52.52 kg/m²       Objective:   Physical Exam:   Constitutional: She is oriented to person, place, and time. She appears well-developed and well-nourished. No distress.    HENT:   Head: Normocephalic and atraumatic.    Eyes: No scleral icterus.     Cardiovascular:       Exam reveals no cyanosis and no edema.        Pulmonary/Chest: Effort normal. No respiratory distress.        Abdominal: Soft. She exhibits no distension and no mass. There is no abdominal tenderness.     Genitourinary:    Vagina normal.      Pelvic exam was performed with patient supine.   There is no lesion on the right labia. There is no lesion on the left labia. Right adnexum displays no mass. Left adnexum displays no mass. No vaginal discharge or bleeding in the vagina. Cervix is absent.Uterus is absent.           Musculoskeletal: Normal range of motion and moves all extremeties. No edema.      Lymphadenopathy: No inguinal adenopathy noted on the right or left side.        Right: No supraclavicular adenopathy present.        Left: No supraclavicular adenopathy present.    Neurological: She is alert and oriented to person, place, and time.    Skin: Skin is warm and dry. No rash noted. No cyanosis. No pallor.    Psychiatric: She has a normal mood and affect.       Assessment:     1. Borderline epithelial neoplasm of ovary    2. Mucinous tumor, of low malignant potential          Plan: "     Orders Placed This Encounter   Procedures    CEA     No evidence of disease on today's exam  Feels well, no new complaints.   CEA normal and stable  RTC in 6 months for routine surveillance with CEA.    I spent approximately 30 minutes reviewing the available records and evaluating the patient, out of which over 50% of the time was spent face to face with the patient in counseling and coordinating this patient's care.

## 2024-10-23 ENCOUNTER — CLINICAL SUPPORT (OUTPATIENT)
Dept: REHABILITATION | Facility: HOSPITAL | Age: 44
End: 2024-10-23
Payer: COMMERCIAL

## 2024-10-23 DIAGNOSIS — M54.50 LUMBAR PAIN: Primary | ICD-10-CM

## 2024-10-23 PROCEDURE — 97112 NEUROMUSCULAR REEDUCATION: CPT | Mod: CQ

## 2024-10-23 PROCEDURE — 97110 THERAPEUTIC EXERCISES: CPT | Mod: CQ

## 2024-10-23 NOTE — PROGRESS NOTES
"[facility]   Physical Therapy Treatment Note      Name: Fran Kellerack  Clinic Number: [id number]    Therapy Diagnosis:   Encounter Diagnosis   Name Primary?    Lumbar pain Yes     Physician: Arun Garcia MD    Visit Date: 10/23/2024    Physician Orders: PT Eval and Treat   Medical Diagnosis from Referral:   M54.16 (ICD-10-CM) - Lumbar radiculopathy   M51.37 (ICD-10-CM) - DDD (degenerative disc disease), lumbosacral      Evaluation Date: 10/10/2024  Authorization Period Expiration: 9/25/24  Plan of Care Expiration: 11/22/24  Progress Note Due: 11/10/24  Visit # / Visits authorized: 1/ 1, 1/20  FOTO: 1/ 3     Precautions: Standard      PTA Visit #: 1/5     Time In: 4:52 pm   Time Out: 5:28 pm   Total Billable Time: 36 minutes    Subjective     Pt reports: that she is feeling about the same, but will be going for her injection tomorrow.  She was compliant with home exercise program.  Response to previous treatment: last session was initial eval  Functional change: none at this time     Pain: 3/10  Location: low back R>L     Objective      Objective Measures updated at progress report unless specified.     Treatment     Fran received the treatments listed below:      therapeutic exercises to develop strength, endurance, ROM, and flexibility for 24 minutes including:  Bridge 2x10 3"   SL clams 2x10 3" B  SL hip abduction 2x10 B   Supine HSS c/ strap 3x30" BLE   Standing hip extension 2x10 victor hugo   HEP review     neuromuscular re-education activities to improve: Posture, Motor Control and Stabilization for 12 minutes. The following activities were included:  TA bracing 5" 2x10   TA bracing c/ mini march 15x   Pallof press DBL RTB 2x10 victor hugo  No money RTB 3" 2x10     therapeutic activities to improve functional performance for 0  minutes, including:        Patient Education and Home Exercises       Education provided:   - HEP  - education on holding HEP for 48 hours after injection "     Written Home Exercises Provided: yes. Exercises were reviewed and Fran was able to demonstrate them prior to the end of the session.  Fran demonstrated good  understanding of the education provided. See EMR under Patient Instructions for exercises provided during therapy sessions    Assessment     Initiated therapy program following pt's initial evaluation with no adverse effects. Initiated activities for core and postural stabilization, as well as LE strengthening. Pt exhibited good form with sidelying hip abductions and extensions, however given tactile cues during TA braces with mini marches for correct form. Will continue to progress pt per her tolerance.     Fran Is progressing well towards her goals.   Pt prognosis is Good.     Pt will continue to benefit from skilled outpatient physical therapy to address the deficits listed in the problem list box on initial evaluation, provide pt/family education and to maximize pt's level of independence in the home and community environment.     Pt's spiritual, cultural and educational needs considered and pt agreeable to plan of care and goals.     Anticipated barriers to physical therapy:  chronicity of symptoms, scoliosis     Goals:   Short Term Goals: 2 weeks   Pt will be compliant with HEP to supplement PT with decreasing pain and improving functional mobility     Long Term Goals: 6 weeks   Pt will improve FOTO score to at least 61 in order to demo improved functional mobility  Pt will improve LE MMT scores by at least 1/2 grade where deficits noted in order to improve strength for functional tasks  Pt will report back pain </= 5/10 at worst in order to be able to perform ADLs with less difficulty     Plan     Continue with established POC.     PT/PTA met face to face to discuss pt's treatment plan and progress towards established goals. Pt will be seen by a physical therapist minimally every 6th visit or every 30 days.      Ruth Tapia PTA

## 2024-10-24 ENCOUNTER — HOSPITAL ENCOUNTER (OUTPATIENT)
Facility: HOSPITAL | Age: 44
Discharge: HOME | End: 2024-10-24
Attending: EMERGENCY MEDICINE | Admitting: EMERGENCY MEDICINE
Payer: COMMERCIAL

## 2024-10-24 VITALS
TEMPERATURE: 98 F | SYSTOLIC BLOOD PRESSURE: 119 MMHG | WEIGHT: 293 LBS | HEIGHT: 64 IN | BODY MASS INDEX: 50.02 KG/M2 | RESPIRATION RATE: 17 BRPM | HEART RATE: 89 BPM | DIASTOLIC BLOOD PRESSURE: 84 MMHG | OXYGEN SATURATION: 98 %

## 2024-10-24 DIAGNOSIS — G89.29 CHRONIC PAIN: ICD-10-CM

## 2024-10-24 LAB — POCT GLUCOSE: 84 MG/DL (ref 70–110)

## 2024-10-24 PROCEDURE — 64484 NJX AA&/STRD TFRM EPI L/S EA: CPT | Mod: RT,,, | Performed by: EMERGENCY MEDICINE

## 2024-10-24 PROCEDURE — 63600175 PHARM REV CODE 636 W HCPCS: Performed by: EMERGENCY MEDICINE

## 2024-10-24 PROCEDURE — 25500020 PHARM REV CODE 255: Performed by: EMERGENCY MEDICINE

## 2024-10-24 PROCEDURE — 64483 NJX AA&/STRD TFRM EPI L/S 1: CPT | Mod: RT | Performed by: EMERGENCY MEDICINE

## 2024-10-24 PROCEDURE — 82962 GLUCOSE BLOOD TEST: CPT | Performed by: EMERGENCY MEDICINE

## 2024-10-24 PROCEDURE — 64484 NJX AA&/STRD TFRM EPI L/S EA: CPT | Mod: RT | Performed by: EMERGENCY MEDICINE

## 2024-10-24 PROCEDURE — 99152 MOD SED SAME PHYS/QHP 5/>YRS: CPT | Performed by: EMERGENCY MEDICINE

## 2024-10-24 PROCEDURE — 64483 NJX AA&/STRD TFRM EPI L/S 1: CPT | Mod: RT,,, | Performed by: EMERGENCY MEDICINE

## 2024-10-24 RX ORDER — FENTANYL CITRATE 50 UG/ML
INJECTION, SOLUTION INTRAMUSCULAR; INTRAVENOUS
Status: DISCONTINUED | OUTPATIENT
Start: 2024-10-24 | End: 2024-10-24 | Stop reason: HOSPADM

## 2024-10-24 RX ORDER — DEXAMETHASONE SODIUM PHOSPHATE 10 MG/ML
INJECTION INTRAMUSCULAR; INTRAVENOUS
Status: DISCONTINUED | OUTPATIENT
Start: 2024-10-24 | End: 2024-10-24 | Stop reason: HOSPADM

## 2024-10-24 RX ORDER — LIDOCAINE HYDROCHLORIDE 10 MG/ML
INJECTION, SOLUTION EPIDURAL; INFILTRATION; INTRACAUDAL; PERINEURAL
Status: DISCONTINUED | OUTPATIENT
Start: 2024-10-24 | End: 2024-10-24 | Stop reason: HOSPADM

## 2024-10-24 RX ORDER — LIDOCAINE HYDROCHLORIDE 20 MG/ML
INJECTION, SOLUTION EPIDURAL; INFILTRATION; INTRACAUDAL; PERINEURAL
Status: DISCONTINUED | OUTPATIENT
Start: 2024-10-24 | End: 2024-10-24 | Stop reason: HOSPADM

## 2024-10-24 RX ORDER — MIDAZOLAM HYDROCHLORIDE 1 MG/ML
INJECTION INTRAMUSCULAR; INTRAVENOUS
Status: DISCONTINUED | OUTPATIENT
Start: 2024-10-24 | End: 2024-10-24 | Stop reason: HOSPADM

## 2024-10-25 ENCOUNTER — HOSPITAL ENCOUNTER (EMERGENCY)
Facility: HOSPITAL | Age: 44
Discharge: HOME | End: 2024-10-25
Attending: EMERGENCY MEDICINE
Payer: COMMERCIAL

## 2024-10-25 ENCOUNTER — PATIENT MESSAGE (OUTPATIENT)
Dept: PAIN MEDICINE | Facility: CLINIC | Age: 44
End: 2024-10-25
Payer: COMMERCIAL

## 2024-10-25 VITALS
RESPIRATION RATE: 19 BRPM | WEIGHT: 293 LBS | TEMPERATURE: 98 F | SYSTOLIC BLOOD PRESSURE: 118 MMHG | OXYGEN SATURATION: 99 % | BODY MASS INDEX: 50.02 KG/M2 | HEART RATE: 79 BPM | DIASTOLIC BLOOD PRESSURE: 57 MMHG | HEIGHT: 64 IN

## 2024-10-25 DIAGNOSIS — Z92.241 STATUS POST EPIDURAL STEROID INJECTION: ICD-10-CM

## 2024-10-25 DIAGNOSIS — R34 DECREASED URINE OUTPUT: Primary | ICD-10-CM

## 2024-10-25 LAB
BACTERIA #/AREA URNS AUTO: ABNORMAL /HPF
BILIRUB UR QL STRIP: NEGATIVE
BUN SERPL-MCNC: 13 MG/DL (ref 6–30)
CHLORIDE SERPL-SCNC: 102 MMOL/L (ref 95–110)
CLARITY UR REFRACT.AUTO: ABNORMAL
COLOR UR AUTO: YELLOW
CREAT SERPL-MCNC: 0.9 MG/DL (ref 0.5–1.4)
GLUCOSE SERPL-MCNC: 168 MG/DL (ref 70–110)
GLUCOSE UR QL STRIP: NEGATIVE
HCT VFR BLD CALC: 37 %PCV (ref 36–54)
HGB UR QL STRIP: NEGATIVE
KETONES UR QL STRIP: NEGATIVE
LEUKOCYTE ESTERASE UR QL STRIP: ABNORMAL
MICROSCOPIC COMMENT: ABNORMAL
NITRITE UR QL STRIP: NEGATIVE
PH UR STRIP: 6 [PH] (ref 5–8)
POC IONIZED CALCIUM: 1.21 MMOL/L (ref 1.06–1.42)
POC TCO2 (MEASURED): 25 MMOL/L (ref 23–29)
POTASSIUM BLD-SCNC: 3.9 MMOL/L (ref 3.5–5.1)
PROT UR QL STRIP: NEGATIVE
RBC #/AREA URNS AUTO: 4 /HPF (ref 0–4)
SAMPLE: ABNORMAL
SODIUM BLD-SCNC: 138 MMOL/L (ref 136–145)
SP GR UR STRIP: 1.01 (ref 1–1.03)
SQUAMOUS #/AREA URNS AUTO: 13 /HPF
URN SPEC COLLECT METH UR: ABNORMAL
WBC #/AREA URNS AUTO: 14 /HPF (ref 0–5)

## 2024-10-25 PROCEDURE — 87086 URINE CULTURE/COLONY COUNT: CPT | Performed by: PHYSICIAN ASSISTANT

## 2024-10-25 PROCEDURE — 99283 EMERGENCY DEPT VISIT LOW MDM: CPT

## 2024-10-25 PROCEDURE — 81001 URINALYSIS AUTO W/SCOPE: CPT | Performed by: PHYSICIAN ASSISTANT

## 2024-10-26 LAB
BACTERIA UR CULT: NORMAL
BACTERIA UR CULT: NORMAL

## 2024-10-28 ENCOUNTER — TELEPHONE (OUTPATIENT)
Dept: PAIN MEDICINE | Facility: CLINIC | Age: 44
End: 2024-10-28
Payer: COMMERCIAL

## 2024-10-30 ENCOUNTER — CLINICAL SUPPORT (OUTPATIENT)
Dept: REHABILITATION | Facility: HOSPITAL | Age: 44
End: 2024-10-30
Payer: COMMERCIAL

## 2024-10-30 DIAGNOSIS — M54.50 LUMBAR PAIN: Primary | ICD-10-CM

## 2024-10-30 PROCEDURE — 97110 THERAPEUTIC EXERCISES: CPT | Mod: CQ

## 2024-10-30 PROCEDURE — 97112 NEUROMUSCULAR REEDUCATION: CPT | Mod: CQ

## 2024-11-02 ENCOUNTER — PATIENT MESSAGE (OUTPATIENT)
Dept: BARIATRICS | Facility: CLINIC | Age: 44
End: 2024-11-02
Payer: COMMERCIAL

## 2024-11-03 DIAGNOSIS — L73.2 HIDRADENITIS SUPPURATIVA: ICD-10-CM

## 2024-11-04 RX ORDER — METFORMIN HYDROCHLORIDE 500 MG/1
TABLET, EXTENDED RELEASE ORAL
Qty: 90 TABLET | Refills: 1 | Status: SHIPPED | OUTPATIENT
Start: 2024-11-04

## 2024-11-05 ENCOUNTER — CLINICAL SUPPORT (OUTPATIENT)
Dept: REHABILITATION | Facility: HOSPITAL | Age: 44
End: 2024-11-05
Payer: COMMERCIAL

## 2024-11-05 DIAGNOSIS — M54.50 LUMBAR PAIN: Primary | ICD-10-CM

## 2024-11-05 PROCEDURE — 97112 NEUROMUSCULAR REEDUCATION: CPT | Mod: CQ

## 2024-11-05 PROCEDURE — 97110 THERAPEUTIC EXERCISES: CPT | Mod: CQ

## 2024-11-05 NOTE — PROGRESS NOTES
"[facility]   Physical Therapy Treatment Note      Name: Fran Kellerack  Clinic Number: [id number]    Therapy Diagnosis:   Encounter Diagnosis   Name Primary?    Lumbar pain Yes         Physician: Arun Garcia MD    Visit Date: 11/5/2024    Physician Orders: PT Eval and Treat   Medical Diagnosis from Referral:   M54.16 (ICD-10-CM) - Lumbar radiculopathy   M51.37 (ICD-10-CM) - DDD (degenerative disc disease), lumbosacral      Evaluation Date: 10/10/2024  Authorization Period Expiration: 9/25/24  Plan of Care Expiration: 11/22/24  Progress Note Due: 11/10/24  Visit # / Visits authorized: 1/ 1, 2/20  FOTO: 1/ 3     Precautions: Standard      PTA Visit #: 2/5     Time In: 5:40 pm arrived late  Time Out: 6:05 pm   Total Billable Time: 25 minutes    Subjective     Pt reports: the injection did help.  She was compliant with home exercise program.  Response to previous treatment: no adverse effects   Functional change: none at this time     Pain: 3/10  Location: low back R>L     Objective      Objective Measures updated at progress report unless specified.     Treatment     Fran received the treatments listed below:      therapeutic exercises to develop strength, endurance, ROM, and flexibility for 10 minutes including:  Bridge 2x10 3"   SL clams 2x10 3" B  SL hip abduction 2x10 B   Supine HSS c/ strap 3x30" BLE   Standing hip extension 2x10 victor hugo   HEP review     neuromuscular re-education activities to improve: Posture, Motor Control and Stabilization for 15 minutes. The following activities were included:  TA bracing 5" 2x10   TA bracing c/ mini march 15x   TA brace c/ SB isometric 3" 2x10   Braced marches GTB 2x10   Pallof press DBL GTB 2x10 victor hugo  No money RTB 3" 2x10     therapeutic activities to improve functional performance for 0  minutes, including:        Patient Education and Home Exercises       Education provided:   - HEP      Written Home Exercises Provided: yes. Exercises " were reviewed and Fran was able to demonstrate them prior to the end of the session.  Fran demonstrated good  understanding of the education provided. See EMR under Patient Instructions for exercises provided during therapy sessions    Assessment     Pt did well today as she completed program without any issues. Will continue to progress strength and core stabilization.     Fran Is progressing well towards her goals.   Pt prognosis is Good.     Pt will continue to benefit from skilled outpatient physical therapy to address the deficits listed in the problem list box on initial evaluation, provide pt/family education and to maximize pt's level of independence in the home and community environment.     Pt's spiritual, cultural and educational needs considered and pt agreeable to plan of care and goals.     Anticipated barriers to physical therapy:  chronicity of symptoms, scoliosis     Goals:   Short Term Goals: 2 weeks   Pt will be compliant with HEP to supplement PT with decreasing pain and improving functional mobility     Long Term Goals: 6 weeks   Pt will improve FOTO score to at least 61 in order to demo improved functional mobility  Pt will improve LE MMT scores by at least 1/2 grade where deficits noted in order to improve strength for functional tasks  Pt will report back pain </= 5/10 at worst in order to be able to perform ADLs with less difficulty     Plan     Continue with established POC.     PT/PTA met face to face to discuss pt's treatment plan and progress towards established goals. Pt will be seen by a physical therapist minimally every 6th visit or every 30 days.      Khanh Marcos PTA

## 2024-11-07 ENCOUNTER — CLINICAL SUPPORT (OUTPATIENT)
Dept: REHABILITATION | Facility: HOSPITAL | Age: 44
End: 2024-11-07
Payer: COMMERCIAL

## 2024-11-07 DIAGNOSIS — M54.50 LUMBAR PAIN: Primary | ICD-10-CM

## 2024-11-07 PROCEDURE — 97110 THERAPEUTIC EXERCISES: CPT | Mod: CQ

## 2024-11-07 PROCEDURE — 97112 NEUROMUSCULAR REEDUCATION: CPT | Mod: CQ

## 2024-11-07 NOTE — PROGRESS NOTES
"[facility]   Physical Therapy Treatment Note      Name: Fran Spencer Gisela  Clinic Number: [id number]    Therapy Diagnosis:   Encounter Diagnosis   Name Primary?    Lumbar pain Yes         Physician: Arun Garcia MD    Visit Date: 11/7/2024    Physician Orders: PT Eval and Treat   Medical Diagnosis from Referral:   M54.16 (ICD-10-CM) - Lumbar radiculopathy   M51.37 (ICD-10-CM) - DDD (degenerative disc disease), lumbosacral      Evaluation Date: 10/10/2024  Authorization Period Expiration: 9/25/24  Plan of Care Expiration: 11/22/24  Progress Note Due: 11/10/24  Visit # / Visits authorized: 1/ 1, 4/20  FOTO: 1/ 3     Precautions: Standard      PTA Visit #: 4/5     Time In: 5:40 pm arrived late  Time Out: 6:15 pm   Total Billable Time: 35 minutes    Subjective     Pt reports: tlow back is fine the upper back is hurting.  She was compliant with home exercise program.  Response to previous treatment: no adverse effects   Functional change: none at this time     Pain: 1/10, 6/10  Location: low back R>L / upper back    Objective      Objective Measures updated at progress report unless specified.     Treatment     Fran received the treatments listed below:      therapeutic exercises to develop strength, endurance, ROM, and flexibility for 10 minutes including:  Bridge 2x10 3"   SL clams 2x10 3" B  SL hip abduction 2x10 B   Supine HSS c/ strap 3x30" BLE   Standing hip extension 2x10 victor hugo   HEP review     neuromuscular re-education activities to improve: Posture, Motor Control and Stabilization for 25 minutes. The following activities were included:  TA bracing 5" 2x10   TA bracing c/ mini march 15x   TA brace c/ SB isometric 3" 2x10   Braced marches GTB 2x10   Pallof press DBL GTB 2x10 victor hugo  No money RTB 3" 2x10   Seated fwd YMB press 2x10    therapeutic activities to improve functional performance for 0  minutes, including:        Patient Education and Home Exercises       Education " provided:   - HEP      Written Home Exercises Provided: yes. Exercises were reviewed and Fran was able to demonstrate them prior to the end of the session.  Fran demonstrated good  understanding of the education provided. See EMR under Patient Instructions for exercises provided during therapy sessions    Assessment     Pt present to therapy with improved LB sx and fatigue from job duties. Pt did well today as she completed program without any issues. Will continue to progress strength and core stabilization.     Fran Is progressing well towards her goals.   Pt prognosis is Good.     Pt will continue to benefit from skilled outpatient physical therapy to address the deficits listed in the problem list box on initial evaluation, provide pt/family education and to maximize pt's level of independence in the home and community environment.     Pt's spiritual, cultural and educational needs considered and pt agreeable to plan of care and goals.     Anticipated barriers to physical therapy:  chronicity of symptoms, scoliosis     Goals:   Short Term Goals: 2 weeks   Pt will be compliant with HEP to supplement PT with decreasing pain and improving functional mobility     Long Term Goals: 6 weeks   Pt will improve FOTO score to at least 61 in order to demo improved functional mobility  Pt will improve LE MMT scores by at least 1/2 grade where deficits noted in order to improve strength for functional tasks  Pt will report back pain </= 5/10 at worst in order to be able to perform ADLs with less difficulty     Plan     Continue with established POC.     PT/PTA met face to face to discuss pt's treatment plan and progress towards established goals. Pt will be seen by a physical therapist minimally every 6th visit or every 30 days.      Khanh Marcos PTA

## 2024-11-11 ENCOUNTER — PATIENT MESSAGE (OUTPATIENT)
Dept: BARIATRICS | Facility: CLINIC | Age: 44
End: 2024-11-11

## 2024-11-11 ENCOUNTER — CLINICAL SUPPORT (OUTPATIENT)
Dept: BARIATRICS | Facility: CLINIC | Age: 44
End: 2024-11-11
Payer: COMMERCIAL

## 2024-11-11 VITALS — BODY MASS INDEX: 52.7 KG/M2 | WEIGHT: 293 LBS

## 2024-11-11 DIAGNOSIS — E66.01 MORBID OBESITY WITH BMI OF 50.0-59.9, ADULT: ICD-10-CM

## 2024-11-11 DIAGNOSIS — Z98.84 S/P LAPAROSCOPIC SLEEVE GASTRECTOMY: ICD-10-CM

## 2024-11-11 DIAGNOSIS — Z71.3 DIETARY COUNSELING AND SURVEILLANCE: Primary | ICD-10-CM

## 2024-11-11 PROCEDURE — 97803 MED NUTRITION INDIV SUBSEQ: CPT | Mod: 95,,, | Performed by: DIETITIAN, REGISTERED

## 2024-11-11 PROCEDURE — 99499 UNLISTED E&M SERVICE: CPT | Mod: 95,,, | Performed by: DIETITIAN, REGISTERED

## 2024-11-11 NOTE — PROGRESS NOTES
The patient location is:  Patient Home   The chief complaint leading to consultation is: morbid obesity in work up for possible revision  Visit type: Virtual visit with synchronous audio and video  Total time spent with patient: 45 minutes  Each patient to whom he or she provides medical services by telemedicine is:  (1) informed of the relationship between the physician and patient and the respective role of any other health care provider with respect to management of the patient; and (2) notified that he or she may decline to receive medical services by telemedicine and may withdraw from such care at any time.  Nutrition Handout located in AVS section of pt's [facility] and/or sent as a message via [facility] portal.  Pt informed of handout and how to access this information in [email or web address].  NUTRITION NOTE    Referring Physician: Jaziel Whitman M.D.   Reason for MNT Referral: 6 months medically supervised diet counseling and surveillance pending laproscopic  sleeve to [facility] or [facility]    Patient presents for follow-up visit for MSD with weight loss over the past month; 8 lbs total weight loss by making numerous dietary and lifestyle changes.  Started [facility] with [facility]    CLINICAL DATA:  44 y.o. female.    Current Weight: 307 lbs  Weight Change Since Initial Visit: Loss of 8 lbs  Ideal Body Weight: 138 lbs  Body mass index is 52.7 kg/m².  Initial Wt: 315 lbs    DAILY NUTRITIONAL NEEDS: pre-op nutritional bariatric guidelines to promote weight loss  [id number] Calories    Grams Protein    CURRENT DIET:  Reduced-calorie diet.  Diet Recall: Food records are not present.  B: Fairlife protein shake  S: apple or quest chips  L: Soup or chicken/turkey with veggies  D: Lean protein ie turkey and cheese rollup  Diet Includes: 4 small meals including 1 shake per day  Meal Pattern: Improved.  Protein Supplements: 1 per day.  Snacking: Adequate. Snacks include healthy  choices.  Vegetables: Likes a variety. Eats daily.  Fruits: Likes a variety. Eats daily.  Beverages:water, sugar-free beverages/powders/drops, and diet/unsweetened tea  Dining Out:  Weekly. Mostly restaurants and take-out.  Cooking at home: Daily. Mostly Cooking at home: Weekly. Mostly baked/roast, grilled/broiled, air-fried, and steamed beef, pork, chicken/turkey, vegetables, and potatoes sweet    CURRENT EXERCISE:  Adequate  PT twice a week  Walking 30-60 minutes 3 times per week    Vitamins / Minerals / Herbs:   Alive potency B vitamins  Calcium in Alive    LABS:  Reviewed.      Food Allergies:   None reported    Social:  Works regular daytime shifts.  Alcohol: None.  Smoking: None.    ASSESSMENT:  Patient demonstrates some willingness to change lifestyle habits as evidenced by weight loss, good/increased exercise, including protein drinks, reduced sugar-sweetened beverages, increased fruits, and increased vegetables.    Doing well with working on greatest challenges (irregular meal patterns).    Barriers to Education:  none  Stage of Change:  action    NUTRITION DIAGNOSIS:  Morbid Obesity related to Excessive calorie intake as evidence by BMI.  Status: Decreased    PLAN/RECOMMENDATION:  Pt is a potential candidate for bariatric surgery     Diet: Maintain diet plan.  Work on Bariatric Nutrition Checklist.  Work on expanding variety of vegetables.  Work on gradually cutting back on starchy CHO in the diet.  5-6 meals per day  Reduce frequency of eating out/delivery.  More grocery shopping and meal preparation at home.  Return to clinic    Exercise: Maintain.    Behavior Modification: Begin to document food & activity logs daily.    Return to clinic in one month. Needs additional visit(s) with RD.    Communicated nutrition plan with bariatric team.    SESSION TIME:  30 minutes

## 2024-11-12 ENCOUNTER — PATIENT MESSAGE (OUTPATIENT)
Dept: BARIATRICS | Facility: CLINIC | Age: 44
End: 2024-11-12
Payer: COMMERCIAL

## 2024-11-14 DIAGNOSIS — L73.2 HIDRADENITIS: ICD-10-CM

## 2024-11-14 NOTE — PROGRESS NOTES
Interventional Pain Management - Established     Interval History 11/15/2024: The patient had L4/5 and L5/S1 right TFESI on 10/24/2024 and they report 60% pain relief.  She states that the pain radiating down her right leg has resolved.  She reports that she now has a pain that started 4 days ago on her right side of her lower back. There is no exacerbating factor, but the more she does the more it hurts. She has also noted a knot in the area.     Interval History 10/04/2024:  Since their last visit the pain has been unchanged. They are participating in physical therapy and are participating in home exercise plan.    Original HPI 09/25/2024: Fran Higgins is a 44 y.o. year old female patient who has a past medical history of Abnormal Pap smear of cervix, Abnormal uterine bleeding (AUB), Amblyopia, Anxiety, Cervical scoliosis, Depression, DUB (dysfunctional uterine bleeding), Fatigue, Galactorrhea in female- bilateral breast, Galactorrhea of both breasts, psychiatric care, Migraine with aura, not intractable, Morbid obesity, Mucinous tumor, of low malignant potential, Ovarian cyst, Pericardial cyst, Psychiatric problem, Sleep difficulties, Surgical menopause, Therapy, and Vaginal yeast infection. She presents in referral from No ref. provider found for lower back pain for over 20 years.     Original Pain Description:  The pain is located in the back and is radiating to the right hip, leg and the heel posteriorly . The pain is described as numbing and tingling. Exacerbating factors: daily activities. Mitigating factors medications. Symptoms interfere with daily activity. The patient feels like symptoms have been unchanged. Patient denies significant motor weakness. No red flags.     PAIN SCORES:  Best: Pain is 3  Current: Pain is 3  Worst: Pain is 10    6 weeks of Conservative therapy:  PT: Completed for neck  Chiro:  HEP: Participating    Treatments / Medications:   Neurontin 100 mg PRN qHS - makes  her feel like zombie  Xanax 0.5 mg  for insomnia and anxiety  Flexeril 5 mg PRN    Antiplatelets/Anticoagulants/Others:  Humira    Interventional Pain Procedures:   10/24/24 L4/5 and L5/S1 right TFESI    IMAGING:    MRI LUMBAR SPINE WITHOUT CONTRAST   09/28/2024  6 Lumbar vetrebra, lowest level considered L5/S1   L2/3: BBPDB->mild BL neural foraminal narrowing and mild spinal canal stenosis  L3/4: BBPDB & facet arthropathy->mild BL neural foraminal narrowing and mild spinal canal stenosis  L4/5: BBPDB & facet arthropathy->mild BL neural foraminal narrowing and mild spinal canal stenosis.  L5/1: BBPDB & facet arthropathy->mod right / severe left neural foraminal narrowing and mild spinal canal stenosis. Grade 1 AL. BL L5 pars defects     XR LUMBAR SPINE AP AND LAT WITH FLEX/EXT   03/07/2024  On neutral lateral view, there is grade 1 AL L5 on S1. Disc space height loss primarily involving L4-L5 and L5-S1 noting endplate degenerative changes primarily at L5-S1.  There is lower lumbar facet arthropathy.  There are pars defects at L5.  On flex/ex no new or worsening listhesis or facet malalignment.  AP spinal alignment is remarkable for mild dextroscoliotic curvature.  The bilateral sacroiliac joints are intact.     US MSK Soft-tissue right lower lumbar in clinic 11/15/24  No focal fluid collections, no masses noted, no cobblestoning appearance, normal ultrasound  Past Surgical History:   Procedure Laterality Date    HYSTERECTOMY  03/2020    INJECTION, SPINE, LUMBOSACRAL, TRANSFORAMINAL APPROACH Right 10/24/2024    Procedure: TFESI RT L4/5, L5/S1;  Surgeon: Arun Garcia MD;  Location: [facility];  Service: Pain Management;  Laterality: Right;  No ac    LAPAROSCOPIC SLEEVE GASTRECTOMY N/A 6/22/2021    Procedure: GASTRECTOMY, SLEEVE, LAPAROSCOPIC; with intraoperative egd;  Surgeon: Jaziel Whitman Jr., MD;  Location: [facility] OR [address] [place] [address];  Service: General;  Laterality: N/A;    ROBOT-ASSISTED LAPAROSCOPIC  ABDOMINAL HYSTERECTOMY USING DA ROMAIN XI N/A 3/9/2020    Procedure: XI ROBOTIC HYSTERECTOMY;  Surgeon: Jeff Kaufman MD;  Location: [facility] OR [address] [place] [address];  Service: Oncology;  Laterality: N/A;    ROBOT-ASSISTED LAPAROSCOPIC SALPINGO-OOPHORECTOMY Left 6/25/2019    Procedure: ROBOTIC SALPINGO-OOPHORECTOMY;  Surgeon: Marky Fox Jr., MD;  Location: [facility];  Service: OB/GYN;  Laterality: Left;    SALPINGOOPHORECTOMY Right 3/9/2020    Procedure: SALPINGO-OOPHORECTOMY  USO;  Surgeon: Jeff Kaufman MD;  Location: [facility] OR [address] [place] [address];  Service: Oncology;  Laterality: Right;    TONSILLECTOMY         Social History     Socioeconomic History    Marital status: Single    Number of children: 1   Occupational History    Occupation: Nurse circulator   Tobacco Use    Smoking status: Never    Smokeless tobacco: Never   Substance and Sexual Activity    Alcohol use: Yes     Comment: 2-3 drinks twice a year    Drug use: No    Sexual activity: Not Currently     Partners: Male     Birth control/protection: None, Condom   Other Topics Concern    Are you pregnant or think you may be? No    Breast-feeding No     Social Drivers of Health     Financial Resource Strain: Patient Declined (11/17/2023)    Overall Financial Resource Strain (CARDIA)     Difficulty of Paying Living Expenses: Patient declined   Food Insecurity: Patient Declined (11/17/2023)    Hunger Vital Sign     Worried About Running Out of Food in the Last Year: Patient declined     Ran Out of Food in the Last Year: Patient declined   Transportation Needs: Patient Declined (11/17/2023)    PRAPARE - Transportation     Lack of Transportation (Medical): Patient declined     Lack of Transportation (Non-Medical): Patient declined   Physical Activity: Insufficiently Active (11/17/2023)    Exercise Vital Sign     Days of Exercise per Week: 3 days     Minutes of Exercise per Session: 30 min   Stress: Stress Concern Present (11/17/2023)    [employer] -  "Occupational Stress Questionnaire     Feeling of Stress : To some extent   Housing Stability: Low Risk  (11/17/2023)    Housing Stability Vital Sign     Unable to Pay for Housing in the Last Year: No     Number of Places Lived in the Last Year: 1     Unstable Housing in the Last Year: No       Medications/Allergies: See med card    ROS:  GENERAL: No fever. No chills. No fatigue. Denies weight loss. Denies weight gain.  Back / musculoskeletal / neuro : See HPI    VITALS:   Vitals:    11/15/24 1608   BP: 122/80   Pulse: 101   Weight: (!) 139.3 kg (307 lb 1.6 oz)   Height: 5' 4" (1.626 m)   PainSc:   8   PainLoc: Back       Body mass index is 52.71 kg/m².      9/25/2024     1:09 PM   Last 3 PDI Scores   Pain Disability Index (PDI) 35       Physical Exam:  General appearance: Well appearing, in no acute distress, alert and oriented x3.  Psych:  Mood and affect appropriate.    PHYSICAL EXAM (Previously):   GENERAL: Well appearing, in no acute distress, alert and oriented x3.  PSYCH:  Mood and affect appropriate.  SKIN: Skin color, texture, turgor normal, no rashes or lesions.  HEENT:  Normocephalic, atraumatic. Cranial nerves grossly intact.  NECK: No pain to palpation over the cervical paraspinous muscles. No pain to palpation over facets. No pain with neck flexion, extension, or lateral flexion.   PULM: No evidence of respiratory difficulty, symmetric chest rise.  GI:  Non-distended  BACK: Normal range of motion. No pain to palpation over the spinous processes. No pain to palpation over facet joints. No pain with axial loading. There is no pain with palpation over the sacroiliac joints bilaterally.   EXTREMITIES: No deformities, edema, or skin discoloration.   MUSCULOSKELETAL: Shoulder, hip, and knee provocative maneuvers are negative. No atrophy is noted.  NEURO: Sensation is equal and appropriate bilaterally. Bilateral upper and lower extremity strength is normal and symmetric. Bilateral upper and lower extremity " coordination and muscle stretch reflexes are physiologic and symmetric. Plantar response are downgoing. Straight leg raising in the supine position is negative to radicular pain.   GAIT: normal.      LABS:    Lab Results   Component Value Date    HGBA1C 5.3 10/11/2024       Lab Results   Component Value Date    CREATININE 0.8 10/11/2024         ASSESSMENT: 44 y.o. year old female with pain, consistent with:    Encounter Diagnoses   Name Primary?    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  Discussed with patient that the voiding that occurred while she was asleep as well as subsequent urinary retention was secondary to the sedation.  If needed repeat procedure, will go very light on sedation.  Patient has myalgia, will treat in clinic today    PLAN:  - I have stressed the importance of physical activity and a home exercise plan to help with pain and improve health.  - Patient can continue with medications for now since they are providing benefits, using them appropriately, and without side effects.  - Counseled patient regarding the importance of activity modification and physical therapy.  - patient with myalgia of the right lower lumbar region, will perform TPI in clinic today.  Please see notes  - Medications:  No NSAIDs due to gastric bypass  - Imaging: Reviewed available imaging with patient and answered any questions they had regarding study.  - The patient's pathophysiology was explained in detail with reference to x-rays, models, other visual aids as appropriate.   - Follow up visit: return to clinic prince Garcia MD  11/16/2024

## 2024-11-15 ENCOUNTER — OFFICE VISIT (OUTPATIENT)
Dept: PAIN MEDICINE | Facility: CLINIC | Age: 44
End: 2024-11-15
Payer: COMMERCIAL

## 2024-11-15 VITALS
BODY MASS INDEX: 50.02 KG/M2 | DIASTOLIC BLOOD PRESSURE: 80 MMHG | WEIGHT: 293 LBS | SYSTOLIC BLOOD PRESSURE: 122 MMHG | HEIGHT: 64 IN | HEART RATE: 101 BPM

## 2024-11-15 DIAGNOSIS — M54.16 LUMBAR RADICULOPATHY: Primary | ICD-10-CM

## 2024-11-15 DIAGNOSIS — M79.10 MYALGIA: ICD-10-CM

## 2024-11-15 PROCEDURE — 99214 OFFICE O/P EST MOD 30 MIN: CPT | Mod: S$GLB,,, | Performed by: EMERGENCY MEDICINE

## 2024-11-15 PROCEDURE — G2211 COMPLEX E/M VISIT ADD ON: HCPCS | Mod: S$GLB,,, | Performed by: EMERGENCY MEDICINE

## 2024-11-15 PROCEDURE — 96372 THER/PROPH/DIAG INJ SC/IM: CPT | Mod: S$GLB,,, | Performed by: EMERGENCY MEDICINE

## 2024-11-15 RX ORDER — ADALIMUMAB 40MG/0.4ML
KIT SUBCUTANEOUS
Qty: 4 PEN | Refills: 4 | Status: SHIPPED | OUTPATIENT
Start: 2024-11-15

## 2024-11-16 RX ORDER — TRIAMCINOLONE ACETONIDE 40 MG/ML
40 INJECTION, SUSPENSION INTRA-ARTICULAR; INTRAMUSCULAR
Status: COMPLETED | OUTPATIENT
Start: 2024-11-16 | End: 2024-11-16

## 2024-11-16 RX ADMIN — TRIAMCINOLONE ACETONIDE 40 MG: 40 INJECTION, SUSPENSION INTRA-ARTICULAR; INTRAMUSCULAR at 10:11

## 2024-11-16 NOTE — PROCEDURES
Procedures  PROCEDURE: Trigger Point Injections with Ultrasound Guidance- Location of Myofascial Pain - right LUMBAR Paraspinals (Multifidus, Longissimus, Spinalis)    PATIENT NAME: Fran Higgins   MRN: [id number]     DATE OF PROCEDURE: 11/16/2024    Diagnosis: Myalgia (M79.1)  CPT code: 51043 (Injection(s); single or multiple trigger point(s), 3 or more muscles), 60140 (Ultrasonic guidance for needle placement imaging supervision and interpretation)    Injection # 1 this year.    Postprocedural Diagnosis: Same  Needle Type: - Stimuplex 21G x 100mm needle    Solution injected: A 10ml mixture of 0.5% Bupivacaine and 40mg Kenalog  Volume Injected for each Trigger Point: 1ml  Number of Trigger Point Injected: 2    Estimated Blood Loss - <2ml  Drains: None  Specimens Removed: None  Urine Output - Not Measured  Complications: None  Outcome: Good    Informed Consent:  The patient's condition and proposed procedures, risks (including complications of nerve damage,  bleeding, infection, and failure of pain relief), and alternatives were discussed with the patient or responsible party.  The patient's/responsible party's questions were answered.  The patient/responsible party appeared to understand and chose to proceed.  Informed consent was obtained.    Procedure in Detail:  The procedure was performed in the procedure treatment room.  After obtaining written consent, the patient was placed in a seated position. By ultrasound the target muscles were identified that correlated with the patient's pain pattern.    Procedural Pause:  A procedural pause verifying correct patient, medical record number, allergies, medications to be administered, current vital signs, and surgical site was performed immediately prior to beginning the procedure.    The above noted needle was advanced towards each trigger point under ultrasound guidance until the patient's typical radiating pain pattern was reproduced. Muscle twitch or reproduction  of pain confirmed proper placement of the needle. After negative aspiration for heme, the above noted solution was injected in a fanned-out distribution at each trigger point. The needle(s) was then removed.     All sites were done in the same manner. The patient tolerated the procedure well and without complications. After meeting discharge criteria, the patient was discharged home safely.    DISCUSSION:  Trigger point injections were performed today to treat the patients myofascial pain. The purpose was to improve the patients function and decrease pain. We have reminded the patient that the trigger point injections must be done in conjunction with a stretching program.  Without a stretching program, results from trigger point injections are often suboptimal. The patient was advised to relax and avoid any heavy lifting or excessive bending for 24 hours. She was advised that she may return to her usual activities after 24 hours if she is otherwise feeling well.  The patient was advised not to bathe or soak in water for 24 hours but that showering would be acceptable.      Note Electronically Signed By:  Arun Garcia  11/16/2024

## 2024-11-18 ENCOUNTER — TELEPHONE (OUTPATIENT)
Dept: REHABILITATION | Facility: HOSPITAL | Age: 44
End: 2024-11-18
Payer: COMMERCIAL

## 2024-11-18 NOTE — TELEPHONE ENCOUNTER
Patient was called 11/18 in regards to her 11/19 visit. Patient was left a voicemail asking to return our call to elaborate the need of an early arrival to her 11/19 visit.

## 2024-11-19 ENCOUNTER — CLINICAL SUPPORT (OUTPATIENT)
Dept: REHABILITATION | Facility: HOSPITAL | Age: 44
End: 2024-11-19
Payer: COMMERCIAL

## 2024-11-19 DIAGNOSIS — M54.50 LUMBAR PAIN: Primary | ICD-10-CM

## 2024-11-19 PROCEDURE — 97110 THERAPEUTIC EXERCISES: CPT

## 2024-11-19 PROCEDURE — 97112 NEUROMUSCULAR REEDUCATION: CPT | Mod: CQ

## 2024-11-19 PROCEDURE — 97110 THERAPEUTIC EXERCISES: CPT | Mod: CQ

## 2024-11-19 NOTE — PROGRESS NOTES
"[facility]   Physical Therapy Treatment Note      Name: Fran Kellerack  Clinic Number: [id number]    Therapy Diagnosis:   Encounter Diagnosis   Name Primary?    Lumbar pain Yes         Physician: Arun Garcia MD    Visit Date: 11/19/2024    Physician Orders: PT Eval and Treat   Medical Diagnosis from Referral:   M54.16 (ICD-10-CM) - Lumbar radiculopathy   M51.37 (ICD-10-CM) - DDD (degenerative disc disease), lumbosacral      Evaluation Date: 10/10/2024  Authorization Period Expiration: 9/25/24  Plan of Care Expiration: 11/22/24  Progress Note Due: 11/10/24  Visit # / Visits authorized: 1/ 1, 5/20  FOTO: 1/ 3     Precautions: Standard      PTA Visit #: 5/5     Time In: 5:10 pm   Time Out: 6:01 pm   Total Billable Time: 36 minutes (15 minutes billed by Robert Schwartz DPT)     Subjective     Pt reports: that she is feeling fine after having some trigger point injections in her back.   She was compliant with home exercise program.  Response to previous treatment: no adverse effects   Functional change: none at this time     Pain: 0/10  Location: n/a     Objective      Objective Measures updated at progress report unless specified.     Treatment     Fran received the treatments listed below:      therapeutic exercises to develop strength, endurance, ROM, and flexibility for 12 minutes including:  Bridge 2x10 3"   SL clams RTB 2x10 3" B  SL hip abduction 2x10 B   Supine HSS c/ strap 3x30" BLE   Standing hip extension 2x10 victor hugo   Standing hip abd 2x10 BLE   Shuttle squats 2.5 cords 2x10   HEP review     neuromuscular re-education activities to improve: Posture, Motor Control and Stabilization for 24 minutes. The following activities were included:  TA bracing 5" 2x10   TA bracing c/ mini march 15x   TA brace c/ SB isometric 3" 2x10   Braced marches GTB 2x10   Pallof press DBL GTB 2x10 victor hugo  No money RTB 3" 2x10   Seated fwd YMB press 2x10    therapeutic activities to improve " functional performance for 0  minutes, including:        Patient Education and Home Exercises       Education provided:   - HEP      Written Home Exercises Provided: yes. Exercises were reviewed and Fran was able to demonstrate them prior to the end of the session.  Fran demonstrated good  understanding of the education provided. See EMR under Patient Instructions for exercises provided during therapy sessions    Assessment     Reassessment performed by Robert Schwartz DPT; see his addendum for details.     Continued with established activities per pt's tolerance. Also progressed TE with additional activities for LE strengthening. Pt noted at the end of session that she feels comfortable completing her HEP at home and discontinuing PT at this time. Reviewed HEP and issued therabands.      Fran Is progressing well towards her goals.   Pt prognosis is Good.     Pt will continue to benefit from skilled outpatient physical therapy to address the deficits listed in the problem list box on initial evaluation, provide pt/family education and to maximize pt's level of independence in the home and community environment.     Pt's spiritual, cultural and educational needs considered and pt agreeable to plan of care and goals.     Anticipated barriers to physical therapy:  chronicity of symptoms, scoliosis     Goals:   Short Term Goals: 2 weeks   Pt will be compliant with HEP to supplement PT with decreasing pain and improving functional mobility     Long Term Goals: 6 weeks   Pt will improve FOTO score to at least 61 in order to demo improved functional mobility  Pt will improve LE MMT scores by at least 1/2 grade where deficits noted in order to improve strength for functional tasks  Pt will report back pain </= 5/10 at worst in order to be able to perform ADLs with less difficulty     Plan     Continue with established POC.     PT/PTA met face to face to discuss pt's treatment plan and progress towards established  goals. Pt will be seen by a physical therapist minimally every 6th visit or every 30 days.      Ruth Tapia PTA

## 2024-11-20 DIAGNOSIS — L30.9 DERMATITIS: ICD-10-CM

## 2024-11-21 RX ORDER — PIMECROLIMUS 10 MG/G
CREAM TOPICAL
Qty: 30 G | Refills: 3 | OUTPATIENT
Start: 2024-11-21

## 2024-11-22 NOTE — PROGRESS NOTES
[facility]   Physical Therapy Treatment Note / Progress Note     Name: Fran Higgins  Clinic Number: [id number]    Therapy Diagnosis:   Encounter Diagnosis   Name Primary?    Lumbar pain Yes         Physician: Arun Garcia MD    Visit Date: 11/19/2024    Physician Orders: PT Eval and Treat   Medical Diagnosis from Referral:   M54.16 (ICD-10-CM) - Lumbar radiculopathy   M51.37 (ICD-10-CM) - DDD (degenerative disc disease), lumbosacral      Evaluation Date: 10/10/2024  Authorization Period Expiration: 9/25/24  Plan of Care Expiration: 11/22/24  Progress Note Due: 12/20/2024  Visit # / Visits authorized: 1/1, 5/20  FOTO: 1/ 3     Precautions: Standard      PTA Visit #: 5/5     Time In: 5:10 pm   Time Out: 6:01 pm   Total Billable Time: 36 minutes (15 minutes billed by Robert Schwartz DPT)     Subjective     Pt reports: that she is feeling fine after having some trigger point injections in her back.   She was compliant with home exercise program.  Response to previous treatment: no adverse effects   Functional change: none at this time     Pain: 0/10  Location: n/a     Objective      11/19/2024    Lumbar AROM: Pain/Dysfunction with Movement:   Flexion 90 degrees      Extension 25 degrees      Right side bending 25 degrees    Left side bending 25 degrees       Hip Right   Left   Pain/Dysfunction with Movement     AROM MMT AROM MMT NT = not tested   Flexion WFL 4+/5 WFL 4+/5     Extension NT NT NT NT Pt performed good bridge against gravity   Abduction WFL 4+/5 WFL 4+/5     External rotation WFL 4+/5 WFL 4+/5        Knee Right   Left   Pain/Dysfunction with Movement     AROM MMT AROM MMT     Flexion WFL 5/5 WFL 5/5     Extension WFL 5/5 WFL 5/5        Ankle Right   Left   Pain/Dysfunction with Movement     AROM MMT AROM MMT     Plantarflexion WFL 5/5 WFL 5/5     Dorsiflexion WFL 5/5 WFL 5/5           90/90 Hamstring Test: (R) =lacking 0 degrees,  (L) = lacking 0 degrees        Treatment     Fran received the treatments listed below:      therapeutic exercises to develop strength, endurance, ROM, and flexibility for 15 minutes including:    Objective measures recorded   Education on progress         Patient Education and Home Exercises       Education provided:   - HEP      Written Home Exercises Provided: yes. Exercises were reviewed and Fran was able to demonstrate them prior to the end of the session.  Fran demonstrated good  understanding of the education provided. See EMR under Patient Instructions for exercises provided during therapy sessions    Assessment     Patient was reassessed today and has shown progress since starting therapy. Patient has no pain as of today and more lumbar range of motion. Patient has also improved with LE strength. Will discuss results with Yasmine Mclean to determine if extension of care is wanted.     Continued with established activities per pt's tolerance. Also progressed TE with additional activities for LE strengthening. Pt noted at the end of session that she feels comfortable completing her HEP at home and discontinuing PT at this time. Reviewed HEP and issued therabands.      Fran Is progressing well towards her goals.   Pt prognosis is Good.     Pt will continue to benefit from skilled outpatient physical therapy to address the deficits listed in the problem list box on initial evaluation, provide pt/family education and to maximize pt's level of independence in the home and community environment.     Pt's spiritual, cultural and educational needs considered and pt agreeable to plan of care and goals.     Anticipated barriers to physical therapy:  chronicity of symptoms, scoliosis     Goals:   Short Term Goals: 2 weeks   Pt will be compliant with HEP to supplement PT with decreasing pain and improving functional mobility     Long Term Goals: 6 weeks   Pt will improve FOTO score to at least 61 in order to demo improved functional  mobility  Pt will improve LE MMT scores by at least 1/2 grade where deficits noted in order to improve strength for functional tasks  Pt will report back pain </= 5/10 at worst in order to be able to perform ADLs with less difficulty     Plan     Continue with established POC.     PT/PTA met face to face to discuss pt's treatment plan and progress towards established goals. Pt will be seen by a physical therapist minimally every 6th visit or every 30 days.      Robert Schwartz, PT

## 2024-12-03 DIAGNOSIS — K21.9 GASTROESOPHAGEAL REFLUX DISEASE, UNSPECIFIED WHETHER ESOPHAGITIS PRESENT: ICD-10-CM

## 2024-12-03 DIAGNOSIS — L73.2 HIDRADENITIS SUPPURATIVA: ICD-10-CM

## 2024-12-03 RX ORDER — OMEPRAZOLE 40 MG/1
40 CAPSULE, DELAYED RELEASE ORAL EVERY MORNING
Qty: 30 CAPSULE | Refills: 1 | Status: SHIPPED | OUTPATIENT
Start: 2024-12-03

## 2024-12-03 RX ORDER — SPIRONOLACTONE 50 MG/1
TABLET, FILM COATED ORAL
Qty: 60 TABLET | Refills: 3 | Status: SHIPPED | OUTPATIENT
Start: 2024-12-03

## 2024-12-09 DIAGNOSIS — E66.9 OBESITY, UNSPECIFIED CLASS, UNSPECIFIED OBESITY TYPE, UNSPECIFIED WHETHER SERIOUS COMORBIDITY PRESENT: ICD-10-CM

## 2024-12-09 RX ORDER — SEMAGLUTIDE 0.5 MG/.5ML
0.5 INJECTION, SOLUTION SUBCUTANEOUS
Qty: 2 ML | Refills: 0 | OUTPATIENT
Start: 2024-12-09

## 2024-12-10 ENCOUNTER — PATIENT MESSAGE (OUTPATIENT)
Dept: BARIATRICS | Facility: CLINIC | Age: 44
End: 2024-12-10
Payer: COMMERCIAL

## 2024-12-16 ENCOUNTER — CLINICAL SUPPORT (OUTPATIENT)
Dept: BARIATRICS | Facility: CLINIC | Age: 44
End: 2024-12-16
Payer: COMMERCIAL

## 2024-12-16 DIAGNOSIS — E66.01 MORBID OBESITY WITH BMI OF 50.0-59.9, ADULT: ICD-10-CM

## 2024-12-16 DIAGNOSIS — Z71.3 DIETARY COUNSELING AND SURVEILLANCE: Primary | ICD-10-CM

## 2024-12-16 DIAGNOSIS — Z98.84 S/P BARIATRIC SURGERY: ICD-10-CM

## 2024-12-16 PROCEDURE — 97803 MED NUTRITION INDIV SUBSEQ: CPT | Mod: 95,GZ,, | Performed by: DIETITIAN, REGISTERED

## 2024-12-16 NOTE — PROGRESS NOTES
The patient location is:  Patient Home   The chief complaint leading to consultation is: morbid obesity in work up for possible revision  Visit type: Virtual visit with synchronous audio and video  Total time spent with patient:30 minutes  Each patient to whom he or she provides medical services by telemedicine is:  (1) informed of the relationship between the physician and patient and the respective role of any other health care provider with respect to management of the patient; and (2) notified that he or she may decline to receive medical services by telemedicine and may withdraw from such care at any time.  Nutrition Handout located in AVS section of pt's [facility] and/or sent as a message via [facility].  Pt informed of handout and how to access this information in [facility].  NUTRITION NOTE    Referring Physician: Jaziel Whitman M.D.   Reason for MNT Referral: 6 months medically supervised diet counseling and surveillance pending laproscopic  sleeve to Bypass    Patient presents for follow-up visit for MSD with weight loss over the past month; 19 lbs total weight loss by making numerous dietary and lifestyle changes.  Started [facility] with [facility] 2 month ago  2  CLINICAL DATA:  44 y.o. female.  Current Wt 296 lb self reported  Current Weight: 307 lbs self reported  Weight Change Since Initial Visit: Loss of 19 lbs  Ideal Body Weight: 138 lbs  Body mass index is 50.81 kg/m².  Initial Wt: 315 lbs    DAILY NUTRITIONAL NEEDS: pre-op nutritional bariatric guidelines to promote weight loss  [id number] Calories    Grams Protein    CURRENT DIET:  Reduced-calorie diet.  Diet Recall: Food records are not present.  B: Turkey alvarez or coffee with sf creamer Dominican vanilla  L: Turkey rollup or turkey/chicken with broccoli  D: Fairlife protein shake 30 gm protein  Diet Includes: 2 small meals including 1 shake per day  Meal Pattern: Improved.  Protein Supplements: 1 per  day.  Snacking: Adequate. Snacks include healthy choices.  Vegetables: Likes a variety. Eats daily.  Fruits: Likes a variety. Eats rarely.  Beverages:water, sugar-free beverages/powders/drops, and diet/unsweetened tea  Dining Out:  Never. Mostly restaurants and take-out.  Cooking at home: Daily. Mostly Cooking at home: Weekly. Mostly baked/roast, grilled/broiled, air-fried, and steamed beef, pork, chicken/turkey, vegetables,     CURRENT EXERCISE:  Adequate  PT  exercises at home  Trigger point injection which helped with mobility  Walking 30-60 minutes 3 times per week    Vitamins / Minerals / Herbs:   Alive potency B vitamins  Calcium in Alive    LABS:  Reviewed.      Food Allergies:   None reported    Social:  Works regular daytime shifts.  Alcohol: None.  Smoking: None.    ASSESSMENT:  Patient demonstrates some willingness to change lifestyle habits as evidenced by weight loss, good/increased exercise, including protein drinks, reduced sugar-sweetened beverages, increased fruits, and increased vegetables.    Doing well with working on greatest challenges (irregular meal patterns).    Barriers to Education:  none  Stage of Change:  action    NUTRITION DIAGNOSIS:  Morbid Obesity related to Excessive calorie intake as evidence by BMI.  Status: Decreased    PLAN/RECOMMENDATION:  Pt is a good candidate for bariatric surgery     Diet: Maintain diet plan.  Work on Bariatric Nutrition Checklist.  Start shopping for bariatric vitamins & minerals    Exercise: Maintain.    Behavior Modification: Begin to document food & activity logs daily.        Communicated nutrition plan with bariatric team.    SESSION TIME:  30 minutes

## 2024-12-17 VITALS — BODY MASS INDEX: 50.81 KG/M2 | WEIGHT: 293 LBS

## 2024-12-20 DIAGNOSIS — L73.2 HIDRADENITIS: ICD-10-CM

## 2024-12-20 RX ORDER — ADALIMUMAB-ADAZ 40 MG/.4ML
40 INJECTION, SOLUTION SUBCUTANEOUS
Qty: 4 PEN | Refills: 4 | Status: SHIPPED | OUTPATIENT
Start: 2024-12-20

## 2025-01-06 ENCOUNTER — CLINICAL SUPPORT (OUTPATIENT)
Dept: ENDOSCOPY | Facility: HOSPITAL | Age: 45
End: 2025-01-06
Payer: COMMERCIAL

## 2025-01-06 ENCOUNTER — TELEPHONE (OUTPATIENT)
Dept: ENDOSCOPY | Facility: HOSPITAL | Age: 45
End: 2025-01-06

## 2025-01-06 ENCOUNTER — PATIENT MESSAGE (OUTPATIENT)
Dept: BARIATRICS | Facility: CLINIC | Age: 45
End: 2025-01-06
Payer: COMMERCIAL

## 2025-01-06 VITALS — HEIGHT: 64 IN | WEIGHT: 289 LBS | BODY MASS INDEX: 49.34 KG/M2

## 2025-01-06 DIAGNOSIS — E66.813 CLASS 3 SEVERE OBESITY WITH SERIOUS COMORBIDITY AND BODY MASS INDEX (BMI) OF 50.0 TO 59.9 IN ADULT, UNSPECIFIED OBESITY TYPE: ICD-10-CM

## 2025-01-06 DIAGNOSIS — Z98.84 S/P LAPAROSCOPIC SLEEVE GASTRECTOMY: ICD-10-CM

## 2025-01-06 DIAGNOSIS — K21.9 GASTROESOPHAGEAL REFLUX DISEASE, UNSPECIFIED WHETHER ESOPHAGITIS PRESENT: ICD-10-CM

## 2025-01-06 DIAGNOSIS — E66.01 CLASS 3 SEVERE OBESITY WITH SERIOUS COMORBIDITY AND BODY MASS INDEX (BMI) OF 50.0 TO 59.9 IN ADULT, UNSPECIFIED OBESITY TYPE: ICD-10-CM

## 2025-01-06 NOTE — TELEPHONE ENCOUNTER
Referral for procedure from PAT appointment      Spoke to patient to schedule procedure(s) Upper Endoscopy (EGD) with [facility]       Physician to perform procedure(s) Dr. TARAN Arias  Date of Procedure (s) 02/03/25  Arrival Time 09:30 AM  Time of Procedure(s) 10:30 AM   Location of Procedure(s) [place] 2nd Floor  Type of Rx Prep sent to patient: N/A  Instructions provided to patient via [facility]    Patient was informed on the following information and verbalized understanding. Screening questionnaire reviewed with patient and complete. If procedure requires anesthesia, a responsible adult needs to be present to accompany the patient home, patient cannot drive after receiving anesthesia. Appointment details are tentative, especially check-in time. Patient will receive a prep-op call 7 days prior to confirm check-in time for procedure. If applicable the patient should contact their pharmacy to verify Rx for procedure prep is ready for pick-up. Patient was advised to call the scheduling department at [phone] if pharmacy states no Rx is available. Patient was advised to call the endoscopy scheduling department if any questions or concerns arise.      [facility] Scheduling Department

## 2025-01-09 ENCOUNTER — PATIENT MESSAGE (OUTPATIENT)
Dept: NEUROLOGY | Facility: CLINIC | Age: 45
End: 2025-01-09

## 2025-01-09 ENCOUNTER — PROCEDURE VISIT (OUTPATIENT)
Dept: NEUROLOGY | Facility: CLINIC | Age: 45
End: 2025-01-09
Payer: COMMERCIAL

## 2025-01-09 VITALS — DIASTOLIC BLOOD PRESSURE: 83 MMHG | HEART RATE: 81 BPM | SYSTOLIC BLOOD PRESSURE: 126 MMHG

## 2025-01-09 DIAGNOSIS — G43.109 MIGRAINE WITH AURA AND WITHOUT STATUS MIGRAINOSUS, NOT INTRACTABLE: ICD-10-CM

## 2025-01-09 DIAGNOSIS — G43.E09 CHRONIC MIGRAINE WITH AURA WITHOUT STATUS MIGRAINOSUS, NOT INTRACTABLE: Primary | ICD-10-CM

## 2025-01-09 DIAGNOSIS — R11.0 NAUSEA: ICD-10-CM

## 2025-01-09 RX ORDER — UBROGEPANT 100 MG/1
TABLET ORAL
Qty: 16 TABLET | Refills: 5 | Status: SHIPPED | OUTPATIENT
Start: 2025-01-09

## 2025-01-09 RX ORDER — ONDANSETRON 4 MG/1
4 TABLET, ORALLY DISINTEGRATING ORAL EVERY 6 HOURS PRN
Qty: 20 TABLET | Refills: 5 | Status: SHIPPED | OUTPATIENT
Start: 2025-01-09

## 2025-01-09 NOTE — PROCEDURES
Established Patient  Procedure Note   SUBJECTIVE:  Patient ID: Fran Higgins  Chief Complaint: Headache      History of Present Illness:  Fran Higgins is a 44 y.o. female with  with migraine, cervical scoliosis, obesity s/p sleeve gastrectomy, anxiety/depression, surgical menopause s/p hysterectomy/RSO for ovary cyst, insomnia  who presents to clinic alone for follow-up of headaches and Botox injections.       01/09/2025- Interval History: botox  Callahan's have remained well controlled on botox. Per HA diary she has had 2/30 ha days per month lasting 3-11 hrs, severity 2-10/10. Ha's abort effectively w/ ubrelvy 100mg.   Plan: continue botox, ubrelvy 100mg prn, zofran prn, sleep strategies, rtc 12 wks for next botox    10/17/2024- Interval History: botox  Patient has had >50% reduction in Ha's since beginning botox. Prior to botox pt's Ha's were occurring >15/30 days per month. Since beginning botox, they are occurring 1-2/30 days per month. As pt has experienced an improvement in Ha's, with sustained reduction, will continue botox as is clinically indicated.   Plan: continue botox, continue ubrelvy 100mg prn, zofran prn, sleep strategies, rtc 12 wks for next botox    07/18/2024- Interval History: botox   Per HA diary, pt has had 1-2/30 ha days per month. She has been treating w/ ubrelvy 100mg at onset and finds this more effective. She is happy w/ current regimen as ha's are well controlled despite large amt of stress recently due to partners passing. Has a good support system and therapist. Will continue current regimen.   Plan: continue botox, continue ubrelvy 100mg prn, zofran prn, sleep strategies, rtc 12 wks for next botox    04/25/2024- Interval History: botox  Per pt 's HA diary, ha's have remained well controlled weaning off emgality. Experienced 5 HA days since last visit. Callahan's can last 3 - 7 hrs. Discussed using ubrelvy up to 100mg and at onset.   Plan: continue botox, d/c emgality, continue  "ubrelvy 50-100mg 1st line, zofran prn, sleep strategies, rtc 12 wks for next botox    02/08/2024- Interval History: botox  Pt's ha's remain well controlled. Reviewed HA diary. Occurring 1-2/30 days per month. Duration 2-14 hrs. Severity 4-10/10. Pt takes ubrelvy 50-100mg 1st line and tylenol 2nd line. Finds current regimen to be helpful. Is amenable to weanign off emgality by next visit.   Prior to initiation of botox the patient was experiencing >15 days of headache lasting 4 or more hours and has had sustained reduction.   Plan: continue botox, wean off emgality, continue ubrelvy 50-100mg 1st line, zofran prn, sleep strategies, rtc 12 wks for next botox    11/14/2023-  botox    09/29/2023 - Interval History:  Patient has had >50% reduction in Ha's since beginning botox. Prior to botox pt's Ha's were occurring >15/30 days per month. Since beginning botox, she experiences 5 HA days in 3 months. Most HA's are now mild in severity. Had 1 severe HA in this span that lasted 2 days but was eventually aborted w/ ubrelvy. HA's last 2 hrs - 2 days. As pt has experienced an improvement in Ha's with sustained reduction will continue botox as is clinically indicated.   Plan: continue botox, emgality, ubrelvy 50-100mg prn, zofran, continue sleep strategies, rtc for next botox    08/17/2023- Interval History: botox #3  Pt feels botox has been helpful. Is not trackign but states "I noticed a change" after the last round. She states she experiences approximately 20/30 ha days per month, hwoever isn't tracking. Will track for next visit.   Plan: continue botox, emgality, ubrelvy 50mg prn, zofran, rtc in 2 mo for f/up and 12 wks for next botox    05/25/2023 - botox #2    02/23/2023 - botox #1    12/12/2022 - Interval History:  Pt's ha's have worsened since last visit. They are now occurring >15/30 days per month for >3 mo, lasting > 4 hrs at a time. She tried tpx but had to d/c 2/2 depression SE. She also tried the addition of " supplements including mag w/ no benefit noted. She continues to have trouble falling and staying asleep for which she recently started acupuncture for. Defers referral to sleep med, elavil, pcp at this time. Pt is interested in botox and would be an ideal candidate as she has tried and failed multiple ppx options.   Plan: start botox next visit, continue emgality, ubrelvy 50mg prn, zofran, continue sleep strategies, rtc jan 12 to begin botox     09/19/2022 - Interval History:   Ha's have increased 1-2 months ago possibly due to worsened insomnia and stress lately. Are now occurring 8/30 days per month lasting up to 1 day at a time. Currently taking effexor and xanax for these conditions.   Ubrelvy 50mg - typically resolves HA's, infrequently needs to repeat dose  Discussed multiple options, pt agreeable w/ following. Denies current depression.   Plan: start tpx 25mg/d, continue emgality, ubrelvy 50mg prn, zofran, rtc in 3 mo or sooner if needed     Recommendations made at last Office Visit on 12/6/21:  - Discussed symptoms appear to be consistent with migraines. Discussed this with patient along with treatment options and patient agreed with the following plan  - ppx - continue emgality  - abortive - trial ubrelvy  - nausea - continue zofran  - avoid nsaids as they are c/i 2/2 gastrectomy  - avoid triptans as recommended by psychiatry 2/2 potential serotinin syndrome w/ effexor for depression  - decreased neck ROM, tightness, and cervical scoliosis - continue conservative treatments, consider PT in future  - risks, benefits, and potential side effects of emgality, ubrelvy, zofran discussed   - alternative treatment options offered   - importance of healthy diet, regular exercise and sleep hygiene in the treatment of headaches    - Start tracking headaches via [employer] [facility] on phone   - RTC 6-12 mo or sooner if needed      Treatments Tried:  emgality - helps  Effexor  Tpx - depression  Anti-htn meds - avoid 2/2  low bp  Bb - avoid 2/2 depression  Ubrelvy - helps  triptans - has been told to avoid per psychiatrist 2/2 serotonin syndrome risk  nsaids - c/i 2/2 recent gastrectomy  zofran       Current Medications:    Current Outpatient Medications:     adalimumab-adaz 40 mg/0.4 mL PnIj, Inject 0.4 mLs (40 mg total) into the skin every 7 days., Disp: 4 pen , Rfl: 4    ALPRAZolam (XANAX) 0.5 MG tablet, Take 1 tablet (0.5 mg total) by mouth nightly as needed for Insomnia or Anxiety., Disp: 30 tablet, Rfl: 5    b complex vitamins capsule, Take 1 capsule by mouth once daily., Disp: , Rfl:     CALCIUM CITRATE ORAL, Take 1 tablet by mouth 3 (three) times daily. chewable, Disp: , Rfl:     clindamycin (CLEOCIN T) 1 % lotion, Apply to affected area two times a day., Disp: 120 mL, Rfl: 3    cyclobenzaprine (FLEXERIL) 5 MG tablet, Take 1 tablet (5 mg total) by mouth 2 (two) times daily as needed for Muscle spasms., Disp: 60 tablet, Rfl: 5    estradioL (ESTRACE) 2 MG tablet, Take 1 tablet (2 mg total) by mouth once daily., Disp: 30 tablet, Rfl: 11    fluconazole (DIFLUCAN) 200 MG Tab, Take 1 tablet by mouth once if not better take another pill in 3 days, Disp: 30 tablet, Rfl: 0    gabapentin (NEURONTIN) 100 MG capsule, Take 1 capsule (100 mg total) by mouth 3 (three) times daily., Disp: 90 capsule, Rfl: 2    ketoconazole (NIZORAL) 2 % cream, Apply to affected area two times a day (Patient taking differently: Apply to affected area two times a day), Disp: 60 g, Rfl: 3    metFORMIN (GLUCOPHAGE-XR) 500 MG ER 24hr tablet, Take 1 tablet by mouth daily., Disp: 90 tablet, Rfl: 1    multivitamin capsule, Take by mouth once daily., Disp: , Rfl:     omeprazole (PRILOSEC) 40 MG capsule, Take 1 capsule (40 mg total) by mouth every morning., Disp: 30 capsule, Rfl: 1    pimecrolimus (ELIDEL) 1 % cream, Apply to affected areas of underarm twice a day as needed for irritation. (Patient taking differently: Apply to affected areas of underarm twice a day as  needed for irritation.), Disp: 30 g, Rfl: 3    semaglutide, weight loss, (WEGOVY) 0.5 mg/0.5 mL PnIj, Inject 0.5 mg (1 pen) into the skin every 7 days., Disp: 2 mL, Rfl: 0    semaglutide, weight loss, (WEGOVY) 1 mg/0.5 mL PnIj, Inject 1 mg into the skin every 7 days., Disp: 2 mL, Rfl: 0    spironolactone (ALDACTONE) 50 MG tablet, Take 2 tablets by mouth daily., Disp: 60 tablet, Rfl: 3    thiamine (VITAMIN B-1) 50 MG tablet, Take 50 mg by mouth once daily., Disp: , Rfl:     venlafaxine (EFFEXOR-XR) 150 MG Cp24, Take 1 capsule (150 mg total) by mouth once daily., Disp: 90 capsule, Rfl: 3    ondansetron (ZOFRAN-ODT) 4 MG TbDL, Dissolve 1 tablet (4 mg total) by mouth every 6 (six) hours as needed (for nausea)., Disp: 20 tablet, Rfl: 5    progesterone (PROMETRIUM) 100 MG capsule, Take 1 capsule (100 mg total) by mouth nightly., Disp: 30 capsule, Rfl: 11    ubrogepant (UBRELVY) 100 mg tablet, Take 1 tablet by mouth at the onset of a headache. May repeat based on response and tolerability after more than 2 hours if needed. Do not take more than 200mg in a 24 hour span., Disp: 16 tablet, Rfl: 5  No current facility-administered medications for this visit.    Review of Systems - as per HPI, otherwise a balanced 10 systems review is negative.    OBJECTIVE:  Vitals:  /83 (BP Location: Left forearm, Patient Position: Sitting)   Pulse 81   LMP 01/17/2020     Physical Exam:  Constitutional: she appears well-developed and well-nourished. she is well groomed. NAD   HENT:    Head: Normocephalic and atraumatic  Eyes: Conjunctivae and EOM are normal  Musculoskeletal: Normal range of motion. No joint stiffness.   Skin: Skin is warm and dry.  Psychiatric: Mood and affect are normal    Neuro: Patient is alert and oriented to person, place, and time. Language is intact and fluent.  Recent and remote memory are intact.  Normal attention and concentration.  Facial movement is symmetric.  Gait is normal.     Review of Data:   Notes  from neuro reviewed.   Labs:  Admission on 10/25/2024, Discharged on 10/25/2024   Component Date Value Ref Range Status    Specimen UA 10/25/2024 Urine, Clean Catch   Final    Color, UA 10/25/2024 Yellow  Yellow, Straw, Lakshmi Final    Appearance, UA 10/25/2024 Hazy (A)  Clear Final    pH, UA 10/25/2024 6.0  5.0 - 8.0 Final    Specific Gravity, UA 10/25/2024 1.010  1.005 - 1.030 Final    Protein, UA 10/25/2024 Negative  Negative Final    Glucose, UA 10/25/2024 Negative  Negative Final    Ketones, UA 10/25/2024 Negative  Negative Final    Bilirubin (UA) 10/25/2024 Negative  Negative Final    Occult Blood UA 10/25/2024 Negative  Negative Final    Nitrite, UA 10/25/2024 Negative  Negative Final    Leukocytes, UA 10/25/2024 2+ (A)  Negative Final    POC Glucose 10/25/2024 168 (H)  70 - 110 mg/dL Final    POC BUN 10/25/2024 13  6 - 30 mg/dL Final    POC Creatinine 10/25/2024 0.9  0.5 - 1.4 mg/dL Final    POC Sodium 10/25/2024 138  136 - 145 mmol/L Final    POC Potassium 10/25/2024 3.9  3.5 - 5.1 mmol/L Final    POC Chloride 10/25/2024 102  95 - 110 mmol/L Final    POC TCO2 (MEASURED) 10/25/2024 25  23 - 29 mmol/L Final    POC Ionized Calcium 10/25/2024 1.21  1.06 - 1.42 mmol/L Final    POC Hematocrit 10/25/2024 37  36 - 54 %PCV Final    Sample 10/25/2024 JASMYNE   Final    RBC, UA 10/25/2024 4  0 - 4 /hpf Final    WBC, UA 10/25/2024 14 (H)  0 - 5 /hpf Final    Bacteria 10/25/2024 Many (A)  None-Occ /hpf Final    Squam Epithel, UA 10/25/2024 13  /hpf Final    Microscopic Comment 10/25/2024 SEE COMMENT   Final    Urine Culture, Routine 10/25/2024 Multiple organisms isolated. None in predominance.  Repeat if   Final    Urine Culture, Routine 10/25/2024 clinically necessary.   Final   Admission on 10/24/2024, Discharged on 10/24/2024   Component Date Value Ref Range Status    POCT Glucose 10/24/2024 84  70 - 110 mg/dL Final   Hospital Outpatient Visit on 10/14/2024   Component Date Value Ref Range Status    QRS Duration  10/14/2024 108  ms Final    [facility] QTC Calculation 10/14/2024 441  ms Final   Lab Visit on 10/11/2024   Component Date Value Ref Range Status    Sodium 10/11/2024 139  136 - 145 mmol/L Final    Potassium 10/11/2024 4.0  3.5 - 5.1 mmol/L Final    Chloride 10/11/2024 105  95 - 110 mmol/L Final    CO2 10/11/2024 23  23 - 29 mmol/L Final    Glucose 10/11/2024 93  70 - 110 mg/dL Final    BUN 10/11/2024 11  6 - 20 mg/dL Final    Creatinine 10/11/2024 0.8  0.5 - 1.4 mg/dL Final    Calcium 10/11/2024 9.3  8.7 - 10.5 mg/dL Final    Anion Gap 10/11/2024 11  8 - 16 mmol/L Final    eGFR 10/11/2024 >60.0  >60 mL/min/1.73 m^2 Final    WBC 10/11/2024 5.29  3.90 - 12.70 K/uL Final    RBC 10/11/2024 4.37  4.00 - 5.40 M/uL Final    Hemoglobin 10/11/2024 12.6  12.0 - 16.0 g/dL Final    Hematocrit 10/11/2024 37.4  37.0 - 48.5 % Final    MCV 10/11/2024 86  82 - 98 fL Final    MCH 10/11/2024 28.8  27.0 - 31.0 pg Final    MCHC 10/11/2024 33.7  32.0 - 36.0 g/dL Final    RDW 10/11/2024 12.6  11.5 - 14.5 % Final    Platelets 10/11/2024 189  150 - 450 K/uL Final    MPV 10/11/2024 12.3  9.2 - 12.9 fL Final    Immature Granulocytes 10/11/2024 0.2  0.0 - 0.5 % Final    Gran # (ANC) 10/11/2024 3.1  1.8 - 7.7 K/uL Final    Immature Grans (Abs) 10/11/2024 0.01  0.00 - 0.04 K/uL Final    Lymph # 10/11/2024 1.8  1.0 - 4.8 K/uL Final    Mono # 10/11/2024 0.4  0.3 - 1.0 K/uL Final    Eos # 10/11/2024 0.0  0.0 - 0.5 K/uL Final    Baso # 10/11/2024 0.04  0.00 - 0.20 K/uL Final    nRBC 10/11/2024 0  0 /100 WBC Final    Gran % 10/11/2024 57.6  38.0 - 73.0 % Final    Lymph % 10/11/2024 33.8  18.0 - 48.0 % Final    Mono % 10/11/2024 6.8  4.0 - 15.0 % Final    Eosinophil % 10/11/2024 0.8  0.0 - 8.0 % Final    Basophil % 10/11/2024 0.8  0.0 - 1.9 % Final    Differential Method 10/11/2024 Automated   Final    Folate 10/11/2024 11.6  4.0 - 24.0 ng/mL Final    H Pylori IgG Interp 10/11/2024 Negative  Negative Final    Hemoglobin A1C 10/11/2024 5.3  4.0 - 5.6 % Final     Estimated Avg Glucose 10/11/2024 105  68 - 131 mg/dL Final    Total Protein 10/11/2024 6.8  6.0 - 8.4 g/dL Final    Albumin 10/11/2024 3.8  3.5 - 5.2 g/dL Final    Total Bilirubin 10/11/2024 0.3  0.1 - 1.0 mg/dL Final    Bilirubin, Direct 10/11/2024 0.1  0.1 - 0.3 mg/dL Final    AST 10/11/2024 14  10 - 40 U/L Final    ALT 10/11/2024 14  10 - 44 U/L Final    Alkaline Phosphatase 10/11/2024 71  55 - 135 U/L Final    Iron 10/11/2024 108  30 - 160 ug/dL Final    Transferrin 10/11/2024 302  200 - 375 mg/dL Final    TIBC 10/11/2024 447  250 - 450 ug/dL Final    Saturated Iron 10/11/2024 24  20 - 50 % Final    Cholesterol 10/11/2024 247 (H)  120 - 199 mg/dL Final    Triglycerides 10/11/2024 182 (H)  30 - 150 mg/dL Final    HDL 10/11/2024 60  40 - 75 mg/dL Final    LDL Cholesterol 10/11/2024 150.6  63.0 - 159.0 mg/dL Final    HDL/Cholesterol Ratio 10/11/2024 24.3  20.0 - 50.0 % Final    Total Cholesterol/HDL Ratio 10/11/2024 4.1  2.0 - 5.0 Final    Non-HDL Cholesterol 10/11/2024 187  mg/dL Final    Magnesium 10/11/2024 1.9  1.6 - 2.6 mg/dL Final    Phosphorus 10/11/2024 3.3  2.7 - 4.5 mg/dL Final    T3, Total 10/11/2024 121  60 - 180 ng/dL Final    T4, Total 10/11/2024 7.6  4.5 - 11.5 ug/dL Final    TSH 10/11/2024 0.804  0.400 - 4.000 uIU/mL Final    Free T4 10/11/2024 0.89  0.71 - 1.51 ng/dL Final    Vitamin B-12 10/11/2024 428  210 - 950 pg/mL Final    Thiamine 10/11/2024 67  38 - 122 ug/L Final    Vit D, 25-Hydroxy 10/11/2024 39  30 - 96 ng/mL Final     Imaging:  No results found. However, due to the size of the patient record, not all encounters were searched. Please check Results Review for a complete set of results.    Note: I have independently reviewed any/all imaging/labs/tests and agree with the report (s) as documented.  Any discrepancies will be as noted/demarcated by free text.  CHANTELLE CHING 1/9/2025    ASSESSMENT:  1. Chronic migraine with aura without status migrainosus, not intractable    2. Migraine with  aura and without status migrainosus, not intractable    3. Nausea          PLAN:  - Discussed symptoms appear to be consistent with migraines. Discussed this with patient along with treatment options and patient agreed with the following plan  - ppx - continue  botox  - Botox administered in clinic for Chronic Migraine (see below)   - abortive - continue ubrelvy  - nausea - continue zofran  - trouble w/ sleep - recently started acupuncture, defers referrals to pcp or sleep med or to begin elavil due to concern w/ interaction w/ effexor  - avoid nsaids as they are c/i 2/2 gastrectomy  - avoid triptans as recommended by psychiatry 2/2 potential serotinin syndrome w/ effexor for depression  - decreased neck ROM, tightness, and cervical scoliosis - continue conservative treatments, consider PT in future  - increased stress recently 2/2 partner's passing, continue therapy and mgmt per pcp  - RTC in 12 weeks for repeat Botox injections or sooner if needed     Orders Placed This Encounter    onabotulinumtoxina injection 200 Units    ubrogepant (UBRELVY) 100 mg tablet    ondansetron (ZOFRAN-ODT) 4 MG TbDL           All questions and concerns addressed.  Patient verbalizes understanding and is agreeable with the above stated treatment plan.      PROCEDURE NOTE:  BOTOX was performed as an indicated therapy for intractable chronic migraine headaches given that the patient failed more than 2 headache medications    Two patient identifiers were confirmed with the patient prior to performing this procedure. A time out to determine correct patient and and agreement on procedure performed was conducted prior to the procedure.      Botulinum Toxin Injection Procedure   Procedure: Botulinum toxin injection (51542)  After risks and benefits were explained including bleeding, infection, worsening of pain, damage to the areas being injected, weakness of muscles, loss of muscle control, dysphagia if injecting the head or neck, facial  droop if injecting the facial area, painful injection, allergic or other reaction to the medications being injected, and the failure of the procedure to help the problem, a signed consent was obtained.   The patient was placed in a comfortable area and the sites to be treated were identified.The area to be treated was prepped three times with alcohol and the alcohol allowed to dry. Next, a 30 gauge needle was used to inject the medication in the area to be treated.      Total Botox used: 155 Units   Botox wastage: 45 Units     Injection sites:    muscle bilaterally ( a total of 10 units divided into 2 sites)   Procerus muscle (5 units)   Frontalis muscle bilaterally (a total of 20 units divided into 4 sites)   Temporalis muscle bilaterally (a total of 40 units divided into 8 sites)   Occipitalis muscle bilaterally (a total of 30 units divided into 6 sites)   Cervical paraspinal muscles (a total of 20 units divided into 4 sites)   Trapezius muscle bilaterally (a total of 30 units divided into 6 sites)   Complications: none   RTC for the next Botox injection: 12 weeks     The patient tolerated the procedure well and did not experience any complications.     CC: Erica Pineda MD Sarena Patel, PA-C  [facility] Department of Neurology   [phone]

## 2025-01-10 NOTE — PROGRESS NOTES
Patient ID: Fran Higgins is a 44 y.o. White female    Subjective  Chief Complaint: patient presents for medical weight loss management.    Co-morbidities: none    HPI: Patient started [facility] with [facility] in October 2024 and is currently managed on Wegovy 1 mg. Pt has completed 4 doses of this strength, last dose on 1/10. Pt previously used Ozempic around 2020 and believes she was on it for ~2 months and lost 12 lbs during that time.    Tolerance to current therapy:  Denies nausea, vomiting, diarrhea, constipation, abdominal pain    Weight loss history:  Starting weight:    10/10/2024   Recent Readings    Weight (lbs) 311 lb    BMI 51.75 BMI    Current weight:    1/13/2025   Recent Readings    Weight (lbs) 287 lb    BMI 47.75 BMI    % weight loss since GLP-1 initiation: 7.7 %    Objective  Lab Results   Component Value Date     10/11/2024     05/24/2024     09/27/2023     Lab Results   Component Value Date    K 4.0 10/11/2024    K 4.9 05/24/2024    K 4.1 09/27/2023     Lab Results   Component Value Date     10/11/2024     05/24/2024     09/27/2023     Lab Results   Component Value Date    CO2 23 10/11/2024    CO2 27 05/24/2024    CO2 28 09/27/2023     Lab Results   Component Value Date    BUN 11 10/11/2024    BUN 7 05/24/2024    BUN 17 09/27/2023     Lab Results   Component Value Date    GLU 93 10/11/2024    GLU 96 05/24/2024    GLU 99 09/27/2023     Lab Results   Component Value Date    CALCIUM 9.3 10/11/2024    CALCIUM 9.6 05/24/2024    CALCIUM 9.6 09/27/2023     Lab Results   Component Value Date    PROT 6.8 10/11/2024    PROT 6.9 05/24/2024    PROT 7.4 09/27/2023     Lab Results   Component Value Date    ALBUMIN 3.8 10/11/2024    ALBUMIN 4.0 05/24/2024    ALBUMIN 4.3 09/27/2023     Lab Results   Component Value Date    BILITOT 0.3 10/11/2024    BILITOT 0.3 05/24/2024    BILITOT 0.2 09/27/2023     Lab Results   Component Value Date    AST 14 10/11/2024     AST 17 05/24/2024    AST 17 09/27/2023     Lab Results   Component Value Date    ALT 14 10/11/2024    ALT 20 05/24/2024    ALT 18 09/27/2023     Lab Results   Component Value Date    ANIONGAP 11 10/11/2024    ANIONGAP 8 05/24/2024    ANIONGAP 7 (L) 09/27/2023     Lab Results   Component Value Date    CREATININE 0.8 10/11/2024    CREATININE 0.9 05/24/2024    CREATININE 1.0 09/27/2023     Lab Results   Component Value Date    EGFRNORACEVR >60.0 10/11/2024    EGFRNORACEVR >60.0 05/24/2024    EGFRNORACEVR >60.0 09/27/2023     Assessment/Plan  - Increase to Wegovy 1.7 mg x 4 weeks   - Then increase to Wegovy 2.4 mg SQ weekly  - RTC in 3 months for follow-up evaluation    Patient consented to pharmacist management via collaborative practice.

## 2025-01-13 ENCOUNTER — OFFICE VISIT (OUTPATIENT)
Dept: PAIN MEDICINE | Facility: CLINIC | Age: 45
End: 2025-01-13
Payer: COMMERCIAL

## 2025-01-13 ENCOUNTER — PATIENT MESSAGE (OUTPATIENT)
Dept: INTERNAL MEDICINE | Facility: CLINIC | Age: 45
End: 2025-01-13

## 2025-01-13 ENCOUNTER — OFFICE VISIT (OUTPATIENT)
Dept: INTERNAL MEDICINE | Facility: CLINIC | Age: 45
End: 2025-01-13
Payer: COMMERCIAL

## 2025-01-13 VITALS
HEART RATE: 88 BPM | WEIGHT: 289 LBS | SYSTOLIC BLOOD PRESSURE: 122 MMHG | DIASTOLIC BLOOD PRESSURE: 85 MMHG | BODY MASS INDEX: 49.34 KG/M2 | HEIGHT: 64 IN

## 2025-01-13 DIAGNOSIS — E66.9 OBESITY, UNSPECIFIED CLASS, UNSPECIFIED OBESITY TYPE, UNSPECIFIED WHETHER SERIOUS COMORBIDITY PRESENT: ICD-10-CM

## 2025-01-13 DIAGNOSIS — M54.16 LUMBAR RADICULOPATHY: ICD-10-CM

## 2025-01-13 DIAGNOSIS — E66.01 OBESITY, CLASS III, BMI 40-49.9 (MORBID OBESITY): Primary | ICD-10-CM

## 2025-01-13 DIAGNOSIS — M51.379 DEGENERATION OF INTERVERTEBRAL DISC OF LUMBOSACRAL REGION, UNSPECIFIED WHETHER PAIN PRESENT: ICD-10-CM

## 2025-01-13 DIAGNOSIS — M79.10 MYALGIA: Primary | ICD-10-CM

## 2025-01-13 PROCEDURE — 99999 PR PBB SHADOW E&M-EST. PATIENT-LVL IV: CPT | Mod: PBBFAC,,, | Performed by: EMERGENCY MEDICINE

## 2025-01-13 PROCEDURE — 99214 OFFICE O/P EST MOD 30 MIN: CPT | Mod: 25,S$GLB,, | Performed by: EMERGENCY MEDICINE

## 2025-01-13 PROCEDURE — 3079F DIAST BP 80-89 MM HG: CPT | Mod: CPTII,S$GLB,, | Performed by: EMERGENCY MEDICINE

## 2025-01-13 PROCEDURE — 99499 UNLISTED E&M SERVICE: CPT | Mod: 95,,,

## 2025-01-13 PROCEDURE — 3074F SYST BP LT 130 MM HG: CPT | Mod: CPTII,S$GLB,, | Performed by: EMERGENCY MEDICINE

## 2025-01-13 PROCEDURE — 20553 NJX 1/MLT TRIGGER POINTS 3/>: CPT | Mod: S$GLB,,, | Performed by: EMERGENCY MEDICINE

## 2025-01-13 PROCEDURE — 1159F MED LIST DOCD IN RCRD: CPT | Mod: CPTII,S$GLB,, | Performed by: EMERGENCY MEDICINE

## 2025-01-13 PROCEDURE — 76942 ECHO GUIDE FOR BIOPSY: CPT | Mod: S$GLB,,, | Performed by: EMERGENCY MEDICINE

## 2025-01-13 PROCEDURE — 3008F BODY MASS INDEX DOCD: CPT | Mod: CPTII,S$GLB,, | Performed by: EMERGENCY MEDICINE

## 2025-01-13 RX ORDER — SEMAGLUTIDE 1.7 MG/.75ML
1.7 INJECTION, SOLUTION SUBCUTANEOUS
Qty: 3 ML | Refills: 0 | Status: ACTIVE | OUTPATIENT
Start: 2025-01-13

## 2025-01-13 RX ORDER — SEMAGLUTIDE 1 MG/.5ML
1 INJECTION, SOLUTION SUBCUTANEOUS
Qty: 2 ML | Refills: 0 | OUTPATIENT
Start: 2025-01-13

## 2025-01-13 RX ORDER — TRIAMCINOLONE ACETONIDE 40 MG/ML
40 INJECTION, SUSPENSION INTRA-ARTICULAR; INTRAMUSCULAR
Status: COMPLETED | OUTPATIENT
Start: 2025-01-13 | End: 2025-01-13

## 2025-01-13 RX ORDER — SEMAGLUTIDE 2.4 MG/.75ML
2.4 INJECTION, SOLUTION SUBCUTANEOUS
Qty: 3 ML | Refills: 2 | Status: ACTIVE | OUTPATIENT
Start: 2025-01-13

## 2025-01-13 RX ADMIN — TRIAMCINOLONE ACETONIDE 40 MG: 40 INJECTION, SUSPENSION INTRA-ARTICULAR; INTRAMUSCULAR at 02:01

## 2025-01-13 NOTE — PROCEDURES
"PROCEDURE: Trigger Point Injections with Ultrasound Guidance- Location of Myofascial Pain     PATIENT NAME: Fran Higgins   MRN: [id number]     DATE OF PROCEDURE: 01/13/2025    Diagnosis: Myalgia (M79.1)  CPT code: 98828 (Injection(s); single or multiple trigger point(s), 3 or more muscles), 97945 (Ultrasonic guidance for needle placement imaging supervision and interpretation)    Injection # 2 this year.    Postprocedural Diagnosis: Same  Needle Type: - 22G 3.5" spinal needle    Solution injected: A 10ml mixture of 0.5% Bupivacaine and 40mg Kenalog  Volume Injected for each Trigger Point: 1ml  Number of Trigger Point Injected: 5    Estimated Blood Loss - <2ml  Drains: None  Specimens Removed: None  Urine Output - Not Measured  Complications: None  Outcome: Good    Informed Consent:  The patient's condition and proposed procedures, risks (including complications of nerve damage,  bleeding, infection, and failure of pain relief), and alternatives were discussed with the patient or responsible party.  The patient's/responsible party's questions were answered.  The patient/responsible party appeared to understand and chose to proceed.  Informed consent was obtained.    Procedure in Detail:  The procedure was performed in the procedure treatment room.  After obtaining written consent, the patient was placed in a prone position. By ultrasound the target muscles were identified that correlated with the patient's pain pattern.    Procedural Pause:  A procedural pause verifying correct patient, medical record number, allergies, medications to be administered, current vital signs, and surgical site was performed immediately prior to beginning the procedure.    The above noted needle was advanced towards each trigger point under ultrasound guidance until the patient's typical radiating pain pattern was reproduced. Muscle twitch or reproduction of pain confirmed proper placement of the needle. After negative aspiration for " heme, the above noted solution was injected in a fanned-out distribution at each trigger point. The needle(s) was then removed.     All sites were done in the same manner. The patient tolerated the procedure well and without complications. After meeting discharge criteria, the patient was discharged home safely.    DISCUSSION:  Trigger point injections were performed today to treat the patients myofascial pain. The purpose was to improve the patients function and decrease pain. We have reminded the patient that the trigger point injections must be done in conjunction with a stretching program.  Without a stretching program, results from trigger point injections are often suboptimal. The patient was advised to relax and avoid any heavy lifting or excessive bending for 24 hours. She was advised that she may return to her usual activities after 24 hours if she is otherwise feeling well.  The patient was advised not to bathe or soak in water for 24 hours but that showering would be acceptable.      Note Electronically Signed By:  Arun Garcia  01/13/2025

## 2025-01-13 NOTE — PROGRESS NOTES
Interventional Pain Management - Established     Interval history 01/13/2025: Patient returns and reports that she is having pain in her right lower back in the same location where she received TPIs in November 2024. She reports that the radicular pain on the right remains improved.     Interval History 11/15/2024: The patient had L4/5 and L5/S1 right TFESI on 10/24/2024 and they report 60% pain relief.  She states that the pain radiating down her right leg has resolved.  She reports that she now has a pain that started 4 days ago on her right side of her lower back. There is no exacerbating factor, but the more she does the more it hurts. She has also noted a knot in the area.     Interval History 10/04/2024:  Since their last visit the pain has been unchanged. They are participating in physical therapy and are participating in home exercise plan.    Original HPI 09/25/2024: Fran Higgins is a 44 y.o. year old female patient who has a past medical history of Abnormal Pap smear of cervix, Abnormal uterine bleeding (AUB), Amblyopia, Anxiety, Cervical scoliosis, Depression, DUB (dysfunctional uterine bleeding), Fatigue, Galactorrhea in female- bilateral breast, Galactorrhea of both breasts, psychiatric care, Migraine with aura, not intractable, Morbid obesity, Mucinous tumor, of low malignant potential, Ovarian cyst, Pericardial cyst, Psychiatric problem, Sleep difficulties, Surgical menopause, Therapy, and Vaginal yeast infection. She presents in referral from No ref. provider found for lower back pain for over 20 years.     Original Pain Description:  The pain is located in the back and is radiating to the right hip, leg and the heel posteriorly . The pain is described as numbing and tingling. Exacerbating factors: daily activities. Mitigating factors medications. Symptoms interfere with daily activity. The patient feels like symptoms have been unchanged. Patient denies significant motor weakness. No  red flags.     PAIN SCORES:  Best: Pain is 3  Current: Pain is 3  Worst: Pain is 10    6 weeks of Conservative therapy:  PT: Completed for neck  Chiro:  HEP: Participating    Treatments / Medications:   Neurontin 100 mg PRN qHS - makes her feel like zombie  Xanax 0.5 mg  for insomnia and anxiety  Flexeril 5 mg PRN    Antiplatelets/Anticoagulants/Others:  Humira    Interventional Pain Procedures:   10/24/24 L4/5 and L5/S1 right TFESI    IMAGING:    MRI LUMBAR SPINE WITHOUT CONTRAST   09/28/2024  6 Lumbar vetrebra, lowest level considered L5/S1   L2/3: BBPDB->mild BL neural foraminal narrowing and mild spinal canal stenosis  L3/4: BBPDB & facet arthropathy->mild BL neural foraminal narrowing and mild spinal canal stenosis  L4/5: BBPDB & facet arthropathy->mild BL neural foraminal narrowing and mild spinal canal stenosis.  L5/1: BBPDB & facet arthropathy->mod right / severe left neural foraminal narrowing and mild spinal canal stenosis. Grade 1 AL. BL L5 pars defects     XR LUMBAR SPINE AP AND LAT WITH FLEX/EXT   03/07/2024  On neutral lateral view, there is grade 1 AL L5 on S1. Disc space height loss primarily involving L4-L5 and L5-S1 noting endplate degenerative changes primarily at L5-S1.  There is lower lumbar facet arthropathy.  There are pars defects at L5.  On flex/ex no new or worsening listhesis or facet malalignment.  AP spinal alignment is remarkable for mild dextroscoliotic curvature.  The bilateral sacroiliac joints are intact.     US MSK Soft-tissue right lower lumbar in clinic 11/15/24  No focal fluid collections, no masses noted, no cobblestoning appearance, normal ultrasound  Past Surgical History:   Procedure Laterality Date    HYSTERECTOMY  03/2020    INJECTION, SPINE, LUMBOSACRAL, TRANSFORAMINAL APPROACH Right 10/24/2024    Procedure: TFESI RT L4/5, L5/S1;  Surgeon: Arun Garcia MD;  Location: [facility];  Service: Pain Management;  Laterality: Right;  No ac    LAPAROSCOPIC SLEEVE  GASTRECTOMY N/A 6/22/2021    Procedure: GASTRECTOMY, SLEEVE, LAPAROSCOPIC; with intraoperative egd;  Surgeon: Jaziel Whitman Jr., MD;  Location: [address];  Service: General;  Laterality: N/A;    ROBOT-ASSISTED LAPAROSCOPIC ABDOMINAL HYSTERECTOMY USING DA ROMAIN XI N/A 3/9/2020    Procedure: XI ROBOTIC HYSTERECTOMY;  Surgeon: Jeff Kaufman MD;  Location: [address];  Service: Oncology;  Laterality: N/A;    ROBOT-ASSISTED LAPAROSCOPIC SALPINGO-OOPHORECTOMY Left 6/25/2019    Procedure: ROBOTIC SALPINGO-OOPHORECTOMY;  Surgeon: Marky Fox Jr., MD;  Location: [facility];  Service: OB/GYN;  Laterality: Left;    SALPINGOOPHORECTOMY Right 3/9/2020    Procedure: SALPINGO-OOPHORECTOMY  USO;  Surgeon: Jeff Kaufman MD;  Location: [facility] OR [address] [place] [address];  Service: Oncology;  Laterality: Right;    TONSILLECTOMY         Social History     Socioeconomic History    Marital status: Single    Number of children: 1   Occupational History    Occupation: Nurse circulator   Tobacco Use    Smoking status: Never    Smokeless tobacco: Never   Substance and Sexual Activity    Alcohol use: Yes     Comment: 2-3 drinks twice a year    Drug use: No    Sexual activity: Not Currently     Partners: Male     Birth control/protection: None, Condom   Other Topics Concern    Are you pregnant or think you may be? No    Breast-feeding No     Social Drivers of Health     Financial Resource Strain: Low Risk  (1/6/2025)    Overall Financial Resource Strain (CARDIA)     Difficulty of Paying Living Expenses: Not very hard   Food Insecurity: No Food Insecurity (1/6/2025)    Hunger Vital Sign     Worried About Running Out of Food in the Last Year: Never true     Ran Out of Food in the Last Year: Never true   Transportation Needs: Patient Declined (11/17/2023)    PRAPARE - Transportation     Lack of Transportation (Medical): Patient declined     Lack of Transportation (Non-Medical): Patient declined   Physical Activity: Insufficiently Active  "(1/6/2025)    Exercise Vital Sign     Days of Exercise per Week: 4 days     Minutes of Exercise per Session: 30 min   Stress: No Stress Concern Present (1/6/2025)    [employer] - Occupational Stress Questionnaire     Feeling of Stress : Only a little   Housing Stability: Low Risk  (11/17/2023)    Housing Stability Vital Sign     Unable to Pay for Housing in the Last Year: No     Number of Places Lived in the Last Year: 1     Unstable Housing in the Last Year: No       Medications/Allergies: See med card    ROS:  GENERAL: No fever. No chills. No fatigue. Denies weight loss. Denies weight gain.  Back / musculoskeletal / neuro : See HPI    VITALS:   Vitals:    01/13/25 1357   BP: 122/85   Pulse: 88   Weight: 131.1 kg (289 lb 0.4 oz)   Height: 5' 4" (1.626 m)   PainSc:   7   PainLoc: Back         Body mass index is 49.61 kg/m².      1/13/2025     1:56 PM 9/25/2024     1:09 PM   Last 3 PDI Scores   Pain Disability Index (PDI) 49 35       Physical Exam:  General appearance: Well appearing, in no acute distress, alert and oriented x3.  Psych:  Mood and affect appropriate.    PHYSICAL EXAM (Previously):   GENERAL: Well appearing, in no acute distress, alert and oriented x3.  PSYCH:  Mood and affect appropriate.  SKIN: Skin color, texture, turgor normal, no rashes or lesions.  HEENT:  Normocephalic, atraumatic. Cranial nerves grossly intact.  NECK: No pain to palpation over the cervical paraspinous muscles. No pain to palpation over facets. No pain with neck flexion, extension, or lateral flexion.   PULM: No evidence of respiratory difficulty, symmetric chest rise.  GI:  Non-distended  BACK: Normal range of motion. No pain to palpation over the spinous processes. No pain to palpation over facet joints. No pain with axial loading. There is no pain with palpation over the sacroiliac joints bilaterally.   EXTREMITIES: No deformities, edema, or skin discoloration.   MUSCULOSKELETAL: Shoulder, hip, " and knee provocative maneuvers are negative. No atrophy is noted.  NEURO: Sensation is equal and appropriate bilaterally. Bilateral upper and lower extremity strength is normal and symmetric. Bilateral upper and lower extremity coordination and muscle stretch reflexes are physiologic and symmetric. Plantar response are downgoing. Straight leg raising in the supine position is negative to radicular pain.   GAIT: normal.      LABS:    Lab Results   Component Value Date    HGBA1C 5.3 10/11/2024       Lab Results   Component Value Date    CREATININE 0.8 10/11/2024         ASSESSMENT: 44 y.o. year old female with pain, consistent with:    Encounter Diagnoses   Name Primary?    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  Discussed with patient that the voiding that occurred while she was asleep as well as subsequent urinary retention was secondary to the sedation.  If needed repeat procedure, will go very light on sedation.  Patient's right lower myalgia has returned, will perform TPI in clinic today.    PLAN:  - I have stressed the importance of physical activity and a home exercise plan to help with pain and improve health.  - Patient can continue with medications for now since they are providing benefits, using them appropriately, and without side effects.  - Counseled patient regarding the importance of activity modification and physical therapy.  - patient with myalgia of the right lower lumbar region, will perform TPI in clinic today.  Please see notes  - Medications:  No NSAIDs due to gastric bypass  - Imaging: Reviewed available imaging with patient and answered any questions they had regarding study.  - The patient's  pathophysiology was explained in detail with reference to x-rays, models, other visual aids as appropriate.   - Follow up visit: return to clinic prince Garcia MD  01/13/2025

## 2025-01-14 ENCOUNTER — PATIENT MESSAGE (OUTPATIENT)
Dept: BARIATRICS | Facility: CLINIC | Age: 45
End: 2025-01-14
Payer: COMMERCIAL

## 2025-01-16 DIAGNOSIS — G43.E09 CHRONIC MIGRAINE WITH AURA WITHOUT STATUS MIGRAINOSUS, NOT INTRACTABLE: Primary | ICD-10-CM

## 2025-01-20 ENCOUNTER — OFFICE VISIT (OUTPATIENT)
Dept: URGENT CARE | Facility: CLINIC | Age: 45
End: 2025-01-20
Payer: COMMERCIAL

## 2025-01-20 VITALS
WEIGHT: 189 LBS | TEMPERATURE: 99 F | DIASTOLIC BLOOD PRESSURE: 93 MMHG | HEIGHT: 64 IN | BODY MASS INDEX: 32.27 KG/M2 | SYSTOLIC BLOOD PRESSURE: 142 MMHG | OXYGEN SATURATION: 97 % | HEART RATE: 91 BPM | RESPIRATION RATE: 18 BRPM

## 2025-01-20 DIAGNOSIS — J40 BRONCHITIS: Primary | ICD-10-CM

## 2025-01-20 PROCEDURE — 99213 OFFICE O/P EST LOW 20 MIN: CPT | Mod: S$GLB,,, | Performed by: SURGERY

## 2025-01-20 RX ORDER — ALBUTEROL SULFATE 90 UG/1
2 INHALANT RESPIRATORY (INHALATION) EVERY 6 HOURS PRN
Qty: 18 G | Refills: 0 | Status: SHIPPED | OUTPATIENT
Start: 2025-01-20 | End: 2026-01-20

## 2025-01-20 RX ORDER — PROMETHAZINE HYDROCHLORIDE AND DEXTROMETHORPHAN HYDROBROMIDE 6.25; 15 MG/5ML; MG/5ML
5 SYRUP ORAL EVERY 4 HOURS PRN
Qty: 180 ML | Refills: 0 | Status: SHIPPED | OUTPATIENT
Start: 2025-01-20 | End: 2025-01-27

## 2025-01-20 RX ORDER — AZELASTINE 1 MG/ML
2 SPRAY, METERED NASAL 2 TIMES DAILY
Qty: 30 ML | Refills: 0 | Status: ON HOLD | OUTPATIENT
Start: 2025-01-20 | End: 2025-02-03 | Stop reason: HOSPADM

## 2025-01-20 RX ORDER — METHYLPREDNISOLONE 4 MG/1
TABLET ORAL
Qty: 21 EACH | Refills: 0 | Status: SHIPPED | OUTPATIENT
Start: 2025-01-20 | End: 2025-01-27

## 2025-01-20 NOTE — PROGRESS NOTES
"Subjective:      Patient ID: Fran Higgins is a 44 y.o. female.    Vitals:  height is 5' 4" (1.626 m) and weight is 85.7 kg (189 lb). Her oral temperature is 98.5 °F (36.9 °C). Her blood pressure is 142/93 (abnormal) and her pulse is 91. Her respiration is 18 and oxygen saturation is 97%.     Chief Complaint: Cough (Bronchitis - Entered by patient)    This pt complains of cough x 2 days.  Pt states she was exposed to Bronchitis. Pt declines any poct testing. No nausea, vomiting, or diarrhea.    Home tx: none    Cough  This is a new problem. The current episode started in the past 7 days. The problem has been unchanged. The problem occurs every few minutes. Pertinent negatives include no chills, headaches, nasal congestion, postnasal drip, sore throat or sweats.       Constitution: Negative for chills.   HENT:  Negative for postnasal drip and sore throat.    Respiratory:  Positive for cough.    Neurological:  Negative for headaches.      Objective:     Physical Exam   Constitutional: She is oriented to person, place, and time. She appears well-developed. She is cooperative.  Non-toxic appearance. She does not appear ill. No distress.   HENT:   Head: Normocephalic and atraumatic.   Ears:   Right Ear: Hearing, tympanic membrane, external ear and ear canal normal.   Left Ear: Hearing, tympanic membrane, external ear and ear canal normal.   Nose: Mucosal edema and rhinorrhea present. No nasal deformity. No epistaxis. Right sinus exhibits no maxillary sinus tenderness and no frontal sinus tenderness. Left sinus exhibits no maxillary sinus tenderness and no frontal sinus tenderness.   Mouth/Throat: Uvula is midline, oropharynx is clear and moist and mucous membranes are normal. No trismus in the jaw. Normal dentition. No uvula swelling. No oropharyngeal exudate, posterior oropharyngeal edema or posterior oropharyngeal erythema.   Eyes: Conjunctivae and lids are normal. No scleral icterus.   Neck: Trachea normal " and phonation normal. Neck supple. No edema present. No erythema present. No neck rigidity present.   Cardiovascular: Normal rate, regular rhythm, normal heart sounds and normal pulses.   Pulmonary/Chest: Effort normal and breath sounds normal. No respiratory distress. She has no decreased breath sounds. She has no rhonchi.   Abdominal: Normal appearance.   Musculoskeletal: Normal range of motion.         General: No deformity. Normal range of motion.   Neurological: She is alert and oriented to person, place, and time. She exhibits normal muscle tone. Coordination normal.   Skin: Skin is warm, dry, intact, not diaphoretic and not pale.   Psychiatric: Her speech is normal and behavior is normal. Judgment and thought content normal.   Nursing note and vitals reviewed.      Assessment:     1. Bronchitis        Plan:       Bronchitis  -     methylPREDNISolone (MEDROL DOSEPACK) 4 mg tablet; use as directed  Dispense: 21 each; Refill: 0  -     azelastine (ASTELIN) 137 mcg (0.1 %) nasal spray; 2 sprays (274 mcg total) by Nasal route 2 (two) times daily.  Dispense: 30 mL; Refill: 0  -     promethazine-dextromethorphan (PROMETHAZINE-DM) 6.25-15 mg/5 mL Syrp; Take 5 mLs by mouth every 4 (four) hours as needed (cough).  Dispense: 180 mL; Refill: 0  -     albuterol (PROVENTIL/VENTOLIN HFA) 90 mcg/actuation inhaler; Inhale 2 puffs into the lungs every 6 (six) hours as needed for Wheezing. Rescue  Dispense: 18 g; Refill: 0

## 2025-01-24 ENCOUNTER — E-VISIT (OUTPATIENT)
Dept: URGENT CARE | Facility: CLINIC | Age: 45
End: 2025-01-24
Payer: COMMERCIAL

## 2025-01-24 DIAGNOSIS — B37.9 YEAST INFECTION: Primary | ICD-10-CM

## 2025-01-24 PROCEDURE — 99421 OL DIG E/M SVC 5-10 MIN: CPT | Mod: ,,, | Performed by: INTERNAL MEDICINE

## 2025-01-24 RX ORDER — FLUCONAZOLE 150 MG/1
150 TABLET ORAL ONCE
Qty: 2 TABLET | Refills: 0 | Status: SHIPPED | OUTPATIENT
Start: 2025-01-24 | End: 2025-01-26

## 2025-01-24 NOTE — PROGRESS NOTES
Patient ID: Fran Higgins is a 44 y.o. female.    Chief Complaint: General Illness (Entered automatically based on patient selection in [employer].)    The patient initiated a request through [employer] on 1/24/2025 for evaluation and management with a chief complaint of General Illness (Entered automatically based on patient selection in [employer].)     I evaluated the questionnaire submission on 01/24/2025 .    [facility]    1/24/2025  1:20 PM CST - Filed by Patient   What do you need help with? Vaginal Symptoms   Do you agree to participate in an E-Visit? Yes   If you have any of the following symptoms,  please do not complete an E-Visit,  schedule an appointment with your provider: I acknowledge   Do you have any of the following pregnancy-related conditions? None   What is the main issue you would like addressed today? Vaginal yeast infection   Which of the following vaginal concerns do you have? Itching   Do you have vaginal discharge? White/milky discharge   Do you have pain while passing urine? No   Do you have any of the following symptoms? None of the above    Have you taken antibiotics in the last two weeks? No    Do you use any of the following? Bubble baths   Which of the following applies to your menstrual period? Have not had for a while   Which of the following applies to your menstrual cycle? No bleeding   Do you have spotting between periods? No   Do you have pain with your period? No   Have you had similar symptoms in the past? More than once   When you had similar symptoms in the past, did any of the following work? Pills for yeast infection   Have you had a temperature of 100.4 or higher? No   Provide any additional information you feel is important. Itchy with mild white discharge;  post surgical menopause (2020);  on steroid dose pack for bronchitis. Usually takes more than one fluconazole. Not diabetic   Please attach any relevant images or files    Are you able  to take your vital signs? No         Encounter Diagnosis   Name Primary?    Yeast infection Yes        No orders of the defined types were placed in this encounter.     Medications Ordered This Encounter   Medications    fluconazole (DIFLUCAN) 150 MG Tab     Sig: Take 1 tablet (150 mg total) by mouth once. Repeat if not resolved in 3 days. for 1 dose     Dispense:  2 tablet     Refill:  0        No follow-ups on file.      E-Visit Time Tracking:    Day 1 Time (in minutes): 5    Total Time (in minutes): 5

## 2025-01-27 ENCOUNTER — OFFICE VISIT (OUTPATIENT)
Dept: PRIMARY CARE CLINIC | Facility: CLINIC | Age: 45
End: 2025-01-27
Payer: COMMERCIAL

## 2025-01-27 VITALS
TEMPERATURE: 98 F | SYSTOLIC BLOOD PRESSURE: 122 MMHG | HEART RATE: 94 BPM | WEIGHT: 282.88 LBS | DIASTOLIC BLOOD PRESSURE: 90 MMHG | OXYGEN SATURATION: 96 % | RESPIRATION RATE: 18 BRPM | HEIGHT: 64 IN | BODY MASS INDEX: 48.29 KG/M2

## 2025-01-27 DIAGNOSIS — R05.8 POST-VIRAL COUGH SYNDROME: Primary | ICD-10-CM

## 2025-01-27 PROCEDURE — 99499 UNLISTED E&M SERVICE: CPT | Mod: S$GLB,,, | Performed by: INTERNAL MEDICINE

## 2025-01-27 RX ORDER — MONTELUKAST SODIUM 10 MG/1
10 TABLET ORAL NIGHTLY
Qty: 30 TABLET | Refills: 0 | Status: ON HOLD | OUTPATIENT
Start: 2025-01-27 | End: 2025-02-03 | Stop reason: HOSPADM

## 2025-01-27 NOTE — H&P (VIEW-ONLY)
44-year-old woman with a history of scoliosis of the cervical spine, migraine, depression, pericardial cyst, means status post total hysterectomy for a left ovarian tumor, status post gastric sleeve, elevated BMI (48.55 kg per meter squared, on Wegovy with a weight loss of 36 lb), insomnia, hidradenitis suppurativa (on Humira), here with a complaint of a cough.    History of present illness and pertinent review of systems:  The patient has been sick for a proximally one-week.  She was seen at urgent care on the 20th of January.  She was diagnosed with bronchitis.  She was treated with a Medrol Dosepak, Astelin, albuterol, promethazine dextromethorphan, but no antibiotic.  The patient was not tested for COVID or flu.  She completed the Medrol Dosepak on Saturday.  The patient notes the cough was unaffected by steroids and that she continues to cough particularly at night and early in the morning.  In the morning she wakes up wheezing.  There was no pleurisy, hemoptysis, but she does state she is more short of breath than normal.  The patient is producing no sputum.  The patient notes no fever, chills, or sweats.  The patient had a generalized headache particularly yesterday.  The headache is better today.  There was no earache.  There was no nasal congestion, rhinorrhea, and she is not aware of a postnasal drip.  She has no dental pain or facial swelling.  There is no sore throat.  There has been no diarrhea, rash, or myalgia.  She notes that she has had significant fatigue possibly from not sleeping well.  The patient notes that she is status post a gastric sleeve and that she has had significant reflux.  There is a question of whether or not the gastric sleeve may need to be revised.  She denies any symptoms of aspiration such as waking up in the middle night choking.  The patient is not taking any medications currently for her cough and is not taking albuterol, Astelin, promethazine dextromethorphan.    Problem  list, medication list, and allergies have been reviewed.  The patient has no known drug allergies.    Review of systems is otherwise negative    Physical Exam  Vitals and nursing note reviewed.   Constitutional:       General: She is not in acute distress.     Appearance: She is not ill-appearing, toxic-appearing or diaphoretic.   HENT:      Head: Atraumatic.      Right Ear: Tympanic membrane, ear canal and external ear normal.      Left Ear: Tympanic membrane, ear canal and external ear normal.      Nose: Congestion present. No rhinorrhea.      Mouth/Throat:      Mouth: Mucous membranes are moist.      Pharynx: Oropharynx is clear. No oropharyngeal exudate or posterior oropharyngeal erythema.   Eyes:      General: No scleral icterus.     Conjunctiva/sclera: Conjunctivae normal.   Cardiovascular:      Rate and Rhythm: Normal rate and regular rhythm.      Pulses: Normal pulses.      Heart sounds: Normal heart sounds. No murmur heard.     No gallop.   Pulmonary:      Effort: Pulmonary effort is normal. No respiratory distress.      Breath sounds: Normal breath sounds. No wheezing, rhonchi or rales.   Abdominal:      General: Bowel sounds are normal.      Palpations: Abdomen is soft.      Tenderness: There is no abdominal tenderness. There is no right CVA tenderness or left CVA tenderness.   Musculoskeletal:         General: No tenderness.      Cervical back: Neck supple. No tenderness.      Right lower leg: No edema.      Left lower leg: No edema.   Lymphadenopathy:      Cervical: No cervical adenopathy.   Skin:     Coloration: Skin is not jaundiced or pale.   Neurological:      General: No focal deficit present.      Mental Status: She is alert and oriented to person, place, and time.   Psychiatric:         Mood and Affect: Mood normal.         Behavior: Behavior normal.      Assessment/plan:   Post viral cough:  The patient is exam is unremarkable including ears and throat.  Lungs are clear.  There is some mild  congestion of the nasal mucosa.  She has past treatment time and testing time for COVID and flu.  There are no findings to suggest a need for an antibiotic.  Treatment is symptomatic.  The length of this cough may last for several more weeks.  Plan:  Explanation of the above   Symptomatic treatment with the following:   Zyrtec 10 mg at bedtime  Saline nasal spray 4 squirts each nostril every 2 hours  Mucinex DM twice daily  Honey 1-2 tbsp directly off the spoon as often as desired or needed   [place] cough drops  The patient may use albuterol 1 hour prior to going to bed  Montelukast 10 mg at bedtime  Sleep with head elevated  Hydration

## 2025-01-27 NOTE — PATIENT INSTRUCTIONS
Thank you for visiting today.  As I explained to you your exam was negative and did not require antibiotics.  This has often called a post viral cough syndrome.  These coughs can last 6-8 weeks.  They are often highlighted by having paroxysms of cough often ending in a wheeze.  Treatment at this time would be symptomatic and I recommend the following:  Use albuterol a proximally 1 hour prior to going to bed.  I have prescribed montelukast or Singulair to be taken with Zyrtec at bedtime.  Mucinex DM twice daily   Saline nasal spray 4 squirts each nostril every 2 hours  Honey 1-2 tbsp directly off the spoon as often as desired for cough and throat irritation   [place] cough drops  Hydration  Sleep with head elevated

## 2025-01-27 NOTE — PROGRESS NOTES
44-year-old woman with a history of scoliosis of the cervical spine, migraine, depression, pericardial cyst, means status post total hysterectomy for a left ovarian tumor, status post gastric sleeve, elevated BMI (48.55 kg per meter squared, on Wegovy with a weight loss of 36 lb), insomnia, hidradenitis suppurativa (on Humira), here with a complaint of a cough.    History of present illness and pertinent review of systems:  The patient has been sick for a proximally one-week.  She was seen at urgent care on the 20th of January.  She was diagnosed with bronchitis.  She was treated with a Medrol Dosepak, Astelin, albuterol, promethazine dextromethorphan, but no antibiotic.  The patient was not tested for COVID or flu.  She completed the Medrol Dosepak on Saturday.  The patient notes the cough was unaffected by steroids and that she continues to cough particularly at night and early in the morning.  In the morning she wakes up wheezing.  There was no pleurisy, hemoptysis, but she does state she is more short of breath than normal.  The patient is producing no sputum.  The patient notes no fever, chills, or sweats.  The patient had a generalized headache particularly yesterday.  The headache is better today.  There was no earache.  There was no nasal congestion, rhinorrhea, and she is not aware of a postnasal drip.  She has no dental pain or facial swelling.  There is no sore throat.  There has been no diarrhea, rash, or myalgia.  She notes that she has had significant fatigue possibly from not sleeping well.  The patient notes that she is status post a gastric sleeve and that she has had significant reflux.  There is a question of whether or not the gastric sleeve may need to be revised.  She denies any symptoms of aspiration such as waking up in the middle night choking.  The patient is not taking any medications currently for her cough and is not taking albuterol, Astelin, promethazine dextromethorphan.    Problem  list, medication list, and allergies have been reviewed.  The patient has no known drug allergies.    Review of systems is otherwise negative    Physical Exam  Vitals and nursing note reviewed.   Constitutional:       General: She is not in acute distress.     Appearance: She is not ill-appearing, toxic-appearing or diaphoretic.   HENT:      Head: Atraumatic.      Right Ear: Tympanic membrane, ear canal and external ear normal.      Left Ear: Tympanic membrane, ear canal and external ear normal.      Nose: Congestion present. No rhinorrhea.      Mouth/Throat:      Mouth: Mucous membranes are moist.      Pharynx: Oropharynx is clear. No oropharyngeal exudate or posterior oropharyngeal erythema.   Eyes:      General: No scleral icterus.     Conjunctiva/sclera: Conjunctivae normal.   Cardiovascular:      Rate and Rhythm: Normal rate and regular rhythm.      Pulses: Normal pulses.      Heart sounds: Normal heart sounds. No murmur heard.     No gallop.   Pulmonary:      Effort: Pulmonary effort is normal. No respiratory distress.      Breath sounds: Normal breath sounds. No wheezing, rhonchi or rales.   Abdominal:      General: Bowel sounds are normal.      Palpations: Abdomen is soft.      Tenderness: There is no abdominal tenderness. There is no right CVA tenderness or left CVA tenderness.   Musculoskeletal:         General: No tenderness.      Cervical back: Neck supple. No tenderness.      Right lower leg: No edema.      Left lower leg: No edema.   Lymphadenopathy:      Cervical: No cervical adenopathy.   Skin:     Coloration: Skin is not jaundiced or pale.   Neurological:      General: No focal deficit present.      Mental Status: She is alert and oriented to person, place, and time.   Psychiatric:         Mood and Affect: Mood normal.         Behavior: Behavior normal.      Assessment/plan:   Post viral cough:  The patient is exam is unremarkable including ears and throat.  Lungs are clear.  There is some mild  congestion of the nasal mucosa.  She has past treatment time and testing time for COVID and flu.  There are no findings to suggest a need for an antibiotic.  Treatment is symptomatic.  The length of this cough may last for several more weeks.  Plan:  Explanation of the above   Symptomatic treatment with the following:   Zyrtec 10 mg at bedtime  Saline nasal spray 4 squirts each nostril every 2 hours  Mucinex DM twice daily  Honey 1-2 tbsp directly off the spoon as often as desired or needed   [place] cough drops  The patient may use albuterol 1 hour prior to going to bed  Montelukast 10 mg at bedtime  Sleep with head elevated  Hydration

## 2025-01-28 ENCOUNTER — TELEPHONE (OUTPATIENT)
Dept: ENDOSCOPY | Facility: HOSPITAL | Age: 45
End: 2025-01-28
Payer: COMMERCIAL

## 2025-01-28 NOTE — TELEPHONE ENCOUNTER
Referral for procedure from  [facility] referral (see Appts tab)      Spoke to patient to reschedule procedure(s) Upper Endoscopy (EGD) with Bra Placement       Physician to perform procedure(s) Dr. KEV Bustamante  Date of Procedure (s) 2/3/25  Arrival Time 10:15 AM  Time of Procedure(s) 11:15 AM   Location of Procedure(s) [place] [address] Floor  Type of Rx Prep sent to patient: N/A  Instructions provided to patient via [facility]    Patient was informed on the following information and verbalized understanding. Screening questionnaire reviewed with patient and complete. If procedure requires anesthesia, a responsible adult needs to be present to accompany the patient home, patient cannot drive after receiving anesthesia. Appointment details are tentative, especially check-in time. Patient will receive a prep-op call 7 days prior to confirm check-in time for procedure. If applicable the patient should contact their pharmacy to verify Rx for procedure prep is ready for pick-up. Patient was advised to call the scheduling department at [phone] if pharmacy states no Rx is available. Patient was advised to call the endoscopy scheduling department if any questions or concerns arise.      [facility] Scheduling Department

## 2025-02-03 ENCOUNTER — ANESTHESIA (OUTPATIENT)
Dept: ENDOSCOPY | Facility: HOSPITAL | Age: 45
End: 2025-02-03
Payer: COMMERCIAL

## 2025-02-03 ENCOUNTER — HOSPITAL ENCOUNTER (OUTPATIENT)
Facility: HOSPITAL | Age: 45
Discharge: HOME | End: 2025-02-03
Attending: INTERNAL MEDICINE | Admitting: INTERNAL MEDICINE
Payer: COMMERCIAL

## 2025-02-03 ENCOUNTER — ANESTHESIA EVENT (OUTPATIENT)
Dept: ENDOSCOPY | Facility: HOSPITAL | Age: 45
End: 2025-02-03
Payer: COMMERCIAL

## 2025-02-03 VITALS
HEART RATE: 88 BPM | SYSTOLIC BLOOD PRESSURE: 120 MMHG | HEIGHT: 64 IN | OXYGEN SATURATION: 98 % | TEMPERATURE: 98 F | WEIGHT: 279 LBS | DIASTOLIC BLOOD PRESSURE: 80 MMHG | BODY MASS INDEX: 47.63 KG/M2 | RESPIRATION RATE: 18 BRPM

## 2025-02-03 DIAGNOSIS — K21.9 GERD (GASTROESOPHAGEAL REFLUX DISEASE): ICD-10-CM

## 2025-02-03 DIAGNOSIS — Z98.84 S/P LAPAROSCOPIC SLEEVE GASTRECTOMY: ICD-10-CM

## 2025-02-03 DIAGNOSIS — F33.41 MAJOR DEPRESSIVE DISORDER, RECURRENT EPISODE, IN PARTIAL REMISSION WITH ANXIOUS DISTRESS: Primary | ICD-10-CM

## 2025-02-03 LAB — POCT GLUCOSE: 84 MG/DL (ref 70–110)

## 2025-02-03 PROCEDURE — 63600175 PHARM REV CODE 636 W HCPCS: Performed by: NURSE ANESTHETIST, CERTIFIED REGISTERED

## 2025-02-03 PROCEDURE — 82962 GLUCOSE BLOOD TEST: CPT | Performed by: INTERNAL MEDICINE

## 2025-02-03 PROCEDURE — 37000008 HC ANESTHESIA 1ST 15 MINUTES: Performed by: INTERNAL MEDICINE

## 2025-02-03 PROCEDURE — 37000009 HC ANESTHESIA EA ADD 15 MINS: Performed by: INTERNAL MEDICINE

## 2025-02-03 PROCEDURE — E9220 PRA ENDO ANESTHESIA: HCPCS | Mod: ,,, | Performed by: NURSE ANESTHETIST, CERTIFIED REGISTERED

## 2025-02-03 PROCEDURE — 91035 G-ESOPH REFLX TST W/ELECTROD: CPT | Mod: TC | Performed by: INTERNAL MEDICINE

## 2025-02-03 PROCEDURE — 43235 EGD DIAGNOSTIC BRUSH WASH: CPT | Mod: ,,, | Performed by: INTERNAL MEDICINE

## 2025-02-03 PROCEDURE — 43235 EGD DIAGNOSTIC BRUSH WASH: CPT | Performed by: INTERNAL MEDICINE

## 2025-02-03 PROCEDURE — 27200942: Performed by: INTERNAL MEDICINE

## 2025-02-03 RX ORDER — LIDOCAINE HYDROCHLORIDE 20 MG/ML
INJECTION, SOLUTION EPIDURAL; INFILTRATION; INTRACAUDAL; PERINEURAL
Status: DISCONTINUED | OUTPATIENT
Start: 2025-02-03 | End: 2025-02-03

## 2025-02-03 RX ORDER — SODIUM CHLORIDE 9 MG/ML
INJECTION, SOLUTION INTRAVENOUS CONTINUOUS
Status: DISCONTINUED | OUTPATIENT
Start: 2025-02-03 | End: 2025-02-03 | Stop reason: HOSPADM

## 2025-02-03 RX ORDER — PROPOFOL 10 MG/ML
VIAL (ML) INTRAVENOUS
Status: DISCONTINUED | OUTPATIENT
Start: 2025-02-03 | End: 2025-02-03

## 2025-02-03 RX ADMIN — LIDOCAINE HYDROCHLORIDE 100 MG: 20 INJECTION, SOLUTION EPIDURAL; INFILTRATION; INTRACAUDAL; PERINEURAL at 11:02

## 2025-02-03 RX ADMIN — PROPOFOL 200 MCG/KG/MIN: 10 INJECTION, EMULSION INTRAVENOUS at 11:02

## 2025-02-03 RX ADMIN — GLYCOPYRROLATE 0.2 MG: 0.2 INJECTION, SOLUTION INTRAMUSCULAR; INTRAVENOUS at 11:02

## 2025-02-03 RX ADMIN — PROPOFOL 100 MG: 10 INJECTION, EMULSION INTRAVENOUS at 11:02

## 2025-02-03 NOTE — PROVATION PATIENT INSTRUCTIONS
Discharge Summary/Instructions after an Endoscopic Procedure  Patient Name: Fran Higgins  Patient MRN: [id number]  Patient YOB: 1980  Monday, February 3, 2025  Rajendra Bustamante MD  Dear patient,  As a result of recent federal legislation (The Federal Cures Act), you may   receive lab or pathology results from your procedure in your [facility]   account before your physician is able to contact you. Your physician or   their representative will relay the results to you with their   recommendations at their soonest availability.  Thank you,  RESTRICTIONS:  During your procedure today, you received medications for sedation.  These   medications may affect your judgment, balance and coordination.  Therefore,   for 24 hours, you have the following restrictions:   - DO NOT drive a car, operate machinery, make legal/financial decisions,   sign important papers or drink alcohol.    ACTIVITY:  Today: no heavy lifting, straining or running due to procedural   sedation/anesthesia.  The following day: return to full activity including work.  DIET:  Eat and drink normally unless instructed otherwise.     TREATMENT FOR COMMON SIDE EFFECTS:  - Mild abdominal pain, nausea, belching, bloating or excessive gas:  rest,   eat lightly and use a heating pad.  - Sore Throat: treat with throat lozenges and/or gargle with warm salt   water.  - Because air was used during the procedure, expelling large amounts of air   from your rectum or belching is normal.  - If a bowel prep was taken, you may not have a bowel movement for 1-3 days.    This is normal.  SYMPTOMS TO WATCH FOR AND REPORT TO YOUR PHYSICIAN:  1. Abdominal pain or bloating, other than gas cramps.  2. Chest pain.  3. Back pain.  4. Signs of infection such as: chills or fever occurring within 24 hours   after the procedure.  5. Rectal bleeding, which would show as bright red, maroon, or black stools.   (A tablespoon of blood from the rectum is not serious, especially if    hemorrhoids are present.)  6. Vomiting.  7. Weakness or dizziness.  GO DIRECTLY TO THE NEAREST EMERGENCY ROOM IF YOU HAVE ANY OF THE FOLLOWING:      Difficulty breathing              Chills and/or fever over 101 F   Persistent vomiting and/or vomiting blood   Severe abdominal pain   Severe chest pain   Black, tarry stools   Bleeding- more than one tablespoon   Any other symptom or condition that you feel may need urgent attention  Your doctor recommends these additional instructions:  If any biopsies were taken, your doctors clinic will contact you in 1 to 2   weeks with any results.  - Discharge patient to home.   - The findings and recommendations were discussed with the designated   responsible adult.  For questions, problems or results please call your physician - Rajendra Bustamante MD at Work:  [phone].  [facility], EMERGENCY ROOM PHONE NUMBER: [phone]  IF A COMPLICATION OR EMERGENCY SITUATION ARISES AND YOU ARE UNABLE TO REACH   YOUR PHYSICIAN - GO DIRECTLY TO THE EMERGENCY ROOM.  Rajendra Bustamante MD  2/3/2025 11:27:56 AM  This report has been verified and signed electronically.  Dear patient,  As a result of recent federal legislation (The Federal Cures Act), you may   receive lab or pathology results from your procedure in your [facility]   account before your physician is able to contact you. Your physician or   their representative will relay the results to you with their   recommendations at their soonest availability.  Thank you,  PROVATION

## 2025-02-03 NOTE — ANESTHESIA POSTPROCEDURE EVALUATION
Anesthesia Post Evaluation    Patient: Fran Higgins    Procedure(s) Performed: Procedure(s) (LRB):  EGD (ESOPHAGOGASTRODUODENOSCOPY) (N/A)  PH MONITORING, ESOPHAGUS, WIRELESS, (OFF REFLUX MEDS) (N/A)    Final Anesthesia Type: general      Patient location during evaluation: PACU  Patient participation: Yes- Able to Participate  Level of consciousness: awake and alert  Post-procedure vital signs: reviewed and stable  Pain management: adequate  Airway patency: patent    PONV status at discharge: No PONV  Anesthetic complications: no      Cardiovascular status: blood pressure returned to baseline  Respiratory status: spontaneous ventilation and room air  Hydration status: euvolemic  Follow-up not needed.              Vitals Value Taken Time   /80 02/03/25 1154   Temp 36.6 °C (97.9 °F) 02/03/25 1131   Pulse 88 02/03/25 1154   Resp 18 02/03/25 1154   SpO2 98 % 02/03/25 1154         Event Time   Out of Recovery 12:00:02         Pain/Bernadine Score: Bernadine Score: 10 (2/3/2025 11:32 AM)

## 2025-02-03 NOTE — ANESTHESIA PREPROCEDURE EVALUATION
02/03/2025  Fran Higgins is a 44 y.o., female.    Patient Active Problem List   Diagnosis    Depression    Left ovarian cyst    S/P RA Laparoscopic LSO    Mucinous tumor, of low malignant potential    Pericardial cyst    Scoliosis of cervical spine    Abnormal uterine bleeding (AUB)    DUB (dysfunctional uterine bleeding)    Migraine with aura, not intractable    s/p robotic hysterectomy/RSO 3/9/20    Borderline epithelial neoplasm of ovary    Surgical menopause    Menopause    Dizziness    Galactorrhea in female- bilateral breast    Other insomnia    Major depressive disorder, recurrent episode, in partial remission with anxious distress    Obesity    S/P laparoscopic sleeve gastrectomy    BMI 45.0-49.9, adult    Hidradenitis suppurativa    Seborrheic dermatitis    Complicated bereavement    Lumbar pain       Past Medical History:   Diagnosis Date    Abnormal Pap smear of cervix 2013    Abnormal uterine bleeding (AUB) 09/13/2019    Amblyopia     Anxiety     Cervical scoliosis 1994    Severe    Depression     DUB (dysfunctional uterine bleeding) 01/30/2020    Fatigue     Galactorrhea in female- bilateral breast 04/09/2020    Galactorrhea of both breasts 07/09/2020    Hx of psychiatric care     Migraine with aura, not intractable 01/30/2020    Morbid obesity     Mucinous tumor, 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    Ovarian cyst     Pericardial cyst 07/26/2019    Psychiatric problem     Sleep difficulties     Surgical menopause 03/13/2020    Therapy     Vaginal yeast infection 09/26/2019       Past Surgical History:   Procedure Laterality Date    HYSTERECTOMY  03/2020    INJECTION, SPINE, LUMBOSACRAL, TRANSFORAMINAL APPROACH Right 10/24/2024    Procedure: TFESI RT L4/5, L5/S1;  Surgeon: Arun Garcia MD;  Location: [facility];  Service: Pain Management;  Laterality: Right;  No ac     LAPAROSCOPIC SLEEVE GASTRECTOMY N/A 6/22/2021    Procedure: GASTRECTOMY, SLEEVE, LAPAROSCOPIC; with intraoperative egd;  Surgeon: Jaziel Whitman Jr., MD;  Location: [address];  Service: General;  Laterality: N/A;    ROBOT-ASSISTED LAPAROSCOPIC ABDOMINAL HYSTERECTOMY USING DA ROMAIN XI N/A 3/9/2020    Procedure: XI ROBOTIC HYSTERECTOMY;  Surgeon: Jeff Kaufman MD;  Location: [address];  Service: Oncology;  Laterality: N/A;    ROBOT-ASSISTED LAPAROSCOPIC SALPINGO-OOPHORECTOMY Left 6/25/2019    Procedure: ROBOTIC SALPINGO-OOPHORECTOMY;  Surgeon: Marky Fox Jr., MD;  Location: [facility];  Service: OB/GYN;  Laterality: Left;    SALPINGOOPHORECTOMY Right 3/9/2020    Procedure: SALPINGO-OOPHORECTOMY  USO;  Surgeon: Jeff Kaufman MD;  Location: [address];  Service: Oncology;  Laterality: Right;    TONSILLECTOMY               Pre-op Assessment    I have reviewed the Patient Summary Reports.     I have reviewed the Nursing Notes. I have reviewed the NPO Status.   I have reviewed the Medications.     Review of Systems  Anesthesia Hx:  No problems with previous Anesthesia                Hematology/Oncology:  Hematology Normal   Oncology Normal                                   EENT/Dental:  EENT/Dental Normal           Cardiovascular:  Cardiovascular Normal                                              Pulmonary:  Pulmonary Normal                       Renal/:  Renal/ Normal                 Hepatic/GI:  Hepatic/GI Normal                    Musculoskeletal:  Musculoskeletal Normal                Neurological:      Headaches                                 Endocrine:  Endocrine Normal            Dermatological:  Skin Normal    Psych:  Psychiatric History  depression                Physical Exam  General: Well nourished, Alert and Oriented    Airway:  Mallampati: II   Mouth Opening: Normal  TM Distance: Normal  Tongue: Normal  Neck ROM: Normal ROM    Dental:  Intact        Anesthesia Plan  Type of  Anesthesia, risks & benefits discussed:    Anesthesia Type: Gen Natural Airway  Intra-op Monitoring Plan: Standard ASA Monitors  Induction:  IV  Informed Consent: Informed consent signed with the Patient and all parties understand the risks and agree with anesthesia plan.  All questions answered.   ASA Score: 2  Day of Surgery Review of History & Physical: H&P Update referred to the surgeon/provider.    Ready For Surgery From Anesthesia Perspective.     .

## 2025-02-03 NOTE — INTERVAL H&P NOTE
The patient has been examined and the H&P has been reviewed:    I concur with the findings and no changes have occurred since H&P was written.    Anesthesia risks, benefits and alternative options discussed and understood by patient/family.    History and Exam unchanged from visit.    Procedure - EGD with [place]  Neck - supple  Plan of anesthesia - General  ASA - per anesthesia  Mallampati - per anesthesia  Anesthesia problems - no  Family history of anesthesia problems - no        There are no hospital problems to display for this patient.

## 2025-02-04 DIAGNOSIS — K21.9 GASTROESOPHAGEAL REFLUX DISEASE, UNSPECIFIED WHETHER ESOPHAGITIS PRESENT: ICD-10-CM

## 2025-02-05 RX ORDER — OMEPRAZOLE 40 MG/1
40 CAPSULE, DELAYED RELEASE ORAL EVERY MORNING
Qty: 30 CAPSULE | Refills: 1 | Status: SHIPPED | OUTPATIENT
Start: 2025-02-05

## 2025-02-07 ENCOUNTER — TELEPHONE (OUTPATIENT)
Dept: BARIATRICS | Facility: CLINIC | Age: 45
End: 2025-02-07
Payer: COMMERCIAL

## 2025-02-07 PROCEDURE — 91035 G-ESOPH REFLX TST W/ELECTROD: CPT | Mod: 26,,, | Performed by: INTERNAL MEDICINE

## 2025-02-08 NOTE — PROVATION PATIENT INSTRUCTIONS
Discharge Summary/Instructions after an Endoscopic Procedure  Patient Name: Fran Higgins  Patient MRN: [id number]  Patient YOB: 1980 Friday, February 7, 2025  Rajendra Bustamante MD  Dear patient,  As a result of recent federal legislation (The Federal Cures Act), you may   receive lab or pathology results from your procedure in your [facility]   account before your physician is able to contact you. Your physician or   their representative will relay the results to you with their   recommendations at their soonest availability.  Thank you,  RESTRICTIONS:  During your procedure today, you received medications for sedation.  These   medications may affect your judgment, balance and coordination.  Therefore,   for 24 hours, you have the following restrictions:   - DO NOT drive a car, operate machinery, make legal/financial decisions,   sign important papers or drink alcohol.    ACTIVITY:  Today: no heavy lifting, straining or running due to procedural   sedation/anesthesia.  The following day: return to full activity including work.  DIET:  Eat and drink normally unless instructed otherwise.     TREATMENT FOR COMMON SIDE EFFECTS:  - Mild abdominal pain, nausea, belching, bloating or excessive gas:  rest,   eat lightly and use a heating pad.  - Sore Throat: treat with throat lozenges and/or gargle with warm salt   water.  - Because air was used during the procedure, expelling large amounts of air   from your rectum or belching is normal.  - If a bowel prep was taken, you may not have a bowel movement for 1-3 days.    This is normal.  SYMPTOMS TO WATCH FOR AND REPORT TO YOUR PHYSICIAN:  1. Abdominal pain or bloating, other than gas cramps.  2. Chest pain.  3. Back pain.  4. Signs of infection such as: chills or fever occurring within 24 hours   after the procedure.  5. Rectal bleeding, which would show as bright red, maroon, or black stools.   (A tablespoon of blood from the rectum is not serious, especially if    hemorrhoids are present.)  6. Vomiting.  7. Weakness or dizziness.  GO DIRECTLY TO THE NEAREST EMERGENCY ROOM IF YOU HAVE ANY OF THE FOLLOWING:      Difficulty breathing              Chills and/or fever over 101 F   Persistent vomiting and/or vomiting blood   Severe abdominal pain   Severe chest pain   Black, tarry stools   Bleeding- more than one tablespoon   Any other symptom or condition that you feel may need urgent attention  Your doctor recommends these additional instructions:  If any biopsies were taken, your doctors clinic will contact you in 1 to 2   weeks with any results.  - Discharge patient to home.   - Consider discontinuing Prilosec (omeprazole).   - Return to referring physician.  For questions, problems or results please call your physician - Rajendra Bustamante MD at Work:  [phone].  [facility], EMERGENCY ROOM PHONE NUMBER: [phone]  IF A COMPLICATION OR EMERGENCY SITUATION ARISES AND YOU ARE UNABLE TO REACH   YOUR PHYSICIAN - GO DIRECTLY TO THE EMERGENCY ROOM.  Rajendra Bustamante MD  2/8/2025 10:43:57 AM  This report has been verified and signed electronically.  Dear patient,  As a result of recent federal legislation (The Federal Cures Act), you may   receive lab or pathology results from your procedure in your [facility]   account before your physician is able to contact you. Your physician or   their representative will relay the results to you with their   recommendations at their soonest availability.  Thank you,  PROVATION

## 2025-02-10 ENCOUNTER — PATIENT MESSAGE (OUTPATIENT)
Dept: BARIATRICS | Facility: CLINIC | Age: 45
End: 2025-02-10
Payer: COMMERCIAL

## 2025-02-11 DIAGNOSIS — J40 BRONCHITIS: ICD-10-CM

## 2025-02-11 RX ORDER — ALBUTEROL SULFATE 90 UG/1
2 INHALANT RESPIRATORY (INHALATION) EVERY 6 HOURS PRN
Qty: 18 G | Refills: 0 | Status: SHIPPED | OUTPATIENT
Start: 2025-02-11 | End: 2026-02-11

## 2025-02-21 ENCOUNTER — TELEPHONE (OUTPATIENT)
Dept: BARIATRICS | Facility: CLINIC | Age: 45
End: 2025-02-21
Payer: COMMERCIAL

## 2025-02-21 NOTE — TELEPHONE ENCOUNTER
Spoke to patient regarding her work-up focus being changed from a GERD approach to a weight regain approach. She understands that she will need additional diet clearance, a Psychiatry Evaluation, and an updated consultation with KEV Quezada NP.

## 2025-02-22 ENCOUNTER — PATIENT MESSAGE (OUTPATIENT)
Dept: BARIATRICS | Facility: CLINIC | Age: 45
End: 2025-02-22
Payer: COMMERCIAL

## 2025-02-22 ENCOUNTER — TELEPHONE (OUTPATIENT)
Dept: BARIATRICS | Facility: CLINIC | Age: 45
End: 2025-02-22
Payer: COMMERCIAL

## 2025-02-22 NOTE — TELEPHONE ENCOUNTER
Spoke to patient to reschedule her 8 am virtual, as KEV Quezada, NP is not available at that time. Appointment rescheduled to 8:30 per KEV Quezada's approval.

## 2025-02-24 ENCOUNTER — OFFICE VISIT (OUTPATIENT)
Dept: BARIATRICS | Facility: CLINIC | Age: 45
End: 2025-02-24
Payer: COMMERCIAL

## 2025-02-24 ENCOUNTER — CLINICAL SUPPORT (OUTPATIENT)
Dept: BARIATRICS | Facility: CLINIC | Age: 45
End: 2025-02-24
Payer: COMMERCIAL

## 2025-02-24 VITALS — BODY MASS INDEX: 47.2 KG/M2 | WEIGHT: 275 LBS

## 2025-02-24 DIAGNOSIS — Z71.3 DIETARY COUNSELING AND SURVEILLANCE: Primary | ICD-10-CM

## 2025-02-24 DIAGNOSIS — E66.01 MORBID OBESITY WITH BMI OF 45.0-49.9, ADULT: ICD-10-CM

## 2025-02-24 DIAGNOSIS — Z98.84 S/P BARIATRIC SURGERY: ICD-10-CM

## 2025-02-24 DIAGNOSIS — Z98.84 S/P LAPAROSCOPIC SLEEVE GASTRECTOMY: Primary | ICD-10-CM

## 2025-02-24 PROCEDURE — 99499 UNLISTED E&M SERVICE: CPT | Mod: 95,,, | Performed by: DIETITIAN, REGISTERED

## 2025-02-24 PROCEDURE — 97803 MED NUTRITION INDIV SUBSEQ: CPT | Mod: 95,,, | Performed by: DIETITIAN, REGISTERED

## 2025-02-24 PROCEDURE — 1159F MED LIST DOCD IN RCRD: CPT | Mod: CPTII,95,, | Performed by: NURSE PRACTITIONER

## 2025-02-24 PROCEDURE — 3008F BODY MASS INDEX DOCD: CPT | Mod: CPTII,95,, | Performed by: NURSE PRACTITIONER

## 2025-02-24 PROCEDURE — 1160F RVW MEDS BY RX/DR IN RCRD: CPT | Mod: CPTII,95,, | Performed by: NURSE PRACTITIONER

## 2025-02-24 PROCEDURE — 98005 SYNCH AUDIO-VIDEO EST LOW 20: CPT | Mod: 95,,, | Performed by: NURSE PRACTITIONER

## 2025-02-24 NOTE — PROGRESS NOTES
The patient location is: [place]  The chief complaint leading to consultation is: Re consult    Visit type: audiovisual    Face to Face time with patient: 20  30 minutes of total time spent on the encounter, which includes face to face time and non-face to face time preparing to see the patient (eg, review of tests), Obtaining and/or reviewing separately obtained history, Documenting clinical information in the electronic or other health record, Independently interpreting results (not separately reported) and communicating results to the patient/family/caregiver, or Care coordination (not separately reported).         Each patient to whom he or she provides medical services by telemedicine is:  (1) informed of the relationship between the physician and patient and the respective role of any other health care provider with respect to management of the patient; and (2) notified that he or she may decline to receive medical services by telemedicine and may withdraw from such care at any time.    Notes:        BARIATRIC NEW PATIENT EVALUATION    CHIEF COMPLAINT:   Morbid obesity, body mass index is 47.2 kg/m². and inability to maintain weight loss.    S/p sleeve 2021 with Dr. Whitman     HPI:  Fran Higgins is a 44 y.o. morbidly obese female. Her current body mass index is 47.2 kg/m². She has multiple associated comorbidities including GERD on daily medications and depression.  She has struggled with GERD since about 6 months after surgery and was not able to taper off PPI. Her current exercise includes walking 3 times a week. She denies any history of eating disorder such as anorexia, bulimia, or taking laxatives for weight loss, and denies any addiction including illicit substances, alcohol, or gambling.  Patient states she has a good  support system.  She lives with family .  She is currently employed   .  She  endorses a 2 year history of GERD, started  6 months after sleeve, controlled on one PPI daily.    The patient's goal is 220 lbs.    ESS: Score of 5, reviewed 02/24/2025.  Does not need Sleep Study.    Started Wegorennyy lost 40 lbs    Pre op weight-352  Lowest-260  Current-275  IBW-141    CXR:10/10/24 Impression: No acute abnormality.    EKG: 10/14/24 NSR     10/21/24 [place] UGI:  Impression:  Postoperative change of sleeve gastrectomy.  No evidence for gastric stenosis or outlet obstruction.    2/3/25 EGD Impression: - Normal esophagus.                          - A sleeve gastrectomy was found, characterized by                          healthy appearing mucosa.                          - Normal examined duodenum.                          - The BRAVO pH capsule was positioned 32 cm from                          the incisors, which was 6 cm proximal to the GE                          junction.       2/7/25  Bravo: Impression:   - Normal ambulatory esophageal pH study.                          - Negative 48 hour pH testing for GERD while off                          acid reflux medications.                          - There was a partial correlation between acid                          reflux events and recorded symptoms of heartburn                          and regurgitation, suggesting esophageal                          hypersensitivity.     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 betty  1/28-pt r/s,BMI 48.41, last dose of prilosec 1/19, last dose of wegovy 1/25, no allergy or sensitivity to jewelry/metal per pt, updated instructions sent to [facility]  1/29/25: attempted precall /    HYSTERECTOMY  03/2020    INJECTION, SPINE, LUMBOSACRAL, TRANSFORAMINAL APPROACH Right 10/24/2024    Procedure: TFESI RT L4/5, L5/S1;  Surgeon: Arun Garcia MD;  Location: [facility];  Service: Pain Management;  Laterality: Right;  No ac    LAPAROSCOPIC SLEEVE GASTRECTOMY N/A 6/22/2021    Procedure: GASTRECTOMY, SLEEVE, LAPAROSCOPIC; with intraoperative egd;  Surgeon: Jaziel Whitman Jr., MD;  Location: [facility] 2ND FLR;  Service: General;  Laterality: N/A;    PH MONITORING, ESOPHAGUS, WIRELESS, (OFF REFLUX MEDS) N/A 2/3/2025    Procedure: PH MONITORING, ESOPHAGUS, WIRELESS, (OFF REFLUX MEDS);  Surgeon: Rajendra Bustamante MD;  Location: [facility] (4TH FLR);  Service: Endoscopy;  Laterality: N/A;  48 Hour  Off PPI/H2 Blocker    ROBOT-ASSISTED LAPAROSCOPIC ABDOMINAL HYSTERECTOMY USING DA ROMAIN XI N/A 3/9/2020    Procedure: XI ROBOTIC HYSTERECTOMY;  Surgeon: Jeff Kaufman MD;  Location: [facility] 2ND FLR;  Service: Oncology;  Laterality: N/A;    ROBOT-ASSISTED LAPAROSCOPIC SALPINGO-OOPHORECTOMY Left 6/25/2019    Procedure: ROBOTIC SALPINGO-OOPHORECTOMY;  Surgeon: Marky Fox Jr., MD;  Location: [facility];  Service: OB/GYN;  Laterality: Left;    SALPINGOOPHORECTOMY Right 3/9/2020    Procedure: SALPINGO-OOPHORECTOMY  USO;  Surgeon: Jeff Kaufman MD;  Location: [facility] OR [address] [place] [address];  Service: Oncology;  Laterality: Right;    TONSILLECTOMY         FAMILY  HISTORY:  Family History   Problem Relation Name Age of Onset    Diabetes Paternal Grandfather      Macular degeneration Maternal Grandmother 93     Cancer Maternal Grandfather          prostate cancer    Hypertension Father      Depression Father      Drug abuse Father      Hypertension Mother      Stroke Mother  60        ASD, PFO    Depression Mother      Cancer Other paternal Great grandmoth         uterine cancer     Uterine cancer Other pat great GM     Depression Sister      Colon cancer Neg Hx      Ovarian cancer Neg Hx      Breast cancer Neg Hx      Glaucoma Neg Hx      Melanoma Neg Hx      Heart attack Neg Hx      Heart disease Neg Hx      Hyperlipidemia Neg Hx          SOCIAL HISTORY:  Social History     Socioeconomic History    Marital status: Single    Number of children: 1   Occupational History    Occupation: Nurse circulator   Tobacco Use    Smoking status: Never     Passive exposure: Never    Smokeless tobacco: Never   Substance and Sexual Activity    Alcohol use: Yes     Comment: 2-3 drinks twice a year    Drug use: No    Sexual activity: Not Currently     Partners: Male     Birth control/protection: None, Condom   Other Topics Concern    Are you pregnant or think you may be? No    Breast-feeding No     Social Drivers of Health     Financial Resource Strain: Low Risk  (1/6/2025)    Overall Financial Resource Strain (CARDIA)     Difficulty of Paying Living Expenses: Not very hard   Food Insecurity: No Food Insecurity (1/6/2025)    Hunger Vital Sign     Worried About Running Out of Food in the Last Year: Never true     Ran Out of Food in the Last Year: Never true   Transportation Needs: Patient Declined (11/17/2023)    PRAPARE - Transportation     Lack of Transportation (Medical): Patient declined     Lack of Transportation (Non-Medical): Patient declined   Physical Activity: Insufficiently Active (1/6/2025)    Exercise Vital Sign     Days of Exercise per Week: 4 days     Minutes of Exercise per  Session: 30 min   Stress: No Stress Concern Present (1/6/2025)    [employer] [place] [employer] - Occupational Stress Questionnaire     Feeling of Stress : Only a little   Housing Stability: Low Risk  (11/17/2023)    Housing Stability Vital Sign     Unable to Pay for Housing in the Last Year: No     Number of Places Lived in the Last Year: 1     Unstable Housing in the Last Year: No       MEDICATIONS:    Current Outpatient Medications:     adalimumab-adaz 40 mg/0.4 mL PnIj, Inject 0.4 mLs (40 mg total) into the skin every 7 days., Disp: 4 pen , Rfl: 4    albuterol (PROVENTIL/VENTOLIN HFA) 90 mcg/actuation inhaler, INHALE 2 PUFFS INTO THE LUNGS EVERY 6 (SIX) HOURS AS NEEDED FOR WHEEZING. RESCUE, Disp: 18 g, Rfl: 0    ALPRAZolam (XANAX) 0.5 MG tablet, Take 1 tablet (0.5 mg total) by mouth nightly as needed for Insomnia or Anxiety., Disp: 30 tablet, Rfl: 5    b complex vitamins capsule, Take 1 capsule by mouth once daily., Disp: , Rfl:     CALCIUM CITRATE ORAL, Take 1 tablet by mouth 3 (three) times daily. chewable, Disp: , Rfl:     clindamycin (CLEOCIN T) 1 % lotion, Apply to affected area two times a day., Disp: 120 mL, Rfl: 3    cyclobenzaprine (FLEXERIL) 5 MG tablet, Take 1 tablet (5 mg total) by mouth 2 (two) times daily as needed for Muscle spasms., Disp: 60 tablet, Rfl: 5    estradioL (ESTRACE) 2 MG tablet, Take 1 tablet (2 mg total) by mouth once daily., Disp: 30 tablet, Rfl: 11    gabapentin (NEURONTIN) 100 MG capsule, Take 1 capsule (100 mg total) by mouth 3 (three) times daily., Disp: 90 capsule, Rfl: 2    metFORMIN (GLUCOPHAGE-XR) 500 MG ER 24hr tablet, Take 1 tablet by mouth daily., Disp: 90 tablet, Rfl: 1    multivitamin capsule, Take by mouth once daily., Disp: , Rfl:     omeprazole (PRILOSEC) 40 MG capsule, Take 1 capsule (40 mg total) by mouth every morning., Disp: 30 capsule, Rfl: 1    ondansetron (ZOFRAN-ODT) 4 MG TbDL, Dissolve 1 tablet (4 mg total) by mouth every 6 (six) hours as needed  (for nausea)., Disp: 20 tablet, Rfl: 5    semaglutide, weight loss, (WEGOVY) 2.4 mg/0.75 mL PnIj, Inject 2.4 mg into the skin every 7 days., Disp: 3 mL, Rfl: 2    spironolactone (ALDACTONE) 50 MG tablet, Take 2 tablets by mouth daily., Disp: 60 tablet, Rfl: 3    thiamine (VITAMIN B-1) 50 MG tablet, Take 50 mg by mouth once daily., Disp: , Rfl:     ubrogepant (UBRELVY) 100 mg tablet, Take 1 tablet by mouth at the onset of a headache. May repeat based on response and tolerability after more than 2 hours if needed. Do not take more than 200mg in a 24 hour span., Disp: 16 tablet, Rfl: 5    ubrogepant (UBRELVY) 100 mg tablet, Take 1 tablet by mouth at the onset of a headache. May repeat based on response and tolerability after more than 2 hours if needed. Do not take more than 200mg in a 24 hour span., Disp: 16 tablet, Rfl: 5    venlafaxine (EFFEXOR-XR) 150 MG Cp24, Take 1 capsule (150 mg total) by mouth once daily., Disp: 90 capsule, Rfl: 3    ALLERGIES:  Review of patient's allergies indicates:  No Known Allergies    Review of Systems   Constitutional:  Negative for chills and fever.   HENT:  Negative for ear pain, nosebleeds and sore throat.    Eyes:  Negative for blurred vision and double vision.   Respiratory:  Negative for cough and shortness of breath.    Cardiovascular:  Negative for chest pain, palpitations, orthopnea, claudication and leg swelling.   Gastrointestinal:  Negative for abdominal pain, constipation, diarrhea, heartburn, nausea and vomiting.   Genitourinary:  Negative for dysuria and urgency.   Musculoskeletal:  Negative for back pain and joint pain.   Skin:  Negative for rash.   Neurological:  Negative for dizziness, tingling, focal weakness and headaches.   Endo/Heme/Allergies:  Does not bruise/bleed easily.   Psychiatric/Behavioral:  Negative for depression and suicidal ideas.        Vitals:    02/24/25 0834   Weight: 124.7 kg (275 lb)   PainSc: 0-No pain       DIAGNOSIS:  1. Morbid obesity, body  mass index is 47.2 kg/m². and inability to maintain weight loss.  2. Co-morbidities: GERD on daily medications and depression.     PLAN:  The patient is a potential candidate for Bariatric Surgery. The patient is interested in revisional surgery  ( MANNY-S)with Dr. Muñoz. The surgery and post-op care was discussed in detail with the patient. All questions were answered.    Revision surgery and post-op care were discussed in detail with the patient. All questions were answered. Discussed at length that additional bariatric surgery is a only tool to aid in weight loss and that she needs to be committed to the diet and exercise for successful weight loss. Discussed expected weight loss outcomes are limited without significant diet and lifestyle modification. Discussed with patient that additional bariatric surgery is not the easy way out and that it will take plenty of dedication on the patient's part to be successful. Also discussed weight regain if the patient strays from the diet guidelines or exercise requirements. Patient verbalized understanding and agrees to begin work-up with RD and psychology. She will also work with bariatrician to aid in wt loss efforts. Plan to proceed with additional work-up when diet is back on track and pt is in agreement.     Estimated Goal weight is 205-210 lbs.    ORDERS:  1. Bariatric Dietician Consult  2. Psychological consult       PCP: Erica Pineda MD  RTC: As scheduled.      This includes 30 mins face to face time and non-face to face time preparing to see the patient (eg, review of tests), obtaining and/or reviewing separately obtained history, documenting clinical information in the electronic or other health record, independently interpreting results and communicating results to the patient/family/caregiver, or care coordinator.

## 2025-02-24 NOTE — PATIENT INSTRUCTIONS
Prior to surgery you will need to complete:  - Dietitian consult and follow up appointments as needed  - Psychological evaluation, Please call psychiatry [phone] to schedule    In preparation for bariatric surgery, please complete the following:   Discuss your current medications with your primary care provider, remember your medications will need to be crushed, chewable, or in liquid form for the first 2-4 weeks after a gastric bypass or sleeve.  For a gastric band, your medications will need to be crushed indefinitely.    If you take a blood thinner such as: Coumadin (warfarin), Pradaxa (dabigatran), or Plavix (clopidogrel), you will need to speak with your prescribing provider on how or if this medication can be stopped before surgery.   If you take a medication for depression or anxiety, remember to discuss this with the psychologist or psychiatrist that you see.   If you take medication for arthritis on a daily basis that is considered a non-steroidal anti-inflammatory (NSAID), please discuss with your prescribing physician an alternative medication.  After having gastric bypass or gastric sleeve, this group of medications is not appropriate to take due to increased risk of bleeding stomach ulcers.      DEFINITIONS  Appointments: Pre-scheduled meetings or consultations with any physician, advanced practice provider, dietitian, or psychologist, and labs, imaging studies, sleep studies, etc.   Late cancellation: Cancelling an appointment 24-48 hours prior to scheduled time.  No-Show appointment:  is when   You do NOT arrive to your appointment at the time its scheduled.  You call to cancel or cancel via [facility] less than 24 hours in advance of your scheduled appointment  You show up 15 minutes AFTER your scheduled appointment time without any notification of being late.     POLICY  You are allowed up to 3 cancellations for appointments.   After 3 cancellations your case will be placed on hold for 2  months and after that time you can resume the program.   You are allowed only 1 no-show for an appointment.   You will be re-scheduled one time and if there is a 2nd no-show at any point, your case will be placed on hold for 3 months.  After 3 months you can resume the program.     Upon resuming the program after being placed on hold for either above mentioned reasons, if you have a late cancel or no show for any appointment, the bariatric team will review if youre an appropriate candidate for surgery at the monthly meeting.

## 2025-02-24 NOTE — PROGRESS NOTES
The patient location is:  Patient Home   The chief complaint leading to consultation is: morbid obesity in work up for possible revision  Visit type: Virtual visit with synchronous audio and video  Total time spent with patient:30 minutes  Each patient to whom he or she provides medical services by telemedicine is:  (1) informed of the relationship between the physician and patient and the respective role of any other health care provider with respect to management of the patient; and (2) notified that he or she may decline to receive medical services by telemedicine and may withdraw from such care at any time.  Nutrition Handout located in AVS section of pt's [facility] and/or sent as a message via [facility].  Pt informed of handout and how to access this information in [facility].  NUTRITION NOTE    Referring Physician: Jaziel Whitman M.D.   Reason for MNT Referral: 6 months medically supervised diet counseling and surveillance pending laproscopic  sleeve to Bypass  Or MANNY-s  Patient presents for follow-up visit for MSD with weight loss over the past month; 39 lbs total weight loss by making numerous dietary and lifestyle changes.  Started Wegovy with [facility] 5 months ago.  2  CLINICAL DATA:  44 y.o. female.  Current Wt: 276 lb at home self reported  Last Wt 296 lb self reported  Current Weight: 307 lbs self reported  Weight Change Since Initial Visit: Loss of 39 lbs  Ideal Body Weight: 138 lbs  BMI 47.20  Initial Wt: 315 lbs    DAILY NUTRITIONAL NEEDS: pre-op nutritional bariatric guidelines to promote weight loss  3879-5190 Calories    Grams Protein    CURRENT DIET:  Reduced-calorie diet.  Diet Recall: Food records are not present.  B: Quest protein bar or Fairlife 30 gm   L: Protein shake or turkey/chicken with veg and fruit  D:Same as lunch Fairlife protein shake 30 gm protein  S: hummus with carrots  Diet Includes: 2 small meals including 1 shake per day  Meal  Pattern: Improved.  Protein Supplements: 3 per day.  Snacking: Adequate. Snacks include healthy choices.  Vegetables: Likes a variety. Eats daily.  Fruits: Likes a variety. Eats daily.fruits   Beverages:water, sugar-free beverages/powders/drops, and diet/unsweetened tea  Dining Out:  Never. None Mostly restaurants and take-out.  Cooking at home: Daily. Mostly Cooking at home: Weekly. Mostly baked/roast, grilled/broiled, air-fried, and steamed beef, pork, chicken/turkey, vegetables,     CURRENT EXERCISE:  Adequate    Walking 30 minutes 4-5 times per week    Vitamins / Minerals / Herbs:   Alive potency B vitamins  Calcium in Alive    LABS:  Reviewed.      Food Allergies:   None reported    Social:  Works regular daytime shifts.  Alcohol: None.  Smoking: None.    ASSESSMENT:  Patient demonstrates some willingness to change lifestyle habits as evidenced by weight loss, good/increased exercise, including protein drinks, reduced sugar-sweetened beverages, increased fruits, and increased vegetables.    Doing well with working on greatest challenges (irregular meal patterns).    Barriers to Education:  none  Stage of Change:  action    NUTRITION DIAGNOSIS:  Morbid Obesity related to Excessive calorie intake as evidence by BMI.  Status: Decreased    PLAN/RECOMMENDATION:  Pt is a good candidate for bariatric surgery     Diet: Maintain diet plan.  Work on Bariatric Nutrition Checklist.  Start shopping for bariatric vitamins & minerals    Exercise: Maintain.    Behavior Modification: Begin to document food & activity logs daily.        Communicated nutrition plan with bariatric team.    SESSION TIME:  30 minutes

## 2025-02-25 ENCOUNTER — CLINICAL SUPPORT (OUTPATIENT)
Dept: OTHER | Facility: CLINIC | Age: 45
End: 2025-02-25

## 2025-02-25 ENCOUNTER — PATIENT MESSAGE (OUTPATIENT)
Dept: BARIATRICS | Facility: CLINIC | Age: 45
End: 2025-02-25
Payer: COMMERCIAL

## 2025-02-25 DIAGNOSIS — Z00.8 ENCOUNTER FOR OTHER GENERAL EXAMINATION: ICD-10-CM

## 2025-02-26 ENCOUNTER — PATIENT MESSAGE (OUTPATIENT)
Dept: PSYCHIATRY | Facility: CLINIC | Age: 45
End: 2025-02-26
Payer: COMMERCIAL

## 2025-02-26 VITALS
SYSTOLIC BLOOD PRESSURE: 117 MMHG | WEIGHT: 275 LBS | DIASTOLIC BLOOD PRESSURE: 83 MMHG | HEIGHT: 62 IN | BODY MASS INDEX: 50.61 KG/M2

## 2025-02-26 LAB
GLUCOSE SERPL-MCNC: 89 MG/DL (ref 60–140)
HDLC SERPL-MCNC: 66 MG/DL
POC CHOLESTEROL, LDL (DOCK): 163 MG/DL
POC CHOLESTEROL, TOTAL: 261 MG/DL
TRIGL SERPL-MCNC: 177 MG/DL

## 2025-02-26 NOTE — PROGRESS NOTES
Biometrics completed.    Results reviewed with a Registered Nurse; understanding of results and   educational materials was verbalized.   This document is complete and the patient is ready for discharge. denies drug use/never used

## 2025-02-28 ENCOUNTER — TELEPHONE (OUTPATIENT)
Dept: BARIATRICS | Facility: CLINIC | Age: 45
End: 2025-02-28
Payer: COMMERCIAL

## 2025-03-10 ENCOUNTER — PATIENT MESSAGE (OUTPATIENT)
Dept: BARIATRICS | Facility: CLINIC | Age: 45
End: 2025-03-10
Payer: COMMERCIAL

## 2025-03-13 DIAGNOSIS — G47.09 OTHER INSOMNIA: ICD-10-CM

## 2025-03-13 RX ORDER — ALPRAZOLAM 0.5 MG/1
0.5 TABLET ORAL NIGHTLY PRN
Qty: 30 TABLET | Refills: 2 | Status: SHIPPED | OUTPATIENT
Start: 2025-03-13

## 2025-03-17 ENCOUNTER — CLINICAL SUPPORT (OUTPATIENT)
Dept: PSYCHIATRY | Facility: CLINIC | Age: 45
End: 2025-03-17
Payer: COMMERCIAL

## 2025-03-17 DIAGNOSIS — Z00.8 ENCOUNTER FOR PRE-SURGICAL PSYCHOLOGICAL ASSESSMENT: Primary | ICD-10-CM

## 2025-03-19 DIAGNOSIS — F54 PSYCHOLOGICAL AND BEHAVIORAL FACTORS ASSOCIATED WITH DISORDERS OR DISEASES CLASSIFIED ELSEWHERE: Primary | ICD-10-CM

## 2025-03-20 ENCOUNTER — RESULTS FOLLOW-UP (OUTPATIENT)
Dept: OTHER | Facility: CLINIC | Age: 45
End: 2025-03-20

## 2025-03-21 NOTE — PROGRESS NOTES
PRESURGICAL PSYCHOLOGICAL EVALUATION - BARIATRICS  Psychiatry Initial Visit (PhD/PsyD)   Psychological Intake and Assessment    The patient location is:  [place], [place]    Visit type: audiovisual    Face to Face time with patient: 20 minutes    185 minutes of total time spent on the encounter, which includes face to face time and non-face to face time preparing to see the patient (e.g., review of tests), Obtaining and/or reviewing separately obtained history, Documenting clinical information in the electronic or other health record, Independently interpreting results (not separately reported) and communicating results to the patient/family/caregiver, or Care coordination (not separately reported).     Each patient to whom he or she provides medical services by telemedicine is:  (1) informed of the relationship between the physician and patient and the respective role of any other health care provider with respect to management of the patient; and (2) notified that he or she may decline to receive medical services by telemedicine and may withdraw from such care at any time.    Notes:     CPT Codes:  MMPI ONLY:  28333 (1 hour): Psychiatric Diagnostic Evaluation  12649 (1.5 hours): Integration of patient data, interpretation of standardized test results and clinical data, clinical decision-making, treatment planning and report, and interactive feedback to the patient  38741 (1.25 hours): Psychological or neuropsychological test administration, with single automated instrument via electronic platform, with automated result only:  [place] Multiphasic Personality Inventory - 3  (MMPI-3)    NAME: Fran Higgins  MRN: [id number]  : 1980    Date: 3/24/4045    Referral source:  Jaziel Whitman M.D.     Clinical status of patient: Outpatient   Met With: Patient    Chief complaint/reason for encounter: Routine psychological evaluation prior to bariatric surgery.     Before this evaluation was initiated, the  purposes and process of the assessment and the limits of confidentiality were discussed with the patient who expressed understanding of these issues and verbally consented to proceed with the evaluation.     Type of surgery sought:  laparoscopic sleeve to Bypass Or MANNY-s    History of present illness:   Ms. Fran Higgins is a 44-year-old White female who is pursuing bariatric surgery to improve her health and quality of life. She has a  history of depression, which appears to be well-managed by psychotropic medication. She has never been hospitalized for psychiatric reasons and denied any history of suicidality.  She has begun making positive lifestyle changes in anticipation for surgery, with good benefit. The patient has a Body Mass Index of  47.2 kg/m² as documented by the referring provider.    Ms. Fran Higgins reported that she has always struggled with weight. Factors that have contributed to her weight gain over the years include stress, hormones, genetics.  She endorsed a history of emotional eating with depression and stress as her primary trigger. She denied regularly engaging in emotional eating currently and states that Wegovy medication has helped to decrease emotional eating. She is working on developing the following skills to cope with her emotions more effectively: hot shower/bath and going for a drive alone. She has tried many weight loss methods on her own (Wegovy and sleeve) and she believes that her biggest weight loss challenge is irregular meal patterns and GERD.  Her motivation for seeking surgery now is GERD.     Ms. Higgins has met with Ms. Giovanna RD, bariatric dietician, and reports that she  has made the following lifestyle changes since beginning the bariatric program:  weight loss, good/increased exercise, including protein drinks, reduced sugar-sweetened beverages, increased fruits, and increased vegetables. She has been cleared by Ms. Heredia to proceed with  "bariatric surgery.    Co-morbidities:     Knowledge of surgery information:  - Basics of procedure: I already have the sleeve. They will connect end of sleeve to duodenal area later on my intestine    - Risks:  malnutrition, death, upset stomach    - Basics of diet: back to bariatric diet, liquid diet going up to at after to soft foods and building up"    Pain: - None currently and injections have been helpful to treat pain from spine    Current Psychiatric Symptoms:   Depression - Endorsed depressed mood and sleep changes related to grief. Denied loss of interest in pleasurable activities, anhedonia, decreased motivation, decreased concentration, feelings of excessive or irrational guilt, helplessness, hopelessness, increased or decreased appetite, weight changes, increased or decreased motor activity, decreased energy, suicidal ideations/thoughts of death. Patient endorsed feelings of sadness related to grief due to the death of her significant other around May 2024.  Patient has history off MDD, but is currently in partial remission phase.   Bell/Hypomania - Denied increased goal directed activity, decreased need for sleep, pressured speech or increased talkative, racing thoughts, increased risk-taking behavior, episodic elevated or irritable mood, flight of ideas, distractibility, inflated self-esteem, grandiosity  Anxiety - Denied excessive worry, difficulty controlling worrying, feeling keyed up, easily fatigued, difficulty concentrating or mind going blank, irritability, muscle tension, sleep disturbance, racing thoughts, being unable to relax, specific phobia. Patient endorsed history of anxiety, but stated that symptoms are well-managed with psychotropic medication.   Panic Attacks: Denied palpitations, sweating, trembling, dyspnea, choking sensation, chest pain/discomfort, nausea, dizziness, chills or hot flashes, tingling, derealization, fear of losing control, fear of dying.  Thoughts - Denied any " AVH, paranoia, delusions, ideas of reference, thought insertion or thought broadcasting  Suicidal thoughts/behaviors - denied passive or active SI, denied suicidal plans or intent  Self-injury - denied.  Substance abuse - denied abuse or dependence.   Sleep -  Patient endorsed some challenges with sleep based on insomnia due to surgical menopause. Psychotropic medication helps    Current psychiatric treatment:  Medications: Effexor  mg daily, xanax 0.5 mg as needed for now    Psychotherapy:  Patient expressed that she continues to participate in psychotherapy     Current Health Behaviors:  Compliant with medical regimens and appointments: Yes  Prescription medication misuse: No  Exercise: walking around neighborhood 5 x a week   Adequate cognitive functioning: Yes    Past Psychiatric history:   Previous diagnosis: Major depressive disorder, in partial remission with anxious distress; Major depressive disorder, recurrent episode, moderate with anxious distress;   Previous psychiatric hospitalizations/inpatient treatment: none   History of outpatient treatment: Dharmesh Peralta MD for medication management from  3/2021 and Brittani Mahan, PhD for psychotherapy from 7/2020 to 6/2024  Previous suicide attempt: none  Non-suicidal self-injury: none    Trauma history:  Death of fiance  around May 2024.     PTSD: Denies re-experiencing trauma, nightmares, increased awareness of surroundings, hyperexcitability.    History of eating disorders:  History of bulimia: Denies recurrent episodes of eating then engaging in inappropriate compensatory behaviors.        History of binge-eating episodes: Denies eating excessive amounts of food within a discrete time period with a lack of control during eating.  She denied eating more rapidly than normal, eating until uncomfortably full, eating large amounts of food when not physically hungry, eating alone due to embarrassment, or negative emotions (i.e., disgusted, guilty, depressed)  "afterwards.    Family history of psychiatric illness: Endorsed overall family history of mental health concerns. Did not identify specific members or concerns.     Social history (marriage, employment, etc.): Ms. Fran Higgins was born and raised in [place], [place] by her biological mother along with one older sister.  She described her childhood as, "Interesting. Parents  and father was killed in accident when I was 24." Patient is currently employed as RN at [employer].  She is not on disability and finances are stable. She has a daughter (age 11).  She currently lives with daughter and dog.      Current psychosocial stressors: none reported     Report of coping skills/recreational activities:  Watching True [employer] television shows, taking a hot shower/bath, therapy/psychiatry appointments    Support system: Patient expressed that her mother lives across the [facility] and also has good friends    Substance use:   Alcohol:  Rarely  Drugs: Denied current use; denied history of abuse or dependency.  Tobacco: None.   Caffeine:  One cup of flat diet coke     Current medications and drug reactions (include OTC, herbal): see medication list     PSYCHOLOGICAL ASSESSMENT/TESTING:   All tests were administered according to standardized procedures and were selected based on the reason for referral.    The MMPI-3 provides an assessment of personality and psychopathology with specific evaluation of psychosocial risk factors associated with outcomes of bariatric surgery.     Ms. Higgins produced a valid and interpretable MMPI-3 protocol. Her test results should be considered a reasonably accurate reflection of her current psychological functioning.       TEST RESULTS. Ms. Higgins's scores indicate emotional and behavioral dysfunction.Emotional-internalizing findings relate to worry. Behavioral-externalizing problems relate to lack of energy and  engagement. Her scores do not indicate any somatic, cognitive,  " thought,  or interpersonal dysfunction.      Specific treatment recommendations indicated by her profile: treatment focused on excessive worry and rumination    GROUP COMPARISONS. Compared to other bariatric surgery candidates, Ms. Higgins reports a higher than average level of behavioral constraint.       FEEDBACK. Ms. Higgins was provided with test results, and offered the opportunity to respond to feedback and clarify results if needed.      Mental Status Exam:   General Appearance:  age appropriate, well dressed, neatly groomed, overweight    Speech:  normal tone, normal rate, normal pitch, normal volume    Level of Cooperation:  cooperative    Thought Processes:  normal and logical    Mood:  euthymic    Thought Content:  normal, no suicidality, no homicidality, delusions, or paranoia    Affect:  congruent and appropriate    Orientation:  oriented x 4   Memory:  recent memory intact;   remote memory intact; able to recall remote personal events   Attention Span & Concentration:  appropriate   Fund of General Knowledge:  appropriate for education    Abstract Reasoning:  Not directly assessed   Judgment & Insight:  good    Language  intact        SUMMARY AND RECOMMENDATIONS:  Ms. Fran Higgins is a 44-year-old female referred for presurgical psychological evaluation prior to bariatric surgery. Results of personality testing should be considered valid, and they indicate that she is experiencing worry and low energy.  Test results do not reveal any evidence that psychological difficulties would play a role in her recovery from surgery. Ms. Fran Higgins's testing profile was largely consistent with her reports in the clinical interview. In the clinical interview, Ms. Higgins endorsed past psychiatric history and treatment of depression and anxiety.   There are no overt psychological contraindications for proceeding with bariatric surgery. Overall, Ms. Higgins is at LOW risk for adverse  postsurgical outcomes based on the following considerations:  There are no indications of disabling psychopathology, substance use/abuse, cognitive problems, or disabilities that would prevent understanding and competence with medical treatment.  Symptoms of anxiety and depression appear to be adequately controlled with psychotropic medication and psychotherapy.  There are no reports or major psychosocial stressors that would interfere with her adherence to treatment recommendations.  There is no evidence of suicidality.  She exhibits medium social stability and good social support.  She has adequate coping strategies to deal with stress and the demands of surgery and recovery.  She has good knowledge about the surgical procedure, good knowledge about the required dietary and lifestyle changes, and adequate understanding of possible risks of this treatment option. She reports adequate compliance with prior medical regimens.    There are no recommendations for psychological intervention at this time. Patient plans to continue regularly meeting with psychiatrist for medication management.     Diagnosis:     ICD-10-CM ICD-9-CM   1. Preoperative evaluation to rule out surgical contraindication  Z01.818 V72.83   2. S/P laparoscopic sleeve gastrectomy  Z98.84 V45.86   3. BMI 45.0-49.9, adult  Z68.42 V85.42   4. Gastroesophageal reflux disease, unspecified whether esophagitis present  K21.9 530.81   5. Major depressive disorder, recurrent episode, in partial remission with anxious distress  F33.41 296.35       Plan: This report will be sent to the referring provider with impressions and recommendations. It will be the referring team's decision whether the patient proceeds with surgery. Services related to the presurgical psychological evaluation are now concluded.     Evaluation Length (direct face-to-face time): 20 minutes    Total Time including report writing, test scoring, chart review, integration of data and  feedback: 185 minutes    Jazmyne Hernandez, PhD   Clinical Psychologist

## 2025-03-24 ENCOUNTER — OFFICE VISIT (OUTPATIENT)
Dept: PSYCHIATRY | Facility: CLINIC | Age: 45
End: 2025-03-24
Payer: COMMERCIAL

## 2025-03-24 DIAGNOSIS — F33.41 MAJOR DEPRESSIVE DISORDER, RECURRENT EPISODE, IN PARTIAL REMISSION WITH ANXIOUS DISTRESS: ICD-10-CM

## 2025-03-24 DIAGNOSIS — Z98.84 S/P LAPAROSCOPIC SLEEVE GASTRECTOMY: ICD-10-CM

## 2025-03-24 DIAGNOSIS — K21.9 GASTROESOPHAGEAL REFLUX DISEASE, UNSPECIFIED WHETHER ESOPHAGITIS PRESENT: ICD-10-CM

## 2025-03-24 DIAGNOSIS — Z01.818 PREOPERATIVE EVALUATION TO RULE OUT SURGICAL CONTRAINDICATION: Primary | ICD-10-CM

## 2025-03-24 NOTE — Clinical Note
There are no overt psychological contraindications for proceeding with bariatric surgery. Overall, Ms. Higgins is at LOW risk for adverse postsurgical outcomes

## 2025-03-26 ENCOUNTER — PATIENT MESSAGE (OUTPATIENT)
Dept: OPTOMETRY | Facility: CLINIC | Age: 45
End: 2025-03-26
Payer: COMMERCIAL

## 2025-03-31 ENCOUNTER — DOCUMENTATION ONLY (OUTPATIENT)
Dept: REHABILITATION | Facility: HOSPITAL | Age: 45
End: 2025-03-31
Payer: COMMERCIAL

## 2025-03-31 NOTE — PROGRESS NOTES
[facility]   Discharge Note    Name: Fran Spencer [facility] Number: [id number]    Therapy Diagnosis: No diagnosis found.  Physician: No ref. provider found    Physician Orders: PT Eval and Treat   Medical Diagnosis from Referral:   M54.16 (ICD-10-CM) - Lumbar radiculopathy   M51.37 (ICD-10-CM) - DDD (degenerative disc disease), lumbosacral      Evaluation Date: 10/10/2024      Date of Last visit: 11/19/24  Total Visits Received: 6    ASSESSMENT          Discharge reason: Patient has not attended therapy since 11/19/24      Goals: not able to determine goal status     PLAN   This patient is discharged from Physical Therapy      Yasmine Mclean, PT

## 2025-04-03 ENCOUNTER — PROCEDURE VISIT (OUTPATIENT)
Dept: NEUROLOGY | Facility: CLINIC | Age: 45
End: 2025-04-03
Payer: COMMERCIAL

## 2025-04-03 ENCOUNTER — PATIENT MESSAGE (OUTPATIENT)
Dept: NEUROLOGY | Facility: CLINIC | Age: 45
End: 2025-04-03

## 2025-04-03 VITALS
HEART RATE: 81 BPM | SYSTOLIC BLOOD PRESSURE: 118 MMHG | WEIGHT: 273.38 LBS | BODY MASS INDEX: 50 KG/M2 | DIASTOLIC BLOOD PRESSURE: 81 MMHG

## 2025-04-03 DIAGNOSIS — G43.E09 CHRONIC MIGRAINE WITH AURA WITHOUT STATUS MIGRAINOSUS, NOT INTRACTABLE: Primary | ICD-10-CM

## 2025-04-03 NOTE — PROCEDURES
Established Patient  Procedure Note   SUBJECTIVE:  Patient ID: Fran Higgins  Chief Complaint: Headache      History of Present Illness:  Fran Higgins is a 44 y.o. female with  with migraine, cervical scoliosis, obesity s/p sleeve gastrectomy, anxiety/depression, surgical menopause s/p hysterectomy/RSO for ovary cyst, insomnia  who presents to clinic alone for follow-up of headaches and Botox injections.       04/03/2025- Interval History: botox  Callahan's have remained well controlled on botox. Per HA diary she has had 2-3/30 ha days per month lasting 1-31 hrs, severity 1-10/10. Ha's abort effectively w/ ubrelvy 100mg. Triggered by weather changes primarily since last visit.  Plan: continue botox, ubrelvy 100mg prn, zofran prn, sleep strategies, rtc 12 wks for next botox    01/09/2025- Interval History: botox  Callahan's have remained well controlled on botox. Per HA diary she has had 2/30 ha days per month lasting 3-11 hrs, severity 2-10/10. Ha's abort effectively w/ ubrelvy 100mg.   Plan: continue botox, ubrelvy 100mg prn, zofran prn, sleep strategies, rtc 12 wks for next botox    10/17/2024- Interval History: botox  Patient has had >50% reduction in Ha's since beginning botox. Prior to botox pt's Ha's were occurring >15/30 days per month. Since beginning botox, they are occurring 1-2/30 days per month. As pt has experienced an improvement in Ha's, with sustained reduction, will continue botox as is clinically indicated.   Plan: continue botox, continue ubrelvy 100mg prn, zofran prn, sleep strategies, rtc 12 wks for next botox    07/18/2024- Interval History: botox   Per HA diary, pt has had 1-2/30 ha days per month. She has been treating w/ ubrelvy 100mg at onset and finds this more effective. She is happy w/ current regimen as ha's are well controlled despite large amt of stress recently due to partners passing. Has a good support system and therapist. Will continue current regimen.   Plan: continue  "botox, continue ubrelvy 100mg prn, zofran prn, sleep strategies, rtc 12 wks for next botox    04/25/2024- Interval History: botox  Per pt 's HA diary, ha's have remained well controlled weaning off emgality. Experienced 5 HA days since last visit. Callahan's can last 3 - 7 hrs. Discussed using ubrelvy up to 100mg and at onset.   Plan: continue botox, d/c emgality, continue ubrelvy 50-100mg 1st line, zofran prn, sleep strategies, rtc 12 wks for next botox    02/08/2024- Interval History: botox  Pt's ha's remain well controlled. Reviewed HA diary. Occurring 1-2/30 days per month. Duration 2-14 hrs. Severity 4-10/10. Pt takes ubrelvy 50-100mg 1st line and tylenol 2nd line. Finds current regimen to be helpful. Is amenable to weanign off emgality by next visit.   Prior to initiation of botox the patient was experiencing >15 days of headache lasting 4 or more hours and has had sustained reduction.   Plan: continue botox, wean off emgality, continue ubrelvy 50-100mg 1st line, zofran prn, sleep strategies, rtc 12 wks for next botox    11/14/2023-  botox    09/29/2023 - Interval History:  Patient has had >50% reduction in Ha's since beginning botox. Prior to botox pt's Ha's were occurring >15/30 days per month. Since beginning botox, she experiences 5 HA days in 3 months. Most HA's are now mild in severity. Had 1 severe HA in this span that lasted 2 days but was eventually aborted w/ ubrelvy. HA's last 2 hrs - 2 days. As pt has experienced an improvement in Ha's with sustained reduction will continue botox as is clinically indicated.   Plan: continue botox, emgality, ubrelvy 50-100mg prn, zofran, continue sleep strategies, rtc for next botox    08/17/2023- Interval History: botox #3  Pt feels botox has been helpful. Is not trackign but states "I noticed a change" after the last round. She states she experiences approximately 20/30 ha days per month, derik isn't tracking. Will track for next visit.   Plan: continue botox, " emgality, ubrelvy 50mg prn, zofran, rtc in 2 mo for f/up and 12 wks for next botox    05/25/2023 - botox #2    02/23/2023 - botox #1    12/12/2022 - Interval History:  Pt's ha's have worsened since last visit. They are now occurring >15/30 days per month for >3 mo, lasting > 4 hrs at a time. She tried tpx but had to d/c 2/2 depression SE. She also tried the addition of supplements including mag w/ no benefit noted. She continues to have trouble falling and staying asleep for which she recently started acupuncture for. Defers referral to sleep med, elavil, pcp at this time. Pt is interested in botox and would be an ideal candidate as she has tried and failed multiple ppx options.   Plan: start botox next visit, continue emgality, ubrelvy 50mg prn, zofran, continue sleep strategies, rtc jan 12 to begin botox     09/19/2022 - Interval History:   Ha's have increased 1-2 months ago possibly due to worsened insomnia and stress lately. Are now occurring 8/30 days per month lasting up to 1 day at a time. Currently taking effexor and xanax for these conditions.   Ubrelvy 50mg - typically resolves HA's, infrequently needs to repeat dose  Discussed multiple options, pt agreeable w/ following. Denies current depression.   Plan: start tpx 25mg/d, continue emgality, ubrelvy 50mg prn, zofran, rtc in 3 mo or sooner if needed     Recommendations made at last Office Visit on 12/6/21:  - Discussed symptoms appear to be consistent with migraines. Discussed this with patient along with treatment options and patient agreed with the following plan  - ppx - continue emgality  - abortive - trial ubrelvy  - nausea - continue zofran  - avoid nsaids as they are c/i 2/2 gastrectomy  - avoid triptans as recommended by psychiatry 2/2 potential serotinin syndrome w/ effexor for depression  - decreased neck ROM, tightness, and cervical scoliosis - continue conservative treatments, consider PT in future  - risks, benefits, and potential side effects  of emgality, ubrelvy, zofran discussed   - alternative treatment options offered   - importance of healthy diet, regular exercise and sleep hygiene in the treatment of headaches    - Start tracking headaches via [facility] roseann on phone   - RTC 6-12 mo or sooner if needed      Treatments Tried:  emgality - helps  Effexor  Tpx - depression  Anti-htn meds - avoid 2/2 low bp  Bb - avoid 2/2 depression  Ubrelvy - helps  triptans - has been told to avoid per psychiatrist 2/2 serotonin syndrome risk  nsaids - c/i 2/2 recent gastrectomy  zofran       Current Medications:    Current Outpatient Medications:     adalimumab-adaz 40 mg/0.4 mL PnIj, Inject 0.4 mLs (40 mg total) into the skin every 7 days., Disp: 4 pen , Rfl: 4    ALPRAZolam (XANAX) 0.5 MG tablet, Take 1 tablet (0.5 mg total) by mouth nightly as needed for Insomnia or Anxiety., Disp: 30 tablet, Rfl: 2    b complex vitamins capsule, Take 1 capsule by mouth once daily., Disp: , Rfl:     CALCIUM CITRATE ORAL, Take 1 tablet by mouth 3 (three) times daily. chewable, Disp: , Rfl:     clindamycin (CLEOCIN T) 1 % lotion, Apply to affected area two times a day., Disp: 120 mL, Rfl: 3    cyclobenzaprine (FLEXERIL) 5 MG tablet, Take 1 tablet (5 mg total) by mouth 2 (two) times daily as needed for Muscle spasms., Disp: 60 tablet, Rfl: 5    estradioL (ESTRACE) 2 MG tablet, Take 1 tablet (2 mg total) by mouth once daily., Disp: 30 tablet, Rfl: 11    gabapentin (NEURONTIN) 100 MG capsule, Take 1 capsule (100 mg total) by mouth 3 (three) times daily., Disp: 90 capsule, Rfl: 2    metFORMIN (GLUCOPHAGE-XR) 500 MG ER 24hr tablet, Take 1 tablet by mouth daily., Disp: 90 tablet, Rfl: 1    multivitamin capsule, Take by mouth once daily., Disp: , Rfl:     omeprazole (PRILOSEC) 40 MG capsule, Take 1 capsule (40 mg total) by mouth every morning., Disp: 30 capsule, Rfl: 1    ondansetron (ZOFRAN-ODT) 4 MG TbDL, Dissolve 1 tablet (4 mg total) by mouth every 6 (six) hours as needed (for  nausea)., Disp: 20 tablet, Rfl: 5    semaglutide, weight loss, (WEGOVY) 2.4 mg/0.75 mL PnIj, Inject 2.4 mg into the skin every 7 days., Disp: 3 mL, Rfl: 2    spironolactone (ALDACTONE) 50 MG tablet, Take 2 tablets by mouth daily., Disp: 60 tablet, Rfl: 3    thiamine (VITAMIN B-1) 50 MG tablet, Take 50 mg by mouth once daily., Disp: , Rfl:     ubrogepant (UBRELVY) 100 mg tablet, Take 1 tablet by mouth at the onset of a headache. May repeat based on response and tolerability after more than 2 hours if needed. Do not take more than 200mg in a 24 hour span., Disp: 16 tablet, Rfl: 5    ubrogepant (UBRELVY) 100 mg tablet, Take 1 tablet by mouth at the onset of a headache. May repeat based on response and tolerability after more than 2 hours if needed. Do not take more than 200mg in a 24 hour span., Disp: 16 tablet, Rfl: 5    venlafaxine (EFFEXOR-XR) 150 MG Cp24, Take 1 capsule (150 mg total) by mouth once daily., Disp: 90 capsule, Rfl: 3    albuterol (PROVENTIL/VENTOLIN HFA) 90 mcg/actuation inhaler, INHALE 2 PUFFS INTO THE LUNGS EVERY 6 (SIX) HOURS AS NEEDED FOR WHEEZING. RESCUE, Disp: 18 g, Rfl: 0  No current facility-administered medications for this visit.    Review of Systems - as per HPI, otherwise a balanced 10 systems review is negative.    OBJECTIVE:  Vitals:  /81 (BP Location: Left forearm, Patient Position: Sitting)   Pulse 81   Wt 124 kg (273 lb 5.9 oz)   LMP 01/17/2020   BMI 50.00 kg/m²     Physical Exam:  Constitutional: she appears well-developed and well-nourished. she is well groomed. NAD   HENT:    Head: Normocephalic and atraumatic  Eyes: Conjunctivae and EOM are normal  Musculoskeletal: Normal range of motion. No joint stiffness.   Skin: Skin is warm and dry.  Psychiatric: Mood and affect are normal    Neuro: Patient is alert and oriented to person, place, and time. Language is intact and fluent.  Recent and remote memory are intact.  Normal attention and concentration.  Facial movement is  symmetric.  Gait is normal.     Review of Data:   Notes from neuro reviewed.   Labs:  Clinical Support on 02/25/2025   Component Date Value Ref Range Status    POC Cholesterol, Total 02/25/2025 261 (H)  <240 MG/DL Final    POC Cholesterol, HDL 02/25/2025 66  MG/DL Final    POC Cholesterol, LDL 02/25/2025 163 (H)  <160 MG/DL Final    POC Triglycerides 02/25/2025 177 (H)  <160 MG/DL Final    POC Glucose 02/25/2025 89  60 - 140 MG/DL Final   Admission on 02/03/2025, Discharged on 02/03/2025   Component Date Value Ref Range Status    POCT Glucose 02/03/2025 84  70 - 110 mg/dL Final     Imaging:  No results found. However, due to the size of the patient record, not all encounters were searched. Please check Results Review for a complete set of results.    Note: I have independently reviewed any/all imaging/labs/tests and agree with the report (s) as documented.  Any discrepancies will be as noted/demarcated by free text.  CHANTELLE CHING 4/3/2025    ASSESSMENT:  1. Chronic migraine with aura without status migrainosus, not intractable          PLAN:  - Discussed symptoms appear to be consistent with migraines. Discussed this with patient along with treatment options and patient agreed with the following plan  - ppx - continue  botox  - Botox administered in clinic for Chronic Migraine (see below)   - abortive - continue ubrelvy  - nausea - continue zofran  - trouble w/ sleep - recently started acupuncture, defers referrals to pcp or sleep med or to begin elavil due to concern w/ interaction w/ effexor  - avoid nsaids as they are c/i 2/2 gastrectomy  - avoid triptans as recommended by psychiatry 2/2 potential serotinin syndrome w/ effexor for depression  - decreased neck ROM, tightness, and cervical scoliosis - continue conservative treatments, consider PT in future  - increased stress recently 2/2 partner's passing, continue therapy and mgmt per pcp  - RTC in 12 weeks for repeat Botox injections or sooner if needed     Orders  Placed This Encounter    onabotulinumtoxina injection 200 Units           All questions and concerns addressed.  Patient verbalizes understanding and is agreeable with the above stated treatment plan.      PROCEDURE NOTE:  BOTOX was performed as an indicated therapy for intractable chronic migraine headaches given that the patient failed more than 2 headache medications    Two patient identifiers were confirmed with the patient prior to performing this procedure. A time out to determine correct patient and and agreement on procedure performed was conducted prior to the procedure.      Botulinum Toxin Injection Procedure   Procedure: Botulinum toxin injection (08288)  After risks and benefits were explained including bleeding, infection, worsening of pain, damage to the areas being injected, weakness of muscles, loss of muscle control, dysphagia if injecting the head or neck, facial droop if injecting the facial area, painful injection, allergic or other reaction to the medications being injected, and the failure of the procedure to help the problem, a signed consent was obtained.   The patient was placed in a comfortable area and the sites to be treated were identified.The area to be treated was prepped three times with alcohol and the alcohol allowed to dry. Next, a 30 gauge needle was used to inject the medication in the area to be treated.      Total Botox used: 155 Units   Botox wastage: 45 Units     Injection sites:    muscle bilaterally ( a total of 10 units divided into 2 sites)   Procerus muscle (5 units)   Frontalis muscle bilaterally (a total of 20 units divided into 4 sites)   Temporalis muscle bilaterally (a total of 40 units divided into 8 sites)   Occipitalis muscle bilaterally (a total of 30 units divided into 6 sites)   Cervical paraspinal muscles (a total of 20 units divided into 4 sites)   Trapezius muscle bilaterally (a total of 30 units divided into 6 sites)   Complications: none   RTC  for the next Botox injection: 12 weeks     The patient tolerated the procedure well and did not experience any complications.     CC: Erica Pineda MD Sarena Patel, PA-C  [facility]   [phone]    Dr. Martino was available during today's encounter.

## 2025-04-08 ENCOUNTER — PATIENT MESSAGE (OUTPATIENT)
Dept: BARIATRICS | Facility: CLINIC | Age: 45
End: 2025-04-08
Payer: COMMERCIAL

## 2025-04-08 DIAGNOSIS — L73.2 HIDRADENITIS SUPPURATIVA: ICD-10-CM

## 2025-04-08 DIAGNOSIS — N95.1 MENOPAUSAL SYMPTOMS: ICD-10-CM

## 2025-04-08 RX ORDER — SPIRONOLACTONE 50 MG/1
TABLET, FILM COATED ORAL
Qty: 60 TABLET | Refills: 0 | Status: SHIPPED | OUTPATIENT
Start: 2025-04-08

## 2025-04-08 RX ORDER — ESTRADIOL 2 MG/1
2 TABLET ORAL DAILY
Qty: 30 TABLET | Refills: 11 | OUTPATIENT
Start: 2025-04-08 | End: 2026-04-08

## 2025-04-09 ENCOUNTER — PATIENT MESSAGE (OUTPATIENT)
Dept: DERMATOLOGY | Facility: CLINIC | Age: 45
End: 2025-04-09
Payer: COMMERCIAL

## 2025-04-11 ENCOUNTER — TELEPHONE (OUTPATIENT)
Dept: HEMATOLOGY/ONCOLOGY | Facility: CLINIC | Age: 45
End: 2025-04-11
Payer: COMMERCIAL

## 2025-04-11 DIAGNOSIS — F33.41 MAJOR DEPRESSIVE DISORDER, RECURRENT EPISODE, IN PARTIAL REMISSION WITH ANXIOUS DISTRESS: Primary | ICD-10-CM

## 2025-04-12 DIAGNOSIS — K21.9 GASTROESOPHAGEAL REFLUX DISEASE, UNSPECIFIED WHETHER ESOPHAGITIS PRESENT: ICD-10-CM

## 2025-04-12 DIAGNOSIS — N95.1 MENOPAUSAL SYMPTOMS: ICD-10-CM

## 2025-04-13 RX ORDER — OMEPRAZOLE 40 MG/1
40 CAPSULE, DELAYED RELEASE ORAL EVERY MORNING
Qty: 30 CAPSULE | Refills: 1 | Status: SHIPPED | OUTPATIENT
Start: 2025-04-13

## 2025-04-14 RX ORDER — ESTRADIOL 2 MG/1
2 TABLET ORAL DAILY
Qty: 30 TABLET | Refills: 0 | Status: SHIPPED | OUTPATIENT
Start: 2025-04-14 | End: 2026-04-14

## 2025-04-21 ENCOUNTER — CLINICAL SUPPORT (OUTPATIENT)
Dept: BARIATRICS | Facility: CLINIC | Age: 45
End: 2025-04-21
Payer: COMMERCIAL

## 2025-04-21 ENCOUNTER — PATIENT MESSAGE (OUTPATIENT)
Dept: BARIATRICS | Facility: CLINIC | Age: 45
End: 2025-04-21

## 2025-04-21 VITALS — WEIGHT: 271 LBS | BODY MASS INDEX: 49.57 KG/M2

## 2025-04-21 DIAGNOSIS — Z98.84 S/P LAPAROSCOPIC SLEEVE GASTRECTOMY: ICD-10-CM

## 2025-04-21 DIAGNOSIS — E66.01 MORBID OBESITY WITH BODY MASS INDEX (BMI) OF 40.0 TO 49.9: ICD-10-CM

## 2025-04-21 DIAGNOSIS — Z71.3 DIETARY COUNSELING AND SURVEILLANCE: Primary | ICD-10-CM

## 2025-04-21 PROCEDURE — 97803 MED NUTRITION INDIV SUBSEQ: CPT | Mod: 95,,, | Performed by: DIETITIAN, REGISTERED

## 2025-04-21 NOTE — PROGRESS NOTES
The patient location is:  Patient Home   The chief complaint leading to consultation is: morbid obesity in work up for possible revision  Visit type: Virtual visit with synchronous audio and video  Total time spent with patient:30 minutes  Each patient to whom he or she provides medical services by telemedicine is:  (1) informed of the relationship between the physician and patient and the respective role of any other health care provider with respect to management of the patient; and (2) notified that he or she may decline to receive medical services by telemedicine and may withdraw from such care at any time.  Nutrition Handout located in AVS section of pt's [facility] and/or sent as a message via [facility] portal.  Pt informed of handout and how to access this information in [facility].  NUTRITION NOTE    Referring Physician: Abad Muñoz M.D.   Reason for MNT Referral: 6 months medically supervised diet counseling and surveillance pending laproscopic  sleeve to [facility]    Patient presents for follow-up visit for MSD with weight loss over the past month; 44  lbs total weight loss by making numerous dietary and lifestyle changes.  Started Wegovy with [facility] 6 months ago.    CLINICAL DATA:  44 y.o. female.  Current Wt: 271 lb at home self reported  Last Wt 276 lb self reported  Current Weight: 307 lbs self reported  Weight Change Since Initial Visit: Loss of 44 lbs  Ideal Body Weight: 138 lbs  BMI 49.57  Initial Wt: 315 lbs    DAILY NUTRITIONAL NEEDS: pre-op nutritional bariatric guidelines to promote weight loss  8320-4203 Calories    Grams Protein    CURRENT DIET:  Reduced-calorie diet.  Diet Recall: Food records are not present.  B: Quest protein bar or Fairlife shake 30 gm   L: Protein shake or turkey/chicken with broccolli   S: Fruit  D:Same as lunch Fairlife protein shake 30 gm protein  S:string cheese  Diet Includes: 2 small meals including 3 shakes per day  Meal  Pattern: Improved.  Protein Supplements: 3 per day.  Snacking: Adequate. Snacks include healthy choices.  Vegetables: Likes a variety. Eats daily.  Fruits: Likes a variety. Eats almost daily.  Beverages:water, sugar-free beverages/powders/drops, and diet/unsweetened tea peach tea great value  Dining Out:  Never. None Mostly restaurants and take-out.  Cooking at home: Daily. Mostly Cooking at home: Weekly. Mostly baked/roast, grilled/broiled, air-fried, and steamed beef, pork, chicken/turkey, vegetables,     CURRENT EXERCISE:  Adequate    Walking 60 minutes 4 times per week    Vitamins / Minerals / Herbs:   Alive potency B vitamins  Calcium citrate chews once a day    LABS:  Reviewed.      Food Allergies:   None reported    Social:  Works regular daytime shifts.  Alcohol: None.  Smoking: None.    ASSESSMENT:  Patient demonstrates some willingness to change lifestyle habits as evidenced by weight loss, good/increased exercise, including protein drinks, reduced sugar-sweetened beverages, increased fruits, and increased vegetables.    Doing well with working on greatest challenges (irregular meal patterns).    Barriers to Education:  none  Stage of Change:  action    NUTRITION DIAGNOSIS:  Morbid Obesity related to Excessive calorie intake as evidence by BMI.  Status: Decreased    PLAN/RECOMMENDATION:  Pt is a good candidate for bariatric surgery     Diet: Maintain diet plan.  Work on Bariatric Nutrition Checklist.  Start shopping for bariatric vitamins & minerals    Exercise: Maintain.    Behavior Modification: Begin to document food & activity logs daily.        Communicated nutrition plan with bariatric team.    SESSION TIME:  30 minutes

## 2025-04-25 ENCOUNTER — TELEPHONE (OUTPATIENT)
Dept: BARIATRICS | Facility: CLINIC | Age: 45
End: 2025-04-25
Payer: COMMERCIAL

## 2025-04-25 DIAGNOSIS — E66.01 MORBID OBESITY WITH BMI OF 45.0-49.9, ADULT: Primary | ICD-10-CM

## 2025-04-25 DIAGNOSIS — Z01.818 PRE-OP TESTING: ICD-10-CM

## 2025-04-25 NOTE — TELEPHONE ENCOUNTER
"Spoke with patient and confirmed the surgical procedure of Sleeve to [facility] with Dr Monahan on 7/25/25.  Scheduled preop appts/surgery date/2 week and 8 week post op appts. All dates and times agreed upon. Pt aware that if they are required to have PCP clearance, it must be within 6 months of surgery, unless their medical history has changed, it should be dated, signed and in chart for preop appointment. All medications have been reviewed regarding the necessity to be crushed or broken into pieces smaller that the tip of a pencil eraser for 2 weeks following gastric sleeve surgery and 4 weeks following gastric bypass surgery. Pt instructed to stop taking all NSAIDS 1 week before surgery and for life after surgery. Pt aware that protein liquid diet start date is 7/11/25. Patient is doing well with their diet. Patient was instructed about the progression of the diet phases. The patient's current weight is 271 lbs, height is 5'2", and BMI is 49.57. Refer to medical letter of necessity from Surgeon. Discussed the importance of increased physical activity and dieting lifestyle changes to improve weight loss and meet goals. Screened patient for history of UTI per protocol. Discussed with patient to avoid antibiotics and elective procedures involving sedation/anesthesia within 30 days of surgery unless cleared by the bariatric department. Patient instructed to call the bariatric clinic post op for any s/s of UTI. Patient's mailing address confirmed and informed to expect a manilla envelop containing bariatric surgery pre op booklet, appointment reminders, protein and fluid log sheets, and liquid diet and vitamin information sheets. Pt aware that all appts can be seen in my [facility] patient portal at this time. Confirmed email address and informed patient that they will be enrolled in the Patient Reported Outcomes program to track their progress and successes. The first email will be sent 2-3 weeks before surgery and then every " year on your surgery anniversary date. Office phone and fax number given to patient for any future questions/concerns. Discussed the pre-surgery complex carbohydrate beverage to purchase in [facility] to drink 30 minutes before the surgery arrival time. Reviewed the policy of scheduling a covid test 72 hours prior to surgery if necessary.

## 2025-04-30 ENCOUNTER — E-VISIT (OUTPATIENT)
Dept: INTERNAL MEDICINE | Facility: CLINIC | Age: 45
End: 2025-04-30
Payer: COMMERCIAL

## 2025-04-30 ENCOUNTER — OFFICE VISIT (OUTPATIENT)
Dept: PAIN MEDICINE | Facility: CLINIC | Age: 45
End: 2025-04-30
Payer: COMMERCIAL

## 2025-04-30 ENCOUNTER — PATIENT MESSAGE (OUTPATIENT)
Dept: INTERNAL MEDICINE | Facility: CLINIC | Age: 45
End: 2025-04-30
Payer: COMMERCIAL

## 2025-04-30 ENCOUNTER — HOSPITAL ENCOUNTER (OUTPATIENT)
Dept: RADIOLOGY | Facility: HOSPITAL | Age: 45
Discharge: HOME | End: 2025-04-30
Attending: EMERGENCY MEDICINE
Payer: COMMERCIAL

## 2025-04-30 VITALS
HEART RATE: 79 BPM | HEIGHT: 62 IN | SYSTOLIC BLOOD PRESSURE: 120 MMHG | DIASTOLIC BLOOD PRESSURE: 79 MMHG | BODY MASS INDEX: 50.71 KG/M2 | WEIGHT: 275.56 LBS

## 2025-04-30 DIAGNOSIS — Z71.84 TRAVEL ADVICE ENCOUNTER: Primary | ICD-10-CM

## 2025-04-30 DIAGNOSIS — M54.2 CERVICALGIA: ICD-10-CM

## 2025-04-30 DIAGNOSIS — L73.2 HIDRADENITIS SUPPURATIVA: ICD-10-CM

## 2025-04-30 DIAGNOSIS — M54.12 CERVICAL RADICULOPATHY: Primary | ICD-10-CM

## 2025-04-30 DIAGNOSIS — M54.12 CERVICAL RADICULOPATHY: ICD-10-CM

## 2025-04-30 DIAGNOSIS — Z79.2 PROPHYLACTIC ANTIBIOTIC: ICD-10-CM

## 2025-04-30 PROCEDURE — 72052 X-RAY EXAM NECK SPINE 6/>VWS: CPT | Mod: 26,,, | Performed by: RADIOLOGY

## 2025-04-30 PROCEDURE — 99999 PR PBB SHADOW E&M-EST. PATIENT-LVL IV: CPT | Mod: PBBFAC,,, | Performed by: EMERGENCY MEDICINE

## 2025-04-30 PROCEDURE — 72052 X-RAY EXAM NECK SPINE 6/>VWS: CPT | Mod: TC

## 2025-04-30 RX ORDER — METHYLPREDNISOLONE 4 MG/1
TABLET ORAL
Qty: 21 EACH | Refills: 0 | Status: SHIPPED | OUTPATIENT
Start: 2025-04-30 | End: 2025-04-30

## 2025-04-30 RX ORDER — GABAPENTIN 100 MG/1
200 CAPSULE ORAL NIGHTLY
Qty: 60 CAPSULE | Refills: 2 | Status: SHIPPED | OUTPATIENT
Start: 2025-04-30 | End: 2025-07-29

## 2025-04-30 RX ORDER — METHYLPREDNISOLONE 4 MG/1
TABLET ORAL
Qty: 21 TABLET | Refills: 0 | Status: SHIPPED | OUTPATIENT
Start: 2025-04-30

## 2025-04-30 NOTE — PROGRESS NOTES
Interventional Pain Management - Established     Interval history 04/30/2025: Patient reports that her neck and left arm felt like it was on fire, but now she is having numbness in her left upper arm laterally. She feels some left thumb tingling that is starting to improve.  Patient denies any myelopathic symptoms.     Interval history 01/13/2025: Patient returns and reports that she is having pain in her right lower back in the same location where she received TPIs in November 2024. She reports that the radicular pain on the right remains improved.     Interval History 11/15/2024: The patient had L4/5 and L5/S1 right TFESI on 10/24/2024 and they report 60% pain relief.  She states that the pain radiating down her right leg has resolved.  She reports that she now has a pain that started 4 days ago on her right side of her lower back. There is no exacerbating factor, but the more she does the more it hurts. She has also noted a knot in the area.     Interval History 10/04/2024:  Since their last visit the pain has been unchanged. They are participating in physical therapy and are participating in home exercise plan.    Original HPI 09/25/2024: Fran Higgins is a 44 y.o. year old female patient who has a past medical history of Abnormal Pap smear of cervix, Abnormal uterine bleeding (AUB), Amblyopia, Anxiety, Cervical scoliosis, Depression, DUB (dysfunctional uterine bleeding), Fatigue, Galactorrhea in female- bilateral breast, Galactorrhea of both breasts, psychiatric care, Migraine with aura, not intractable, Morbid obesity, Mucinous tumor, of low malignant potential, Ovarian cyst, Pericardial cyst, Psychiatric problem, Sleep difficulties, Surgical menopause, Therapy, and Vaginal yeast infection. She presents in referral from No ref. provider found for lower back pain for over 20 years.     Original Pain Description:  The pain is located in the back and is radiating to the right hip, leg and the heel  posteriorly. The pain is described as numbing and tingling. Exacerbating factors: daily activities. Mitigating factors medications. Symptoms interfere with daily activity. The patient feels like symptoms have been unchanged. Patient denies significant motor weakness. No red flags.     PAIN SCORES:  Best: Pain is 3  Current: Pain is 3  Worst: Pain is 10    6 weeks of Conservative therapy:  PT: Completed for neck  Chiro:  HEP: Participating    Treatments / Medications:   Arianne 100 mg PRN qHS - makes her feel like zombie  Xanax 0.5 mg  for insomnia and anxiety  Flexeril 5 mg PRN    Antiplatelets/Anticoagulants/Others:  Humira    Interventional Pain Procedures:   10/24/24 L4/5 and L5/S1 right TFESI    IMAGING:    MRI LUMBAR SPINE WITHOUT CONTRAST   09/28/2024  6 Lumbar vetrebra, lowest level considered L5/S1   L2/3: BBPDB->mild BL neural foraminal narrowing and mild spinal canal stenosis  L3/4: BBPDB & facet arthropathy->mild BL neural foraminal narrowing and mild spinal canal stenosis  L4/5: BBPDB & facet arthropathy->mild BL neural foraminal narrowing and mild spinal canal stenosis.  L5/1: BBPDB & facet arthropathy->mod right / severe left neural foraminal narrowing and mild spinal canal stenosis. Grade 1 AL. BL L5 pars defects     XR LUMBAR SPINE AP AND LAT WITH FLEX/EXT   03/07/2024  On neutral lateral view, there is grade 1 AL L5 on S1. Disc space height loss primarily involving L4-L5 and L5-S1 noting endplate degenerative changes primarily at L5-S1.  There is lower lumbar facet arthropathy.  There are pars defects at L5.  On flex/ex no new or worsening listhesis or facet malalignment.  AP spinal alignment is remarkable for mild dextroscoliotic curvature.  The bilateral sacroiliac joints are intact.     US MSK Soft-tissue right lower lumbar in clinic 11/15/24  No focal fluid collections, no masses noted, no cobblestoning appearance, normal ultrasound    Medications/Allergies: See med card    ROS:  GENERAL: No fever.  "No chills. No fatigue. Denies weight loss. Denies weight gain.  Back / musculoskeletal / neuro : See HPI    VITALS:   Vitals:    04/30/25 1551   BP: 120/79   Pulse: 79   Weight: 125 kg (275 lb 9.2 oz)   Height: 5' 2" (1.575 m)   PainSc:   5   PainLoc: Back         Body mass index is 50.4 kg/m².      4/30/2025     3:52 PM 1/13/2025     1:56 PM 9/25/2024     1:09 PM   Last 3 PDI Scores   Pain Disability Index (PDI) 7 49 35       Physical Exam:  GENERAL: Well appearing, in no acute distress, alert and oriented x3.  PSYCH:  Mood and affect appropriate.  SKIN: Skin color, texture, turgor normal, no rashes or lesions.  HEENT:  Normocephalic, atraumatic. Cranial nerves grossly intact.  NECK:  Spurling's positive to left.  PULM: No evidence of respiratory difficulty, symmetric chest rise.  GI:  Non-distended  BACK: Normal range of motion. No pain to palpation over the spinous processes. No pain to palpation over facet joints. No pain with axial loading. There is no pain with palpation over the sacroiliac joints bilaterally.   EXTREMITIES: No deformities, edema, or skin discoloration.   MUSCULOSKELETAL: Shoulder, hip, and knee provocative maneuvers are negative. No atrophy is noted.  NEURO: Sensation is equal and appropriate bilaterally. Bilateral upper and lower extremity strength is normal and symmetric. Bilateral upper and lower extremity coordination and muscle stretch reflexes are physiologic and symmetric. Plantar response are downgoing. Straight leg raising in the supine position is negative to radicular pain.  Negative Guillermo's bilaterally  GAIT: normal.      LABS:    Lab Results   Component Value Date    HGBA1C 5.3 10/11/2024       Lab Results   Component Value Date    CREATININE 0.8 10/11/2024         ASSESSMENT: 44 y.o. year old female with pain, consistent with:    Encounter Diagnoses   Name Primary?    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  She is now developing cervical radiculopathy, but is due to go on admission trip out of the country in a few days.  Will treat with Medrol Dosepak and arrange for imaging and follow up after her trip    PLAN:  - I have stressed the importance of physical activity and a home exercise plan to help with pain and improve health.  - Patient can continue with medications for now since they are providing benefits, using them appropriately, and without side effects.  - Counseled patient regarding the importance of activity modification and physical therapy.  - Medications:  No NSAIDs due to gastric bypass    Medrol dose phong    Increase Arianne to 200mg qHS  - Imaging: Reviewed available imaging with patient and answered any questions they had regarding study.  - The patient's pathophysiology was explained in detail with reference to x-rays, models, other visual aids as appropriate.   - Follow up visit: return to clinic 1 week after imaging    Arun Garcia MD  05/02/2025

## 2025-05-01 RX ORDER — CIPROFLOXACIN 500 MG/1
500 TABLET ORAL EVERY 12 HOURS
Qty: 10 TABLET | Refills: 0 | Status: SHIPPED | OUTPATIENT
Start: 2025-05-01

## 2025-05-01 NOTE — PROGRESS NOTES
Patient ID: Fran Higgins is a 44 y.o. female.    Chief Complaint: General Illness (Entered automatically based on patient selection in [facility].)    The patient initiated a request through [facility] on 4/30/2025 for evaluation and management with a chief complaint of General Illness (Entered automatically based on patient selection in [facility].)     I evaluated the questionnaire submission on 5/1/25 .    [facility] Peq Evisit [facility]-Medication    4/30/2025  5:16 PM CDT - Filed by Patient   What do you need help with? Medication Request   Do you agree to participate in an E-Visit? Yes   If you have any of the following symptoms, please present to your local emergency room or call 911:  I acknowledge   Medication requests for narcotics will not be addressed via an E-Visit.  Please schedule an appointment. I acknowledge   Do you have any of the following pregnancy-related conditions? None   Do you want to address a new or existing medication? I would like to start a new medication that I do not already take   What is the main issue you would like addressed today? Cipro for traveling   What is the name of the medication that you would like to start? Ciprofloxacin   Have you taken a similar medication in the past? Yes   What was the name of the similar medication? Ciprofloxacin   Why are you no longer on that medication? No specific reason    What medical condition is the  medication intended to treat? 5 day supply for traveling out country   Provide any additional information you feel is important. I need this in case of gastrointestinal issues during mission trip to [facility]   Please attach any relevant images or files    Are you able to take your vital signs? No         Encounter Diagnoses   Name Primary?    Travel advice encounter Yes    Prophylactic antibiotic         No orders of the defined types were placed in this encounter.     Medications Ordered This Encounter   Medications    ciprofloxacin HCl (CIPRO)  500 MG tablet     Sig: Take 1 tablet (500 mg total) by mouth every 12 (twelve) hours.     Dispense:  10 tablet     Refill:  0        No follow-ups on file.      E-Visit Time Trackin min

## 2025-05-07 ENCOUNTER — PATIENT MESSAGE (OUTPATIENT)
Dept: DERMATOLOGY | Facility: CLINIC | Age: 45
End: 2025-05-07
Payer: COMMERCIAL

## 2025-05-07 RX ORDER — METFORMIN HYDROCHLORIDE 500 MG/1
TABLET, EXTENDED RELEASE ORAL
Qty: 90 TABLET | Refills: 0 | Status: SHIPPED | OUTPATIENT
Start: 2025-05-07

## 2025-05-13 ENCOUNTER — PATIENT MESSAGE (OUTPATIENT)
Dept: BARIATRICS | Facility: CLINIC | Age: 45
End: 2025-05-13
Payer: COMMERCIAL

## 2025-05-14 ENCOUNTER — LAB VISIT (OUTPATIENT)
Dept: LAB | Facility: HOSPITAL | Age: 45
End: 2025-05-14
Attending: OBSTETRICS & GYNECOLOGY
Payer: COMMERCIAL

## 2025-05-14 DIAGNOSIS — C56.9 MUCINOUS TUMOR, OF LOW MALIGNANT POTENTIAL: ICD-10-CM

## 2025-05-14 LAB — CARCINOEMBRYONIC ANTIGEN (OHS): 2.2 NG/ML

## 2025-05-14 PROCEDURE — 36415 COLL VENOUS BLD VENIPUNCTURE: CPT

## 2025-05-14 PROCEDURE — 82378 CARCINOEMBRYONIC ANTIGEN: CPT

## 2025-05-15 ENCOUNTER — OFFICE VISIT (OUTPATIENT)
Dept: GYNECOLOGIC ONCOLOGY | Facility: CLINIC | Age: 45
End: 2025-05-15
Payer: COMMERCIAL

## 2025-05-15 ENCOUNTER — HOSPITAL ENCOUNTER (OUTPATIENT)
Dept: RADIOLOGY | Facility: HOSPITAL | Age: 45
Discharge: HOME | End: 2025-05-15
Attending: EMERGENCY MEDICINE
Payer: COMMERCIAL

## 2025-05-15 ENCOUNTER — RESULTS FOLLOW-UP (OUTPATIENT)
Dept: HEMATOLOGY/ONCOLOGY | Facility: CLINIC | Age: 45
End: 2025-05-15

## 2025-05-15 VITALS
BODY MASS INDEX: 46.79 KG/M2 | RESPIRATION RATE: 16 BRPM | SYSTOLIC BLOOD PRESSURE: 118 MMHG | DIASTOLIC BLOOD PRESSURE: 80 MMHG | WEIGHT: 274.06 LBS | OXYGEN SATURATION: 96 % | HEIGHT: 64 IN | TEMPERATURE: 97 F

## 2025-05-15 DIAGNOSIS — M54.12 CERVICAL RADICULOPATHY: ICD-10-CM

## 2025-05-15 DIAGNOSIS — M54.2 CERVICALGIA: ICD-10-CM

## 2025-05-15 DIAGNOSIS — C56.9 MUCINOUS TUMOR, OF LOW MALIGNANT POTENTIAL: Primary | ICD-10-CM

## 2025-05-15 PROCEDURE — 3074F SYST BP LT 130 MM HG: CPT | Mod: CPTII,S$GLB,, | Performed by: OBSTETRICS & GYNECOLOGY

## 2025-05-15 PROCEDURE — 72141 MRI NECK SPINE W/O DYE: CPT | Mod: TC

## 2025-05-15 PROCEDURE — 99214 OFFICE O/P EST MOD 30 MIN: CPT | Mod: S$GLB,,, | Performed by: OBSTETRICS & GYNECOLOGY

## 2025-05-15 PROCEDURE — 3008F BODY MASS INDEX DOCD: CPT | Mod: CPTII,S$GLB,, | Performed by: OBSTETRICS & GYNECOLOGY

## 2025-05-15 PROCEDURE — 72141 MRI NECK SPINE W/O DYE: CPT | Mod: 26,,, | Performed by: RADIOLOGY

## 2025-05-15 PROCEDURE — 3079F DIAST BP 80-89 MM HG: CPT | Mod: CPTII,S$GLB,, | Performed by: OBSTETRICS & GYNECOLOGY

## 2025-05-15 PROCEDURE — 1159F MED LIST DOCD IN RCRD: CPT | Mod: CPTII,S$GLB,, | Performed by: OBSTETRICS & GYNECOLOGY

## 2025-05-15 PROCEDURE — 99999 PR PBB SHADOW E&M-EST. PATIENT-LVL V: CPT | Mod: PBBFAC,,, | Performed by: OBSTETRICS & GYNECOLOGY

## 2025-05-15 NOTE — PROGRESS NOTES
Subjective:      Patient ID: Fran Higgins is a 44 y.o. female.    Chief Complaint:  MD for MYCHART FOLLOWUP/OFFICE VISIT - follow up      HPI  INTERVAL HISTORY  CC: h/o right ovarian mucinous borderline tumor    CT due to increased CEA from baseline 3/13/2024 shows no evidence of disease.     Today's visit:   Presents for routine surveillance visit.  No GYN concerns, previous patient of Dr. Kaufman.  She has no vaginal bleeding or discharge.  She is having no nausea or vomiting.  She has no bowel or bladder complaints.  She has no pain issues.       Follows with [facility] for HRT and well woman care.      CEA (normal 0-3) 1.8 > 2.5 > 2.1 > 2.2    Disease free interval 5 years.     Recently completed a mission trip with Dr. Colindres.     _________________  HPI or ONCOLOGIC HISTORY  6/25/2019: LSO by Dr. 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  Size of tumor: 11.8 CM.  Capsule involvement cannot be determined secondary to fragmentation.  Microinvasion is present: 5 foci. Intraepithelial carcinoma is present. Benign fallopian tube and fimbriated end with no significant histopathologic alterations. Pathologic staging: pT1a      3/9/2020: Robotic assisted total hysterectomy and RSO.    PATHOLOGY:  139 g uterus with secretory endometrium and focal adenomyosis, multiple leiomyomas.  Right ovary and fallopian tube benign. (After completion hysterectomy and RSO she has had problems with depression and hormone replacement. Seen by Katia Mahan and Sherrie for depression. On  venlafaxine. Depression and VMS is better.)        6/22/2021 Gastric Sleeve    Review of Systems   Constitutional:  Negative for appetite change, chills, fatigue and fever.   HENT:  Negative for mouth sores.    Respiratory:  Negative for cough and shortness of breath.    Cardiovascular:  Negative for leg swelling.   Gastrointestinal:  Negative for abdominal pain, blood in stool,  "constipation and diarrhea.   Endocrine: Negative for cold intolerance.   Genitourinary:  Negative for dysuria and vaginal bleeding.   Musculoskeletal:  Negative for myalgias.   Skin:  Negative for rash.   Allergic/Immunologic: Negative.    Neurological:  Negative for weakness and numbness.   Hematological:  Negative for adenopathy. Does not bruise/bleed easily.   Psychiatric/Behavioral:  Negative for confusion.      /80   Temp 97.1 °F (36.2 °C) (Temporal)   Resp 16   Ht 5' 4" (1.626 m)   Wt 124.3 kg (274 lb 0.5 oz)   LMP 01/17/2020   SpO2 96%   BMI 47.04 kg/m²       Objective:   Physical Exam:   Constitutional: She is oriented to person, place, and time. She appears well-developed and well-nourished. No distress.    HENT:   Head: Normocephalic and atraumatic.    Eyes: No scleral icterus.     Cardiovascular:       Exam reveals no cyanosis and no edema.        Pulmonary/Chest: Effort normal. No respiratory distress.        Abdominal: Soft. She exhibits no distension and no mass. There is no abdominal tenderness.     Genitourinary:    Vagina normal.      Pelvic exam was performed with patient supine.   There is no lesion on the right labia. There is no lesion on the left labia. Right adnexum displays no mass. Left adnexum displays no mass. No vaginal discharge or bleeding in the vagina. Cervix is absent.Uterus is absent.           Musculoskeletal: Normal range of motion and moves all extremeties. No edema.      Lymphadenopathy: No inguinal adenopathy noted on the right or left side.        Right: No supraclavicular adenopathy present.        Left: No supraclavicular adenopathy present.    Neurological: She is alert and oriented to person, place, and time.    Skin: Skin is warm and dry. No rash noted. No cyanosis. No pallor.    Psychiatric: She has a normal mood and affect.       Assessment:     1. Mucinous tumor, of low malignant potential            Plan:     No orders of the defined types were placed in " this encounter.    No evidence of disease on today's exam  Feels well, no new complaints.   CEA normal and stable  Has completed 5 years of oncologic surveillance for low risk borderline tumor of the ovary. Will transition to annual surveillance with survivorship team. May RTC with me as needed.     I spent approximately 30 minutes reviewing the available records and evaluating the patient, out of which over 50% of the time was spent face to face with the patient in counseling and coordinating this patient's care.

## 2025-05-16 ENCOUNTER — PATIENT MESSAGE (OUTPATIENT)
Dept: GYNECOLOGIC ONCOLOGY | Facility: CLINIC | Age: 45
End: 2025-05-16
Payer: COMMERCIAL

## 2025-05-16 DIAGNOSIS — E89.40 SURGICAL MENOPAUSE: Primary | ICD-10-CM

## 2025-05-17 DIAGNOSIS — L73.2 HIDRADENITIS SUPPURATIVA: ICD-10-CM

## 2025-05-17 DIAGNOSIS — N95.1 MENOPAUSAL SYMPTOMS: ICD-10-CM

## 2025-05-19 ENCOUNTER — TELEPHONE (OUTPATIENT)
Dept: DERMATOLOGY | Facility: CLINIC | Age: 45
End: 2025-05-19
Payer: COMMERCIAL

## 2025-05-19 ENCOUNTER — PATIENT MESSAGE (OUTPATIENT)
Dept: DERMATOLOGY | Facility: CLINIC | Age: 45
End: 2025-05-19
Payer: COMMERCIAL

## 2025-05-19 DIAGNOSIS — L73.2 HIDRADENITIS SUPPURATIVA: ICD-10-CM

## 2025-05-19 RX ORDER — SPIRONOLACTONE 50 MG/1
TABLET, FILM COATED ORAL
Qty: 60 TABLET | Refills: 0 | Status: SHIPPED | OUTPATIENT
Start: 2025-05-19 | End: 2025-05-20

## 2025-05-19 RX ORDER — SPIRONOLACTONE 50 MG/1
TABLET, FILM COATED ORAL
Qty: 60 TABLET | Refills: 0 | Status: CANCELLED | OUTPATIENT
Start: 2025-05-19

## 2025-05-19 RX ORDER — CLINDAMYCIN PHOSPHATE 10 UG/ML
LOTION TOPICAL
Qty: 120 ML | Refills: 3 | Status: CANCELLED | OUTPATIENT
Start: 2025-05-19

## 2025-05-19 RX ORDER — CLINDAMYCIN PHOSPHATE 10 UG/ML
LOTION TOPICAL
Qty: 120 ML | Refills: 0 | Status: SHIPPED | OUTPATIENT
Start: 2025-05-19

## 2025-05-19 RX ORDER — ESTRADIOL 2 MG/1
2 TABLET ORAL DAILY
Qty: 30 TABLET | Refills: 2 | Status: SHIPPED | OUTPATIENT
Start: 2025-05-19 | End: 2026-05-19

## 2025-05-20 ENCOUNTER — OFFICE VISIT (OUTPATIENT)
Dept: DERMATOLOGY | Facility: CLINIC | Age: 45
End: 2025-05-20
Payer: COMMERCIAL

## 2025-05-20 DIAGNOSIS — L73.2 HIDRADENITIS SUPPURATIVA: Primary | ICD-10-CM

## 2025-05-20 PROCEDURE — 98006 SYNCH AUDIO-VIDEO EST MOD 30: CPT | Mod: 95,,, | Performed by: DERMATOLOGY

## 2025-05-20 PROCEDURE — 1160F RVW MEDS BY RX/DR IN RCRD: CPT | Mod: CPTII,95,, | Performed by: DERMATOLOGY

## 2025-05-20 PROCEDURE — 1159F MED LIST DOCD IN RCRD: CPT | Mod: CPTII,95,, | Performed by: DERMATOLOGY

## 2025-05-20 PROCEDURE — G2211 COMPLEX E/M VISIT ADD ON: HCPCS | Mod: 95,,, | Performed by: DERMATOLOGY

## 2025-05-20 RX ORDER — METFORMIN HYDROCHLORIDE 500 MG/1
TABLET, EXTENDED RELEASE ORAL
Qty: 180 TABLET | Refills: 1 | Status: SHIPPED | OUTPATIENT
Start: 2025-05-20

## 2025-05-20 RX ORDER — SPIRONOLACTONE 100 MG/1
TABLET, FILM COATED ORAL
Qty: 90 TABLET | Refills: 1 | Status: SHIPPED | OUTPATIENT
Start: 2025-05-20

## 2025-05-20 NOTE — PROGRESS NOTES
"    Patient Information  Name: Fran Higgins  : 1980  MRN: [id number]     Referring Physician:  Dr. Barrera ref. provider found   Primary Care Physician:  Ercia Lindsey MD   Date of Visit: 2025      Subjective:       Fran Higgins is a 44 y.o. female who presents for No chief complaint on file.      Last seen 24    2 - 3 mild flares since last visit.  Was having constant flares prior to tx:    Current treatment regimen:  CLn wash every day and occ. Uses hibiclens (alternates)  Clindamycin soln 2x/day  Spironolactone 100mg every day  Humira 40mg every week  Metformin ER at 500mg every day    Tried turmeric - GI upset  Has not used dilute bleach baths        Patient was last seen in Dermatology: 2024.    Prior notes by myself reviewed.   Clinical documentation obtained by nursing staff reviewed.    Review of Systems   Constitutional:  Positive for weight loss (had gastric sleeve . currently on Wegovy (45#) - 271#). Negative for weight gain.   Gastrointestinal:  Negative for diarrhea.   Genitourinary:  Negative for irregular periods (hysterectomy ).   Musculoskeletal:  Positive for arthralgias (has "bad spine" 2/2 scoliosis - shoulders and hips).   Skin:  Positive for abscesses.   Psychiatric/Behavioral:  Positive for depressed mood (on effexor and occ xanax - controlled) and anxiety (controlled on meds).    Hematologic/Lymphatic: Does not bruise/bleed easily.        Objective:    Physical Exam   Constitutional: She appears well-developed and well-nourished. No distress.   Neurological: She is alert and oriented to person, place, and time. She is not disoriented.   Psychiatric: She has a normal mood and affect.          Diagram Legend     Erythematous scaling macule/papule c/w actinic keratosis       Vascular papule c/w angioma      Pigmented verrucoid papule/plaque c/w seborrheic keratosis      Yellow umbilicated papule c/w sebaceous hyperplasia      Irregularly " shaped tan macule c/w lentigo     1-2 mm smooth white papules consistent with Milia      Movable subcutaneous cyst with punctum c/w epidermal inclusion cyst      Subcutaneous movable cyst c/w pilar cyst      Firm pink to brown papule c/w dermatofibroma      Pedunculated fleshy papule(s) c/w skin tag(s)      Evenly pigmented macule c/w junctional nevus     Mildly variegated pigmented, slightly irregular-bordered macule c/w mildly atypical nevus      Flesh colored to evenly pigmented papule c/w intradermal nevus       Pink pearly papule/plaque c/w basal cell carcinoma      Erythematous hyperkeratotic cursted plaque c/w SCC      Surgical scar with no sign of skin cancer recurrence      Open and closed comedones      Inflammatory papules and pustules      Verrucoid papule consistent consistent with wart     Erythematous eczematous patches and plaques     Dystrophic onycholytic nail with subungual debris c/w onychomycosis     Umbilicated papule    Erythematous-base heme-crusted tan verrucoid plaque consistent with inflamed seborrheic keratosis     Erythematous Silvery Scaling Plaque c/w Psoriasis     See annotation            [x] Data reviewed  Lab Results   Component Value Date    WBC 5.29 10/11/2024    HGB 12.6 10/11/2024    HCT 37 10/25/2024    MCV 86 10/11/2024     10/11/2024         Lab Results   Component Value Date    TBGOLDPLUS Negative 09/30/2024         [] Prior external notes reviewed    [] Independent review of test    [] Management discussed with another provider    [] Independent historian    Assessment / Plan:        Hidradenitis suppurativa  -     CBC Auto Differential; Future; Expected date: 05/20/2025  -     metFORMIN (GLUCOPHAGE-XR) 500 MG ER 24hr tablet; Take 2 tablets by mouth daily.  Dispense: 180 tablet; Refill: 1  -     spironolactone (ALDACTONE) 100 MG tablet; Take 1 po qday  Dispense: 90 tablet; Refill: 1  Continue Current treatment regimen:  CLn wash every day and occ. Uses hibiclens  (alternates)  Clindamycin soln 2x/day  Spironolactone 100mg every day  Humira 40mg every week - OSP; needs cbc    Increase Metformin ER to 1000mg every day    Start Zinc supplementation - 90mg every day - start low and taper up.     Start dilute bleach baths prn flare             The patient location is: stationary car  The chief complaint leading to consultation is: HS/Rx refill    Visit type: audiovisual    Face to Face time with patient: 10 minutes  11 minutes of total time spent on the encounter, which includes face to face time and non-face to face time preparing to see the patient (eg, review of tests), Obtaining and/or reviewing separately obtained history, Documenting clinical information in the electronic or other health record, Independently interpreting results (not separately reported) and communicating results to the patient/family/caregiver, or Care coordination (not separately reported).         Each patient to whom he or she provides medical services by telemedicine is:  (1) informed of the relationship between the physician and patient and the respective role of any other health care provider with respect to management of the patient; and (2) notified that he or she may decline to receive medical services by telemedicine and may withdraw from such care at any time.          LOS NUMBER AND COMPLEXITY OF PROBLEMS    COMPLEXITY OF DATA RISK TOTAL TIME (m)   58082  28376 [] 1 self-limited or minor problem [] Minimal to none [] No treatment recommended or patient to monitor. Reassurance.  15-29  10-19   66279  05893 Low  [] 2 or more self limited or minor problems  [] 1 stable chronic illness  [] 1 acute, uncomplicated illness or injury Limited (2)  [] Prior external notes from each unique source  [] Review result of each unique test  [] Order each unique test  OR [] Independent historian Low  []  OTC medications   []  Discussed/Decision for minor skin surgery (no risk factors) 30-44 20-29    04268  13235 Moderate  []  1 or more chronic unstable illness (not at goal or progression or exacerbation) or SE of treatment  []  2 or more stable chronic illnesses  []  1 acute illness with systemic symptoms  []  1 acute complicated injury  []  1 undiagnosed new problem with uncertain prognosis Moderate (1/3 below)  []  3 or more data items        *Now includes independent historian  []  Independent interpretation of a test  []  Discuss management/test with another provider Moderate  []  Prescription drug mgmt  []  Discussed/Decision for Minor surgery with risk factors  []  Mgmt limited by social determinates 45-59  30-39   50815  24528 High  []  1 or more chronic illness with severe exacerbation, progression or SE of treatment  []  1 acute or chronic illness/injury that poses a threat to life or bodily function Extensive (2/3 below)  []  3 or more data items        *Now includes independent historian.  []  Independent interpretation of a test  []  Discuss management/test with another provider High  []  Major surgery with risk discussed  []  Drug therapy requiring intensive monitoring for toxicity  []  Hospitalization  []  Decision for DNR 60-74  40-54

## 2025-05-20 NOTE — PATIENT INSTRUCTIONS
Dilute bleach to affected areas: compresses daily to affected/flared area(s) - can use CLn wash on warm wet rag as compress  If have groin involvement, may want to take dilute bleach baths (daily when flared; 2x/week for maintenance). Recipe for dilute bleach bath:    add 1/2 cup of regular strength (6%) bleach to a full tub of lukewarm water and soak for 10 - 15 minutes. (use 1/4 cup for a half-full tub of water)   OR Add Clorox (2 teaspoons/gal of water, max. 2/3 cup) to bath water

## 2025-05-22 ENCOUNTER — APPOINTMENT (OUTPATIENT)
Dept: RADIOLOGY | Facility: CLINIC | Age: 45
End: 2025-05-22
Attending: NURSE PRACTITIONER
Payer: COMMERCIAL

## 2025-05-22 ENCOUNTER — TELEPHONE (OUTPATIENT)
Dept: PAIN MEDICINE | Facility: CLINIC | Age: 45
End: 2025-05-22
Payer: COMMERCIAL

## 2025-05-22 DIAGNOSIS — E89.40 SURGICAL MENOPAUSE: ICD-10-CM

## 2025-05-22 DIAGNOSIS — M47.812 CERVICAL SPONDYLOSIS: ICD-10-CM

## 2025-05-22 DIAGNOSIS — M54.12 CERVICAL RADICULOPATHY: Primary | ICD-10-CM

## 2025-05-22 PROCEDURE — 77080 DXA BONE DENSITY AXIAL: CPT | Mod: TC,PO

## 2025-05-22 NOTE — TELEPHONE ENCOUNTER
----- Message from Arun Bobby MD sent at 2025  2:43 PM CDT -----  Regarding: Order for CARLA MCLEOD    Patient Name: CARLA MCLEOD([id number])  Sex: Female  : 1980      PCP: RUBEN RIDLEY    Center: [facility]     Types of orders made on 2025: Procedure Request    Order Date:2025  Ordering User:ARUN BOBBY [[id number]  Encounter Provider:Arun Bobby MD [[id number]  Authorizing Provider: Arun Bobby MD [[id number]]  Department:[facility] PAIN MANAGEMENT[[id number]]    Common Order Information  Procedure -> Epidural Injection (specify level) Cmt: C6/7    Order Specific Information  Order: Procedure Request Order for Pain Management [Custom: [id number]]  Order #:          [id number]: 1 FUTURE    Priority: Routine  Class: Clinic Performed    Future Order Information      Expires on:2026            Expected by:2025                   Associated Diagnoses      M54.12 Cervical radiculopathy      M47.812 Cervical spondylosis      Physician -> Jose         Facility Name: -> [place]           Priority: Routine  Class: Clinic Performed    Future Order Information      Expires on:2026            Expected by:2025                   Associated Diagnoses      M54.12 Cervical radiculopathy      M47.812 Cervical spondylosis      Procedure -> Epidural Injection (specify level) Cmt: C6/7        Physician -> Jose         Facility Name: -> [place]

## 2025-05-23 NOTE — TELEPHONE ENCOUNTER
----- Message from Arun Bobby MD sent at 2025  2:43 PM CDT -----  Regarding: Order for CARLA MCLEOD    Patient Name: CARLA MCLEOD([id number])  Sex: Female  : 1980      PCP: RUBEN RIDLEY    Center: [facility]     Types of orders made on 2025: Procedure Request    Order Date:2025  Ordering User:ARUN BOBBY [[id number]  Encounter Provider:Arun Bobby MD [[id number]]  Authorizing Provider: Arun Bobby MD [[id number]]  Department:[facility] PAIN MANAGEMENT[[id number]]    Common Order Information  Procedure -> Epidural Injection (specify level) Cmt: C6/7    Order Specific Information  Order: Procedure Request Order for Pain Management [Custom: [id number]]  Order #:          [id number]: 1 FUTURE    Priority: Routine  Class: Clinic Performed    Future Order Information      Expires on:2026            Expected by:2025                   Associated Diagnoses      M54.12 Cervical radiculopathy      M47.812 Cervical spondylosis      Physician -> Jose         Facility Name: -> [place]           Priority: Routine  Class: Clinic Performed    Future Order Information      Expires on:2026            Expected by:2025                   Associated Diagnoses      M54.12 Cervical radiculopathy      M47.812 Cervical spondylosis      Procedure -> Epidural Injection (specify level) Cmt: C6/7        Physician -> Jose         Facility Name: -> [place]

## 2025-05-23 NOTE — TELEPHONE ENCOUNTER
----- Message from Arun Bobby MD sent at 2025  2:43 PM CDT -----  Regarding: Order for CARLA MCLEOD    Patient Name: CARLA MCLEOD([id number])  Sex: Female  : 1980      PCP: RUBEN RIDLEY    Center: [facility]     Types of orders made on 2025: Procedure Request    Order Date:2025  Ordering User:ARUN BOBBY [[id number]  Encounter Provider:Arun Bobby MD [[id number]  Authorizing Provider: Arun Bobby MD [[id number]  Department:[facility][[id number]]    Common Order Information  Procedure -> Epidural Injection (specify level) Cmt: C6/7    Order Specific Information  Order: Procedure Request Order for Pain Management [Custom: [id number]]  Order #:          [id number]: 1 FUTURE    Priority: Routine  Class: Clinic Performed    Future Order Information      Expires on:2026            Expected by:2025                   Associated Diagnoses      M54.12 Cervical radiculopathy      M47.812 Cervical spondylosis      Physician -> Jose         Facility Name: -> [facility]           Priority: Routine  Class: Clinic Performed    Future Order Information      Expires on:2026            Expected by:2025                   Associated Diagnoses      M54.12 Cervical radiculopathy      M47.812 Cervical spondylosis      Procedure -> Epidural Injection (specify level) Cmt: C6/7        Physician -> Jose         Facility Name: -> [facility]

## 2025-05-26 ENCOUNTER — OFFICE VISIT (OUTPATIENT)
Dept: PSYCHIATRY | Facility: CLINIC | Age: 45
End: 2025-05-26
Payer: COMMERCIAL

## 2025-05-26 DIAGNOSIS — F33.40 MDD (RECURRENT MAJOR DEPRESSIVE DISORDER) IN REMISSION: Primary | ICD-10-CM

## 2025-05-26 PROCEDURE — 98005 SYNCH AUDIO-VIDEO EST LOW 20: CPT | Mod: 95,,, | Performed by: PSYCHIATRY & NEUROLOGY

## 2025-05-26 PROCEDURE — 1160F RVW MEDS BY RX/DR IN RCRD: CPT | Mod: CPTII,95,, | Performed by: PSYCHIATRY & NEUROLOGY

## 2025-05-26 PROCEDURE — 1159F MED LIST DOCD IN RCRD: CPT | Mod: CPTII,95,, | Performed by: PSYCHIATRY & NEUROLOGY

## 2025-05-26 NOTE — PROGRESS NOTES
"    Visit type: audiovisualThe patient location is: home  The chief complaint leading to consultation is: depression    Visit type: audiovisual    Face to Face time with patient: 14 min  20 minutes of total time spent on the encounter, which includes face to face time and non-face to face time preparing to see the patient (eg, review of tests), Obtaining and/or reviewing separately obtained history, Documenting clinical information in the electronic or other health record, Independently interpreting results (not separately reported) and communicating results to the patient/family/caregiver, or Care coordination (not separately reported).     Each patient to whom he or she provides medical services by telemedicine is:  (1) informed of the relationship between the physician and patient and the respective role of any other health care provider with respect to management of the patient; and (2) notified that he or she may decline to receive medical services by telemedicine and may withdraw from such care at any time.    Notes:     Outpatient Psychiatry Follow-Up Visit (MD/NP)    5/26/2025    Clinical Status of Patient:  Outpatient (Ambulatory)    Chief Complaint:  Fran Higgins is a 44 y.o. female who presents today for follow-up of depression.  Met with patient.      Interval History and Content of Current Session:  Interim Events/Subjective Report/Content of Current Session: "I'm good."  Having a sleeve alteration in July.  Working a lot.  10 hr days 4 days a week and 12 on Mondays.  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  Taking xanax once or twice a month at this point.  Sleep is much better.  Denies feeling tired during the day.  Just back from a medical mission trip to [facility].      Mood has been pretty stable.  No inappropriate sadness.  Never desires to die.  Anxiety is stable as well.      Scheduled virtual by accident.  Reports she has enough xanax to last 6 mo.  30 tabs dispensed in March.  "       Prior trials:  Benadryl - inconsistent  prozac when younger worked  Trazodone - HA in the morning  Melatonin - nightmares      Psychotherapy:  Target symptoms: depression  Why chosen therapy is appropriate versus another modality: relevant to diagnosis  Outcome monitoring methods: self-report  Therapeutic intervention type: supportive psychotherapy  Topics discussed/themes: building skills sets for symptom management, symptom recognition  The patient's response to the intervention is accepting. The patient's progress toward treatment goals is excellent.   Duration of intervention:  minutes.    ROS    Past Medical, Family and Social History: The patient's past medical, family and social history have been reviewed and updated as appropriate within the electronic medical record - see encounter notes.    Compliance: yes    Side effects: None    Risk Parameters:  Patient reports no suicidal ideation  Patient reports no homicidal ideation  Patient reports no self-injurious behavior  Patient reports no violent behavior    Exam (detailed: at least 9 elements; comprehensive: all 15 elements)   Constitutional  Vitals:  Most recent vital signs, dated less than 90 days prior to this appointment, were reviewed.   There were no vitals filed for this visit.     General:  age appropriate, neatly groomed     Musculoskeletal  Muscle Strength/Tone:  no dyskinesia, no tremor   Gait & Station:  Not observed     Psychiatric  Speech:  spontaneous, not pressured, clearly audible   Mood:   Affect:  euthymic  congruent and appropriate   Thought Process:  goal-directed, logical   Associations:  intact   Thought Content:  normal, no suicidality, no homicidality, delusions, or paranoia   Insight:  has awareness of illness   Judgement: behavior is adequate to circumstances   Orientation:  grossly intact   Memory: intact for content of interview, not tested   Language: grossly intact   Attention Span & Concentration:  able to focus   Fund  of Knowledge:  intact and appropriate to age and level of education     Assessment and Diagnosis   Status/Progress: Based on the examination today, the patient's problem(s) is/are well controlled.  New problems have not been presented today.   Lack of compliance are not complicating management of the primary condition.  There are no active rule-out diagnoses for this patient at this time.     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  H/o recurrent MDD.          Diagnosis:  Recurrent MDD, in remission  Obesity  S/p bilat ooperectomy, HANNAH    Intervention/Counseling/Treatment Plan   Continue Effexor  mg daily  Continue xanax 0.5 mg as needed for now.    Continue seeing Dr. Mahan as needed      Return to Clinic: 6 months in person

## 2025-05-27 ENCOUNTER — OFFICE VISIT (OUTPATIENT)
Dept: INTERNAL MEDICINE | Facility: CLINIC | Age: 45
End: 2025-05-27
Payer: COMMERCIAL

## 2025-05-27 DIAGNOSIS — E66.01 OBESITY, CLASS III, BMI 40-49.9 (MORBID OBESITY): Primary | ICD-10-CM

## 2025-05-27 PROCEDURE — 99499 UNLISTED E&M SERVICE: CPT | Mod: 95,,,

## 2025-05-27 RX ORDER — SEMAGLUTIDE 2.4 MG/.75ML
2.4 INJECTION, SOLUTION SUBCUTANEOUS
Qty: 3 ML | Refills: 5 | Status: ACTIVE | OUTPATIENT
Start: 2025-05-27

## 2025-05-27 NOTE — PROGRESS NOTES
Patient ID: Fran Higgins is a 44 y.o. White female    Subjective  Chief Complaint: patient presents for medical weight loss management.    Co-morbidities: none    HPI: Patient [facility] We[facility]y with [facility] in October 2024 and is currently managed on Wegovy 2.4 mg. Pt previously used Ozempic around 2020 and believes she was on it for ~2 months and lost 12 lbs during that time.    Tolerance to current therapy:  Denies nausea, vomiting, diarrhea, constipation, abdominal pain    Weight loss history:  Starting weight:    10/10/2024   Recent Readings    Weight (lbs) 311 lb    BMI 51.75 BMI    Current weight:    5/22/2025   Recent Readings    Weight (lbs) 269 lb    BMI 44.76 BMI    % weight loss since GLP-1 initiation: 13.5 %    Objective  Lab Results   Component Value Date     10/11/2024     05/24/2024     09/27/2023     Lab Results   Component Value Date    K 4.0 10/11/2024    K 4.9 05/24/2024    K 4.1 09/27/2023     Lab Results   Component Value Date     10/11/2024     05/24/2024     09/27/2023     Lab Results   Component Value Date    CO2 23 10/11/2024    CO2 27 05/24/2024    CO2 28 09/27/2023     Lab Results   Component Value Date    BUN 11 10/11/2024    BUN 7 05/24/2024    BUN 17 09/27/2023     Lab Results   Component Value Date    GLU 93 10/11/2024    GLU 96 05/24/2024    GLU 99 09/27/2023     Lab Results   Component Value Date    CALCIUM 9.3 10/11/2024    CALCIUM 9.6 05/24/2024    CALCIUM 9.6 09/27/2023     Lab Results   Component Value Date    PROT 6.8 10/11/2024    PROT 6.9 05/24/2024    PROT 7.4 09/27/2023     Lab Results   Component Value Date    ALBUMIN 3.8 10/11/2024    ALBUMIN 4.0 05/24/2024    ALBUMIN 4.3 09/27/2023     Lab Results   Component Value Date    BILITOT 0.3 10/11/2024    BILITOT 0.3 05/24/2024    BILITOT 0.2 09/27/2023     Lab Results   Component Value Date    AST 14 10/11/2024    AST 17 05/24/2024    AST 17 09/27/2023     Lab Results    Component Value Date    ALT 14 10/11/2024    ALT 20 05/24/2024    ALT 18 09/27/2023     Lab Results   Component Value Date    ANIONGAP 11 10/11/2024    ANIONGAP 8 05/24/2024    ANIONGAP 7 (L) 09/27/2023     Lab Results   Component Value Date    CREATININE 0.8 10/11/2024    CREATININE 0.9 05/24/2024    CREATININE 1.0 09/27/2023     Lab Results   Component Value Date    EGFRNORACEVR >60.0 10/11/2024    EGFRNORACEVR >60.0 05/24/2024    EGFRNORACEVR >60.0 09/27/2023     Assessment/Plan  - Continue Wegovy 2.4 mg SQ weekly  - RTC in 6 months for follow-up evaluation    Patient consented to pharmacist management via collaborative practice.

## 2025-05-29 ENCOUNTER — TELEPHONE (OUTPATIENT)
Dept: PAIN MEDICINE | Facility: CLINIC | Age: 45
End: 2025-05-29
Payer: COMMERCIAL

## 2025-06-03 ENCOUNTER — PATIENT MESSAGE (OUTPATIENT)
Dept: BARIATRICS | Facility: CLINIC | Age: 45
End: 2025-06-03
Payer: COMMERCIAL

## 2025-06-03 ENCOUNTER — TELEPHONE (OUTPATIENT)
Dept: PAIN MEDICINE | Facility: HOSPITAL | Age: 45
End: 2025-06-03
Payer: COMMERCIAL

## 2025-06-04 ENCOUNTER — PATIENT MESSAGE (OUTPATIENT)
Dept: GYNECOLOGIC ONCOLOGY | Facility: CLINIC | Age: 45
End: 2025-06-04
Payer: COMMERCIAL

## 2025-06-04 NOTE — DISCHARGE INSTRUCTIONS
[facility]  Dr. Arun Garcia  Messaging service # [phone]    POST-PROCEDURE INSTRUCTIONS:    Today you had an injection that included a steroid medications.  The steroid may or may not have been mixed with a local anesthetic when it was injected.   If the injection was in the neck, you may feel some pressure, numbness, or slight weakness in the arm after the procedure for a short period of time (this is a normal response), if this persists for longer than 1 day please contact our office or go to the emergency room.  If the injection was in the low back, you may feel some pressure, numbness, or slight weakness in the leg after the procedure for a short period of time (this is a normal response), if this persists for longer than 1 day please contact our office or go to the emergency room.  You may get side effects from the steroid.  This is not uncommon.  Symptoms include: elevated blood sugar, elevated blood pressure, headache, flushing, nausea, insomnia.  These symptoms are transient and will resolve within 1-3 days.  If symptoms last longer than this please contact our office or head to the emergency room.  Steroid medications can take anywhere from 3-14 days to take effect (rarely longer).  You may notice that your pain worsens for a short period of time after the injection, this would not be unusual due to the pressure and trauma from the needle.    If you do not have a follow up appointment scheduled, please contact my office (or the office of the physician who referred you for the procedure) to get a post-procedure follow up scheduled 2-4 weeks after the procedure.  This can be done as a virtual visit if that is more convenient for you.      What you need to do:    Keep a record of your response to the injection you had today.    How much relief did you get?   When did the relief start and how long did it last?  Were you able to decrease the use of any of your pain  medications?  Were you able to increase your level of activity?  How long did the relief last?    What to watch out for:    If you experience any of the following symptoms after your procedure, please notify the messaging service immediately (see above for contact information):   fever (increased oral temperature)   bleeding or swelling at the injection site,    drainage, rash or redness at the injection site    possible signs of infection    increased pain at the injection site   worsening of your usual pain   severe headache   new or worsening numbness    new arm and/or leg weakness, or    changes in bowel and/or bladder function: urinating or defecating on yourself and not knowing that you did it.    PLEASE FOLLOW ALL INSTRUCTIONS CAREFULLY     Do not engage in strenuous activity (e.g., lifting or pushing heavy objects or repeated bending) for 24 hours.     Do not take a bath, swim or use Jacuzzi for 24 hours after procedure. (A shower is fine).   Remove any Band-Aids when you get home.    Use cold/ice, as needed for comfort.  We recommend the use of cold therapy alternating on for 20 minutes, off for 20 minutes.    Do not apply direct heat (heating pad or heat packs) to the injection site for 24 hours.     Resume your usual medications, unless instructed otherwise by your Pain Physician.     If you are on warfarin (Coumadin) or other blood thinner, resume this medication as instructed by your prescribing Physician.    IF AT ANY POINT YOU ARE VERY CONCERNED ABOUT YOUR SYMPTOMS, or you have an urgent or emergent issue after between 5pm and 7am or on weekends, please contact  the on call provider at [phone].    If you develop worsening pain, weakness, numbness, lose bowel or bladder control (i.e., having an accident where you did not even know you had to go to the bathroom and suddenly noticed you soiled yourself), saddle anesthesia (a loss of sensation restricted to the area of the buttocks, anus and between  the legs -- i.e., those parts of your body that would touch a saddle if you were sitting on one) you need to go immediately to the emergency department for evaluation and treatment.    ----------------------------------------------------------------------------------------------------------------------------------------------------------------  If you received Sedation please read the following instructions:  POST SEDATION INSTRUCTIONS    Today you received intravenous medication (also known as sedation) that was used to help you relax and/or decrease discomfort during your procedure. This medication will be acting in your body for the next 24 hours, so you might feel a little tired or sleepy. This feeling will slowly wear off.   Common side effects associated with these medications include: drowsiness, dizziness, sleepiness, confusion, feeling excited, difficulty remembering things, lack of steadiness with walking or balance, loss of fine muscle control, slowed reflexes, difficulty focusing, and blurred vision.  Some over-the-counter and prescription medications (e.g., muscle relaxants, opioids, mood-altering medications, sedatives/hypnotics, antihistamines) can interact with the intravenous medication you received and cause an increased risk of the side effects listed above in addition to other potentially life threatening side effects. Use extreme caution if you are taking such medications, and consult with your Pain Physician or prescribing physician if you have any questions.  For the next 12-24 hours:    DO NOT--Drive a car, operate machinery or power tools   DO NOT--Drink any alcoholic beverages (not even beer), they may dangerously increase the risk of side effects.    DO NOT--Make any important legal or business decisions or sign important documents.  We advise you to have someone to assist you at home. Move slowly and carefully. Do not make sudden changes in position. Be aware of dizziness or  light-headedness and move accordingly.   If you seek medical treatment within 24 hours, let the nurse or doctor caring for you know that you have received the above medications. If you have any questions or concerns related to your sedation or treatment today please contact us.

## 2025-06-05 ENCOUNTER — HOSPITAL ENCOUNTER (OUTPATIENT)
Facility: HOSPITAL | Age: 45
Discharge: HOME | End: 2025-06-05
Attending: EMERGENCY MEDICINE | Admitting: EMERGENCY MEDICINE
Payer: COMMERCIAL

## 2025-06-05 VITALS
DIASTOLIC BLOOD PRESSURE: 69 MMHG | TEMPERATURE: 98 F | RESPIRATION RATE: 16 BRPM | SYSTOLIC BLOOD PRESSURE: 118 MMHG | OXYGEN SATURATION: 100 % | HEART RATE: 82 BPM

## 2025-06-05 DIAGNOSIS — G89.29 CHRONIC PAIN: ICD-10-CM

## 2025-06-05 PROCEDURE — 25500020 PHARM REV CODE 255: Performed by: EMERGENCY MEDICINE

## 2025-06-05 PROCEDURE — 99152 MOD SED SAME PHYS/QHP 5/>YRS: CPT | Performed by: EMERGENCY MEDICINE

## 2025-06-05 PROCEDURE — 62321 NJX INTERLAMINAR CRV/THRC: CPT | Performed by: EMERGENCY MEDICINE

## 2025-06-05 PROCEDURE — 62321 NJX INTERLAMINAR CRV/THRC: CPT | Mod: ,,, | Performed by: EMERGENCY MEDICINE

## 2025-06-05 PROCEDURE — 63600175 PHARM REV CODE 636 W HCPCS: Performed by: EMERGENCY MEDICINE

## 2025-06-05 RX ORDER — MIDAZOLAM HYDROCHLORIDE 1 MG/ML
INJECTION INTRAMUSCULAR; INTRAVENOUS
Status: DISCONTINUED | OUTPATIENT
Start: 2025-06-05 | End: 2025-06-05 | Stop reason: HOSPADM

## 2025-06-05 RX ORDER — FENTANYL CITRATE 50 UG/ML
INJECTION, SOLUTION INTRAMUSCULAR; INTRAVENOUS
Status: DISCONTINUED | OUTPATIENT
Start: 2025-06-05 | End: 2025-06-05 | Stop reason: HOSPADM

## 2025-06-05 RX ORDER — LIDOCAINE HYDROCHLORIDE 20 MG/ML
INJECTION, SOLUTION EPIDURAL; INFILTRATION; INTRACAUDAL; PERINEURAL
Status: DISCONTINUED | OUTPATIENT
Start: 2025-06-05 | End: 2025-06-05 | Stop reason: HOSPADM

## 2025-06-05 RX ORDER — DEXAMETHASONE SODIUM PHOSPHATE 10 MG/ML
INJECTION, SOLUTION INTRA-ARTICULAR; INTRALESIONAL; INTRAMUSCULAR; INTRAVENOUS; SOFT TISSUE
Status: DISCONTINUED | OUTPATIENT
Start: 2025-06-05 | End: 2025-06-05 | Stop reason: HOSPADM

## 2025-06-05 NOTE — OP NOTE
Cervical Interlaminar Epidural Steroid Injection under Fluoroscopic Guidance    The procedure, risks, benefits, and options were discussed with the patient. There are no contraindications to the procedure. The patent expressed understanding and agreed to the procedure. Informed written consent was obtained prior to the start of the procedure and can be found in the patient's chart.     PATIENT NAME: Fran Higgins   MRN: [id number]     DATE OF PROCEDURE: 06/05/2025    PROCEDURE: Cervical Interlaminar Epidural Steroid Injection C7/T1 under Fluoroscopic Guidance    PRE-OP DIAGNOSIS: Cervical radiculopathy [M54.12] Cervical radiculopathy [M54.12]    POST-OP DIAGNOSIS: Same    PHYSICIAN: Arun Garcia MD     MEDICATIONS INJECTED: Preservative-free Decadron 10mg with 3cc of preservative free normal saline    LOCAL ANESTHETIC INJECTED: Xylocaine 2%     SEDATION: Versed 0.5mg and Fentanyl 25mcg                                                                                                                                                                                     Conscious sedation ordered by M.D. Patient re-evaluation prior to administration of conscious sedation. No changes noted in patient's status from initial evaluation. The patient's vital signs were monitored by RN and patient remained hemodynamically stable throughout the procedure.    Event Time In   Sedation Start 1424       ESTIMATED BLOOD LOSS: None    COMPLICATIONS: None    TECHNIQUE: Time-out was performed to identify the patient and procedure to be performed. With the patient laying in a prone position, the surgical area was prepped and draped in the usual sterile fashion using ChloraPrep and a fenestrated drape. The level was determined under fluoroscopy guidance. Skin anesthesia was achieved by injecting Lidocaine 2% over the injection site.  The interlaminar space was then approached with a 20 gauge, 3.5 inch Tuohy needle that was introduced  under fluoroscopic guidance with AP, lateral and/or contralateral oblique imaging. Once the Ligamentum flavum was encountered loss of resistance to saline was used to enter the epidural space. With positive loss of resistance and negative aspiration for CSF or Blood, contrast dye  Omnipaque (300mg/mL) was injected to confirm placement and there was no vascular runoff. Then 3 mL of the medication mixture listed above was then injected slowly. Displacement of the radio opaque contrast after injection of the medication confirmed that the medication went into the area of the epidural space. The needles were removed, and bleeding was nil. A sterile dressing was applied. No specimens collected. The patient tolerated the procedure well.     The patient was monitored after the procedure in the recovery area. They were given post-procedure and discharge instructions to follow at home. The patient was discharged in a stable condition.    Arun Garcia MD

## 2025-06-05 NOTE — DISCHARGE SUMMARY
Discharge Note  Short Stay      SUMMARY     Admit Date: 6/5/2025    Attending Physician: Arun Garcia      Discharge Physician: Arun Garcia      Discharge Date: 6/5/2025 2:47 PM    Procedure(s) (LRB):  C6/7 ANGEL (N/A)    Final Diagnosis: Cervical radiculopathy [M54.12]    Disposition: Home or self care    Patient Instructions:   Current Discharge Medication List        CONTINUE these medications which have NOT CHANGED    Details   ALPRAZolam (XANAX) 0.5 MG tablet Take 1 tablet (0.5 mg total) by mouth nightly as needed for Insomnia or Anxiety.  Qty: 30 tablet, Refills: 2    Associated Diagnoses: Other insomnia      b complex vitamins capsule Take 1 capsule by mouth once daily.      CALCIUM CITRATE ORAL Take 1 tablet by mouth 3 (three) times daily. chewable      estradioL (ESTRACE) 2 MG tablet Take 1 tablet (2 mg total) by mouth once daily.  Qty: 30 tablet, Refills: 2    Comments: Needs to schedule appointment for additional refills  Associated Diagnoses: Menopausal symptoms      !! gabapentin (NEURONTIN) 100 MG capsule Take 1 capsule (100 mg total) by mouth 3 (three) times daily.  Qty: 90 capsule, Refills: 2      !! gabapentin (NEURONTIN) 100 MG capsule Take 2 capsules (200 mg total) by mouth every evening.  Qty: 60 capsule, Refills: 2      metFORMIN (GLUCOPHAGE-XR) 500 MG ER 24hr tablet Take 2 tablets by mouth daily.  Qty: 180 tablet, Refills: 1    Associated Diagnoses: Hidradenitis suppurativa      multivitamin capsule Take by mouth once daily.      omeprazole (PRILOSEC) 40 MG capsule Take 1 capsule (40 mg total) by mouth every morning.  Qty: 30 capsule, Refills: 1    Associated Diagnoses: Gastroesophageal reflux disease, unspecified whether esophagitis present      spironolactone (ALDACTONE) 100 MG tablet Take 1 tablet by mouth every day  Qty: 90 tablet, Refills: 1    Associated Diagnoses: Hidradenitis suppurativa      thiamine (VITAMIN B-1) 50 MG tablet Take 50 mg by mouth once daily.      !! ubrogepant  (UBRELVY) 100 mg tablet Take 1 tablet by mouth at the onset of a headache. May repeat based on response and tolerability after more than 2 hours if needed. Do not take more than 200mg in a 24 hour span.  Qty: 16 tablet, Refills: 5    Associated Diagnoses: Migraine with aura and without status migrainosus, not intractable      !! ubrogepant (UBRELVY) 100 mg tablet Take 1 tablet by mouth at the onset of a headache. May repeat based on response and tolerability after more than 2 hours if needed. Do not take more than 200mg in a 24 hour span.  Qty: 16 tablet, Refills: 5    Associated Diagnoses: Migraine with aura and without status migrainosus, not intractable      venlafaxine (EFFEXOR-XR) 150 MG Cp24 Take 1 capsule (150 mg total) by mouth once daily.  Qty: 90 capsule, Refills: 3      adalimumab-adaz 40 mg/0.4 mL PnIj Inject 0.4 mLs (40 mg total) into the skin every 7 days.  Qty: 4 pen , Refills: 4    Associated Diagnoses: Hidradenitis      clindamycin (CLEOCIN T) 1 % lotion Apply to affected area two times a day.  Qty: 120 mL, Refills: 0    Associated Diagnoses: Hidradenitis suppurativa      cyclobenzaprine (FLEXERIL) 5 MG tablet Take 1 tablet (5 mg total) by mouth 2 (two) times daily as needed for Muscle spasms.  Qty: 60 tablet, Refills: 5      ondansetron (ZOFRAN-ODT) 4 MG TbDL Dissolve 1 tablet (4 mg total) by mouth every 6 (six) hours as needed (for nausea).  Qty: 20 tablet, Refills: 5    Associated Diagnoses: Nausea      semaglutide, weight loss, (WEGOVY) 2.4 mg/0.75 mL PnIj Inject 2.4 mg into the skin every 7 days.  Qty: 3 mL, Refills: 5    Associated Diagnoses: Obesity, Class III, BMI 40-49.9 (morbid obesity)       !! - Potential duplicate medications found. Please discuss with provider.              Discharge Diagnosis: Cervical radiculopathy [M54.12]  Condition on Discharge: Stable with no complications to procedure   Diet on Discharge: Same as before.  Activity: as per instruction sheet.  Discharge to: Home  with a responsible adult.  Follow up: 2-4 weeks       Please call my office or pager at [phone] if experienced any weakness or loss of sensation, fever > 101.5, pain uncontrolled with oral medications, persistent nausea/vomiting/or diarrhea, redness or drainage from the incisions, or any other worrisome concerns. If physician on call was not reached or could not communicate with our office for any reason please go to the nearest emergency department

## 2025-06-05 NOTE — H&P
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 betty  1/28-pt r/s,BMI 48.41, last dose of prilosec 1/19, last dose of wegovy 1/25, no allergy or sensitivity to jewelry/metal per pt, updated instructions sent to [facility]  1/29/25: attempted precall /    HYSTERECTOMY  03/2020    INJECTION, SPINE, LUMBOSACRAL, TRANSFORAMINAL APPROACH Right 10/24/2024    Procedure: TFESI RT L4/5, L5/S1;  Surgeon: Arun Garcia MD;  Location: [facility] PAIN MANAGEMENT;  Service: Pain Management;  Laterality: Right;  No ac    LAPAROSCOPIC SLEEVE GASTRECTOMY N/A 6/22/2021    Procedure: GASTRECTOMY, SLEEVE, LAPAROSCOPIC; with intraoperative egd;  Surgeon: Jaziel Whitman Jr., MD;  Location: [facility] OR [address] [place] [address];  Service: General;  Laterality: N/A;    PH MONITORING, ESOPHAGUS, WIRELESS, (OFF REFLUX MEDS) N/A 2/3/2025    Procedure: PH MONITORING, ESOPHAGUS, WIRELESS, (OFF REFLUX MEDS);  Surgeon: Robby  MD Rajendra;  Location: [facility] ENDO (4TH FLR);  Service: Endoscopy;  Laterality: N/A;  48 Hour  Off PPI/H2 Blocker    ROBOT-ASSISTED LAPAROSCOPIC ABDOMINAL HYSTERECTOMY USING DA ROMAIN XI N/A 3/9/2020    Procedure: XI ROBOTIC HYSTERECTOMY;  Surgeon: Jeff Kaufman MD;  Location: [facility] 2ND FLR;  Service: Oncology;  Laterality: N/A;    ROBOT-ASSISTED LAPAROSCOPIC SALPINGO-OOPHORECTOMY Left 6/25/2019    Procedure: ROBOTIC SALPINGO-OOPHORECTOMY;  Surgeon: Marky Fox Jr., MD;  Location: [facility];  Service: OB/GYN;  Laterality: Left;    SALPINGOOPHORECTOMY Right 3/9/2020    Procedure: SALPINGO-OOPHORECTOMY  USO;  Surgeon: Jeff Kaufman MD;  Location: [facility] 2ND FLR;  Service: Oncology;  Laterality: Right;    TONSILLECTOMY       Review of patient's allergies indicates:  No Known Allergies   No current facility-administered medications for this encounter.       PMHx, PSHx, Allergies, Medications reviewed in epic    ROS negative except pain complaints in HPI    OBJECTIVE:    /77 (BP Location: Right arm, Patient Position: Lying)   Pulse 87   Temp 97.6 °F (36.4 °C) (Temporal)   Resp 17   LMP 01/17/2020   SpO2 100%   Breastfeeding No     PHYSICAL EXAMINATION:    GENERAL: Well appearing, in no acute distress, alert and oriented x3.  PSYCH:  Mood and affect appropriate.  SKIN: Skin color, texture, turgor normal, no rashes or lesions which will impact the procedure.  CV: RRR with palpation of the radial artery.  PULM: No evidence of respiratory difficulty, symmetric chest rise. Clear to auscultation.  NEURO: Cranial nerves grossly intact.    Plan:    Proceed with procedure as planned Procedure(s) (LRB):  C6/7 ANGEL (N/A)    Arun Garcia  06/05/2025

## 2025-06-05 NOTE — PLAN OF CARE
Pt in preop bay 24, VSS, and IV inserted. Pt denies any open wounds on body or the use of any weight loss injections. Pt ready to roll.    Procedural consents verified with pt.

## 2025-06-06 ENCOUNTER — TELEPHONE (OUTPATIENT)
Dept: PAIN MEDICINE | Facility: CLINIC | Age: 45
End: 2025-06-06
Payer: COMMERCIAL

## 2025-06-17 DIAGNOSIS — K21.9 GASTROESOPHAGEAL REFLUX DISEASE, UNSPECIFIED WHETHER ESOPHAGITIS PRESENT: ICD-10-CM

## 2025-06-17 RX ORDER — OMEPRAZOLE 40 MG/1
40 CAPSULE, DELAYED RELEASE ORAL EVERY MORNING
Qty: 30 CAPSULE | Refills: 1 | Status: SHIPPED | OUTPATIENT
Start: 2025-06-17

## 2025-06-26 ENCOUNTER — PROCEDURE VISIT (OUTPATIENT)
Dept: NEUROLOGY | Facility: CLINIC | Age: 45
End: 2025-06-26
Payer: COMMERCIAL

## 2025-06-26 VITALS
SYSTOLIC BLOOD PRESSURE: 144 MMHG | BODY MASS INDEX: 47.04 KG/M2 | WEIGHT: 274.06 LBS | HEART RATE: 109 BPM | DIASTOLIC BLOOD PRESSURE: 85 MMHG

## 2025-06-26 DIAGNOSIS — G43.E09 CHRONIC MIGRAINE WITH AURA WITHOUT STATUS MIGRAINOSUS, NOT INTRACTABLE: Primary | ICD-10-CM

## 2025-06-26 NOTE — PROCEDURES
Established Patient  Procedure Note   SUBJECTIVE:  Patient ID: Fran Higgins  Chief Complaint: Headache      History of Present Illness:  Fran Higgins is a 44 y.o. female with  with migraine, cervical scoliosis, obesity s/p sleeve gastrectomy, anxiety/depression, surgical menopause s/p hysterectomy/RSO for ovary cyst, insomnia  who presents to clinic alone for follow-up of headaches and Botox injections.       06/26/2025- Interval History: botox  Ha's continue to improve, will upload HA diary later to [facility]. Pt happy w/ current regimen.     04/03/2025- Interval History: botox  Callahan's have remained well controlled on botox. Per HA diary she has had 2-3/30 ha days per month lasting 1-31 hrs, severity 1-10/10. Ha's abort effectively w/ ubrelvy 100mg. Triggered by weather changes primarily since last visit.  Plan: continue botox, ubrelvy 100mg prn, zofran prn, sleep strategies, rtc 12 wks for next botox    01/09/2025- Interval History: botox  Callahan's have remained well controlled on botox. Per HA diary she has had 2/30 ha days per month lasting 3-11 hrs, severity 2-10/10. Ha's abort effectively w/ ubrelvy 100mg.   Plan: continue botox, ubrelvy 100mg prn, zofran prn, sleep strategies, rtc 12 wks for next botox    10/17/2024- Interval History: botox  Patient has had >50% reduction in Ha's since beginning botox. Prior to botox pt's Ha's were occurring >15/30 days per month. Since beginning botox, they are occurring 1-2/30 days per month. As pt has experienced an improvement in Ha's, with sustained reduction, will continue botox as is clinically indicated.   Plan: continue botox, continue ubrelvy 100mg prn, zofran prn, sleep strategies, rtc 12 wks for next botox    07/18/2024- Interval History: botox   Per HA diary, pt has had 1-2/30 ha days per month. She has been treating w/ ubrelvy 100mg at onset and finds this more effective. She is happy w/ current regimen as ha's are well controlled despite large amt  "of stress recently due to partners passing. Has a good support system and therapist. Will continue current regimen.   Plan: continue botox, continue ubrelvy 100mg prn, zofran prn, sleep strategies, rtc 12 wks for next botox    04/25/2024- Interval History: botox  Per pt 's HA diary, ha's have remained well controlled weaning off emgality. Experienced 5 HA days since last visit. Callahan's can last 3 - 7 hrs. Discussed using ubrelvy up to 100mg and at onset.   Plan: continue botox, d/c emgality, continue ubrelvy 50-100mg 1st line, zofran prn, sleep strategies, rtc 12 wks for next botox    02/08/2024- Interval History: botox  Pt's ha's remain well controlled. Reviewed HA diary. Occurring 1-2/30 days per month. Duration 2-14 hrs. Severity 4-10/10. Pt takes ubrelvy 50-100mg 1st line and tylenol 2nd line. Finds current regimen to be helpful. Is amenable to weanign off emgality by next visit.   Prior to initiation of botox the patient was experiencing >15 days of headache lasting 4 or more hours and has had sustained reduction.   Plan: continue botox, wean off emgality, continue ubrelvy 50-100mg 1st line, zofran prn, sleep strategies, rtc 12 wks for next botox    11/14/2023-  botox    09/29/2023 - Interval History:  Patient has had >50% reduction in Ha's since beginning botox. Prior to botox pt's Ha's were occurring >15/30 days per month. Since beginning botox, she experiences 5 HA days in 3 months. Most HA's are now mild in severity. Had 1 severe HA in this span that lasted 2 days but was eventually aborted w/ ubrelvy. HA's last 2 hrs - 2 days. As pt has experienced an improvement in Ha's with sustained reduction will continue botox as is clinically indicated.   Plan: continue botox, emgality, ubrelvy 50-100mg prn, zofran, continue sleep strategies, rtc for next botox    08/17/2023- Interval History: botox #3  Pt feels botox has been helpful. Is not trackign but states "I noticed a change" after the last round. She states she " experiences approximately 20/30 ha days per month, hwoexiao isn't tracking. Will track for next visit.   Plan: continue botox, emgality, ubrelvy 50mg prn, zofran, rtc in 2 mo for f/up and 12 wks for next botox    05/25/2023 - botox #2    02/23/2023 - botox #1    12/12/2022 - Interval History:  Pt's ha's have worsened since last visit. They are now occurring >15/30 days per month for >3 mo, lasting > 4 hrs at a time. She tried tpx but had to d/c 2/2 depression SE. She also tried the addition of supplements including mag w/ no benefit noted. She continues to have trouble falling and staying asleep for which she recently started acupuncture for. Defers referral to sleep med, elavil, pcp at this time. Pt is interested in botox and would be an ideal candidate as she has tried and failed multiple ppx options.   Plan: start botox next visit, continue emgality, ubrelvy 50mg prn, zofran, continue sleep strategies, rtc jan 12 to begin botox     09/19/2022 - Interval History:   Ha's have increased 1-2 months ago possibly due to worsened insomnia and stress lately. Are now occurring 8/30 days per month lasting up to 1 day at a time. Currently taking effexor and xanax for these conditions.   Ubrelvy 50mg - typically resolves HA's, infrequently needs to repeat dose  Discussed multiple options, pt agreeable w/ following. Denies current depression.   Plan: start tpx 25mg/d, continue emgality, ubrelvy 50mg prn, zofran, rtc in 3 mo or sooner if needed     Recommendations made at last Office Visit on 12/6/21:  - Discussed symptoms appear to be consistent with migraines. Discussed this with patient along with treatment options and patient agreed with the following plan  - ppx - continue emgality  - abortive - trial ubrelvy  - nausea - continue zofran  - avoid nsaids as they are c/i 2/2 gastrectomy  - avoid triptans as recommended by psychiatry 2/2 potential serotinin syndrome w/ effexor for depression  - decreased neck ROM, tightness,  and cervical scoliosis - continue conservative treatments, consider PT in future  - risks, benefits, and potential side effects of emgality, ubrelvy, zofran discussed   - alternative treatment options offered   - importance of healthy diet, regular exercise and sleep hygiene in the treatment of headaches    - Start tracking headaches via Migraine Buddy roseann on phone   - RTC 6-12 mo or sooner if needed      Treatments Tried:  emgality - helps  Effexor  Tpx - depression  Anti-htn meds - avoid 2/2 low bp  Bb - avoid 2/2 depression  Ubrelvy - helps  triptans - has been told to avoid per psychiatrist 2/2 serotonin syndrome risk  nsaids - c/i 2/2 recent gastrectomy  zofran       Current Medications:    Current Outpatient Medications:     adalimumab-adaz 40 mg/0.4 mL PnIj, Inject 0.4 mLs (40 mg total) into the skin every 7 days., Disp: 4 pen , Rfl: 4    ALPRAZolam (XANAX) 0.5 MG tablet, Take 1 tablet (0.5 mg total) by mouth nightly as needed for Insomnia or Anxiety., Disp: 30 tablet, Rfl: 2    b complex vitamins capsule, Take 1 capsule by mouth once daily., Disp: , Rfl:     CALCIUM CITRATE ORAL, Take 1 tablet by mouth 3 (three) times daily. chewable, Disp: , Rfl:     clindamycin (CLEOCIN T) 1 % lotion, Apply to affected area two times a day., Disp: 120 mL, Rfl: 0    cyclobenzaprine (FLEXERIL) 5 MG tablet, Take 1 tablet (5 mg total) by mouth 2 (two) times daily as needed for Muscle spasms., Disp: 60 tablet, Rfl: 5    estradioL (ESTRACE) 2 MG tablet, Take 1 tablet (2 mg total) by mouth once daily., Disp: 30 tablet, Rfl: 2    gabapentin (NEURONTIN) 100 MG capsule, Take 1 capsule (100 mg total) by mouth 3 (three) times daily., Disp: 90 capsule, Rfl: 2    gabapentin (NEURONTIN) 100 MG capsule, Take 2 capsules (200 mg total) by mouth every evening., Disp: 60 capsule, Rfl: 2    metFORMIN (GLUCOPHAGE-XR) 500 MG ER 24hr tablet, Take 2 tablets by mouth daily., Disp: 180 tablet, Rfl: 1    multivitamin capsule, Take by mouth once  daily., Disp: , Rfl:     omeprazole (PRILOSEC) 40 MG capsule, Take 1 capsule (40 mg total) by mouth every morning., Disp: 30 capsule, Rfl: 1    ondansetron (ZOFRAN-ODT) 4 MG TbDL, Dissolve 1 tablet (4 mg total) by mouth every 6 (six) hours as needed (for nausea)., Disp: 20 tablet, Rfl: 5    semaglutide, weight loss, (WEGOVY) 2.4 mg/0.75 mL PnIj, Inject 2.4 mg into the skin every 7 days., Disp: 3 mL, Rfl: 5    spironolactone (ALDACTONE) 100 MG tablet, Take 1 tablet by mouth every day, Disp: 90 tablet, Rfl: 1    thiamine (VITAMIN B-1) 50 MG tablet, Take 50 mg by mouth once daily., Disp: , Rfl:     ubrogepant (UBRELVY) 100 mg tablet, Take 1 tablet by mouth at the onset of a headache. May repeat based on response and tolerability after more than 2 hours if needed. Do not take more than 200mg in a 24 hour span., Disp: 16 tablet, Rfl: 5    ubrogepant (UBRELVY) 100 mg tablet, Take 1 tablet by mouth at the onset of a headache. May repeat based on response and tolerability after more than 2 hours if needed. Do not take more than 200mg in a 24 hour span., Disp: 16 tablet, Rfl: 5    venlafaxine (EFFEXOR-XR) 150 MG Cp24, Take 1 capsule (150 mg total) by mouth once daily., Disp: 90 capsule, Rfl: 3  No current facility-administered medications for this visit.    Review of Systems - as per HPI, otherwise a balanced 10 systems review is negative.    OBJECTIVE:  Vitals:  BP (!) 144/85 (BP Location: Left forearm, Patient Position: Sitting)   Pulse 109   Wt 124.3 kg (274 lb 0.5 oz)   LMP 01/17/2020   BMI 47.04 kg/m²     Physical Exam:  Constitutional: she appears well-developed and well-nourished. she is well groomed. NAD   HENT:    Head: Normocephalic and atraumatic  Eyes: Conjunctivae and EOM are normal  Musculoskeletal: Normal range of motion. No joint stiffness.   Skin: Skin is warm and dry.  Psychiatric: Mood and affect are normal    Neuro: Patient is alert and oriented to person, place, and time. Language is intact and  fluent.  Recent and remote memory are intact.  Normal attention and concentration.  Facial movement is symmetric.  Gait is normal.     Review of Data:   Notes from neuro reviewed.   Labs:  Lab Visit on 05/14/2025   Component Date Value Ref Range Status    Carcinoembryonic Antigen 05/14/2025 2.2  <=5.0 ng/mL Final     Imaging:  No results found. However, due to the size of the patient record, not all encounters were searched. Please check Results Review for a complete set of results.    Note: I have independently reviewed any/all imaging/labs/tests and agree with the report (s) as documented.  Any discrepancies will be as noted/demarcated by free text.  CHANTELLE CHING 6/26/2025    ASSESSMENT:  1. Chronic migraine with aura without status migrainosus, not intractable          PLAN:  - Discussed symptoms appear to be consistent with migraines. Discussed this with patient along with treatment options and patient agreed with the following plan  - ppx - continue  botox  - Botox administered in clinic for Chronic Migraine (see below)   - abortive - continue ubrelvy  - nausea - continue zofran  - trouble w/ sleep - recently started acupuncture, defers referrals to pcp or sleep med or to begin elavil due to concern w/ interaction w/ effexor  - avoid nsaids as they are c/i 2/2 gastrectomy  - avoid triptans as recommended by psychiatry 2/2 potential serotinin syndrome w/ effexor for depression  - decreased neck ROM, tightness, and cervical scoliosis - continue conservative treatments, consider PT in future  - increased stress recently 2/2 partner's passing, continue therapy and mgmt per pcp  - RTC in 12 weeks for repeat Botox injections or sooner if needed     Orders Placed This Encounter    onabotulinumtoxina injection 200 Units           All questions and concerns addressed.  Patient verbalizes understanding and is agreeable with the above stated treatment plan.      PROCEDURE NOTE:  BOTOX was performed as an indicated therapy  for intractable chronic migraine headaches given that the patient failed more than 2 headache medications    Two patient identifiers were confirmed with the patient prior to performing this procedure. A time out to determine correct patient and and agreement on procedure performed was conducted prior to the procedure.      Botulinum Toxin Injection Procedure   Procedure: Botulinum toxin injection (88142)  After risks and benefits were explained including bleeding, infection, worsening of pain, damage to the areas being injected, weakness of muscles, loss of muscle control, dysphagia if injecting the head or neck, facial droop if injecting the facial area, painful injection, allergic or other reaction to the medications being injected, and the failure of the procedure to help the problem, a signed consent was obtained.   The patient was placed in a comfortable area and the sites to be treated were identified.The area to be treated was prepped three times with alcohol and the alcohol allowed to dry. Next, a 30 gauge needle was used to inject the medication in the area to be treated.      Total Botox used: 155 Units   Botox wastage: 45 Units     Injection sites:    muscle bilaterally ( a total of 10 units divided into 2 sites)   Procerus muscle (5 units)   Frontalis muscle bilaterally (a total of 20 units divided into 4 sites)   Temporalis muscle bilaterally (a total of 40 units divided into 8 sites)   Occipitalis muscle bilaterally (a total of 30 units divided into 6 sites)   Cervical paraspinal muscles (a total of 20 units divided into 4 sites)   Trapezius muscle bilaterally (a total of 30 units divided into 6 sites)   Complications: none   RTC for the next Botox injection: 12 weeks     The patient tolerated the procedure well and did not experience any complications.     CC: Erica Pineda MD Sarena Patel, PA-C  [facility]   [phone]    Dr. Martino was available during  today's encounter.

## 2025-06-28 ENCOUNTER — PATIENT MESSAGE (OUTPATIENT)
Dept: NEUROLOGY | Facility: CLINIC | Age: 45
End: 2025-06-28
Payer: COMMERCIAL

## 2025-07-01 RX ORDER — VENLAFAXINE HYDROCHLORIDE 150 MG/1
150 CAPSULE, EXTENDED RELEASE ORAL DAILY
Qty: 90 CAPSULE | Refills: 3 | Status: SHIPPED | OUTPATIENT
Start: 2025-07-01

## 2025-07-01 NOTE — TELEPHONE ENCOUNTER
No care due was identified.  [facility] Embedded Care Due Messages. Reference number: [id number].   7/01/2025 10:31:49 AM CDT

## 2025-07-02 DIAGNOSIS — L73.2 HIDRADENITIS SUPPURATIVA: ICD-10-CM

## 2025-07-03 RX ORDER — CLINDAMYCIN PHOSPHATE 10 UG/ML
LOTION TOPICAL
Qty: 120 ML | Refills: 0 | Status: SHIPPED | OUTPATIENT
Start: 2025-07-03

## 2025-07-07 ENCOUNTER — OFFICE VISIT (OUTPATIENT)
Dept: BARIATRICS | Facility: CLINIC | Age: 45
End: 2025-07-07
Payer: COMMERCIAL

## 2025-07-07 ENCOUNTER — OFFICE VISIT (OUTPATIENT)
Dept: PAIN MEDICINE | Facility: CLINIC | Age: 45
End: 2025-07-07
Payer: COMMERCIAL

## 2025-07-07 ENCOUNTER — TELEPHONE (OUTPATIENT)
Dept: BARIATRICS | Facility: CLINIC | Age: 45
End: 2025-07-07
Payer: COMMERCIAL

## 2025-07-07 ENCOUNTER — PATIENT MESSAGE (OUTPATIENT)
Dept: BARIATRICS | Facility: CLINIC | Age: 45
End: 2025-07-07

## 2025-07-07 ENCOUNTER — LAB VISIT (OUTPATIENT)
Dept: LAB | Facility: HOSPITAL | Age: 45
End: 2025-07-07
Payer: COMMERCIAL

## 2025-07-07 VITALS
HEART RATE: 88 BPM | WEIGHT: 265.88 LBS | SYSTOLIC BLOOD PRESSURE: 120 MMHG | OXYGEN SATURATION: 96 % | HEIGHT: 64 IN | DIASTOLIC BLOOD PRESSURE: 78 MMHG | BODY MASS INDEX: 45.39 KG/M2

## 2025-07-07 DIAGNOSIS — M54.12 CERVICAL RADICULOPATHY: Primary | ICD-10-CM

## 2025-07-07 DIAGNOSIS — E66.01 CLASS 3 SEVERE OBESITY DUE TO EXCESS CALORIES WITH SERIOUS COMORBIDITY AND BODY MASS INDEX (BMI) OF 45.0 TO 49.9 IN ADULT: Primary | ICD-10-CM

## 2025-07-07 DIAGNOSIS — K21.9 GASTROESOPHAGEAL REFLUX DISEASE, UNSPECIFIED WHETHER ESOPHAGITIS PRESENT: ICD-10-CM

## 2025-07-07 DIAGNOSIS — Z98.84 S/P BARIATRIC SURGERY: ICD-10-CM

## 2025-07-07 DIAGNOSIS — K21.9 GASTROESOPHAGEAL REFLUX DISEASE WITHOUT ESOPHAGITIS: ICD-10-CM

## 2025-07-07 DIAGNOSIS — E66.813 CLASS 3 SEVERE OBESITY DUE TO EXCESS CALORIES WITH SERIOUS COMORBIDITY AND BODY MASS INDEX (BMI) OF 45.0 TO 49.9 IN ADULT: Primary | ICD-10-CM

## 2025-07-07 DIAGNOSIS — E66.01 MORBID OBESITY WITH BMI OF 45.0-49.9, ADULT: ICD-10-CM

## 2025-07-07 LAB
25(OH)D3+25(OH)D2 SERPL-MCNC: 36 NG/ML (ref 30–96)
ABSOLUTE EOSINOPHIL (OHS): 0.04 K/UL
ABSOLUTE MONOCYTE (OHS): 0.4 K/UL (ref 0.3–1)
ABSOLUTE NEUTROPHIL COUNT (OHS): 3.5 K/UL (ref 1.8–7.7)
ALBUMIN SERPL BCP-MCNC: 4.2 G/DL (ref 3.5–5.2)
ALP SERPL-CCNC: 62 UNIT/L (ref 40–150)
ALT SERPL W/O P-5'-P-CCNC: 12 UNIT/L (ref 10–44)
ANION GAP (OHS): 6 MMOL/L (ref 8–16)
AST SERPL-CCNC: 13 UNIT/L (ref 11–45)
BASOPHILS # BLD AUTO: 0.03 K/UL
BASOPHILS NFR BLD AUTO: 0.5 %
BILIRUB SERPL-MCNC: 0.3 MG/DL (ref 0.1–1)
BUN SERPL-MCNC: 7 MG/DL (ref 6–20)
CALCIUM SERPL-MCNC: 9.2 MG/DL (ref 8.7–10.5)
CHLORIDE SERPL-SCNC: 102 MMOL/L (ref 95–110)
CHOLEST SERPL-MCNC: 248 MG/DL (ref 120–199)
CHOLEST/HDLC SERPL: 4.6 {RATIO} (ref 2–5)
CO2 SERPL-SCNC: 30 MMOL/L (ref 23–29)
CREAT SERPL-MCNC: 0.8 MG/DL (ref 0.5–1.4)
ERYTHROCYTE [DISTWIDTH] IN BLOOD BY AUTOMATED COUNT: 12.1 % (ref 11.5–14.5)
GFR SERPLBLD CREATININE-BSD FMLA CKD-EPI: >60 ML/MIN/1.73/M2
GLUCOSE SERPL-MCNC: 91 MG/DL (ref 70–110)
HCT VFR BLD AUTO: 40.8 % (ref 37–48.5)
HDLC SERPL-MCNC: 54 MG/DL (ref 40–75)
HDLC SERPL: 21.8 % (ref 20–50)
HGB BLD-MCNC: 13.4 GM/DL (ref 12–16)
IMM GRANULOCYTES # BLD AUTO: 0.02 K/UL (ref 0–0.04)
IMM GRANULOCYTES NFR BLD AUTO: 0.3 % (ref 0–0.5)
IRON SATN MFR SERPL: 24 % (ref 20–50)
IRON SERPL-MCNC: 108 UG/DL (ref 30–160)
LDLC SERPL CALC-MCNC: 141 MG/DL (ref 63–159)
LYMPHOCYTES # BLD AUTO: 2.2 K/UL (ref 1–4.8)
MCH RBC QN AUTO: 28.3 PG (ref 27–31)
MCHC RBC AUTO-ENTMCNC: 32.8 G/DL (ref 32–36)
MCV RBC AUTO: 86 FL (ref 82–98)
NONHDLC SERPL-MCNC: 194 MG/DL
NUCLEATED RBC (/100WBC) (OHS): 0 /100 WBC
PLATELET # BLD AUTO: 223 K/UL (ref 150–450)
PMV BLD AUTO: 11.8 FL (ref 9.2–12.9)
POTASSIUM SERPL-SCNC: 4.1 MMOL/L (ref 3.5–5.1)
PROT SERPL-MCNC: 7.1 GM/DL (ref 6–8.4)
RBC # BLD AUTO: 4.73 M/UL (ref 4–5.4)
RELATIVE EOSINOPHIL (OHS): 0.6 %
RELATIVE LYMPHOCYTE (OHS): 35.5 % (ref 18–48)
RELATIVE MONOCYTE (OHS): 6.5 % (ref 4–15)
RELATIVE NEUTROPHIL (OHS): 56.6 % (ref 38–73)
SODIUM SERPL-SCNC: 138 MMOL/L (ref 136–145)
TIBC SERPL-MCNC: 448 UG/DL (ref 250–450)
TRANSFERRIN SERPL-MCNC: 303 MG/DL (ref 200–375)
TRIGL SERPL-MCNC: 265 MG/DL (ref 30–150)
VIT B12 SERPL-MCNC: 551 PG/ML (ref 210–950)
WBC # BLD AUTO: 6.19 K/UL (ref 3.9–12.7)

## 2025-07-07 PROCEDURE — 82607 VITAMIN B-12: CPT

## 2025-07-07 PROCEDURE — 80061 LIPID PANEL: CPT

## 2025-07-07 PROCEDURE — 36415 COLL VENOUS BLD VENIPUNCTURE: CPT

## 2025-07-07 PROCEDURE — 99999 PR PBB SHADOW E&M-EST. PATIENT-LVL IV: CPT | Mod: PBBFAC,,, | Performed by: SURGERY

## 2025-07-07 PROCEDURE — 85025 COMPLETE CBC W/AUTO DIFF WBC: CPT

## 2025-07-07 PROCEDURE — 3008F BODY MASS INDEX DOCD: CPT | Mod: CPTII,S$GLB,, | Performed by: SURGERY

## 2025-07-07 PROCEDURE — 1159F MED LIST DOCD IN RCRD: CPT | Mod: CPTII,S$GLB,, | Performed by: SURGERY

## 2025-07-07 PROCEDURE — 99215 OFFICE O/P EST HI 40 MIN: CPT | Mod: S$GLB,,, | Performed by: SURGERY

## 2025-07-07 PROCEDURE — 82306 VITAMIN D 25 HYDROXY: CPT

## 2025-07-07 PROCEDURE — 98005 SYNCH AUDIO-VIDEO EST LOW 20: CPT | Mod: 95,,, | Performed by: EMERGENCY MEDICINE

## 2025-07-07 PROCEDURE — 80053 COMPREHEN METABOLIC PANEL: CPT

## 2025-07-07 PROCEDURE — 3078F DIAST BP <80 MM HG: CPT | Mod: CPTII,S$GLB,, | Performed by: SURGERY

## 2025-07-07 PROCEDURE — 3074F SYST BP LT 130 MM HG: CPT | Mod: CPTII,S$GLB,, | Performed by: SURGERY

## 2025-07-07 PROCEDURE — 84425 ASSAY OF VITAMIN B-1: CPT

## 2025-07-07 PROCEDURE — 83540 ASSAY OF IRON: CPT

## 2025-07-07 RX ORDER — OMEPRAZOLE 40 MG/1
40 CAPSULE, DELAYED RELEASE ORAL EVERY MORNING
Qty: 30 CAPSULE | Refills: 2 | Status: SHIPPED | OUTPATIENT
Start: 2025-07-07

## 2025-07-07 RX ORDER — POLYETHYLENE GLYCOL 3350 17 G/17G
17 POWDER, FOR SOLUTION ORAL DAILY
Qty: 510 G | Refills: 0 | Status: SHIPPED | OUTPATIENT
Start: 2025-07-07 | End: 2025-08-06

## 2025-07-07 RX ORDER — TIZANIDINE 2 MG/1
2 TABLET ORAL EVERY 8 HOURS
Qty: 15 TABLET | Refills: 0 | Status: SHIPPED | OUTPATIENT
Start: 2025-07-07 | End: 2025-07-12

## 2025-07-07 RX ORDER — PROMETHAZINE HYDROCHLORIDE 12.5 MG/1
12.5 SUPPOSITORY RECTAL EVERY 6 HOURS PRN
Qty: 5 SUPPOSITORY | Refills: 0 | Status: SHIPPED | OUTPATIENT
Start: 2025-07-07

## 2025-07-07 RX ORDER — ONDANSETRON 8 MG/1
8 TABLET, ORALLY DISINTEGRATING ORAL EVERY 6 HOURS PRN
Qty: 30 TABLET | Refills: 0 | Status: SHIPPED | OUTPATIENT
Start: 2025-07-07

## 2025-07-07 RX ORDER — PREGABALIN 50 MG/1
CAPSULE ORAL
Qty: 46 CAPSULE | Refills: 0 | Status: SHIPPED | OUTPATIENT
Start: 2025-07-07 | End: 2025-08-07

## 2025-07-07 RX ORDER — GABAPENTIN 300 MG/1
CAPSULE ORAL
Qty: 11 CAPSULE | Refills: 0 | Status: SHIPPED | OUTPATIENT
Start: 2025-07-07 | End: 2025-07-07

## 2025-07-07 RX ORDER — URSODIOL 500 MG/1
500 TABLET, FILM COATED ORAL DAILY
Qty: 180 TABLET | Refills: 0 | Status: SHIPPED | OUTPATIENT
Start: 2025-07-07 | End: 2026-01-03

## 2025-07-07 NOTE — PROGRESS NOTES
Subjective:  The patient is a 44 y.o. obese female who presents for pre op for Robotic-assisted Laparoscopic Single Anastomosis Duodenoileostomy - already had a Sleeve Gastrectomy with Dr Whitman in 2021.  GERD - no esophagitis. Feels this actually got better when on wegovy (now stopped). EGD without esophagitis, no hiatal hernia. UGI without HH. Munoz was negative - consistent with esophageal hypersensitivity.   Stopping HRT for surgery; discussed.  Will interrupt metformin perioperatively - uses for HS.  Had excellent prepared questions and was able to explain the risks and benefits of the procedure.    All workup has been reviewed in clinic today and there is nothing on the review that would prevent us from proceeding with surgery.    All questions were answered in clinic today prior to leaving.    Body mass index is 45.64 kg/m².       Patient Active Problem List    Diagnosis Date Noted    Lumbar pain 10/10/2024    Complicated bereavement 05/08/2024    Hidradenitis suppurativa 03/14/2022    Seborrheic dermatitis 03/14/2022    BMI 45.0-49.9, adult 10/07/2021    S/P laparoscopic sleeve gastrectomy 07/13/2021    Obesity 06/22/2021    Major depressive disorder, recurrent episode, in partial remission with anxious distress 07/16/2020    Menopause 04/09/2020    Dizziness 04/09/2020    Galactorrhea in female- bilateral breast 04/09/2020    Other insomnia 04/09/2020    Surgical menopause 03/13/2020    s/p robotic hysterectomy/RSO 3/9/20 03/09/2020    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    DUB (dysfunctional uterine bleeding) 01/30/2020    Migraine with aura, not intractable 01/30/2020    Abnormal uterine bleeding (AUB) 09/13/2019    Pericardial cyst 07/26/2019    Mucinous tumor, 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    Left ovarian cyst 06/25/2019    S/P RA Laparoscopic LSO 06/25/2019    Depression 05/17/2019    Scoliosis of cervical spine 01/01/1994     Past Medical History:   Diagnosis Date     Abnormal Pap smear of cervix 2013    Abnormal uterine bleeding (AUB) 09/13/2019    Amblyopia     Anxiety     Cervical scoliosis 1994    Severe    Depression     DUB (dysfunctional uterine bleeding) 01/30/2020    Fatigue     Galactorrhea in female- bilateral breast 04/09/2020    Galactorrhea of both breasts 07/09/2020    Hx of psychiatric care     Migraine with aura, not intractable 01/30/2020    Morbid obesity     Mucinous tumor, 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    Ovarian cyst     Pericardial cyst 07/26/2019    Psychiatric problem     Sleep difficulties     Surgical menopause 03/13/2020    Therapy     Vaginal yeast infection 09/26/2019      Past Surgical History:   Procedure Laterality Date    EPIDURAL STEROID INJECTION INTO CERVICAL SPINE N/A 6/5/2025    Procedure: C6/7 ANGEL;  Surgeon: Arun Garcia MD;  Location: [facility];  Service: Pain Management;  Laterality: N/A;  Wegovy 7 days no AC    ESOPHAGOGASTRODUODENOSCOPY N/A 2/3/2025    Procedure: EGD (ESOPHAGOGASTRODUODENOSCOPY);  Surgeon: Rajendra Bustamante MD;  Location: [facility] (4TH FLR);  Service: Endoscopy;  Laterality: N/A;  1/6 ref by Samantha Quezada NP, Wegovy, BMI 49.61, portal. betty  1/28-pt r/s,BMI 48.41, last dose of prilosec 1/19, last dose of wegovy 1/25, no allergy or sensitivity to jewelry/metal per pt, updated instructions sent to [facility]  1/29/25: attempted precall /    HYSTERECTOMY  03/2020    INJECTION, SPINE, LUMBOSACRAL, TRANSFORAMINAL APPROACH Right 10/24/2024    Procedure: TFESI RT L4/5, L5/S1;  Surgeon: Arun Garcia MD;  Location: [facility];  Service: Pain Management;  Laterality: Right;  No ac    LAPAROSCOPIC SLEEVE GASTRECTOMY N/A 6/22/2021    Procedure: GASTRECTOMY, SLEEVE, LAPAROSCOPIC; with intraoperative egd;  Surgeon: Jaziel Whitman Jr., MD;  Location: [facility] OR 2ND FLR;  Service: General;  Laterality: N/A;    PH MONITORING, ESOPHAGUS, WIRELESS, (OFF REFLUX MEDS) N/A 2/3/2025    Procedure: PH  MONITORING, ESOPHAGUS, WIRELESS, (OFF REFLUX MEDS);  Surgeon: Rajendra Bustamante MD;  Location: [facility] (4TH FLR);  Service: Endoscopy;  Laterality: N/A;  48 Hour  Off PPI/H2 Blocker    ROBOT-ASSISTED LAPAROSCOPIC ABDOMINAL HYSTERECTOMY USING DA ROMAIN XI N/A 3/9/2020    Procedure: XI ROBOTIC HYSTERECTOMY;  Surgeon: Jeff Kaufman MD;  Location: [address];  Service: Oncology;  Laterality: N/A;    ROBOT-ASSISTED LAPAROSCOPIC SALPINGO-OOPHORECTOMY Left 6/25/2019    Procedure: ROBOTIC SALPINGO-OOPHORECTOMY;  Surgeon: Marky Fox Jr., MD;  Location: [facility];  Service: OB/GYN;  Laterality: Left;    SALPINGOOPHORECTOMY Right 3/9/2020    Procedure: SALPINGO-OOPHORECTOMY  USO;  Surgeon: Jeff Kaufman MD;  Location: [address];  Service: Oncology;  Laterality: Right;    TONSILLECTOMY        Prescriptions Prior to Admission[1]  Review of patient's allergies indicates:  No Known Allergies   Social History     Tobacco Use    Smoking status: Never     Passive exposure: Never    Smokeless tobacco: Never   Substance Use Topics    Alcohol use: Yes     Comment: 2-3 drinks twice a year      Family History   Problem Relation Name Age of Onset    Diabetes Paternal Grandfather      Macular degeneration Maternal Grandmother 93     Cancer Maternal Grandfather          prostate cancer    Hypertension Father      Depression Father      Drug abuse Father      Hypertension Mother      Stroke Mother  60        ASD, PFO    Depression Mother      Cancer Other paternal Great grandmoth         uterine cancer     Uterine cancer Other pat great GM     Depression Sister      Colon cancer Neg Hx      Ovarian cancer Neg Hx      Breast cancer Neg Hx      Glaucoma Neg Hx      Melanoma Neg Hx      Heart attack Neg Hx      Heart disease Neg Hx      Hyperlipidemia Neg Hx          Review of Systems  Constitutional: negative for anorexia, chills and fatigue  Eyes: negative for icterus, irritation and redness  Respiratory: negative for cough and  "dyspnea on exertion  Cardiovascular: negative for chest pain and chest pressure/discomfort  Gastrointestinal: negative for abdominal pain, change in bowel habits, constipation and diarrhea  Musculoskeletal:negative for arthralgias and back pain  Neurological: negative for coordination problems and dizziness  Behavioral/Psych: negative for anxiety and bad mood    Objective:  Vital signs in last 24 hours:  Vitals:    07/07/25 1402   BP: 120/78   Pulse: 88   SpO2: 96%   Weight: 120.6 kg (265 lb 14 oz)   Height: 5' 4" (1.626 m)          Weight History Current Weight Total Weight Loss EWL   12/21/2020  10:22 .1 lbs -346.1 lbs    2/8/2021  10:48 .4 lbs -339.4 lbs    3/11/2021  10:56 .9 lbs -334.9 lbs    3/25/2021  10:47  lbs -337 lbs    4/15/2021   9:54  lbs -338 lbs    6/9/2021   3:29  lbs -5 lbs -0.03 lbs   7/1/2021   2:15  lbs 21 lbs 0.11 lbs   7/7/2021  11:11  lbs 23 lbs 0.12 lbs   8/26/2021   8:58  lbs 43 lbs 0.22 lbs   8/26/2021  10:43  lbs 44 lbs 0.22 lbs   4/14/2022  11:08  lbs 67 lbs 0.34 lbs   11/17/2023   2:17 .8 lbs 23.2 lbs 0.12 lbs   10/10/2024   2:16  lbs 22 lbs 0.11 lbs   2/24/2025   8:35  lbs 62 lbs 0.31 lbs   7/7/2025   2:08  lbs -265 lbs        General appearance: alert, appears stated age and cooperative  Head: Normocephalic, without obvious abnormality, atraumatic  Eyes: negative findings: lids and lashes normal and conjunctivae and sclerae normal  Lungs: non labored breathing  Heart: regular rate and rhythm  Abdomen: soft, non-tender  Extremities: extremities normal, atraumatic, no cyanosis or edema  Skin: Skin color, texture, turgor normal. No rashes or lesions  Neurologic: Grossly normal    Data Review:  Psych: Cleared  Nutrition: Cleared  PCP: Cleared  CXR: WNL  EKG: WNL  Labs: No abnormalities that would prevent proceeding with surgery.    Assessment/Plan:  Fran was seen today for pre-op exam.    Diagnoses " and all orders for this visit:    Class 3 severe obesity due to excess calories with serious comorbidity and body mass index (BMI) of 45.0 to 49.9 in adult  -     acetaminophen (TYLENOL) 500 mg powder; Take 2 packets (1,000 mg total) by mouth 3 (three) times daily. Take 1,000mg (2 packets) THREE TIMES DAILY for at least 3 days and up to 5 days as needed for pain. May take an additional packet (500mg) once daily if required for pain. Do not take more than 4,000mg in 24 hours. for 5 days  -     omeprazole (PRILOSEC) 40 MG capsule; Take 1 capsule (40 mg total) by mouth every morning. Open capsule and take with apple sauce  -     polyethylene glycol (GLYCOLAX) 17 gram/dose powder; Use to cap to measure 17g, mix with liquid, and take by mouth once daily.  -     ursodioL (BARBARA FORTE) 500 MG tablet; Take 1 tablet (500 mg total) by mouth once daily. For gallstone prevention.  -     ondansetron (ZOFRAN-ODT) 8 MG TbDL; Take 1 tablet (8 mg total) by mouth every 6 (six) hours as needed (Nausea).  -     promethazine (PHENERGAN) 12.5 MG Supp; Place 1 suppository (12.5 mg total) rectally every 6 (six) hours as needed (nausea, 2nd line).  -     tiZANidine (ZANAFLEX) 2 MG tablet; Take 1 tablet (2 mg total) by mouth every 8 (eight) hours. For post-op muscle spasms / pain. for 5 days  -     gabapentin (NEURONTIN) 300 MG capsule; OPEN capsule into a tablespoon and consume with sip of liquid. Take once the MORNING OF SURGERY. After surgery: take one capsule TWICE DAILY for at least 3 days and up to 5 days as needed for pain.    BSTOP-D perioperative narcotic sparing protocol discussed.  She uses gabapentin chronically at a 200mg dose at night. Will hold this while using the 300mg BID dosing for the protocol.     Morbid obesity with failure of conservative therapy.    I have personally discussed, in appropriate language, the potential complications of a Robotic-assisted Laparoscopic Single Anastomosis Duodenoileostomy with Fran Spencer  Gisela. She is well aware of the serious complications of the surgery that include but are not limited to: bleeding (including possible need for transfusions), deep venous thrombosis, pulmonary embolism, pulmonary complications such as pneumonia, cardiac events, temporary or permanent stroke, damage to the spleen, bowel or other organs during surgery and the risk of death. Fran is also aware of specific complications which apply in particular to bariatric procedures and can include but are not limited to: severe post-operative nausea, dysphagia, the leakage of gastric/ intestinal contents at the staple line and the development of an intra-abdominal abscess requiring drainage, need for additional surgery, severe intra-abdominal infection leading to sepsis and even death, wound complications such as hernias and wound infections, strictures, small bowel obstruction, disfiguring scars and failure to lose weight. Further, once again, it was reinforced that her bariatric procedure is a weight loss tool but ultimate success will rest on lifelong dietary and lifestyle changes. Inadequate weight loss and weight regain are absolutely possible and therefore she will need to treat obesity as the chronic disease it is and continue to optimize her efforts.    We discussed that our goal is to ameliorate her medical problems and not to obtain a specific body mass index. She understands the risks and benefits and wishes to proceed with the procedure. She has signed a consent form.       I spent 45 minutes on this patient encounter. This includes times spent with the patient involving coordination of care and counseling, discussion of the patient's condition, delineating the plan of management above, discussing the rationale / recommendations for treatment, chart review and documentation. All time accounted for was spent on the day of service.      Abad Muñoz MD FACS  Minimally Invasive General & Bariatric Surgery          [1] (Not in a hospital admission)

## 2025-07-07 NOTE — PROGRESS NOTES
Interventional Pain Management - Established     Interval history 07/07/2025:  On 06/05/2025 the patient underwent a C6/7 ANGEL and she reports that the burning pain down her left arm is unchanged. She doesn't feel any relief of her pain with recent injection.     Interval history 04/30/2025: Patient reports that her neck and left arm felt like it was on fire, but now she is having numbness in her left upper arm laterally. She feels some left thumb tingling that is starting to improve.  Patient denies any myelopathic symptoms.     Interval history 01/13/2025: Patient returns and reports that she is having pain in her right lower back in the same location where she received TPIs in November 2024. She reports that the radicular pain on the right remains improved.     Interval History 11/15/2024: The patient had L4/5 and L5/S1 right TFESI on 10/24/2024 and they report 60% pain relief.  She states that the pain radiating down her right leg has resolved.  She reports that she now has a pain that started 4 days ago on her right side of her lower back. There is no exacerbating factor, but the more she does the more it hurts. She has also noted a knot in the area.     Interval History 10/04/2024:  Since their last visit the pain has been unchanged. They are participating in physical therapy and are participating in home exercise plan.    Original HPI 09/25/2024: Fran Higgins is a 44 y.o. year old female patient who has a past medical history of Abnormal Pap smear of cervix, Abnormal uterine bleeding (AUB), Amblyopia, Anxiety, Cervical scoliosis, Depression, DUB (dysfunctional uterine bleeding), Fatigue, Galactorrhea in female- bilateral breast, Galactorrhea of both breasts, psychiatric care, Migraine with aura, not intractable, Morbid obesity, Mucinous tumor, of low malignant potential, Ovarian cyst, Pericardial cyst, Psychiatric problem, Sleep difficulties, Surgical menopause, Therapy, and Vaginal yeast  infection. She presents in referral from No ref. provider found for lower back pain for over 20 years.     Original Pain Description:  The pain is located in the back and is radiating to the right hip, leg and the heel posteriorly. The pain is described as numbing and tingling. Exacerbating factors: daily activities. Mitigating factors medications. Symptoms interfere with daily activity. The patient feels like symptoms have been unchanged. Patient denies significant motor weakness. No red flags.     PAIN SCORES:  Best: Pain is 3  Current: Pain is 3  Worst: Pain is 10    6 weeks of Conservative therapy:  PT: Completed for neck  Chiro:  HEP: Participating    Treatments / Medications:   Arianne 100 mg PRN qHS - makes her feel like zombie  Xanax 0.5 mg  for insomnia and anxiety  Flexeril 5 mg PRN    Antiplatelets/Anticoagulants/Others:  Humira    Interventional Pain Procedures:   06/05/25 C6/7 ANGEL  10/24/24 L4/5 and L5/S1 right TFESI    IMAGING:    MRI CERVICAL SPINE WITHOUT CONTRAST    05/15/2025  C2-3: mild facet arthropathy.  C3-4: mild BBPDOC w/Uv spurring.    C4-5: mild DOC to left w/Uv spurring->mild BL foraminal narrowing.    C5-6: BL Uv spurring w/DOC->severe right/mod left foraminal narrowing, possibly impinging on exiting C6NR. Mild spinal canal stenosis.  C6-7: DOC to right->mild right foraminal narrowing.   C7-1: Mild Uv spurring and right facet joint arthropathy->mild right foraminal narrowing.     MRI LUMBAR SPINE WITHOUT CONTRAST   09/28/2024  6 Lumbar vetrebra, lowest level considered L5/S1   L2/3: BBPDB->mild BL neural foraminal narrowing and mild spinal canal stenosis  L3/4: BBPDB & facet arthropathy->mild BL neural foraminal narrowing and mild spinal canal stenosis  L4/5: BBPDB & facet arthropathy->mild BL neural foraminal narrowing and mild spinal canal stenosis.  L5/1: BBPDB & facet arthropathy->mod right / severe left neural foraminal narrowing and mild spinal canal stenosis. Grade 1 AL. BL L5 pars  defects     XR LUMBAR SPINE AP AND LAT WITH FLEX/EXT   03/07/2024  On neutral lateral view, there is grade 1 AL L5 on S1. Disc space height loss primarily involving L4-L5 and L5-S1 noting endplate degenerative changes primarily at L5-S1.  There is lower lumbar facet arthropathy.  There are pars defects at L5.  On flex/ex no new or worsening listhesis or facet malalignment.  AP spinal alignment is remarkable for mild dextroscoliotic curvature.  The bilateral sacroiliac joints are intact.     US MSK Soft-tissue right lower lumbar in clinic 11/15/24  No focal fluid collections, no masses noted, no cobblestoning appearance, normal ultrasound    Medications/Allergies: See med card    ROS:  GENERAL: No fever. No chills. No fatigue. Denies weight loss. Denies weight gain.  Back / musculoskeletal / neuro : See HPI    VITALS:   There were no vitals filed for this visit.        There is no height or weight on file to calculate BMI.      4/30/2025     3:52 PM 1/13/2025     1:56 PM 9/25/2024     1:09 PM   Last 3 PDI Scores   Pain Disability Index (PDI) [phone]       Physical Exam:  GENERAL: Well appearing, in no acute distress, alert and oriented x3.  PSYCH:  Mood and affect appropriate.  SKIN: Skin color, texture, turgor normal, no rashes or lesions.  HEENT:  Normocephalic, atraumatic. Cranial nerves grossly intact.  NECK:  Spurling's positive to left.  PULM: No evidence of respiratory difficulty, symmetric chest rise.  GI:  Non-distended  BACK: Normal range of motion. No pain to palpation over the spinous processes. No pain to palpation over facet joints. No pain with axial loading. There is no pain with palpation over the sacroiliac joints bilaterally.   EXTREMITIES: No deformities, edema, or skin discoloration.   MUSCULOSKELETAL: Shoulder, hip, and knee provocative maneuvers are negative. No atrophy is noted.  NEURO: Sensation is equal and appropriate bilaterally. Bilateral upper and lower extremity strength is normal and  symmetric. Bilateral upper and lower extremity coordination and muscle stretch reflexes are physiologic and symmetric. Plantar response are downgoing. Straight leg raising in the supine position is negative to radicular pain.  Negative Guillermo's bilaterally  GAIT: normal.      LABS:    Lab Results   Component Value Date    HGBA1C 5.3 10/11/2024       Lab Results   Component Value Date    CREATININE 0.8 07/07/2025         ASSESSMENT: 44 y.o. year old female with pain, consistent with:    Encounter Diagnosis   Name Primary?    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  She is now developing cervical radiculopathy, but is due to go on admission trip out of the country in a few days.  Will treat with Medrol Dosepak and arrange for imaging and follow up after her trip    PLAN:  - I have stressed the importance of physical activity and a home exercise plan to help with pain and improve health.  - Patient can continue with medications for now since they are providing benefits, using them appropriately, and without side effects.  - Counseled patient regarding the importance of activity modification and physical therapy.  - Consider left C5/6 TFESI if no improvement with change to lyrica  - Medications:  No NSAIDs due to gastric bypass    Start Lyrica 50mg on ramp up  - Imaging: Reviewed available imaging with patient and answered any questions they had regarding study.  - The patient's pathophysiology was explained in detail with reference to x-rays, models, other visual aids as appropriate.   - Follow up visit: return to clinic 4 weeks    Arun Garcia MD  07/08/2025

## 2025-07-07 NOTE — H&P (VIEW-ONLY)
Subjective:  The patient is a 44 y.o. obese female who presents for pre op for Robotic-assisted Laparoscopic Single Anastomosis Duodenoileostomy - already had a Sleeve Gastrectomy with Dr Whitman in 2021.  GERD - no esophagitis. Feels this actually got better when on wegovy (now stopped). EGD without esophagitis, no hiatal hernia. UGI without HH. Munoz was negative - consistent with esophageal hypersensitivity.   Stopping HRT for surgery; discussed.  Will interrupt metformin perioperatively - uses for HS.  Had excellent prepared questions and was able to explain the risks and benefits of the procedure.    All workup has been reviewed in clinic today and there is nothing on the review that would prevent us from proceeding with surgery.    All questions were answered in clinic today prior to leaving.    Body mass index is 45.64 kg/m².       Patient Active Problem List    Diagnosis Date Noted    Lumbar pain 10/10/2024    Complicated bereavement 05/08/2024    Hidradenitis suppurativa 03/14/2022    Seborrheic dermatitis 03/14/2022    BMI 45.0-49.9, adult 10/07/2021    S/P laparoscopic sleeve gastrectomy 07/13/2021    Obesity 06/22/2021    Major depressive disorder, recurrent episode, in partial remission with anxious distress 07/16/2020    Menopause 04/09/2020    Dizziness 04/09/2020    Galactorrhea in female- bilateral breast 04/09/2020    Other insomnia 04/09/2020    Surgical menopause 03/13/2020    s/p robotic hysterectomy/RSO 3/9/20 03/09/2020    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    DUB (dysfunctional uterine bleeding) 01/30/2020    Migraine with aura, not intractable 01/30/2020    Abnormal uterine bleeding (AUB) 09/13/2019    Pericardial cyst 07/26/2019    Mucinous tumor, 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    Left ovarian cyst 06/25/2019    S/P RA Laparoscopic LSO 06/25/2019    Depression 05/17/2019    Scoliosis of cervical spine 01/01/1994     Past Medical History:   Diagnosis Date     Abnormal Pap smear of cervix 2013    Abnormal uterine bleeding (AUB) 09/13/2019    Amblyopia     Anxiety     Cervical scoliosis 1994    Severe    Depression     DUB (dysfunctional uterine bleeding) 01/30/2020    Fatigue     Galactorrhea in female- bilateral breast 04/09/2020    Galactorrhea of both breasts 07/09/2020    Hx of psychiatric care     Migraine with aura, not intractable 01/30/2020    Morbid obesity     Mucinous tumor, 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    Ovarian cyst     Pericardial cyst 07/26/2019    Psychiatric problem     Sleep difficulties     Surgical menopause 03/13/2020    Therapy     Vaginal yeast infection 09/26/2019      Past Surgical History:   Procedure Laterality Date    EPIDURAL STEROID INJECTION INTO CERVICAL SPINE N/A 6/5/2025    Procedure: C6/7 ANGEL;  Surgeon: Arun Garcia MD;  Location: [facility];  Service: Pain Management;  Laterality: N/A;  Wegovy 7 days no AC    ESOPHAGOGASTRODUODENOSCOPY N/A 2/3/2025    Procedure: EGD (ESOPHAGOGASTRODUODENOSCOPY);  Surgeon: Rajendra Bustamante MD;  Location: [facility] (4TH FLR);  Service: Endoscopy;  Laterality: N/A;  1/6 ref by Samantha Quezada NP, Wegovy, BMI 49.61, portal. betty  1/28-pt r/s,BMI 48.41, last dose of prilosec 1/19, last dose of wegovy 1/25, no allergy or sensitivity to jewelry/metal per pt, updated instructions sent to [facility]  1/29/25: attempted precall /    HYSTERECTOMY  03/2020    INJECTION, SPINE, LUMBOSACRAL, TRANSFORAMINAL APPROACH Right 10/24/2024    Procedure: TFESI RT L4/5, L5/S1;  Surgeon: Arun Garcia MD;  Location: [facility];  Service: Pain Management;  Laterality: Right;  No ac    LAPAROSCOPIC SLEEVE GASTRECTOMY N/A 6/22/2021    Procedure: GASTRECTOMY, SLEEVE, LAPAROSCOPIC; with intraoperative egd;  Surgeon: Jaziel Whitman Jr., MD;  Location: [facility] OR 2ND FLR;  Service: General;  Laterality: N/A;    PH MONITORING, ESOPHAGUS, WIRELESS, (OFF REFLUX MEDS) N/A 2/3/2025    Procedure: PH  MONITORING, ESOPHAGUS, WIRELESS, (OFF REFLUX MEDS);  Surgeon: Rajendra Bustamante MD;  Location: [facility] ENDO (4TH FLR);  Service: Endoscopy;  Laterality: N/A;  48 Hour  Off PPI/H2 Blocker    ROBOT-ASSISTED LAPAROSCOPIC ABDOMINAL HYSTERECTOMY USING DA ROMAIN XI N/A 3/9/2020    Procedure: XI ROBOTIC HYSTERECTOMY;  Surgeon: Jeff Kaufman MD;  Location: [address];  Service: Oncology;  Laterality: N/A;    ROBOT-ASSISTED LAPAROSCOPIC SALPINGO-OOPHORECTOMY Left 6/25/2019    Procedure: ROBOTIC SALPINGO-OOPHORECTOMY;  Surgeon: Marky Fox Jr., MD;  Location: [facility];  Service: OB/GYN;  Laterality: Left;    SALPINGOOPHORECTOMY Right 3/9/2020    Procedure: SALPINGO-OOPHORECTOMY  USO;  Surgeon: Jeff Kaufman MD;  Location: [address];  Service: Oncology;  Laterality: Right;    TONSILLECTOMY        Prescriptions Prior to Admission[1]  Review of patient's allergies indicates:  No Known Allergies   Social History     Tobacco Use    Smoking status: Never     Passive exposure: Never    Smokeless tobacco: Never   Substance Use Topics    Alcohol use: Yes     Comment: 2-3 drinks twice a year      Family History   Problem Relation Name Age of Onset    Diabetes Paternal Grandfather      Macular degeneration Maternal Grandmother 93     Cancer Maternal Grandfather          prostate cancer    Hypertension Father      Depression Father      Drug abuse Father      Hypertension Mother      Stroke Mother  60        ASD, PFO    Depression Mother      Cancer Other paternal Great grandmoth         uterine cancer     Uterine cancer Other pat great GM     Depression Sister      Colon cancer Neg Hx      Ovarian cancer Neg Hx      Breast cancer Neg Hx      Glaucoma Neg Hx      Melanoma Neg Hx      Heart attack Neg Hx      Heart disease Neg Hx      Hyperlipidemia Neg Hx          Review of Systems  Constitutional: negative for anorexia, chills and fatigue  Eyes: negative for icterus, irritation and redness  Respiratory: negative for cough and  "dyspnea on exertion  Cardiovascular: negative for chest pain and chest pressure/discomfort  Gastrointestinal: negative for abdominal pain, change in bowel habits, constipation and diarrhea  Musculoskeletal:negative for arthralgias and back pain  Neurological: negative for coordination problems and dizziness  Behavioral/Psych: negative for anxiety and bad mood    Objective:  Vital signs in last 24 hours:  Vitals:    07/07/25 1402   BP: 120/78   Pulse: 88   SpO2: 96%   Weight: 120.6 kg (265 lb 14 oz)   Height: 5' 4" (1.626 m)          Weight History Current Weight Total Weight Loss EWL   12/21/2020  10:22 .1 lbs -346.1 lbs    2/8/2021  10:48 .4 lbs -339.4 lbs    3/11/2021  10:56 .9 lbs -334.9 lbs    3/25/2021  10:47  lbs -337 lbs    4/15/2021   9:54  lbs -338 lbs    6/9/2021   3:29  lbs -5 lbs -0.03 lbs   7/1/2021   2:15  lbs 21 lbs 0.11 lbs   7/7/2021  11:11  lbs 23 lbs 0.12 lbs   8/26/2021   8:58  lbs 43 lbs 0.22 lbs   8/26/2021  10:43  lbs 44 lbs 0.22 lbs   4/14/2022  11:08  lbs 67 lbs 0.34 lbs   11/17/2023   2:17 .8 lbs 23.2 lbs 0.12 lbs   10/10/2024   2:16  lbs 22 lbs 0.11 lbs   2/24/2025   8:35  lbs 62 lbs 0.31 lbs   7/7/2025   2:08  lbs -265 lbs        General appearance: alert, appears stated age and cooperative  Head: Normocephalic, without obvious abnormality, atraumatic  Eyes: negative findings: lids and lashes normal and conjunctivae and sclerae normal  Lungs: non labored breathing  Heart: regular rate and rhythm  Abdomen: soft, non-tender  Extremities: extremities normal, atraumatic, no cyanosis or edema  Skin: Skin color, texture, turgor normal. No rashes or lesions  Neurologic: Grossly normal    Data Review:  Psych: Cleared  Nutrition: Cleared  PCP: Cleared  CXR: WNL  EKG: WNL  Labs: No abnormalities that would prevent proceeding with surgery.    Assessment/Plan:  Fran was seen today for pre-op exam.    Diagnoses " and all orders for this visit:    Class 3 severe obesity due to excess calories with serious comorbidity and body mass index (BMI) of 45.0 to 49.9 in adult  -     acetaminophen (TYLENOL) 500 mg powder; Take 2 packets (1,000 mg total) by mouth 3 (three) times daily. Take 1,000mg (2 packets) THREE TIMES DAILY for at least 3 days and up to 5 days as needed for pain. May take an additional packet (500mg) once daily if required for pain. Do not take more than 4,000mg in 24 hours. for 5 days  -     omeprazole (PRILOSEC) 40 MG capsule; Take 1 capsule (40 mg total) by mouth every morning. Open capsule and take with apple sauce  -     polyethylene glycol (GLYCOLAX) 17 gram/dose powder; Use to cap to measure 17g, mix with liquid, and take by mouth once daily.  -     ursodioL (BARBARA FORTE) 500 MG tablet; Take 1 tablet (500 mg total) by mouth once daily. For gallstone prevention.  -     ondansetron (ZOFRAN-ODT) 8 MG TbDL; Take 1 tablet (8 mg total) by mouth every 6 (six) hours as needed (Nausea).  -     promethazine (PHENERGAN) 12.5 MG Supp; Place 1 suppository (12.5 mg total) rectally every 6 (six) hours as needed (nausea, 2nd line).  -     tiZANidine (ZANAFLEX) 2 MG tablet; Take 1 tablet (2 mg total) by mouth every 8 (eight) hours. For post-op muscle spasms / pain. for 5 days  -     gabapentin (NEURONTIN) 300 MG capsule; OPEN capsule into a tablespoon and consume with sip of liquid. Take once the MORNING OF SURGERY. After surgery: take one capsule TWICE DAILY for at least 3 days and up to 5 days as needed for pain.    BSTOP-D perioperative narcotic sparing protocol discussed.  She uses gabapentin chronically at a 200mg dose at night. Will hold this while using the 300mg BID dosing for the protocol.     Morbid obesity with failure of conservative therapy.    I have personally discussed, in appropriate language, the potential complications of a Robotic-assisted Laparoscopic Single Anastomosis Duodenoileostomy with Fran Spencer  Gisela. She is well aware of the serious complications of the surgery that include but are not limited to: bleeding (including possible need for transfusions), deep venous thrombosis, pulmonary embolism, pulmonary complications such as pneumonia, cardiac events, temporary or permanent stroke, damage to the spleen, bowel or other organs during surgery and the risk of death. Fran is also aware of specific complications which apply in particular to bariatric procedures and can include but are not limited to: severe post-operative nausea, dysphagia, the leakage of gastric/ intestinal contents at the staple line and the development of an intra-abdominal abscess requiring drainage, need for additional surgery, severe intra-abdominal infection leading to sepsis and even death, wound complications such as hernias and wound infections, strictures, small bowel obstruction, disfiguring scars and failure to lose weight. Further, once again, it was reinforced that her bariatric procedure is a weight loss tool but ultimate success will rest on lifelong dietary and lifestyle changes. Inadequate weight loss and weight regain are absolutely possible and therefore she will need to treat obesity as the chronic disease it is and continue to optimize her efforts.    We discussed that our goal is to ameliorate her medical problems and not to obtain a specific body mass index. She understands the risks and benefits and wishes to proceed with the procedure. She has signed a consent form.       I spent 45 minutes on this patient encounter. This includes times spent with the patient involving coordination of care and counseling, discussion of the patient's condition, delineating the plan of management above, discussing the rationale / recommendations for treatment, chart review and documentation. All time accounted for was spent on the day of service.      Abad Muñoz MD FACS  Minimally Invasive General & Bariatric Surgery          [1] (Not in a hospital admission)

## 2025-07-08 ENCOUNTER — PATIENT MESSAGE (OUTPATIENT)
Dept: BARIATRICS | Facility: CLINIC | Age: 45
End: 2025-07-08
Payer: COMMERCIAL

## 2025-07-08 RX ORDER — SCOPOLAMINE 1 MG/3D
1 PATCH, EXTENDED RELEASE TRANSDERMAL ONCE
OUTPATIENT
Start: 2025-07-08 | End: 2025-07-08

## 2025-07-08 RX ORDER — FAMOTIDINE 10 MG/ML
20 INJECTION, SOLUTION INTRAVENOUS ONCE
OUTPATIENT
Start: 2025-07-08 | End: 2025-07-08

## 2025-07-08 RX ORDER — OXYCODONE HCL 5 MG/5 ML
5 SOLUTION, ORAL ORAL EVERY 6 HOURS PRN
Refills: 0 | OUTPATIENT
Start: 2025-07-08

## 2025-07-08 RX ORDER — PROCHLORPERAZINE EDISYLATE 5 MG/ML
5 INJECTION INTRAMUSCULAR; INTRAVENOUS EVERY 6 HOURS PRN
OUTPATIENT
Start: 2025-07-08

## 2025-07-08 RX ORDER — PANTOPRAZOLE SODIUM 40 MG/10ML
40 INJECTION, POWDER, LYOPHILIZED, FOR SOLUTION INTRAVENOUS 2 TIMES DAILY
OUTPATIENT
Start: 2025-07-08

## 2025-07-08 RX ORDER — HEPARIN SODIUM 5000 [USP'U]/ML
5000 INJECTION, SOLUTION INTRAVENOUS; SUBCUTANEOUS ONCE
OUTPATIENT
Start: 2025-07-08 | End: 2025-07-08

## 2025-07-08 RX ORDER — KETOROLAC TROMETHAMINE 15 MG/ML
15 INJECTION, SOLUTION INTRAMUSCULAR; INTRAVENOUS ONCE
OUTPATIENT
Start: 2025-07-08 | End: 2025-07-08

## 2025-07-08 RX ORDER — ACETAMINOPHEN 650 MG/20.3ML
1000 LIQUID ORAL EVERY 8 HOURS
OUTPATIENT
Start: 2025-07-08

## 2025-07-08 RX ORDER — GABAPENTIN 250 MG/5ML
300 SOLUTION ORAL 2 TIMES DAILY
OUTPATIENT
Start: 2025-07-08

## 2025-07-08 RX ORDER — SODIUM CHLORIDE 9 MG/ML
INJECTION, SOLUTION INTRAVENOUS CONTINUOUS
OUTPATIENT
Start: 2025-07-08

## 2025-07-08 RX ORDER — LIDOCAINE HYDROCHLORIDE 10 MG/ML
1 INJECTION, SOLUTION EPIDURAL; INFILTRATION; INTRACAUDAL; PERINEURAL ONCE
OUTPATIENT
Start: 2025-07-08 | End: 2025-07-08

## 2025-07-08 RX ORDER — ACETAMINOPHEN 650 MG/20.3ML
500 LIQUID ORAL
OUTPATIENT
Start: 2025-07-08

## 2025-07-08 RX ORDER — MUPIROCIN 20 MG/G
OINTMENT TOPICAL
OUTPATIENT
Start: 2025-07-08

## 2025-07-08 RX ORDER — ENOXAPARIN SODIUM 100 MG/ML
40 INJECTION SUBCUTANEOUS EVERY 12 HOURS
OUTPATIENT
Start: 2025-07-08

## 2025-07-08 RX ORDER — DEXTROMETHORPHAN/PSEUDOEPHED 2.5-7.5/.8
40 DROPS ORAL 4 TIMES DAILY PRN
OUTPATIENT
Start: 2025-07-08

## 2025-07-08 RX ORDER — ONDANSETRON HYDROCHLORIDE 2 MG/ML
8 INJECTION, SOLUTION INTRAVENOUS EVERY 6 HOURS
OUTPATIENT
Start: 2025-07-08

## 2025-07-08 RX ORDER — METRONIDAZOLE 500 MG/100ML
500 INJECTION, SOLUTION INTRAVENOUS
OUTPATIENT
Start: 2025-07-08

## 2025-07-08 RX ORDER — ACETAMINOPHEN 10 MG/ML
1000 INJECTION, SOLUTION INTRAVENOUS ONCE
OUTPATIENT
Start: 2025-07-08 | End: 2025-07-08

## 2025-07-08 RX ORDER — SODIUM CHLORIDE, SODIUM LACTATE, POTASSIUM CHLORIDE, CALCIUM CHLORIDE 600; 310; 30; 20 MG/100ML; MG/100ML; MG/100ML; MG/100ML
INJECTION, SOLUTION INTRAVENOUS CONTINUOUS
OUTPATIENT
Start: 2025-07-08

## 2025-07-11 ENCOUNTER — CLINICAL SUPPORT (OUTPATIENT)
Dept: BARIATRICS | Facility: CLINIC | Age: 45
End: 2025-07-11
Payer: COMMERCIAL

## 2025-07-11 DIAGNOSIS — E66.01 CLASS 3 SEVERE OBESITY DUE TO EXCESS CALORIES WITH SERIOUS COMORBIDITY AND BODY MASS INDEX (BMI) OF 45.0 TO 49.9 IN ADULT: ICD-10-CM

## 2025-07-11 DIAGNOSIS — E66.813 CLASS 3 SEVERE OBESITY DUE TO EXCESS CALORIES WITH SERIOUS COMORBIDITY AND BODY MASS INDEX (BMI) OF 45.0 TO 49.9 IN ADULT: ICD-10-CM

## 2025-07-11 DIAGNOSIS — Z71.3 DIETARY COUNSELING AND SURVEILLANCE: Primary | ICD-10-CM

## 2025-07-11 NOTE — PROGRESS NOTES
Audio Only Telehealth Visit     The patient location is: [place]  The chief complaint leading to consultation is: Pre-op liquid diet and nutrition instructions  Visit type: Virtual visit with audio only (telephone)  Total time spent with patient: 10 mins     The reason for the audio only service rather than synchronous audio and video virtual visit was related to technical difficulties or patient preference/necessity.     Each patient to whom I provide medical services by telemedicine is:  (1) informed of the relationship between the physician and patient and the respective role of any other health care provider with respect to management of the patient; and (2) notified that they may decline to receive medical services by telemedicine and may withdraw from such care at any time. Patient verbally consented to receive this service via voice-only telephone call.    This service was not originating from a related E/M service provided within the previous 7 days nor will  to an E/M service or procedure within the next 24 hours or my soonest available appointment.  Prevailing standard of care was able to be met in this audio-only visit.      NUTRITION NOTE    Bariatric Surgeon: Abad Muñoz M.D.  Reason for MNT Referral:   Procedure: sleeve to MANNY-S  Date of Surgery: 7/25/25    Pre-op liquid diet  Pt using Pixsta for preop liquid diet  Fluids include: Water, SF peach tea powder, SF popsicles, broth    Discussion:  -  gms of protein per day  - 600-800 calories per day   - No more than 4 g sugar per protein shake/water/powder  - Sugar-free, decaffeinated, non-carbonated fluids  - No fruits, juices, yogurt or pudding on liquid diet  - No herbal supplements including green tea and fish oils for 2 weeks prior to surgery   - No vitamins for 1 week prior to surgery  - Stop GLP-1 1 week prior to surgery    Post-op instructions  - Reviewed nutritional guidelines for post-surgery.    - 1 ounce medicine cups and tracking  sheet provided for patient to measure and track fluid intake after surgery.  - Start with 1 oz clear liquids every 15 minutes following surgery, and to increase as tolerated. Aim for 48 fl oz clear fluids daily (includes clear protein drinks and protein soups).  - May begin sipping on protein shakes, as long as maintaining 48 fl oz non-caffeinated clear liquids per day.  - Reviewed bariatric vitamin/mineral regimen, starts 1 week after surgery as tolerated.  - Reviewed common nutritional concerns and prevention tips after bariatric surgery.  - Reminded not to lift anything greater than 10 lbs for 6 week post-surgery   - Encouraged PT to walk as much as tolerable after surgery.     Pt has the following vitamins and minerals to start taking one week after being discharged from hospital:    Bariatric Multivitamin   Calcium Citrate chews     Reviewed dosage and timing of vitamin/mineral guidelines.    Remind pt per nursing and medical team to inform our department if taking antibiotics within the 30 days post bariatric surgery or it any other surgeries/procedures are scheduled within 30 days after bariatric surgery.    Pt verbalized understanding of information provided with appropriate questions and comments.     SESSION TIME: 10 minutes

## 2025-07-14 ENCOUNTER — OFFICE VISIT (OUTPATIENT)
Dept: INTERNAL MEDICINE | Facility: CLINIC | Age: 45
End: 2025-07-14
Payer: COMMERCIAL

## 2025-07-14 VITALS
HEIGHT: 64 IN | WEIGHT: 266.13 LBS | SYSTOLIC BLOOD PRESSURE: 100 MMHG | OXYGEN SATURATION: 99 % | DIASTOLIC BLOOD PRESSURE: 80 MMHG | RESPIRATION RATE: 18 BRPM | TEMPERATURE: 97 F | HEART RATE: 72 BPM | BODY MASS INDEX: 45.43 KG/M2

## 2025-07-14 DIAGNOSIS — R73.03 PREDIABETES: ICD-10-CM

## 2025-07-14 DIAGNOSIS — E66.813 CLASS 3 OBESITY: ICD-10-CM

## 2025-07-14 DIAGNOSIS — F33.41 MAJOR DEPRESSIVE DISORDER, RECURRENT EPISODE, IN PARTIAL REMISSION WITH ANXIOUS DISTRESS: ICD-10-CM

## 2025-07-14 DIAGNOSIS — Z12.31 ENCOUNTER FOR SCREENING MAMMOGRAM FOR BREAST CANCER: ICD-10-CM

## 2025-07-14 DIAGNOSIS — Z00.00 ANNUAL PHYSICAL EXAM: Primary | ICD-10-CM

## 2025-07-14 LAB — W VITAMIN B1: 73 UG/L

## 2025-07-14 PROCEDURE — 99999 PR PBB SHADOW E&M-EST. PATIENT-LVL V: CPT | Mod: PBBFAC,,, | Performed by: HOSPITALIST

## 2025-07-14 PROCEDURE — 3074F SYST BP LT 130 MM HG: CPT | Mod: CPTII,S$GLB,, | Performed by: HOSPITALIST

## 2025-07-14 PROCEDURE — 1159F MED LIST DOCD IN RCRD: CPT | Mod: CPTII,S$GLB,, | Performed by: HOSPITALIST

## 2025-07-14 PROCEDURE — 3079F DIAST BP 80-89 MM HG: CPT | Mod: CPTII,S$GLB,, | Performed by: HOSPITALIST

## 2025-07-14 PROCEDURE — 99396 PREV VISIT EST AGE 40-64: CPT | Mod: S$GLB,,, | Performed by: HOSPITALIST

## 2025-07-14 PROCEDURE — 1160F RVW MEDS BY RX/DR IN RCRD: CPT | Mod: CPTII,S$GLB,, | Performed by: HOSPITALIST

## 2025-07-14 PROCEDURE — 3008F BODY MASS INDEX DOCD: CPT | Mod: CPTII,S$GLB,, | Performed by: HOSPITALIST

## 2025-07-14 RX ORDER — METFORMIN HYDROCHLORIDE 500 MG/1
500 TABLET ORAL
Qty: 30 TABLET | Refills: 1 | Status: SHIPPED | OUTPATIENT
Start: 2025-07-14 | End: 2025-08-14

## 2025-07-14 RX ORDER — VENLAFAXINE 75 MG/1
75 TABLET ORAL 2 TIMES DAILY
Qty: 60 TABLET | Refills: 1 | Status: SHIPPED | OUTPATIENT
Start: 2025-07-14 | End: 2026-07-14

## 2025-07-14 NOTE — PROGRESS NOTES
"Subjective:     @Patient ID: Fran Higgins is a 44 y.o. female.    Chief Complaint: Annual Exam    HPI  44 yo F with migraine, depression, obesity s/p gastric sleeve , prediabetes, hidradenitis, borderline epithelial neoplasm of ovary presents for annual. Pt reports she is doing fine. Has upcoming bariatric surgery (Robotic-assisted laparoscopic Single Anastomosis Duodenoileostomy). In preparation for post-op she needs immediate release versions of Venlafaxine and Metformin.     Her significant other  unexpectedly in . She found him in his car.      Lipid disorders/ASCVD risk (ages >/= 45 or >/= 20 if increased risk ): ordered    DM (>45y yearly or if obese, HTN): A1c ordered     Breast Cancer (40-50y discretion of pt, 50-74y every 1-2 years): Mammogram utd   Cervical Cancer (Pap Smear ages 21-65 every 3 years or Pap + HPV q5 years after 30 years of age):  Done 21; s/p hysterectomy      Vaccines:   Influenza (yearly): done  Tetanus (every 10 yrs - 1st tdap): done 2018   covid19: due for booster      Exercise: walking      The 10-year ASCVD risk score (Christofer HUITRON, et al., 2019) is: 0.6%    Values used to calculate the score:      Age: 44 years      Sex: Female      Is Non- : No      Diabetic: No      Tobacco smoker: No      Systolic Blood Pressure: 100 mmHg      Is BP treated: No      HDL Cholesterol: 54 mg/dL      Total Cholesterol: 248 mg/dL      Review of Systems   Psychiatric/Behavioral:  Negative for agitation and confusion.      Past medical history, surgical history, and family medical history reviewed and updated as appropriate.    Medications and allergies reviewed.     Objective:     Vitals:    25 0841   BP: 100/80   BP Location: Right arm   Patient Position: Sitting   Pulse: 72   Resp: 18   Temp: 97 °F (36.1 °C)   TempSrc: Temporal   SpO2: 99%   Weight: 120.7 kg (266 lb 1.5 oz)   Height: 5' 4" (1.626 m)     Body mass index is 45.68 kg/m².  Physical " Exam  Vitals reviewed.   Constitutional:       General: She is not in acute distress.     Appearance: She is well-developed.   HENT:      Head: Normocephalic and atraumatic.      Right Ear: Tympanic membrane normal.      Left Ear: Tympanic membrane normal.      Mouth/Throat:      Mouth: Mucous membranes are moist.      Pharynx: No oropharyngeal exudate.   Eyes:      General:         Right eye: No discharge.         Left eye: No discharge.      Conjunctiva/sclera: Conjunctivae normal.   Cardiovascular:      Rate and Rhythm: Normal rate and regular rhythm.      Heart sounds: No murmur heard.     No friction rub.   Pulmonary:      Effort: Pulmonary effort is normal.      Breath sounds: Normal breath sounds.   Abdominal:      General: Bowel sounds are normal. There is no distension.      Palpations: Abdomen is soft.      Tenderness: There is no abdominal tenderness. There is no guarding.   Musculoskeletal:         General: Normal range of motion.      Cervical back: Normal range of motion and neck supple.      Right lower leg: No edema.      Left lower leg: No edema.   Lymphadenopathy:      Cervical: No cervical adenopathy.   Skin:     General: Skin is warm and dry.   Neurological:      Mental Status: She is alert and oriented to person, place, and time.   Psychiatric:         Mood and Affect: Mood normal.         Behavior: Behavior normal.         Lab Results   Component Value Date    WBC 6.19 07/07/2025    HGB 13.4 07/07/2025    HCT 40.8 07/07/2025     07/07/2025    CHOL 248 (H) 07/07/2025    TRIG 265 (H) 07/07/2025    HDL 54 07/07/2025    ALT 12 07/07/2025    AST 13 07/07/2025     07/07/2025    K 4.1 07/07/2025     07/07/2025    CREATININE 0.8 07/07/2025    BUN 7 07/07/2025    CO2 30 (H) 07/07/2025    TSH 0.804 10/11/2024    INR 1.0 07/15/2013    HGBA1C 5.3 10/11/2024       Assessment:     1. Annual physical exam    2. Prediabetes    3. Class 3 obesity    4. Major depressive disorder, recurrent  episode, in partial remission with anxious distress    5. Encounter for screening mammogram for breast cancer      Plan:   Fran was seen today for annual exam.    Diagnoses and all orders for this visit:    Annual physical exam  -     TSH; Future  -     Hemoglobin A1C; Future    Prediabetes  -     TSH; Future  -     Hemoglobin A1C; Future    Class 3 obesity  -     TSH; Future  -     Hemoglobin A1C; Future    Major depressive disorder, recurrent episode, in partial remission with anxious distress  -     TSH; Future  -     Hemoglobin A1C; Future    Encounter for screening mammogram for breast cancer  -     Mammo Digital Screening Bilat w/ Patrick (XPD); Future    Other orders  -     venlafaxine (EFFEXOR) 75 MG tablet; Take 1 tablet (75 mg total) by mouth 2 (two) times daily.  -     metFORMIN (GLUCOPHAGE) 500 MG tablet; Take 1 tablet (500 mg total) by mouth daily with breakfast.        Rtc 1 year and prn     Erica Pineda MD  Internal Medicine    7/14/2025

## 2025-07-21 ENCOUNTER — LAB VISIT (OUTPATIENT)
Dept: LAB | Facility: HOSPITAL | Age: 45
End: 2025-07-21
Attending: HOSPITALIST
Payer: COMMERCIAL

## 2025-07-21 ENCOUNTER — RESULTS FOLLOW-UP (OUTPATIENT)
Dept: INTERNAL MEDICINE | Facility: CLINIC | Age: 45
End: 2025-07-21
Payer: COMMERCIAL

## 2025-07-21 DIAGNOSIS — F33.41 MAJOR DEPRESSIVE DISORDER, RECURRENT EPISODE, IN PARTIAL REMISSION WITH ANXIOUS DISTRESS: ICD-10-CM

## 2025-07-21 DIAGNOSIS — R73.03 PREDIABETES: ICD-10-CM

## 2025-07-21 DIAGNOSIS — E66.813 CLASS 3 OBESITY: ICD-10-CM

## 2025-07-21 DIAGNOSIS — Z00.00 ANNUAL PHYSICAL EXAM: ICD-10-CM

## 2025-07-21 LAB
EAG (OHS): 105 MG/DL (ref 68–131)
HBA1C MFR BLD: 5.3 % (ref 4–5.6)
TSH SERPL-ACNC: 0.5 UIU/ML (ref 0.4–4)

## 2025-07-21 PROCEDURE — 36415 COLL VENOUS BLD VENIPUNCTURE: CPT | Mod: PO

## 2025-07-21 PROCEDURE — 84443 ASSAY THYROID STIM HORMONE: CPT

## 2025-07-21 PROCEDURE — 83036 HEMOGLOBIN GLYCOSYLATED A1C: CPT

## 2025-07-22 DIAGNOSIS — L73.2 HIDRADENITIS SUPPURATIVA: ICD-10-CM

## 2025-07-22 RX ORDER — CLINDAMYCIN PHOSPHATE 10 UG/ML
LOTION TOPICAL
Qty: 120 ML | Refills: 0 | OUTPATIENT
Start: 2025-07-22

## 2025-07-23 DIAGNOSIS — L73.2 HIDRADENITIS SUPPURATIVA: ICD-10-CM

## 2025-07-23 RX ORDER — CLINDAMYCIN PHOSPHATE 10 UG/ML
LOTION TOPICAL
Qty: 120 ML | Refills: 0 | OUTPATIENT
Start: 2025-07-23

## 2025-07-23 NOTE — PRE-PROCEDURE INSTRUCTIONS
Anesthesia Pre-Op Instructions given:       *Please DO NOT take Wegovy for at least 7 days before your procedure.*       -- YOUR SURGEON'S OFFICE WILL PROVIDE YOUR ARRIVAL TIME  -- Medication information (what to hold and what to take)   -- NPO guidelines as follows: (or as per your Surgeon)  Stop ALL solid food, gum, candy 8 hours before arrival time.  Stop all CLOUDY liquids: coffee with creamer, cloudy juices, 8 hours prior to arrival time.  The patient should be ENCOURAGED to drink carbohydrate-rich clear liquids (sports drinks, clear juices) until 2 hours prior to arrival time.  Stop clear liquids 2 hours prior to arrival time.  CLEAR liquids include water, black coffee NO creamer, clear oral rehydration drinks, clear sports drinks and clear fruit juices (no orange juice, no pulpy juices, no apple cider).   IF IN DOUBT, drink water instead.   NOTHING TO DRINK 2 hours before surgery/procedure time. If you are told to take medication on the morning of surgery, it may be taken with a sip of water.   -- *Arrival place and directions given*.  Time to be given the day before procedure by the Surgeon's Office   -- Bathe with antibacterial soap (dial or Hibiclens as instructed)  -- Don't wear any jewelry or valuables and not metals on skin or hair AM of surgery   -- No makeup or moisturizer to face   -- No perfume/cologne, powder, lotions, aftershave or deodorant     Pt's NPO instructions given by surgeons office. Pt will follow surgeon's office NPO instructions, verified with Pt that there were no further questions at this time. Pt verbalized understanding.           *If going to , see below:      Directions and Instructions for [facility]   At [facility], we have an outstanding team of physicians, anesthesiologists, CRNAs, Registered Nurses, Surgical Technologists, and other ancillary team members all focused on your surgical and procedural care.   Before Your Procedure:   The  physician's office will call you with a specific arrival time and directions a day or two before your scheduled procedure. You may also receive these instructions through your [facility] portal.   Day of Procedure:   Please be sure to arrive at the arrival time given or you may risk your surgery being delayed or canceled. The arrival time is earlier than your scheduled surgery or procedure time. In the winter months please dress warm and bring blankets for you or your child as the waiting room may be cold. If you have difficulty locating the facility, please give us a call at [phone].   Directions:   The [facility] is located on the 1st floor of the hospital building near the [place] entrance.   Parking:   You will park in the South Parking Garage (note location on map). [facility] opens at 5:00 a.m. and has a drop off area by the entrance.  parking is available starting at 7:00 a.m. Please see below for further  parking instructions.   Directions from the parking garage elevators   Blue [facility] Elevators: From the parking garage, take the blue [facility] elevators (located in the center of the parking garage) to the 1st floor of the garage. You will then take a right once off the elevators then another right to the outside of the parking garage. You will be across from the [facility]. You will walk down the sidewalk, pass the  curve at the [place] entrance and continue to follow the sidewalk. You will pass the radiation oncology entrance on your right. Continue to follow the sidewalk to the [facility] glass door entrance.   Hospital Entrance (Inside Route): If a mostly inside route is preferred: Take the inside elevator bank (located at the far north end of the garage) from the parking garage to the 1st floor. On the 1st floor walk past [employer]. Keep walking down the center of the hallway towards the hospital elevators. Once you  reach the red brick bob, take a left and go past the hospital elevators. Take another left and follow the blue and white [facility] signs around the hallway to the end. Go outside of the door. You will see the [facility] Surgery [place] entrance to your right.   Drop Off:   There is a drop off area at the doors of the [facility] for your convenience. If utilized for pediatric patients, an adult must accompany the patient into the surgery center while another adult resendiz the vehicle.    (at 7:00 a.m.):   Upon check-in, please let the  know that you are utilizing [employer] parking which is free. The . will then call [employer] for your car to be picked up. Your keys and phone number will be collected and given to [employer] services. You will then be given a ticket. Upon discharge, [employer] will be notified to bring your vehicle back when you are ready.           Directions to [facility]:  [phone]     From 1st floor garage elevators: go past hospitals [employer] shop. Look for Black piano on side of coffee shop. Take Atrium (gold) elevator by piano up to 2nd floor. When you exit elevator follow long hallway (you should see a sign hanging from the ceiling that says Day of Surgery Family Waiting Room. When hallway ends you will be entering the day of surgery waiting area. Check in at the desk for your procedure.      From [facility] entrance: Make a right after you enter the door to the hospital. On your Left, take [place] elevator to 2nd floor. Check in at desk      From Lab desk on 2nd floor: Exit lab area toward hospital atrium. You should see a sign that says Day of Surgery Family Waiting Area. Make a Left and follow long hallway (you should see a sign hanging from the ceiling that says Day of Surgery Family Waiting Room. When hallway ends you will be entering the day of surgery waiting area. Check in at the desk for your procedure.

## 2025-07-24 ENCOUNTER — TELEPHONE (OUTPATIENT)
Dept: BARIATRICS | Facility: CLINIC | Age: 45
End: 2025-07-24
Payer: COMMERCIAL

## 2025-07-24 ENCOUNTER — ANESTHESIA EVENT (OUTPATIENT)
Dept: SURGERY | Facility: HOSPITAL | Age: 45
DRG: 621 | End: 2025-07-24
Payer: COMMERCIAL

## 2025-07-24 RX ORDER — SCOPOLAMINE 1 MG/3D
1 PATCH, EXTENDED RELEASE TRANSDERMAL
OUTPATIENT
Start: 2025-07-24 | End: 2025-07-24

## 2025-07-24 RX ORDER — ACETAMINOPHEN 500 MG
1000 TABLET ORAL
OUTPATIENT
Start: 2025-07-24 | End: 2025-07-24

## 2025-07-24 NOTE — TELEPHONE ENCOUNTER
Copied from CRM #[id number]. Topic: General Inquiry - Patient Advice  >> Jul 24, 2025 12:31 PM Jacquelyn wrote:  Type:  Patient Returning Call    Who Called: Fran Higgins     Who Left Message for Patient: Liberty     Does the patient know what this is regarding?: Yes     Would the patient rather a call back or a response via [facility]?  Call     Best Call Back Number: [phone]

## 2025-07-24 NOTE — TELEPHONE ENCOUNTER
Notified patient of arrival time to the [facility] at 0500 with expected surgery start time 0700 on 7/25/25. Instructed patient regarding pre-op instructions including no protein shakes or sugar free clear liquids after midnight but can have a rare sip of water for comfort, showering and preop medications to hold/take per anesthesia/preop. Reminded pt to drink pre-surgery beverage or 8 oz water and take one dose of Gabapentin at 0730. Instructed patient on the s/s of dehydration and for patient to call at the first sign of dehydration. Informed patient that someone from bariatrics will call them 1 week post op to review diet/fluid intake and to ensure adequate hydration. Reminded patient not to take antibiotics for 30 days following surgery or schedule elective procedures involving anesthesia/sedation for 30 days following surgery unless checking with the bariatric clinic first. Pt verbalized understanding. Pt given office phone number for any additional questions/concerns.

## 2025-07-24 NOTE — ANESTHESIA PREPROCEDURE EVALUATION
[facility]  Anesthesia Pre-Operative Evaluation         Patient Name: Fran Higgins  YOB: 1980  MRN: [id number]    SUBJECTIVE:     Pre-operative evaluation for Procedure(s) (LRB):  DV5 ROBOTIC MANNY PROCEDURE    SLEEVE TO MANNY (N/A)     07/24/2025    Fran Higgins is a 44 y.o. female w/ a significant PMHx of morbid obesity (BMI 45), chronic back pain, hydradenitis suppurativa, migraine with aura, pericardial cyst (not seen on recent Echo), GERD and s/p sleeve gastrectomy with Dr. Whitman (2021).    Patient now presents for the above procedure(s).    Prev airway:  Date/Time: 6/22/2021 7:06 AM  Intubation:     Induction:  Intravenous    Intubated:  Postinduction    Mask Ventilation:  Easy mask    Attempts:  1    Attempted By:  CRNA    Method of Intubation:  Video laryngoscopy    Blade:  Guillen 3    Laryngeal View Grade: Grade I - full view of chords      Difficult Airway Encountered?: No      Complications:  None    Airway Device:  Oral endotracheal tube    Airway Device Size:  7.0    Style/Cuff Inflation:  Cuffed    Inflation Amount (mL):  6    Tube secured:  21    Secured at:  The lips    Placement Verified By:  Colorimetric ETCO2 device    Complicating Factors:  Obesity    Findings Post-Intubation:  BS equal bilateral    Drips: None documented.    Problem List[1]    Review of patient's allergies indicates:  No Known Allergies    Current Outpatient Medications:  Current Medications[2]    Past Surgical History:   Procedure Laterality Date    EPIDURAL STEROID INJECTION INTO CERVICAL SPINE N/A 6/5/2025    Procedure: C6/7 ANGEL;  Surgeon: Arun Garcia MD;  Location: [facility];  Service: Pain Management;  Laterality: N/A;  Wegov 7 days no AC    ESOPHAGOGASTRODUODENOSCOPY N/A 2/3/2025    Procedure: EGD (ESOPHAGOGASTRODUODENOSCOPY);  Surgeon: Rajendra Bustamante MD;  Location: [address]);  Service: Endoscopy;  Laterality: N/A;  1/6 ref by Samantha Quezada,  NP, Wegovy, BMI 49.61, portal. betty  1/28-pt r/s,BMI 48.41, last dose of prilosec 1/19, last dose of wegovy 1/25, no allergy or sensitivity to jewelry/metal per pt, updated instructions sent to [facility] in [place]  1/29/25: attempted precall /    HYSTERECTOMY  03/2020    INJECTION, SPINE, LUMBOSACRAL, TRANSFORAMINAL APPROACH Right 10/24/2024    Procedure: TFESI RT L4/5, L5/S1;  Surgeon: Arun Garcia MD;  Location: [facility];  Service: Pain Management;  Laterality: Right;  No ac    LAPAROSCOPIC SLEEVE GASTRECTOMY N/A 6/22/2021    Procedure: GASTRECTOMY, SLEEVE, LAPAROSCOPIC; with intraoperative egd;  Surgeon: Jaziel Whitman Jr., MD;  Location: [address];  Service: General;  Laterality: N/A;    PH MONITORING, ESOPHAGUS, WIRELESS, (OFF REFLUX MEDS) N/A 2/3/2025    Procedure: PH MONITORING, ESOPHAGUS, WIRELESS, (OFF REFLUX MEDS);  Surgeon: Rajendra Bustamante MD;  Location: [address]);  Service: Endoscopy;  Laterality: N/A;  48 Hour  Off PPI/H2 Blocker    ROBOT-ASSISTED LAPAROSCOPIC ABDOMINAL HYSTERECTOMY USING DA ROMAIN XI N/A 3/9/2020    Procedure: XI ROBOTIC HYSTERECTOMY;  Surgeon: Jeff Kaufman MD;  Location: [address];  Service: Oncology;  Laterality: N/A;    ROBOT-ASSISTED LAPAROSCOPIC SALPINGO-OOPHORECTOMY Left 6/25/2019    Procedure: ROBOTIC SALPINGO-OOPHORECTOMY;  Surgeon: Marky Fox Jr., MD;  Location: [facility];  Service: OB/GYN;  Laterality: Left;    SALPINGOOPHORECTOMY Right 3/9/2020    Procedure: SALPINGO-OOPHORECTOMY  USO;  Surgeon: Jeff Kaufman MD;  Location: [address];  Service: Oncology;  Laterality: Right;    TONSILLECTOMY         Social History[3]    OBJECTIVE:     Vital Signs Range (Last 24H):         Significant Labs:  Lab Results   Component Value Date    WBC 6.19 07/07/2025    HGB 13.4 07/07/2025    HCT 40.8 07/07/2025     07/07/2025    CHOL 248 (H) 07/07/2025    TRIG 265 (H) 07/07/2025    HDL 54 07/07/2025    ALT 12 07/07/2025    AST 13 07/07/2025      07/07/2025    K 4.1 07/07/2025     07/07/2025    CREATININE 0.8 07/07/2025    BUN 7 07/07/2025    CO2 30 (H) 07/07/2025    TSH 0.503 07/21/2025    INR 1.0 07/15/2013    HGBA1C 5.3 07/21/2025       Diagnostic Studies: No relevant studies.    EKG:   Results for orders placed or performed during the hospital encounter of 10/14/24   EKG    Collection Time: 10/14/24  2:15 PM   Result Value Ref Range    QRS Duration 108 ms    OHS QTC Calculation 441 ms    Narrative    Test Reason : Z98.84,E66.813,E66.01,Z68.43,K21.9,    Vent. Rate : 077 BPM     Atrial Rate : 077 BPM     P-R Int : 138 ms          QRS Dur : 108 ms      QT Int : 390 ms       P-R-T Axes : 056 041 031 degrees     QTc Int : 441 ms    Normal sinus rhythm  Normal ECG  When compared with ECG of 01-APR-2024 13:15,  No significant change was found  Confirmed by Abhay Noe MD ([id number]) on 10/14/2024 4:04:35 PM    Referred By: JANEY MANZANARES           Confirmed By:Abhay Noe MD       2D ECHO:  TTE 6/03/2024:    Left Ventricle: The left ventricle is normal in size. Normal wall thickness. There is normal systolic function with a visually estimated ejection fraction of 55 - 60%. There is normal diastolic function.    Right Ventricle: Normal right ventricular cavity size. Wall thickness is normal. Systolic function is normal.    Aortic Valve: The aortic valve is a trileaflet valve.    Mitral Valve: There is no stenosis. The mean pressure gradient across the mitral valve is 1 mmHg at a heart rate of 75 bpm.    IVC/SVC: Normal venous pressure at 3 mmHg.    Pericardial cyst unable to be visualized      KARIN:  No results found. However, due to the size of the patient record, not all encounters were searched. Please check Results Review for a complete set of results.    ASSESSMENT/PLAN:           Pre-op Assessment    I have reviewed the Patient Summary Reports.     I have reviewed the Nursing Notes. I have reviewed the NPO Status.   I have reviewed the Medications.      Review of Systems  Anesthesia Hx:  No problems with previous Anesthesia   History of prior surgery of interest to airway management or planning: gastric bypass. Previous anesthesia: General        Denies Family Hx of Anesthesia complications.    Denies Personal Hx of Anesthesia complications.                    Social:  Non-Smoker, Alcohol Use       Hematology/Oncology:       -- Denies Anemia:                  Denies Current/Recent Cancer                Cardiovascular:     Denies Pacemaker.  Denies Hypertension.   Denies MI.  Denies CAD.    Denies CABG/stent.            Pericardial cyst Patient not on beta blockers                          Pulmonary:    Denies COPD.  Denies Asthma.     Denies Sleep Apnea.                Hepatic/GI:      Denies GERD.   S/p sleeve gastrectomy Not Taking GLP-1 Agonists            Musculoskeletal:  Denies Arthritis.               Neurological:    Denies CVA.    Denies Headaches. Denies Seizures.                                       Anesthesia Plan  Type of Anesthesia, risks & benefits discussed:    Anesthesia Type: Gen ETT  Intra-op Monitoring Plan: Standard ASA Monitors  Post Op Pain Control Plan: multimodal analgesia and IV/PO Opioids PRN  Induction:  IV  Airway Plan: Direct and Video, Post-Induction  Informed Consent: Informed consent signed with the Patient and all parties understand the risks and agree with anesthesia plan.  All questions answered.   ASA Score: 3  Day of Surgery Review of History & Physical: H&P Update referred to the surgeon/provider.    Ready For Surgery From Anesthesia Perspective.     .           [1]   Patient Active Problem List  Diagnosis    Depression    Left ovarian cyst    S/P RA Laparoscopic LSO    Mucinous tumor, of low malignant potential    Pericardial cyst    Scoliosis of cervical spine    Abnormal uterine bleeding (AUB)    DUB (dysfunctional uterine bleeding)    Migraine with aura, not intractable    s/p robotic hysterectomy/RSO 3/9/20     Borderline epithelial neoplasm of ovary    Surgical menopause    Menopause    Dizziness    Galactorrhea in female- bilateral breast    Other insomnia    Major depressive disorder, recurrent episode, in partial remission with anxious distress    Obesity    S/P laparoscopic sleeve gastrectomy    BMI 45.0-49.9, adult    Hidradenitis suppurativa    Seborrheic dermatitis    Complicated bereavement    Lumbar pain   [2] No current facility-administered medications for this encounter.    Current Outpatient Medications:     metFORMIN (GLUCOPHAGE) 500 MG tablet, Take 1 tablet (500 mg total) by mouth daily with breakfast., Disp: 30 tablet, Rfl: 1    omeprazole (PRILOSEC) 40 MG capsule, Take 1 capsule (40 mg total) by mouth every morning. Open capsule and take with apple sauce, Disp: 30 capsule, Rfl: 2    spironolactone (ALDACTONE) 100 MG tablet, Take 1 tablet by mouth every day, Disp: 90 tablet, Rfl: 1    venlafaxine (EFFEXOR) 75 MG tablet, Take 1 tablet (75 mg total) by mouth 2 (two) times daily., Disp: 60 tablet, Rfl: 1    venlafaxine (EFFEXOR-XR) 150 MG Cp24, Take 1 capsule (150 mg total) by mouth once daily., Disp: 90 capsule, Rfl: 3    acetaminophen (TYLENOL) 500 mg powder, Take 2 packets (1,000 mg total) by mouth 3 (three) times daily for at least 3 days and up to 5 days as needed for pain. May take an additional packet (500mg) once daily if required for pain. Do not take more than 4,000mg in 24 hours., Disp: 35 packet, Rfl: 0    adalimumab-adaz 40 mg/0.4 mL PnIj, Inject 0.4 mLs (40 mg total) into the skin every 7 days., Disp: 4 pen , Rfl: 4    ALPRAZolam (XANAX) 0.5 MG tablet, Take 1 tablet (0.5 mg total) by mouth nightly as needed for Insomnia or Anxiety., Disp: 30 tablet, Rfl: 2    b complex vitamins capsule, Take 1 capsule by mouth once daily., Disp: , Rfl:     CALCIUM CITRATE ORAL, Take 1 tablet by mouth 3 (three) times daily. chewable, Disp: , Rfl:     clindamycin (CLEOCIN T) 1 % lotion, Apply to affected area two  times a day., Disp: 120 mL, Rfl: 0    cyclobenzaprine (FLEXERIL) 5 MG tablet, Take 1 tablet (5 mg total) by mouth 2 (two) times daily as needed for Muscle spasms., Disp: 60 tablet, Rfl: 5    estradioL (ESTRACE) 2 MG tablet, Take 1 tablet (2 mg total) by mouth once daily., Disp: 30 tablet, Rfl: 2    metFORMIN (GLUCOPHAGE-XR) 500 MG ER 24hr tablet, Take 2 tablets by mouth daily., Disp: 180 tablet, Rfl: 1    multivitamin capsule, Take by mouth once daily., Disp: , Rfl:     ondansetron (ZOFRAN-ODT) 8 MG TbDL, Dissolve 1 tablet (8 mg total) by mouth every 6 (six) hours as needed (Nausea)., Disp: 30 tablet, Rfl: 0    polyethylene glycol (GLYCOLAX) 17 gram/dose powder, Use to cap to measure 17g, mix with liquid, and take by mouth once daily., Disp: 510 g, Rfl: 0    pregabalin (LYRICA) 50 MG capsule, Take 1 capsule (50 mg total) by mouth every evening for 14 days, THEN 1 capsule (50 mg total) 2 (two) times daily for 16 days., Disp: 46 capsule, Rfl: 0    promethazine (PHENERGAN) 12.5 MG Supp, Place 1 suppository (12.5 mg total) rectally every 6 (six) hours as needed (nausea, 2nd line)., Disp: 5 suppository, Rfl: 0    semaglutide, weight loss, (WEGOVY) 2.4 mg/0.75 mL PnIj, Inject 2.4 mg into the skin every 7 days., Disp: 3 mL, Rfl: 5    thiamine (VITAMIN B-1) 50 MG tablet, Take 50 mg by mouth once daily., Disp: , Rfl:     tiZANidine (ZANAFLEX) 2 MG tablet, Take 1 tablet (2 mg total) by mouth every 8 (eight) hours. For post-op muscle spasms / pain. for 5 days, Disp: 15 tablet, Rfl: 0    ubrogepant (UBRELVY) 100 mg tablet, Take 1 tablet by mouth at the onset of a headache. May repeat based on response and tolerability after more than 2 hours if needed. Do not take more than 200mg in a 24 hour span., Disp: 16 tablet, Rfl: 5    ubrogepant (UBRELVY) 100 mg tablet, Take 1 tablet by mouth at the onset of a headache. May repeat based on response and tolerability after more than 2 hours if needed. Do not take more than 200mg in a 24  hour span., Disp: 16 tablet, Rfl: 5    ursodioL (BARBARA FORTE) 500 MG tablet, Take 1 tablet (500 mg total) by mouth once daily. For gallstone prevention., Disp: 180 tablet, Rfl: 0  [3]   Social History  Socioeconomic History    Marital status: Single    Number of children: 1   Occupational History    Occupation: Nurse circulator   Tobacco Use    Smoking status: Never     Passive exposure: Never    Smokeless tobacco: Never   Substance and Sexual Activity    Alcohol use: Yes     Comment: 2-3 drinks twice a year    Drug use: No    Sexual activity: Not Currently     Partners: Male     Birth control/protection: None, Condom   Other Topics Concern    Are you pregnant or think you may be? No    Breast-feeding No     Social Drivers of Health     Financial Resource Strain: Low Risk  (1/6/2025)    Overall Financial Resource Strain (CARDIA)     Difficulty of Paying Living Expenses: Not very hard   Food Insecurity: No Food Insecurity (1/6/2025)    Hunger Vital Sign     Worried About Running Out of Food in the Last Year: Never true     Ran Out of Food in the Last Year: Never true   Transportation Needs: Patient Declined (11/17/2023)    PRAPARE - Transportation     Lack of Transportation (Medical): Patient declined     Lack of Transportation (Non-Medical): Patient declined   Physical Activity: Insufficiently Active (1/6/2025)    Exercise Vital Sign     Days of Exercise per Week: 4 days     Minutes of Exercise per Session: 30 min   Stress: No Stress Concern Present (1/6/2025)    [employer] [place] [employer] - Occupational Stress Questionnaire     Feeling of Stress : Only a little   Housing Stability: Low Risk  (11/17/2023)    Housing Stability Vital Sign     Unable to Pay for Housing in the Last Year: No     Number of Places Lived in the Last Year: 1     Unstable Housing in the Last Year: No

## 2025-07-24 NOTE — TELEPHONE ENCOUNTER
Called pt to provide surgery times and reminders.  LVM for RC. Will continue to reach out to her.

## 2025-07-25 ENCOUNTER — ANESTHESIA (OUTPATIENT)
Dept: SURGERY | Facility: HOSPITAL | Age: 45
DRG: 621 | End: 2025-07-25
Payer: COMMERCIAL

## 2025-07-25 ENCOUNTER — HOSPITAL ENCOUNTER (INPATIENT)
Facility: HOSPITAL | Age: 45
LOS: 1 days | Discharge: HOME | DRG: 621 | End: 2025-07-26
Attending: SURGERY | Admitting: SURGERY
Payer: COMMERCIAL

## 2025-07-25 DIAGNOSIS — Z98.84 S/P BARIATRIC SURGERY: ICD-10-CM

## 2025-07-25 DIAGNOSIS — E66.813 CLASS 3 SEVERE OBESITY DUE TO EXCESS CALORIES WITH SERIOUS COMORBIDITY AND BODY MASS INDEX (BMI) OF 45.0 TO 49.9 IN ADULT: Primary | ICD-10-CM

## 2025-07-25 DIAGNOSIS — K21.9 GASTROESOPHAGEAL REFLUX DISEASE WITHOUT ESOPHAGITIS: ICD-10-CM

## 2025-07-25 PROCEDURE — 36000712 HC OR TIME LEV V 1ST 15 MIN: Performed by: SURGERY

## 2025-07-25 PROCEDURE — 0D194ZB BYPASS DUODENUM TO ILEUM, PERCUTANEOUS ENDOSCOPIC APPROACH: ICD-10-PCS | Performed by: SURGERY

## 2025-07-25 PROCEDURE — 25000003 PHARM REV CODE 250: Performed by: SURGERY

## 2025-07-25 PROCEDURE — 8E0W4CZ ROBOTIC ASSISTED PROCEDURE OF TRUNK REGION, PERCUTANEOUS ENDOSCOPIC APPROACH: ICD-10-PCS | Performed by: SURGERY

## 2025-07-25 PROCEDURE — 71000033 HC RECOVERY, INTIAL HOUR: Performed by: SURGERY

## 2025-07-25 PROCEDURE — 0DB64Z3 EXCISION OF STOMACH, PERCUTANEOUS ENDOSCOPIC APPROACH, VERTICAL: ICD-10-PCS | Performed by: SURGERY

## 2025-07-25 PROCEDURE — 25000242 PHARM REV CODE 250 ALT 637 W/ HCPCS: Performed by: SURGERY

## 2025-07-25 PROCEDURE — 25000003 PHARM REV CODE 250: Performed by: NURSE ANESTHETIST, CERTIFIED REGISTERED

## 2025-07-25 PROCEDURE — 37000009 HC ANESTHESIA EA ADD 15 MINS: Performed by: SURGERY

## 2025-07-25 PROCEDURE — 63600175 PHARM REV CODE 636 W HCPCS: Performed by: SURGERY

## 2025-07-25 PROCEDURE — 11000001 HC ACUTE MED/SURG PRIVATE ROOM

## 2025-07-25 PROCEDURE — 37000008 HC ANESTHESIA 1ST 15 MINUTES: Performed by: SURGERY

## 2025-07-25 PROCEDURE — 71000016 HC POSTOP RECOV ADDL HR: Performed by: SURGERY

## 2025-07-25 PROCEDURE — D9220A PRA ANESTHESIA: Mod: ANES,,, | Performed by: ANESTHESIOLOGY

## 2025-07-25 PROCEDURE — D9220A PRA ANESTHESIA: Mod: CRNA,,, | Performed by: NURSE ANESTHETIST, CERTIFIED REGISTERED

## 2025-07-25 PROCEDURE — 94761 N-INVAS EAR/PLS OXIMETRY MLT: CPT

## 2025-07-25 PROCEDURE — 63600175 PHARM REV CODE 636 W HCPCS: Performed by: NURSE ANESTHETIST, CERTIFIED REGISTERED

## 2025-07-25 PROCEDURE — 36000713 HC OR TIME LEV V EA ADD 15 MIN: Performed by: SURGERY

## 2025-07-25 PROCEDURE — 71000015 HC POSTOP RECOV 1ST HR: Performed by: SURGERY

## 2025-07-25 PROCEDURE — 27201423 OPTIME MED/SURG SUP & DEVICES STERILE SUPPLY: Performed by: SURGERY

## 2025-07-25 PROCEDURE — 63600175 PHARM REV CODE 636 W HCPCS

## 2025-07-25 DEVICE — IMPLANTABLE DEVICE: Type: IMPLANTABLE DEVICE | Site: ABDOMEN | Status: FUNCTIONAL

## 2025-07-25 RX ORDER — SODIUM CHLORIDE, SODIUM LACTATE, POTASSIUM CHLORIDE, CALCIUM CHLORIDE 600; 310; 30; 20 MG/100ML; MG/100ML; MG/100ML; MG/100ML
INJECTION, SOLUTION INTRAVENOUS CONTINUOUS
Status: DISCONTINUED | OUTPATIENT
Start: 2025-07-25 | End: 2025-07-26 | Stop reason: HOSPADM

## 2025-07-25 RX ORDER — OXYCODONE HCL 5 MG/5 ML
5 SOLUTION, ORAL ORAL EVERY 6 HOURS PRN
Status: DISCONTINUED | OUTPATIENT
Start: 2025-07-25 | End: 2025-07-26 | Stop reason: HOSPADM

## 2025-07-25 RX ORDER — ACETAMINOPHEN 10 MG/ML
1000 INJECTION, SOLUTION INTRAVENOUS ONCE
Status: COMPLETED | OUTPATIENT
Start: 2025-07-25 | End: 2025-07-25

## 2025-07-25 RX ORDER — LIDOCAINE HYDROCHLORIDE 10 MG/ML
1 INJECTION, SOLUTION EPIDURAL; INFILTRATION; INTRACAUDAL; PERINEURAL ONCE
Status: DISCONTINUED | OUTPATIENT
Start: 2025-07-25 | End: 2025-07-25 | Stop reason: HOSPADM

## 2025-07-25 RX ORDER — PANTOPRAZOLE SODIUM 40 MG/10ML
40 INJECTION, POWDER, LYOPHILIZED, FOR SOLUTION INTRAVENOUS 2 TIMES DAILY
Status: DISCONTINUED | OUTPATIENT
Start: 2025-07-25 | End: 2025-07-26 | Stop reason: HOSPADM

## 2025-07-25 RX ORDER — ENOXAPARIN SODIUM 100 MG/ML
40 INJECTION SUBCUTANEOUS EVERY 12 HOURS
Status: DISCONTINUED | OUTPATIENT
Start: 2025-07-25 | End: 2025-07-26 | Stop reason: HOSPADM

## 2025-07-25 RX ORDER — KETOROLAC TROMETHAMINE 15 MG/ML
15 INJECTION, SOLUTION INTRAMUSCULAR; INTRAVENOUS ONCE
Status: COMPLETED | OUTPATIENT
Start: 2025-07-25 | End: 2025-07-25

## 2025-07-25 RX ORDER — FAMOTIDINE 10 MG/ML
20 INJECTION, SOLUTION INTRAVENOUS ONCE
Status: COMPLETED | OUTPATIENT
Start: 2025-07-25 | End: 2025-07-25

## 2025-07-25 RX ORDER — MUPIROCIN 20 MG/G
OINTMENT TOPICAL
Status: DISCONTINUED | OUTPATIENT
Start: 2025-07-25 | End: 2025-07-25 | Stop reason: HOSPADM

## 2025-07-25 RX ORDER — DEXAMETHASONE SODIUM PHOSPHATE 4 MG/ML
INJECTION, SOLUTION INTRA-ARTICULAR; INTRALESIONAL; INTRAMUSCULAR; INTRAVENOUS; SOFT TISSUE
Status: DISCONTINUED | OUTPATIENT
Start: 2025-07-25 | End: 2025-07-25

## 2025-07-25 RX ORDER — LIDOCAINE HYDROCHLORIDE 20 MG/ML
INJECTION INTRAVENOUS
Status: DISCONTINUED | OUTPATIENT
Start: 2025-07-25 | End: 2025-07-25

## 2025-07-25 RX ORDER — METRONIDAZOLE 500 MG/100ML
500 INJECTION, SOLUTION INTRAVENOUS
Status: COMPLETED | OUTPATIENT
Start: 2025-07-25 | End: 2025-07-25

## 2025-07-25 RX ORDER — KETAMINE HCL IN 0.9 % NACL 50 MG/5 ML
SYRINGE (ML) INTRAVENOUS
Status: DISCONTINUED | OUTPATIENT
Start: 2025-07-25 | End: 2025-07-25

## 2025-07-25 RX ORDER — ONDANSETRON HYDROCHLORIDE 2 MG/ML
8 INJECTION, SOLUTION INTRAVENOUS EVERY 6 HOURS
Status: DISCONTINUED | OUTPATIENT
Start: 2025-07-25 | End: 2025-07-26 | Stop reason: HOSPADM

## 2025-07-25 RX ORDER — LIDOCAINE HYDROCHLORIDE AND EPINEPHRINE 10; 10 UG/ML; MG/ML
INJECTION, SOLUTION INFILTRATION; PERINEURAL
Status: DISCONTINUED | OUTPATIENT
Start: 2025-07-25 | End: 2025-07-25 | Stop reason: HOSPADM

## 2025-07-25 RX ORDER — INDOCYANINE GREEN AND WATER 25 MG
KIT INJECTION
Status: DISCONTINUED | OUTPATIENT
Start: 2025-07-25 | End: 2025-07-25

## 2025-07-25 RX ORDER — GABAPENTIN 250 MG/5ML
300 SOLUTION ORAL 2 TIMES DAILY
Status: DISCONTINUED | OUTPATIENT
Start: 2025-07-25 | End: 2025-07-26 | Stop reason: HOSPADM

## 2025-07-25 RX ORDER — SCOPOLAMINE 1 MG/3D
1 PATCH, EXTENDED RELEASE TRANSDERMAL ONCE
Status: DISCONTINUED | OUTPATIENT
Start: 2025-07-25 | End: 2025-07-26 | Stop reason: HOSPADM

## 2025-07-25 RX ORDER — GLUCAGON 1 MG
1 KIT INJECTION
Status: DISCONTINUED | OUTPATIENT
Start: 2025-07-25 | End: 2025-07-25 | Stop reason: HOSPADM

## 2025-07-25 RX ORDER — BUPIVACAINE HYDROCHLORIDE 2.5 MG/ML
INJECTION, SOLUTION EPIDURAL; INFILTRATION; INTRACAUDAL; PERINEURAL
Status: DISCONTINUED | OUTPATIENT
Start: 2025-07-25 | End: 2025-07-25 | Stop reason: HOSPADM

## 2025-07-25 RX ORDER — ONDANSETRON HYDROCHLORIDE 2 MG/ML
INJECTION, SOLUTION INTRAVENOUS
Status: DISCONTINUED | OUTPATIENT
Start: 2025-07-25 | End: 2025-07-25

## 2025-07-25 RX ORDER — PROCHLORPERAZINE EDISYLATE 5 MG/ML
5 INJECTION INTRAMUSCULAR; INTRAVENOUS EVERY 6 HOURS PRN
Status: DISCONTINUED | OUTPATIENT
Start: 2025-07-25 | End: 2025-07-26 | Stop reason: HOSPADM

## 2025-07-25 RX ORDER — SUCCINYLCHOLINE CHLORIDE 20 MG/ML
INJECTION INTRAMUSCULAR; INTRAVENOUS
Status: DISCONTINUED | OUTPATIENT
Start: 2025-07-25 | End: 2025-07-25

## 2025-07-25 RX ORDER — SODIUM CHLORIDE 9 MG/ML
INJECTION, SOLUTION INTRAVENOUS CONTINUOUS
Status: DISCONTINUED | OUTPATIENT
Start: 2025-07-25 | End: 2025-07-26

## 2025-07-25 RX ORDER — MIDAZOLAM HYDROCHLORIDE 1 MG/ML
INJECTION INTRAMUSCULAR; INTRAVENOUS
Status: DISCONTINUED | OUTPATIENT
Start: 2025-07-25 | End: 2025-07-25

## 2025-07-25 RX ORDER — HALOPERIDOL LACTATE 5 MG/ML
0.5 INJECTION, SOLUTION INTRAMUSCULAR EVERY 10 MIN PRN
Status: DISCONTINUED | OUTPATIENT
Start: 2025-07-25 | End: 2025-07-25 | Stop reason: HOSPADM

## 2025-07-25 RX ORDER — FENTANYL CITRATE 50 UG/ML
25 INJECTION, SOLUTION INTRAMUSCULAR; INTRAVENOUS EVERY 5 MIN PRN
Status: DISCONTINUED | OUTPATIENT
Start: 2025-07-25 | End: 2025-07-25 | Stop reason: HOSPADM

## 2025-07-25 RX ORDER — ACETAMINOPHEN 650 MG/20.3ML
1000 LIQUID ORAL EVERY 8 HOURS
Status: DISCONTINUED | OUTPATIENT
Start: 2025-07-25 | End: 2025-07-26 | Stop reason: HOSPADM

## 2025-07-25 RX ORDER — PROPOFOL 10 MG/ML
VIAL (ML) INTRAVENOUS
Status: DISCONTINUED | OUTPATIENT
Start: 2025-07-25 | End: 2025-07-25

## 2025-07-25 RX ORDER — ROCURONIUM BROMIDE 10 MG/ML
INJECTION, SOLUTION INTRAVENOUS
Status: DISCONTINUED | OUTPATIENT
Start: 2025-07-25 | End: 2025-07-25

## 2025-07-25 RX ORDER — HEPARIN SODIUM 5000 [USP'U]/ML
5000 INJECTION, SOLUTION INTRAVENOUS; SUBCUTANEOUS ONCE
Status: COMPLETED | OUTPATIENT
Start: 2025-07-25 | End: 2025-07-25

## 2025-07-25 RX ORDER — METOCLOPRAMIDE HYDROCHLORIDE 5 MG/ML
10 INJECTION INTRAMUSCULAR; INTRAVENOUS EVERY 6 HOURS
Status: DISCONTINUED | OUTPATIENT
Start: 2025-07-25 | End: 2025-07-26 | Stop reason: HOSPADM

## 2025-07-25 RX ORDER — PROCHLORPERAZINE EDISYLATE 5 MG/ML
5 INJECTION INTRAMUSCULAR; INTRAVENOUS EVERY 30 MIN PRN
Status: DISCONTINUED | OUTPATIENT
Start: 2025-07-25 | End: 2025-07-25 | Stop reason: HOSPADM

## 2025-07-25 RX ORDER — ACETAMINOPHEN 650 MG/20.3ML
500 LIQUID ORAL
Status: DISCONTINUED | OUTPATIENT
Start: 2025-07-25 | End: 2025-07-26 | Stop reason: HOSPADM

## 2025-07-25 RX ORDER — FENTANYL CITRATE 50 UG/ML
INJECTION, SOLUTION INTRAMUSCULAR; INTRAVENOUS
Status: DISCONTINUED | OUTPATIENT
Start: 2025-07-25 | End: 2025-07-25

## 2025-07-25 RX ADMIN — Medication 20 MG: at 07:07

## 2025-07-25 RX ADMIN — MIDAZOLAM HYDROCHLORIDE 2 MG: 2 INJECTION, SOLUTION INTRAMUSCULAR; INTRAVENOUS at 07:07

## 2025-07-25 RX ADMIN — PROPOFOL 200 MG: 10 INJECTION, EMULSION INTRAVENOUS at 07:07

## 2025-07-25 RX ADMIN — FENTANYL CITRATE 50 MCG: 50 INJECTION, SOLUTION INTRAMUSCULAR; INTRAVENOUS at 08:07

## 2025-07-25 RX ADMIN — MUPIROCIN: 20 OINTMENT TOPICAL at 06:07

## 2025-07-25 RX ADMIN — ROCURONIUM BROMIDE 20 MG: 10 INJECTION, SOLUTION INTRAVENOUS at 07:07

## 2025-07-25 RX ADMIN — SODIUM CHLORIDE 3 G: 9 INJECTION, SOLUTION INTRAVENOUS at 07:07

## 2025-07-25 RX ADMIN — SUGAMMADEX 400 MG: 100 INJECTION, SOLUTION INTRAVENOUS at 10:07

## 2025-07-25 RX ADMIN — FENTANYL CITRATE 50 MCG: 50 INJECTION, SOLUTION INTRAMUSCULAR; INTRAVENOUS at 07:07

## 2025-07-25 RX ADMIN — Medication 10 MG: at 10:07

## 2025-07-25 RX ADMIN — GABAPENTIN 300 MG: 250 SOLUTION ORAL at 08:07

## 2025-07-25 RX ADMIN — HALOPERIDOL LACTATE 0.5 MG: 5 INJECTION, SOLUTION INTRAMUSCULAR at 11:07

## 2025-07-25 RX ADMIN — PANTOPRAZOLE SODIUM 40 MG: 40 INJECTION, POWDER, FOR SOLUTION INTRAVENOUS at 10:07

## 2025-07-25 RX ADMIN — ACETAMINOPHEN 999.01 MG: 650 SOLUTION ORAL at 01:07

## 2025-07-25 RX ADMIN — SIMETHICONE 40 MG: 20 SUSPENSION/ DROPS ORAL at 09:07

## 2025-07-25 RX ADMIN — DEXAMETHASONE SODIUM PHOSPHATE 8 MG: 4 INJECTION, SOLUTION INTRAMUSCULAR; INTRAVENOUS at 07:07

## 2025-07-25 RX ADMIN — SCOPOLAMINE 1 PATCH: 1.5 PATCH, EXTENDED RELEASE TRANSDERMAL at 06:07

## 2025-07-25 RX ADMIN — KETOROLAC TROMETHAMINE 15 MG: 15 INJECTION INTRAMUSCULAR at 06:07

## 2025-07-25 RX ADMIN — LIDOCAINE HYDROCHLORIDE 100 MG: 20 INJECTION INTRAVENOUS at 07:07

## 2025-07-25 RX ADMIN — OXYCODONE HYDROCHLORIDE 5 MG: 5 SOLUTION ORAL at 12:07

## 2025-07-25 RX ADMIN — ROCURONIUM BROMIDE 45 MG: 10 INJECTION, SOLUTION INTRAVENOUS at 07:07

## 2025-07-25 RX ADMIN — INDOCYANINE GREEN AND WATER 12.5 MG: KIT at 09:07

## 2025-07-25 RX ADMIN — SODIUM CHLORIDE: 0.9 INJECTION, SOLUTION INTRAVENOUS at 06:07

## 2025-07-25 RX ADMIN — SODIUM CHLORIDE, SODIUM GLUCONATE, SODIUM ACETATE, POTASSIUM CHLORIDE, MAGNESIUM CHLORIDE, SODIUM PHOSPHATE, DIBASIC, AND POTASSIUM PHOSPHATE: .53; .5; .37; .037; .03; .012; .00082 INJECTION, SOLUTION INTRAVENOUS at 08:07

## 2025-07-25 RX ADMIN — HEPARIN SODIUM 5000 UNITS: 5000 INJECTION INTRAVENOUS; SUBCUTANEOUS at 06:07

## 2025-07-25 RX ADMIN — SUCCINYLCHOLINE CHLORIDE 180 MG: 20 INJECTION, SOLUTION INTRAMUSCULAR; INTRAVENOUS; PARENTERAL at 07:07

## 2025-07-25 RX ADMIN — Medication 10 MG: at 08:07

## 2025-07-25 RX ADMIN — OXYCODONE HYDROCHLORIDE 5 MG: 5 SOLUTION ORAL at 09:07

## 2025-07-25 RX ADMIN — METRONIDAZOLE 500 MG: 500 INJECTION, SOLUTION INTRAVENOUS at 07:07

## 2025-07-25 RX ADMIN — PANTOPRAZOLE SODIUM 40 MG: 40 INJECTION, POWDER, FOR SOLUTION INTRAVENOUS at 09:07

## 2025-07-25 RX ADMIN — ACETAMINOPHEN 999.01 MG: 650 SOLUTION ORAL at 08:07

## 2025-07-25 RX ADMIN — SUGAMMADEX 200 MG: 100 INJECTION, SOLUTION INTRAVENOUS at 10:07

## 2025-07-25 RX ADMIN — FAMOTIDINE 20 MG: 10 INJECTION, SOLUTION INTRAVENOUS at 06:07

## 2025-07-25 RX ADMIN — ONDANSETRON 4 MG: 2 INJECTION INTRAMUSCULAR; INTRAVENOUS at 12:07

## 2025-07-25 RX ADMIN — ONDANSETRON 4 MG: 2 INJECTION INTRAMUSCULAR; INTRAVENOUS at 10:07

## 2025-07-25 RX ADMIN — METOCLOPRAMIDE 10 MG: 5 INJECTION, SOLUTION INTRAMUSCULAR; INTRAVENOUS at 04:07

## 2025-07-25 RX ADMIN — SODIUM CHLORIDE, SODIUM GLUCONATE, SODIUM ACETATE, POTASSIUM CHLORIDE, MAGNESIUM CHLORIDE, SODIUM PHOSPHATE, DIBASIC, AND POTASSIUM PHOSPHATE: .53; .5; .37; .037; .03; .012; .00082 INJECTION, SOLUTION INTRAVENOUS at 07:07

## 2025-07-25 RX ADMIN — SODIUM CHLORIDE, POTASSIUM CHLORIDE, SODIUM LACTATE AND CALCIUM CHLORIDE: 600; 310; 30; 20 INJECTION, SOLUTION INTRAVENOUS at 09:07

## 2025-07-25 RX ADMIN — ROCURONIUM BROMIDE 20 MG: 10 INJECTION, SOLUTION INTRAVENOUS at 09:07

## 2025-07-25 RX ADMIN — ENOXAPARIN SODIUM 40 MG: 40 INJECTION SUBCUTANEOUS at 08:07

## 2025-07-25 RX ADMIN — ONDANSETRON 8 MG: 2 INJECTION INTRAMUSCULAR; INTRAVENOUS at 06:07

## 2025-07-25 RX ADMIN — ROCURONIUM BROMIDE 20 MG: 10 INJECTION, SOLUTION INTRAVENOUS at 08:07

## 2025-07-25 RX ADMIN — FENTANYL CITRATE 100 MCG: 50 INJECTION, SOLUTION INTRAMUSCULAR; INTRAVENOUS at 07:07

## 2025-07-25 RX ADMIN — SODIUM CHLORIDE, POTASSIUM CHLORIDE, SODIUM LACTATE AND CALCIUM CHLORIDE: 600; 310; 30; 20 INJECTION, SOLUTION INTRAVENOUS at 12:07

## 2025-07-25 RX ADMIN — Medication 10 MG: at 09:07

## 2025-07-25 RX ADMIN — ROCURONIUM BROMIDE 5 MG: 10 INJECTION, SOLUTION INTRAVENOUS at 07:07

## 2025-07-25 RX ADMIN — ACETAMINOPHEN 1000 MG: 10 INJECTION INTRAVENOUS at 05:07

## 2025-07-25 NOTE — ANESTHESIA PROCEDURE NOTES
Intubation    Date/Time: 7/25/2025 7:16 AM    Performed by: Anamika Allred CRNA  Authorized by: Laurel Bustamante MD    Intubation:     Induction:  Intravenous    Intubated:  Postinduction    Mask Ventilation:  Easy mask    Attempts:  1    Attempted By:  CRNA    Method of Intubation:  Video laryngoscopy    Blade:  Guillen 3    Laryngeal View Grade: Grade I - full view of cords      Difficult Airway Encountered?: No      Complications:  None    Airway Device:  Oral endotracheal tube    Airway Device Size:  7.0    Style/Cuff Inflation:  Cuffed (inflated to minimal occlusive pressure)    Tube secured:  22    Secured at:  The lips    Placement Verified By:  Capnometry    Complicating Factors:  Obesity and small mouth    Findings Post-Intubation:  BS equal bilateral and atraumatic/condition of teeth unchanged

## 2025-07-25 NOTE — NURSING
Pt arrived to floor via bed with ,transport IVF started, SCDs applied, oriented to room, call light placed within reach, bed low and locked, and mother at bedside No complaints of pain. Pt ambulated in munguia , pt voided. Incisions noted to abdomen , CDI. No acute distress noted at this time.

## 2025-07-25 NOTE — TRANSFER OF CARE
"Anesthesia Transfer of Care Note    Patient: Fran Higgins    Procedure(s) Performed: Procedure(s) (LRB):  DV5 ROBOTIC MANNY PROCEDURE    SLEEVE TO MANNY (N/A)    Patient location: PACU    Anesthesia Type: general    Transport from OR: Transported from OR on 6-10 L/min O2 by face mask with adequate spontaneous ventilation    Post pain: adequate analgesia    Post assessment: no apparent anesthetic complications and tolerated procedure well    Post vital signs: stable    Level of consciousness: sedated and responds to stimulation    Nausea/Vomiting: no nausea/vomiting    Complications: none    Transfer of care protocol was followed    Last vitals: Visit Vitals  /75 (BP Location: Left arm, Patient Position: Lying)   Pulse 90   Temp 36.7 °C (98.1 °F) (Skin)   Resp 18   Ht 5' 4" (1.626 m)   LMP 01/17/2020   SpO2 97%   Breastfeeding No   BMI 45.68 kg/m²     "

## 2025-07-25 NOTE — PROGRESS NOTES
..Nursing Transfer Note      Reason patient is being transferred: procedure care complete     Transfer To: 504    Transfer via bed    Transfer with 2L to O2    Transported by PCT x2    Medicines sent: LR @ 125ml/hr    Any special needs or follow-up needed: routine     Chart send with patient: Yes    Notified: mother    Patient reassessed at: 1520 7/25/25 (date, time)

## 2025-07-25 NOTE — OP NOTE
Pre-Operative Diagnosis:  Morbid obesity with BMI of 45.0-49.9, adult [E66.01, Z68.42]  Depression  Migraine  GERD without esophagitis  S/p sleeve gastrectomy    Post-Operative Diagnosis:  As above    Procedure Performed:  1. Robotic assisted laparoscopic duodenal transection with duodeno-ileal anastomosis creation (2nd stage MANNY-S)  2. Bilateral transversus abdominus plane block for post-operative pain control    Date of Procedure: 07/25/2025    Surgeons and Role:     * Abad Muñoz MD - Primary     * Perfecto Alcaraz MD - Resident - Chief    Type of Anesthesia Used: General endotracheal    Description of Findings:   Anatomy conducive to biliopancreatic diversion with duodenal switch procedure. lleum measured 300cm from the cecum and marked.   Prior sleeve gastrectomy well formed.   Dissection and transection of the first portion of the duodenum with a reinforced staple load. Two layer hand sewn duodenoileostomy performed.  Intraoperative leak test negative and anastomosis patent.    Estimated Blood Loss: 20ml    Specimen(s) Removed: None    Complications: None apparent    Drains: None    Fluids: See anesthesia flowsheet    Urine Output: Not recorded, no gates    Indications: Fran Higgins is a 44 y.o. female who is having surgery for morbid obesity and weight related co-morbidities.    Procedure Details:  The patient was seen in the Pre-Operative Holding Area. The risks, benefits, complications, treatment options, non-operative alternatives, expected recovery and outcomes were discussed with the patient. The possibilities of reaction to medication, pulmonary aspiration, injury to surrounding structures, bleeding, recurrent infection, the need for additional procedures, failure to diagnose a condition, and creating a complication requiring transfusion or operation were discussed with the patient. The patient concurred with the proposed plan, giving informed consent. The site of surgery was properly  noted/marked if necessary per policy. The patient has been actively warmed in pre-operative area. Pre-operative antibiotics have been ordered and given within 1 hour(s) of incision. Venous thrombosis prophylaxis has been ordered including bilateral sequential compression devices and chemical prophylaxis.    She was taken the OR and placed supine on the bariatric operating table with her arms placed on either side. Bilateral sequential compression devices were applied. Following general endotracheal intubation, a Visi G 3D was placed as an orogastric tube. The patient was positioned on a footboard with feet and knees secured. Both arms were secured on arm boards. The abdomen was prepped and draped in normal standard sterile fashion. During the dry time a surgical time out was done confirming the correct patient operation and that all the anticipated supplies and instruments were available in the OR.     The site of the first trocar was anesthetized with local anesthesia. A Veress needle was inserted in the left subcostal region and a saline drop test negative. The abdomen was insufflated to 15 mmHg at which point an 8 mm visually optic trocar was placed under direct visualization, approximately 21cm from the xiphoid. The scope was inserted to ensure the position was in fact intraabdominal and that no other intraabdominal organs or otherwise were injured. No abnormalities were noted and the Veress removed. There were no significant adhesions to the anterior abdominal wall. As each trocar site was identified it was injected with anesthesia at the skin, fascia and peritoneum. A 12 mm trocar was then placed in the right mid abdomen and two 8mm trocars in the left mid and lateral abdomen. An additional 5mm trocar was placed in the left upper abdomen. The next step was to begin assessing the bowel for the feasibility of a duodenal switch. The cecum and terminal ileum was identified. Laparoscopically, I began counting the  small bowel and this was started from the ileocecal valve. A total of 300cm was counted proximally from the cecum and this loop of small bowel was tacked to the gastrocolic omentum with two sutures, making sure there was no twisting of the mesentery and orientation maintained.     The patient was positioned in reverse Trendelenburg. The robot was docked.  The GE junction visualization was reasonably easy and while there was no obvious hiatal hernia there did appear to be a little weakness to the crural commissure anteriorly. Given that the upper GI series and EGD preoperatively did not demonstrate a hiatal hernia and her Bravo pH testing was negative for pathologic acid reflux, we did not address this further at this time though if reflux continues to be an issue, further intervention at the hiatus may be reasonable.     A v-loc suture was used to elevated the left lobe of the liver in a liver hammock fashion. We then proceeded with duodenal dissection and transection. The lesser sac was entered along the greater curve of the antrum and dissection extended beyond pylorus onto about 3cm of duodenum circumferentially via an inferior to superior approach, and the duodenum was transected with a 60mm blue load with staple line reinforcement, at the level of the visible GDA groove and pancreatic head. The right gastric artery was preserved given that there did not appear to be any concern for reach / tension.  The stay suture on the small bowel loop was cut and a posterior row of 2-0 absorbable V-loc suture used to attach the duodenal cuff to the ileum. Enterotomies approximately 2.5cm long were created with hook electrocautery on each side. Using 3-0 absorbable V-loc, a hand-sewn duodenoileostomy was created. The space between the ileal and transverse colon mesenteries was approximated with a 2-0 ethibond suture.     Then, a leak test was performed by injecting 100ml of dilute ICG in saline followed by air via the OG  tube using Firefly. No dye was noted to be escaping from the sleeve staple line and contrast and air traversed the anastomosis, indicating the leak test was negative and anastomosis patent. The liver retraction suture was removed. We then closed the fascial defect with an 0 vicryl suture and the Juni Posadas device under direct vision. 10ml of local anesthesia was injected bilaterally in the anterior axillary line under direct vision to perform a transverse abdominis plane block. The remainder of the ports were removed under direct visualization. All trocar sites were closed with subcuticular stitches and covered with dermabond. The patient was then awakened by anesthesia, and tolerated the procedure well.     There were no intraoperative complications, and the counts were correct at the end of the case. Photographs obtained during the case were saved in the patient's chart and I communicated the intraoperative findings with the patient's family.     Disposition: PACU - hemodynamically stable.    Abad Muñoz MD FACS  Minimally Invasive General & Bariatric Surgery

## 2025-07-25 NOTE — BRIEF OP NOTE
[facility] ([facility])  Brief Operative Note    SUMMARY     Surgery Date: 7/25/2025     Surgeons and Role:     * Abad Muñoz MD - Primary     * Perfecto Alcaraz MD - Resident - Assisting        Pre-op Diagnosis:  Morbid obesity with BMI of 45.0-49.9, adult [E66.01, Z68.42]    Post-op Diagnosis:  Post-Op Diagnosis Codes:     * Morbid obesity with BMI of 45.0-49.9, adult [E66.01, Z68.42]    Procedure(s) (LRB):  DV5 ROBOTIC MANNY PROCEDURE    SLEEVE TO MANNY (N/A)    Anesthesia: General/Regional    Implants:  Implant Name Type Inv. Item Serial No.  Lot No. LRB No. Used Action   STPLR 60 INTUITIVE DVNC XI - [id number]  STPLR 60 INTUITIVE DVNC XI  [employer] [id number] N/A 1 Implanted       Operative Findings: Conversion from sleeve gastrectomy to MANNY. Intra-operative leak test negative.     Estimated Blood Loss: * No values recorded between 7/25/2025  6:56 AM and 7/25/2025 10:25 AM *    Estimated Blood Loss has not been documented. EBL = Less than 20 cc.         Specimens:   Specimen (24h ago, onward)      None          * No specimens in log *    [id number]

## 2025-07-26 VITALS
HEIGHT: 64 IN | OXYGEN SATURATION: 95 % | DIASTOLIC BLOOD PRESSURE: 65 MMHG | SYSTOLIC BLOOD PRESSURE: 121 MMHG | RESPIRATION RATE: 18 BRPM | BODY MASS INDEX: 45.68 KG/M2 | HEART RATE: 77 BPM | TEMPERATURE: 98 F

## 2025-07-26 LAB
ABSOLUTE EOSINOPHIL (OHS): 0.01 K/UL
ABSOLUTE MONOCYTE (OHS): 0.81 K/UL (ref 0.3–1)
ABSOLUTE NEUTROPHIL COUNT (OHS): 5.36 K/UL (ref 1.8–7.7)
ANION GAP (OHS): 8 MMOL/L (ref 8–16)
BASOPHILS # BLD AUTO: 0.01 K/UL
BASOPHILS NFR BLD AUTO: 0.1 %
BUN SERPL-MCNC: 9 MG/DL (ref 6–20)
CALCIUM SERPL-MCNC: 8.6 MG/DL (ref 8.7–10.5)
CHLORIDE SERPL-SCNC: 106 MMOL/L (ref 95–110)
CO2 SERPL-SCNC: 22 MMOL/L (ref 23–29)
CREAT SERPL-MCNC: 0.8 MG/DL (ref 0.5–1.4)
ERYTHROCYTE [DISTWIDTH] IN BLOOD BY AUTOMATED COUNT: 12.3 % (ref 11.5–14.5)
GFR SERPLBLD CREATININE-BSD FMLA CKD-EPI: >60 ML/MIN/1.73/M2
GLUCOSE SERPL-MCNC: 90 MG/DL (ref 70–110)
HCT VFR BLD AUTO: 34.1 % (ref 37–48.5)
HCT VFR BLD AUTO: 34.8 % (ref 37–48.5)
HGB BLD-MCNC: 11.2 GM/DL (ref 12–16)
HGB BLD-MCNC: 11.2 GM/DL (ref 12–16)
IMM GRANULOCYTES # BLD AUTO: 0.01 K/UL (ref 0–0.04)
IMM GRANULOCYTES NFR BLD AUTO: 0.1 % (ref 0–0.5)
LYMPHOCYTES # BLD AUTO: 1.79 K/UL (ref 1–4.8)
MAGNESIUM SERPL-MCNC: 1.9 MG/DL (ref 1.6–2.6)
MCH RBC QN AUTO: 28.6 PG (ref 27–31)
MCHC RBC AUTO-ENTMCNC: 32.8 G/DL (ref 32–36)
MCV RBC AUTO: 87 FL (ref 82–98)
NUCLEATED RBC (/100WBC) (OHS): 0 /100 WBC
PHOSPHATE SERPL-MCNC: 4.5 MG/DL (ref 2.7–4.5)
PLATELET # BLD AUTO: 153 K/UL (ref 150–450)
PMV BLD AUTO: 12.2 FL (ref 9.2–12.9)
POTASSIUM SERPL-SCNC: 4.2 MMOL/L (ref 3.5–5.1)
RBC # BLD AUTO: 3.92 M/UL (ref 4–5.4)
RELATIVE EOSINOPHIL (OHS): 0.1 %
RELATIVE LYMPHOCYTE (OHS): 22.4 % (ref 18–48)
RELATIVE MONOCYTE (OHS): 10.1 % (ref 4–15)
RELATIVE NEUTROPHIL (OHS): 67.2 % (ref 38–73)
SODIUM SERPL-SCNC: 136 MMOL/L (ref 136–145)
WBC # BLD AUTO: 7.99 K/UL (ref 3.9–12.7)

## 2025-07-26 PROCEDURE — 83735 ASSAY OF MAGNESIUM: CPT | Performed by: SURGERY

## 2025-07-26 PROCEDURE — 99900035 HC TECH TIME PER 15 MIN (STAT)

## 2025-07-26 PROCEDURE — 94761 N-INVAS EAR/PLS OXIMETRY MLT: CPT

## 2025-07-26 PROCEDURE — 80048 BASIC METABOLIC PNL TOTAL CA: CPT | Performed by: SURGERY

## 2025-07-26 PROCEDURE — 85018 HEMOGLOBIN: CPT | Performed by: SURGERY

## 2025-07-26 PROCEDURE — 25000003 PHARM REV CODE 250: Performed by: SURGERY

## 2025-07-26 PROCEDURE — 63600175 PHARM REV CODE 636 W HCPCS: Performed by: SURGERY

## 2025-07-26 PROCEDURE — 85025 COMPLETE CBC W/AUTO DIFF WBC: CPT | Performed by: SURGERY

## 2025-07-26 PROCEDURE — 85014 HEMATOCRIT: CPT | Performed by: SURGERY

## 2025-07-26 PROCEDURE — 84100 ASSAY OF PHOSPHORUS: CPT | Performed by: SURGERY

## 2025-07-26 PROCEDURE — 36415 COLL VENOUS BLD VENIPUNCTURE: CPT | Performed by: SURGERY

## 2025-07-26 RX ORDER — POLYETHYLENE GLYCOL 3350 17 G/17G
17 POWDER, FOR SOLUTION ORAL DAILY
Qty: 116 G | Refills: 2 | Status: SHIPPED | OUTPATIENT
Start: 2025-07-26

## 2025-07-26 RX ORDER — URSODIOL 500 MG/1
TABLET, FILM COATED ORAL
Qty: 90 TABLET | Refills: 1 | Status: SHIPPED | OUTPATIENT
Start: 2025-07-26 | End: 2026-07-26

## 2025-07-26 RX ORDER — GABAPENTIN 250 MG/5ML
300 SOLUTION ORAL 2 TIMES DAILY
Qty: 66 ML | Refills: 0 | Status: SHIPPED | OUTPATIENT
Start: 2025-07-26

## 2025-07-26 RX ORDER — OMEPRAZOLE 40 MG/1
40 CAPSULE, DELAYED RELEASE ORAL EVERY MORNING
Qty: 60 CAPSULE | Refills: 0 | Status: SHIPPED | OUTPATIENT
Start: 2025-07-26 | End: 2025-09-24

## 2025-07-26 RX ORDER — OXYCODONE HCL 5 MG/5 ML
5 SOLUTION, ORAL ORAL EVERY 4 HOURS PRN
Qty: 15 ML | Refills: 0 | Status: SHIPPED | OUTPATIENT
Start: 2025-07-26

## 2025-07-26 RX ORDER — ONDANSETRON 8 MG/1
8 TABLET, ORALLY DISINTEGRATING ORAL EVERY 6 HOURS PRN
Qty: 30 TABLET | Refills: 0 | Status: SHIPPED | OUTPATIENT
Start: 2025-07-26

## 2025-07-26 RX ADMIN — ONDANSETRON 8 MG: 2 INJECTION INTRAMUSCULAR; INTRAVENOUS at 01:07

## 2025-07-26 RX ADMIN — ACETAMINOPHEN 999.01 MG: 650 SOLUTION ORAL at 01:07

## 2025-07-26 RX ADMIN — METOCLOPRAMIDE 10 MG: 5 INJECTION, SOLUTION INTRAMUSCULAR; INTRAVENOUS at 05:07

## 2025-07-26 RX ADMIN — ACETAMINOPHEN 999.01 MG: 650 SOLUTION ORAL at 05:07

## 2025-07-26 RX ADMIN — ONDANSETRON 8 MG: 2 INJECTION INTRAMUSCULAR; INTRAVENOUS at 12:07

## 2025-07-26 RX ADMIN — METOCLOPRAMIDE 10 MG: 5 INJECTION, SOLUTION INTRAMUSCULAR; INTRAVENOUS at 12:07

## 2025-07-26 RX ADMIN — ENOXAPARIN SODIUM 40 MG: 40 INJECTION SUBCUTANEOUS at 09:07

## 2025-07-26 RX ADMIN — ONDANSETRON 8 MG: 2 INJECTION INTRAMUSCULAR; INTRAVENOUS at 05:07

## 2025-07-26 RX ADMIN — GABAPENTIN 300 MG: 250 SOLUTION ORAL at 09:07

## 2025-07-26 RX ADMIN — PANTOPRAZOLE SODIUM 40 MG: 40 INJECTION, POWDER, FOR SOLUTION INTRAVENOUS at 09:07

## 2025-07-26 RX ADMIN — SIMETHICONE 40 MG: 20 SUSPENSION/ DROPS ORAL at 05:07

## 2025-07-26 RX ADMIN — METOCLOPRAMIDE 10 MG: 5 INJECTION, SOLUTION INTRAMUSCULAR; INTRAVENOUS at 01:07

## 2025-07-26 NOTE — NURSING
Pt ambulating hallway also ambulated to bathroom to void, completed 15ml water protocol and initiated 30ml  water protocol

## 2025-07-26 NOTE — PLAN OF CARE
Problem: Adult Inpatient Plan of Care  Goal: Plan of Care Review  Outcome: Progressing  Goal: Patient-Specific Goal (Individualized)  Outcome: Progressing  Goal: Absence of Hospital-Acquired Illness or Injury  Outcome: Progressing  Goal: Optimal Comfort and Wellbeing  Outcome: Progressing  Goal: Readiness for Transition of Care  Outcome: Progressing     Problem: Bariatric Environmental Safety  Goal: Safety Maintained with Care  Outcome: Progressing     Problem: Wound  Goal: Optimal Coping  Outcome: Progressing     Problem: Wound  Goal: Optimal Coping  Outcome: Progressing  Goal: Optimal Functional Ability  Outcome: Progressing  Goal: Absence of Infection Signs and Symptoms  Outcome: Progressing  Goal: Improved Oral Intake  Outcome: Progressing  Goal: Optimal Pain Control and Function  Outcome: Progressing  Goal: Skin Health and Integrity  Outcome: Progressing  Goal: Optimal Wound Healing  Outcome: Progressing

## 2025-07-26 NOTE — PLAN OF CARE
[facility] - [facility]  Discharge Final Note    Primary Care Provider: Erica Pineda MD    Expected Discharge Date: 7/26/2025    Patient to be discharged home. The patient does not have any home needs.  Family to provide transportation home.    Future Appointments   Date Time Provider Department Center   8/1/2025 11:00 AM Prema Lombardo RD [facility]   8/11/2025  9:00 AM LAB, APPOINTMENT [facility] LAB [facility]   8/11/2025  9:30 AM Nicky Almazan PA-C [facility] BARIAT [facility]   8/11/2025 10:00 AM Aparna Heredia, [facility]   9/18/2025  3:00 PM Marina Geronimo PA-C [facility] NEURO [place]   9/22/2025  8:30 AM Dharmesh Peralta MD [facility] PSYCH [facility]   9/22/2025  9:30 AM LAB, APPOINTMENT [facility] LAB [facility]   9/22/2025 10:00 AM Samantha Quezada, NP [facility]   9/22/2025 10:30 AM Aparna Heredia RD [facility]   11/3/2025  4:30 PM Jimmy Vásquez, PharmD [facility] PCW   11/10/2025  2:40 PM Anamika Panda OD [facility] OPTO [place]   1/26/2026  9:30 AM LAB, APPOINTMENT [facility] LAB [facility]   1/26/2026 10:00 AM Samantha Quezada, NP [facility]        Final Discharge Note (most recent)       Final Note - 07/26/25 1316          Final Note    Assessment Type Final Discharge Note     Anticipated Discharge Disposition Home or Self Care        Post-Acute Status    Post-Acute Authorization Other     Other Status No Post-Acute Service Needs     Discharge Delays None known at this time                     Important Message from Medicare

## 2025-07-26 NOTE — DISCHARGE SUMMARY
[facility] - [facility]  General Surgery  Discharge Summary      Patient Name: Fran Higgins  MRN: [id number]  Admission Date: 7/25/2025  Hospital Length of Stay: 1 days  Discharge Date and Time: 07/26/2025 9:59 AM  Attending Physician: Abad Muñoz MD   Discharging Provider: Perfecto Alcaraz MD  Primary Care Provider: Erica Pineda MD    HPI:   No notes on file    Procedure(s) (LRB):  DV5 ROBOTIC MANNY PROCEDURE    SLEEVE TO MANNY (N/A)      Indwelling Lines/Drains at time of discharge:   Lines/Drains/Airways       None                 Hospital Course: Please see the preoperative H&P and other available documentation for full details related to history leading up to this admission.  Briefly, Fran Higgins is a 44 y.o. female who was admitted on 7/25/2025 following scheduled elective surgery for Morbid obesity with BMI of 45.0-49.9, adult [E66.01, Z68.42].  Following a complete preoperative discussion of the risks and benefits of surgery with signed informed consent, the patient was taken to the operating room on 7/25/2025 and underwent the above stated procedures.  The patient tolerated surgery well and there were no complications.  Please see the operative report for full intraoperative findings and details.  Postoperatively, the patient did well and was transferred from the PACU to the 5th floor POSS unit in stable condition where they overall had a stable and relatively uncomplicated hospital course.  Labs and vital signs remained essentially stable and appropriate throughout course, and repletion of electrolytes occurred as indicated.  Diet was advanced per usual surgical pathway at appropriate timing, and the patient was transitioned to oral pain medications without problem.      Her CBC the morning after surgery demonstrated a 2 point drop from her baseline/pre-operative labs. Repeat H/H testing was stable.    Currently, the patient is doing well at 1 Day Post-Op, and is stable and appropriate  for discharge home at this time.       Goals of Care Treatment Preferences:  Code Status: Full Code      Consults:     Significant Diagnostic Studies: N/A    Pending Diagnostic Studies:       Procedure Component Value Units Date/Time    Hematocrit [[id number]]     Order Status: Sent Lab Status: No result     Specimen: Blood     Hemoglobin [[id number]]     Order Status: Sent Lab Status: No result     Specimen: Blood           Final Active Diagnoses:    Diagnosis Date Noted POA    PRINCIPAL PROBLEM:  Obesity [E66.9] 06/22/2021 Yes      Problems Resolved During this Admission:      Discharged Condition: good    Disposition: Home or Self Care    Follow Up:    Patient Instructions:   No discharge procedures on file.  Medications:  Reconciled Home Medications:      Medication List        START taking these medications      gabapentin 300 mg/6 mL (6 mL) oral solution  Take 6 mLs (300 mg total) by mouth 2 (two) times a day.            ASK your doctor about these medications      acetaminophen 500 mg powder  Commonly known as: Tylenol  Take 2 packets (1,000 mg total) by mouth 3 (three) times daily for at least 3 days and up to 5 days as needed for pain. May take an additional packet (500mg) once daily if required for pain. Do not take more than 4,000mg in 24 hours.     adalimumab-adaz 40 mg/0.4 mL Pnij  Inject 0.4 mLs (40 mg total) into the skin every 7 days.     ALPRAZolam 0.5 MG tablet  Commonly known as: XANAX  Take 1 tablet (0.5 mg total) by mouth nightly as needed for Insomnia or Anxiety.     b complex vitamins capsule  Take 1 capsule by mouth once daily.     CALCIUM CITRATE ORAL  Take 1 tablet by mouth 3 (three) times daily. chewable     clindamycin 1 % lotion  Commonly known as: CLEOCIN T  Apply to affected area two times a day.     cyclobenzaprine 5 MG tablet  Commonly known as: FLEXERIL  Take 1 tablet (5 mg total) by mouth 2 (two) times daily as needed for Muscle spasms.     estradioL 2 MG tablet  Commonly known as:  ESTRACE  Take 1 tablet (2 mg total) by mouth once daily.     * metFORMIN 500 MG ER 24hr tablet  Commonly known as: GLUCOPHAGE-XR  Take 2 tablets by mouth daily.     * metFORMIN 500 MG tablet  Commonly known as: GLUCOPHAGE  Take 1 tablet (500 mg total) by mouth daily with breakfast.     multivitamin capsule  Take by mouth once daily.     * omeprazole 40 MG capsule  Commonly known as: PRILOSEC  Take 1 capsule (40 mg total) by mouth every morning. Open capsule and take with apple sauce  Ask about: Which instructions should I use?     * omeprazole 40 MG capsule  Commonly known as: PRILOSEC  Take 1 capsule (40 mg total) by mouth every morning.  Ask about: Which instructions should I use?     * ondansetron 8 MG Tbdl  Commonly known as: ZOFRAN-ODT  Dissolve 1 tablet (8 mg total) by mouth every 6 (six) hours as needed (Nausea).  Ask about: Which instructions should I use?     * ondansetron 8 MG Tbdl  Commonly known as: ZOFRAN-ODT  Take 1 tablet (8 mg total) by mouth every 6 (six) hours as needed.  Ask about: Which instructions should I use?     * polyethylene glycol 17 gram/dose powder  Commonly known as: GLYCOLAX  Use to cap to measure 17g, mix with liquid, and take by mouth once daily.  Ask about: Which instructions should I use?     * polyethylene glycol 17 gram/dose powder  Commonly known as: GLYCOLAX  Take 17 g by mouth once daily.  Ask about: Which instructions should I use?     pregabalin 50 MG capsule  Commonly known as: LYRICA  Take 1 capsule (50 mg total) by mouth every evening for 14 days, THEN 1 capsule (50 mg total) 2 (two) times daily for 16 days.  Start taking on: July 7, 2025     promethazine 12.5 MG Supp  Commonly known as: PHENERGAN  Place 1 suppository (12.5 mg total) rectally every 6 (six) hours as needed (nausea, 2nd line).     spironolactone 100 MG tablet  Commonly known as: ALDACTONE  Take 1 tablet by mouth every day     thiamine 50 MG tablet  Commonly known as: VITAMIN B-1  Take 50 mg by mouth once  daily.     tiZANidine 2 MG tablet  Commonly known as: ZANAFLEX  Take 1 tablet (2 mg total) by mouth every 8 (eight) hours. For post-op muscle spasms / pain. for 5 days     * UBRELVY 100 mg tablet  Generic drug: ubrogepant  Take 1 tablet by mouth at the onset of a headache. May repeat based on response and tolerability after more than 2 hours if needed. Do not take more than 200mg in a 24 hour span.     * UBRELVY 100 mg tablet  Generic drug: ubrogepant  Take 1 tablet by mouth at the onset of a headache. May repeat based on response and tolerability after more than 2 hours if needed. Do not take more than 200mg in a 24 hour span.     * ursodioL 500 MG tablet  Commonly known as: BARBARA FORTE  Take 1 tablet (500 mg total) by mouth once daily. For gallstone prevention.  Ask about: Which instructions should I use?     * ursodioL 500 MG tablet  Commonly known as: BARBARA FORTE  Crush one tablet daily for gall bladder. Please compound into liquid and supply for 90 days if insurance approves.  Ask about: Which instructions should I use?     * venlafaxine 150 MG Cp24  Commonly known as: EFFEXOR-XR  Take 1 capsule (150 mg total) by mouth once daily.     * venlafaxine 75 MG tablet  Commonly known as: EFFEXOR  Take 1 tablet (75 mg total) by mouth 2 (two) times daily.     WEGOVY 2.4 mg/0.75 mL Pnij  Generic drug: semaglutide (weight loss)  Inject 2.4 mg into the skin every 7 days.           * This list has 14 medication(s) that are the same as other medications prescribed for you. Read the directions carefully, and ask your doctor or other care provider to review them with you.                Time spent on the discharge of patient: 5 minutes    Perfecto Alcaraz MD  General Surgery  [facility] - [facility]

## 2025-07-26 NOTE — HOSPITAL COURSE
Please see the preoperative H&P and other available documentation for full details related to history leading up to this admission.  Briefly, Fran Higgins is a 44 y.o. female who was admitted on 7/25/2025 following scheduled elective surgery for Morbid obesity with BMI of 45.0-49.9, adult [E66.01, Z68.42].  Following a complete preoperative discussion of the risks and benefits of surgery with signed informed consent, the patient was taken to the operating room on 7/25/2025 and underwent the above stated procedures.  The patient tolerated surgery well and there were no complications.  Please see the operative report for full intraoperative findings and details.  Postoperatively, the patient did well and was transferred from the PACU to the 5th floor POSS unit in stable condition where they overall had a stable and relatively uncomplicated hospital course.  Labs and vital signs remained essentially stable and appropriate throughout course, and repletion of electrolytes occurred as indicated.  Diet was advanced per usual surgical pathway at appropriate timing, and the patient was transitioned to oral pain medications without problem.      Her CBC the morning after surgery demonstrated a 2 point drop from her baseline/pre-operative labs. Repeat H/H testing was ***.    Currently, the patient is doing well at 1 Day Post-Op, and is stable and appropriate for discharge home at this time.

## 2025-07-26 NOTE — PLAN OF CARE
Problem: Adult Inpatient Plan of Care  Goal: Plan of Care Review  Outcome: Progressing  Goal: Patient-Specific Goal (Individualized)  Outcome: Progressing  Goal: Absence of Hospital-Acquired Illness or Injury  Outcome: Progressing  Goal: Optimal Comfort and Wellbeing  Outcome: Progressing  Goal: Readiness for Transition of Care  Outcome: Progressing     Problem: Adult Inpatient Plan of Care  Goal: Plan of Care Review  Outcome: Progressing     Problem: Adult Inpatient Plan of Care  Goal: Patient-Specific Goal (Individualized)  Outcome: Progressing     Problem: Adult Inpatient Plan of Care  Goal: Absence of Hospital-Acquired Illness or Injury  Outcome: Progressing     Problem: Adult Inpatient Plan of Care  Goal: Optimal Comfort and Wellbeing  Outcome: Progressing     Problem: Adult Inpatient Plan of Care  Goal: Readiness for Transition of Care  Outcome: Progressing     PRN medication effective for gax discomfort  Explained plan of care, verbalized understanding.  . No injury during shift, Side rails up x 2, call light by bedside. Ambulated hallways and completed water protocol

## 2025-07-26 NOTE — PLAN OF CARE
[facility]  Initial Discharge Assessment       Primary Care Provider: Erica Pineda MD    Admission Diagnosis: Morbid obesity with BMI of 45.0-49.9, adult [E66.01, Z68.42]  Class 3 severe obesity due to excess calories with serious comorbidity and body mass index (BMI) of 45.0 to 49.9 in adult [E66.813, Z68.42]  Obesity [E66.9]    Admission Date: 7/25/2025  Expected Discharge Date:     Transition of Care Barriers: None    Payor: [employer] / Plan: [employer] [place] / Product Type: Self Funded /     Extended Emergency Contact Information  Primary Emergency Contact: GiselaConnie  Address: [address] [place] [address]           [place] [address] [facility]  Home Phone: [phone]  Work Phone: [phone]  Mobile Phone: [phone]  Relation: Mother    Discharge Plan A: Home with family  Discharge Plan B: Home with family, Home Health      [facility]  [address] [address]  Phone: [phone] Fax: [phone]    [facility]  [address] [facility] [address]  Phone: [phone] Fax: [phone]    [facility]  [address]  [place] [address]  Phone: [phone] Fax: [phone]    [id number] - [place] - [address]  [address]  [place] [address]  Phone: [phone] Fax: [phone]    CM obtained discharge planning assessment with patient, mother at bedside.  Initial Assessment (most recent)       Adult Discharge Assessment - 07/26/25 1045          Discharge Assessment    Assessment Type Discharge Planning Assessment     Confirmed/corrected address, phone number and insurance Yes     Confirmed Demographics Correct on Facesheet     Source of Information patient     People in Home child(lobito), dependent     Name(s) of People in Home dependent child     Do you expect to return to your current living situation? Yes     Do you have help at home or  someone to help you manage your care at home? Yes     Who are your caregiver(s) and their phone number(s)? Connie Higgins - mother [phone]     Prior to hospitilization cognitive status: Alert/Oriented     Current cognitive status: Alert/Oriented     Walking or Climbing Stairs Difficulty no     Dressing/Bathing Difficulty no     Equipment Currently Used at Home none     Readmission within 30 days? No     Patient currently being followed by outpatient case management? No     Do you currently have service(s) that help you manage your care at home? No     Do you take prescription medications? Yes     Do you have prescription coverage? Yes     Do you have any problems affording any of your prescribed medications? No     Is the patient taking medications as prescribed? yes     How do you get to doctors appointments? car, drives self;family or friend will provide     Are you on dialysis? No     Do you take coumadin? No     Discharge Plan A Home with family     Discharge Plan B Home with family;Home Health     DME Needed Upon Discharge  none     Discharge Plan discussed with: Patient     Transition of Care Barriers None        Physical Activity    On average, how many days per week do you engage in moderate to strenuous exercise (like a brisk walk)? 0 days     On average, how many minutes do you engage in exercise at this level? 0 min        Financial Resource Strain    How hard is it for you to pay for the very basics like food, housing, medical care, and heating? Not hard at all        Housing Stability    In the last 12 months, was there a time when you were not able to pay the mortgage or rent on time? No     At any time in the past 12 months, were you homeless or living in a shelter (including now)? No        Transportation Needs    In the past 12 months, has lack of transportation kept you from medical appointments or from getting medications? No     In the past 12 months, has lack of transportation kept you  from meetings, work, or from getting things needed for daily living? No        Food Insecurity    Within the past 12 months, you worried that your food would run out before you got the money to buy more. Never true     Within the past 12 months, the food you bought just didn't last and you didn't have money to get more. Never true        Stress    Do you feel stress - tense, restless, nervous, or anxious, or unable to sleep at night because your mind is troubled all the time - these days? Rather much        Social Isolation    How often do you feel lonely or isolated from those around you?  Never        Alcohol Use    Q1: How often do you have a drink containing alcohol? Monthly or less     Q2: How many drinks containing alcohol do you have on a typical day when you are drinking? 1 or 2     Q3: How often do you have six or more drinks on one occasion? Never        Utilities    In the past 12 months has the electric, gas, oil, or water company threatened to shut off services in your home? No        Health Literacy    How often do you need to have someone help you when you read instructions, pamphlets, or other written material from your doctor or pharmacy? Never

## 2025-07-28 ENCOUNTER — PATIENT MESSAGE (OUTPATIENT)
Dept: BARIATRICS | Facility: CLINIC | Age: 45
End: 2025-07-28
Payer: COMMERCIAL

## 2025-07-28 ENCOUNTER — PATIENT OUTREACH (OUTPATIENT)
Dept: ADMINISTRATIVE | Facility: CLINIC | Age: 45
End: 2025-07-28
Payer: COMMERCIAL

## 2025-07-28 DIAGNOSIS — L73.2 HIDRADENITIS SUPPURATIVA: ICD-10-CM

## 2025-07-28 NOTE — TELEPHONE ENCOUNTER
Nicky,    Please see her message. S/p Sleeve to MANNY with Dr. Muñoz 7/25/25.  She is asking for oxy refill for her incision pain.  I had her send in a picture of the incision.  She stated no leakage or fever.    Thanks,  Anny GONZALEZ

## 2025-07-29 RX ORDER — CLINDAMYCIN PHOSPHATE 10 UG/ML
LOTION TOPICAL
Qty: 120 ML | Refills: 0 | Status: SHIPPED | OUTPATIENT
Start: 2025-07-29

## 2025-07-31 NOTE — PROGRESS NOTES
Audio Only Telehealth Visit     The patient location is: [place]  The chief complaint leading to consultation is: Follow-up 1 Week s/p laparoscopic sleeve to [facility]-S  Visit type: Virtual visit with audio only (telephone)  Total time spent with patient: 10 mins     The reason for the audio only service rather than synchronous audio and video virtual visit was related to technical difficulties or patient preference/necessity.     Each patient to whom I provide medical services by telemedicine is:  (1) informed of the relationship between the physician and patient and the respective role of any other health care provider with respect to management of the patient; and (2) notified that they may decline to receive medical services by telemedicine and may withdraw from such care at any time. Patient verbally consented to receive this service via voice-only telephone call.    This service was not originating from a related E/M service provided within the previous 7 days nor will  to an E/M service or procedure within the next 24 hours or my soonest available appointment.  Prevailing standard of care was able to be met in this audio-only visit.      NUTRITION NOTE    Referring Physician: Abad Muñoz M.D.   Reason for MNT Referral: Follow-up 1 Week s/p laparoscopic sleeve to [facility]-S    CURRENT DIET:  Bariatric Liquid Diet    Dehydration assessment:  Urine output/color: Clear  Chest pain: N  Persistent increased heart rate: N  Fatigue: N  Dizzy/weak: N   N/V: N  BM: N - taking Miralax    Protein and fluid intake assessment: (food diary)    Fluids include: Water, SF peach tea powder, SF popsicles, broth  Fluid intake yesterday: 80 oz  Protein supplements: PaxVax Nutrition Plan  Protein intake yesterday: 60 g    Vitamins  Bariatric ADEK   Calcium Citrate chews 3 x day    Medications  Omeprazole: Y  How are you tolerating pain at this time? (rate on a scale from 1 to 10; >7 notify PA/MD): Discomfort on right side. Wraps with  ace bandage. Today is a 3  Did you take your acetaminophen and gabapentin for 3 days? Y  Did you have to take additional acetaminophen for break through pain (pain scale 4-6)? Y  Did you have any severe pain that required oxycodone? If yes, how much did you use and do you have left? Y. None    How is the support system at home?  Exercise reminder (light exercise at this time, no lifting above 10 lbs)     Questions for nurse/MA/PA:     BARIATRIC DIET DISCUSSION:  Reinforced post-op nutrition guidelines.  Continue to work on fluid and protein intake.    PLAN/RECOMMONDATIONS:  Continue bariatric high protein liquid diet.  Maintain protein intake or increase gradually to goal of 60 g prot/day.  Maintain fluid intake or increase gradually to goal of 64 floz/day.  Begin appropriate vitamins & minerals.  Begin or continue light exercise.     Confirmed date and time for 2 week po labs and clinic visit     SESSION TIME: 15 minutes

## 2025-08-01 ENCOUNTER — CLINICAL SUPPORT (OUTPATIENT)
Dept: BARIATRICS | Facility: CLINIC | Age: 45
End: 2025-08-01
Payer: COMMERCIAL

## 2025-08-01 DIAGNOSIS — Z71.3 DIETARY COUNSELING AND SURVEILLANCE: Primary | ICD-10-CM

## 2025-08-01 DIAGNOSIS — E66.813 CLASS 3 SEVERE OBESITY DUE TO EXCESS CALORIES WITH SERIOUS COMORBIDITY AND BODY MASS INDEX (BMI) OF 45.0 TO 49.9 IN ADULT: ICD-10-CM

## 2025-08-01 DIAGNOSIS — Z98.84 S/P BARIATRIC SURGERY: ICD-10-CM

## 2025-08-01 NOTE — ANESTHESIA POSTPROCEDURE EVALUATION
Anesthesia Post Evaluation    Patient: Fran Higgins    Procedure(s) Performed: Procedure(s) (LRB):  DV5 ROBOTIC MANNY PROCEDURE    SLEEVE TO [facility]NY (N/A)    Final Anesthesia Type: general      Patient location during evaluation: PACU  Patient participation: Yes- Able to Participate  Level of consciousness: awake and alert  Post-procedure vital signs: reviewed and stable  Pain management: adequate  Airway patency: patent    PONV status at discharge: No PONV  Anesthetic complications: no      Cardiovascular status: stable  Respiratory status: unassisted and spontaneous ventilation  Hydration status: euvolemic  Follow-up not needed.              Vitals Value Taken Time   /65 07/26/25 11:46   Temp 36.7 °C (98.1 °F) 07/26/25 11:46   Pulse 77 07/26/25 11:46   Resp 18 07/26/25 11:46   SpO2 95 % 07/26/25 11:46         Event Time   Out of Recovery 11:00:00         Pain/Bernadine Score: No data recorded

## 2025-08-03 ENCOUNTER — PATIENT MESSAGE (OUTPATIENT)
Dept: PSYCHIATRY | Facility: CLINIC | Age: 45
End: 2025-08-03
Payer: COMMERCIAL

## 2025-08-03 ENCOUNTER — PATIENT MESSAGE (OUTPATIENT)
Dept: BARIATRICS | Facility: CLINIC | Age: 45
End: 2025-08-03
Payer: COMMERCIAL

## 2025-08-04 ENCOUNTER — OFFICE VISIT (OUTPATIENT)
Dept: SURGERY | Facility: CLINIC | Age: 45
End: 2025-08-04
Payer: COMMERCIAL

## 2025-08-04 ENCOUNTER — TELEPHONE (OUTPATIENT)
Dept: PSYCHIATRY | Facility: CLINIC | Age: 45
End: 2025-08-04
Payer: COMMERCIAL

## 2025-08-04 ENCOUNTER — PATIENT MESSAGE (OUTPATIENT)
Dept: PSYCHIATRY | Facility: CLINIC | Age: 45
End: 2025-08-04
Payer: COMMERCIAL

## 2025-08-04 VITALS
HEIGHT: 64 IN | WEIGHT: 253.5 LBS | SYSTOLIC BLOOD PRESSURE: 134 MMHG | DIASTOLIC BLOOD PRESSURE: 90 MMHG | RESPIRATION RATE: 14 BRPM | BODY MASS INDEX: 43.28 KG/M2 | HEART RATE: 80 BPM

## 2025-08-04 DIAGNOSIS — K64.9 HEMORRHOIDS, UNSPECIFIED HEMORRHOID TYPE: Primary | ICD-10-CM

## 2025-08-04 PROCEDURE — 3080F DIAST BP >= 90 MM HG: CPT | Mod: CPTII,S$GLB,, | Performed by: COLON & RECTAL SURGERY

## 2025-08-04 PROCEDURE — 3075F SYST BP GE 130 - 139MM HG: CPT | Mod: CPTII,S$GLB,, | Performed by: COLON & RECTAL SURGERY

## 2025-08-04 PROCEDURE — 3044F HG A1C LEVEL LT 7.0%: CPT | Mod: CPTII,S$GLB,, | Performed by: COLON & RECTAL SURGERY

## 2025-08-04 PROCEDURE — 1111F DSCHRG MED/CURRENT MED MERGE: CPT | Mod: CPTII,S$GLB,, | Performed by: COLON & RECTAL SURGERY

## 2025-08-04 PROCEDURE — 3008F BODY MASS INDEX DOCD: CPT | Mod: CPTII,S$GLB,, | Performed by: COLON & RECTAL SURGERY

## 2025-08-04 PROCEDURE — 1159F MED LIST DOCD IN RCRD: CPT | Mod: CPTII,S$GLB,, | Performed by: COLON & RECTAL SURGERY

## 2025-08-04 PROCEDURE — 99203 OFFICE O/P NEW LOW 30 MIN: CPT | Mod: S$GLB,,, | Performed by: COLON & RECTAL SURGERY

## 2025-08-04 PROCEDURE — 99999 PR PBB SHADOW E&M-EST. PATIENT-LVL III: CPT | Mod: PBBFAC,,, | Performed by: COLON & RECTAL SURGERY

## 2025-08-04 RX ORDER — HYDROCORTISONE ACETATE 25 MG/1
25 SUPPOSITORY RECTAL 2 TIMES DAILY
Qty: 20 SUPPOSITORY | Refills: 0 | Status: SHIPPED | OUTPATIENT
Start: 2025-08-04 | End: 2025-08-14

## 2025-08-04 NOTE — TELEPHONE ENCOUNTER
Discussed with Samantha Quezada NP who recommended for pt to keep appt with CR today and can use OTC hemorrhoid relief.    Called and spoke with the pt. Provided advice from KEV Quezada NP.

## 2025-08-04 NOTE — PROGRESS NOTES
"Subjective     Patient ID: Fran Higgins is a 44 y.o. female.    Chief Complaint: No chief complaint on file.    History of Present Illness    CHIEF COMPLAINT:  Patient presents today for follow-up related to hemorrhoids as a complication of recent sleeve gastrectomy revision.    INTERVAL HISTORY:  Patient is 10 days post sleeve gastrectomy revision and presents with hemorrhoids that developed 3 days ago, reporting significant perianal pain and swelling that is constant and worsens with standing, walking, and sitting, not limited to defecation. Bowel movements are soft, occurring approximately every other day, with minimal oral intake. Self-treatment with Preparation H, detox pads, and topical anesthetic has been ineffective. She is currently taking MiraLAX and Senna for GI management. Patient denies constipation and dysuria with defecation.    MEDICATIONS:  - MiraLAX  - Senna  - Preparation H    PAST MEDICAL HISTORY:  - Hemorrhoids    PAST SURGICAL HISTORY:  - Sleeve gastrectomy revision: 10 days ago, performed by Dr. Cabrera at the current facility. Complication: hemorrhoids developed 3 days ago.    OB/GYN HISTORY:  - Pregnancy status: Currently pregnant      ROS:  General: -fever, -chills, -fatigue, -weight gain, -weight loss  Gastrointestinal: -abdominal pain, -nausea, -vomiting, -diarrhea, -constipation, -blood in stool, +hemorrhoids, +anal pain, +change in bowel habits  Genitourinary: -dysuria, -hematuria, -frequency         Objective     BP (!) 134/90 (BP Location: Right arm, Patient Position: Sitting)   Pulse 80   Resp 14   Ht 5' 4" (1.626 m)   Wt 115 kg (253 lb 8.5 oz)   LMP 01/17/2020   BMI 43.52 kg/m²   Well appearing  Anorectal:left lateral thrombosed external hemorrhoid -appears to be in resolution as most of the hemorrhoid is soft with 2 areas of clot <1cm in size. No excoriation.         Assessment and Plan     1. Hemorrhoids, unspecified hemorrhoid type  -     hydrocortisone (ANUSOL-HC) " 25 mg suppository; Place 1 suppository (25 mg total) rectally 2 (two) times daily. for 10 days  Dispense: 20 suppository; Refill: 0      Assessment & Plan    Z98.84 Bariatric surgery status  K64.8 Other hemorrhoids  Z87.59 Personal history of other complications of pregnancy, childbirth and the puerperium    OTHER HEMORRHOIDS:  - Patient presents with hemorrhoids 10 days post-op from sleeve gastrectomy revision with pain and swelling.  - Conservative treatments including Preparation H, detox pads, and numbing agent ineffective.  - Soft bowel movements every other day; constipation ruled out as primary cause.  - Ordered physical exam of hemorrhoids.    BARIATRIC SURGERY STATUS:  - Patient presents with hemorrhoids 10 days post-op from sleeve gastrectomy revision with pain and swelling.           Followup next week with note in portal if not improving.         No follow-ups on file.    This note was generated with the assistance of ambient listening technology. Verbal consent was obtained by the patient and accompanying visitor(s) for the recording of patient appointment to facilitate this note. I attest to having reviewed and edited the generated note for accuracy, though some syntax or spelling errors may persist. Please contact the author of this note for any clarification.

## 2025-08-05 ENCOUNTER — OFFICE VISIT (OUTPATIENT)
Dept: OBSTETRICS AND GYNECOLOGY | Facility: CLINIC | Age: 45
End: 2025-08-05
Payer: COMMERCIAL

## 2025-08-05 VITALS
HEIGHT: 64 IN | WEIGHT: 253.81 LBS | BODY MASS INDEX: 43.33 KG/M2 | DIASTOLIC BLOOD PRESSURE: 81 MMHG | HEART RATE: 79 BPM | SYSTOLIC BLOOD PRESSURE: 167 MMHG

## 2025-08-05 DIAGNOSIS — D39.10 BORDERLINE EPITHELIAL NEOPLASM OF OVARY: ICD-10-CM

## 2025-08-05 DIAGNOSIS — C56.9 MUCINOUS TUMOR, OF LOW MALIGNANT POTENTIAL: ICD-10-CM

## 2025-08-05 DIAGNOSIS — N95.1 MENOPAUSAL SYMPTOMS: Primary | ICD-10-CM

## 2025-08-05 PROCEDURE — 3079F DIAST BP 80-89 MM HG: CPT | Mod: CPTII,S$GLB,, | Performed by: PHYSICIAN ASSISTANT

## 2025-08-05 PROCEDURE — 3077F SYST BP >= 140 MM HG: CPT | Mod: CPTII,S$GLB,, | Performed by: PHYSICIAN ASSISTANT

## 2025-08-05 PROCEDURE — 1159F MED LIST DOCD IN RCRD: CPT | Mod: CPTII,S$GLB,, | Performed by: PHYSICIAN ASSISTANT

## 2025-08-05 PROCEDURE — 99999 PR PBB SHADOW E&M-EST. PATIENT-LVL V: CPT | Mod: PBBFAC,,, | Performed by: PHYSICIAN ASSISTANT

## 2025-08-05 PROCEDURE — 1111F DSCHRG MED/CURRENT MED MERGE: CPT | Mod: CPTII,S$GLB,, | Performed by: PHYSICIAN ASSISTANT

## 2025-08-05 PROCEDURE — 3044F HG A1C LEVEL LT 7.0%: CPT | Mod: CPTII,S$GLB,, | Performed by: PHYSICIAN ASSISTANT

## 2025-08-05 PROCEDURE — 3008F BODY MASS INDEX DOCD: CPT | Mod: CPTII,S$GLB,, | Performed by: PHYSICIAN ASSISTANT

## 2025-08-05 PROCEDURE — 1160F RVW MEDS BY RX/DR IN RCRD: CPT | Mod: CPTII,S$GLB,, | Performed by: PHYSICIAN ASSISTANT

## 2025-08-05 PROCEDURE — 99213 OFFICE O/P EST LOW 20 MIN: CPT | Mod: S$GLB,,, | Performed by: PHYSICIAN ASSISTANT

## 2025-08-05 RX ORDER — ESTRADIOL 2 MG/1
2 TABLET ORAL DAILY
Qty: 30 TABLET | Refills: 4 | Status: SHIPPED | OUTPATIENT
Start: 2025-08-05 | End: 2026-08-05

## 2025-08-05 NOTE — PROGRESS NOTES
CC: HRT follow up    Fran Higgins is a 44 y.o. female  presents for  HRT follow up.  LMP: Patient's last menstrual period was 2020.   She is on HRT and wishes to continue. Reaction to estradiol patch and gel so went back to oral estradiol 2mg QD. Stopped progesterone because did not feel it was helping. She was feeling well with estradiol 2mg. She had gastric sleeve revision about 10 days ago and has been off HRT since 2 weeks prior to surgery. Surgery told her to hold HRT until 4 weeks post-op. She wishes to continue oral estradiol for now. Understands that there might be decreased absorption.   She has never had HPV vaccine and interested in starting series.   She has been cleared by gyn onc to follow up at [facility] annually.     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 Marky Fox in 2019.  Followed by Dr. Pro. Cleared by Dr. Pro.    Current Tx:  Estradiol 2mg QD  Progesterone 100mg QHS (discontinued.    Mammogram: 10/2/2024 TC 9.96%  Pap: s/p hyst  PCP: Erica Pineda MD     Routine Screening Labs: 2025  DEXA: 2025 normal  WWE: 5/15/2025 with Dr. Pro    Past Medical History:   Diagnosis Date    Abnormal Pap smear of cervix     Abnormal uterine bleeding (AUB) 2019    Amblyopia     Anxiety     Cervical scoliosis 1994    Severe    Depression     DUB (dysfunctional uterine bleeding) 2020    Fatigue     Galactorrhea in female- bilateral breast 2020    Galactorrhea of both breasts 2020    Hx of psychiatric care     Migraine with aura, not intractable 2020    Morbid obesity     Mucinous tumor, of low malignant potential 2019    Ovarian cyst     Pericardial cyst 2019    Psychiatric problem     Sleep difficulties     Surgical menopause 2020    Therapy     Vaginal yeast infection 2019      Past Surgical History:   Procedure Laterality Date    DV5 ROBOTIC MANNY PROCEDURE N/A 7/25/2025    Procedure: DV5 ROBOTIC MANNY PROCEDURE    SLEEVE TO MANNY;  Surgeon: Abad Muñoz MD;  [facility];  Service: General;  Laterality: N/A;  Surgeon to perform TAP block intraoperatively    EPIDURAL STEROID INJECTION INTO CERVICAL SPINE N/A 6/5/2025    Procedure: C6/7 ANGEL;  Surgeon: Arun Garcia MD;  Location: [facility] PAIN MANAGEMENT;  Service: Pain Management;  Laterality: N/A;  Wegovy 7 days no AC    ESOPHAGOGASTRODUODENOSCOPY N/A 2/3/2025    Procedure: EGD (ESOPHAGOGASTRODUODENOSCOPY);  Surgeon: Rajendra Bustamante MD;  Location: [facility] ENDO (4TH FLR);  Service: Endoscopy;  Laterality: N/A;  1/6 ref by Samantha Quezada NP, Wegovy, BMI 49.61, portal. betty  1/28-pt r/s,BMI 48.41, last dose of prilosec 1/19, last dose of wegovy 1/25, no allergy or sensitivity to jewelry/metal per pt, updated instructions sent to [facility]  1/29/25: attempted precall /    HYSTERECTOMY  03/2020    INJECTION, SPINE, LUMBOSACRAL, TRANSFORAMINAL APPROACH Right 10/24/2024    Procedure: TFESI RT L4/5, L5/S1;  Surgeon: Arun Garcia MD;  Location: [facility] PAIN MANAGEMENT;  Service: Pain Management;  Laterality: Right;  No ac    LAPAROSCOPIC SLEEVE GASTRECTOMY N/A 6/22/2021    Procedure: GASTRECTOMY, SLEEVE, LAPAROSCOPIC; with intraoperative egd;  Surgeon: Jaziel Whitman Jr., MD;  [facility];  Service: General;  Laterality: N/A;    PH MONITORING, ESOPHAGUS, WIRELESS, (OFF REFLUX MEDS) N/A 2/3/2025    Procedure: PH MONITORING, ESOPHAGUS, WIRELESS, (OFF REFLUX MEDS);  Surgeon: Rajendra Bustamante MD;  Location: [facility] ENDO (4TH FLR);  Service: Endoscopy;  Laterality: N/A;  48 Hour  Off PPI/H2 Blocker    ROBOT-ASSISTED LAPAROSCOPIC ABDOMINAL HYSTERECTOMY USING DA ROMAIN XI N/A 3/9/2020    Procedure: XI ROBOTIC HYSTERECTOMY;  Surgeon: Jeff Kaufman MD;  Location: [facility] OR [address] [place] [address];  Service: Oncology;  Laterality: N/A;     "ROBOT-ASSISTED LAPAROSCOPIC SALPINGO-OOPHORECTOMY Left 2019    Procedure: ROBOTIC SALPINGO-OOPHORECTOMY;  Surgeon: Marky Fox Jr., MD;  Location: [facility];  Service: OB/GYN;  Laterality: Left;    SALPINGOOPHORECTOMY Right 3/9/2020    Procedure: SALPINGO-OOPHORECTOMY  USO;  Surgeon: Jeff Kaufman MD;  Location: [address];  Service: Oncology;  Laterality: Right;    TONSILLECTOMY       Social History[1]  Family History   Problem Relation Name Age of Onset    Diabetes Paternal Grandfather      Macular degeneration Maternal Grandmother 93     Cancer Maternal Grandfather          prostate cancer    Hypertension Father      Depression Father      Drug abuse Father      Hypertension Mother      Stroke Mother  60        ASD, PFO    Depression Mother      Cancer Other paternal Great grandmoth         uterine cancer     Uterine cancer Other pat great GM     Depression Sister      Colon cancer Neg Hx      Ovarian cancer Neg Hx      Breast cancer Neg Hx      Glaucoma Neg Hx      Melanoma Neg Hx      Heart attack Neg Hx      Heart disease Neg Hx      Hyperlipidemia Neg Hx       OB History          1    Para   1    Term   1       0    AB   0    Living   1         SAB   0    IAB   0    Ectopic   0    Multiple   0    Live Births   1               Current Medications[2]    The 10-year ASCVD risk score (Christofer HUITRON, et al., 2019) is: 1.7%    Values used to calculate the score:      Age: 44 years      Sex: Female      Is Non- : No      Diabetic: No      Tobacco smoker: No      Systolic Blood Pressure: 167 mmHg      Is BP treated: No      HDL Cholesterol: 54 mg/dL      Total Cholesterol: 248 mg/dL    BP (!) 167/81 (Patient Position: Sitting)   Pulse 79   Ht 5' 4" (1.626 m)   Wt 115.1 kg (253 lb 12.8 oz)   LMP 2020   BMI 43.56 kg/m²       ROS:  GENERAL: Denies weight gain or weight loss. Feeling well overall.   SKIN: Denies rash or lesions.   HEAD: Denies head injury or " headache.   NODES: Denies enlarged lymph nodes.   CHEST: Denies chest pain or shortness of breath.   CARDIOVASCULAR: Denies palpitations or left sided chest pain.   ABDOMEN: No abdominal pain, constipation, diarrhea, nausea, vomiting or rectal bleeding.   URINARY: No frequency, dysuria, hematuria, or burning on urination.  REPRODUCTIVE: See HPI.   BREASTS: Denies pain, lumps, or nipple discharge.   HEMATOLOGIC: No easy bruisability or excessive bleeding.   MUSCULOSKELETAL: Denies joint pain or swelling.   NEUROLOGIC: Denies syncope or weakness.   PSYCHIATRIC: Denies depression, anxiety or mood swings.    PHYSICAL EXAM:  APPEARANCE: Well nourished, well developed, in no acute distress.  AFFECT: WNL, alert and oriented x 3      ASSESSMENT:   Menopausal symptoms  -     Estradiol; Future; Expected date: 08/05/2025  -     estradioL (ESTRACE) 2 MG tablet; Take 1 tablet (2 mg total) by mouth once daily.  Dispense: 30 tablet; Refill: 4    Mucinous tumor, of low malignant potential    Borderline epithelial neoplasm of ovary          PLAN:   Restart estradiol 2mg QD 4 weeks postop   Worry about absorption of oral estradiol after bypass surgery  Order to initiate HPV vaccine provided.  Follow up in 4 months with estradiol level 1 week prior.     Order to start HPV vaccine seriers.  Patient was counseled today on A.C.S. Pap guidelines and recommendations for yearly pelvic exams, mammograms and monthly self breast exams; to see her PCP for other health maintenance.                             [1]   Social History  Socioeconomic History    Marital status: Single    Number of children: 1   Occupational History    Occupation: Nurse circulator   Tobacco Use    Smoking status: Never     Passive exposure: Never    Smokeless tobacco: Never   Substance and Sexual Activity    Alcohol use: Yes     Comment: 2-3 drinks twice a year    Drug use: No    Sexual activity: Not Currently     Partners: Male     Birth control/protection: None,  Condom   Other Topics Concern    Are you pregnant or think you may be? No    Breast-feeding No     Social Drivers of Health     Financial Resource Strain: Low Risk  (7/26/2025)    Overall Financial Resource Strain (CARDIA)     Difficulty of Paying Living Expenses: Not hard at all   Food Insecurity: No Food Insecurity (7/26/2025)    Hunger Vital Sign     Worried About Running Out of Food in the Last Year: Never true     Ran Out of Food in the Last Year: Never true   Transportation Needs: No Transportation Needs (7/26/2025)    PRAPARE - Transportation     Lack of Transportation (Medical): No     Lack of Transportation (Non-Medical): No   Physical Activity: Inactive (7/26/2025)    Exercise Vital Sign     Days of Exercise per Week: 0 days     Minutes of Exercise per Session: 0 min   Stress: Stress Concern Present (7/26/2025)    [employer] - Occupational Stress Questionnaire     Feeling of Stress : Rather much   Housing Stability: Low Risk  (7/26/2025)    Housing Stability Vital Sign     Unable to Pay for Housing in the Last Year: No     Homeless in the Last Year: No   [2]   Current Outpatient Medications:     adalimumab-adaz 40 mg/0.4 mL PnIj, Inject 0.4 mLs (40 mg total) into the skin every 7 days., Disp: 4 pen , Rfl: 4    ALPRAZolam (XANAX) 0.5 MG tablet, Take 1 tablet (0.5 mg total) by mouth nightly as needed for Insomnia or Anxiety., Disp: 30 tablet, Rfl: 2    b complex vitamins capsule, Take 1 capsule by mouth once daily., Disp: , Rfl:     CALCIUM CITRATE ORAL, Take 1 tablet by mouth 3 (three) times daily. chewable, Disp: , Rfl:     clindamycin (CLEOCIN T) 1 % lotion, Apply to affected area two times a day., Disp: 120 mL, Rfl: 0    cyclobenzaprine (FLEXERIL) 5 MG tablet, Take 1 tablet (5 mg total) by mouth 2 (two) times daily as needed for Muscle spasms., Disp: 60 tablet, Rfl: 5    estradioL (ESTRACE) 2 MG tablet, Take 1 tablet (2 mg total) by mouth once daily., Disp: 30 tablet, Rfl:  4    gabapentin (NEURONTIN) 250 mg/5 mL solution, Take 6 mLs (300 mg total) by mouth 2 (two) times a day., Disp: 66 mL, Rfl: 0    hpv vaccine,9-ajit (GARDASIL 9, PF,) 0.5 mL Syrg, Inject 0.5 mLs into the muscle once. For one dose. for 1 dose, Disp: 0.5 mL, Rfl: 2    hydrocortisone (ANUSOL-HC) 25 mg suppository, Place 1 suppository (25 mg total) rectally 2 (two) times daily. for 10 days, Disp: 20 suppository, Rfl: 0    metFORMIN (GLUCOPHAGE) 500 MG tablet, Take 1 tablet (500 mg total) by mouth daily with breakfast., Disp: 30 tablet, Rfl: 1    metFORMIN (GLUCOPHAGE-XR) 500 MG ER 24hr tablet, Take 2 tablets by mouth daily., Disp: 180 tablet, Rfl: 1    multivitamin capsule, Take by mouth once daily., Disp: , Rfl:     omeprazole (PRILOSEC) 40 MG capsule, Take 1 capsule (40 mg total) by mouth every morning. Open capsule and take with apple sauce, Disp: 30 capsule, Rfl: 2    omeprazole (PRILOSEC) 40 MG capsule, Take 1 capsule (40 mg total) by mouth every morning., Disp: 60 capsule, Rfl: 0    ondansetron (ZOFRAN-ODT) 8 MG TbDL, Dissolve 1 tablet (8 mg total) by mouth every 6 (six) hours as needed (Nausea)., Disp: 30 tablet, Rfl: 0    ondansetron (ZOFRAN-ODT) 8 MG TbDL, Take 1 tablet (8 mg total) by mouth every 6 (six) hours as needed., Disp: 30 tablet, Rfl: 0    oxyCODONE (ROXICODONE) 5 mg/5 mL Soln, Take 5 mLs (5 mg total) by mouth every 4 (four) hours as needed., Disp: 15 mL, Rfl: 0    polyethylene glycol (GLYCOLAX) 17 gram/dose powder, Use to cap to measure 17g, mix with liquid, and take by mouth once daily., Disp: 510 g, Rfl: 0    polyethylene glycol (GLYCOLAX) 17 gram/dose powder, Take 17 g by mouth once daily., Disp: 116 g, Rfl: 2    pregabalin (LYRICA) 50 MG capsule, Take 1 capsule (50 mg total) by mouth every evening for 14 days, THEN 1 capsule (50 mg total) 2 (two) times daily for 16 days., Disp: 46 capsule, Rfl: 0    promethazine (PHENERGAN) 12.5 MG Supp, Place 1 suppository (12.5 mg total) rectally every 6 (six)  hours as needed (nausea, 2nd line)., Disp: 5 suppository, Rfl: 0    semaglutide, weight loss, (WEGOVY) 2.4 mg/0.75 mL PnIj, Inject 2.4 mg into the skin every 7 days., Disp: 3 mL, Rfl: 5    spironolactone (ALDACTONE) 100 MG tablet, Take 1 tablet by mouth every day, Disp: 90 tablet, Rfl: 1    thiamine (VITAMIN B-1) 50 MG tablet, Take 50 mg by mouth once daily., Disp: , Rfl:     ubrogepant (UBRELVY) 100 mg tablet, Take 1 tablet by mouth at the onset of a headache. May repeat based on response and tolerability after more than 2 hours if needed. Do not take more than 200mg in a 24 hour span., Disp: 16 tablet, Rfl: 5    ubrogepant (UBRELVY) 100 mg tablet, Take 1 tablet by mouth at the onset of a headache. May repeat based on response and tolerability after more than 2 hours if needed. Do not take more than 200mg in a 24 hour span., Disp: 16 tablet, Rfl: 5    ursodioL (BARBARA FORTE) 500 MG tablet, Take 1 tablet (500 mg total) by mouth once daily. For gallstone prevention., Disp: 180 tablet, Rfl: 0    ursodioL (BARBARA FORTE) 500 MG tablet, Crush one tablet daily for gall bladder. Please compound into liquid and supply for 90 days if insurance approves., Disp: 90 tablet, Rfl: 1    venlafaxine (EFFEXOR) 75 MG tablet, Take 1 tablet (75 mg total) by mouth 2 (two) times daily., Disp: 60 tablet, Rfl: 1    venlafaxine (EFFEXOR-XR) 150 MG Cp24, Take 1 capsule (150 mg total) by mouth once daily., Disp: 90 capsule, Rfl: 3

## 2025-08-08 DIAGNOSIS — L73.2 HIDRADENITIS: ICD-10-CM

## 2025-08-08 RX ORDER — ADALIMUMAB-ADAZ 40 MG/.4ML
40 INJECTION, SOLUTION SUBCUTANEOUS
Qty: 4 PEN | Refills: 4 | Status: SHIPPED | OUTPATIENT
Start: 2025-08-08

## 2025-08-11 ENCOUNTER — CLINICAL SUPPORT (OUTPATIENT)
Dept: BARIATRICS | Facility: CLINIC | Age: 45
End: 2025-08-11
Payer: COMMERCIAL

## 2025-08-11 ENCOUNTER — OFFICE VISIT (OUTPATIENT)
Dept: BARIATRICS | Facility: CLINIC | Age: 45
End: 2025-08-11
Payer: COMMERCIAL

## 2025-08-11 ENCOUNTER — LAB VISIT (OUTPATIENT)
Dept: LAB | Facility: HOSPITAL | Age: 45
End: 2025-08-11
Attending: SURGERY
Payer: COMMERCIAL

## 2025-08-11 VITALS
DIASTOLIC BLOOD PRESSURE: 83 MMHG | HEIGHT: 64 IN | BODY MASS INDEX: 42.49 KG/M2 | HEART RATE: 82 BPM | WEIGHT: 248.88 LBS | SYSTOLIC BLOOD PRESSURE: 115 MMHG | OXYGEN SATURATION: 97 %

## 2025-08-11 DIAGNOSIS — R63.4 WEIGHT LOSS: ICD-10-CM

## 2025-08-11 DIAGNOSIS — Z98.890 POST-OPERATIVE STATE: Primary | ICD-10-CM

## 2025-08-11 DIAGNOSIS — E66.813 CLASS 3 SEVERE OBESITY DUE TO EXCESS CALORIES WITH SERIOUS COMORBIDITY AND BODY MASS INDEX (BMI) OF 45.0 TO 49.9 IN ADULT: ICD-10-CM

## 2025-08-11 DIAGNOSIS — K21.9 GASTROESOPHAGEAL REFLUX DISEASE WITHOUT ESOPHAGITIS: ICD-10-CM

## 2025-08-11 DIAGNOSIS — K21.9 GASTROESOPHAGEAL REFLUX DISEASE, UNSPECIFIED WHETHER ESOPHAGITIS PRESENT: ICD-10-CM

## 2025-08-11 DIAGNOSIS — Z98.84 S/P BARIATRIC SURGERY: ICD-10-CM

## 2025-08-11 DIAGNOSIS — Z71.3 DIETARY COUNSELING AND SURVEILLANCE: Primary | ICD-10-CM

## 2025-08-11 DIAGNOSIS — E66.01 MORBID OBESITY WITH BODY MASS INDEX (BMI) OF 40.0 OR HIGHER: ICD-10-CM

## 2025-08-11 DIAGNOSIS — Z01.818 PRE-OP TESTING: ICD-10-CM

## 2025-08-11 DIAGNOSIS — E66.01 MORBID OBESITY WITH BMI OF 45.0-49.9, ADULT: ICD-10-CM

## 2025-08-11 DIAGNOSIS — Z98.84 S/P LAPAROSCOPIC SLEEVE GASTRECTOMY: ICD-10-CM

## 2025-08-11 LAB
ABSOLUTE EOSINOPHIL (OHS): 0.14 K/UL
ABSOLUTE MONOCYTE (OHS): 0.56 K/UL (ref 0.3–1)
ABSOLUTE NEUTROPHIL COUNT (OHS): 3.08 K/UL (ref 1.8–7.7)
ALBUMIN SERPL BCP-MCNC: 4.3 G/DL (ref 3.5–5.2)
ALP SERPL-CCNC: 80 UNIT/L (ref 40–150)
ALT SERPL W/O P-5'-P-CCNC: 23 UNIT/L (ref 0–55)
ANION GAP (OHS): 9 MMOL/L (ref 8–16)
AST SERPL-CCNC: 16 UNIT/L (ref 0–50)
BASOPHILS # BLD AUTO: 0.05 K/UL
BASOPHILS NFR BLD AUTO: 0.8 %
BILIRUB SERPL-MCNC: 0.4 MG/DL (ref 0.1–1)
BUN SERPL-MCNC: 13 MG/DL (ref 6–20)
CALCIUM SERPL-MCNC: 9.7 MG/DL (ref 8.7–10.5)
CHLORIDE SERPL-SCNC: 105 MMOL/L (ref 95–110)
CO2 SERPL-SCNC: 28 MMOL/L (ref 23–29)
CREAT SERPL-MCNC: 0.9 MG/DL (ref 0.5–1.4)
ERYTHROCYTE [DISTWIDTH] IN BLOOD BY AUTOMATED COUNT: 12.4 % (ref 11.5–14.5)
GFR SERPLBLD CREATININE-BSD FMLA CKD-EPI: >60 ML/MIN/1.73/M2
GLUCOSE SERPL-MCNC: 99 MG/DL (ref 70–110)
HCT VFR BLD AUTO: 41.6 % (ref 37–48.5)
HGB BLD-MCNC: 13.6 GM/DL (ref 12–16)
IMM GRANULOCYTES # BLD AUTO: 0.01 K/UL (ref 0–0.04)
IMM GRANULOCYTES NFR BLD AUTO: 0.2 % (ref 0–0.5)
LYMPHOCYTES # BLD AUTO: 2.41 K/UL (ref 1–4.8)
MCH RBC QN AUTO: 28.3 PG (ref 27–31)
MCHC RBC AUTO-ENTMCNC: 32.7 G/DL (ref 32–36)
MCV RBC AUTO: 87 FL (ref 82–98)
NUCLEATED RBC (/100WBC) (OHS): 0 /100 WBC
PLATELET # BLD AUTO: 223 K/UL (ref 150–450)
PMV BLD AUTO: 12.4 FL (ref 9.2–12.9)
POTASSIUM SERPL-SCNC: 4.5 MMOL/L (ref 3.5–5.1)
PROT SERPL-MCNC: 7 GM/DL (ref 6–8.4)
RBC # BLD AUTO: 4.8 M/UL (ref 4–5.4)
RELATIVE EOSINOPHIL (OHS): 2.2 %
RELATIVE LYMPHOCYTE (OHS): 38.6 % (ref 18–48)
RELATIVE MONOCYTE (OHS): 9 % (ref 4–15)
RELATIVE NEUTROPHIL (OHS): 49.2 % (ref 38–73)
SODIUM SERPL-SCNC: 142 MMOL/L (ref 136–145)
VIT B12 SERPL-MCNC: 691 PG/ML (ref 210–950)
WBC # BLD AUTO: 6.25 K/UL (ref 3.9–12.7)

## 2025-08-11 PROCEDURE — 85025 COMPLETE CBC W/AUTO DIFF WBC: CPT

## 2025-08-11 PROCEDURE — 99999 PR PBB SHADOW E&M-EST. PATIENT-LVL I: CPT | Mod: PBBFAC,,, | Performed by: DIETITIAN, REGISTERED

## 2025-08-11 PROCEDURE — 36415 COLL VENOUS BLD VENIPUNCTURE: CPT

## 2025-08-11 PROCEDURE — 99499 UNLISTED E&M SERVICE: CPT | Mod: S$GLB,,, | Performed by: DIETITIAN, REGISTERED

## 2025-08-11 PROCEDURE — 99999 PR PBB SHADOW E&M-EST. PATIENT-LVL V: CPT | Mod: PBBFAC,,, | Performed by: PHYSICIAN ASSISTANT

## 2025-08-11 PROCEDURE — 82040 ASSAY OF SERUM ALBUMIN: CPT

## 2025-08-11 PROCEDURE — 99024 POSTOP FOLLOW-UP VISIT: CPT | Mod: S$GLB,,, | Performed by: PHYSICIAN ASSISTANT

## 2025-08-11 PROCEDURE — 84425 ASSAY OF VITAMIN B-1: CPT

## 2025-08-11 PROCEDURE — 3044F HG A1C LEVEL LT 7.0%: CPT | Mod: CPTII,S$GLB,, | Performed by: PHYSICIAN ASSISTANT

## 2025-08-11 PROCEDURE — 3074F SYST BP LT 130 MM HG: CPT | Mod: CPTII,S$GLB,, | Performed by: PHYSICIAN ASSISTANT

## 2025-08-11 PROCEDURE — 1159F MED LIST DOCD IN RCRD: CPT | Mod: CPTII,S$GLB,, | Performed by: PHYSICIAN ASSISTANT

## 2025-08-11 PROCEDURE — 82607 VITAMIN B-12: CPT

## 2025-08-11 PROCEDURE — 3079F DIAST BP 80-89 MM HG: CPT | Mod: CPTII,S$GLB,, | Performed by: PHYSICIAN ASSISTANT

## 2025-08-12 ENCOUNTER — PATIENT MESSAGE (OUTPATIENT)
Dept: BARIATRICS | Facility: CLINIC | Age: 45
End: 2025-08-12
Payer: COMMERCIAL

## 2025-08-12 ENCOUNTER — OFFICE VISIT (OUTPATIENT)
Dept: PSYCHIATRY | Facility: CLINIC | Age: 45
End: 2025-08-12
Payer: COMMERCIAL

## 2025-08-12 DIAGNOSIS — F43.21 GRIEF: Primary | ICD-10-CM

## 2025-08-12 DIAGNOSIS — D39.10 BORDERLINE EPITHELIAL NEOPLASM OF OVARY: ICD-10-CM

## 2025-08-12 PROCEDURE — 1159F MED LIST DOCD IN RCRD: CPT | Mod: CPTII,95,, | Performed by: PSYCHOLOGIST

## 2025-08-12 PROCEDURE — 90832 PSYTX W PT 30 MINUTES: CPT | Mod: 95,,, | Performed by: PSYCHOLOGIST

## 2025-08-12 PROCEDURE — 3044F HG A1C LEVEL LT 7.0%: CPT | Mod: CPTII,95,, | Performed by: PSYCHOLOGIST

## 2025-08-13 RX ORDER — CYCLOBENZAPRINE HCL 5 MG
5 TABLET ORAL 2 TIMES DAILY PRN
Qty: 60 TABLET | Refills: 5 | Status: SHIPPED | OUTPATIENT
Start: 2025-08-13

## 2025-08-14 LAB — W VITAMIN B1: 56 UG/L

## 2025-08-20 DIAGNOSIS — L73.2 HIDRADENITIS SUPPURATIVA: ICD-10-CM

## 2025-08-20 RX ORDER — CLINDAMYCIN PHOSPHATE 10 UG/ML
LOTION TOPICAL
Qty: 120 ML | Refills: 0 | OUTPATIENT
Start: 2025-08-20

## 2025-08-22 ENCOUNTER — PATIENT MESSAGE (OUTPATIENT)
Dept: BARIATRICS | Facility: CLINIC | Age: 45
End: 2025-08-22
Payer: COMMERCIAL

## 2025-08-22 ENCOUNTER — HOSPITAL ENCOUNTER (OUTPATIENT)
Dept: RADIOLOGY | Facility: HOSPITAL | Age: 45
Discharge: HOME | End: 2025-08-22
Attending: PHYSICIAN ASSISTANT
Payer: COMMERCIAL

## 2025-08-22 DIAGNOSIS — R10.11 RUQ PAIN: ICD-10-CM

## 2025-08-22 DIAGNOSIS — R10.11 RUQ PAIN: Primary | ICD-10-CM

## 2025-08-22 PROCEDURE — 76700 US EXAM ABDOM COMPLETE: CPT | Mod: 26,,, | Performed by: RADIOLOGY

## 2025-08-22 PROCEDURE — 76700 US EXAM ABDOM COMPLETE: CPT | Mod: TC,PO

## 2025-08-23 DIAGNOSIS — L73.2 HIDRADENITIS SUPPURATIVA: ICD-10-CM

## 2025-08-25 ENCOUNTER — PATIENT MESSAGE (OUTPATIENT)
Dept: BARIATRICS | Facility: CLINIC | Age: 45
End: 2025-08-25
Payer: COMMERCIAL

## 2025-08-25 DIAGNOSIS — R10.9 ABDOMINAL PAIN, UNSPECIFIED ABDOMINAL LOCATION: Primary | ICD-10-CM

## 2025-08-25 RX ORDER — TRAMADOL HYDROCHLORIDE 50 MG/1
50 TABLET, FILM COATED ORAL EVERY 8 HOURS PRN
Qty: 15 TABLET | Refills: 0 | Status: SHIPPED | OUTPATIENT
Start: 2025-08-25 | End: 2025-08-26

## 2025-08-25 RX ORDER — CLINDAMYCIN PHOSPHATE 10 UG/ML
LOTION TOPICAL
Qty: 120 ML | Refills: 0 | OUTPATIENT
Start: 2025-08-25

## 2025-08-26 ENCOUNTER — HOSPITAL ENCOUNTER (OUTPATIENT)
Dept: RADIOLOGY | Facility: HOSPITAL | Age: 45
Discharge: HOME | End: 2025-08-26
Attending: PHYSICIAN ASSISTANT
Payer: COMMERCIAL

## 2025-08-26 DIAGNOSIS — R10.11 RIGHT UPPER QUADRANT ABDOMINAL PAIN: Primary | ICD-10-CM

## 2025-08-26 DIAGNOSIS — R10.9 ABDOMINAL PAIN, UNSPECIFIED ABDOMINAL LOCATION: ICD-10-CM

## 2025-08-26 DIAGNOSIS — L73.2 HIDRADENITIS SUPPURATIVA: ICD-10-CM

## 2025-08-26 PROCEDURE — 25500020 PHARM REV CODE 255: Performed by: PHYSICIAN ASSISTANT

## 2025-08-26 PROCEDURE — 74177 CT ABD & PELVIS W/CONTRAST: CPT | Mod: TC

## 2025-08-26 PROCEDURE — 74177 CT ABD & PELVIS W/CONTRAST: CPT | Mod: 26,,, | Performed by: RADIOLOGY

## 2025-08-26 RX ORDER — CLINDAMYCIN PHOSPHATE 10 UG/ML
LOTION TOPICAL
Qty: 120 ML | Refills: 0 | OUTPATIENT
Start: 2025-08-26

## 2025-08-26 RX ORDER — TRAMADOL HYDROCHLORIDE 50 MG/1
50 TABLET, FILM COATED ORAL EVERY 6 HOURS
Qty: 21 TABLET | Refills: 0 | Status: SHIPPED | OUTPATIENT
Start: 2025-08-26

## 2025-08-26 RX ADMIN — IOHEXOL 100 ML: 350 INJECTION, SOLUTION INTRAVENOUS at 10:08

## 2025-08-26 RX ADMIN — IOHEXOL 15 ML: 300 INJECTION, SOLUTION INTRAVENOUS at 10:08

## 2025-09-02 ENCOUNTER — PATIENT MESSAGE (OUTPATIENT)
Dept: BARIATRICS | Facility: CLINIC | Age: 45
End: 2025-09-02
Payer: COMMERCIAL

## 2025-09-03 DIAGNOSIS — L73.2 HIDRADENITIS SUPPURATIVA: ICD-10-CM

## 2025-09-04 RX ORDER — CLINDAMYCIN PHOSPHATE 10 UG/ML
LOTION TOPICAL
Qty: 120 ML | Refills: 0 | Status: SHIPPED | OUTPATIENT
Start: 2025-09-04

## (undated) DEVICE — NDL HYPO REG 25G X 1 1/2

## (undated) DEVICE — CLOSURE SKIN STERI STRIP 1/4X4

## (undated) DEVICE — SHEARS HARMONIC 5CM 36CM

## (undated) DEVICE — TROCAR ENDOPATH XCEL 5X100MM

## (undated) DEVICE — SOL ELECTROLUBE ANTI-STIC

## (undated) DEVICE — APPLICATOR CHLORAPREP ORN 26ML

## (undated) DEVICE — PENCIL ROCKER SWITCH 10FT CORD

## (undated) DEVICE — LUBRICANT SURGILUBE 2 OZ

## (undated) DEVICE — SYR 10CC LUER LOCK

## (undated) DEVICE — COVER TIP CURVED SCISSORS XI

## (undated) DEVICE — TRAY FOLEY 16FR INFECTION CONT

## (undated) DEVICE — SUT VICRYL+ 27 UR-6 VIOL

## (undated) DEVICE — SEALER VESSEL EXTEND

## (undated) DEVICE — SYS SMOKE EVACUATION LAP

## (undated) DEVICE — ELECTRODE REM PLYHSV RETURN 9

## (undated) DEVICE — SUT 0 54IN COATED VICRYL U

## (undated) DEVICE — BAG INZII TISS RETRV 12/15MM

## (undated) DEVICE — SEE MEDLINE ITEM 157131

## (undated) DEVICE — STAPLER SUREFORM 60 SPU

## (undated) DEVICE — RELOAD SUREFORM 60 3.5 BLU 6R

## (undated) DEVICE — SUT 0 VICRYL / UR6 (J603)

## (undated) DEVICE — SOL NS 1000CC

## (undated) DEVICE — STAPLER ECHELON FLEX 60MM 44CM

## (undated) DEVICE — LOOP VESSEL YELLOW MAXI

## (undated) DEVICE — Device

## (undated) DEVICE — SEE MEDLINE ITEM 157110

## (undated) DEVICE — SEE MEDLINE ITEM 157117

## (undated) DEVICE — SEAL UNIVERSAL 5MM-8MM XI

## (undated) DEVICE — GOWN SURGICAL X-LARGE

## (undated) DEVICE — DRAPE SCOPE PILLOW WARMER

## (undated) DEVICE — SUT CTD VICRYL VIL BR SH 27

## (undated) DEVICE — LEGGINGS 48X31 INCH

## (undated) DEVICE — SOL WATER STRL IRR 1000ML

## (undated) DEVICE — SEE MEDLINE ITEM 146417

## (undated) DEVICE — BLADE SURG CARBON STEEL SZ11

## (undated) DEVICE — COVER LIGHT HANDLE 80/CA

## (undated) DEVICE — GOWN POLY REINF BRTH SLV XL

## (undated) DEVICE — CANNULA ENDOPATH XCEL 5X100MM

## (undated) DEVICE — NDL INSUF ULTRA VERESS 120MM

## (undated) DEVICE — SOL STRL WATER INJ 1000ML BG

## (undated) DEVICE — SEE MEDLINE ITEM 157181

## (undated) DEVICE — TRAY MINOR GEN SURG OMC

## (undated) DEVICE — DRAPE ARM DAVINCI XI

## (undated) DEVICE — CANNULA SEAL 12MM

## (undated) DEVICE — KIT PROCEDURE STER INLET CLOSU

## (undated) DEVICE — MANIPULATOR VCARE PLUS 37MM LG

## (undated) DEVICE — WARMER DRAPE STERILE LF

## (undated) DEVICE — CLIPPER BLADE MOD 4406 (CAREF)

## (undated) DEVICE — SUT MCRYL PLUS 4-0 PS2 27IN

## (undated) DEVICE — SPONGE LAP 18X18 PREWASHED

## (undated) DEVICE — DRAPE ABDOMINAL TIBURON 14X11

## (undated) DEVICE — SEE MEDLINE ITEM 154981

## (undated) DEVICE — PORT ACCESS 8MM W/120MM LOW

## (undated) DEVICE — SUT CTD VICRYL 0 UND BR

## (undated) DEVICE — GLOVE BIOGEL SKINSENSE PI 6.5

## (undated) DEVICE — SUT 1 48IN PDS II VIO MONO

## (undated) DEVICE — SUT EASE CROSSBOW CLSR SYS

## (undated) DEVICE — SET TRI-LUMEN FILTERED TUBE

## (undated) DEVICE — SEE MEDLINE ITEM 152487

## (undated) DEVICE — SUT CTD VICRYL 0 VIL BR/CT

## (undated) DEVICE — IRRIGATOR ENDOSCOPY DISP.

## (undated) DEVICE — JELLY SURGILUBE 5GR

## (undated) DEVICE — TROCAR ENDOPATH XCEL 12MM 10CM

## (undated) DEVICE — DRAPE STERI INSTRUMENT 1018

## (undated) DEVICE — SEE MEDLINE ITEM 156902

## (undated) DEVICE — SUT CTD VICRYL VIL BR CR/SH

## (undated) DEVICE — STRIP STERI REIN CLSR 1/2X2IN

## (undated) DEVICE — RELOAD ECHELON FLEX BLU 60MM

## (undated) DEVICE — SUT 2/0 36IN ETHIBOND EXCE

## (undated) DEVICE — NDL 20GX1-1/2IN IB

## (undated) DEVICE — COVER LIGHT HANDLE

## (undated) DEVICE — SET DECANTER MEDICHOICE

## (undated) DEVICE — TIP RUMI BLUE DISPOSABLE 5/BX

## (undated) DEVICE — DRAPE COLUMN DAVINCI XI

## (undated) DEVICE — DRESSING ABSRBNT ISLAND 3.6X8

## (undated) DEVICE — OBTURATOR BLADELESS 8MM XI CLR

## (undated) DEVICE — SUT VLOC 180 2-0 GS-22 NDL

## (undated) DEVICE — PILLOW TROOP ELEVATION DISP

## (undated) DEVICE — RELOAD ECHELON FLEX GRN 60MM

## (undated) DEVICE — SEE MEDLINE ITEM 152622

## (undated) DEVICE — SEE MEDLINE ITEM 157148

## (undated) DEVICE — UNDERGLOVES BIOGEL PI SZ 7 LF

## (undated) DEVICE — ADHESIVE DERMABOND ADVANCED

## (undated) DEVICE — CANNULA REDUCER 12-8MM

## (undated) DEVICE — TRAY MINOR GEN SURG

## (undated) DEVICE — SYR 30CC LUER LOCK

## (undated) DEVICE — TUBING HF INSUFFLATION W/ FLTR

## (undated) DEVICE — SEE MEDLINE ITEM 156923

## (undated) DEVICE — TROCAR ENDOPATH XCEL 12X100MM

## (undated) DEVICE — DRAPE CORETEMP FLD WRM 56X62IN

## (undated) DEVICE — ELECTRODE MEGADYNE RETURN DUAL

## (undated) DEVICE — SOL CLEARIFY VISUALIZATION LAP

## (undated) DEVICE — ADHESIVE MASTISOL VIAL 48/BX

## (undated) DEVICE — OBTURATOR BLADELESS 8MM XI

## (undated) DEVICE — SYR 50CC LL

## (undated) DEVICE — SUT 2/0 54IN COATED VICRYL

## (undated) DEVICE — SUT MONOCRYL 4-0 PS-2

## (undated) DEVICE — BAG TISSUE RETRIEVAL 225ML